# Patient Record
Sex: FEMALE | Race: WHITE | Employment: OTHER | URBAN - NONMETROPOLITAN AREA
[De-identification: names, ages, dates, MRNs, and addresses within clinical notes are randomized per-mention and may not be internally consistent; named-entity substitution may affect disease eponyms.]

---

## 2017-01-03 ENCOUNTER — TELEPHONE (OUTPATIENT)
Dept: ONCOLOGY | Facility: OTHER | Age: 82
End: 2017-01-03

## 2017-01-03 ENCOUNTER — ONCOLOGY VISIT (OUTPATIENT)
Dept: ONCOLOGY | Facility: OTHER | Age: 82
End: 2017-01-03
Attending: NURSE PRACTITIONER
Payer: MEDICARE

## 2017-01-03 VITALS
WEIGHT: 132 LBS | OXYGEN SATURATION: 94 % | SYSTOLIC BLOOD PRESSURE: 130 MMHG | BODY MASS INDEX: 22.53 KG/M2 | DIASTOLIC BLOOD PRESSURE: 80 MMHG | TEMPERATURE: 98.2 F | RESPIRATION RATE: 18 BRPM | HEART RATE: 88 BPM | HEIGHT: 64 IN

## 2017-01-03 DIAGNOSIS — R97.8 ELEVATED TUMOR MARKERS: ICD-10-CM

## 2017-01-03 DIAGNOSIS — C50.911 MALIGNANT NEOPLASM OF RIGHT FEMALE BREAST, UNSPECIFIED SITE OF BREAST: Primary | ICD-10-CM

## 2017-01-03 DIAGNOSIS — M81.0 OSTEOPOROSIS: ICD-10-CM

## 2017-01-03 PROCEDURE — 99212 OFFICE O/P EST SF 10 MIN: CPT

## 2017-01-03 PROCEDURE — 99213 OFFICE O/P EST LOW 20 MIN: CPT | Performed by: NURSE PRACTITIONER

## 2017-01-03 ASSESSMENT — PAIN SCALES - GENERAL: PAINLEVEL: NO PAIN (0)

## 2017-01-03 NOTE — PROGRESS NOTES
Oncology Follow-up Visit:  January 3, 2017    Reason for Visit:  Patient presents with:  RECHECK: 6 month follow up breast and scan and labs     Nursing Note and documentation reviewed: yes    HPI:  This is a 81-year-old female patient who presents to the oncology clinic today in followup of breast cancer.  She was diagnosed with carcinoma of the right breast in April 2010; she underwent mastectomy; tumor was ER/MT positive, HER-2/nery negative. She remains on Arimidex 1mg daily.  She states she is feeling well and has no new complaints.  She has ongoing pain with the left shoulder that has not changed and is long standing.  She continues on calcium with vitamin D twice a day.  She had a DEXA scan completed prior to this visit.    Oncologic History: Michael presented in April 2010 with an abnormal mammogram. She underwent a right simple mastectomy and sentinel node biopsy on 4/15/2010 by Dr. Rogelio Pina. 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 [place] node biopsy negative. She was then seen initially by Dr. Malin, oncology, on 5/4/2010 and patient was placed on tamoxifen 20 mg daily. Patient remained on tamoxifen until May of 2012 and had been complaining of cramps in her calves as well as hot flashes and she was switched to Arimidex 1 mg daily. She continues on the Arimidex to date and has been followed with surveillance.     Current Chemo Regime/TX: Arimidex 1mg daily started May 2012    Current Cycle: n/a  # of completed cycles: n/a    Previous treatment: Started tamoxifen May 2010 and switched to Arimidex 1mg daily May 2012     Past Medical History   Diagnosis Date     Arthritis      Hypertension      Shortness of breath 6/18/2007     Unspecified essential hypertension 8/30/2006     Osteoarthrosis, unspecified whether generalized or localized, lower leg 8/14/2006     Other and unspecified hyperlipidemia 10/11/2007     Backache,  unspecified 1/1/2011     Posttraumatic stress disorder 1/1/2011     Seasonal allergies 1/1/2011     Claustrophobia 1/1/2011     Hip joint replacement by other means 1/1/2011     Breast cancer, stage 1 3/19/2010     Chronic pain syndrome 1/23/2015       Past Surgical History   Procedure Laterality Date     Gyn surgery       tubal pregnancy     Breast surgery       right  side mastectomy     Eye surgery       Colonoscopy       Cataract extraction and lens implantation       Tubal/ectopic pregnancy       Orthopedic surgery  2009     l. hip replacement LT     Orthopedic surgery  july 19th 2013     Right total knee     C total knee arthroplasty Left 01/26/15       Family History   Problem Relation Age of Onset     DIABETES Father 75     cause of death     Other - See Comments Father      PVD     HEART DISEASE Sister      Other - See Comments Mother 83     old age; cause of death     Other - See Comments Sister      pow concentration; cause of death     Other - See Comments Sister      pow concentration; cause of death     Chronic Obstructive Pulmonary Disease Daughter        Social History     Social History     Marital Status:      Spouse Name: N/A     Number of Children: N/A     Years of Education: N/A     Occupational History     Not on file.     Social History Main Topics     Smoking status: Former Smoker -- 0.30 packs/day for 30 years     Types: Cigarettes     Quit date: 08/11/1987     Smokeless tobacco: Never Used      Comment: year quit 1987     Alcohol Use: No     Drug Use: No     Sexual Activity: Not on file     Other Topics Concern     Parent/Sibling W/ Cabg, Mi Or Angioplasty Before 65f 55m? No     Blood Transfusions Yes     Social History Narrative       Current Outpatient Prescriptions   Medication     losartan (COZAAR) 50 MG tablet     simvastatin (ZOCOR) 40 MG tablet     traMADol (ULTRAM) 50 MG tablet     anastrozole (ARIMIDEX) 1 MG tablet     warfarin (COUMADIN) 5 MG tablet     diltiazem (CARDIZEM)  "60 MG tablet     Acetaminophen (TYLENOL PO)     Calcium Citrate-Vitamin D (CALCITRATE/VITAMIN D PO)     Multiple Vitamin (MULTIVITAMINS PO)     Misc Natural Products (OSTEO BI-FLEX ADV DOUBLE ST PO)     No current facility-administered medications for this visit.        Allergies   Allergen Reactions     Atenolol Shortness Of Breath and Other (See Comments)     Dizziness  Severe vertigo and dizziness/SOB     Lisinopril Cough     Pollen Extract      Other reaction(s): Runny Nose       Review Of Systems:  Constitutional: denies fever, weight changes, chills, and night sweats.  Eyes: denies blurred or double vision  Ears/Nose/Throat: denies ear pain, nose problems, difficulty swallowing  Respiratory: denies shortness of breath, cough   Skin: denies rash, lesions  Breast/Chest wall: denies pain, lumps or discharge  Cardiovascular: denies chest pain, palpitations, edema  Gastrointestinal: denies abdominal pain, bloating, nausea, vomiting, early satiety  Genitourinary: denies difficulty with urination, blood in urine  Musculoskeletal:denies new muscle pain, bone pain  Neurologic: denies lightheadedness, headaches, numbness or tingling  Psychiatric: denies anxiety, depression  Hematologic/Lymphatic/Immunologic: denies easy bruising, easy bleeding, lumps or bumps noted  Endocrine: Denies increased thirst    Physical Exam:  /80 mmHg  Pulse 88  Temp(Src) 98.2  F (36.8  C) (Tympanic)  Resp 18  Ht 1.626 m (5' 4\")  Wt 59.875 kg (132 lb)  BMI 22.65 kg/m2  SpO2 94%    GENERAL APPEARANCE:  alert and in no acute distress.  HEENT: Normocephalic, Sclerae anicteric. Oropharynx without ulcers, lesions, or thrush.  NECK: Supple. No asymmetry or masses, no thyromegaly.  LYMPHATICS: No palpable cervical, supraclavicular, axillary, or inguinal nodes   RESP: Lungs clear to auscultation bilaterally, respirations regular and easy  CARDIOVASCULAR: Regular rate and rhythm. Normal S1, S2  ABDOMEN: Soft, nontender. Bowel sounds " auscultated all 4 quadrants. No palpable organomegaly or masses.  BREAST/Chest wall: no concerning lumps, masses or tenderness bilaterally  MUSCULOSKELETAL: Extremities without gross deformities noted. No edema of bilateral lower extremities.  SKIN: No suspicious lesions or rashes.  NEURO: Alert and oriented x 3.  Gait steady.  PSYCHIATRIC: Mentation and affect appear normal.  Mood appropriate.    Laboratory:  Results for orders placed or performed in visit on 12/19/16   CBC with platelets differential   Result Value Ref Range    WBC 10.6 4.0 - 11.0 10e9/L    RBC Count 4.27 3.8 - 5.2 10e12/L    Hemoglobin 13.7 11.7 - 15.7 g/dL    Hematocrit 40.6 35.0 - 47.0 %    MCV 95 78 - 100 fl    MCH 32.1 26.5 - 33.0 pg    MCHC 33.7 31.5 - 36.5 g/dL    RDW 12.7 10.0 - 15.0 %    Platelet Count 355 150 - 450 10e9/L    Diff Method Automated Method     % Neutrophils 68.0 %    % Lymphocytes 21.4 %    % Monocytes 8.7 %    % Eosinophils 1.0 %    % Basophils 0.6 %    % Immature Granulocytes 0.3 %    Nucleated RBCs 0 0 /100    Absolute Neutrophil 7.2 1.6 - 8.3 10e9/L    Absolute Lymphocytes 2.3 0.8 - 5.3 10e9/L    Absolute Monocytes 0.9 0.0 - 1.3 10e9/L    Absolute Eosinophils 0.1 0.0 - 0.7 10e9/L    Absolute Basophils 0.1 0.0 - 0.2 10e9/L    Abs Immature Granulocytes 0.0 0 - 0.4 10e9/L    Absolute Nucleated RBC 0.0    Comprehensive metabolic panel   Result Value Ref Range    Sodium 138 133 - 144 mmol/L    Potassium 4.7 3.4 - 5.3 mmol/L    Chloride 100 94 - 109 mmol/L    Carbon Dioxide 29 20 - 32 mmol/L    Anion Gap 9 3 - 14 mmol/L    Glucose 97 70 - 99 mg/dL    Urea Nitrogen 21 7 - 30 mg/dL    Creatinine 1.01 0.52 - 1.04 mg/dL    GFR Estimate 53 (L) >60 mL/min/1.7m2    GFR Estimate If Black 64 >60 mL/min/1.7m2    Calcium 9.6 8.5 - 10.1 mg/dL    Bilirubin Total 0.7 0.2 - 1.3 mg/dL    Albumin 4.1 3.4 - 5.0 g/dL    Protein Total 8.1 6.8 - 8.8 g/dL    Alkaline Phosphatase 84 40 - 150 U/L    ALT 20 0 - 50 U/L    AST 24 0 - 45 U/L   Ca27.29   breast tumor marker   Result Value Ref Range    CA 27-29 41 (H) 0 - 39 U/mL       Imaging Studies:     BONE DENSITOMETRY SCAN     HISTORY:  Postmenopausal female with history of breast cancer,  screening for osteoporosis.     COMPARISON:  Today's study is compared to a prior examination which is  dated December 12, 2014.     FINDINGS:  Bone mineral density in the right hip is 0.626 G/cm2 with a  T-score of -2.6.  This represents a 4.3% decrease from the prior  study.     Bone mineral density in the lumbar spine is 0.969 G/cm2 with a T-score  of -0.7.  This is essentially unchanged from the prior study.     IMPRESSION:  OSTEOPOROSIS, WORSENED.  Exam Date: Dec 19, 2016 01:23:00 PM  Author: MARGARET JOYA  This report is final and signed     LEFT SCREENING MAMMOGRAM WITH COMPUTER ANALYSIS AND LEFT BREAST  TOMOSYNTHESIS     FINDINGS: The patient is status post right mastectomy.  There are  scattered areas of fibroglandular density.  There are no dominant  juanito,  suspicious calcifications, or areas of architectural  distortion identified.  When comparison is made to prior left  mammograms of April 24, 2015 and April 22, 2014, no significant  interval changes have occurred.     IMPRESSION:  NEGATIVE.  ROUTINE SCREENING IS RECOMMENDED.    12/12/2014 DEXA scan: Osteopenia  11/14/2014 Bone scan: uptake which appears to be benign disease; please see report  10/29/2014 CT scan of the abdomen and pelvis: Incidental findings with no definite finding to account for weight loss  10/29/2014 CT scan of the chest: no intrapulmonary consolidation or new mass to suggest pulmonary malignancy; multiple osteoporotic compression fractures again seen  8/22/2014 right foot x-ray: Negative  8/11/2014 chest x-ray: Negative  4/22/2014 left mammogram: Negative with annual screening recommended      ASSESSMENT/PLAN:    #1 Breast cancer: Diagnosed 4/2010 with stage I carcinoma of the right breast; status post right mastectomy with sentinel node  biopsy negative; tumor was 1.2 cm, ER/UT positive, HER-2/nery negative. She was placed on Arimidex 1 mg daily and will continue this. Ca 27.29 is 41.  Will repeat in 1 month.  Will also obtain a BCI per recommendation of Dr. Levine.  Will follow up with patient for these results.  She will be due for left mammogram in April 2017.    #2  Elevated tumor marker:  Will recheck in 1 month and call her with this result.    #3  Osteoporosis:  Worsened bone density.  Discussed institution of a bisphosphonate and discussed benefits and risks.  She declines related to possible jaw issues as she does not have dental insurance.  Discussed with dr. Levine and we will obtain a BCI to evaluate if we could possibly stop the Arimidex as she has had also 7 yeas of hormonal therapy.  She will continue the Calcium and vitamin D.    I encouraged patient to call with any questions or concerns.      Noemy Mahajan  FNP-BC

## 2017-01-03 NOTE — NURSING NOTE
"Chief Complaint   Patient presents with     RECHECK     6 month follow up breast and scan and labs       Initial /80 mmHg  Pulse 88  Temp(Src) 98.2  F (36.8  C) (Tympanic)  Resp 18  Ht 1.626 m (5' 4\")  Wt 59.875 kg (132 lb)  BMI 22.65 kg/m2  SpO2 94% Estimated body mass index is 22.65 kg/(m^2) as calculated from the following:    Height as of this encounter: 1.626 m (5' 4\").    Weight as of this encounter: 59.875 kg (132 lb).  BP completed using cuff size: regular    Iris Bennett      Patient was assessed using the NCCN psychosocial distress thermometer. Patient rated the score as a 0. Patient rated current stressors as n/a. Stressors will be brought to the attention of provider or Oncology RN Care Coordinator for a score of 6 or greater or per nurses discretion.   Iris Bennett            "

## 2017-01-04 NOTE — TELEPHONE ENCOUNTER
Please call patient and let her know that I discussed her situation with Dr. Levine and we would like to do an additional test that will help determine if she could go off of the medication.  Please order the BCI on tumor sample from 2010 and have her follow up with me for the result in 5 weeks.  She could have her tumor marker re-done 3 days prior to this visit.  That order is in.  Going back on the Tamoxifen is not an option at this point.

## 2017-01-05 ENCOUNTER — MEDICAL CORRESPONDENCE (OUTPATIENT)
Dept: HEALTH INFORMATION MANAGEMENT | Facility: HOSPITAL | Age: 82
End: 2017-01-05

## 2017-01-05 NOTE — TELEPHONE ENCOUNTER
Paper work completed, waiting for Dr. Levine signature. He will be back in office 1/9/16    Melinda Soto RN

## 2017-01-11 ENCOUNTER — DOCUMENTATION ONLY (OUTPATIENT)
Dept: ONCOLOGY | Facility: OTHER | Age: 82
End: 2017-01-11

## 2017-01-11 ENCOUNTER — ANTICOAGULATION THERAPY VISIT (OUTPATIENT)
Dept: ANTICOAGULATION | Facility: OTHER | Age: 82
End: 2017-01-11
Attending: PHYSICIAN ASSISTANT
Payer: MEDICARE

## 2017-01-11 DIAGNOSIS — I48.20 CHRONIC ATRIAL FIBRILLATION (H): ICD-10-CM

## 2017-01-11 DIAGNOSIS — Z79.01 LONG-TERM (CURRENT) USE OF ANTICOAGULANTS: Primary | ICD-10-CM

## 2017-01-11 LAB — INR POINT OF CARE: 2.3 (ref 0.86–1.14)

## 2017-01-11 PROCEDURE — 85610 PROTHROMBIN TIME: CPT | Mod: QW

## 2017-01-11 NOTE — MR AVS SNAPSHOT
Michael Christiansen   1/11/2017 10:15 AM   Anticoagulation Therapy Visit    Description:  81 year old female   Provider:   ANTI COAGULATION   Department:   Anti Coagulation           INR as of 1/11/2017     Selected INR 2.3 (1/11/2017)      Anticoagulation Summary as of 1/11/2017     INR goal 2.0-3.0   Selected INR 2.3 (1/11/2017)   Full instructions 5 mg on Mon, Wed, Fri; 2.5 mg all other days   Next INR check 2/22/2017    Indications   Long-term (current) use of anticoagulants [Z79.01] [Z79.01]  Chronic atrial fibrillation (H) [I48.2]         Your next Anticoagulation Clinic appointment(s)     Jan 11, 2017 10:15 AM   [facility]  [facility]   [facility]    [address] [facility] [address]   [phone]            Feb 22, 2017 10:30 AM   Anticoagulation Visit with  ANTI COAGULATION   [facility]    [address] [facility] [address]   [phone]              January 2017 Details    Sun Mon Tue Wed Thu Fri Sat     1               2               3               4               5               6               7                 8               9               10               11      5 mg   See details      12      2.5 mg         13      5 mg         14      2.5 mg           15      2.5 mg         16      5 mg         17      2.5 mg         18      5 mg         19      2.5 mg         20      5 mg         21      2.5 mg           22      2.5 mg         23      5 mg         24      2.5 mg         25      5 mg         26      2.5 mg         27      5 mg         28      2.5 mg           29      2.5 mg         30      5 mg         31      2.5 mg              Date Details   01/11 This INR check               How to take your warfarin dose     To take:  2.5 mg Take 0.5 of a 5 mg tablet.    To take:  5 mg Take 1 of the 5 mg tablets.           February 2017 Details    Sun Mon Tue Wed Thu Fri Sat        1      5 mg          2      2.5 mg         3      5 mg         4      2.5 mg           5      2.5 mg         6      5 mg         7      2.5 mg         8      5 mg         9      2.5 mg         10      5 mg         11      2.5 mg           12      2.5 mg         13      5 mg         14      2.5 mg         15      5 mg         16      2.5 mg         17      5 mg         18      2.5 mg           19      2.5 mg         20      5 mg         21      2.5 mg         22            23               24               25                 26               27               28                    Date Details   No additional details    Date of next INR:  2/22/2017         How to take your warfarin dose     To take:  2.5 mg Take 0.5 of a 5 mg tablet.    To take:  5 mg Take 1 of the 5 mg tablets.

## 2017-01-11 NOTE — PROGRESS NOTES
ANTICOAGULATION FOLLOW-UP CLINIC VISIT    Patient Name:  Michael Christiansen  Date:  1/11/2017  Contact Type:  Face to Face    SUBJECTIVE:     Patient Findings     Positives No Problem Findings           OBJECTIVE    INR PROTIME   Date Value Ref Range Status   01/11/2017 2.3* 0.86 - 1.14 Final       ASSESSMENT / PLAN  INR assessment THER    Recheck INR In: 6 WEEKS    INR Location Clinic      Anticoagulation Summary as of 1/11/2017     INR goal 2.0-3.0   Selected INR 2.3 (1/11/2017)   Maintenance plan 5 mg (5 mg x 1) on Mon, Wed, Fri; 2.5 mg (5 mg x 0.5) all other days   Full instructions 5 mg on Mon, Wed, Fri; 2.5 mg all other days   Weekly total 25 mg   No change documented Kasia Hercules RN   Plan last modified Gabriella Koo RN (7/27/2016)   Next INR check 2/22/2017   Priority INR   Target end date Indefinite    Indications   Long-term (current) use of anticoagulants [Z79.01] [Z79.01]  Chronic atrial fibrillation (H) [I48.2]         Anticoagulation Episode Summary     INR check location     Preferred lab     Send INR reminders to  [facility] POOL    Comments       Anticoagulation Care Providers     Provider Role Specialty Phone number    Chandrika Kumar, PA [facility] [phone]            See the Encounter Report to view Anticoagulation Flowsheet and Dosing Calendar (Go to Encounters tab in chart review, and find the Anticoagulation Therapy Visit)        Kasia Hercules, RN

## 2017-01-11 NOTE — PROGRESS NOTES
BCI fax received, Requesting more information - pathology reports  All path related to breast cancer sent.     Melinda Soto RN

## 2017-01-20 NOTE — PROGRESS NOTES
Additional information requested by BCI - information sent and confirmation received.     Melinda Soto RN

## 2017-01-25 ENCOUNTER — TELEPHONE (OUTPATIENT)
Dept: ONCOLOGY | Facility: OTHER | Age: 82
End: 2017-01-25

## 2017-01-26 ENCOUNTER — ONCOLOGY VISIT (OUTPATIENT)
Dept: ONCOLOGY | Facility: OTHER | Age: 82
End: 2017-01-26
Attending: NURSE PRACTITIONER
Payer: MEDICARE

## 2017-01-26 VITALS
WEIGHT: 131 LBS | SYSTOLIC BLOOD PRESSURE: 135 MMHG | BODY MASS INDEX: 22.36 KG/M2 | OXYGEN SATURATION: 98 % | HEIGHT: 64 IN | HEART RATE: 86 BPM | DIASTOLIC BLOOD PRESSURE: 74 MMHG | TEMPERATURE: 98.1 F | RESPIRATION RATE: 16 BRPM

## 2017-01-26 DIAGNOSIS — Z12.31 ENCOUNTER FOR SCREENING MAMMOGRAM FOR BREAST CANCER: ICD-10-CM

## 2017-01-26 DIAGNOSIS — R97.8 ELEVATED TUMOR MARKERS: ICD-10-CM

## 2017-01-26 DIAGNOSIS — M81.0 OSTEOPOROSIS: ICD-10-CM

## 2017-01-26 DIAGNOSIS — Z71.89 ACP (ADVANCE CARE PLANNING): ICD-10-CM

## 2017-01-26 DIAGNOSIS — C50.911 MALIGNANT NEOPLASM OF RIGHT FEMALE BREAST, UNSPECIFIED SITE OF BREAST: Primary | ICD-10-CM

## 2017-01-26 PROCEDURE — 99214 OFFICE O/P EST MOD 30 MIN: CPT | Performed by: NURSE PRACTITIONER

## 2017-01-26 PROCEDURE — 99212 OFFICE O/P EST SF 10 MIN: CPT

## 2017-01-26 ASSESSMENT — PAIN SCALES - GENERAL: PAINLEVEL: MILD PAIN (2)

## 2017-01-26 NOTE — TELEPHONE ENCOUNTER
Please call patient and ask if she would be willing to come in to discuss the results of the test we did.

## 2017-01-26 NOTE — PATIENT INSTRUCTIONS
We would like to see you back in 6 months. Please come one week prior for lab work. When you are in need of a refill, please call your pharmacy and they will send us a request. If you have any questions please call [phone]

## 2017-01-26 NOTE — MR AVS SNAPSHOT
"              After Visit Summary   1/26/2017    Michael Christiansen    MRN: [id number]           Patient Information     Date Of Birth          1935        Visit Information        Provider Department      1/26/2017 12:00 PM Noemy Mahajan NP [facility]        Today's Diagnoses     ACP (advance care planning)    -  1       Care Instructions    We would like to see you back in 6 months. Please come one week prior for lab work. When you are in need of a refill, please call your pharmacy and they will send us a request. If you have any questions please call [phone]        Follow-ups after your visit        Your next 10 appointments already scheduled     Feb 22, 2017 10:30 AM   Anticoagulation Visit with  ANTI COAGULATION   [facility] ([facility])    [address] [place] [id number] [phone]              Who to contact     If you have questions or need follow up information about today's clinic visit or your schedule please contact [facility] directly at [phone].  Normal or non-critical lab and imaging results will be communicated to you by MyChart, letter or phone within 4 business days after the clinic has received the results. If you do not hear from us within 7 days, please contact the clinic through MyChart or phone. If you have a critical or abnormal lab result, we will notify you by phone as soon as possible.  Submit refill requests through fypio or call your pharmacy and they will forward the refill request to us. Please allow 3 business days for your refill to be completed.          Additional Information About Your Visit        Jumiahart Information     fypio lets you send messages to your doctor, view your test results, renew your prescriptions, schedule appointments and more. To sign up, go to [email or web address] . Click on \"Log in\" on the left side of the screen, which will take you to the Welcome page. Then click on " "\"Sign up Now\" on the right side of the page.     You will be asked to enter the access code listed below, as well as some personal information. Please follow the directions to create your username and password.     Your access code is: [email or web address]  Expires: 2017 12:56 PM     Your access code will  in 90 days. If you need help or a new code, please call your [facility] or [phone].        Care EveryWhere ID     This is your Care EveryWhere ID. This could be used by other organizations to access your [facility] medical records  [id number]        Your Vitals Were     Pulse Temperature Respirations Height BMI (Body Mass Index) Pulse Oximetry    86 98.1  F (36.7  C) (Tympanic) 16 1.626 m (5' 4\") 22.47 kg/m2 98%       Blood Pressure from Last 3 Encounters:   17 135/74   17 130/80   16 121/67    Weight from Last 3 Encounters:   17 59.421 kg (131 lb)   17 59.875 kg (132 lb)   16 59.421 kg (131 lb)              Today, you had the following     No orders found for display       Primary Care Provider Office Phone # Fax #    MELISA Murray [phone]       [facility] [address] [place] [address]        Thank you!     Thank you for choosing [facility]  for your care. Our goal is always to provide you with excellent care. Hearing back from our patients is one way we can continue to improve our services. Please take a few minutes to complete the written survey that you may receive in the mail after your visit with us. Thank you!             Your Updated Medication List - Protect others around you: Learn how to safely use, store and throw away your medicines at www.disposemymeds.org.          This list is accurate as of: 17 12:56 PM.  Always use your most recent med list.                   Brand Name Dispense Instructions for use    anastrozole 1 MG tablet    ARIMIDEX    90 tablet    Take 1 tablet (1 mg) by mouth " daily       CALCITRATE/VITAMIN D PO      Take  by mouth 2 times daily.       diltiazem 60 MG tablet    CARDIZEM    93 tablet    TAKE ONE TABLET BY MOUTH THREE TIMES DAILY       losartan 50 MG tablet    COZAAR    30 tablet    TAKE ONE TABLET BY MOUTH ONCE DAILY       MULTIVITAMINS PO      Take  by mouth daily.       OSTEO BI-FLEX ADV DOUBLE ST PO      Take  by mouth daily.       simvastatin 40 MG tablet    ZOCOR    45 tablet    TAKE ONE-HALF TABLET BY MOUTH ONCE DAILY IN THE EVENING       traMADol 50 MG tablet    ULTRAM    180 tablet    TAKE ONE TO TWO TABLETS BY MOUTH EVERY 6 HOURS AS NEEDED FOR  PAIN       TYLENOL PO      Take 500 mg by mouth       warfarin 5 MG tablet    COUMADIN    90 tablet    5mg Mon-Wed-Fri and 2.5mg the rest of the week

## 2017-01-26 NOTE — NURSING NOTE
"Chief Complaint   Patient presents with     RECHECK     follow up results/breast       Initial /74 mmHg  Pulse 86  Temp(Src) 98.1  F (36.7  C) (Tympanic)  Resp 16  Ht 1.626 m (5' 4\")  Wt 59.421 kg (131 lb)  BMI 22.47 kg/m2  SpO2 98% Estimated body mass index is 22.47 kg/(m^2) as calculated from the following:    Height as of this encounter: 1.626 m (5' 4\").    Weight as of this encounter: 59.421 kg (131 lb).  BP completed using cuff size: regular  Iris Bennett      Patient was assessed using the NCCN psychosocial distress thermometer. Patient rated the score as a 0. Patient rated current stressors as n/a. Stressors will be brought to the attention of provider or Oncology RN Care Coordinator for a score of 6 or greater or per nurses discretion.     Iris Bennett        "

## 2017-01-26 NOTE — PROGRESS NOTES
Oncology Follow-up Visit:  January 26, 2017    Reason for Visit:  Patient presents with:  RECHECK: follow up results/breast     Nursing Note and documentation reviewed: yes    HPI:  This is a 81-year-old female patient who presents to the oncology clinic today in followup of breast cancer.  She was diagnosed with carcinoma of the right breast in April 2010; she underwent mastectomy; tumor was ER/NC positive, HER-2/nery negative. She remains on Arimidex 1mg daily.  Patient was seen in follow up on 1/3/2017 with results of DEXA scan which showed worsening bone density and now osteoporosis.  She declines and bisphosphonate therapy related to concerns of side effects.  I was decided to do a Breast Cancer Index to evaluate the need for extended hormonal therapy and she is here to discuss these results.  She has no new complaints today.    Oncologic History:  Michael presented in April 2010 with an abnormal mammogram. She underwent a right simple mastectomy and sentinel node biopsy on 4/15/2010 by Dr. Rogelio Pina. 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 [place] node biopsy negative. She was then seen initially by Dr. Malin, oncology, on 5/4/2010 and patient was placed on tamoxifen 20 mg daily. Patient remained on tamoxifen until May of 2012 and had been complaining of cramps in her calves as well as hot flashes and she was switched to Arimidex 1 mg daily. She continues on the Arimidex to date and has been followed with surveillance.     Current Chemo Regime/TX: Arimidex 1mg daily started May 2012    Current Cycle: n/a  # of completed cycles: n/a    Previous treatment: Started tamoxifen May 2010 and switched to Arimidex 1mg daily May 2012     Past Medical History   Diagnosis Date     Arthritis      Hypertension      Shortness of breath 6/18/2007     Unspecified essential hypertension 8/30/2006     Osteoarthrosis, unspecified whether  generalized or localized, lower leg 8/14/2006     Other and unspecified hyperlipidemia 10/11/2007     Backache, unspecified 1/1/2011     Posttraumatic stress disorder 1/1/2011     Seasonal allergies 1/1/2011     Claustrophobia 1/1/2011     Hip joint replacement by other means 1/1/2011     Breast cancer, stage 1 3/19/2010     Chronic pain syndrome 1/23/2015       Past Surgical History   Procedure Laterality Date     Gyn surgery       tubal pregnancy     Breast surgery       right  side mastectomy     Eye surgery       Colonoscopy       Cataract extraction and lens implantation       Tubal/ectopic pregnancy       Orthopedic surgery  2009     l. hip replacement LT     Orthopedic surgery  july 19th 2013     Right total knee     C total knee arthroplasty Left 01/26/15       Family History   Problem Relation Age of Onset     DIABETES Father 75     cause of death     Other - See Comments Father      PVD     HEART DISEASE Sister      Other - See Comments Mother 83     old age; cause of death     Other - See Comments Sister      pow concentration; cause of death     Other - See Comments Sister      pow concentration; cause of death     Chronic Obstructive Pulmonary Disease Daughter        Social History     Social History     Marital Status:      Spouse Name: N/A     Number of Children: N/A     Years of Education: N/A     Occupational History     Not on file.     Social History Main Topics     Smoking status: Former Smoker -- 0.30 packs/day for 30 years     Types: Cigarettes     Quit date: 08/11/1987     Smokeless tobacco: Never Used      Comment: year quit 1987     Alcohol Use: No     Drug Use: No     Sexual Activity: Not on file     Other Topics Concern     Parent/Sibling W/ Cabg, Mi Or Angioplasty Before 65f 55m? No     Blood Transfusions Yes     Social History Narrative       Current Outpatient Prescriptions   Medication     traMADol (ULTRAM) 50 MG tablet     losartan (COZAAR) 50 MG tablet     simvastatin (ZOCOR)  "40 MG tablet     anastrozole (ARIMIDEX) 1 MG tablet     warfarin (COUMADIN) 5 MG tablet     diltiazem (CARDIZEM) 60 MG tablet     Acetaminophen (TYLENOL PO)     Calcium Citrate-Vitamin D (CALCITRATE/VITAMIN D PO)     Multiple Vitamin (MULTIVITAMINS PO)     Misc Natural Products (OSTEO BI-FLEX ADV DOUBLE ST PO)     No current facility-administered medications for this visit.        Allergies   Allergen Reactions     Atenolol Shortness Of Breath and Other (See Comments)     Dizziness  Severe vertigo and dizziness/SOB     Lisinopril Cough     Pollen Extract      Other reaction(s): Runny Nose       Physical Exam:  /74 mmHg  Pulse 86  Temp(Src) 98.1  F (36.7  C) (Tympanic)  Resp 16  Ht 1.626 m (5' 4\")  Wt 59.421 kg (131 lb)  BMI 22.47 kg/m2  SpO2 98%    GENERAL APPEARANCE:  alert and in no acute distress.  HEENT: Normocephalic, Sclerae anicteric.   RESP: respirations regular and easy  MUSCULOSKELETAL: Extremities without gross deformities noted.   NEURO: Alert and oriented x 3.  Gait steady.  PSYCHIATRIC: Mentation and affect appear normal.  Mood appropriate.    Laboratory:  None for today    Imaging Studies:      BONE DENSITOMETRY SCAN     HISTORY:  Postmenopausal female with history of breast cancer,  screening for osteoporosis.     COMPARISON:  Today's study is compared to a prior examination which is  dated December 12, 2014.     FINDINGS:  Bone mineral density in the right hip is 0.626 G/cm2 with a  T-score of -2.6.  This represents a 4.3% decrease from the prior  study.     Bone mineral density in the lumbar spine is 0.969 G/cm2 with a T-score  of -0.7.  This is essentially unchanged from the prior study.     IMPRESSION:  OSTEOPOROSIS, WORSENED.  Exam Date: Dec 19, 2016 01:23:00 PM  Author: MARGARET JOYA  This report is final and signed      ASSESSMENT/PLAN:    #1 Breast cancer: Diagnosed 4/2010 with stage I carcinoma of the right breast; status post right mastectomy with sentinel node biopsy negative; " tumor was 1.2 cm, ER/AL positive, HER-2/nery negative.  She has been on antiestrogen therapy since May 2010.  BCI shows low risk and benefit form extended hormonal therapy so we will discontinue the arimidex.  She will have another CA 27.29 in 2 weeks related to slight elevation and then plan follow up in 6 months with a CBC, CMP, LDH and CA 27.29.  Will likely follow annually from there.  Will place order for mammogram in April 2017.    #2  Elevated CA 27.29:  Very slight elevation at 41.  Will redraw this in about 2 weeks and call her with the results.    #3  Osteoporosis:  Worsening in her bone density and per BCI, patient is at low risk so the Arimidex will be discontinued.  Will plan another DEXA scan in 1 year.  She will continue with Calcium and Vitamin D twice daily.  Discussed extensively the use of bisphosphonates and patient declines.    I encouraged patient to call with any questions or concerns.    Noemy Mahajan  FNP-BC

## 2017-02-22 ENCOUNTER — ANTICOAGULATION THERAPY VISIT (OUTPATIENT)
Dept: ANTICOAGULATION | Facility: OTHER | Age: 82
End: 2017-02-22
Attending: PHYSICIAN ASSISTANT
Payer: MEDICARE

## 2017-02-22 DIAGNOSIS — Z79.01 LONG-TERM (CURRENT) USE OF ANTICOAGULANTS: ICD-10-CM

## 2017-02-22 DIAGNOSIS — I48.20 CHRONIC ATRIAL FIBRILLATION (H): ICD-10-CM

## 2017-02-22 LAB — INR POINT OF CARE: 2.3 (ref 0.86–1.14)

## 2017-02-22 PROCEDURE — 85610 PROTHROMBIN TIME: CPT | Mod: QW

## 2017-02-22 NOTE — PROGRESS NOTES
ANTICOAGULATION FOLLOW-UP CLINIC VISIT    Patient Name:  Michael Christiansen  Date:  2/22/2017  Contact Type:  Face to Face    SUBJECTIVE:     Patient Findings     Comments Sciatic nerve pain left leb           OBJECTIVE    INR Protime   Date Value Ref Range Status   02/22/2017 2.3 (A) 0.86 - 1.14 Final       ASSESSMENT / PLAN  INR assessment THER    Recheck INR In: 6 WEEKS    INR Location Clinic      Anticoagulation Summary as of 2/22/2017     INR goal 2.0-3.0   Today's INR 2.3   Maintenance plan 5 mg (5 mg x 1) on Mon, Wed, Fri; 2.5 mg (5 mg x 0.5) all other days   Full instructions 5 mg on Mon, Wed, Fri; 2.5 mg all other days   Weekly total 25 mg   No change documented Kasia Hercules RN   Plan last modified Gabriella Koo RN (7/27/2016)   Next INR check 4/5/2017   Priority INR   Target end date Indefinite    Indications   Long-term (current) use of anticoagulants [Z79.01] [Z79.01]  Chronic atrial fibrillation (H) [I48.2]         Anticoagulation Episode Summary     INR check location     Preferred lab     Send INR reminders to  [facility] POOL    Comments       Anticoagulation Care Providers     Provider Role Specialty Phone number    Chandrika Kumar, PA [facility] [phone]            See the Encounter Report to view Anticoagulation Flowsheet and Dosing Calendar (Go to Encounters tab in chart review, and find the Anticoagulation Therapy Visit)        Kasia Hercules, RN

## 2017-02-22 NOTE — MR AVS SNAPSHOT
Michael DEJESUS Елена   2/22/2017 10:30 AM   Anticoagulation Therapy Visit    Description:  81 year old female   Provider:   ANTI COAGULATION   Department:  [facility] Anti Coagulation           INR as of 2/22/2017     Today's INR 2.3      Anticoagulation Summary as of 2/22/2017     INR goal 2.0-3.0   Today's INR 2.3   Full instructions 5 mg on Mon, Wed, Fri; 2.5 mg all other days   Next INR check 4/5/2017    Indications   Long-term (current) use of anticoagulants [Z79.01] [Z79.01]  Chronic atrial fibrillation (H) [I48.2]         Your next Anticoagulation Clinic appointment(s)     Feb 22, 2017 10:30 AM CST   Anticoagulation Visit with  ANTI COAGULATION   [facility] ([facility]    [address]  [place] [address]   [phone]            Apr 05, 2017 10:00 AM CDT   Anticoagulation Visit with  ANTI COAGULATION   [facility] ([facility])    [address]  [place] [address]   [phone]              February 2017 Details    Sun Mon Tue Wed Thu Fri Sat        1               2               3               4                 5               6               7               8               9               10               11                 12               13               14               15               16               17               18                 19               20               21               22      5 mg   See details      23      2.5 mg         24      5 mg         25      2.5 mg           26      2.5 mg         27      5 mg         28      2.5 mg              Date Details   02/22 This INR check               How to take your warfarin dose     To take:  2.5 mg Take 0.5 of a 5 mg tablet.    To take:  5 mg Take 1 of the 5 mg tablets.           March 2017 Details    Sun Mon Tue Wed Thu Fri Sat        1      5 mg         2      2.5 mg         3      5 mg         4      2.5 mg           5      2.5 mg         6      5 mg         7      2.5 mg         8      5  mg         9      2.5 mg         10      5 mg         11      2.5 mg           12      2.5 mg         13      5 mg         14      2.5 mg         15      5 mg         16      2.5 mg         17      5 mg         18      2.5 mg           19      2.5 mg         20      5 mg         21      2.5 mg         22      5 mg         23      2.5 mg         24      5 mg         25      2.5 mg           26      2.5 mg         27      5 mg         28      2.5 mg         29      5 mg         30      2.5 mg         31      5 mg           Date Details   No additional details            How to take your warfarin dose     To take:  2.5 mg Take 0.5 of a 5 mg tablet.    To take:  5 mg Take 1 of the 5 mg tablets.           April 2017 Details    Sun Mon Tue Wed Thu Fri Sat           1      2.5 mg           2      2.5 mg         3      5 mg         4      2.5 mg         5            6               7               8                 9               10               11               12               13               14               15                 16               17               18               19               20               21               22                 23               24               25               26               27               28               29                 30                      Date Details   No additional details    Date of next INR:  4/5/2017         How to take your warfarin dose     To take:  2.5 mg Take 0.5 of a 5 mg tablet.    To take:  5 mg Take 1 of the 5 mg tablets.

## 2017-02-27 DIAGNOSIS — Z79.01 LONG TERM CURRENT USE OF ANTICOAGULANT THERAPY: ICD-10-CM

## 2017-02-27 DIAGNOSIS — I48.91 ATRIAL FIBRILLATION (H): ICD-10-CM

## 2017-02-27 DIAGNOSIS — I10 BENIGN ESSENTIAL HYPERTENSION: ICD-10-CM

## 2017-02-27 RX ORDER — WARFARIN SODIUM 5 MG/1
TABLET ORAL
Qty: 90 TABLET | Refills: 0 | Status: SHIPPED | OUTPATIENT
Start: 2017-02-27 | End: 2017-05-24

## 2017-02-27 RX ORDER — DILTIAZEM HCL 60 MG
TABLET ORAL
Qty: 93 TABLET | Refills: 0 | Status: SHIPPED | OUTPATIENT
Start: 2017-02-27 | End: 2017-03-27

## 2017-02-28 DIAGNOSIS — C50.911 MALIGNANT NEOPLASM OF RIGHT FEMALE BREAST, UNSPECIFIED SITE OF BREAST: ICD-10-CM

## 2017-02-28 DIAGNOSIS — R97.8 ELEVATED TUMOR MARKERS: ICD-10-CM

## 2017-02-28 LAB — CANCER AG27-29 SERPL-ACNC: 35 U/ML (ref 0–39)

## 2017-02-28 PROCEDURE — 86300 IMMUNOASSAY TUMOR CA 15-3: CPT | Performed by: NURSE PRACTITIONER

## 2017-02-28 PROCEDURE — 36415 COLL VENOUS BLD VENIPUNCTURE: CPT | Performed by: NURSE PRACTITIONER

## 2017-03-22 ENCOUNTER — OFFICE VISIT (OUTPATIENT)
Dept: FAMILY MEDICINE | Facility: OTHER | Age: 82
End: 2017-03-22
Attending: PHYSICIAN ASSISTANT
Payer: MEDICARE

## 2017-03-22 VITALS
DIASTOLIC BLOOD PRESSURE: 72 MMHG | HEIGHT: 64 IN | BODY MASS INDEX: 22.36 KG/M2 | WEIGHT: 131 LBS | HEART RATE: 84 BPM | TEMPERATURE: 98.5 F | SYSTOLIC BLOOD PRESSURE: 122 MMHG | OXYGEN SATURATION: 95 %

## 2017-03-22 DIAGNOSIS — I10 ESSENTIAL HYPERTENSION: ICD-10-CM

## 2017-03-22 DIAGNOSIS — H61.21 IMPACTED CERUMEN OF RIGHT EAR: ICD-10-CM

## 2017-03-22 DIAGNOSIS — E78.5 HYPERLIPIDEMIA LDL GOAL <130: ICD-10-CM

## 2017-03-22 DIAGNOSIS — M47.10 OSTEOARTHRITIS OF SPINE WITH MYELOPATHY, UNSPECIFIED SPINAL REGION: Primary | ICD-10-CM

## 2017-03-22 DIAGNOSIS — M81.0 SENILE OSTEOPOROSIS: ICD-10-CM

## 2017-03-22 PROCEDURE — 99212 OFFICE O/P EST SF 10 MIN: CPT | Mod: 25 | Performed by: COUNSELOR

## 2017-03-22 PROCEDURE — 69209 REMOVE IMPACTED EAR WAX UNI: CPT | Performed by: PHYSICIAN ASSISTANT

## 2017-03-22 PROCEDURE — 72100 X-RAY EXAM L-S SPINE 2/3 VWS: CPT | Mod: TC

## 2017-03-22 PROCEDURE — 99214 OFFICE O/P EST MOD 30 MIN: CPT | Performed by: PHYSICIAN ASSISTANT

## 2017-03-22 RX ORDER — ALENDRONATE SODIUM 70 MG/1
70 TABLET ORAL
Qty: 12 TABLET | Refills: 1 | Status: SHIPPED | OUTPATIENT
Start: 2017-03-22 | End: 2017-06-14

## 2017-03-22 ASSESSMENT — PAIN SCALES - GENERAL: PAINLEVEL: MILD PAIN (3)

## 2017-03-22 NOTE — PROGRESS NOTES
SUBJECTIVE:                                                    Michael Christiansen is a 81 year old female who presents to clinic today for the following health issues:    Musculoskeletal problem/pain      Duration: On going issue for a while    Description  Location: Left Leg/Low Left back    Intensity:  moderate    Accompanying signs and symptoms: none    History  Previous similar problem: no   Previous evaluation:  none    Precipitating or alleviating factors:  Trauma or overuse: no   Aggravating factors include: walking, climbing stairs and exercise    Therapies tried and outcome: rest/inactivity       Hyperlipidemia Follow-Up      Rate your low fat/cholesterol diet?: good    Taking statin?  Yes, no muscle aches from statin    Other lipid medications/supplements?:  none     Hypertension Follow-up      Outpatient blood pressures are not being checked.    Low Salt Diet: no added salt       Problem list and histories reviewed & adjusted, as indicated.  Additional history: as documented    Patient Active Problem List   Diagnosis     Hyperlipidemia LDL goal <130     HTN (hypertension)     Osteoarthritis     Chronic atrial fibrillation (H)     Advanced care planning/counseling discussion     Cancer of breast (H)     Chronic pain syndrome     History of total knee replacement     Thoracic or lumbosacral neuritis or radiculitis     Osteoarthritis of knee     Aromatase inhibitor use     Osteopenia     Long-term (current) use of anticoagulants [Z79.01]     ACP (advance care planning)     Malignant neoplasm of right female breast, unspecified site of breast (H)     Osteoporosis     Past Surgical History:   Procedure Laterality Date     BREAST SURGERY      right  side mastectomy     C TOTAL KNEE ARTHROPLASTY Left 01/26/15     cataract extraction and lens implantation       COLONOSCOPY       EYE SURGERY       GYN SURGERY      tubal pregnancy     ORTHOPEDIC SURGERY  2009    l. hip replacement LT     ORTHOPEDIC SURGERY  july  19th 2013    Right total knee     TUBAL/ECTOPIC PREGNANCY         Social History   Substance Use Topics     Smoking status: Former Smoker     Packs/day: 0.30     Years: 30.00     Types: Cigarettes     Quit date: 8/11/1987     Smokeless tobacco: Never Used      Comment: year quit 1987     Alcohol use No     Family History   Problem Relation Age of Onset     DIABETES Father 75     cause of death     Other - See Comments Father      PVD     HEART DISEASE Sister      Other - See Comments Mother 83     old age; cause of death     Other - See Comments Sister      pow concentration; cause of death     Other - See Comments Sister      pow concentration; cause of death     Chronic Obstructive Pulmonary Disease Daughter          Current Outpatient Prescriptions   Medication Sig Dispense Refill     diltiazem (CARDIZEM) 60 MG tablet TAKE ONE TABLET BY MOUTH THREE TIMES DAILY 93 tablet 0     warfarin (COUMADIN) 5 MG tablet TAKE ONE TABLET (5 MG) BY MOUTH MONDAY, WEDNESDAY, FRIDAY, AND ONE-HALF TABLET (2.5 MG) THE REST OF THE WEEK. 90 tablet 0     simvastatin (ZOCOR) 40 MG tablet TAKE ONE-HALF TABLET BY MOUTH ONCE DAILY IN THE EVENING 45 tablet 0     traMADol (ULTRAM) 50 MG tablet TAKE ONE TO TWO TABLETS BY MOUTH EVERY 6 HOURS AS NEEDED FOR  PAIN 180 tablet 0     losartan (COZAAR) 50 MG tablet TAKE ONE TABLET BY MOUTH ONCE DAILY 30 tablet 3     Acetaminophen (TYLENOL PO) Take 500 mg by mouth       Calcium Citrate-Vitamin D (CALCITRATE/VITAMIN D PO) Take  by mouth 2 times daily.       Multiple Vitamin (MULTIVITAMINS PO) Take  by mouth daily.       Misc Natural Products (OSTEO BI-FLEX ADV DOUBLE ST PO) Take  by mouth daily.       Allergies   Allergen Reactions     Atenolol Shortness Of Breath and Other (See Comments)     Dizziness  Severe vertigo and dizziness/SOB     Lisinopril Cough     Pollen Extract      Other reaction(s): Runny Nose     BP Readings from Last 3 Encounters:   03/22/17 122/72   01/26/17 135/74   01/03/17 130/80  "   Wt Readings from Last 3 Encounters:   03/22/17 131 lb (59.4 kg)   01/26/17 131 lb (59.4 kg)   01/03/17 132 lb (59.9 kg)                    Reviewed and updated as needed this visit by clinical staff  Tobacco  Allergies  Meds       Reviewed and updated as needed this visit by Provider         ROS:  Constitutional, neuro, ENT, endocrine, pulmonary, cardiac, gastrointestinal, genitourinary, musculoskeletal, integument and psychiatric systems are negative, except as otherwise noted.    OBJECTIVE:                                                    /72  Pulse 84  Temp 98.5  F (36.9  C) (Tympanic)  Ht 5' 4\" (1.626 m)  Wt 131 lb (59.4 kg)  SpO2 95%  BMI 22.49 kg/m2  Body mass index is 22.49 kg/(m^2).  GENERAL APPEARANCE: healthy, alert and no distress  EYES: Eyes grossly normal to inspection, PERRL and conjunctivae and sclerae normal  HENT: ear canals and TM's right impacted and manual removal not able to do.  Wash ordered and nurse did do this.  nose and mouth without ulcers or lesions  NECK: no adenopathy, no asymmetry, masses, or scars and thyroid normal to palpation  RESP: lungs clear to auscultation - no rales, rhonchi or wheezes  CV: regular rates and rhythm, normal S1 S2, no S3 or S4 and no murmur, click or rub  LYMPHATICS: normal ant/post cervical and supraclavicular nodes  MS: pain in left leg and ankle from her back is improved.   SKIN: no suspicious lesions or rashes  NEURO: Normal strength and tone, mentation intact and speech normal  PSYCH: mentation appears normal and affect normal/bright    Diagnostic test results:  Diagnostic Test Results:  No results found for this or any previous visit (from the past 24 hour(s)).     ASSESSMENT/PLAN:                                                    1. Osteoarthritis of spine with myelopathy, unspecified spinal region  Pain is a little better today.  Pain seems to go to the hip and to the ankle.  Near the S1 Distrubution on her lower back.   - XR Lumbar " Spine 2/3 Views    2. Essential hypertension  Good control good labs.     3. Hyperlipidemia LDL goal <130  Due for this.  Diet is good really has lost weight but less mobile.     - Lipid Profile; Future    4. Senile osteoporosis  DEXA shows present.  Willing to take this to avoid fracture.   - alendronate (FOSAMAX) 70 MG tablet; Take 1 tablet (70 mg) by mouth every 7 days Take 60 minutes before am meal with 8 oz. water. Remain upright for 30 minutes.  Dispense: 12 tablet; Refill: 1        See Patient Instructions    MELISA Gillespie  [facility]

## 2017-03-22 NOTE — MR AVS SNAPSHOT
After Visit Summary   3/22/2017    Michael Christiansen    MRN: [id number]           Patient Information     Date Of Birth          1935        Visit Information        Provider Department      3/22/2017 10:45 AM Chandrika Kumar PA [facility]        Today's Diagnoses     Osteoarthritis of spine with myelopathy, unspecified spinal region    -  1    Essential hypertension        Hyperlipidemia LDL goal <130        Senile osteoporosis          Care Instructions    Thank you for choosing [facility].   I appreciate the opportunity to serve you and look forward to supporting your healthcare needs in the future. Please contact me with any questions or concerns that you may have.    Sincerely,    Chandrika Kumar RN, PA-C           Follow-ups after your visit        Your next 10 appointments already scheduled     Apr 05, 2017 10:00 AM CDT   Anticoagulation Visit with  ANTI COAGULATION   [facility] ([facility] [place] [facility])    [address]  [place] [address]   [phone]            Apr 28, 2017  9:30 AM CDT   MAMMOGRAM with  MAMMOGRAM [facility] ([facility] [place] [facility])    [address]  [facility] [address]   [phone]           Do not wear any body powder, lotions, deodorant or perfume the day of the exam. Bring a list of all medications, especially hormones.  If your last mammogram was not done at [place], please bring your mammogram films. We will need the name of your MD/PA to send a copy of your report.            Jul 20, 2017  8:30 AM CDT   LAB with HC LAB   [facility] ([facility] [place] [facility])    [address]  [place] [address]   [phone]            Jul 27, 2017  9:30 AM CDT   Return Visit with Noemy Mahajan NP   [facility] ([facility] [place] [facility])    [address]  [place] [address]   [phone]              Future tests that were ordered for you today     Open Future Orders        Priority  "Expected Expires Ordered    Lipid Profile Routine  3/22/2018 3/22/2017            Who to contact     If you have questions or need follow up information about today's clinic visit or your schedule please contact [facility] [place] directly at [phone].  Normal or non-critical lab and imaging results will be communicated to you by MyChart, letter or phone within 4 business days after the clinic has received the results. If you do not hear from us within 7 days, please contact the clinic through [employer] or phone. If you have a critical or abnormal lab result, we will notify you by phone as soon as possible.  Submit refill requests through [employer] or call your pharmacy and they will forward the refill request to us. Please allow 3 business days for your refill to be completed.          Additional Information About Your Visit        [employer] Information     [employer] lets you send messages to your doctor, view your test results, renew your prescriptions, schedule appointments and more. To sign up, go to [email or web address] . Click on \"Log in\" on the left side of the screen, which will take you to the Welcome page. Then click on \"Sign up Now\" on the right side of the page.     You will be asked to enter the access code listed below, as well as some personal information. Please follow the directions to create your username and password.     Your access code is: [email or web address]  Expires: 2017  1:56 PM     Your access code will  in 90 days. If you need help or a new code, please call your [place] clinic or [phone].        Care EveryWhere ID     This is your Care EveryWhere ID. This could be used by other organizations to access your [place] medical records  [id number]        Your Vitals Were     Pulse Temperature Height Pulse Oximetry BMI (Body Mass Index)       84 98.5  F (36.9  C) (Tympanic) 5' 4\" (1.626 m) 95% 22.49 kg/m2        Blood Pressure from Last 3 Encounters:   17 122/72 "   01/26/17 135/74   01/03/17 130/80    Weight from Last 3 Encounters:   03/22/17 131 lb (59.4 kg)   01/26/17 131 lb (59.4 kg)   01/03/17 132 lb (59.9 kg)              We Performed the Following     XR Lumbar Spine 2/3 Views          Today's Medication Changes          These changes are accurate as of: 3/22/17 11:36 AM.  If you have any questions, ask your nurse or doctor.               Start taking these medicines.        Dose/Directions    alendronate 70 MG tablet   Commonly known as:  FOSAMAX   Used for:  Senile osteoporosis   Started by:  Chandrika Kumar PA        Dose:  70 mg   Take 1 tablet (70 mg) by mouth every 7 days Take 60 minutes before am meal with 8 oz. water. Remain upright for 30 minutes.   Quantity:  12 tablet   Refills:  1            Where to get your medicines      These medications were sent to [id number] - [place], [place] - [address]  [address], [place] [address]     Phone:  [phone]     alendronate 70 MG tablet                Primary Care Provider Office Phone # Fax #    MELISA Murray [phone]       [facility] [address]  [place] [address]        Thank you!     Thank you for choosing [facility]  for your care. Our goal is always to provide you with excellent care. Hearing back from our patients is one way we can continue to improve our services. Please take a few minutes to complete the written survey that you may receive in the mail after your visit with us. Thank you!             Your Updated Medication List - Protect others around you: Learn how to safely use, store and throw away your medicines at www.disposemymeds.org.          This list is accurate as of: 3/22/17 11:36 AM.  Always use your most recent med list.                   Brand Name Dispense Instructions for use    alendronate 70 MG tablet    FOSAMAX    12 tablet    Take 1 tablet (70 mg) by mouth every 7 days Take 60 minutes before am meal with 8  oz. water. Remain upright for 30 minutes.       CALCITRATE/VITAMIN D PO      Take  by mouth 2 times daily.       diltiazem 60 MG tablet    CARDIZEM    93 tablet    TAKE ONE TABLET BY MOUTH THREE TIMES DAILY       losartan 50 MG tablet    COZAAR    30 tablet    TAKE ONE TABLET BY MOUTH ONCE DAILY       MULTIVITAMINS PO      Take  by mouth daily.       OSTEO BI-FLEX ADV DOUBLE ST PO      Take  by mouth daily.       simvastatin 40 MG tablet    ZOCOR    45 tablet    TAKE ONE-HALF TABLET BY MOUTH ONCE DAILY IN THE EVENING       traMADol 50 MG tablet    ULTRAM    180 tablet    TAKE ONE TO TWO TABLETS BY MOUTH EVERY 6 HOURS AS NEEDED FOR  PAIN       TYLENOL PO      Take 500 mg by mouth       warfarin 5 MG tablet    COUMADIN    90 tablet    TAKE ONE TABLET (5 MG) BY MOUTH MONDAY, WEDNESDAY, FRIDAY, AND ONE-HALF TABLET (2.5 MG) THE REST OF THE WEEK.

## 2017-03-22 NOTE — PATIENT INSTRUCTIONS
Thank you for choosing [facility].   I appreciate the opportunity to serve you and look forward to supporting your healthcare needs in the future. Please contact me with any questions or concerns that you may have.    Sincerely,    Chandrika Kumar RN, PA-C

## 2017-03-22 NOTE — NURSING NOTE
"Chief Complaint   Patient presents with     Musculoskeletal Problem     Left Leg Pain       Initial /72  Pulse 84  Temp 98.5  F (36.9  C) (Tympanic)  Ht 5' 4\" (1.626 m)  Wt 131 lb (59.4 kg)  SpO2 95%  BMI 22.49 kg/m2 Estimated body mass index is 22.49 kg/(m^2) as calculated from the following:    Height as of this encounter: 5' 4\" (1.626 m).    Weight as of this encounter: 131 lb (59.4 kg).  Medication Reconciliation: complete     Alisa Patricio      "

## 2017-03-23 NOTE — NURSING NOTE
Right ear wash done per Chandrika Kumar PAC. Ear was irrigated with warm water, hydrogen peroxide and rubbing alcohol.  Ear was irrigated until cerumen was removed. Ear was clear, with no redness noted. Patient tolerated procedure well.  Tiffanie Edmonds LPN

## 2017-03-24 ENCOUNTER — TELEPHONE (OUTPATIENT)
Dept: FAMILY MEDICINE | Facility: OTHER | Age: 82
End: 2017-03-24

## 2017-03-24 NOTE — TELEPHONE ENCOUNTER
8:23 AM    Reason for Call: Phone Call    Description: Pt called and stated she picked up her Fosamax yesterday. She thought she was told by provider to take 1 tab a week but the directions on the bottle say take 1 tab a day for 1 week. Please call her back at [phone]    Was an appointment offered for this call? No    Preferred method for responding to this message: Telephone Call    If we cannot reach you directly, may we leave a detailed response at the number you provided? Yes    Can this message wait until your PCP/provider returns, if available today? Not applicable    Gissell Ambrose

## 2017-03-27 ENCOUNTER — DOCUMENTATION ONLY (OUTPATIENT)
Dept: OTHER | Facility: CLINIC | Age: 82
End: 2017-03-27

## 2017-03-27 DIAGNOSIS — Z71.89 ACP (ADVANCE CARE PLANNING): ICD-10-CM

## 2017-03-27 DIAGNOSIS — Z71.89 ADVANCED CARE PLANNING/COUNSELING DISCUSSION: ICD-10-CM

## 2017-03-27 DIAGNOSIS — I10 HTN (HYPERTENSION): ICD-10-CM

## 2017-03-27 DIAGNOSIS — I10 BENIGN ESSENTIAL HYPERTENSION: ICD-10-CM

## 2017-03-27 RX ORDER — LOSARTAN POTASSIUM 50 MG/1
TABLET ORAL
Qty: 30 TABLET | Refills: 11 | Status: SHIPPED | OUTPATIENT
Start: 2017-03-27 | End: 2018-03-01

## 2017-03-27 RX ORDER — DILTIAZEM HCL 60 MG
TABLET ORAL
Qty: 93 TABLET | Refills: 5 | Status: SHIPPED | OUTPATIENT
Start: 2017-03-27 | End: 2017-09-27

## 2017-04-05 ENCOUNTER — ANTICOAGULATION THERAPY VISIT (OUTPATIENT)
Dept: ANTICOAGULATION | Facility: OTHER | Age: 82
End: 2017-04-05
Attending: PHYSICIAN ASSISTANT
Payer: MEDICARE

## 2017-04-05 DIAGNOSIS — I48.20 CHRONIC ATRIAL FIBRILLATION (H): ICD-10-CM

## 2017-04-05 DIAGNOSIS — Z79.01 LONG-TERM (CURRENT) USE OF ANTICOAGULANTS: ICD-10-CM

## 2017-04-05 LAB — INR POINT OF CARE: 2.5 (ref 0.86–1.14)

## 2017-04-05 PROCEDURE — 85610 PROTHROMBIN TIME: CPT | Mod: QW

## 2017-04-05 NOTE — MR AVS SNAPSHOT
Pinorao Christiansen   4/5/2017 10:00 AM   Anticoagulation Therapy Visit    Description:  81 year old female   Provider:   ANTI COAGULATION   Department:  Hc Anti Coagulation           INR as of 4/5/2017     Today's INR 2.5      Anticoagulation Summary as of 4/5/2017     INR goal 2.0-3.0   Today's INR 2.5   Full instructions 5 mg on Mon, Wed, Fri; 2.5 mg all other days   Next INR check 5/17/2017    Indications   Long-term (current) use of anticoagulants [Z79.01] [Z79.01]  Chronic atrial fibrillation (H) [I48.2]         Your next Anticoagulation Clinic appointment(s)     May 17, 2017 10:00 AM CDT   Anticoagulation Visit with  ANTI COAGULATION   [facility] ([facility])    [address] [place] [address]   [phone]              April 2017 Details    Sun Mon Tue Wed Thu Fri Sat           1                 2               3               4               5      5 mg   See details      6      2.5 mg         7      5 mg         8      2.5 mg           9      2.5 mg         10      5 mg         11      2.5 mg         12      5 mg         13      2.5 mg         14      5 mg         15      2.5 mg           16      2.5 mg         17      5 mg         18      2.5 mg         19      5 mg         20      2.5 mg         21      5 mg         22      2.5 mg           23      2.5 mg         24      5 mg         25      2.5 mg         26      5 mg         27      2.5 mg         28      5 mg         29      2.5 mg           30      2.5 mg                Date Details   04/05 This INR check               How to take your warfarin dose     To take:  2.5 mg Take 0.5 of a 5 mg tablet.    To take:  5 mg Take 1 of the 5 mg tablets.           May 2017 Details    Sun Mon Tue Wed Thu Fri Sat      1      5 mg         2      2.5 mg         3      5 mg         4      2.5 mg         5      5 mg         6      2.5 mg           7      2.5 mg         8      5 mg         9      2.5 mg         10      5 mg         11       2.5 mg         12      5 mg         13      2.5 mg           14      2.5 mg         15      5 mg         16      2.5 mg         17            18               19               20                 21               22               23               24               25               26               27                 28               29               30               31                   Date Details   No additional details    Date of next INR:  5/17/2017         How to take your warfarin dose     To take:  2.5 mg Take 0.5 of a 5 mg tablet.    To take:  5 mg Take 1 of the 5 mg tablets.

## 2017-04-05 NOTE — PROGRESS NOTES
ANTICOAGULATION FOLLOW-UP CLINIC VISIT    Patient Name:  Michael Christiansen  Date:  4/5/2017  Contact Type:  Face to Face    SUBJECTIVE:     Patient Findings     Comments Fosamax new. Started it 2 weeks ago           OBJECTIVE    INR Protime   Date Value Ref Range Status   04/05/2017 2.5 (A) 0.86 - 1.14 Final       ASSESSMENT / PLAN  INR assessment THER    Recheck INR In: 6 WEEKS    INR Location Clinic      Anticoagulation Summary as of 4/5/2017     INR goal 2.0-3.0   Today's INR 2.5   Maintenance plan 5 mg (5 mg x 1) on Mon, Wed, Fri; 2.5 mg (5 mg x 0.5) all other days   Full instructions 5 mg on Mon, Wed, Fri; 2.5 mg all other days   Weekly total 25 mg   No change documented Kasia Hercules RN   Plan last modified Gabriella Koo RN (7/27/2016)   Next INR check 5/17/2017   Priority INR   Target end date Indefinite    Indications   Long-term (current) use of anticoagulants [Z79.01] [Z79.01]  Chronic atrial fibrillation (H) [I48.2]         Anticoagulation Episode Summary     INR check location     Preferred lab     Send INR reminders to  ANTICOAG POOL    Comments       Anticoagulation Care Providers     Provider Role Specialty Phone number    Chandrika Kumar, PA [facility] [phone]            See the Encounter Report to view Anticoagulation Flowsheet and Dosing Calendar (Go to Encounters tab in chart review, and find the Anticoagulation Therapy Visit)        Kasia Hercules, RN

## 2017-04-18 DIAGNOSIS — G89.4 CHRONIC PAIN SYNDROME: ICD-10-CM

## 2017-04-19 NOTE — TELEPHONE ENCOUNTER
Tramadol      Last Written Prescription Date: 1/23/17  Last Fill Quantity: 180,  # refills: 0   Last Office Visit with G, P or [facility] prescribing provider: 3/22/17

## 2017-04-19 NOTE — TELEPHONE ENCOUNTER
Tramadol      Last Written Prescription Date:  1/23/17  Last Fill Quantity: 180,   # refills: 0  Last Office Visit with G, P or [facility] prescribing provider: 3/22/17  Future Office visit:       Routing refill request to provider for review/approval because:

## 2017-04-20 RX ORDER — TRAMADOL HYDROCHLORIDE 50 MG/1
TABLET ORAL
Qty: 180 TABLET | Refills: 0 | Status: SHIPPED | OUTPATIENT
Start: 2017-04-20 | End: 2017-06-21

## 2017-04-28 DIAGNOSIS — Z12.31 VISIT FOR SCREENING MAMMOGRAM: Primary | ICD-10-CM

## 2017-04-28 PROCEDURE — G0202 SCR MAMMO BI INCL CAD: HCPCS | Mod: TC,LT

## 2017-04-28 PROCEDURE — 77063 BREAST TOMOSYNTHESIS BI: CPT | Mod: TC,LT

## 2017-05-07 DIAGNOSIS — E78.5 HYPERLIPIDEMIA LDL GOAL <100: ICD-10-CM

## 2017-05-09 RX ORDER — SIMVASTATIN 40 MG
TABLET ORAL
Qty: 45 TABLET | Refills: 1 | Status: SHIPPED | OUTPATIENT
Start: 2017-05-09 | End: 2017-10-30

## 2017-05-09 NOTE — TELEPHONE ENCOUNTER
Simvastatin      Last Written Prescription Date: 2/1/2017  Last Fill Quantity: 45, # refills: 0  Last Office Visit with G, P or [facility] prescribing provider: 3/22/2017  Next 5 appointments (look out 90 days)     Jul 27, 2017  9:30 AM CDT   Return Visit with Noemy Mahajan NP   [facility] [place] ([facility] [place] [facility])    [address] [place] [address]  [place] [address]   [phone]                   No results found for: CHOL  No results found for: HDL  No results found for: LDL  No results found for: TRIG  No results found for: CHOLHDLRATIO

## 2017-05-17 ENCOUNTER — ANTICOAGULATION THERAPY VISIT (OUTPATIENT)
Dept: ANTICOAGULATION | Facility: OTHER | Age: 82
End: 2017-05-17
Attending: PHYSICIAN ASSISTANT
Payer: MEDICARE

## 2017-05-17 DIAGNOSIS — Z79.01 LONG-TERM (CURRENT) USE OF ANTICOAGULANTS: ICD-10-CM

## 2017-05-17 DIAGNOSIS — I48.20 CHRONIC ATRIAL FIBRILLATION (H): ICD-10-CM

## 2017-05-17 LAB — INR POINT OF CARE: 3.3 (ref 0.86–1.14)

## 2017-05-17 PROCEDURE — 85610 PROTHROMBIN TIME: CPT | Mod: QW

## 2017-05-17 NOTE — MR AVS SNAPSHOT
Michael Christiansen   5/17/2017 10:00 AM   Anticoagulation Therapy Visit    Description:  81 year old female   Provider:   ANTI COAGULATION   Department:  Hc Anti Coagulation           INR as of 5/17/2017     Today's INR 3.3!      Anticoagulation Summary as of 5/17/2017     INR goal 2.0-3.0   Today's INR 3.3!   Full instructions 5/17: 2.5 mg; Otherwise 5 mg on Mon, Wed, Fri; 2.5 mg all other days   Next INR check 6/14/2017    Indications   Long-term (current) use of anticoagulants [Z79.01] [Z79.01]  Chronic atrial fibrillation (H) [I48.2]         Your next Anticoagulation Clinic appointment(s)     Jun 14, 2017  9:45 AM CDT   Anticoagulation Visit with  ANTI COAGULATION   [facility] ([facility])    [address] [place] [address]   [phone]              May 2017 Details    Sun Mon Tue Wed Thu Fri Sat      1               2               3               4               5               6                 7               8               9               10               11               12               13                 14               15               16               17      2.5 mg   See details      18      2.5 mg         19      5 mg         20      2.5 mg           21      2.5 mg         22      5 mg         23      2.5 mg         24      5 mg         25      2.5 mg         26      5 mg         27      2.5 mg           28      2.5 mg         29      5 mg         30      2.5 mg         31      5 mg             Date Details   05/17 This INR check               How to take your warfarin dose     To take:  2.5 mg Take 0.5 of a 5 mg tablet.    To take:  5 mg Take 1 of the 5 mg tablets.           June 2017 Details    Sun Mon Tue Wed Thu Fri Sat         1      2.5 mg         2      5 mg         3      2.5 mg           4      2.5 mg         5      5 mg         6      2.5 mg         7      5 mg         8      2.5 mg         9      5 mg         10      2.5 mg           11      2.5 mg          12      5 mg         13      2.5 mg         14            15               16               17                 18               19               20               21               22               23               24                 25               26               27               28               29               30                 Date Details   No additional details    Date of next INR:  6/14/2017         How to take your warfarin dose     To take:  2.5 mg Take 0.5 of a 5 mg tablet.    To take:  5 mg Take 1 of the 5 mg tablets.

## 2017-05-17 NOTE — PROGRESS NOTES
ANTICOAGULATION FOLLOW-UP CLINIC VISIT    Patient Name:  Michael Christiansen  Date:  5/17/2017  Contact Type:  Face to Face    SUBJECTIVE:     Patient Findings     Positives No Problem Findings    Comments Decreased vit K intake           OBJECTIVE    INR Protime   Date Value Ref Range Status   05/17/2017 3.3 (A) 0.86 - 1.14 Final       ASSESSMENT / PLAN  INR assessment SUPRA    Recheck INR In: 3 WEEKS    INR Location Clinic      Anticoagulation Summary as of 5/17/2017     INR goal 2.0-3.0   Today's INR 3.3!   Maintenance plan 5 mg (5 mg x 1) on Mon, Wed, Fri; 2.5 mg (5 mg x 0.5) all other days   Full instructions 5/17: 2.5 mg; Otherwise 5 mg on Mon, Wed, Fri; 2.5 mg all other days   Weekly total 25 mg   Plan last modified Gabriella Koo RN (7/27/2016)   Next INR check 6/14/2017   Priority INR   Target end date Indefinite    Indications   Long-term (current) use of anticoagulants [Z79.01] [Z79.01]  Chronic atrial fibrillation (H) [I48.2]         Anticoagulation Episode Summary     INR check location     Preferred lab     Send INR reminders to  ANTICOAG POOL    Comments       Anticoagulation Care Providers     Provider Role Specialty Phone number    Chandrika Kumar, PA [facility] [phone]            See the Encounter Report to view Anticoagulation Flowsheet and Dosing Calendar (Go to Encounters tab in chart review, and find the Anticoagulation Therapy Visit)        Kasia Hercules, RN

## 2017-05-24 DIAGNOSIS — I48.91 ATRIAL FIBRILLATION (H): ICD-10-CM

## 2017-05-24 DIAGNOSIS — Z79.01 LONG TERM CURRENT USE OF ANTICOAGULANT THERAPY: ICD-10-CM

## 2017-05-24 RX ORDER — WARFARIN SODIUM 5 MG/1
TABLET ORAL
Qty: 90 TABLET | Refills: 0 | Status: SHIPPED | OUTPATIENT
Start: 2017-05-24 | End: 2017-12-03

## 2017-06-14 ENCOUNTER — ANTICOAGULATION THERAPY VISIT (OUTPATIENT)
Dept: ANTICOAGULATION | Facility: OTHER | Age: 82
End: 2017-06-14
Attending: PHYSICIAN ASSISTANT
Payer: MEDICARE

## 2017-06-14 DIAGNOSIS — I48.20 CHRONIC ATRIAL FIBRILLATION (H): ICD-10-CM

## 2017-06-14 DIAGNOSIS — Z79.01 LONG-TERM (CURRENT) USE OF ANTICOAGULANTS: ICD-10-CM

## 2017-06-14 LAB — INR POINT OF CARE: 2.9 (ref 0.86–1.14)

## 2017-06-14 PROCEDURE — 85610 PROTHROMBIN TIME: CPT | Mod: QW,ZL

## 2017-06-14 NOTE — MR AVS SNAPSHOT
Michael DEJESUS Елена   6/14/2017 9:45 AM   Anticoagulation Therapy Visit    Description:  81 year old female   Provider:   ANTI COAGULATION   Department:   Anti Coagulation           INR as of 6/14/2017     Today's INR 2.9      Anticoagulation Summary as of 6/14/2017     INR goal 2.0-3.0   Today's INR 2.9   Full instructions 5 mg on Mon, Wed, Fri; 2.5 mg all other days   Next INR check 7/26/2017    Indications   Long-term (current) use of anticoagulants [Z79.01] [Z79.01]  Chronic atrial fibrillation (H) [I48.2]         Your next Anticoagulation Clinic appointment(s)     Jun 14, 2017  9:45 AM CDT   Anticoagulation Visit with  ANTI COAGULATION   [facility] [place] ([facility] )    [address]  [place] [address]   [phone]            Jul 26, 2017  9:45 AM CDT   Anticoagulation Visit with  ANTI COAGULATION   [facility] )    [address] [place] [address]   [phone]              June 2017 Details    Sun Mon Tue Wed Thu Fri Sat         1               2               3                 4               5               6               7               8               9               10                 11               12               13               14      5 mg   See details      15      2.5 mg         16      5 mg         17      2.5 mg           18      2.5 mg         19      5 mg         20      2.5 mg         21      5 mg         22      2.5 mg         23      5 mg         24      2.5 mg           25      2.5 mg         26      5 mg         27      2.5 mg         28      5 mg         29      2.5 mg         30      5 mg           Date Details   06/14 This INR check               How to take your warfarin dose     To take:  2.5 mg Take 0.5 of a 5 mg tablet.    To take:  5 mg Take 1 of the 5 mg tablets.           July 2017 Details    Sun Mon Tue Wed Thu Fri Sat           1      2.5 mg           2      2.5 mg         3       5 mg         4      2.5 mg         5      5 mg         6      2.5 mg         7      5 mg         8      2.5 mg           9      2.5 mg         10      5 mg         11      2.5 mg         12      5 mg         13      2.5 mg         14      5 mg         15      2.5 mg           16      2.5 mg         17      5 mg         18      2.5 mg         19      5 mg         20      2.5 mg         21      5 mg         22      2.5 mg           23      2.5 mg         24      5 mg         25      2.5 mg         26            27               28               29                 30               31                     Date Details   No additional details    Date of next INR:  7/26/2017         How to take your warfarin dose     To take:  2.5 mg Take 0.5 of a 5 mg tablet.    To take:  5 mg Take 1 of the 5 mg tablets.

## 2017-06-14 NOTE — PROGRESS NOTES
ANTICOAGULATION FOLLOW-UP CLINIC VISIT    Patient Name:  Michael Christiansen  Date:  6/14/2017  Contact Type:  Face to Face    SUBJECTIVE:     Patient Findings     Positives No Problem Findings           OBJECTIVE    INR Protime   Date Value Ref Range Status   06/14/2017 2.9 (A) 0.86 - 1.14 Final       ASSESSMENT / PLAN  INR assessment THER    Recheck INR In: 6 WEEKS    INR Location Clinic      Anticoagulation Summary as of 6/14/2017     INR goal 2.0-3.0   Today's INR 2.9   Maintenance plan 5 mg (5 mg x 1) on Mon, Wed, Fri; 2.5 mg (5 mg x 0.5) all other days   Full instructions 5 mg on Mon, Wed, Fri; 2.5 mg all other days   Weekly total 25 mg   No change documented Kasia Hercules RN   Plan last modified Gabriella Koo RN (7/27/2016)   Next INR check 7/26/2017   Priority INR   Target end date Indefinite    Indications   Long-term (current) use of anticoagulants [Z79.01] [Z79.01]  Chronic atrial fibrillation (H) [I48.2]         Anticoagulation Episode Summary     INR check location     Preferred lab     Send INR reminders to  MC POOL    Comments       Anticoagulation Care Providers     Provider Role Specialty Phone number    Chandrika Kumar, PA [facility] [phone]            See the Encounter Report to view Anticoagulation Flowsheet and Dosing Calendar (Go to Encounters tab in chart review, and find the Anticoagulation Therapy Visit)        Kasia Hercules, RN

## 2017-06-21 ENCOUNTER — OFFICE VISIT (OUTPATIENT)
Dept: FAMILY MEDICINE | Facility: OTHER | Age: 82
End: 2017-06-21
Attending: PHYSICIAN ASSISTANT
Payer: MEDICARE

## 2017-06-21 VITALS
SYSTOLIC BLOOD PRESSURE: 122 MMHG | OXYGEN SATURATION: 97 % | DIASTOLIC BLOOD PRESSURE: 68 MMHG | TEMPERATURE: 97.3 F | HEART RATE: 82 BPM | BODY MASS INDEX: 23.17 KG/M2 | WEIGHT: 135 LBS

## 2017-06-21 DIAGNOSIS — G89.4 CHRONIC PAIN SYNDROME: ICD-10-CM

## 2017-06-21 DIAGNOSIS — E78.5 HYPERLIPIDEMIA LDL GOAL <130: ICD-10-CM

## 2017-06-21 DIAGNOSIS — M81.0 OSTEOPOROSIS: Primary | ICD-10-CM

## 2017-06-21 DIAGNOSIS — I10 ESSENTIAL HYPERTENSION: ICD-10-CM

## 2017-06-21 LAB
CHOLEST SERPL-MCNC: 165 MG/DL
HDLC SERPL-MCNC: 76 MG/DL
LDLC SERPL CALC-MCNC: 77 MG/DL
NONHDLC SERPL-MCNC: 89 MG/DL
TRIGL SERPL-MCNC: 61 MG/DL

## 2017-06-21 PROCEDURE — 80061 LIPID PANEL: CPT | Mod: ZL | Performed by: PHYSICIAN ASSISTANT

## 2017-06-21 PROCEDURE — 99212 OFFICE O/P EST SF 10 MIN: CPT

## 2017-06-21 PROCEDURE — 36415 COLL VENOUS BLD VENIPUNCTURE: CPT | Mod: ZL | Performed by: PHYSICIAN ASSISTANT

## 2017-06-21 PROCEDURE — 99214 OFFICE O/P EST MOD 30 MIN: CPT | Performed by: PHYSICIAN ASSISTANT

## 2017-06-21 RX ORDER — IBANDRONATE SODIUM 150 MG/1
150 TABLET, FILM COATED ORAL
Qty: 1 TABLET | Refills: 3 | Status: SHIPPED | OUTPATIENT
Start: 2017-06-21 | End: 2017-07-06 | Stop reason: SINTOL

## 2017-06-21 RX ORDER — TRAMADOL HYDROCHLORIDE 50 MG/1
TABLET ORAL
Qty: 180 TABLET | Refills: 0 | Status: SHIPPED | OUTPATIENT
Start: 2017-06-21 | End: 2017-09-10

## 2017-06-21 ASSESSMENT — ANXIETY QUESTIONNAIRES
1. FEELING NERVOUS, ANXIOUS, OR ON EDGE: SEVERAL DAYS
IF YOU CHECKED OFF ANY PROBLEMS ON THIS QUESTIONNAIRE, HOW DIFFICULT HAVE THESE PROBLEMS MADE IT FOR YOU TO DO YOUR WORK, TAKE CARE OF THINGS AT HOME, OR GET ALONG WITH OTHER PEOPLE: NOT DIFFICULT AT ALL
2. NOT BEING ABLE TO STOP OR CONTROL WORRYING: NOT AT ALL
6. BECOMING EASILY ANNOYED OR IRRITABLE: NOT AT ALL
GAD7 TOTAL SCORE: 1
5. BEING SO RESTLESS THAT IT IS HARD TO SIT STILL: NOT AT ALL
3. WORRYING TOO MUCH ABOUT DIFFERENT THINGS: NOT AT ALL
7. FEELING AFRAID AS IF SOMETHING AWFUL MIGHT HAPPEN: NOT AT ALL

## 2017-06-21 ASSESSMENT — PAIN SCALES - GENERAL: PAINLEVEL: MILD PAIN (3)

## 2017-06-21 ASSESSMENT — PATIENT HEALTH QUESTIONNAIRE - PHQ9: 5. POOR APPETITE OR OVEREATING: NOT AT ALL

## 2017-06-21 NOTE — NURSING NOTE
"Chief Complaint   Patient presents with     Hypertension     Lipids     Musculoskeletal Problem     left lower leg        Initial /68 (BP Location: Left arm, Patient Position: Chair, Cuff Size: Adult Regular)  Pulse 82  Temp 97.3  F (36.3  C) (Tympanic)  Wt 135 lb (61.2 kg)  SpO2 97%  Breastfeeding? No  BMI 23.17 kg/m2 Estimated body mass index is 23.17 kg/(m^2) as calculated from the following:    Height as of 3/22/17: 5' 4\" (1.626 m).    Weight as of this encounter: 135 lb (61.2 kg).  Medication Reconciliation: complete   Ayana John CMA(AAMA)     "

## 2017-06-21 NOTE — PROGRESS NOTES
SUBJECTIVE:                                                    Michael Christiansen is a 81 year old female who presents to clinic today for the following health issues:      Hyperlipidemia Follow-Up      Rate your low fat/cholesterol diet?: good, but doesn't really watch her diet    Taking statin?  Yes, no muscle aches from statin    Other lipid medications/supplements?:  none     Hypertension Follow-up      Outpatient blood pressures are not being checked.    Low Salt Diet: no added salt       Amount of exercise or physical activity: None    Problems taking medications regularly: No    Medication side effects: none    Diet: regular (no restrictions) and low salt    Musculoskeletal problem/pain      Duration: 3 month follow up     Description  Location: Left lower leg which she thinks may be attributed to her lower back.    Intensity:  moderate, severe    Accompanying signs and symptoms: Patient states leg pain has not improved since last last visit but has actually gotten worse. Having sharp constant pains. She finds herself having trouble getting out of bed each morning.     History  Previous similar problem: YES  Previous evaluation:  Yes, seen with Chandrika. Had xray completed in 03/2017 which indicated some degenerative changes, L5-S1 Spondylolisthesis, and a new T10 compression fracture.    Precipitating or alleviating factors:  Trauma or overuse: no   Aggravating factors include: Standing, Walking, Climbing stairs. Reports that it is worse in the AM.     Therapies tried and outcome: rest/inactivity- Tramadol in PM, Tylenol in AM which helps           Problem list and histories reviewed & adjusted, as indicated.  Additional history: as documented    Patient Active Problem List   Diagnosis     Hyperlipidemia LDL goal <130     HTN (hypertension)     Osteoarthritis     Chronic atrial fibrillation (H)     Cancer of breast (H)     Chronic pain syndrome     History of total knee replacement     Thoracic or lumbosacral  neuritis or radiculitis     Osteoarthritis of knee     Aromatase inhibitor use     Osteopenia     Long-term (current) use of anticoagulants [Z79.01]     ACP (advance care planning)     Malignant neoplasm of right female breast, unspecified site of breast (H)     Osteoporosis     Past Surgical History:   Procedure Laterality Date     BREAST SURGERY      right  side mastectomy     C TOTAL KNEE ARTHROPLASTY Left 01/26/15     cataract extraction and lens implantation       COLONOSCOPY       EYE SURGERY       GYN SURGERY      tubal pregnancy     ORTHOPEDIC SURGERY  2009    l. hip replacement LT     ORTHOPEDIC SURGERY  july 19th 2013    Right total knee     TUBAL/ECTOPIC PREGNANCY         Social History   Substance Use Topics     Smoking status: Former Smoker     Packs/day: 0.30     Years: 30.00     Types: Cigarettes     Quit date: 8/11/1987     Smokeless tobacco: Never Used      Comment: year quit 1987     Alcohol use No     Family History   Problem Relation Age of Onset     DIABETES Father 75     cause of death     Other - See Comments Father      PVD     HEART DISEASE Sister      Other - See Comments Mother 83     old age; cause of death     Other - See Comments Sister      pow concentration; cause of death     Other - See Comments Sister      pow concentration; cause of death     Chronic Obstructive Pulmonary Disease Daughter          Current Outpatient Prescriptions   Medication Sig Dispense Refill     warfarin (COUMADIN) 5 MG tablet TAKE ONE TABLET (5 MG) BY MOUTH MONDAY, WEDNESDAY, FRIDAY, AND ONE-HALF TABLET (2.5 MG) THE REST OF THE WEEK. 90 tablet 0     simvastatin (ZOCOR) 40 MG tablet TAKE ONE-HALF TABLET BY MOUTH ONCE DAILY IN THE EVENING 45 tablet 1     traMADol (ULTRAM) 50 MG tablet TAKE ONE TO TWO TABLETS BY MOUTH EVERY 6 HOURS AS NEEDED FOR PAIN 180 tablet 0     diltiazem (CARDIZEM) 60 MG tablet TAKE ONE TABLET BY MOUTH THREE TIMES DAILY 93 tablet 5     losartan (COZAAR) 50 MG tablet TAKE ONE TABLET BY  MOUTH ONCE DAILY 30 tablet 11     Acetaminophen (TYLENOL PO) Take 500 mg by mouth       Calcium Citrate-Vitamin D (CALCITRATE/VITAMIN D PO) Take  by mouth 2 times daily.       Multiple Vitamin (MULTIVITAMINS PO) Take  by mouth daily.       Misc Natural Products (OSTEO BI-FLEX ADV DOUBLE ST PO) Take  by mouth daily.       Allergies   Allergen Reactions     Atenolol Shortness Of Breath and Other (See Comments)     Dizziness  Severe vertigo and dizziness/SOB     Lisinopril Cough     Pollen Extract      Other reaction(s): Runny Nose     BP Readings from Last 3 Encounters:   06/21/17 122/68   03/22/17 122/72   01/26/17 135/74    Wt Readings from Last 3 Encounters:   06/21/17 135 lb (61.2 kg)   03/22/17 131 lb (59.4 kg)   01/26/17 131 lb (59.4 kg)           Tobacco  Allergies  Meds  Med Hx  Surg Hx  Fam Hx  Soc Hx      Reviewed and updated as needed this visit by Provider    ROS:  Constitutional, HEENT, cardiovascular, pulmonary, GI, , musculoskeletal, neuro, skin, endocrine and psych systems are negative, except as otherwise noted.    OBJECTIVE:                                                    /68 (BP Location: Left arm, Patient Position: Chair, Cuff Size: Adult Regular)  Pulse 82  Temp 97.3  F (36.3  C) (Tympanic)  Wt 135 lb (61.2 kg)  SpO2 97%  Breastfeeding? No  BMI 23.17 kg/m2  Body mass index is 23.17 kg/(m^2).  GENERAL: healthy, alert and no distress  EYES: Eyes grossly normal to inspection, PERRL and conjunctivae and sclerae normal  HENT: ear canals and TM's normal, nose and mouth without ulcers or lesions  NECK: no adenopathy, no asymmetry, masses, or scars and thyroid normal to palpation  RESP: lungs clear to auscultation - no rales, rhonchi or wheezes  CV: regular rate and rhythm with harsh murmur auscultated, no peripheral edema   MS: no gross musculoskeletal defects noted, no edema  SKIN: no suspicious lesions or rashes  NEURO: Normal strength and tone, mentation intact and speech  normal  PSYCH: mentation appears normal, affect normal/bright    Diagnostic Test Results:  No results found for this or any previous visit (from the past 24 hour(s)).     ASSESSMENT/PLAN:                                                        1. Osteoporosis  Patient had a DEXA scan completed in 07/2016 which showed worsening of her osteoporosis. She was started on fosamax but got terrible constipation from that. We are going to try her on Boniva once a month and see if this is more tolerable.   - IBANdronate (Boniva) 150 MG tablet; Take one tablet (150 mg) by mouth every 30 days. Dispense: 1 tablet, Refill 3 tablets.     2. Hyperlipidemia LDL goal <130  Due for her lipid screen. Hasn't particularity been watching her diet but reports that she doesn't eat high cholesterol foods. Order placed, fasting today. Tolerating the simvastatin well.     3. Essential hypertension  Well controlled on the losartan. No complaints. Good BP today. Continue with POC.     4. Chronic pain syndrome  Continues having back pain that sometimes radiates into her legs. She notes that the tramadol helps her pains at night and Tylenol throughout the day. She is on the lowest dose of tramadol and there are no signs of her overusing her medications. This gives her good relief. Refill provided. Continue with plan of care.   - traMADol (ULTRAM) 50 MG tablet; TAKE ONE TO TWO TABLETS BY MOUTH EVERY 6 HOURS AS NEEDED FOR PAIN  Dispense: 180 tablet; Refill: 0    Follow-up in 6 months or sooner for acute issues.    Martha Greenberg, [employer] student, saw this patient under the supervision of Chandrika Kumar PA-C.     Chandrika Kumar PA-C  [facility]

## 2017-06-21 NOTE — MR AVS SNAPSHOT
After Visit Summary   6/21/2017    Michael Christiansen    MRN: [id number]           Patient Information     Date Of Birth          1935        Visit Information        Provider Department      6/21/2017 9:30 AM Chandrika Kumar PA [facility]        Today's Diagnoses     Osteoporosis    -  1    Hyperlipidemia LDL goal <130        Essential hypertension        Chronic pain syndrome          Care Instructions      Thank you for choosing [facility].   I have office hours 8:00 am to 4:30 pm on Monday's, Wednesday's, Thursday's and Friday's. My nurse and I are out of the office every Tuesday.    Following your visit, when your labs and diagnostic testing have returned, I will review then and you will be contacted by my nurse.  If you are on My Chart, you can also view results there.    For refills, notify your pharmacy regarding what you need and the pharmacy will generate a refill request. Do not call my nurse as she is unable to process refill request. Please plan ahead and allow 3-5 days for refill requests.    You will generally receive a reminder call the day prior to your appointment.  If you cannot attend your appointment, please cancel your appointment with as much notice as possible.  If there is a pattern of failure to present for your appointments, I cannot provide consistent, meaningful, ongoing care for you. It is very important to me that you come in for your care, so we can best assist you with your health care needs.    IMPORTANT:  Please note that it is my standard of practice to NOT participate in prescribing ongoing requested Narcotic Analgesic therapy, and/or participate in the prescribing of other controlled substances.  My nurse and I am happy to assist you with the process of referral for alternative pain management as needed, and other treatment modalities including but not limited to:  Physical Therapy, Physical Medicine and Rehab, Counseling,  Chiropractic Care, Orthopedic Care, and non-narcotic medication management.     In the event that you need to be seen for emergent concerns and I am out of office,  please see one of my colleagues for acute concerns.  You may also present to  or ER.  I appreciate the opportunity to serve you and look forward to supporting your healthcare needs in the future. Please contact me with any questions or concerns that you may have.    Sincerely,      Chandrika Kumar RN, PA-C               Follow-ups after your visit        Your next 10 appointments already scheduled     Jul 20, 2017  8:30 AM CDT   LAB with HC LAB   [facility] ([facility] [place] )    [address]  [place] [address]   [phone]            Jul 26, 2017  9:45 AM CDT   Anticoagulation Visit with HC ANTI COAGULATION   [facility] ([facility] [place] )    [address]  [place] [address]   [phone]            Jul 27, 2017  9:30 AM CDT   Return Visit with Noemy Mahajan NP   [facility] ([facility] [place] )    [address]  [place] [address]   [phone]              Who to contact     If you have questions or need follow up information about today's clinic visit or your schedule please contact [facility] directly at [phone].  Normal or non-critical lab and imaging results will be communicated to you by MyChart, letter or phone within 4 business days after the clinic has received the results. If you do not hear from us within 7 days, please contact the clinic through MyChart or phone. If you have a critical or abnormal lab result, we will notify you by phone as soon as possible.  Submit refill requests through [employer] or call your pharmacy and they will forward the refill request to us. Please allow 3 business days for your refill to be completed.          Additional Information About Your Visit        Care EveryWhere ID     This is  your Care EveryWhere ID. This could be used by other organizations to access your [place] medical records  [id number]        Your Vitals Were     Pulse Temperature Pulse Oximetry Breastfeeding? BMI (Body Mass Index)       82 97.3  F (36.3  C) (Tympanic) 97% No 23.17 kg/m2        Blood Pressure from Last 3 Encounters:   06/21/17 122/68   03/22/17 122/72   01/26/17 135/74    Weight from Last 3 Encounters:   06/21/17 135 lb (61.2 kg)   03/22/17 131 lb (59.4 kg)   01/26/17 131 lb (59.4 kg)              Today, you had the following     No orders found for display         Today's Medication Changes          These changes are accurate as of: 6/21/17 10:17 AM.  If you have any questions, ask your nurse or doctor.               Start taking these medicines.        Dose/Directions    IBANdronate 150 MG tablet   Commonly known as:  BONIVA   Used for:  Osteoporosis   Started by:  Chandrika Kumar PA        Dose:  150 mg   Take 1 tablet (150 mg) by mouth every 30 days   Quantity:  1 tablet   Refills:  3         These medicines have changed or have updated prescriptions.        Dose/Directions    traMADol 50 MG tablet   Commonly known as:  ULTRAM   This may have changed:  See the new instructions.   Used for:  Chronic pain syndrome   Changed by:  Chandrika Kumar PA        TAKE ONE TO TWO TABLETS BY MOUTH EVERY 6 HOURS AS NEEDED FOR PAIN   Quantity:  180 tablet   Refills:  0            Where to get your medicines      These medications were sent to [facility] [address]  [place], [place] - [address] [facility] [address]  [address], [place] [address]     Phone:  [phone]     IBANdronate 150 MG tablet         Some of these will need a paper prescription and others can be bought over the counter.  Ask your nurse if you have questions.     Bring a paper prescription for each of these medications     traMADol 50 MG tablet                Primary Care Provider Office Phone # Fax #    MELISA Murray [phone]        [facility] [address]  [place] [address]        Equal Access to Services     AMAYA CONSTANTINO : Hadii aad ku hadtaneshao Soalbertali, waaxda luqadaha, qaybta kaalmada adeegyada, topher yokastain hayaaeric quirosrosie nation modesto finley. So [facility] [phone].    ATENCIÓN: Si habla español, tiene a shay disposición servicios gratuitos de asistencia lingüística. Llame al [phone].    We comply with applicable federal civil rights laws and [place] laws. We do not discriminate on the basis of race, color, national origin, age, disability sex, sexual orientation or gender identity.            Thank you!     Thank you for choosing [facility]  for your care. Our goal is always to provide you with excellent care. Hearing back from our patients is one way we can continue to improve our services. Please take a few minutes to complete the written survey that you may receive in the mail after your visit with us. Thank you!             Your Updated Medication List - Protect others around you: Learn how to safely use, store and throw away your medicines at www.disposemymeds.org.          This list is accurate as of: 6/21/17 10:17 AM.  Always use your most recent med list.                   Brand Name Dispense Instructions for use Diagnosis    CALCITRATE/VITAMIN D PO      Take  by mouth 2 times daily.        diltiazem 60 MG tablet    CARDIZEM    93 tablet    TAKE ONE TABLET BY MOUTH THREE TIMES DAILY    Benign essential hypertension       IBANdronate 150 MG tablet    BONIVA    1 tablet    Take 1 tablet (150 mg) by mouth every 30 days    Osteoporosis       losartan 50 MG tablet    COZAAR    30 tablet    TAKE ONE TABLET BY MOUTH ONCE DAILY    HTN (hypertension)       MULTIVITAMINS PO      Take  by mouth daily.        OSTEO BI-FLEX ADV DOUBLE ST PO      Take  by mouth daily.        simvastatin 40 MG tablet    ZOCOR    45 tablet    TAKE ONE-HALF TABLET BY MOUTH ONCE DAILY IN THE EVENING    Hyperlipidemia LDL goal <100        traMADol 50 MG tablet    ULTRAM    180 tablet    TAKE ONE TO TWO TABLETS BY MOUTH EVERY 6 HOURS AS NEEDED FOR PAIN    Chronic pain syndrome       TYLENOL PO      Take 500 mg by mouth        warfarin 5 MG tablet    COUMADIN    90 tablet    TAKE ONE TABLET (5 MG) BY MOUTH MONDAY, WEDNESDAY, FRIDAY, AND ONE-HALF TABLET (2.5 MG) THE REST OF THE WEEK.    Atrial fibrillation (H), Long term current use of anticoagulant therapy

## 2017-06-21 NOTE — PATIENT INSTRUCTIONS
Thank you for choosing [facility].   I have office hours 8:00 am to 4:30 pm on Monday's, Wednesday's, Thursday's and Friday's. My nurse and I are out of the office every Tuesday.    Following your visit, when your labs and diagnostic testing have returned, I will review then and you will be contacted by my nurse.  If you are on My Chart, you can also view results there.    For refills, notify your pharmacy regarding what you need and the pharmacy will generate a refill request. Do not call my nurse as she is unable to process refill request. Please plan ahead and allow 3-5 days for refill requests.    You will generally receive a reminder call the day prior to your appointment.  If you cannot attend your appointment, please cancel your appointment with as much notice as possible.  If there is a pattern of failure to present for your appointments, I cannot provide consistent, meaningful, ongoing care for you. It is very important to me that you come in for your care, so we can best assist you with your health care needs.    IMPORTANT:  Please note that it is my standard of practice to NOT participate in prescribing ongoing requested Narcotic Analgesic therapy, and/or participate in the prescribing of other controlled substances.  My nurse and I am happy to assist you with the process of referral for alternative pain management as needed, and other treatment modalities including but not limited to:  Physical Therapy, Physical Medicine and Rehab, Counseling, Chiropractic Care, Orthopedic Care, and non-narcotic medication management.     In the event that you need to be seen for emergent concerns and I am out of office,  please see one of my colleagues for acute concerns.  You may also present to  or ER.  I appreciate the opportunity to serve you and look forward to supporting your healthcare needs in the future. Please contact me with any questions or concerns that you may  have.    Sincerely,      Chandrika Kumar RN, PA-C

## 2017-06-21 NOTE — LETTER
[facility] [place]  [address]  [facility] [address]  [phone]                                                                                                           Michael Christiansen  [address] [place] [id number]    June 22, 2017          Dear Michael Christiansen,      Thank you for allowing me to participate in your care. Your recent test results were reviewed and listed below.      Your results are provided below for your review  Results for orders placed or performed in visit on 06/21/17   Lipid Profile   Result Value Ref Range    Cholesterol 165 <200 mg/dL    Triglycerides 61 <150 mg/dL    HDL Cholesterol 76 >49 mg/dL    LDL Cholesterol Calculated 77 <100 mg/dL    Non HDL Cholesterol 89 <130 mg/dL                   Labs are great!    Thank you for choosing [facility]. As a result, please continue with the treatment plan discussed in the office. Return as discussed or sooner if symptoms worsens or fail to improve. If you have any further questions or concerns, please do not hesitate to contact us.                  Sincerely,    The office of Chandrika Kumar PAC

## 2017-06-22 ASSESSMENT — ANXIETY QUESTIONNAIRES: GAD7 TOTAL SCORE: 1

## 2017-06-22 ASSESSMENT — PATIENT HEALTH QUESTIONNAIRE - PHQ9: SUM OF ALL RESPONSES TO PHQ QUESTIONS 1-9: 1

## 2017-06-26 ENCOUNTER — TELEPHONE (OUTPATIENT)
Dept: FAMILY MEDICINE | Facility: OTHER | Age: 82
End: 2017-06-26

## 2017-06-26 NOTE — TELEPHONE ENCOUNTER
Patient took on Saturday, states can barely walk, horrible leg cramps and body shakes. Does not want to take the Boniva again. Willing to go back on Fosamax, this caused constipation but willing to deal with that as will just take Miralax with it. Aware provider is out and will wait for call back at a later date.  Tiffanie Edmonds LPN

## 2017-06-26 NOTE — TELEPHONE ENCOUNTER
10:22 AM    Reason for Call: Phone Call    Description: PT called and wants to talk to the nurse about a medication she was put on last week, (Boneva) for her osteoporosis, she said she is to take this one time a month, and she took it and she feels terrible, achy bones, exc. Please call her back at [phone] Thank you    Was an appointment offered for this call? Yes    Preferred method for responding to this message: Telephone Call [phone]    If we cannot reach you directly, may we leave a detailed response at the number you provided? Yes    Can this message wait until your PCP/provider returns, if available today? PCP is in    [place]

## 2017-06-27 NOTE — TELEPHONE ENCOUNTER
She will have to wait a month.  This was a monthly dose.  We should check calcium and her kidneys before restarting Fosamax.

## 2017-06-27 NOTE — TELEPHONE ENCOUNTER
Patient notified, was transferred to scheduling to make a follow up appointment to discuss   SONJA FUNES

## 2017-07-06 ENCOUNTER — OFFICE VISIT (OUTPATIENT)
Dept: FAMILY MEDICINE | Facility: OTHER | Age: 82
End: 2017-07-06
Attending: PHYSICIAN ASSISTANT
Payer: MEDICARE

## 2017-07-06 VITALS
SYSTOLIC BLOOD PRESSURE: 130 MMHG | BODY MASS INDEX: 23.92 KG/M2 | WEIGHT: 130 LBS | DIASTOLIC BLOOD PRESSURE: 76 MMHG | HEART RATE: 77 BPM | TEMPERATURE: 98.4 F | HEIGHT: 62 IN | OXYGEN SATURATION: 97 %

## 2017-07-06 DIAGNOSIS — M81.0 AGE RELATED OSTEOPOROSIS, UNSPECIFIED PATHOLOGICAL FRACTURE PRESENCE: ICD-10-CM

## 2017-07-06 DIAGNOSIS — M25.50 MULTIPLE JOINT PAIN: Primary | ICD-10-CM

## 2017-07-06 LAB
ANION GAP SERPL CALCULATED.3IONS-SCNC: 6 MMOL/L (ref 3–14)
BUN SERPL-MCNC: 24 MG/DL (ref 7–30)
CALCIUM SERPL-MCNC: 9.2 MG/DL (ref 8.5–10.1)
CHLORIDE SERPL-SCNC: 102 MMOL/L (ref 94–109)
CO2 SERPL-SCNC: 28 MMOL/L (ref 20–32)
CREAT SERPL-MCNC: 0.88 MG/DL (ref 0.52–1.04)
GFR SERPL CREATININE-BSD FRML MDRD: 62 ML/MIN/1.7M2
GLUCOSE SERPL-MCNC: 98 MG/DL (ref 70–99)
POTASSIUM SERPL-SCNC: 4.5 MMOL/L (ref 3.4–5.3)
SODIUM SERPL-SCNC: 136 MMOL/L (ref 133–144)

## 2017-07-06 PROCEDURE — 73502 X-RAY EXAM HIP UNI 2-3 VIEWS: CPT | Mod: TC,LT

## 2017-07-06 PROCEDURE — 99213 OFFICE O/P EST LOW 20 MIN: CPT | Performed by: PHYSICIAN ASSISTANT

## 2017-07-06 PROCEDURE — 99212 OFFICE O/P EST SF 10 MIN: CPT

## 2017-07-06 PROCEDURE — 36415 COLL VENOUS BLD VENIPUNCTURE: CPT | Mod: ZL | Performed by: PHYSICIAN ASSISTANT

## 2017-07-06 PROCEDURE — 80048 BASIC METABOLIC PNL TOTAL CA: CPT | Mod: ZL | Performed by: PHYSICIAN ASSISTANT

## 2017-07-06 RX ORDER — ALENDRONATE SODIUM 70 MG/1
70 TABLET ORAL
Qty: 4 TABLET | Refills: 1 | Status: SHIPPED | OUTPATIENT
Start: 2017-07-06 | End: 2017-08-09

## 2017-07-06 ASSESSMENT — PAIN SCALES - GENERAL: PAINLEVEL: MODERATE PAIN (4)

## 2017-07-06 NOTE — PROGRESS NOTES
SUBJECTIVE:                                                    Michael Christiansen is a 81 year old female who presents to clinic today for the following health issues:      Medication Followup of Ibandronate (Boniva)    Taking Medication as prescribed: yes    Side Effects:  Yes, body aches    Medication Helping Symptoms:  NO, made the pain worse           Problem list and histories reviewed & adjusted, as indicated.  Additional history: as documented    Patient Active Problem List   Diagnosis     Hyperlipidemia LDL goal <130     HTN (hypertension)     Osteoarthritis     Chronic atrial fibrillation (H)     Cancer of breast (H)     Chronic pain syndrome     History of total knee replacement     Radiculitis     Osteoarthritis of knee     Aromatase inhibitor use     Osteopenia     Long-term (current) use of anticoagulants [Z79.01]     ACP (advance care planning)     Malignant neoplasm of right female breast, unspecified site of breast     Osteoporosis     Past Surgical History:   Procedure Laterality Date     BREAST SURGERY      right  side mastectomy     C TOTAL KNEE ARTHROPLASTY Left 01/26/15     cataract extraction and lens implantation       COLONOSCOPY       EYE SURGERY       GYN SURGERY      tubal pregnancy     ORTHOPEDIC SURGERY  2009    l. hip replacement LT     ORTHOPEDIC SURGERY  july 19th 2013    Right total knee     TUBAL/ECTOPIC PREGNANCY         Social History   Substance Use Topics     Smoking status: Former Smoker     Packs/day: 0.30     Years: 30.00     Types: Cigarettes     Quit date: 8/11/1987     Smokeless tobacco: Never Used      Comment: year quit 1987     Alcohol use No     Family History   Problem Relation Age of Onset     DIABETES Father 75     cause of death     Other - See Comments Father      PVD     HEART DISEASE Sister      Other - See Comments Mother 83     old age; cause of death     Other - See Comments Sister      pow concentration; cause of death     Other - See Comments Sister       pow concentration; cause of death     Chronic Obstructive Pulmonary Disease Daughter          Current Outpatient Prescriptions   Medication Sig Dispense Refill     traMADol (ULTRAM) 50 MG tablet TAKE ONE TO TWO TABLETS BY MOUTH EVERY 6 HOURS AS NEEDED FOR PAIN 180 tablet 0     warfarin (COUMADIN) 5 MG tablet TAKE ONE TABLET (5 MG) BY MOUTH MONDAY, WEDNESDAY, FRIDAY, AND ONE-HALF TABLET (2.5 MG) THE REST OF THE WEEK. 90 tablet 0     simvastatin (ZOCOR) 40 MG tablet TAKE ONE-HALF TABLET BY MOUTH ONCE DAILY IN THE EVENING 45 tablet 1     diltiazem (CARDIZEM) 60 MG tablet TAKE ONE TABLET BY MOUTH THREE TIMES DAILY 93 tablet 5     losartan (COZAAR) 50 MG tablet TAKE ONE TABLET BY MOUTH ONCE DAILY 30 tablet 11     Acetaminophen (TYLENOL PO) Take 500 mg by mouth       Calcium Citrate-Vitamin D (CALCITRATE/VITAMIN D PO) Take  by mouth 2 times daily.       Multiple Vitamin (MULTIVITAMINS PO) Take  by mouth daily.       Misc Natural Products (OSTEO BI-FLEX ADV DOUBLE ST PO) Take  by mouth daily.       IBANdronate (BONIVA) 150 MG tablet Take 1 tablet (150 mg) by mouth every 30 days (Patient not taking: Reported on 7/6/2017) 1 tablet 3     Allergies   Allergen Reactions     Atenolol Shortness Of Breath and Other (See Comments)     Dizziness  Severe vertigo and dizziness/SOB     Lisinopril Cough     Pollen Extract      Other reaction(s): Runny Nose     BP Readings from Last 3 Encounters:   07/06/17 130/76   06/21/17 122/68   03/22/17 122/72    Wt Readings from Last 3 Encounters:   07/06/17 130 lb (59 kg)   06/21/17 135 lb (61.2 kg)   03/22/17 131 lb (59.4 kg)                    Reviewed and updated as needed this visit by clinical staff  Tobacco  Allergies  Meds  Med Hx  Surg Hx  Fam Hx  Soc Hx      Reviewed and updated as needed this visit by Provider         ROS:  Constitutional, HEENT, cardiovascular, pulmonary, gi and gu systems are negative, except as otherwise noted.    OBJECTIVE:                                       "              /76 (BP Location: Left arm, Patient Position: Chair, Cuff Size: Adult Regular)  Pulse 77  Temp 98.4  F (36.9  C) (Tympanic)  Ht 5' 1.5\" (1.562 m)  Wt 130 lb (59 kg)  SpO2 97%  BMI 24.17 kg/m2  Body mass index is 24.17 kg/(m^2).  GENERAL APPEARANCE: healthy, alert and no distress  CV: regular rates and rhythm, normal S1 S2, no S3 or S4 and no murmur, click or rub  LYMPHATICS: normal ant/post cervical and supraclavicular nodes  MS: pain in lateral hip and moving to knee. Lower back is sore.  Hurts to move legs and arms.    PSYCH: mentation appears normal and affect normal/bright    Diagnostic test results:  Diagnostic Test Results:  Results for orders placed or performed in visit on 07/06/17   XR HIP LT G/E 2 VW (Clinic Performed)    Narrative    LEFT HIP TWO VIEWS    REPORT:  There is a total hip prosthesis in place. Prosthetic elements  appear well seated.  There is some myositis ossificans seen adjacent  to the hip.    IMPRESSION: NO ACUTE HIP ABNORMALITY.  Exam Date: Jul 06, 2017 11:30:40 AM  Author: NARDA WHITE  This report is final and null     Basic metabolic panel   Result Value Ref Range    Sodium 136 133 - 144 mmol/L    Potassium 4.5 3.4 - 5.3 mmol/L    Chloride 102 94 - 109 mmol/L    Carbon Dioxide 28 20 - 32 mmol/L    Anion Gap 6 3 - 14 mmol/L    Glucose 98 70 - 99 mg/dL    Urea Nitrogen 24 7 - 30 mg/dL    Creatinine 0.88 0.52 - 1.04 mg/dL    GFR Estimate 62 >60 mL/min/1.7m2    GFR Estimate If Black 75 >60 mL/min/1.7m2    Calcium 9.2 8.5 - 10.1 mg/dL        ASSESSMENT/PLAN:                                                    1. Multiple joint pain  Took her Boniva and she did not feel well and still doesn't . Has hip pain. And has muscle aches.  We will obtain labs. She can hold for now.  Good calcium and vit d.  Discussion on which she would like to take and will go back on Fosamax and deal with constipation. Labs appear ok and her xray is ok.   - Basic metabolic panel    2. " Age related osteoporosis, unspecified pathological fracture presence  Change back to fosamax.  It made her constipated , even thought I thought it just might be her Vit D and Calcium, but still taking and constipation not an issue.   Will see us back in a month.   - Basic metabolic panel      See Patient Instructions    Chandrika Kumar PA-C  [facility]

## 2017-07-06 NOTE — NURSING NOTE
"Chief Complaint   Patient presents with     Medication Follow-up     Ibandronate 150mg.        Initial /76 (BP Location: Left arm, Patient Position: Chair, Cuff Size: Adult Regular)  Pulse 77  Temp 98.4  F (36.9  C) (Tympanic)  Ht 5' 1.5\" (1.562 m)  Wt 130 lb (59 kg)  SpO2 97%  BMI 24.17 kg/m2 Estimated body mass index is 24.17 kg/(m^2) as calculated from the following:    Height as of this encounter: 5' 1.5\" (1.562 m).    Weight as of this encounter: 130 lb (59 kg).  Medication Reconciliation: complete   Pretty Purcell LPN    "

## 2017-07-06 NOTE — MR AVS SNAPSHOT
After Visit Summary   7/6/2017    Michael Christiansen    MRN: [id number]           Patient Information     Date Of Birth          1935        Visit Information        Provider Department      7/6/2017 10:15 AM Chandrika Kumar PA [facility]        Today's Diagnoses     Multiple joint pain    -  1    Age related osteoporosis, unspecified pathological fracture presence          Care Instructions      Thank you for choosing [facility].   I have office hours 8:00 am to 4:30 pm on Monday's, Wednesday's, Thursday's and Friday's. My nurse and I are out of the office every Tuesday.    Following your visit, when your labs and diagnostic testing have returned, I will review then and you will be contacted by my nurse.  If you are on My Chart, you can also view results there.    For refills, notify your pharmacy regarding what you need and the pharmacy will generate a refill request. Do not call my nurse as she is unable to process refill request. Please plan ahead and allow 3-5 days for refill requests.    You will generally receive a reminder call the day prior to your appointment.  If you cannot attend your appointment, please cancel your appointment with as much notice as possible.  If there is a pattern of failure to present for your appointments, I cannot provide consistent, meaningful, ongoing care for you. It is very important to me that you come in for your care, so we can best assist you with your health care needs.    IMPORTANT:  Please note that it is my standard of practice to NOT participate in prescribing ongoing requested Narcotic Analgesic therapy, and/or participate in the prescribing of other controlled substances.  My nurse and I am happy to assist you with the process of referral for alternative pain management as needed, and other treatment modalities including but not limited to:  Physical Therapy, Physical Medicine and Rehab, Counseling, Chiropractic Care,  Orthopedic Care, and non-narcotic medication management.     In the event that you need to be seen for emergent concerns and I am out of office,  please see one of my colleagues for acute concerns.  You may also present to UC or ER.  I appreciate the opportunity to serve you and look forward to supporting your healthcare needs in the future. Please contact me with any questions or concerns that you may have.    Sincerely,      Chandrika Kumar RN, PA-C               Follow-ups after your visit        Your next 10 appointments already scheduled     Jul 20, 2017  8:30 AM CDT   LAB with HC LAB   [facility] ([facility] [place] )    [address]  [place] [address]   [phone]            Jul 26, 2017  9:45 AM CDT   Anticoagulation Visit with HC ANTI COAGULATION   [facility] ([facility] [place] )    [address]  [place] [address]   [phone]            Jul 27, 2017  9:30 AM CDT   Return Visit with Noemy Mahajan NP   [facility] [place] ([facility] [place] )    [address]  [place] [address]   [phone]            Dec 21, 2017 10:15 AM CST   (Arrive by 10:00 AM)   Office Visit with MELISA Murray   [facility] ([facility] [place] )    [address]  [place] [address]   [phone]              Who to contact     If you have questions or need follow up information about today's clinic visit or your schedule please contact [facility] directly at [phone].  Normal or non-critical lab and imaging results will be communicated to you by MyChart, letter or phone within 4 business days after the clinic has received the results. If you do not hear from us within 7 days, please contact the clinic through MyChart or phone. If you have a critical or abnormal lab result, we will notify you by phone as soon as possible.  Submit refill requests through appiris or call your pharmacy  "and they will forward the refill request to us. Please allow 3 business days for your refill to be completed.          Additional Information About Your Visit        Care EveryWhere ID     This is your Care EveryWhere ID. This could be used by other organizations to access your [place] medical records  [id number]        Your Vitals Were     Pulse Temperature Height Pulse Oximetry BMI (Body Mass Index)       77 98.4  F (36.9  C) (Tympanic) 5' 1.5\" (1.562 m) 97% 24.17 kg/m2        Blood Pressure from Last 3 Encounters:   07/06/17 130/76   06/21/17 122/68   03/22/17 122/72    Weight from Last 3 Encounters:   07/06/17 130 lb (59 kg)   06/21/17 135 lb (61.2 kg)   03/22/17 131 lb (59.4 kg)              We Performed the Following     Basic metabolic panel          Today's Medication Changes          These changes are accurate as of: 7/6/17 11:09 AM.  If you have any questions, ask your nurse or doctor.               Start taking these medicines.        Dose/Directions    alendronate 70 MG tablet   Commonly known as:  FOSAMAX   Used for:  Age related osteoporosis, unspecified pathological fracture presence   Started by:  Chandrika Kumar PA        Dose:  70 mg   Take 1 tablet (70 mg) by mouth every 7 days Take 60 minutes before am meal with 8 oz. water. Remain upright for 30 minutes.   Quantity:  4 tablet   Refills:  1         Stop taking these medicines if you haven't already. Please contact your care team if you have questions.     IBANdronate 150 MG tablet   Commonly known as:  BONIVA   Stopped by:  Chandrika Kumar PA                Where to get your medicines      These medications were sent to [id number] [address] [id number]   [address] , [place] [address]     Phone:  [phone]     alendronate 70 MG tablet                Primary Care Provider Office Phone # Fax #    MELISA Murray [phone]       [facility] [address]  [place] [address]   "      Equal Access to Services     [facility]: Hadii anjelica florence mynor [place], waaxda luqadaha, qaybta kaalmapedro luis quiroskristinepedro luis, topher finley. So [facility] [phone].    ATENCIÓN: Si habla migdalia, tiene a shay disposición servicios gratuitos de asistencia lingüística. Mosheame al [phone].    We comply with applicable federal civil rights laws and [place] laws. We do not discriminate on the basis of race, color, national origin, age, disability sex, sexual orientation or gender identity.            Thank you!     Thank you for choosing [facility]  [facility]. Our goal is always to provide you with excellent care. Hearing back from our patients is one way we can continue to improve our services. Please take a few minutes to complete the written survey that you may receive in the mail after your visit with us. Thank you!             Your Updated Medication List - Protect others around you: Learn how to safely use, store and throw away your medicines at www.disposemymeds.org.          This list is accurate as of: 7/6/17 11:09 AM.  Always use your most recent med list.                   Brand Name Dispense Instructions for use Diagnosis    alendronate 70 MG tablet    FOSAMAX    4 tablet    Take 1 tablet (70 mg) by mouth every 7 days Take 60 minutes before am meal with 8 oz. water. Remain upright for 30 minutes.    Age related osteoporosis, unspecified pathological fracture presence       CALCITRATE/VITAMIN D PO      Take  by mouth 2 times daily.        diltiazem 60 MG tablet    CARDIZEM    93 tablet    TAKE ONE TABLET BY MOUTH THREE TIMES DAILY    Benign essential hypertension       losartan 50 MG tablet    COZAAR    30 tablet    TAKE ONE TABLET BY MOUTH ONCE DAILY    HTN (hypertension)       MULTIVITAMINS PO      Take  by mouth daily.        OSTEO BI-FLEX ADV DOUBLE ST PO      Take  by mouth daily.        simvastatin 40 MG tablet    ZOCOR    45 tablet    TAKE ONE-HALF TABLET BY  MOUTH ONCE DAILY IN THE EVENING    Hyperlipidemia LDL goal <100       traMADol 50 MG tablet    ULTRAM    180 tablet    TAKE ONE TO TWO TABLETS BY MOUTH EVERY 6 HOURS AS NEEDED FOR PAIN    Chronic pain syndrome       TYLENOL PO      Take 500 mg by mouth        warfarin 5 MG tablet    COUMADIN    90 tablet    TAKE ONE TABLET (5 MG) BY MOUTH MONDAY, WEDNESDAY, FRIDAY, AND ONE-HALF TABLET (2.5 MG) THE REST OF THE WEEK.    Atrial fibrillation (H), Long term current use of anticoagulant therapy

## 2017-07-06 NOTE — PATIENT INSTRUCTIONS
Thank you for choosing [facility].   I have office hours 8:00 am to 4:30 pm on Monday's, Wednesday's, Thursday's and Friday's. My nurse and I are out of the office every Tuesday.    Following your visit, when your labs and diagnostic testing have returned, I will review then and you will be contacted by my nurse.  If you are on My Chart, you can also view results there.    For refills, notify your pharmacy regarding what you need and the pharmacy will generate a refill request. Do not call my nurse as she is unable to process refill request. Please plan ahead and allow 3-5 days for refill requests.    You will generally receive a reminder call the day prior to your appointment.  If you cannot attend your appointment, please cancel your appointment with as much notice as possible.  If there is a pattern of failure to present for your appointments, I cannot provide consistent, meaningful, ongoing care for you. It is very important to me that you come in for your care, so we can best assist you with your health care needs.    IMPORTANT:  Please note that it is my standard of practice to NOT participate in prescribing ongoing requested Narcotic Analgesic therapy, and/or participate in the prescribing of other controlled substances.  My nurse and I am happy to assist you with the process of referral for alternative pain management as needed, and other treatment modalities including but not limited to:  Physical Therapy, Physical Medicine and Rehab, Counseling, Chiropractic Care, Orthopedic Care, and non-narcotic medication management.     In the event that you need to be seen for emergent concerns and I am out of office,  please see one of my colleagues for acute concerns.  You may also present to  or ER.  I appreciate the opportunity to serve you and look forward to supporting your healthcare needs in the future. Please contact me with any questions or concerns that you may  have.    Sincerely,      Chandrika Kumar RN, PA-C

## 2017-07-18 DIAGNOSIS — C50.911 MALIGNANT NEOPLASM OF RIGHT FEMALE BREAST (H): Primary | ICD-10-CM

## 2017-07-20 DIAGNOSIS — C50.911 MALIGNANT NEOPLASM OF RIGHT FEMALE BREAST (H): ICD-10-CM

## 2017-07-20 LAB
ALBUMIN SERPL-MCNC: 3.9 G/DL (ref 3.4–5)
ALP SERPL-CCNC: 73 U/L (ref 40–150)
ALT SERPL W P-5'-P-CCNC: 19 U/L (ref 0–50)
ANION GAP SERPL CALCULATED.3IONS-SCNC: 6 MMOL/L (ref 3–14)
AST SERPL W P-5'-P-CCNC: 23 U/L (ref 0–45)
BASOPHILS # BLD AUTO: 0.1 10E9/L (ref 0–0.2)
BASOPHILS NFR BLD AUTO: 0.8 %
BILIRUB SERPL-MCNC: 0.6 MG/DL (ref 0.2–1.3)
BUN SERPL-MCNC: 21 MG/DL (ref 7–30)
CALCIUM SERPL-MCNC: 9.1 MG/DL (ref 8.5–10.1)
CANCER AG27-29 SERPL-ACNC: 40 U/ML (ref 0–39)
CHLORIDE SERPL-SCNC: 100 MMOL/L (ref 94–109)
CO2 SERPL-SCNC: 29 MMOL/L (ref 20–32)
CREAT SERPL-MCNC: 0.91 MG/DL (ref 0.52–1.04)
DIFFERENTIAL METHOD BLD: NORMAL
EOSINOPHIL # BLD AUTO: 0.1 10E9/L (ref 0–0.7)
EOSINOPHIL NFR BLD AUTO: 1.1 %
ERYTHROCYTE [DISTWIDTH] IN BLOOD BY AUTOMATED COUNT: 13 % (ref 10–15)
GFR SERPL CREATININE-BSD FRML MDRD: 59 ML/MIN/1.7M2
GLUCOSE SERPL-MCNC: 91 MG/DL (ref 70–99)
HCT VFR BLD AUTO: 38.3 % (ref 35–47)
HGB BLD-MCNC: 13.4 G/DL (ref 11.7–15.7)
IMM GRANULOCYTES # BLD: 0 10E9/L (ref 0–0.4)
IMM GRANULOCYTES NFR BLD: 0.3 %
LDH SERPL L TO P-CCNC: 190 U/L (ref 81–234)
LYMPHOCYTES # BLD AUTO: 2.1 10E9/L (ref 0.8–5.3)
LYMPHOCYTES NFR BLD AUTO: 21.6 %
MCH RBC QN AUTO: 32.6 PG (ref 26.5–33)
MCHC RBC AUTO-ENTMCNC: 35 G/DL (ref 31.5–36.5)
MCV RBC AUTO: 93 FL (ref 78–100)
MONOCYTES # BLD AUTO: 1.1 10E9/L (ref 0–1.3)
MONOCYTES NFR BLD AUTO: 10.7 %
NEUTROPHILS # BLD AUTO: 6.4 10E9/L (ref 1.6–8.3)
NEUTROPHILS NFR BLD AUTO: 65.5 %
NRBC # BLD AUTO: 0 10*3/UL
NRBC BLD AUTO-RTO: 0 /100
PLATELET # BLD AUTO: 329 10E9/L (ref 150–450)
POTASSIUM SERPL-SCNC: 4.3 MMOL/L (ref 3.4–5.3)
PROT SERPL-MCNC: 7.8 G/DL (ref 6.8–8.8)
RBC # BLD AUTO: 4.11 10E12/L (ref 3.8–5.2)
SODIUM SERPL-SCNC: 135 MMOL/L (ref 133–144)
WBC # BLD AUTO: 9.8 10E9/L (ref 4–11)

## 2017-07-20 PROCEDURE — 86300 IMMUNOASSAY TUMOR CA 15-3: CPT | Mod: ZL | Performed by: NURSE PRACTITIONER

## 2017-07-20 PROCEDURE — 36415 COLL VENOUS BLD VENIPUNCTURE: CPT | Mod: ZL | Performed by: NURSE PRACTITIONER

## 2017-07-20 PROCEDURE — 83615 LACTATE (LD) (LDH) ENZYME: CPT | Mod: ZL | Performed by: NURSE PRACTITIONER

## 2017-07-20 PROCEDURE — 80053 COMPREHEN METABOLIC PANEL: CPT | Mod: ZL | Performed by: NURSE PRACTITIONER

## 2017-07-20 PROCEDURE — 85025 COMPLETE CBC W/AUTO DIFF WBC: CPT | Mod: ZL | Performed by: NURSE PRACTITIONER

## 2017-07-26 ENCOUNTER — ANTICOAGULATION THERAPY VISIT (OUTPATIENT)
Dept: ANTICOAGULATION | Facility: OTHER | Age: 82
End: 2017-07-26
Attending: FAMILY MEDICINE
Payer: MEDICARE

## 2017-07-26 DIAGNOSIS — I48.20 CHRONIC ATRIAL FIBRILLATION (H): ICD-10-CM

## 2017-07-26 DIAGNOSIS — Z79.01 LONG-TERM (CURRENT) USE OF ANTICOAGULANTS: ICD-10-CM

## 2017-07-26 LAB — INR POINT OF CARE: 2.5 (ref 0.86–1.14)

## 2017-07-26 PROCEDURE — 85610 PROTHROMBIN TIME: CPT | Mod: QW,ZL

## 2017-07-26 NOTE — MR AVS SNAPSHOT
Pinorao Christiansen   7/26/2017 9:45 AM   Anticoagulation Therapy Visit    Description:  81 year old female   Provider:   ANTI COAGULATION   Department:  H[facility] Anti Coagulation           INR as of 7/26/2017     Today's INR 2.5      Anticoagulation Summary as of 7/26/2017     INR goal 2.0-3.0   Today's INR 2.5   Full instructions 5 mg on Mon, Wed, Fri; 2.5 mg all other days   Next INR check 9/6/2017    Indications   Long-term (current) use of anticoagulants [Z79.01] [Z79.01]  Chronic atrial fibrillation (H) [I48.2]         Your next Anticoagulation Clinic appointment(s)     Sep 06, 2017  9:45 AM CDT   Anticoagulation Visit with  ANTI COAGULATION   [facility] ([facility]    [address] [place] [address]   [phone]              July 2017 Details    Sun Mon Tue Wed Thu Fri Sat           1                 2               3               4               5               6               7               8                 9               10               11               12               13               14               15                 16               17               18               19               20               21               22                 23               24               25               26      5 mg   See details      27      2.5 mg         28      5 mg         29      2.5 mg           30      2.5 mg         31      5 mg               Date Details   07/26 This INR check               How to take your warfarin dose     To take:  2.5 mg Take 0.5 of a 5 mg tablet.    To take:  5 mg Take 1 of the 5 mg tablets.           August 2017 Details    Sun Mon Tue Wed Thu Fri Sat       1      2.5 mg         2      5 mg         3      2.5 mg         4      5 mg         5      2.5 mg           6      2.5 mg         7      5 mg         8      2.5 mg         9      5 mg         10      2.5 mg         11      5 mg         12      2.5 mg           13      2.5 mg         14       5 mg         15      2.5 mg         16      5 mg         17      2.5 mg         18      5 mg         19      2.5 mg           20      2.5 mg         21      5 mg         22      2.5 mg         23      5 mg         24      2.5 mg         25      5 mg         26      2.5 mg           27      2.5 mg         28      5 mg         29      2.5 mg         30      5 mg         31      2.5 mg            Date Details   No additional details            How to take your warfarin dose     To take:  2.5 mg Take 0.5 of a 5 mg tablet.    To take:  5 mg Take 1 of the 5 mg tablets.           September 2017 Details    Sun Mon Tue Wed Thu Fri Sat          1      5 mg         2      2.5 mg           3      2.5 mg         4      5 mg         5      2.5 mg         6            7               8               9                 10               11               12               13               14               15               16                 17               18               19               20               21               22               23                 24               25               26               27               28               29               30                Date Details   No additional details    Date of next INR:  9/6/2017         How to take your warfarin dose     To take:  2.5 mg Take 0.5 of a 5 mg tablet.    To take:  5 mg Take 1 of the 5 mg tablets.

## 2017-07-26 NOTE — PROGRESS NOTES
ANTICOAGULATION FOLLOW-UP CLINIC VISIT    Patient Name:  Michael Christiansen  Date:  7/26/2017  Contact Type:  Face to Face    SUBJECTIVE:     Patient Findings     Positives No Problem Findings           OBJECTIVE    INR Protime   Date Value Ref Range Status   07/26/2017 2.5 (A) 0.86 - 1.14 Final       ASSESSMENT / PLAN  INR assessment THER    Recheck INR In: 6 WEEKS    INR Location Clinic      Anticoagulation Summary as of 7/26/2017     INR goal 2.0-3.0   Today's INR 2.5   Maintenance plan 5 mg (5 mg x 1) on Mon, Wed, Fri; 2.5 mg (5 mg x 0.5) all other days   Full instructions 5 mg on Mon, Wed, Fri; 2.5 mg all other days   Weekly total 25 mg   No change documented Kasia Hercules RN   Plan last modified Gabriella Koo RN (7/27/2016)   Next INR check 9/6/2017   Priority INR   Target end date Indefinite    Indications   Long-term (current) use of anticoagulants [Z79.01] [Z79.01]  Chronic atrial fibrillation (H) [I48.2]         Anticoagulation Episode Summary     INR check location     Preferred lab     Send INR reminders to  [facility] POOL    Comments       Anticoagulation Care Providers     Provider Role Specialty Phone number    Chandrika Kumar, PA [facility] [phone]            See the Encounter Report to view Anticoagulation Flowsheet and Dosing Calendar (Go to Encounters tab in chart review, and find the Anticoagulation Therapy Visit)        Kasia Hercules, RN

## 2017-08-02 ENCOUNTER — ONCOLOGY VISIT (OUTPATIENT)
Dept: ONCOLOGY | Facility: OTHER | Age: 82
End: 2017-08-02
Attending: NURSE PRACTITIONER
Payer: MEDICARE

## 2017-08-02 VITALS
HEIGHT: 63 IN | BODY MASS INDEX: 23.37 KG/M2 | HEART RATE: 65 BPM | WEIGHT: 131.9 LBS | DIASTOLIC BLOOD PRESSURE: 84 MMHG | TEMPERATURE: 97.9 F | SYSTOLIC BLOOD PRESSURE: 120 MMHG | OXYGEN SATURATION: 98 %

## 2017-08-02 DIAGNOSIS — C50.911 MALIGNANT NEOPLASM OF RIGHT BREAST IN FEMALE, ESTROGEN RECEPTOR POSITIVE, UNSPECIFIED SITE OF BREAST (H): Primary | ICD-10-CM

## 2017-08-02 DIAGNOSIS — Z17.0 MALIGNANT NEOPLASM OF RIGHT BREAST IN FEMALE, ESTROGEN RECEPTOR POSITIVE, UNSPECIFIED SITE OF BREAST (H): Primary | ICD-10-CM

## 2017-08-02 DIAGNOSIS — R97.8 ELEVATED TUMOR MARKERS: ICD-10-CM

## 2017-08-02 DIAGNOSIS — Z78.0 POSTMENOPAUSAL STATUS: ICD-10-CM

## 2017-08-02 DIAGNOSIS — M81.0 OSTEOPOROSIS, UNSPECIFIED OSTEOPOROSIS TYPE, UNSPECIFIED PATHOLOGICAL FRACTURE PRESENCE: ICD-10-CM

## 2017-08-02 PROCEDURE — 99214 OFFICE O/P EST MOD 30 MIN: CPT | Performed by: NURSE PRACTITIONER

## 2017-08-02 PROCEDURE — 99212 OFFICE O/P EST SF 10 MIN: CPT

## 2017-08-02 ASSESSMENT — PAIN SCALES - GENERAL: PAINLEVEL: MODERATE PAIN (4)

## 2017-08-02 NOTE — MR AVS SNAPSHOT
After Visit Summary   8/2/2017    Michael Christiansen    MRN: [id number]           Patient Information     Date Of Birth          1935        Visit Information        Provider Department      8/2/2017 10:30 AM Noemy Mahajan NP [facility] (formerly [facility])        Today's Diagnoses     Malignant neoplasm of right breast in female, estrogen receptor positive, unspecified site of breast (H)    -  1    Postmenopausal status        Osteoporosis, unspecified osteoporosis type, unspecified pathological fracture presence           Follow-ups after your visit        Your next 10 appointments already scheduled     Aug 09, 2017 10:45 AM CDT   (Arrive by 10:30 AM)   Office Visit with MELISA Murray   [facility] [place] ([facility] [place] )    [address]  [place] [address]   [phone]            Sep 06, 2017  9:45 AM CDT   Anticoagulation Visit with  ANTI COAGULATION   [facility] ([facility] [place] )    [address]  [place] [address]   [phone]            Dec 21, 2017 10:15 AM CST   (Arrive by 10:00 AM)   Office Visit with MELISA Murray   [facility] [place] ([facility] [place] )    [address]  [place] [address]   [phone]            Dec 27, 2017 12:15 PM CST   LAB with HC LAB   [facility] [place] ([facility] [place] )    [address]  [place] [address]   [phone]            Dec 27, 2017  1:00 PM CST   Radiology with HI DEXA   [facility] ([facility] )    [address] [place] [address]  [place] [address] [phone]            Jan 03, 2018  1:00 PM CST   (Arrive by 12:45 PM)   Return Visit with Noemy Mahajan NP   [facility]    [address]  [place] [address]   [phone]              Future tests that were ordered for you today     Open Future Orders        Priority Expected Expires Ordered     "Comprehensive metabolic panel Routine 1/2/2018 2/2/2018 8/2/2017    Lactate Dehydrogenase Routine 1/2/2018 2/2/2018 8/2/2017    CBC with platelets differential Routine 1/2/2018 2/2/2018 8/2/2017    Ca27.29  breast tumor marker Routine 1/2/2018 2/2/2018 8/2/2017            Who to contact     If you have questions or need follow up information about today's clinic visit or your schedule please contact [facility] directly at [phone].  Normal or non-critical lab and imaging results will be communicated to you by MyChart, letter or phone within 4 business days after the clinic has received the results. If you do not hear from us within 7 days, please contact the clinic through MyChart or phone. If you have a critical or abnormal lab result, we will notify you by phone as soon as possible.  Submit refill requests through [employer] or call your pharmacy and they will forward the refill request to us. Please allow 3 business days for your refill to be completed.          Additional Information About Your Visit        Care EveryWhere ID     This is your Care EveryWhere ID. This could be used by other organizations to access your [facility] medical records  [id number]        Your Vitals Were     Pulse Temperature Height Pulse Oximetry BMI (Body Mass Index)       65 97.9  F (36.6  C) (Tympanic) 1.6 m (5' 3\") 98% 23.37 kg/m2        Blood Pressure from Last 3 Encounters:   08/02/17 120/84   07/06/17 130/76   06/21/17 122/68    Weight from Last 3 Encounters:   08/02/17 59.8 kg (131 lb 14.4 oz)   07/06/17 59 kg (130 lb)   06/21/17 61.2 kg (135 lb)               Primary Care Provider Office Phone # Fax #    MELISA Murray [phone]       [facility] [address] [address]        Equal Access to Services     AMAYA CONSTANTINO : Jaxson Hampton, levi luqadaha, qaybta kaaltopher isaac. So [facility] " [phone].    ATENCIÓN: Si kal negron, tiene a shay disposición servicios gratuitos de asistencia lingüística. Gera cho [phone].    We comply with applicable federal civil rights laws and [place] laws. We do not discriminate on the basis of race, color, national origin, age, disability sex, sexual orientation or gender identity.            Thank you!     Thank you for choosing [facility]  for your care. Our goal is always to provide you with excellent care. Hearing back from our patients is one way we can continue to improve our services. Please take a few minutes to complete the written survey that you may receive in the mail after your visit with us. Thank you!             Your Updated Medication List - Protect others around you: Learn how to safely use, store and throw away your medicines at www.disposemymeds.org.          This list is accurate as of: 8/2/17 11:59 PM.  Always use your most recent med list.                   Brand Name Dispense Instructions for use Diagnosis    alendronate 70 MG tablet    FOSAMAX    4 tablet    Take 1 tablet (70 mg) by mouth every 7 days Take 60 minutes before am meal with 8 oz. water. Remain upright for 30 minutes.    Age related osteoporosis, unspecified pathological fracture presence       CALCITRATE/VITAMIN D PO      Take  by mouth 2 times daily.        diltiazem 60 MG tablet    CARDIZEM    93 tablet    TAKE ONE TABLET BY MOUTH THREE TIMES DAILY    Benign essential hypertension       losartan 50 MG tablet    COZAAR    30 tablet    TAKE ONE TABLET BY MOUTH ONCE DAILY    HTN (hypertension)       MULTIVITAMINS PO      Take  by mouth daily.        OSTEO BI-FLEX ADV DOUBLE ST PO      Take  by mouth daily.        simvastatin 40 MG tablet    ZOCOR    45 tablet    TAKE ONE-HALF TABLET BY MOUTH ONCE DAILY IN THE EVENING    Hyperlipidemia LDL goal <100       traMADol 50 MG tablet    ULTRAM    180 tablet    TAKE ONE TO TWO TABLETS BY MOUTH EVERY 6 HOURS AS NEEDED  FOR PAIN    Chronic pain syndrome       TYLENOL PO      Take 500 mg by mouth        warfarin 5 MG tablet    COUMADIN    90 tablet    TAKE ONE TABLET (5 MG) BY MOUTH MONDAY, WEDNESDAY, FRIDAY, AND ONE-HALF TABLET (2.5 MG) THE REST OF THE WEEK.    Atrial fibrillation (H), Long term current use of anticoagulant therapy

## 2017-08-02 NOTE — NURSING NOTE
"Chief Complaint   Patient presents with     RECHECK     *_* Health Care Directive *_*     On file       Initial /84 (BP Location: Left arm, Cuff Size: Adult Regular)  Pulse 65  Temp 97.9  F (36.6  C) (Tympanic)  Ht 1.6 m (5' 3\")  Wt 59.8 kg (131 lb 14.4 oz)  SpO2 98%  BMI 23.37 kg/m2 Estimated body mass index is 23.37 kg/(m^2) as calculated from the following:    Height as of this encounter: 1.6 m (5' 3\").    Weight as of this encounter: 59.8 kg (131 lb 14.4 oz).  Medication Reconciliation: complete   Yusra Bravo LPN      "

## 2017-08-02 NOTE — PROGRESS NOTES
Oncology Follow-up Visit:  August 2, 2017    Reason for Visit:  Patient presents with:  RECHECK  *_* Health Care Directive *_*: On file     Nursing Note and documentation reviewed: yes    HPI:  This is a 81-year-old female patient who presents to the oncology clinic today in followup of breast cancer.  She was diagnosed with carcinoma of the right breast in April 2010; she underwent mastectomy; tumor was ER/MD positive, HER-2/nery negative.  Arimidex was discontinued in January of 2017 related to worsening osteoporosis and BCI showing essentially no benefit from long-term hormonal therapy.      Patient presents to the clinic today stating she is feeling well.  She is having difficulty with her sciatic nerve and this has been ongoing for years.  She denies any problems with new muscle or bone pain and is currently being followed by her PCP for her osteoporosis and she did initiate Fosamax recently.  She is on calcium and vitamin d twice daily.  She does state she has some issues with easy bruising and this has not new and also some increased anxiety related to her 's health.  She denies any headaches, shortness of breath, coughing or chest pains and denies any fevers.    Oncologic History: Michael presented in April 2010 with an abnormal mammogram. She underwent a right simple mastectomy and sentinel node biopsy on 4/15/2010 by Dr. Rogelio Pina. Pathology revealed a 1.2 cm infiltrating ductal carcinoma of the right breast, grade 1/3; ER/MD positive, HER-2/nery negative. Rea node biopsy negative. She was then seen initially by Dr. Malin, oncology, on 5/4/2010 and patient was placed on tamoxifen 20 mg daily. Patient remained on tamoxifen until May of 2012 and had been complaining of cramps in her calves as well as hot flashes and she was switched to Arimidex 1 mg daily.     Current Chemo Regime/TX: n/a  Current Cycle: n/a  # of completed cycles: n/a     Previous treatment: Started tamoxifen May 2010 and switched  to Arimidex 1mg daily May 2012 and discontinued 1/2017     Past Medical History:   Diagnosis Date     Arthritis      Backache, unspecified 1/1/2011     Breast cancer, stage 1 3/19/2010     Chronic pain syndrome 1/23/2015     Claustrophobia 1/1/2011     Hip joint replacement by other means 1/1/2011     Hypertension      Osteoarthrosis, unspecified whether generalized or localized, lower leg 8/14/2006     Other and unspecified hyperlipidemia 10/11/2007     Posttraumatic stress disorder 1/1/2011     Seasonal allergies 1/1/2011     Shortness of breath 6/18/2007     Unspecified essential hypertension 8/30/2006       Past Surgical History:   Procedure Laterality Date     BREAST SURGERY      right  side mastectomy     C TOTAL KNEE ARTHROPLASTY Left 01/26/15     cataract extraction and lens implantation       COLONOSCOPY       EYE SURGERY       GYN SURGERY      tubal pregnancy     ORTHOPEDIC SURGERY  2009    l. hip replacement LT     ORTHOPEDIC SURGERY  july 19th 2013    Right total knee     TUBAL/ECTOPIC PREGNANCY         Family History   Problem Relation Age of Onset     DIABETES Father 75     cause of death     Other - See Comments Father      PVD     HEART DISEASE Sister      Other - See Comments Mother 83     old age; cause of death     Other - See Comments Sister      pow concentration; cause of death     Other - See Comments Sister      pow concentration; cause of death     Chronic Obstructive Pulmonary Disease Daughter        Social History     Social History     Marital status:      Spouse name: N/A     Number of children: N/A     Years of education: N/A     Occupational History     Not on file.     Social History Main Topics     Smoking status: Former Smoker     Packs/day: 0.30     Years: 30.00     Types: Cigarettes     Quit date: 8/11/1987     Smokeless tobacco: Never Used      Comment: year quit 1987     Alcohol use No     Drug use: No     Sexual activity: Not on file     Other Topics Concern      "Parent/Sibling W/ Cabg, Mi Or Angioplasty Before 65f 55m? No     Blood Transfusions Yes     Social History Narrative       Current Outpatient Prescriptions   Medication     alendronate (FOSAMAX) 70 MG tablet     traMADol (ULTRAM) 50 MG tablet     warfarin (COUMADIN) 5 MG tablet     simvastatin (ZOCOR) 40 MG tablet     diltiazem (CARDIZEM) 60 MG tablet     losartan (COZAAR) 50 MG tablet     Acetaminophen (TYLENOL PO)     Calcium Citrate-Vitamin D (CALCITRATE/VITAMIN D PO)     Multiple Vitamin (MULTIVITAMINS PO)     Misc Natural Products (OSTEO BI-FLEX ADV DOUBLE ST PO)     No current facility-administered medications for this visit.         Allergies   Allergen Reactions     Atenolol Shortness Of Breath and Other (See Comments)     Dizziness  Severe vertigo and dizziness/SOB     Lisinopril Cough     Pollen Extract      Other reaction(s): Runny Nose       Review Of Systems:  Constitutional: denies fever, weight changes, chills, and night sweats.  Eyes: denies blurred or double vision  Ears/Nose/Throat: denies ear pain, nose problems, difficulty swallowing  Respiratory: denies shortness of breath, cough  Skin: denies rash, lesions  Breast/Chest wall: denies new pain, lumps   Cardiovascular: denies chest pain, palpitations, edema  Gastrointestinal: denies abdominal pain, bloating, nausea, vomiting, early satiety  Genitourinary: denies difficulty with urination, blood in urine  Musculoskeletal:denies new muscle pain, bone pain  Neurologic: denies lightheadedness, headaches, numbness or tingling  Psychiatric: see HPI  Hematologic/Lymphatic/Immunologic: denies easy bleeding, lumps or bumps noted, has easy bruising-on coumadin,   Endocrine: Denies increased thirst    Physical Exam:  /84 (BP Location: Left arm, Cuff Size: Adult Regular)  Pulse 65  Temp 97.9  F (36.6  C) (Tympanic)  Ht 1.6 m (5' 3\")  Wt 59.8 kg (131 lb 14.4 oz)  SpO2 98%  BMI 23.37 kg/m2    GENERAL APPEARANCE: Healthy, alert and in no acute " distress.  HEENT: Normocephalic, Sclerae anicteric. Oropharynx without ulcers, lesions, or thrush.  NECK: No asymmetry or masses, no thyromegaly.  LYMPHATICS: No palpable cervical, supraclavicular, axillary, or inguinal nodes   RESP: Lungs clear to auscultation bilaterally, respirations regular and easy  CARDIOVASCULAR: Regular rate and rhythm. Normal S1, S2; no murmur, gallop, or rub.  ABDOMEN: Soft, nontender. Bowel sounds auscultated all 4 quadrants. No palpable organomegaly or masses.  BREAST/Chest wall: no lumps, masses or tenderness bilaterally  MUSCULOSKELETAL: Extremities without gross deformities noted. No edema of bilateral lower extremities.  SKIN: No suspicious lesions or rashes.  NEURO: Alert and oriented x 3.  Gait steady.  PSYCHIATRIC: Mentation and affect appear normal.  Mood appropriate.    Laboratory:  Component Results   Component Value Flag Ref Range Units Status Collected Lab   WBC 9.8  4.0 - 11.0 10e9/L Final 07/20/2017 10:04 AM HI   RBC Count 4.11  3.8 - 5.2 10e12/L Final 07/20/2017 10:04 AM HI   Hemoglobin 13.4  11.7 - 15.7 g/dL Final 07/20/2017 10:04 AM HI   Hematocrit 38.3  35.0 - 47.0 % Final 07/20/2017 10:04 AM HI   MCV 93  78 - 100 fl Final 07/20/2017 10:04 AM HI   MCH 32.6  26.5 - 33.0 pg Final 07/20/2017 10:04 AM HI   MCHC 35.0  31.5 - 36.5 g/dL Final 07/20/2017 10:04 AM HI   RDW 13.0  10.0 - 15.0 % Final 07/20/2017 10:04 AM HI   Platelet Count 329  150 - 450 10e9/L Final 07/20/2017 10:04 AM HI   Diff Method     Final 07/20/2017 10:04 AM HI   Automated Method   % Neutrophils 65.5   % Final 07/20/2017 10:04 AM HI   % Lymphocytes 21.6   % Final 07/20/2017 10:04 AM HI   % Monocytes 10.7   % Final 07/20/2017 10:04 AM HI   % Eosinophils 1.1   % Final 07/20/2017 10:04 AM HI   % Basophils 0.8   % Final 07/20/2017 10:04 AM HI   % Immature Granulocytes 0.3   % Final 07/20/2017 10:04 AM HI   Nucleated RBCs 0  0 /100 Final 07/20/2017 10:04 AM HI   Absolute Neutrophil 6.4  1.6 - 8.3 10e9/L Final  07/20/2017 10:04 AM HI   Absolute Lymphocytes 2.1  0.8 - 5.3 10e9/L Final 07/20/2017 10:04 AM HI   Absolute Monocytes 1.1  0.0 - 1.3 10e9/L Final 07/20/2017 10:04 AM HI   Absolute Eosinophils 0.1  0.0 - 0.7 10e9/L Final 07/20/2017 10:04 AM HI   Absolute Basophils 0.1  0.0 - 0.2 10e9/L Final 07/20/2017 10:04 AM HI   Abs Immature Granulocytes 0.0  0 - 0.4 10e9/L Final 07/20/2017 10:04 AM HI   Absolute Nucleated RBC 0.0    Final 07/20/2017 10:04 AM HI     Component Value Flag Ref Range Units Status Collected Lab   Sodium 135  133 - 144 mmol/L Final 07/20/2017 10:04 AM HI   Potassium 4.3  3.4 - 5.3 mmol/L Final 07/20/2017 10:04 AM HI   Chloride 100  94 - 109 mmol/L Final 07/20/2017 10:04 AM HI   Carbon Dioxide 29  20 - 32 mmol/L Final 07/20/2017 10:04 AM HI   Anion Gap 6  3 - 14 mmol/L Final 07/20/2017 10:04 AM HI   Glucose 91  70 - 99 mg/dL Final 07/20/2017 10:04 AM HI   Urea Nitrogen 21  7 - 30 mg/dL Final 07/20/2017 10:04 AM HI   Creatinine 0.91  0.52 - 1.04 mg/dL Final 07/20/2017 10:04 AM HI   GFR Estimate 59 (L) >60 mL/min/1.7m2 Final 07/20/2017 10:04 AM HI   Comment:   Non  GFR Calc   GFR Estimate If Black 71  >60 mL/min/1.7m2 Final 07/20/2017 10:04 AM HI   Comment:   African American GFR Calc   Calcium 9.1  8.5 - 10.1 mg/dL Final 07/20/2017 10:04 AM HI   Bilirubin Total 0.6  0.2 - 1.3 mg/dL Final 07/20/2017 10:04 AM HI   Albumin 3.9  3.4 - 5.0 g/dL Final 07/20/2017 10:04 AM HI   Protein Total 7.8  6.8 - 8.8 g/dL Final 07/20/2017 10:04 AM HI   Alkaline Phosphatase 73  40 - 150 U/L Final 07/20/2017 10:04 AM HI   ALT 19  0 - 50 U/L Final 07/20/2017 10:04 AM HI   AST 23  0 - 45 U/L Final 07/20/2017 10:04 AM HI     Component Value Flag Ref Range Units Status Collected Lab   Lactate Dehydrogenase 190  81 - 234 U/L Final 07/20/2017 10:04 AM HI     Component Value Flag Ref Range Units Status Collected Lab   CA 27-29 40 (H) 0 - 39 U/mL Final 07/20/2017 10:04 AM 51       Imaging Studies:  None for  today      ASSESSMENT/PLAN:    #1 Breast cancer: Diagnosed 4/2010 with stage I carcinoma of the right breast; status post right mastectomy with sentinel node biopsy negative; tumor was 1.2 cm, ER/LA positive, HER-2/nery negative.  She completed 7 years of antiestrogen therapy.  BCI showed low risk and low benefit from extended hormonal therapy so the arimidex was discontinued in January 2017.   CA 27.29 at 40.  We will follow up in 5 months with a CBC, CMP, LDH and CA 27.29.  Will plan for mammogram in April 2018.     #2  Elevated CA 27.29:  Very slight elevation at 40 and essentially stable.  Will recheck in 5 months.     #3  Osteoporosis:  She will continue with Calcium and Vitamin D twice daily and is currently on Fosamax.  Will plan for DEXA scan prior to next follow up.     I encouraged patient to call with any questions or concerns.       Noemy Mahajan  FNP-BC

## 2017-08-06 NOTE — PATIENT INSTRUCTIONS
We would like to see you back in 5 months with lab and DEXA scan 1 week prior. Our diagnostic imaging department will call you to schedule your DEXA scan(s).   If you have any questions please call [phone]  Other instructions:  None

## 2017-08-09 ENCOUNTER — OFFICE VISIT (OUTPATIENT)
Dept: FAMILY MEDICINE | Facility: OTHER | Age: 82
End: 2017-08-09
Attending: PHYSICIAN ASSISTANT
Payer: MEDICARE

## 2017-08-09 VITALS
OXYGEN SATURATION: 96 % | HEART RATE: 62 BPM | TEMPERATURE: 98 F | WEIGHT: 130 LBS | BODY MASS INDEX: 23.03 KG/M2 | DIASTOLIC BLOOD PRESSURE: 62 MMHG | SYSTOLIC BLOOD PRESSURE: 116 MMHG

## 2017-08-09 DIAGNOSIS — M81.0 OSTEOPOROSIS, UNSPECIFIED OSTEOPOROSIS TYPE, UNSPECIFIED PATHOLOGICAL FRACTURE PRESENCE: ICD-10-CM

## 2017-08-09 DIAGNOSIS — M54.42 CHRONIC LEFT-SIDED LOW BACK PAIN WITH LEFT-SIDED SCIATICA: Primary | ICD-10-CM

## 2017-08-09 DIAGNOSIS — G89.29 CHRONIC LEFT-SIDED LOW BACK PAIN WITH LEFT-SIDED SCIATICA: Primary | ICD-10-CM

## 2017-08-09 DIAGNOSIS — M81.0 AGE RELATED OSTEOPOROSIS, UNSPECIFIED PATHOLOGICAL FRACTURE PRESENCE: ICD-10-CM

## 2017-08-09 PROCEDURE — 99212 OFFICE O/P EST SF 10 MIN: CPT

## 2017-08-09 PROCEDURE — 99214 OFFICE O/P EST MOD 30 MIN: CPT | Performed by: PHYSICIAN ASSISTANT

## 2017-08-09 RX ORDER — ALENDRONATE SODIUM 70 MG/1
70 TABLET ORAL
Qty: 4 TABLET | Refills: 11 | Status: SHIPPED | OUTPATIENT
Start: 2017-08-09 | End: 2018-06-01

## 2017-08-09 RX ORDER — METHYLPREDNISOLONE 4 MG
TABLET, DOSE PACK ORAL
Qty: 21 TABLET | Refills: 0 | Status: SHIPPED | OUTPATIENT
Start: 2017-08-09 | End: 2017-08-30

## 2017-08-09 ASSESSMENT — ANXIETY QUESTIONNAIRES
7. FEELING AFRAID AS IF SOMETHING AWFUL MIGHT HAPPEN: NOT AT ALL
GAD7 TOTAL SCORE: 7
1. FEELING NERVOUS, ANXIOUS, OR ON EDGE: SEVERAL DAYS
5. BEING SO RESTLESS THAT IT IS HARD TO SIT STILL: MORE THAN HALF THE DAYS
4. TROUBLE RELAXING: MORE THAN HALF THE DAYS
3. WORRYING TOO MUCH ABOUT DIFFERENT THINGS: SEVERAL DAYS
2. NOT BEING ABLE TO STOP OR CONTROL WORRYING: SEVERAL DAYS
6. BECOMING EASILY ANNOYED OR IRRITABLE: NOT AT ALL

## 2017-08-09 ASSESSMENT — PATIENT HEALTH QUESTIONNAIRE - PHQ9: SUM OF ALL RESPONSES TO PHQ QUESTIONS 1-9: 8

## 2017-08-09 ASSESSMENT — PAIN SCALES - GENERAL: PAINLEVEL: MODERATE PAIN (4)

## 2017-08-09 NOTE — PATIENT INSTRUCTIONS
Thank you for choosing [facility].   I have office hours 8:00 am to 4:30 pm on Monday's, Wednesday's, Thursday's and Friday's. My nurse and I are out of the office every Tuesday.    Following your visit, when your labs and diagnostic testing have returned, I will review then and you will be contacted by my nurse.  If you are on My Chart, you can also view results there.    For refills, notify your pharmacy regarding what you need and the pharmacy will generate a refill request. Do not call my nurse as she is unable to process refill request. Please plan ahead and allow 3-5 days for refill requests.    You will generally receive a reminder call the day prior to your appointment.  If you cannot attend your appointment, please cancel your appointment with as much notice as possible.  If there is a pattern of failure to present for your appointments, I cannot provide consistent, meaningful, ongoing care for you. It is very important to me that you come in for your care, so we can best assist you with your health care needs.    IMPORTANT:  Please note that it is my standard of practice to NOT participate in prescribing ongoing requested Narcotic Analgesic therapy, and/or participate in the prescribing of other controlled substances.  My nurse and I am happy to assist you with the process of referral for alternative pain management as needed, and other treatment modalities including but not limited to:  Physical Therapy, Physical Medicine and Rehab, Counseling, Chiropractic Care, Orthopedic Care, and non-narcotic medication management.     In the event that you need to be seen for emergent concerns and I am out of office,  please see one of my colleagues for acute concerns.  You may also present to  or ER.  I appreciate the opportunity to serve you and look forward to supporting your healthcare needs in the future. Please contact me with any questions or concerns that you may  have.    Sincerely,      Chandrika Kumar RN, PA-C

## 2017-08-09 NOTE — PROGRESS NOTES
SUBJECTIVE:                                                    Michael Christiansen is a 82 year old female who presents to clinic today for the following health issues:        Osteoporosis medication follow up       Duration: 1 month     Description (location/character/radiation): patient states was taking Boniva medication but was not tolerating the medication and was switched to Fosamax medication. Patient here to follow up on medication today     Intensity:  Patient states , 4/10 pain rating today     Accompanying signs and symptoms: no known side effects to Fosamax medication today     History (similar episodes/previous evaluation): taking Fosamax was taking Boniva     Precipitating or alleviating factors: None    Therapies tried and outcome: Fosamax          Musculoskeletal problem/pain      Duration: severe sciatica    Description  Location: left side    Intensity:  severe    Accompanying signs and symptoms: none    History  Previous similar problem: YES  Previous evaluation:  x-ray, MRI and CT    Precipitating or alleviating factors:  Trauma or overuse: YES  Aggravating factors include: none    Therapies tried and outcome: nothing        Problem list and histories reviewed & adjusted, as indicated.  Additional history: as documented    Patient Active Problem List   Diagnosis     Hyperlipidemia LDL goal <130     HTN (hypertension)     Osteoarthritis     Chronic atrial fibrillation (H)     Chronic pain syndrome     History of total knee replacement     Radiculitis     Osteoarthritis of knee     Long-term (current) use of anticoagulants [Z79.01]     ACP (advance care planning)     Osteoporosis     Malignant neoplasm of right breast in female, estrogen receptor positive, unspecified site of breast (H)     Past Surgical History:   Procedure Laterality Date     BREAST SURGERY      right  side mastectomy     C TOTAL KNEE ARTHROPLASTY Left 01/26/15     cataract extraction and lens implantation       COLONOSCOPY        EYE SURGERY       GYN SURGERY      tubal pregnancy     ORTHOPEDIC SURGERY  2009    l. hip replacement LT     ORTHOPEDIC SURGERY  july 19th 2013    Right total knee     TUBAL/ECTOPIC PREGNANCY         Social History   Substance Use Topics     Smoking status: Former Smoker     Packs/day: 0.30     Years: 30.00     Types: Cigarettes     Quit date: 8/11/1987     Smokeless tobacco: Never Used      Comment: year quit 1987     Alcohol use No     Family History   Problem Relation Age of Onset     DIABETES Father 75     cause of death     Other - See Comments Father      PVD     HEART DISEASE Sister      Other - See Comments Mother 83     old age; cause of death     Other - See Comments Sister      pow concentration; cause of death     Other - See Comments Sister      pow concentration; cause of death     Chronic Obstructive Pulmonary Disease Daughter          Current Outpatient Prescriptions   Medication Sig Dispense Refill     methylPREDNISolone (MEDROL DOSEPAK) 4 MG tablet Follow package instructions 21 tablet 0     alendronate (FOSAMAX) 70 MG tablet Take 1 tablet (70 mg) by mouth every 7 days Take 60 minutes before am meal with 8 oz. water. Remain upright for 30 minutes. 4 tablet 1     traMADol (ULTRAM) 50 MG tablet TAKE ONE TO TWO TABLETS BY MOUTH EVERY 6 HOURS AS NEEDED FOR PAIN 180 tablet 0     warfarin (COUMADIN) 5 MG tablet TAKE ONE TABLET (5 MG) BY MOUTH MONDAY, WEDNESDAY, FRIDAY, AND ONE-HALF TABLET (2.5 MG) THE REST OF THE WEEK. 90 tablet 0     simvastatin (ZOCOR) 40 MG tablet TAKE ONE-HALF TABLET BY MOUTH ONCE DAILY IN THE EVENING 45 tablet 1     diltiazem (CARDIZEM) 60 MG tablet TAKE ONE TABLET BY MOUTH THREE TIMES DAILY 93 tablet 5     losartan (COZAAR) 50 MG tablet TAKE ONE TABLET BY MOUTH ONCE DAILY 30 tablet 11     Acetaminophen (TYLENOL PO) Take 500 mg by mouth       Calcium Citrate-Vitamin D (CALCITRATE/VITAMIN D PO) Take  by mouth 2 times daily.       Multiple Vitamin (MULTIVITAMINS PO) Take  by mouth  daily.       [facility] [place] [place] Natural Products (OSTEO BI-FLEX ADV DOUBLE ST PO) Take  by mouth daily.       Allergies   Allergen Reactions     Atenolol Shortness Of Breath and Other (See Comments)     Dizziness  Severe vertigo and dizziness/SOB     Lisinopril Cough     Pollen Extract      Other reaction(s): Runny Nose     BP Readings from Last 3 Encounters:   08/09/17 116/62   08/02/17 120/84   07/06/17 130/76    Wt Readings from Last 3 Encounters:   08/09/17 130 lb (59 kg)   08/02/17 131 lb 14.4 oz (59.8 kg)   07/06/17 130 lb (59 kg)                        Reviewed and updated as needed this visit by clinical staffTobacco  Allergies  Meds       Reviewed and updated as needed this visit by Provider         ROS:  Constitutional, neuro, ENT, endocrine, pulmonary, cardiac, gastrointestinal, genitourinary, musculoskeletal, integument and psychiatric systems are negative, except as otherwise noted.      OBJECTIVE:                                                    /62 (BP Location: Left arm, Patient Position: Chair, Cuff Size: Adult Regular)  Pulse 62  Temp 98  F (36.7  C) (Tympanic)  Wt 130 lb (59 kg)  SpO2 96%  Breastfeeding? No  BMI 23.03 kg/m2  Body mass index is 23.03 kg/(m^2).  GENERAL APPEARANCE: healthy, alert and no distress  MS: extremities normal- no gross deformities noted  SKIN: no suspicious lesions or rashes  NEURO: Normal strength and tone, mentation intact and speech normal  PSYCH: mentation appears normal and affect normal/bright    Diagnostic test results:  Diagnostic Test Results:  No results found for this or any previous visit (from the past 24 hour(s)).       ASSESSMENT/PLAN:                                                    1. Chronic left-sided low back pain with left-sided sciatica  She has severe sciatica.  Not related to her osteoporosis.  Wants to try PT again along with the dose pack as that has worked well for her in the past.   See us back as recommended.   - methylPREDNISolone (MEDROL  DOSEPAK) 4 MG tablet; Follow package instructions  Dispense: 21 tablet; Refill: 0  - PHYSICAL THERAPY REFERRAL      2. Osteoporosis, unspecified osteoporosis type, unspecified pathological fracture presence  Just start fosamax last week. Was off due to reaction to Boniva.      See Patient Instructions    Chandrika Kumar PA-C  [facility]

## 2017-08-09 NOTE — NURSING NOTE
"Chief Complaint   Patient presents with     Musculoskeletal Problem     follow up medication        Initial /62 (BP Location: Left arm, Patient Position: Chair, Cuff Size: Adult Regular)  Pulse 62  Temp 98  F (36.7  C) (Tympanic)  Wt 130 lb (59 kg)  SpO2 96%  Breastfeeding? No  BMI 23.03 kg/m2 Estimated body mass index is 23.03 kg/(m^2) as calculated from the following:    Height as of 8/2/17: 5' 3\" (1.6 m).    Weight as of this encounter: 130 lb (59 kg).  Medication Reconciliation: complete   Ayana John CMA(AAMA)   "

## 2017-08-10 ASSESSMENT — ANXIETY QUESTIONNAIRES: GAD7 TOTAL SCORE: 7

## 2017-08-30 ENCOUNTER — OFFICE VISIT (OUTPATIENT)
Dept: FAMILY MEDICINE | Facility: OTHER | Age: 82
End: 2017-08-30
Attending: PHYSICIAN ASSISTANT
Payer: MEDICARE

## 2017-08-30 VITALS
WEIGHT: 125 LBS | TEMPERATURE: 97.6 F | DIASTOLIC BLOOD PRESSURE: 70 MMHG | BODY MASS INDEX: 22.14 KG/M2 | SYSTOLIC BLOOD PRESSURE: 124 MMHG | HEART RATE: 86 BPM | OXYGEN SATURATION: 98 %

## 2017-08-30 DIAGNOSIS — M54.42 CHRONIC BILATERAL LOW BACK PAIN WITH BILATERAL SCIATICA: Primary | ICD-10-CM

## 2017-08-30 DIAGNOSIS — M54.41 CHRONIC BILATERAL LOW BACK PAIN WITH BILATERAL SCIATICA: Primary | ICD-10-CM

## 2017-08-30 DIAGNOSIS — G89.29 CHRONIC BILATERAL LOW BACK PAIN WITH BILATERAL SCIATICA: Primary | ICD-10-CM

## 2017-08-30 PROCEDURE — 99212 OFFICE O/P EST SF 10 MIN: CPT

## 2017-08-30 PROCEDURE — 99213 OFFICE O/P EST LOW 20 MIN: CPT | Performed by: PHYSICIAN ASSISTANT

## 2017-08-30 RX ORDER — LORAZEPAM 1 MG/1
TABLET ORAL
Qty: 2 TABLET | Refills: 0 | Status: SHIPPED | OUTPATIENT
Start: 2017-08-30 | End: 2017-09-06

## 2017-08-30 RX ORDER — CYCLOBENZAPRINE HCL 5 MG
5 TABLET ORAL 3 TIMES DAILY PRN
Qty: 42 TABLET | Refills: 0 | Status: SHIPPED | OUTPATIENT
Start: 2017-08-30 | End: 2017-10-26 | Stop reason: SINTOL

## 2017-08-30 ASSESSMENT — PATIENT HEALTH QUESTIONNAIRE - PHQ9: SUM OF ALL RESPONSES TO PHQ QUESTIONS 1-9: 6

## 2017-08-30 ASSESSMENT — ANXIETY QUESTIONNAIRES
2. NOT BEING ABLE TO STOP OR CONTROL WORRYING: MORE THAN HALF THE DAYS
7. FEELING AFRAID AS IF SOMETHING AWFUL MIGHT HAPPEN: SEVERAL DAYS
5. BEING SO RESTLESS THAT IT IS HARD TO SIT STILL: MORE THAN HALF THE DAYS
GAD7 TOTAL SCORE: 11
1. FEELING NERVOUS, ANXIOUS, OR ON EDGE: MORE THAN HALF THE DAYS
3. WORRYING TOO MUCH ABOUT DIFFERENT THINGS: MORE THAN HALF THE DAYS
IF YOU CHECKED OFF ANY PROBLEMS ON THIS QUESTIONNAIRE, HOW DIFFICULT HAVE THESE PROBLEMS MADE IT FOR YOU TO DO YOUR WORK, TAKE CARE OF THINGS AT HOME, OR GET ALONG WITH OTHER PEOPLE: NOT DIFFICULT AT ALL
4. TROUBLE RELAXING: MORE THAN HALF THE DAYS
6. BECOMING EASILY ANNOYED OR IRRITABLE: NOT AT ALL

## 2017-08-30 ASSESSMENT — PAIN SCALES - GENERAL: PAINLEVEL: MODERATE PAIN (4)

## 2017-08-30 NOTE — NURSING NOTE
"Chief Complaint   Patient presents with     Back Pain       Initial /70 (BP Location: Left arm, Patient Position: Chair, Cuff Size: Adult Regular)  Pulse 86  Temp 97.6  F (36.4  C) (Tympanic)  Wt 125 lb (56.7 kg)  SpO2 98%  Breastfeeding? No  BMI 22.14 kg/m2 Estimated body mass index is 22.14 kg/(m^2) as calculated from the following:    Height as of 8/2/17: 5' 3\" (1.6 m).    Weight as of this encounter: 125 lb (56.7 kg).  Medication Reconciliation: complete   Ayana John CMA(AAMA)   "

## 2017-08-30 NOTE — PATIENT INSTRUCTIONS
Thank you for choosing [facility].   I have office hours 8:00 am to 4:30 pm on Monday's, Wednesday's, Thursday's and Friday's. My nurse and I are out of the office every Tuesday.    Following your visit, when your labs and diagnostic testing have returned, I will review then and you will be contacted by my nurse.  If you are on My Chart, you can also view results there.    For refills, notify your pharmacy regarding what you need and the pharmacy will generate a refill request. Do not call my nurse as she is unable to process refill request. Please plan ahead and allow 3-5 days for refill requests.    You will generally receive a reminder call the day prior to your appointment.  If you cannot attend your appointment, please cancel your appointment with as much notice as possible.  If there is a pattern of failure to present for your appointments, I cannot provide consistent, meaningful, ongoing care for you. It is very important to me that you come in for your care, so we can best assist you with your health care needs.    IMPORTANT:  Please note that it is my standard of practice to NOT participate in prescribing ongoing requested Narcotic Analgesic therapy, and/or participate in the prescribing of other controlled substances.  My nurse and I am happy to assist you with the process of referral for alternative pain management as needed, and other treatment modalities including but not limited to:  Physical Therapy, Physical Medicine and Rehab, Counseling, Chiropractic Care, Orthopedic Care, and non-narcotic medication management.     In the event that you need to be seen for emergent concerns and I am out of office,  please see one of my colleagues for acute concerns.  You may also present to  or ER.  I appreciate the opportunity to serve you and look forward to supporting your healthcare needs in the future. Please contact me with any questions or concerns that you may  have.    Sincerely,      Chandrika Kumar RN, PA-C

## 2017-08-30 NOTE — PROGRESS NOTES
SUBJECTIVE:   Michael Christiansen is a 82 year old female who presents to clinic today for the following health issues:            Chronic Pain Follow-Up       Type / Location of Pain: back and down legs   Analgesia/pain control:       Recent changes:  same      Overall control: Tolerable with discomfort  Activity level/function:      Daily activities:  Able to do light housework, cooking    Work:  not applicable  Adverse effects:  No  Adherance    Taking medication as directed?  Yes    Participating in other treatments: no - physical therapy   Risk Factors:    Sleep:  Fair    Mood/anxiety:  controlled    Recent family or social stressors:  none noted    Other aggravating factors: prolonged sitting, prolonged standing and poor posture  PHQ-9 SCORE 4/19/2016 6/21/2017 8/9/2017   Total Score 3 1 8     LISA-7 SCORE 6/21/2017 8/9/2017 8/30/2017   Total Score 1 7 11     Encounter-Level CSA:     There are no encounter-level csa.              Problem list and histories reviewed & adjusted, as indicated.  Additional history: as documented    Patient Active Problem List   Diagnosis     Hyperlipidemia LDL goal <130     HTN (hypertension)     Osteoarthritis     Chronic atrial fibrillation (H)     Chronic pain syndrome     History of total knee replacement     Radiculitis     Osteoarthritis of knee     Long-term (current) use of anticoagulants [Z79.01]     ACP (advance care planning)     Osteoporosis     Malignant neoplasm of right breast in female, estrogen receptor positive, unspecified site of breast (H)     Past Surgical History:   Procedure Laterality Date     BREAST SURGERY      right  side mastectomy     C TOTAL KNEE ARTHROPLASTY Left 01/26/15     cataract extraction and lens implantation       COLONOSCOPY       EYE SURGERY       GYN SURGERY      tubal pregnancy     ORTHOPEDIC SURGERY  2009    l. hip replacement LT     ORTHOPEDIC SURGERY  july 19th 2013    Right total knee     TUBAL/ECTOPIC PREGNANCY         Social History    Substance Use Topics     Smoking status: Former Smoker     Packs/day: 0.30     Years: 30.00     Types: Cigarettes     Quit date: 8/11/1987     Smokeless tobacco: Never Used      Comment: year quit 1987     Alcohol use No     Family History   Problem Relation Age of Onset     DIABETES Father 75     cause of death     Other - See Comments Father      PVD     HEART DISEASE Sister      Other - See Comments Mother 83     old age; cause of death     Other - See Comments Sister      pow concentration; cause of death     Other - See Comments Sister      pow concentration; cause of death     Chronic Obstructive Pulmonary Disease Daughter          Current Outpatient Prescriptions   Medication Sig Dispense Refill     LORazepam (ATIVAN) 1 MG tablet Take one pill 30 minutes before procedure.  May repeat x1. 2 tablet 0     alendronate (FOSAMAX) 70 MG tablet Take 1 tablet (70 mg) by mouth every 7 days Take 60 minutes before am meal with 8 oz. water. Remain upright for 30 minutes. 4 tablet 11     Acetaminophen (TYLENOL PO) Take 500 mg by mouth       Calcium Citrate-Vitamin D (CALCITRATE/VITAMIN D PO) Take  by mouth 2 times daily.       order for DME 4 wheeled walker 1 each 0     traMADol (ULTRAM) 50 MG tablet TAKE ONE TO TWO TABLETS BY MOUTH EVERY 6 HOURS AS NEEDED FOR PAIN 180 tablet 0     warfarin (COUMADIN) 5 MG tablet TAKE ONE TABLET (5 MG) BY MOUTH MONDAY, WEDNESDAY, FRIDAY, AND ONE-HALF TABLET (2.5 MG) THE REST OF THE WEEK. 90 tablet 0     simvastatin (ZOCOR) 40 MG tablet TAKE ONE-HALF TABLET BY MOUTH ONCE DAILY IN THE EVENING 45 tablet 1     diltiazem (CARDIZEM) 60 MG tablet TAKE ONE TABLET BY MOUTH THREE TIMES DAILY 93 tablet 5     losartan (COZAAR) 50 MG tablet TAKE ONE TABLET BY MOUTH ONCE DAILY 30 tablet 11     Multiple Vitamin (MULTIVITAMINS PO) Take  by mouth daily.       Misc Natural Products (OSTEO BI-FLEX ADV DOUBLE ST PO) Take  by mouth daily.       Allergies   Allergen Reactions     Atenolol Shortness Of Breath  and Other (See Comments)     Dizziness  Severe vertigo and dizziness/SOB     Lisinopril Cough     Pollen Extract      Other reaction(s): Runny Nose     BP Readings from Last 3 Encounters:   08/30/17 124/70   08/09/17 116/62   08/02/17 120/84    Wt Readings from Last 3 Encounters:   08/30/17 125 lb (56.7 kg)   08/09/17 130 lb (59 kg)   08/02/17 131 lb 14.4 oz (59.8 kg)                        Reviewed and updated as needed this visit by clinical staffTobacco  Allergies  Meds       Reviewed and updated as needed this visit by Provider         ROS:  Constitutional, neuro, ENT, endocrine, pulmonary, cardiac, gastrointestinal, genitourinary, musculoskeletal, integument and psychiatric systems are negative, except as otherwise noted.      OBJECTIVE:                                                    /70 (BP Location: Left arm, Patient Position: Chair, Cuff Size: Adult Regular)  Pulse 86  Temp 97.6  F (36.4  C) (Tympanic)  Wt 125 lb (56.7 kg)  SpO2 98%  Breastfeeding? No  BMI 22.14 kg/m2  Body mass index is 22.14 kg/(m^2).  GENERAL APPEARANCE: healthy, alert and no distress  MS: severe DJD in lower spine.   Pain in buttox bilaterally.  walking with walker.  Pain is worsening.   SKIN: no suspicious lesions or rashes  NEURO: Normal strength and tone, mentation intact and speech normal  PSYCH: mentation appears normal and affect normal/bright    Diagnostic test results:  Diagnostic Test Results:  No results found for this or any previous visit (from the past 24 hour(s)).       ASSESSMENT/PLAN:                                                    1. Chronic bilateral low back pain with bilateral sciatica  She is going to be given MRI and then referral for injection.  Pain is not allowing sleeping in PT now and not getting much benefit. We will call and see if both can be done on the same day.   - MR Lumbar Spine w/o Contrast; Future  - MR Lumbar Spine w/o Contrast  - LORazepam (ATIVAN) 1 MG tablet; Take one pill 30  minutes before procedure.  May repeat x1.  Dispense: 2 tablet; Refill: 0      See Patient Instructions    Chandrika Kumar PA-C  [facility]

## 2017-08-30 NOTE — MR AVS SNAPSHOT
After Visit Summary   8/30/2017    Michael Christiansen    MRN: [id number]           Patient Information     Date Of Birth          1935        Visit Information        Provider Department      8/30/2017 10:15 AM Chandrika Kumar PA [facility]        Today's Diagnoses     Chronic bilateral low back pain with bilateral sciatica    -  1      Care Instructions      Thank you for choosing [facility].   I have office hours 8:00 am to 4:30 pm on Monday's, Wednesday's, Thursday's and Friday's. My nurse and I are out of the office every Tuesday.    Following your visit, when your labs and diagnostic testing have returned, I will review then and you will be contacted by my nurse.  If you are on My Chart, you can also view results there.    For refills, notify your pharmacy regarding what you need and the pharmacy will generate a refill request. Do not call my nurse as she is unable to process refill request. Please plan ahead and allow 3-5 days for refill requests.    You will generally receive a reminder call the day prior to your appointment.  If you cannot attend your appointment, please cancel your appointment with as much notice as possible.  If there is a pattern of failure to present for your appointments, I cannot provide consistent, meaningful, ongoing care for you. It is very important to me that you come in for your care, so we can best assist you with your health care needs.    IMPORTANT:  Please note that it is my standard of practice to NOT participate in prescribing ongoing requested Narcotic Analgesic therapy, and/or participate in the prescribing of other controlled substances.  My nurse and I am happy to assist you with the process of referral for alternative pain management as needed, and other treatment modalities including but not limited to:  Physical Therapy, Physical Medicine and Rehab, Counseling, Chiropractic Care, Orthopedic Care, and non-narcotic  medication management.     In the event that you need to be seen for emergent concerns and I am out of office,  please see one of my colleagues for acute concerns.  You may also present to UC or ER.  I appreciate the opportunity to serve you and look forward to supporting your healthcare needs in the future. Please contact me with any questions or concerns that you may have.    Sincerely,      Chandrika Kumar RN, PA-C               Follow-ups after your visit        Your next 10 appointments already scheduled     Sep 06, 2017  9:45 AM CDT   Anticoagulation Visit with HC ANTI COAGULATION   [facility] [place] ([facility] [place] )    [address]  [place] [address]   [phone]            Dec 21, 2017 10:15 AM CST   (Arrive by 10:00 AM)   Office Visit with MELISA Murray   [facility] ([facility] [place] )    [address]  [place] [address]   [phone]            Dec 27, 2017 12:15 PM CST   LAB with HC LAB   [facility] ([facility] [place] )    [address]  [place] [address]   [phone]            Dec 27, 2017  1:00 PM CST   [facility]   [facility] ([facility] )    [address] [place] [address] [phone]            Jan 03, 2018  1:00 PM CST   (Arrive by 12:45 PM)   Return Visit with Noemy Mahajan NP   [facility] ([facility]    [address]  [place] [address]   [phone]              Who to contact     If you have questions or need follow up information about today's clinic visit or your schedule please contact [facility] directly at [phone].  Normal or non-critical lab and imaging results will be communicated to you by MyChart, letter or phone within 4 business days after the clinic has received the results. If you do not hear from us within 7 days, please contact the clinic through MyChart or phone.  If you have a critical or abnormal lab result, we will notify you by phone as soon as possible.  Submit refill requests through [employer] or call your pharmacy and they will forward the refill request to us. Please allow 3 business days for your refill to be completed.          Additional Information About Your Visit        Care EveryWhere ID     This is your Care EveryWhere ID. This could be used by other organizations to access your [place] medical records  [id number]        Your Vitals Were     Pulse Temperature Pulse Oximetry Breastfeeding? BMI (Body Mass Index)       86 97.6  F (36.4  C) (Tympanic) 98% No 22.14 kg/m2        Blood Pressure from Last 3 Encounters:   08/30/17 124/70   08/09/17 116/62   08/02/17 120/84    Weight from Last 3 Encounters:   08/30/17 125 lb (56.7 kg)   08/09/17 130 lb (59 kg)   08/02/17 131 lb 14.4 oz (59.8 kg)                 Today's Medication Changes          These changes are accurate as of: 8/30/17 10:49 AM.  If you have any questions, ask your nurse or doctor.               Start taking these medicines.        Dose/Directions    LORazepam 1 MG tablet   Commonly known as:  ATIVAN   Used for:  Chronic bilateral low back pain with bilateral sciatica   Started by:  Chandrika Kumar PA        Take one pill 30 minutes before procedure.  May repeat x1.   Quantity:  2 tablet   Refills:  0            Where to get your medicines      Some of these will need a paper prescription and others can be bought over the counter.  Ask your nurse if you have questions.     Bring a paper prescription for each of these medications     LORazepam 1 MG tablet                Primary Care Provider Office Phone # Fax #    MELISA Murray [phone]       [facility] [address] [address]        Equal Access to Services     AMAYA CONSTANTINO AH: Jaxson Hampton, waaxda luqadaha, qaybta kaalmapedro luis solano, topher finley.  So [facility] [phone].    ATENCIÓN: Si kal negron, tiene a shay disposición servicios gratuitos de asistencia lingüística. Gera cho [phone].    We comply with applicable federal civil rights laws and [place] laws. We do not discriminate on the basis of race, color, national origin, age, disability sex, sexual orientation or gender identity.            Thank you!     Thank you for choosing [facility]  for your care. Our goal is always to provide you with excellent care. Hearing back from our patients is one way we can continue to improve our services. Please take a few minutes to complete the written survey that you may receive in the mail after your visit with us. Thank you!             Your Updated Medication List - Protect others around you: Learn how to safely use, store and throw away your medicines at www.disposemymeds.org.          This list is accurate as of: 8/30/17 10:49 AM.  Always use your most recent med list.                   Brand Name Dispense Instructions for use Diagnosis    alendronate 70 MG tablet    FOSAMAX    4 tablet    Take 1 tablet (70 mg) by mouth every 7 days Take 60 minutes before am meal with 8 oz. water. Remain upright for 30 minutes.    Age related osteoporosis, unspecified pathological fracture presence       CALCITRATE/VITAMIN D PO      Take  by mouth 2 times daily.        diltiazem 60 MG tablet    CARDIZEM    93 tablet    TAKE ONE TABLET BY MOUTH THREE TIMES DAILY    Benign essential hypertension       LORazepam 1 MG tablet    ATIVAN    2 tablet    Take one pill 30 minutes before procedure.  May repeat x1.    Chronic bilateral low back pain with bilateral sciatica       losartan 50 MG tablet    COZAAR    30 tablet    TAKE ONE TABLET BY MOUTH ONCE DAILY    HTN (hypertension)       MULTIVITAMINS PO      Take  by mouth daily.        order for DME     1 each    4 wheeled walker    Chronic left-sided low back pain with left-sided sciatica       OSTEO BI-FLEX ADV  DOUBLE ST PO      Take  by mouth daily.        simvastatin 40 MG tablet    ZOCOR    45 tablet    TAKE ONE-HALF TABLET BY MOUTH ONCE DAILY IN THE EVENING    Hyperlipidemia LDL goal <100       traMADol 50 MG tablet    ULTRAM    180 tablet    TAKE ONE TO TWO TABLETS BY MOUTH EVERY 6 HOURS AS NEEDED FOR PAIN    Chronic pain syndrome       TYLENOL PO      Take 500 mg by mouth        warfarin 5 MG tablet    COUMADIN    90 tablet    TAKE ONE TABLET (5 MG) BY MOUTH MONDAY, WEDNESDAY, FRIDAY, AND ONE-HALF TABLET (2.5 MG) THE REST OF THE WEEK.    Atrial fibrillation (H), Long term current use of anticoagulant therapy

## 2017-08-31 ASSESSMENT — ANXIETY QUESTIONNAIRES: GAD7 TOTAL SCORE: 11

## 2017-09-01 DIAGNOSIS — M54.41 CHRONIC BILATERAL LOW BACK PAIN WITH BILATERAL SCIATICA: Primary | ICD-10-CM

## 2017-09-01 DIAGNOSIS — M54.42 CHRONIC BILATERAL LOW BACK PAIN WITH BILATERAL SCIATICA: Primary | ICD-10-CM

## 2017-09-01 DIAGNOSIS — G89.29 CHRONIC BILATERAL LOW BACK PAIN WITH BILATERAL SCIATICA: Primary | ICD-10-CM

## 2017-09-06 ENCOUNTER — HOSPITAL ENCOUNTER (OUTPATIENT)
Dept: MRI IMAGING | Facility: HOSPITAL | Age: 82
Discharge: HOME | End: 2017-09-06
Attending: PHYSICIAN ASSISTANT | Admitting: PHYSICIAN ASSISTANT
Payer: MEDICARE

## 2017-09-06 ENCOUNTER — ANTICOAGULATION THERAPY VISIT (OUTPATIENT)
Dept: ANTICOAGULATION | Facility: OTHER | Age: 82
End: 2017-09-06
Attending: PHYSICIAN ASSISTANT
Payer: MEDICARE

## 2017-09-06 ENCOUNTER — HOSPITAL ENCOUNTER (OUTPATIENT)
Dept: INTERVENTIONAL RADIOLOGY/VASCULAR | Facility: HOSPITAL | Age: 82
End: 2017-09-06
Attending: PHYSICIAN ASSISTANT
Payer: MEDICARE

## 2017-09-06 DIAGNOSIS — Z79.01 LONG-TERM (CURRENT) USE OF ANTICOAGULANTS: ICD-10-CM

## 2017-09-06 DIAGNOSIS — Z79.01 LONG TERM (CURRENT) USE OF ANTICOAGULANTS: Primary | ICD-10-CM

## 2017-09-06 DIAGNOSIS — I48.20 CHRONIC ATRIAL FIBRILLATION (H): ICD-10-CM

## 2017-09-06 LAB — INR POINT OF CARE: 1.1 (ref 0.86–1.14)

## 2017-09-06 PROCEDURE — 64483 NJX AA&/STRD TFRM EPI L/S 1: CPT | Mod: TC,LT

## 2017-09-06 PROCEDURE — 25000128 H RX IP 250 OP 636: Performed by: RADIOLOGY

## 2017-09-06 PROCEDURE — 72148 MRI LUMBAR SPINE W/O DYE: CPT | Mod: TC

## 2017-09-06 PROCEDURE — 85610 PROTHROMBIN TIME: CPT | Mod: QW,ZL

## 2017-09-06 PROCEDURE — 64483 NJX AA&/STRD TFRM EPI L/S 1: CPT | Mod: TC | Performed by: RADIOLOGY

## 2017-09-06 RX ORDER — METHYLPREDNISOLONE ACETATE 80 MG/ML
INJECTION, SUSPENSION INTRA-ARTICULAR; INTRALESIONAL; INTRAMUSCULAR; SOFT TISSUE
Status: DISCONTINUED
Start: 2017-09-06 | End: 2017-09-06 | Stop reason: WASHOUT

## 2017-09-06 RX ORDER — IOPAMIDOL 612 MG/ML
15 INJECTION, SOLUTION INTRATHECAL ONCE
Status: COMPLETED | OUTPATIENT
Start: 2017-09-06 | End: 2017-09-06

## 2017-09-06 RX ORDER — LIDOCAINE HYDROCHLORIDE 10 MG/ML
INJECTION, SOLUTION EPIDURAL; INFILTRATION; INTRACAUDAL; PERINEURAL
Status: DISCONTINUED
Start: 2017-09-06 | End: 2017-09-06 | Stop reason: WASHOUT

## 2017-09-06 RX ORDER — DEXAMETHASONE SODIUM PHOSPHATE 10 MG/ML
INJECTION, SOLUTION INTRAMUSCULAR; INTRAVENOUS
Status: DISPENSED
Start: 2017-09-06 | End: 2017-09-06

## 2017-09-06 RX ORDER — LIDOCAINE HYDROCHLORIDE 10 MG/ML
5 INJECTION, SOLUTION INFILTRATION; PERINEURAL ONCE
Status: DISCONTINUED | OUTPATIENT
Start: 2017-09-06 | End: 2017-09-07 | Stop reason: HOSPADM

## 2017-09-06 RX ORDER — METHYLPREDNISOLONE ACETATE 80 MG/ML
80 INJECTION, SUSPENSION INTRA-ARTICULAR; INTRALESIONAL; INTRAMUSCULAR; SOFT TISSUE ONCE
Status: DISCONTINUED | OUTPATIENT
Start: 2017-09-06 | End: 2017-09-06 | Stop reason: CLARIF

## 2017-09-06 RX ORDER — DEXAMETHASONE SODIUM PHOSPHATE 10 MG/ML
10 INJECTION, SOLUTION INTRAMUSCULAR; INTRAVENOUS ONCE
Status: COMPLETED | OUTPATIENT
Start: 2017-09-06 | End: 2017-09-06

## 2017-09-06 RX ADMIN — DEXAMETHASONE SODIUM PHOSPHATE 10 MG: 10 INJECTION INTRAMUSCULAR; INTRAVENOUS at 09:39

## 2017-09-06 RX ADMIN — IOPAMIDOL 2 ML: 612 INJECTION, SOLUTION INTRATHECAL at 09:39

## 2017-09-06 NOTE — PROGRESS NOTES
Procedure: Spine: lumbar elias    Radiologist:Dr. Masterson    There were no complicationsand patient has no symptoms..      Sedation:None. Local Anesthestic used  No sedation    Complications: None    Condition: Stable    Post-procedure pain score as of today is: 3    See dictated procedure note for full details and results.    Chandrika Soto

## 2017-09-06 NOTE — MR AVS SNAPSHOT
Michael DEJESUS Елена   9/6/2017 9:45 AM   Anticoagulation Therapy Visit    Description:  82 year old female   Provider:   ANTI COAGULATION   Department:  [facility] Anti Coagulation           INR as of 9/6/2017     Today's INR 1.1!      Anticoagulation Summary as of 9/6/2017     INR goal 2.0-3.0   Today's INR 1.1!   Full instructions 9/7: 7.5 mg; 9/8: 7.5 mg; 9/9: 5 mg; Otherwise 5 mg on Mon, Wed, Fri; 2.5 mg all other days   Next INR check 9/11/2017    Indications   Long-term (current) use of anticoagulants [Z79.01] [Z79.01]  Chronic atrial fibrillation (H) [I48.2]         Your next Anticoagulation Clinic appointment(s)     Sep 06, 2017  9:45 AM CDT   Anticoagulation Visit with  ANTI COAGULATION   [facility] ([facility] )    [address]  [place] [address]   [phone]            Sep 11, 2017 10:15 AM CDT   Anticoagulation Visit with  ANTI COAGULATION   [facility] ([facility] )    [address]  [place] [address]   [phone]              September 2017 Details    Sun Mon Tue Wed Thu Fri Sat          1               2                 3               4               5               6      5 mg   See details      7      7.5 mg         8      7.5 mg         9      5 mg           10      2.5 mg         11            12               13               14               15               16                 17               18               19               20               21               22               23                 24               25               26               27               28               29               30                Date Details   09/06 This INR check       Date of next INR:  9/11/2017         How to take your warfarin dose     To take:  2.5 mg Take 0.5 of a 5 mg tablet.    To take:  5 mg Take 1 of the 5 mg tablets.    To take:  7.5 mg Take 1.5 of the 5 mg tablets.

## 2017-09-06 NOTE — IP AVS SNAPSHOT
[facility]    [address] [place] [id number]    Phone:  [phone]    Fax:  [phone]                                       After Visit Summary   9/6/2017    Michael Christiansen    MRN: [id number]           After Visit Summary Signature Page     I have received my discharge instructions, and my questions have been answered. I have discussed any challenges I see with this plan with the nurse or doctor.    ..........................................................................................................................................  Patient/Patient Representative Signature      ..........................................................................................................................................  Patient Representative Print Name and Relationship to Patient    ..................................................               ................................................  Date                                            Time    ..........................................................................................................................................  Reviewed by Signature/Title    ...................................................              ..............................................  Date                                                            Time

## 2017-09-06 NOTE — DISCHARGE INSTRUCTIONS
Home number on file [phone] (home)  Is it ok to leave a message at this number(s)? Yes    Dr. Masterson completed your procedure on 9/6/2017.    Current Pain Level (0-10 Scale): 4/10  Post Pain Level (0-10):  3/10    Radiology Discharge instructions for Steroid Injection    Activity Level:     Do not do any heavy activity or exercise for 24 hours.   Do not drive for 4 hours after your injection.  Diet:   Return to your normal diet.  Medications:   If you have stopped taking your Aspirin, Coumadin/Warfarin, Ibuprofen, or any   other blood thinner for this procedure you may resume in the morning unless   your primary care provider has given you other instructions.    Diabetics may see an increase in blood sugar after steroid injections. If you are concerned about your blood sugar, please contact your family doctor.    Site Care:  Remove the bandage and bathe or shower the morning after the procedure.      Please allow two weeks to experience improvement in your pain.  If you have any further issues, please contact your provider.    Call your Primary Care Provider if you have the following (if your primary care provider is not available please seek emergency care):   Nausea with vomiting   Severe headache   Drowsiness or confusion   Redness or drainage at the injection or puncture site   Temperature over 101 degrees F   Other concerns   Worsening back pain   Stiff neck

## 2017-09-06 NOTE — IP AVS SNAPSHOT
MRN:[id number]                      After Visit Summary   9/6/2017    Michael Christiansen    MRN: [id number]           Visit Information        Provider Department      9/6/2017  8:30 AM Radiologist, Need Interventional; [facility] INTERVENTIONAL RO[facility]           Review of your medicines      UNREVIEWED medicines. Ask your doctor about these medicines        Dose / Directions    alendronate 70 MG tablet   Commonly known as:  FOSAMAX   Used for:  Age related osteoporosis, unspecified pathological fracture presence        Dose:  70 mg   Take 1 tablet (70 mg) by mouth every 7 days Take 60 minutes before am meal with 8 oz. water. Remain upright for 30 minutes.   Quantity:  4 tablet   Refills:  11       CALCITRATE/VITAMIN D PO        Take  by mouth 2 times daily.   Refills:  0       cyclobenzaprine 5 MG tablet   Commonly known as:  FLEXERIL   Used for:  Chronic bilateral low back pain with bilateral sciatica        Dose:  5 mg   Take 1 tablet (5 mg) by mouth 3 times daily as needed for muscle spasms   Quantity:  42 tablet   Refills:  0       diltiazem 60 MG tablet   Commonly known as:  CARDIZEM   Used for:  Benign essential hypertension        TAKE ONE TABLET BY MOUTH THREE TIMES DAILY   Quantity:  93 tablet   Refills:  5       losartan 50 MG tablet   Commonly known as:  COZAAR   Used for:  HTN (hypertension)        TAKE ONE TABLET BY MOUTH ONCE DAILY   Quantity:  30 tablet   Refills:  11       MULTIVITAMINS PO        Take  by mouth daily.   Refills:  0       OSTEO BI-FLEX ADV DOUBLE ST PO        Take  by mouth daily.   Refills:  0       simvastatin 40 MG tablet   Commonly known as:  ZOCOR   Used for:  Hyperlipidemia LDL goal <100        TAKE ONE-HALF TABLET BY MOUTH ONCE DAILY IN THE EVENING   Quantity:  45 tablet   Refills:  1       traMADol 50 MG tablet   Commonly known as:  ULTRAM   Used for:  Chronic pain syndrome        TAKE ONE TO TWO TABLETS BY MOUTH EVERY 6 HOURS AS NEEDED FOR  PAIN   Quantity:  180 tablet   Refills:  0       TYLENOL PO        Dose:  500 mg   Take 500 mg by mouth   Refills:  0       warfarin 5 MG tablet   Commonly known as:  COUMADIN   Used for:  Atrial fibrillation (H), Long term current use of anticoagulant therapy        TAKE ONE TABLET (5 MG) BY MOUTH MONDAY, WEDNESDAY, FRIDAY, AND ONE-HALF TABLET (2.5 MG) THE REST OF THE WEEK.   Quantity:  90 tablet   Refills:  0         CONTINUE these medicines which have NOT CHANGED        Dose / Directions    order for DME   Used for:  Chronic left-sided low back pain with left-sided sciatica        4 wheeled walker   Quantity:  1 each   Refills:  0                Protect others around you: Learn how to safely use, store and throw away your medicines at www.disposemymeds.org.         Follow-ups after your visit        Your next 10 appointments already scheduled     Sep 06, 2017  9:45 AM CDT   Anticoagulation Visit with  ANTI COAGULATION   [facility] ([facility] )    [address]  [facility] [address]   [phone]            Sep 11, 2017 10:15 AM CDT   Anticoagulation Visit with  ANTI COAGULATION   [facility] ([facility] )    [address]  [facility] [address]   [phone]            Dec 21, 2017 10:15 AM CST   (Arrive by 10:00 AM)   Office Visit with MELISA Murray   [facility] ([facility] )    [address]  [facility] [address]   [phone]            Dec 27, 2017 12:15 PM CST   LAB with  LAB   [facility] ([facility] )    [address]  [facility] [address]   [phone]            Dec 27, 2017  1:00 PM CST   Radiology with HI DEXA   [facility] ([facility] )    [address] [id number] [phone]            Jan 03, 2018  1:00 PM CST   (Arrive by 12:45 PM)   Return Visit with Noemy Mahajan NP   [facility]  ([facility] )    [address] [place] [address]   [phone]               Care Instructions        Further instructions from your care team       Home number on file [phone] (home)  Is it ok to leave a message at this number(s)? Yes    Dr. Masterson completed your procedure on 9/6/2017.    Current Pain Level (0-10 Scale): 4/10  Post Pain Level (0-10):  3/10    Radiology Discharge instructions for Steroid Injection    Activity Level:     Do not do any heavy activity or exercise for 24 hours.   Do not drive for 4 hours after your injection.  Diet:   Return to your normal diet.  Medications:   If you have stopped taking your Aspirin, Coumadin/Warfarin, Ibuprofen, or any   other blood thinner for this procedure you may resume in the morning unless   your primary care provider has given you other instructions.    Diabetics may see an increase in blood sugar after steroid injections. If you are concerned about your blood sugar, please contact your family doctor.    Site Care:  Remove the bandage and bathe or shower the morning after the procedure.      Please allow two weeks to experience improvement in your pain.  If you have any further issues, please contact your provider.    Call your Primary Care Provider if you have the following (if your primary care provider is not available please seek emergency care):   Nausea with vomiting   Severe headache   Drowsiness or confusion   Redness or drainage at the injection or puncture site   Temperature over 101 degrees F   Other concerns   Worsening back pain   Stiff neck       Additional Information About Your Visit        Care EveryWhere ID     This is your Care EveryWhere ID. This could be used by other organizations to access your [facility] medical records  [id number]         Primary Care Provider Office Phone # Fax #    MELISA Murray [phone]      Equal Access to Services     AMAYA CONSTANTINO AH: Jaxson lowe  Sorudi, wazanderda luqadaha, qaramirezta kaludmila solano, topher duenaseric malia. So [facility] [phone].    ATENCIÓN: Si kal negron, tiene a shay disposición servicios gratuitos de asistencia lingüística. Gera al [phone].    We comply with applicable federal civil rights laws and [place] laws. We do not discriminate on the basis of race, color, national origin, age, disability sex, sexual orientation or gender identity.            Thank you!     Thank you for choosing [facility] for your care. Our goal is always to provide you with excellent care. Hearing back from our patients is one way we can continue to improve our services. Please take a few minutes to complete the written survey that you may receive in the mail after you visit with us. Thank you!             Medication List: This is a list of all your medications and when to take them. Check marks below indicate your daily home schedule. Keep this list as a reference.      Medications           Morning Afternoon Evening Bedtime As Needed    alendronate 70 MG tablet   Commonly known as:  FOSAMAX   Take 1 tablet (70 mg) by mouth every 7 days Take 60 minutes before am meal with 8 oz. water. Remain upright for 30 minutes.                                CALCITRATE/VITAMIN D PO   Take  by mouth 2 times daily.                                cyclobenzaprine 5 MG tablet   Commonly known as:  FLEXERIL   Take 1 tablet (5 mg) by mouth 3 times daily as needed for muscle spasms                                diltiazem 60 MG tablet   Commonly known as:  CARDIZEM   TAKE ONE TABLET BY MOUTH THREE TIMES DAILY                                losartan 50 MG tablet   Commonly known as:  COZAAR   TAKE ONE TABLET BY MOUTH ONCE DAILY                                MULTIVITAMINS PO   Take  by mouth daily.                                order for DME   4 wheeled walker                                OSTEO BI-FLEX ADV DOUBLE ST PO   Take  by mouth daily.                                 simvastatin 40 MG tablet   Commonly known as:  ZOCOR   TAKE ONE-HALF TABLET BY MOUTH ONCE DAILY IN THE EVENING                                traMADol 50 MG tablet   Commonly known as:  ULTRAM   TAKE ONE TO TWO TABLETS BY MOUTH EVERY 6 HOURS AS NEEDED FOR PAIN                                TYLENOL PO   Take 500 mg by mouth                                warfarin 5 MG tablet   Commonly known as:  COUMADIN   TAKE ONE TABLET (5 MG) BY MOUTH MONDAY, WEDNESDAY, FRIDAY, AND ONE-HALF TABLET (2.5 MG) THE REST OF THE WEEK.

## 2017-09-06 NOTE — PROGRESS NOTES
Fluoro time for this case was 18 seconds, less than 5 min  Was patient held? NO  If yes, by whom?

## 2017-09-06 NOTE — PROGRESS NOTES
ANTICOAGULATION FOLLOW-UP CLINIC VISIT    Patient Name:  Michael Christiansen  Date:  9/6/2017  Contact Type:  Face to Face    SUBJECTIVE:     Patient Findings     Comments Back injection today           OBJECTIVE    INR Protime   Date Value Ref Range Status   09/06/2017 1.1 0.86 - 1.14 Final       ASSESSMENT / PLAN  INR assessment THER    Recheck INR In: 5 DAYS    INR Location Clinic      Anticoagulation Summary as of 9/6/2017     INR goal 2.0-3.0   Today's INR 1.1!   Maintenance plan 5 mg (5 mg x 1) on Mon, Wed, Fri; 2.5 mg (5 mg x 0.5) all other days   Full instructions 9/7: 7.5 mg; 9/8: 7.5 mg; 9/9: 5 mg; Otherwise 5 mg on Mon, Wed, Fri; 2.5 mg all other days   Weekly total 25 mg   Plan last modified Gabriella Koo RN (7/27/2016)   Next INR check 9/11/2017   Priority INR   Target end date Indefinite    Indications   Long-term (current) use of anticoagulants [Z79.01] [Z79.01]  Chronic atrial fibrillation (H) [I48.2]         Anticoagulation Episode Summary     INR check location     Preferred lab     Send INR reminders to  [facility] POOL    Comments       Anticoagulation Care Providers     Provider Role Specialty Phone number    Chandrika Kumar, PA [facility] [phone]            See the Encounter Report to view Anticoagulation Flowsheet and Dosing Calendar (Go to Encounters tab in chart review, and find the Anticoagulation Therapy Visit)        Kasia Hercules, RN

## 2017-09-10 DIAGNOSIS — G89.4 CHRONIC PAIN SYNDROME: ICD-10-CM

## 2017-09-11 ENCOUNTER — ANTICOAGULATION THERAPY VISIT (OUTPATIENT)
Dept: ANTICOAGULATION | Facility: OTHER | Age: 82
End: 2017-09-11
Attending: PHYSICIAN ASSISTANT
Payer: MEDICARE

## 2017-09-11 DIAGNOSIS — Z79.01 LONG-TERM (CURRENT) USE OF ANTICOAGULANTS: ICD-10-CM

## 2017-09-11 DIAGNOSIS — I48.20 CHRONIC ATRIAL FIBRILLATION (H): ICD-10-CM

## 2017-09-11 LAB — INR POINT OF CARE: 3.2 (ref 0.86–1.14)

## 2017-09-11 PROCEDURE — 85610 PROTHROMBIN TIME: CPT | Mod: QW,ZL

## 2017-09-11 NOTE — TELEPHONE ENCOUNTER
Controlled Substance Refill Request for Tramadol  Problem List Complete:  No     PROVIDER TO CONSIDER COMPLETION OF PROBLEM LIST AND OVERVIEW/CONTROLLED SUBSTANCE AGREEMENT    Last Written Prescription Date:  6.21.17  Last Fill Quantity: 180,   # refills: 0    [facility] Office Visit with [facility] care provider: 8.30.17    Future Office visit:   Next 5 appointments (look out 90 days)     Oct 26, 2017 10:30 AM CDT   (Arrive by 10:15 AM)   SHORT with MELISA Murray   [facility] ([facility] [place] )    [address]  [place] [address]   [phone]                  Controlled substance agreement on file: No.     Processing:  Fax Rx to [facility] pharmacy     checked in past 6 months?  No, route to RN

## 2017-09-11 NOTE — MR AVS SNAPSHOT
Michael DEJESUS Елена   9/11/2017 10:15 AM   Anticoagulation Therapy Visit    Description:  82 year old female   Provider:   ANTI COAGULATION   Department:   Anti Coagulation           INR as of 9/11/2017     Today's INR 3.2!      Anticoagulation Summary as of 9/11/2017     INR goal 2.0-3.0   Today's INR 3.2!   Full instructions 9/11: 2.5 mg; Otherwise 5 mg on Mon, Wed, Fri; 2.5 mg all other days   Next INR check 10/9/2017    Indications   Long-term (current) use of anticoagulants [Z79.01] [Z79.01]  Chronic atrial fibrillation (H) [I48.2]         Your next Anticoagulation Clinic appointment(s)     Sep 11, 2017 10:15 AM CDT   Anticoagulation Visit with  ANTI COAGULATION   [facility] [place] ([facility] )    [address]  [place] [address]   [phone]            Oct 09, 2017  9:45 AM CDT   Anticoagulation Visit with  ANTI COAGULATION   [facility]    [address] [facility] [facility] [place] [address]   [phone]              September 2017 Details    Sun Mon Tue Wed Thu Fri Sat          1               2                 3               4               5               6               7               8               9                 10               11      2.5 mg   See details      12      2.5 mg         13      5 mg         14      2.5 mg         15      5 mg         16      2.5 mg           17      2.5 mg         18      5 mg         19      2.5 mg         20      5 mg         21      2.5 mg         22      5 mg         23      2.5 mg           24      2.5 mg         25      5 mg         26      2.5 mg         27      5 mg         28      2.5 mg         29      5 mg         30      2.5 mg          Date Details   09/11 This INR check               How to take your warfarin dose     To take:  2.5 mg Take 0.5 of a 5 mg tablet.    To take:  5 mg Take 1 of the 5 mg tablets.           October 2017 Details    Sun Mon Tue Wed Thu Fri Sat     1       2.5 mg         2      5 mg         3      2.5 mg         4      5 mg         5      2.5 mg         6      5 mg         7      2.5 mg           8      2.5 mg         9            10               11               12               13               14                 15               16               17               18               19               20               21                 22               23               24               25               26               27               28                 29               30               31                    Date Details   No additional details    Date of next INR:  10/9/2017         How to take your warfarin dose     To take:  2.5 mg Take 0.5 of a 5 mg tablet.    To take:  5 mg Take 1 of the 5 mg tablets.

## 2017-09-11 NOTE — PROGRESS NOTES
ANTICOAGULATION FOLLOW-UP CLINIC VISIT    Patient Name:  Michael Christiansen  Date:  9/11/2017  Contact Type:  Face to Face    SUBJECTIVE:     Patient Findings     Comments Having some left hip pain           OBJECTIVE    INR Protime   Date Value Ref Range Status   09/11/2017 3.2 (A) 0.86 - 1.14 Final       ASSESSMENT / PLAN  INR assessment SUPRA    Recheck INR In: 4 WEEKS    INR Location Clinic      Anticoagulation Summary as of 9/11/2017     INR goal 2.0-3.0   Today's INR 3.2!   Maintenance plan 5 mg (5 mg x 1) on Mon, Wed, Fri; 2.5 mg (5 mg x 0.5) all other days   Full instructions 9/11: 2.5 mg; Otherwise 5 mg on Mon, Wed, Fri; 2.5 mg all other days   Weekly total 25 mg   Plan last modified Gabriella Koo RN (7/27/2016)   Next INR check 10/9/2017   Priority INR   Target end date Indefinite    Indications   Long-term (current) use of anticoagulants [Z79.01] [Z79.01]  Chronic atrial fibrillation (H) [I48.2]         Anticoagulation Episode Summary     INR check location     Preferred lab     Send INR reminders to  ANTICOAG POOL    Comments       Anticoagulation Care Providers     Provider Role Specialty Phone number    Chandrika Kumar, PA [facility] [phone]            See the Encounter Report to view Anticoagulation Flowsheet and Dosing Calendar (Go to Encounters tab in chart review, and find the Anticoagulation Therapy Visit)        Kasia Hercules, RN

## 2017-09-12 RX ORDER — TRAMADOL HYDROCHLORIDE 50 MG/1
TABLET ORAL
Qty: 180 TABLET | Refills: 0 | Status: SHIPPED | OUTPATIENT
Start: 2017-09-12 | End: 2017-10-26

## 2017-09-20 ENCOUNTER — TELEPHONE (OUTPATIENT)
Dept: INTERVENTIONAL RADIOLOGY/VASCULAR | Facility: HOSPITAL | Age: 82
End: 2017-09-20

## 2017-09-20 NOTE — TELEPHONE ENCOUNTER
[unfilled]    PAIN MANAGEMENT POST CALL      Procedure: NKECHI TF Lumbar  Radiologist(s): Dr. Jaguar Masterson  Date of Procedure: 9/6/17    I responded to the patient's questions/concerns.    Pre-procedure pain score was: 4 (See pre-procedure score)  Post-procedure pain score as of today is: 2(Ask in call)   Percentage of pain reduction: 85% (Calculate from patients' post procedure response--do not expect the patient to calculate)     Where is the pain? Left hip  Is the pain radiating? Yes: left leg  Is this new pain? Yes: aches    Patient would like to pursue another injection.    Patient will contact provider, Chandrika Kumar if there are any issues and for the results.    REX SHANNON RN

## 2017-09-27 DIAGNOSIS — I10 BENIGN ESSENTIAL HYPERTENSION: ICD-10-CM

## 2017-09-28 RX ORDER — DILTIAZEM HCL 60 MG
TABLET ORAL
Qty: 93 TABLET | Refills: 1 | Status: SHIPPED | OUTPATIENT
Start: 2017-09-28 | End: 2017-12-03

## 2017-10-09 ENCOUNTER — ANTICOAGULATION THERAPY VISIT (OUTPATIENT)
Dept: ANTICOAGULATION | Facility: OTHER | Age: 82
End: 2017-10-09
Attending: PHYSICIAN ASSISTANT
Payer: MEDICARE

## 2017-10-09 DIAGNOSIS — Z79.01 LONG-TERM (CURRENT) USE OF ANTICOAGULANTS: ICD-10-CM

## 2017-10-09 DIAGNOSIS — I48.20 CHRONIC ATRIAL FIBRILLATION (H): ICD-10-CM

## 2017-10-09 LAB — INR POINT OF CARE: 3.7 (ref 0.86–1.14)

## 2017-10-09 PROCEDURE — 85610 PROTHROMBIN TIME: CPT | Mod: QW,ZL

## 2017-10-09 NOTE — PROGRESS NOTES
ANTICOAGULATION FOLLOW-UP CLINIC VISIT    Patient Name:  Michael Christiansen  Date:  10/9/2017  Contact Type:  Face to Face    SUBJECTIVE:     Patient Findings     Positives Change in diet/appetite    Comments Decreased vit K intake           OBJECTIVE    INR Protime   Date Value Ref Range Status   10/09/2017 3.7 (A) 0.86 - 1.14 Final       ASSESSMENT / PLAN  INR assessment SUPRA    Recheck INR In: 3 WEEKS    INR Location Clinic      Anticoagulation Summary as of 10/9/2017     INR goal 2.0-3.0   Today's INR 3.7!   Maintenance plan 5 mg (5 mg x 1) on Mon, Wed, Fri; 2.5 mg (5 mg x 0.5) all other days   Full instructions 10/9: Hold; Otherwise 5 mg on Mon, Wed, Fri; 2.5 mg all other days   Weekly total 25 mg   Plan last modified Gabriella Koo RN (7/27/2016)   Next INR check 10/26/2017   Priority INR   Target end date Indefinite    Indications   Long-term (current) use of anticoagulants [Z79.01] [Z79.01]  Chronic atrial fibrillation (H) [I48.2]         Anticoagulation Episode Summary     INR check location     Preferred lab     Send INR reminders to  ANTICOAG POOL    Comments       Anticoagulation Care Providers     Provider Role Specialty Phone number    Chandrika Kumar, PA [facility] [phone]            See the Encounter Report to view Anticoagulation Flowsheet and Dosing Calendar (Go to Encounters tab in chart review, and find the Anticoagulation Therapy Visit)        Kasia Hercules, RN

## 2017-10-09 NOTE — MR AVS SNAPSHOT
Michael DEJESUS Елена   10/9/2017 9:45 AM   Anticoagulation Therapy Visit    Description:  82 year old female   Provider:   ANTI COAGULATION   Department:  [facility] Anti Coagulation           INR as of 10/9/2017     Today's INR 3.7!      Anticoagulation Summary as of 10/9/2017     INR goal 2.0-3.0   Today's INR 3.7!   Full instructions 10/9: Hold; Otherwise 5 mg on Mon, Wed, Fri; 2.5 mg all other days   Next INR check 10/26/2017    Indications   Long-term (current) use of anticoagulants [Z79.01] [Z79.01]  Chronic atrial fibrillation (H) [I48.2]         Your next Anticoagulation Clinic appointment(s)     Oct 09, 2017  9:45 AM CDT   Anticoagulation Visit with  ANTI COAGULATION   [facility] ([facility] )    [address]  [place] [address]   [phone]            Oct 26, 2017 10:00 AM CDT   Anticoagulation Visit with  ANTI COAGULATION   [facility] )    [address] [facility] [place] [address]   [phone]              October 2017 Details    Sun Mon Tue Wed Thu Fri Sat     1               2               3               4               5               6               7                 8               9      Hold   See details      10      2.5 mg         11      5 mg         12      2.5 mg         13      5 mg         14      2.5 mg           15      2.5 mg         16      5 mg         17      2.5 mg         18      5 mg         19      2.5 mg         20      5 mg         21      2.5 mg           22      2.5 mg         23      5 mg         24      2.5 mg         25      5 mg         26            27               28                 29               30               31                    Date Details   10/09 This INR check       Date of next INR:  10/26/2017         How to take your warfarin dose     To take:  2.5 mg Take 0.5 of a 5 mg tablet.    To take:  5 mg Take 1 of the 5 mg tablets.    Hold Do not take your warfarin dose. See the  Details table to the right for additional instructions.

## 2017-10-26 ENCOUNTER — ANTICOAGULATION THERAPY VISIT (OUTPATIENT)
Dept: ANTICOAGULATION | Facility: OTHER | Age: 82
End: 2017-10-26
Attending: PHYSICIAN ASSISTANT
Payer: MEDICARE

## 2017-10-26 ENCOUNTER — OFFICE VISIT (OUTPATIENT)
Dept: FAMILY MEDICINE | Facility: OTHER | Age: 82
End: 2017-10-26
Attending: PHYSICIAN ASSISTANT
Payer: MEDICARE

## 2017-10-26 VITALS
TEMPERATURE: 96.5 F | OXYGEN SATURATION: 97 % | WEIGHT: 129 LBS | BODY MASS INDEX: 22.85 KG/M2 | SYSTOLIC BLOOD PRESSURE: 124 MMHG | DIASTOLIC BLOOD PRESSURE: 72 MMHG | HEART RATE: 88 BPM

## 2017-10-26 DIAGNOSIS — R30.0 DYSURIA: Primary | ICD-10-CM

## 2017-10-26 DIAGNOSIS — I48.20 CHRONIC ATRIAL FIBRILLATION (H): ICD-10-CM

## 2017-10-26 DIAGNOSIS — Z79.01 LONG-TERM (CURRENT) USE OF ANTICOAGULANTS: ICD-10-CM

## 2017-10-26 DIAGNOSIS — R82.90 ABNORMAL URINE FINDINGS: ICD-10-CM

## 2017-10-26 DIAGNOSIS — G89.4 CHRONIC PAIN SYNDROME: ICD-10-CM

## 2017-10-26 LAB
ALBUMIN UR-MCNC: NEGATIVE MG/DL
APPEARANCE UR: ABNORMAL
BACTERIA #/AREA URNS HPF: ABNORMAL /HPF
BILIRUB UR QL STRIP: NEGATIVE
COLOR UR AUTO: ABNORMAL
GLUCOSE UR STRIP-MCNC: NEGATIVE MG/DL
HGB UR QL STRIP: ABNORMAL
INR POINT OF CARE: 2.8 (ref 0.86–1.14)
INR POINT OF CARE: 2.8 (ref 0.86–1.14)
KETONES UR STRIP-MCNC: NEGATIVE MG/DL
LEUKOCYTE ESTERASE UR QL STRIP: ABNORMAL
NITRATE UR QL: POSITIVE
PH UR STRIP: 7 PH (ref 4.7–8)
RBC #/AREA URNS AUTO: 4 /HPF (ref 0–2)
SOURCE: ABNORMAL
SP GR UR STRIP: 1.01 (ref 1–1.03)
UROBILINOGEN UR STRIP-MCNC: NORMAL MG/DL (ref 0–2)
WBC #/AREA URNS AUTO: >182 /HPF (ref 0–2)
WBC CLUMPS #/AREA URNS HPF: PRESENT /HPF

## 2017-10-26 PROCEDURE — 87186 SC STD MICRODIL/AGAR DIL: CPT | Mod: ZL | Performed by: PHYSICIAN ASSISTANT

## 2017-10-26 PROCEDURE — 87086 URINE CULTURE/COLONY COUNT: CPT | Mod: ZL | Performed by: PHYSICIAN ASSISTANT

## 2017-10-26 PROCEDURE — 99214 OFFICE O/P EST MOD 30 MIN: CPT | Performed by: PHYSICIAN ASSISTANT

## 2017-10-26 PROCEDURE — 99212 OFFICE O/P EST SF 10 MIN: CPT

## 2017-10-26 PROCEDURE — 87088 URINE BACTERIA CULTURE: CPT | Mod: ZL | Performed by: PHYSICIAN ASSISTANT

## 2017-10-26 PROCEDURE — 81001 URINALYSIS AUTO W/SCOPE: CPT | Mod: ZL | Performed by: PHYSICIAN ASSISTANT

## 2017-10-26 PROCEDURE — 85610 PROTHROMBIN TIME: CPT | Mod: QW,ZL

## 2017-10-26 RX ORDER — AMOXICILLIN 500 MG/1
500 CAPSULE ORAL 3 TIMES DAILY
Qty: 30 CAPSULE | Refills: 0 | Status: SHIPPED | OUTPATIENT
Start: 2017-10-26 | End: 2017-11-09

## 2017-10-26 RX ORDER — TRAMADOL HYDROCHLORIDE 50 MG/1
TABLET ORAL
Qty: 180 TABLET | Refills: 0 | Status: SHIPPED | OUTPATIENT
Start: 2017-10-26 | End: 2017-12-21

## 2017-10-26 ASSESSMENT — ANXIETY QUESTIONNAIRES
3. WORRYING TOO MUCH ABOUT DIFFERENT THINGS: SEVERAL DAYS
6. BECOMING EASILY ANNOYED OR IRRITABLE: SEVERAL DAYS
2. NOT BEING ABLE TO STOP OR CONTROL WORRYING: SEVERAL DAYS
5. BEING SO RESTLESS THAT IT IS HARD TO SIT STILL: NOT AT ALL
GAD7 TOTAL SCORE: 3
1. FEELING NERVOUS, ANXIOUS, OR ON EDGE: NOT AT ALL
7. FEELING AFRAID AS IF SOMETHING AWFUL MIGHT HAPPEN: NOT AT ALL
4. TROUBLE RELAXING: NOT AT ALL

## 2017-10-26 ASSESSMENT — PAIN SCALES - GENERAL: PAINLEVEL: MILD PAIN (2)

## 2017-10-26 ASSESSMENT — PATIENT HEALTH QUESTIONNAIRE - PHQ9: SUM OF ALL RESPONSES TO PHQ QUESTIONS 1-9: 6

## 2017-10-26 NOTE — PROGRESS NOTES
ANTICOAGULATION FOLLOW-UP CLINIC VISIT    Patient Name:  Michael Christiansen  Date:  10/26/2017  Contact Type:  Face to Face    SUBJECTIVE:     Patient Findings     Positives Other complaints    Comments Having back injection on 11/1/17.  Will have to hold warfarin x 5 days. States she cannot do the lovenox shots and refuses to have lovenox while off warfarin.  She is seeing DAMON Kumar today so I talked to Chandrika's nurse, Tiffanie and told her that patient is refusing lovenox shots while off warfarin. Tiffanie will tell Chandrika Kumar that patient will not do the lovenox shots.            OBJECTIVE    INR Protime   Date Value Ref Range Status   10/26/2017 2.8 (A) 0.86 - 1.14 Final   10/26/2017 2.8 (A) 0.86 - 1.14 Final       ASSESSMENT / PLAN  INR assessment THER    Recheck INR In: 1 WEEK    INR Location Clinic      Anticoagulation Summary as of 10/26/2017     INR goal 2.0-3.0   Today's INR 2.8   Maintenance plan 5 mg (5 mg x 1) on Mon, Wed, Fri; 2.5 mg (5 mg x 0.5) all other days   Full instructions 10/27: Hold; 10/28: Hold; 10/29: Hold; 10/30: Hold; 10/31: Hold; Otherwise 5 mg on Mon, Wed, Fri; 2.5 mg all other days   Weekly total 25 mg   Plan last modified Gabriella Koo RN (7/27/2016)   Next INR check 11/1/2017   Priority INR   Target end date Indefinite    Indications   Long-term (current) use of anticoagulants [Z79.01] [Z79.01]  Chronic atrial fibrillation (H) [I48.2]         Anticoagulation Episode Summary     INR check location     Preferred lab     Send INR reminders to  ANTICOAG POOL    Comments       Anticoagulation Care Providers     Provider Role Specialty Phone number    Chandrika Kumar, PA [facility] [phone]            See the Encounter Report to view Anticoagulation Flowsheet and Dosing Calendar (Go to Encounters tab in chart review, and find the Anticoagulation Therapy Visit)        Kasia Hercules, RN

## 2017-10-26 NOTE — MR AVS SNAPSHOT
After Visit Summary   10/26/2017    Michael Christiansen    MRN: [id number]           Patient Information     Date Of Birth          1935        Visit Information        Provider Department      10/26/2017 10:30 AM Chandrika Kumar PA [facility]        Today's Diagnoses     Dysuria    -  1    Chronic pain syndrome          Care Instructions      Thank you for choosing [facility].   I have office hours 8:00 am to 4:30 pm on Monday's, Wednesday's, Thursday's and Friday's. My nurse and I are out of the office every Tuesday.    Following your visit, when your labs and diagnostic testing have returned, I will review then and you will be contacted by my nurse.  If you are on My Chart, you can also view results there.    For refills, notify your pharmacy regarding what you need and the pharmacy will generate a refill request. Do not call my nurse as she is unable to process refill request. Please plan ahead and allow 3-5 days for refill requests.    You will generally receive a reminder call the day prior to your appointment.  If you cannot attend your appointment, please cancel your appointment with as much notice as possible.  If there is a pattern of failure to present for your appointments, I cannot provide consistent, meaningful, ongoing care for you. It is very important to me that you come in for your care, so we can best assist you with your health care needs.    IMPORTANT:  Please note that it is my standard of practice to NOT participate in prescribing ongoing requested Narcotic Analgesic therapy, and/or participate in the prescribing of other controlled substances.  My nurse and I am happy to assist you with the process of referral for alternative pain management as needed, and other treatment modalities including but not limited to:  Physical Therapy, Physical Medicine and Rehab, Counseling, Chiropractic Care, Orthopedic Care, and non-narcotic medication management.      In the event that you need to be seen for emergent concerns and I am out of office,  please see one of my colleagues for acute concerns.  You may also present to UC or ER.  I appreciate the opportunity to serve you and look forward to supporting your healthcare needs in the future. Please contact me with any questions or concerns that you may have.    Sincerely,      Chandrika Kumar RN, PA-C               Follow-ups after your visit        Your next 10 appointments already scheduled     Nov 01, 2017  9:00 AM CDT   Anticoagulation Visit with  ANTI COAGULATION   [facility] ([facility] )    [address]  [facility] [address]   [phone]            Nov 01, 2017  9:30 AM CDT   XR LUMBAR TRANSFORAMINAL INJ SINGLE with HIIR1, HIIRRAD   [facility] INTERVENTIONAL RAD ([facility] )    [address] [address] [phone]           For nerve root injection, please send or bring copies of any MRIs or other scans you have had.  Bring a list of your current medicines to your exam. (Include vitamins, minerals and over-the-counter medicines.) Leave your valuables at home.  Plan to have someone drive you home afterward.  Stop taking the following medicines (but talk to your doctor first):   If you take blood thinners, you may need to stop taking them a few days before treatment. Talk to your doctor before stopping these medicines.Stop taking Coumadin (warfarin) 3 days before treatment. Restart the day after treatment.   If you take Plavix, Ticlid, Pletal or Persantine, please ask your doctor if you should stop these medicines. You may need extra tests on the morning of your scan.   If you take Xarelto (Rivaroxaban), you may need to stop taking it 24 hours before treatment. Talk to your doctor before stopping this medicine. Restart the day after treatment.  You may take your other medicines as normal.  Stop all food and drink (including water) 3 hours before your  test or treatment.  Please tell the doctor:   If you are allergic to X-ray dye (contrast fluid).   If you may be pregnant.  Injections take about 30 to 45 minutes. Most people spend up to 2 hours in the clinic or hospital.  Please call the Imaging Department at your exam site with any questions.            Dec 21, 2017 10:15 AM CST   (Arrive by 10:00 AM)   Office Visit with MELISA Murray   [facility] ([facility] )    [address] [facility] [address]   [phone]            Dec 27, 2017 12:15 PM CST   LAB with HC LAB   [facility] ([facility] )    [address] [facility] [address]   [phone]            Dec 28, 2017  1:00 PM CST   DX HIP/PELVIS/SPINE with HIDX1   HI DEXA ([facility] )    [address]  [facility] [id number] [phone]           Please do not take any of the following 24 hours prior to the day of your exam: vitamins, calcium tablets, antacids.  If possible, please wear clothes without metal (snaps, zippers). A sweatsuit works well.            Jan 03, 2018  1:00 PM CST   (Arrive by 12:45 PM)   Return Visit with Noemy Mahajan NP   [facility] ([facility] )    [address] [facility] [address]   [phone]              Who to contact     If you have questions or need follow up information about today's clinic visit or your schedule please contact [facility] directly at [phone].  Normal or non-critical lab and imaging results will be communicated to you by MyChart, letter or phone within 4 business days after the clinic has received the results. If you do not hear from us within 7 days, please contact the clinic through [employer] or phone. If you have a critical or abnormal lab result, we will notify you by phone as soon as possible.  Submit refill requests through [employer] or call your pharmacy and they will forward the refill request to  us. Please allow 3 business days for your refill to be completed.          Additional Information About Your Visit        Care EveryWhere ID     This is your Care EveryWhere ID. This could be used by other organizations to access your [facility] medical records  [id number]        Your Vitals Were     Pulse Temperature Pulse Oximetry Breastfeeding? BMI (Body Mass Index)       88 96.5  F (35.8  C) (Tympanic) 97% No 22.85 kg/m2        Blood Pressure from Last 3 Encounters:   10/26/17 124/72   08/30/17 124/70   08/09/17 116/62    Weight from Last 3 Encounters:   10/26/17 129 lb (58.5 kg)   08/30/17 125 lb (56.7 kg)   08/09/17 130 lb (59 kg)              We Performed the Following     UA reflex to Microscopic and Culture          Today's Medication Changes          These changes are accurate as of: 10/26/17 11:25 AM.  If you have any questions, ask your nurse or doctor.               These medicines have changed or have updated prescriptions.        Dose/Directions    traMADol 50 MG tablet   Commonly known as:  ULTRAM   This may have changed:  See the new instructions.   Used for:  Chronic pain syndrome   Changed by:  Chandrika Kumar PA        TAKE ONE TO TWO TABLETS BY MOUTH EVERY 6 HOURS AS NEEDED FOR PAIN   Quantity:  180 tablet   Refills:  0         Stop taking these medicines if you haven't already. Please contact your care team if you have questions.     cyclobenzaprine 5 MG tablet   Commonly known as:  FLEXERIL   Stopped by:  Chandrika Kumar PA                Where to get your medicines      Some of these will need a paper prescription and others can be bought over the counter.  Ask your nurse if you have questions.     Bring a paper prescription for each of these medications     traMADol 50 MG tablet                Primary Care Provider Office Phone # Fax #    MELISA Murray [phone]       [facility] [address] [place] [address]        Equal Access to  Services     : Hadii aad ku hadtaneshakya Ritarudi, waaxda luqadaha, qaybta kaalmada xiomara, topher salvador . So [facility] [phone].    ATENCIÓN: Si tylerla migdalia, tiene a shay disposición servicios gratuitos de asistencia lingüística. Llame al [phone].    We comply with applicable federal civil rights laws and [place] laws. We do not discriminate on the basis of race, color, national origin, age, disability, sex, sexual orientation, or gender identity.            Thank you!     Thank you for choosing [facility]  for your care. Our goal is always to provide you with excellent care. Hearing back from our patients is one way we can continue to improve our services. Please take a few minutes to complete the written survey that you may receive in the mail after your visit with us. Thank you!             Your Updated Medication List - Protect others around you: Learn how to safely use, store and throw away your medicines at www.disposemymeds.org.          This list is accurate as of: 10/26/17 11:25 AM.  Always use your most recent med list.                   Brand Name Dispense Instructions for use Diagnosis    alendronate 70 MG tablet    FOSAMAX    4 tablet    Take 1 tablet (70 mg) by mouth every 7 days Take 60 minutes before am meal with 8 oz. water. Remain upright for 30 minutes.    Age related osteoporosis, unspecified pathological fracture presence       CALCITRATE/VITAMIN D PO      Take  by mouth 2 times daily.        diltiazem 60 MG tablet    CARDIZEM    93 tablet    TAKE ONE TABLET BY MOUTH THREE TIMES DAILY    Benign essential hypertension       losartan 50 MG tablet    COZAAR    30 tablet    TAKE ONE TABLET BY MOUTH ONCE DAILY    HTN (hypertension)       MULTIVITAMINS PO      Take  by mouth daily.        order for DME     1 each    4 wheeled walker    Chronic left-sided low back pain with left-sided sciatica       OSTEO BI-FLEX ADV DOUBLE ST PO      Take  by  mouth daily.        simvastatin 40 MG tablet    ZOCOR    45 tablet    TAKE ONE-HALF TABLET BY MOUTH ONCE DAILY IN THE EVENING    Hyperlipidemia LDL goal <100       traMADol 50 MG tablet    ULTRAM    180 tablet    TAKE ONE TO TWO TABLETS BY MOUTH EVERY 6 HOURS AS NEEDED FOR PAIN    Chronic pain syndrome       TYLENOL PO      Take 500 mg by mouth        warfarin 5 MG tablet    COUMADIN    90 tablet    TAKE ONE TABLET (5 MG) BY MOUTH MONDAY, WEDNESDAY, FRIDAY, AND ONE-HALF TABLET (2.5 MG) THE REST OF THE WEEK.    Atrial fibrillation (H), Long term current use of anticoagulant therapy

## 2017-10-26 NOTE — PROGRESS NOTES
SUBJECTIVE:   Michael Christiansen is a 82 year old female who presents to clinic today for the following health issues:        Chronic Pain Follow-Up       Type / Location of Pain: back  Analgesia/pain control:       Recent changes:  same      Overall control: Tolerable with discomfort  Activity level/function:      Daily activities:  Able to do light housework, cooking    Work:  not applicable  Adverse effects:  No  Adherance    Taking medication as directed?  Yes    Participating in other treatments: yes  Risk Factors:    Sleep:  good    Mood/anxiety:  controlled    Recent family or social stressors:  none noted    Other aggravating factors: prolonged sitting, prolonged standing and poor posture  PHQ-9 SCORE 8/9/2017 8/30/2017 10/26/2017   Total Score 8 6 6     LISA-7 SCORE 8/9/2017 8/30/2017 10/26/2017   Total Score 7 11 3     Encounter-Level CSA:     There are no encounter-level csa.            Amount of exercise or physical activity: None    Problems taking medications regularly: No    Medication side effects: none    Diet: regular (no restrictions)        URINARY TRACT SYMPTOMS      Duration: 1 week.     Description  dysuria, frequency, urgency and retention    Intensity:  moderate    Accompanying signs and symptoms:  Fever/chills: YES  Flank pain no   Nausea and vomiting: no   Vaginal symptoms: none  Abdominal/Pelvic Pain: no     History  History of frequent UTI's: no   History of kidney stones: no   Sexually Active: no   Possibility of pregnancy: No    Precipitating or alleviating factors: None    Therapies tried and outcome: cranberry juice  and increase fluid intake   Outcome: not helpful.         Problem list and histories reviewed & adjusted, as indicated.  Additional history: as documented    Patient Active Problem List   Diagnosis     Hyperlipidemia LDL goal <130     HTN (hypertension)     Osteoarthritis     Chronic atrial fibrillation (H)     Chronic pain syndrome     History of total knee replacement      Radiculitis     Osteoarthritis of knee     Long-term (current) use of anticoagulants [Z79.01]     ACP (advance care planning)     Osteoporosis     Malignant neoplasm of right breast in female, estrogen receptor positive, unspecified site of breast (H)     Past Surgical History:   Procedure Laterality Date     BREAST SURGERY      right  side mastectomy     C TOTAL KNEE ARTHROPLASTY Left 01/26/15     cataract extraction and lens implantation       COLONOSCOPY       EYE SURGERY       GYN SURGERY      tubal pregnancy     ORTHOPEDIC SURGERY  2009    l. hip replacement LT     ORTHOPEDIC SURGERY  july 19th 2013    Right total knee     TUBAL/ECTOPIC PREGNANCY         Social History   Substance Use Topics     Smoking status: Former Smoker     Packs/day: 0.30     Years: 30.00     Types: Cigarettes     Quit date: 8/11/1987     Smokeless tobacco: Never Used      Comment: year quit 1987     Alcohol use No     Family History   Problem Relation Age of Onset     DIABETES Father 75     cause of death     Other - See Comments Father      PVD     HEART DISEASE Sister      Other - See Comments Mother 83     old age; cause of death     Other - See Comments Sister      pow concentration; cause of death     Other - See Comments Sister      pow concentration; cause of death     Chronic Obstructive Pulmonary Disease Daughter          Current Outpatient Prescriptions   Medication Sig Dispense Refill     traMADol (ULTRAM) 50 MG tablet TAKE ONE TO TWO TABLETS BY MOUTH EVERY 6 HOURS AS NEEDED FOR PAIN 180 tablet 0     diltiazem (CARDIZEM) 60 MG tablet TAKE ONE TABLET BY MOUTH THREE TIMES DAILY 93 tablet 1     alendronate (FOSAMAX) 70 MG tablet Take 1 tablet (70 mg) by mouth every 7 days Take 60 minutes before am meal with 8 oz. water. Remain upright for 30 minutes. 4 tablet 11     order for DME 4 wheeled walker 1 each 0     warfarin (COUMADIN) 5 MG tablet TAKE ONE TABLET (5 MG) BY MOUTH MONDAY, WEDNESDAY, FRIDAY, AND ONE-HALF TABLET (2.5  MG) THE REST OF THE WEEK. 90 tablet 0     simvastatin (ZOCOR) 40 MG tablet TAKE ONE-HALF TABLET BY MOUTH ONCE DAILY IN THE EVENING 45 tablet 1     losartan (COZAAR) 50 MG tablet TAKE ONE TABLET BY MOUTH ONCE DAILY 30 tablet 11     Acetaminophen (TYLENOL PO) Take 500 mg by mouth       Calcium Citrate-Vitamin D (CALCITRATE/VITAMIN D PO) Take  by mouth 2 times daily.       Multiple Vitamin (MULTIVITAMINS PO) Take  by mouth daily.       Misc Natural Products (OSTEO BI-FLEX ADV DOUBLE ST PO) Take  by mouth daily.       Allergies   Allergen Reactions     Atenolol Shortness Of Breath and Other (See Comments)     Dizziness  Severe vertigo and dizziness/SOB     Lisinopril Cough     Pollen Extract      Other reaction(s): Runny Nose     Recent Labs   Lab Test  07/20/17   1004  07/06/17   1120  06/21/17   1025  12/19/16   1155  07/15/16   0908   10/27/14   1450   LDL   --    --   77   --    --    --    --    HDL   --    --   76   --    --    --    --    TRIG   --    --   61   --    --    --    --    ALT  19   --    --   20  20   < >  22   CR  0.91  0.88   --   1.01  0.82   < >  0.92   GFRESTIMATED  59*  62   --   53*  67   < >  59*   GFRESTBLACK  71  75   --   64  81   < >  71   POTASSIUM  4.3  4.5   --   4.7  4.3   < >  4.1   TSH   --    --    --    --    --    --   0.56    < > = values in this interval not displayed.      BP Readings from Last 3 Encounters:   10/26/17 124/72   08/30/17 124/70   08/09/17 116/62    Wt Readings from Last 3 Encounters:   10/26/17 129 lb (58.5 kg)   08/30/17 125 lb (56.7 kg)   08/09/17 130 lb (59 kg)                          Reviewed and updated as needed this visit by clinical staffTobacco  Allergies  Meds       Reviewed and updated as needed this visit by Provider         ROS:  Constitutional, neuro, ENT, endocrine, pulmonary, cardiac, gastrointestinal, genitourinary, musculoskeletal, integument and psychiatric systems are negative, except as otherwise noted.      OBJECTIVE:                                                     /72 (BP Location: Left arm, Patient Position: Chair, Cuff Size: Adult Regular)  Pulse 88  Temp 96.5  F (35.8  C) (Tympanic)  Wt 129 lb (58.5 kg)  SpO2 97%  Breastfeeding? No  BMI 22.85 kg/m2  Body mass index is 22.85 kg/(m^2).  GENERAL APPEARANCE: healthy, alert and no distress  EYES: Eyes grossly normal to inspection, PERRL and conjunctivae and sclerae normal  HENT: ear canals and TM's normal and nose and mouth without ulcers or lesions  NECK: no adenopathy, no asymmetry, masses, or scars and thyroid normal to palpation  RESP: lungs clear to auscultation - no rales, rhonchi or wheezes  CV: regular rates and rhythm, normal S1 S2, no S3 or S4 and no murmur, click or rub  LYMPHATICS: normal ant/post cervical and supraclavicular nodes  ABDOMEN: soft, nontender, without hepatosplenomegaly or masses and bowel sounds normal  MS: extremities normal- no gross deformities noted  SKIN: pain in low back is much better, sitting crossed legged.  Left leg is still quite painful.   Gait is slightly compensated.   NEURO: both legs are weak.   PSYCH: mentation appears normal and affect normal/bright    Diagnostic test results:  Diagnostic Test Results:  Results for orders placed or performed in visit on 10/26/17 (from the past 24 hour(s))   INR point of care   Result Value Ref Range    INR Protime 2.8 (A) 0.86 - 1.14   INR point of care   Result Value Ref Range    INR Protime 2.8 (A) 0.86 - 1.14          ASSESSMENT/PLAN:                                                    1. Dysuria  Start on Amoxil due to UTI and being on Coumadin.  Awaiting culture.   - UA reflex to Microscopic and Culture    2. Chronic pain syndrome  In PT and having her 3rd spinal injection pain in leg remains.  Her movement with a cane is pretty good and not exhibiting  pain behavior. Continue PT and her injections.  Further recommendations in another month.   - traMADol (ULTRAM) 50 MG tablet; TAKE ONE TO TWO TABLETS  BY MOUTH EVERY 6 HOURS AS NEEDED FOR PAIN  Dispense: 180 tablet; Refill: 0      Follow up with Provider - 4 weeks.      MELISA Gillespie  [facility]

## 2017-10-26 NOTE — MR AVS SNAPSHOT
Michael Christiansen   10/26/2017 10:00 AM   Anticoagulation Therapy Visit    Description:  82 year old female   Provider:  HC ANTI COAGULATION   Department:  [facility] Anti Coagulation           INR as of 10/26/2017     Today's INR 2.8      Anticoagulation Summary as of 10/26/2017     INR goal 2.0-3.0   Today's INR 2.8   Full instructions 10/27: Hold; 10/28: Hold; 10/29: Hold; 10/30: Hold; 10/31: Hold; Otherwise 5 mg on Mon, Wed, Fri; 2.5 mg all other days   Next INR check 11/1/2017    Indications   Long-term (current) use of anticoagulants [Z79.01] [Z79.01]  Chronic atrial fibrillation (H) [I48.2]         Your next Anticoagulation Clinic appointment(s)     Nov 01, 2017  9:00 AM CDT   Anticoagulation Visit with HC ANTI COAGULATION   [facility] ([facility]    [address] [address]   [phone]              October 2017 Details    Sun Mon Tue Wed Thu Fri Sat     1               2               3               4               5               6               7                 8               9               10               11               12               13               14                 15               16               17               18               19               20               21                 22               23               24               25               26      2.5 mg   See details      27      Hold         28      Hold           29      Hold         30      Hold         31      Hold              Date Details   10/26 This INR check               How to take your warfarin dose     To take:  2.5 mg Take 0.5 of a 5 mg tablet.    Hold Do not take your warfarin dose. See the Details table to the right for additional instructions.                November 2017 Details    Sun Mon Tue Wed Thu Fri Sat        1            2               3               4                 5               6               7               8               9               10                11                 12               13               14               15               16               17               18                 19               20               21               22               23               24               25                 26               27               28               29               30                  Date Details   No additional details    Date of next INR:  11/1/2017         How to take your warfarin dose     To take:  5 mg Take 1 of the 5 mg tablets.

## 2017-10-26 NOTE — NURSING NOTE
"Chief Complaint   Patient presents with     Pain       Initial /72 (BP Location: Left arm, Patient Position: Chair, Cuff Size: Adult Regular)  Pulse 88  Temp 96.5  F (35.8  C) (Tympanic)  Wt 129 lb (58.5 kg)  SpO2 97%  Breastfeeding? No  BMI 22.85 kg/m2 Estimated body mass index is 22.85 kg/(m^2) as calculated from the following:    Height as of 8/2/17: 5' 3\" (1.6 m).    Weight as of this encounter: 129 lb (58.5 kg).  Medication Reconciliation: complete   Ayana John CMA(AAMA)   "

## 2017-10-26 NOTE — PATIENT INSTRUCTIONS
Thank you for choosing [facility].   I have office hours 8:00 am to 4:30 pm on Monday's, Wednesday's, Thursday's and Friday's. My nurse and I are out of the office every Tuesday.    Following your visit, when your labs and diagnostic testing have returned, I will review then and you will be contacted by my nurse.  If you are on My Chart, you can also view results there.    For refills, notify your pharmacy regarding what you need and the pharmacy will generate a refill request. Do not call my nurse as she is unable to process refill request. Please plan ahead and allow 3-5 days for refill requests.    You will generally receive a reminder call the day prior to your appointment.  If you cannot attend your appointment, please cancel your appointment with as much notice as possible.  If there is a pattern of failure to present for your appointments, I cannot provide consistent, meaningful, ongoing care for you. It is very important to me that you come in for your care, so we can best assist you with your health care needs.    IMPORTANT:  Please note that it is my standard of practice to NOT participate in prescribing ongoing requested Narcotic Analgesic therapy, and/or participate in the prescribing of other controlled substances.  My nurse and I am happy to assist you with the process of referral for alternative pain management as needed, and other treatment modalities including but not limited to:  Physical Therapy, Physical Medicine and Rehab, Counseling, Chiropractic Care, Orthopedic Care, and non-narcotic medication management.     In the event that you need to be seen for emergent concerns and I am out of office,  please see one of my colleagues for acute concerns.  You may also present to  or ER.  I appreciate the opportunity to serve you and look forward to supporting your healthcare needs in the future. Please contact me with any questions or concerns that you may  have.    Sincerely,      Chandrika Kumar RN, PA-C

## 2017-10-27 ASSESSMENT — ANXIETY QUESTIONNAIRES: GAD7 TOTAL SCORE: 3

## 2017-10-28 LAB
BACTERIA SPEC CULT: ABNORMAL
SPECIMEN SOURCE: ABNORMAL

## 2017-10-30 ENCOUNTER — ANTICOAGULATION THERAPY VISIT (OUTPATIENT)
Dept: ANTICOAGULATION | Facility: OTHER | Age: 82
End: 2017-10-30

## 2017-10-30 DIAGNOSIS — E78.5 HYPERLIPIDEMIA LDL GOAL <100: ICD-10-CM

## 2017-10-30 DIAGNOSIS — Z79.01 LONG-TERM (CURRENT) USE OF ANTICOAGULANTS: ICD-10-CM

## 2017-10-30 DIAGNOSIS — I48.20 CHRONIC ATRIAL FIBRILLATION (H): ICD-10-CM

## 2017-10-30 NOTE — MR AVS SNAPSHOT
Michael ANJELICA Christiansen   10/30/2017   Anticoagulation Therapy Visit    Description:  82 year old female   Provider:  Chandrika Kumar PA   Department:  Hc Anti Coagulation           INR as of 10/30/2017     Today's INR No new INR was available at the time of this encounter.      Anticoagulation Summary as of 10/30/2017     INR goal 2.0-3.0   Today's INR No new INR was available at the time of this encounter.   Full instructions 10/30: 7.5 mg; 10/31: 5 mg; Otherwise 5 mg on Mon, Wed, Fri; 2.5 mg all other days   Next INR check 11/3/2017    Indications   Long-term (current) use of anticoagulants [Z79.01] [Z79.01]  Chronic atrial fibrillation (H) [I48.2]         Your next Anticoagulation Clinic appointment(s)     Nov 03, 2017  9:15 AM CDT   Anticoagulation Visit with HC ANTI COAGULATION   [facility]    [address] [place] [address]   [phone]              October 2017 Details    Sun Mon Tue Wed Thu Fri Sat     1               2               3               4               5               6               7                 8               9               10               11               12               13               14                 15               16               17               18               19               20               21                 22               23               24               25               26               27               28                 29               30      7.5 mg   See details      31      5 mg              Date Details   10/30 This INR check               How to take your warfarin dose     To take:  5 mg Take 1 of the 5 mg tablets.    To take:  7.5 mg Take 1.5 of the 5 mg tablets.           November 2017 Details    Sun Mon Tue Wed Thu Fri Sat        1      5 mg         2      2.5 mg         3            4                 5               6               7               8               9               10                11                 12               13               14               15               16               17               18                 19               20               21               22               23               24               25                 26               27               28               29               30                  Date Details   No additional details    Date of next INR:  11/3/2017         How to take your warfarin dose     To take:  2.5 mg Take 0.5 of a 5 mg tablet.    To take:  5 mg Take 1 of the 5 mg tablets.

## 2017-10-30 NOTE — TELEPHONE ENCOUNTER
Simvastatin       Last Written Prescription Date: 5/9/17  Last Fill Quantity: 45, # refills: 1    Last Office Visit with [facility] or [facility] prescribing provider:  10/26/17   Future Office Visit:    Next 5 appointments (look out 90 days)     Nov 30, 2017 11:15 AM CST   (Arrive by 11:00 AM)   Office Visit with MELISA Murray   [facility] [place] ([facility] [place] [facility]    [address]  [place] [address]   [phone]            Dec 21, 2017 10:15 AM CST   (Arrive by 10:00 AM)   Office Visit with MELISA Murray   [facility] [place] ([facility] [place] )    [address]  [place] [address]   [phone]            Jan 03, 2018  1:00 PM CST   (Arrive by 12:45 PM)   Return Visit with Noemy Mahajan NP   [facility] [place] ([facility] [place] )    [address]  [place] [address]   [phone]                  Cholesterol   Date Value Ref Range Status   06/21/2017 165 <200 mg/dL Final     HDL Cholesterol   Date Value Ref Range Status   06/21/2017 76 >49 mg/dL Final     LDL Cholesterol Calculated   Date Value Ref Range Status   06/21/2017 77 <100 mg/dL Final     Comment:     Desirable:       <100 mg/dl     Triglycerides   Date Value Ref Range Status   06/21/2017 61 <150 mg/dL Final     Comment:     Fasting specimen     ALT   Date Value Ref Range Status   07/20/2017 19 0 - 50 U/L Final

## 2017-10-30 NOTE — PROGRESS NOTES
ANTICOAGULATION FOLLOW-UP CLINIC VISIT    Patient Name:  Michael Christiansen  Date:  10/30/2017  Contact Type:  Telephone    SUBJECTIVE:     Patient Findings     Positives Antibiotic use or infection, Intentional hold of therapy    Comments Call returned to patient.  She had stated she is not having her back injection on 11/1  and has already stopped her warfarin. She is also on amoxicillin for UTI. She states back injection rescheduled for 11/9/17.            OBJECTIVE    INR Protime   Date Value Ref Range Status   10/26/2017 2.8 (A) 0.86 - 1.14 Final   10/26/2017 2.8 (A) 0.86 - 1.14 Final       ASSESSMENT / PLAN  No question data found.  Anticoagulation Summary as of 10/30/2017     INR goal 2.0-3.0   Today's INR No new INR was available at the time of this encounter.   Maintenance plan 5 mg (5 mg x 1) on Mon, Wed, Fri; 2.5 mg (5 mg x 0.5) all other days   Full instructions 10/30: 7.5 mg; 10/31: 5 mg; Otherwise 5 mg on Mon, Wed, Fri; 2.5 mg all other days   Weekly total 25 mg   Plan last modified Gabriella Koo RN (7/27/2016)   Next INR check 11/3/2017   Priority INR   Target end date Indefinite    Indications   Long-term (current) use of anticoagulants [Z79.01] [Z79.01]  Chronic atrial fibrillation (H) [I48.2]         Anticoagulation Episode Summary     INR check location     Preferred lab     Send INR reminders to  ANTICOAG POOL    Comments       Anticoagulation Care Providers     Provider Role Specialty Phone number    Chandrika Kumar, PA [facility] [phone]            See the Encounter Report to view Anticoagulation Flowsheet and Dosing Calendar (Go to Encounters tab in chart review, and find the Anticoagulation Therapy Visit)        Kasia Hercules, RN

## 2017-11-01 RX ORDER — SIMVASTATIN 40 MG
TABLET ORAL
Qty: 45 TABLET | Refills: 1 | Status: SHIPPED | OUTPATIENT
Start: 2017-11-01 | End: 2018-02-26

## 2017-11-03 ENCOUNTER — ANTICOAGULATION THERAPY VISIT (OUTPATIENT)
Dept: ANTICOAGULATION | Facility: OTHER | Age: 82
End: 2017-11-03
Attending: PHYSICIAN ASSISTANT
Payer: MEDICARE

## 2017-11-03 DIAGNOSIS — I48.20 CHRONIC ATRIAL FIBRILLATION (H): ICD-10-CM

## 2017-11-03 DIAGNOSIS — Z79.01 LONG-TERM (CURRENT) USE OF ANTICOAGULANTS: ICD-10-CM

## 2017-11-03 LAB — INR POINT OF CARE: 1.8 (ref 0.86–1.14)

## 2017-11-03 PROCEDURE — 85610 PROTHROMBIN TIME: CPT | Mod: QW,ZL

## 2017-11-03 NOTE — MR AVS SNAPSHOT
Michael Christiansen   11/3/2017 9:15 AM   Anticoagulation Therapy Visit    Description:  82 year old female   Provider:   ANTI COAGULATION   Department:  Hc Anti Coagulation           INR as of 11/3/2017     Today's INR 1.8!      Anticoagulation Summary as of 11/3/2017     INR goal 2.0-3.0   Today's INR 1.8!   Full instructions 11/4: Hold; 11/5: Hold; 11/6: Hold; 11/7: Hold; 11/8: Hold; Otherwise 5 mg on Mon, Wed, Fri; 2.5 mg all other days   Next INR check 11/9/2017    Indications   Long-term (current) use of anticoagulants [Z79.01] [Z79.01]  Chronic atrial fibrillation (H) [I48.2]         Your next Anticoagulation Clinic appointment(s)     Nov 03, 2017  9:15 AM CDT   Anticoagulation Visit with  ANTI COAGULATION   [facility]    [address]  [place] [address]   [phone]            Nov 09, 2017 11:15 AM CST   Anticoagulation Visit with  ANTI COAGULATION   [facility]    [address]  [place] [address]   [phone]              November 2017 Details    Sun Mon Tue Wed Thu Fri Sat        1               2               3      5 mg   See details      4      Hold           5      Hold         6      Hold         7      Hold         8      Hold         9            10               11                 12               13               14               15               16               17               18                 19               20               21               22               23               24               25                 26               27               28               29               30                  Date Details   11/03 This INR check       Date of next INR:  11/9/2017         How to take your warfarin dose     To take:  2.5 mg Take 0.5 of a 5 mg tablet.    To take:  5 mg Take 1 of the 5 mg tablets.    Hold Do not take your warfarin dose. See the Details table to the right for  additional instructions.

## 2017-11-03 NOTE — PROGRESS NOTES
ANTICOAGULATION FOLLOW-UP CLINIC VISIT    Patient Name:  Michael Christiansen  Date:  11/3/2017  Contact Type:  Face to Face    SUBJECTIVE:     Patient Findings     Positives Other complaints    Comments Patient on amoxicillin for UTI and will complete in 2 days.  She is scheduled for a back injection on 11/9/17.  We will hold warfarin x 5 days. Patient states she will not do lovenox shots as she cannot do the shot herself and her  cannot help her either.  We discussed warfarin hold and patient verbalized understanding           OBJECTIVE    INR Protime   Date Value Ref Range Status   11/03/2017 1.8 (A) 0.86 - 1.14 Final       ASSESSMENT / PLAN  INR assessment SUB    Recheck INR In: 6 DAYS    INR Location Clinic      Anticoagulation Summary as of 11/3/2017     INR goal 2.0-3.0   Today's INR 1.8!   Maintenance plan 5 mg (5 mg x 1) on Mon, Wed, Fri; 2.5 mg (5 mg x 0.5) all other days   Full instructions 11/4: Hold; 11/5: Hold; 11/6: Hold; 11/7: Hold; 11/8: Hold; Otherwise 5 mg on Mon, Wed, Fri; 2.5 mg all other days   Weekly total 25 mg   Plan last modified Gabriella Koo RN (7/27/2016)   Next INR check 11/9/2017   Priority INR   Target end date Indefinite    Indications   Long-term (current) use of anticoagulants [Z79.01] [Z79.01]  Chronic atrial fibrillation (H) [I48.2]         Anticoagulation Episode Summary     INR check location     Preferred lab     Send INR reminders to  ANTICOAG POOL    Comments       Anticoagulation Care Providers     Provider Role Specialty Phone number    Chandrika Kumar PA [facility] [phone]            See the Encounter Report to view Anticoagulation Flowsheet and Dosing Calendar (Go to Encounters tab in chart review, and find the Anticoagulation Therapy Visit)        Kasia Hercules, RN

## 2017-11-03 NOTE — Clinical Note
Michael Cobosy is having a back injection on 11/9/17. We will hold warfarin for 5 days. She told me she cannot do any lovenox ever as she cannot do the shot herself and her  cannot help her.  Kasia Hercules RN

## 2017-11-09 ENCOUNTER — ANTICOAGULATION THERAPY VISIT (OUTPATIENT)
Dept: ANTICOAGULATION | Facility: OTHER | Age: 82
End: 2017-11-09
Attending: PHYSICIAN ASSISTANT
Payer: MEDICARE

## 2017-11-09 ENCOUNTER — HOSPITAL ENCOUNTER (OUTPATIENT)
Dept: INTERVENTIONAL RADIOLOGY/VASCULAR | Facility: HOSPITAL | Age: 82
Discharge: HOME | End: 2017-11-09
Attending: PHYSICIAN ASSISTANT | Admitting: PHYSICIAN ASSISTANT
Payer: MEDICARE

## 2017-11-09 DIAGNOSIS — M54.42 CHRONIC LEFT-SIDED LOW BACK PAIN WITH LEFT-SIDED SCIATICA: ICD-10-CM

## 2017-11-09 DIAGNOSIS — Z79.01 LONG-TERM (CURRENT) USE OF ANTICOAGULANTS: ICD-10-CM

## 2017-11-09 DIAGNOSIS — G89.29 CHRONIC LEFT-SIDED LOW BACK PAIN WITH LEFT-SIDED SCIATICA: ICD-10-CM

## 2017-11-09 DIAGNOSIS — I48.20 CHRONIC ATRIAL FIBRILLATION (H): ICD-10-CM

## 2017-11-09 LAB — INR POINT OF CARE: 1 (ref 0.86–1.14)

## 2017-11-09 PROCEDURE — 25000128 H RX IP 250 OP 636: Performed by: RADIOLOGY

## 2017-11-09 PROCEDURE — 64483 NJX AA&/STRD TFRM EPI L/S 1: CPT | Mod: TC

## 2017-11-09 PROCEDURE — 85610 PROTHROMBIN TIME: CPT | Mod: QW,ZL

## 2017-11-09 RX ORDER — IOPAMIDOL 612 MG/ML
15 INJECTION, SOLUTION INTRATHECAL ONCE
Status: COMPLETED | OUTPATIENT
Start: 2017-11-09 | End: 2017-11-09

## 2017-11-09 RX ORDER — DEXAMETHASONE SODIUM PHOSPHATE 10 MG/ML
10 INJECTION, SOLUTION INTRAMUSCULAR; INTRAVENOUS ONCE
Status: COMPLETED | OUTPATIENT
Start: 2017-11-09 | End: 2017-11-09

## 2017-11-09 RX ORDER — DEXAMETHASONE SODIUM PHOSPHATE 10 MG/ML
INJECTION, SOLUTION INTRAMUSCULAR; INTRAVENOUS
Status: DISPENSED
Start: 2017-11-09 | End: 2017-11-10

## 2017-11-09 RX ADMIN — IOPAMIDOL 2 ML: 612 INJECTION, SOLUTION INTRATHECAL at 12:24

## 2017-11-09 RX ADMIN — DEXAMETHASONE SODIUM PHOSPHATE 10 MG: 10 INJECTION INTRAMUSCULAR; INTRAVENOUS at 12:25

## 2017-11-09 NOTE — PROGRESS NOTES
ANTICOAGULATION FOLLOW-UP CLINIC VISIT    Patient Name:  Michael Christiansen  Date:  11/9/2017  Contact Type:  Face to Face    SUBJECTIVE:     Patient Findings     Positives Intentional hold of therapy    Comments Patient having back injection today, Warfarin has been held x 5 days, no bridging.  Call placed to radiology and left message for the nurse, re INR result.            OBJECTIVE    INR Protime   Date Value Ref Range Status   11/09/2017 1.0 0.86 - 1.14 Final       ASSESSMENT / PLAN  INR assessment THER    Recheck INR In: 1 WEEK    INR Location Clinic      Anticoagulation Summary as of 11/9/2017     INR goal 2.0-3.0   Today's INR 1.0!   Maintenance plan 5 mg (5 mg x 1) on Mon, Wed, Fri; 2.5 mg (5 mg x 0.5) all other days   Full instructions 11/10: 7.5 mg; 11/11: 7.5 mg; Otherwise 5 mg on Mon, Wed, Fri; 2.5 mg all other days   Weekly total 25 mg   Plan last modified Gabriella Koo RN (7/27/2016)   Next INR check 11/16/2017   Priority INR   Target end date Indefinite    Indications   Long-term (current) use of anticoagulants [Z79.01] [Z79.01]  Chronic atrial fibrillation (H) [I48.2]         Anticoagulation Episode Summary     INR check location     Preferred lab     Send INR reminders to  ANTICOAG POOL    Comments       Anticoagulation Care Providers     Provider Role Specialty Phone number    Chandrika Kumar PA [facility] [phone]            See the Encounter Report to view Anticoagulation Flowsheet and Dosing Calendar (Go to Encounters tab in chart review, and find the Anticoagulation Therapy Visit)        Kasia Hercules RN

## 2017-11-09 NOTE — DISCHARGE INSTRUCTIONS
Home number on file [phone] (home)  Is it ok to leave a message at this number(s)? Yes    Dr. Michel completed your procedure on 11/9/2017.    Current Pain Level (0-10 Scale): 1/10  Post Pain Level (0-10):  1/10    Radiology Discharge instructions for Steroid Injection    Activity Level:     Do not do any heavy activity or exercise for 24 hours.   Do not drive for 4 hours after your injection.  Diet:   Return to your normal diet.  Medications:   If you have stopped taking your Aspirin, Coumadin/Warfarin, Ibuprofen, or any   other blood thinner for this procedure you may resume in the morning unless   your primary care provider has given you other instructions.    Diabetics may see an increase in blood sugar after steroid injections. If you are concerned about your blood sugar, please contact your family doctor.    Site Care:  Remove the bandage and bathe or shower the morning after the procedure.      Please allow two weeks to experience improvement in your pain.  If you have any further issues, please contact your provider.    Call your Primary Care Provider if you have the following (if your primary care provider is not available please seek emergency care):   Nausea with vomiting   Severe headache   Drowsiness or confusion   Redness or drainage at the injection or puncture site   Temperature over 101 degrees F   Other concerns   Worsening back pain   Stiff neck

## 2017-11-09 NOTE — IP AVS SNAPSHOT
[facility]    [address] [address]    Phone:  [phone]    Fax:  [phone]                                       After Visit Summary   11/9/2017    Michael Christiansen    MRN: [id number]           After Visit Summary Signature Page     I have received my discharge instructions, and my questions have been answered. I have discussed any challenges I see with this plan with the nurse or doctor.    ..........................................................................................................................................  Patient/Patient Representative Signature      ..........................................................................................................................................  Patient Representative Print Name and Relationship to Patient    ..................................................               ................................................  Date                                            Time    ..........................................................................................................................................  Reviewed by Signature/Title    ...................................................              ..............................................  Date                                                            Time

## 2017-11-09 NOTE — IP AVS SNAPSHOT
MRN:[id number]                      After Visit Summary   11/9/2017    Michael Christiansen    MRN: [id number]           Visit Information        Provider Department      11/9/2017 11:45 AM HIIRRAD; HIIR1 HI INTERVENTIONAL RAD           Review of your medicines      UNREVIEWED medicines. Ask your doctor about these medicines        Dose / Directions    alendronate 70 MG tablet   Commonly known as:  FOSAMAX   Used for:  Age related osteoporosis, unspecified pathological fracture presence        Dose:  70 mg   Take 1 tablet (70 mg) by mouth every 7 days Take 60 minutes before am meal with 8 oz. water. Remain upright for 30 minutes.   Quantity:  4 tablet   Refills:  11       CALCITRATE/VITAMIN D PO        Take  by mouth 2 times daily.   Refills:  0       diltiazem 60 MG tablet   Commonly known as:  CARDIZEM   Used for:  Benign essential hypertension        TAKE ONE TABLET BY MOUTH THREE TIMES DAILY   Quantity:  93 tablet   Refills:  1       losartan 50 MG tablet   Commonly known as:  COZAAR   Used for:  HTN (hypertension)        TAKE ONE TABLET BY MOUTH ONCE DAILY   Quantity:  30 tablet   Refills:  11       MULTIVITAMINS PO        Take  by mouth daily.   Refills:  0       OSTEO BI-FLEX ADV DOUBLE ST PO        Take  by mouth daily.   Refills:  0       simvastatin 40 MG tablet   Commonly known as:  ZOCOR   Used for:  Hyperlipidemia LDL goal <100        TAKE ONE-HALF TABLET BY MOUTH ONCE DAILY IN THE EVENING   Quantity:  45 tablet   Refills:  1       traMADol 50 MG tablet   Commonly known as:  ULTRAM   Used for:  Chronic pain syndrome        TAKE ONE TO TWO TABLETS BY MOUTH EVERY 6 HOURS AS NEEDED FOR PAIN   Quantity:  180 tablet   Refills:  0       TYLENOL PO        Dose:  500 mg   Take 500 mg by mouth   Refills:  0       warfarin 5 MG tablet   Commonly known as:  COUMADIN   Used for:  Atrial fibrillation (H), Long term current use of anticoagulant therapy        TAKE ONE TABLET (5 MG) BY MOUTH MONDAY,  WEDNESDAY, FRIDAY, AND ONE-HALF TABLET (2.5 MG) THE REST OF THE WEEK.   Quantity:  90 tablet   Refills:  0         CONTINUE these medicines which have NOT CHANGED        Dose / Directions    order for DME   Used for:  Chronic left-sided low back pain with left-sided sciatica        4 wheeled walker   Quantity:  1 each   Refills:  0                Protect others around you: Learn how to safely use, store and throw away your medicines at www.disposemymeds.org.         Follow-ups after your visit        Your next 10 appointments already scheduled     Nov 16, 2017  2:00 PM CST   Anticoagulation Visit with HC ANTI COAGULATION   [facility] ([facility] )    [address]  [place] [address]   [phone]            Nov 30, 2017 11:15 AM CST   (Arrive by 11:00 AM)   Office Visit with MELISA Murray   [facility] ([facility] )    [address]  [place] [address]   [phone]            Dec 21, 2017 10:15 AM CST   (Arrive by 10:00 AM)   Office Visit with MELISA Murray   [facility] ([facility] )    [address]  [place] [address]   [phone]            Dec 27, 2017 12:15 PM CST   LAB with HC LAB   [facility] ([facility] )    [address]  [place] [address]   [phone]            Dec 28, 2017  1:00 PM CST   DX HIP/PELVIS/SPINE with HIDX1   [facility] DEXA ([facility] )    [address]  [facility] [id number] [phone]           Please do not take any of the following 24 hours prior to the day of your exam: vitamins, calcium tablets, antacids.  If possible, please wear clothes without metal (snaps, zippers). A sweatsuit works well.            Jan 03, 2018  1:00 PM CST   (Arrive by 12:45 PM)   Return Visit with Noemy Mahajan NP   [facility] ([facility] )    [address]  [place] [address]    [phone]               Care Instructions        Further instructions from your care team       Home number on file [phone] (home)  Is it ok to leave a message at this number(s)? Yes    Dr. Michel completed your procedure on 11/9/2017.    Current Pain Level (0-10 Scale): 1/10  Post Pain Level (0-10):  1/10    Radiology Discharge instructions for Steroid Injection    Activity Level:     Do not do any heavy activity or exercise for 24 hours.   Do not drive for 4 hours after your injection.  Diet:   Return to your normal diet.  Medications:   If you have stopped taking your Aspirin, Coumadin/Warfarin, Ibuprofen, or any   other blood thinner for this procedure you may resume in the morning unless   your primary care provider has given you other instructions.    Diabetics may see an increase in blood sugar after steroid injections. If you are concerned about your blood sugar, please contact your family doctor.    Site Care:  Remove the bandage and bathe or shower the morning after the procedure.      Please allow two weeks to experience improvement in your pain.  If you have any further issues, please contact your provider.    Call your Primary Care Provider if you have the following (if your primary care provider is not available please seek emergency care):   Nausea with vomiting   Severe headache   Drowsiness or confusion   Redness or drainage at the injection or puncture site   Temperature over 101 degrees F   Other concerns   Worsening back pain   Stiff neck         Additional Information About Your Visit        Care EveryWhere ID     This is your Care EveryWhere ID. This could be used by other organizations to access your [facility] medical records  [id number]         Primary Care Provider Office Phone # Fax #    MELISA Murray [phone]      Equal Access to Services     [facility]: Jaxson Hampton, levi navarro, topher valenzuela  chularosie blaineamanda la'aan ah. So [facility] [phone].    ATENCIÓN: Si habla migdalia, tiene a shay disposición servicios gratuitos de asistencia lingüística. Gera al [phone].    We comply with applicable federal civil rights laws and [place] laws. We do not discriminate on the basis of race, color, national origin, age, disability, sex, sexual orientation, or gender identity.            Thank you!     Thank you for choosing [facility] for your care. Our goal is always to provide you with excellent care. Hearing back from our patients is one way we can continue to improve our services. Please take a few minutes to complete the written survey that you may receive in the mail after you visit with us. Thank you!             Medication List: This is a list of all your medications and when to take them. Check marks below indicate your daily home schedule. Keep this list as a reference.      Medications           Morning Afternoon Evening Bedtime As Needed    alendronate 70 MG tablet   Commonly known as:  FOSAMAX   Take 1 tablet (70 mg) by mouth every 7 days Take 60 minutes before am meal with 8 oz. water. Remain upright for 30 minutes.                                CALCITRATE/VITAMIN D PO   Take  by mouth 2 times daily.                                diltiazem 60 MG tablet   Commonly known as:  CARDIZEM   TAKE ONE TABLET BY MOUTH THREE TIMES DAILY                                losartan 50 MG tablet   Commonly known as:  COZAAR   TAKE ONE TABLET BY MOUTH ONCE DAILY                                MULTIVITAMINS PO   Take  by mouth daily.                                order for DME   4 wheeled walker                                OSTEO BI-FLEX ADV DOUBLE ST PO   Take  by mouth daily.                                simvastatin 40 MG tablet   Commonly known as:  ZOCOR   TAKE ONE-HALF TABLET BY MOUTH ONCE DAILY IN THE EVENING                                traMADol 50 MG tablet   Commonly known as:  ULTRAM   TAKE ONE TO TWO  TABLETS BY MOUTH EVERY 6 HOURS AS NEEDED FOR PAIN                                TYLENOL PO   Take 500 mg by mouth                                warfarin 5 MG tablet   Commonly known as:  COUMADIN   TAKE ONE TABLET (5 MG) BY MOUTH MONDAY, WEDNESDAY, FRIDAY, AND ONE-HALF TABLET (2.5 MG) THE REST OF THE WEEK.

## 2017-11-09 NOTE — MR AVS SNAPSHOT
Michael DEJESUS Елена   11/9/2017 11:15 AM   Anticoagulation Therapy Visit    Description:  82 year old female   Provider:   ANTI COAGULATION   Department:  Hc Anti Coagulation           INR as of 11/9/2017     Today's INR 1.0!      Anticoagulation Summary as of 11/9/2017     INR goal 2.0-3.0   Today's INR 1.0!   Full instructions 11/10: 7.5 mg; 11/11: 7.5 mg; Otherwise 5 mg on Mon, Wed, Fri; 2.5 mg all other days   Next INR check 11/16/2017    Indications   Long-term (current) use of anticoagulants [Z79.01] [Z79.01]  Chronic atrial fibrillation (H) [I48.2]         Your next Anticoagulation Clinic appointment(s)     Nov 09, 2017 11:15 AM CST   Anticoagulation Visit with  ANTI COAGULATION   [facility] ([facility] )    [address]  [place] [address]   [phone]            Nov 16, 2017  2:00 PM CST   Anticoagulation Visit with  ANTI COAGULATION   [facility] ([facility] )    [address]  [place] [address]   [phone]              November 2017 Details    Sun Mon Tue Wed Thu Fri Sat        1               2               3               4                 5               6               7               8               9      2.5 mg   See details      10      7.5 mg         11      7.5 mg           12      2.5 mg         13      5 mg         14      2.5 mg         15      5 mg         16            17               18                 19               20               21               22               23               24               25                 26               27               28               29               30                  Date Details   11/09 This INR check       Date of next INR:  11/16/2017         How to take your warfarin dose     To take:  2.5 mg Take 0.5 of a 5 mg tablet.    To take:  5 mg Take 1 of the 5 mg tablets.    To take:  7.5 mg Take 1.5 of the 5 mg tablets.

## 2017-11-16 ENCOUNTER — ANTICOAGULATION THERAPY VISIT (OUTPATIENT)
Dept: ANTICOAGULATION | Facility: OTHER | Age: 82
End: 2017-11-16
Attending: PHYSICIAN ASSISTANT
Payer: MEDICARE

## 2017-11-16 DIAGNOSIS — I48.20 CHRONIC ATRIAL FIBRILLATION (H): ICD-10-CM

## 2017-11-16 DIAGNOSIS — Z79.01 LONG-TERM (CURRENT) USE OF ANTICOAGULANTS: ICD-10-CM

## 2017-11-16 LAB — INR POINT OF CARE: 1.7 (ref 0.86–1.14)

## 2017-11-16 PROCEDURE — 85610 PROTHROMBIN TIME: CPT | Mod: QW,ZL

## 2017-11-16 NOTE — MR AVS SNAPSHOT
Michael DEJESUS Елена   11/16/2017 2:00 PM   Anticoagulation Therapy Visit    Description:  82 year old female   Provider:   ANTI COAGULATION   Department:  Hc Anti Coagulation           INR as of 11/16/2017     Today's INR 1.7!      Anticoagulation Summary as of 11/16/2017     INR goal 2.0-3.0   Today's INR 1.7!   Full instructions 11/16: 5 mg; Otherwise 5 mg on Mon, Wed, Fri; 2.5 mg all other days   Next INR check 12/7/2017    Indications   Long-term (current) use of anticoagulants [Z79.01] [Z79.01]  Chronic atrial fibrillation (H) [I48.2]         Your next Anticoagulation Clinic appointment(s)     Nov 16, 2017  2:00 PM CST   Anticoagulation Visit with  ANTI COAGULATION   [facility] [place] ([facility] )    [address]  [place] [address]   [phone]            Dec 07, 2017  1:00 PM CST   Anticoagulation Visit with  ANTI COAGULATION   [facility]    [address] [facility] [place] [address]   [phone]              November 2017 Details    Sun Mon Tue Wed Thu Fri Sat        1               2               3               4                 5               6               7               8               9               10               11                 12               13               14               15               16      5 mg   See details      17      5 mg         18      2.5 mg           19      2.5 mg         20      5 mg         21      2.5 mg         22      5 mg         23      2.5 mg         24      5 mg         25      2.5 mg           26      2.5 mg         27      5 mg         28      2.5 mg         29      5 mg         30      2.5 mg            Date Details   11/16 This INR check               How to take your warfarin dose     To take:  2.5 mg Take 0.5 of a 5 mg tablet.    To take:  5 mg Take 1 of the 5 mg tablets.           December 2017 Details    Sun Mon Tue Wed Thu Fri Sat          1      5 mg         2       2.5 mg           3      2.5 mg         4      5 mg         5      2.5 mg         6      5 mg         7            8               9                 10               11               12               13               14               15               16                 17               18               19               20               21               22               23                 24               25               26               27               28               29               30                 31                      Date Details   No additional details    Date of next INR:  12/7/2017         How to take your warfarin dose     To take:  2.5 mg Take 0.5 of a 5 mg tablet.    To take:  5 mg Take 1 of the 5 mg tablets.

## 2017-11-16 NOTE — PROGRESS NOTES
ANTICOAGULATION FOLLOW-UP CLINIC VISIT    Patient Name:  Michael Christiansen  Date:  11/16/2017  Contact Type:  Face to Face    SUBJECTIVE:     Patient Findings     Comments Back injection last week.           OBJECTIVE    INR Protime   Date Value Ref Range Status   11/16/2017 1.7 (A) 0.86 - 1.14 Final       ASSESSMENT / PLAN  INR assessment THER    Recheck INR In: 3 WEEKS    INR Location Clinic      Anticoagulation Summary as of 11/16/2017     INR goal 2.0-3.0   Today's INR 1.7!   Maintenance plan 5 mg (5 mg x 1) on Mon, Wed, Fri; 2.5 mg (5 mg x 0.5) all other days   Full instructions 11/16: 5 mg; Otherwise 5 mg on Mon, Wed, Fri; 2.5 mg all other days   Weekly total 25 mg   Plan last modified Gabriella Koo RN (7/27/2016)   Next INR check 12/7/2017   Priority INR   Target end date Indefinite    Indications   Long-term (current) use of anticoagulants [Z79.01] [Z79.01]  Chronic atrial fibrillation (H) [I48.2]         Anticoagulation Episode Summary     INR check location     Preferred lab     Send INR reminders to  [facility] POOL    Comments       Anticoagulation Care Providers     Provider Role Specialty Phone number    Chandrika Kumar, PA [facility] [phone]            See the Encounter Report to view Anticoagulation Flowsheet and Dosing Calendar (Go to Encounters tab in chart review, and find the Anticoagulation Therapy Visit)        Kasia Hercules RN

## 2017-11-22 ENCOUNTER — TELEPHONE (OUTPATIENT)
Dept: INTERVENTIONAL RADIOLOGY/VASCULAR | Facility: HOSPITAL | Age: 82
End: 2017-11-22

## 2017-11-22 NOTE — TELEPHONE ENCOUNTER
"POST CALL      Procedure: NKECHI TF lumbar left L5-S1  Radiologist(s): Dr. Tristen Michel  Date of Procedure: 11/09/2017    The patient had no questions or concerns.    Pre-procedure pain score was: 1 (See pre-procedure score)  Post-procedure pain score as of today is: 1(Ask in call)   Percentage of pain reduction: 0% (Calculate from patients' post procedure response--do not expect the patient to calculate)     Where is the pain? Patient is doing \"good\".  Is the pain radiating? No  Is this new pain? No  Patient would not like to pursue another injection at this time. She will call her PCP to get another order when needed.   Patient will contact provider, Chandrika Kumar if there are any issues and for the results.  RAVINDRA Wiggins        "

## 2017-11-30 ENCOUNTER — OFFICE VISIT (OUTPATIENT)
Dept: FAMILY MEDICINE | Facility: OTHER | Age: 82
End: 2017-11-30
Attending: PHYSICIAN ASSISTANT
Payer: MEDICARE

## 2017-11-30 VITALS
OXYGEN SATURATION: 95 % | TEMPERATURE: 97.9 F | HEART RATE: 74 BPM | WEIGHT: 135 LBS | BODY MASS INDEX: 23.91 KG/M2 | DIASTOLIC BLOOD PRESSURE: 76 MMHG | SYSTOLIC BLOOD PRESSURE: 122 MMHG

## 2017-11-30 DIAGNOSIS — M15.0 PRIMARY OSTEOARTHRITIS INVOLVING MULTIPLE JOINTS: Primary | ICD-10-CM

## 2017-11-30 DIAGNOSIS — I48.20 CHRONIC ATRIAL FIBRILLATION (H): ICD-10-CM

## 2017-11-30 DIAGNOSIS — I35.0 NONRHEUMATIC AORTIC VALVE STENOSIS: ICD-10-CM

## 2017-11-30 PROCEDURE — 99214 OFFICE O/P EST MOD 30 MIN: CPT | Performed by: PHYSICIAN ASSISTANT

## 2017-11-30 PROCEDURE — 99212 OFFICE O/P EST SF 10 MIN: CPT

## 2017-11-30 ASSESSMENT — ANXIETY QUESTIONNAIRES
GAD7 TOTAL SCORE: 7
7. FEELING AFRAID AS IF SOMETHING AWFUL MIGHT HAPPEN: NOT AT ALL
3. WORRYING TOO MUCH ABOUT DIFFERENT THINGS: MORE THAN HALF THE DAYS
1. FEELING NERVOUS, ANXIOUS, OR ON EDGE: SEVERAL DAYS
2. NOT BEING ABLE TO STOP OR CONTROL WORRYING: SEVERAL DAYS
4. TROUBLE RELAXING: SEVERAL DAYS
5. BEING SO RESTLESS THAT IT IS HARD TO SIT STILL: SEVERAL DAYS
6. BECOMING EASILY ANNOYED OR IRRITABLE: SEVERAL DAYS

## 2017-11-30 ASSESSMENT — PAIN SCALES - GENERAL: PAINLEVEL: MILD PAIN (2)

## 2017-11-30 ASSESSMENT — PATIENT HEALTH QUESTIONNAIRE - PHQ9: SUM OF ALL RESPONSES TO PHQ QUESTIONS 1-9: 3

## 2017-11-30 NOTE — PROGRESS NOTES
SUBJECTIVE:   Michael Christiansen is a 82 year old female who presents to clinic today for the following health issues:        Musculoskeletal problem/pain      Duration: 1 month follow     Description  Location: back     Intensity:  2/10    Accompanying signs and symptoms: patient states no pain since injection     History  Previous similar problem: no   Previous evaluation:  x-ray    Precipitating or alleviating factors:  Trauma or overuse: no   Aggravating factors include: standing, walking and climbing stairs    Therapies tried and outcome: rest/inactivity and tramadol- injection             Problem list and histories reviewed & adjusted, as indicated.  Additional history: as documented    Patient Active Problem List   Diagnosis     Hyperlipidemia LDL goal <130     HTN (hypertension)     Osteoarthritis     Chronic atrial fibrillation (H)     Chronic pain syndrome     History of total knee replacement     Radiculitis     Osteoarthritis of knee     Long-term (current) use of anticoagulants [Z79.01]     ACP (advance care planning)     Osteoporosis     Malignant neoplasm of right breast in female, estrogen receptor positive, unspecified site of breast (H)     Past Surgical History:   Procedure Laterality Date     BREAST SURGERY      right  side mastectomy     C TOTAL KNEE ARTHROPLASTY Left 01/26/15     cataract extraction and lens implantation       COLONOSCOPY       EYE SURGERY       GYN SURGERY      tubal pregnancy     ORTHOPEDIC SURGERY  2009    l. hip replacement LT     ORTHOPEDIC SURGERY  july 19th 2013    Right total knee     TUBAL/ECTOPIC PREGNANCY         Social History   Substance Use Topics     Smoking status: Former Smoker     Packs/day: 0.30     Years: 30.00     Types: Cigarettes     Quit date: 8/11/1987     Smokeless tobacco: Never Used      Comment: year quit 1987     Alcohol use No     Family History   Problem Relation Age of Onset     DIABETES Father 75     cause of death     Other - See Comments  Father      PVD     HEART DISEASE Sister      Other - See Comments Mother 83     old age; cause of death     Other - See Comments Sister      pow concentration; cause of death     Other - See Comments Sister      pow concentration; cause of death     Chronic Obstructive Pulmonary Disease Daughter          Current Outpatient Prescriptions   Medication Sig Dispense Refill     simvastatin (ZOCOR) 40 MG tablet TAKE ONE-HALF TABLET BY MOUTH ONCE DAILY IN THE EVENING 45 tablet 1     traMADol (ULTRAM) 50 MG tablet TAKE ONE TO TWO TABLETS BY MOUTH EVERY 6 HOURS AS NEEDED FOR PAIN 180 tablet 0     diltiazem (CARDIZEM) 60 MG tablet TAKE ONE TABLET BY MOUTH THREE TIMES DAILY 93 tablet 1     alendronate (FOSAMAX) 70 MG tablet Take 1 tablet (70 mg) by mouth every 7 days Take 60 minutes before am meal with 8 oz. water. Remain upright for 30 minutes. 4 tablet 11     order for DME 4 wheeled walker 1 each 0     warfarin (COUMADIN) 5 MG tablet TAKE ONE TABLET (5 MG) BY MOUTH MONDAY, WEDNESDAY, FRIDAY, AND ONE-HALF TABLET (2.5 MG) THE REST OF THE WEEK. 90 tablet 0     losartan (COZAAR) 50 MG tablet TAKE ONE TABLET BY MOUTH ONCE DAILY 30 tablet 11     Acetaminophen (TYLENOL PO) Take 500 mg by mouth       Calcium Citrate-Vitamin D (CALCITRATE/VITAMIN D PO) Take 1 tablet by mouth 2 times daily        Multiple Vitamin (MULTIVITAMINS PO) Take 1 tablet by mouth daily        Misc Natural Products (OSTEO BI-FLEX ADV DOUBLE ST PO) Take  by mouth daily.       Allergies   Allergen Reactions     Atenolol Shortness Of Breath and Other (See Comments)     Dizziness  Severe vertigo and dizziness/SOB     Lisinopril Cough     Pollen Extract      Other reaction(s): Runny Nose     Recent Labs   Lab Test  07/20/17   1004  07/06/17   1120  06/21/17   1025  12/19/16   1155  07/15/16   0908   10/27/14   1450   LDL   --    --   77   --    --    --    --    HDL   --    --   76   --    --    --    --    TRIG   --    --   61   --    --    --    --    ALT  19    --    --   20  20   < >  22   CR  0.91  0.88   --   1.01  0.82   < >  0.92   GFRESTIMATED  59*  62   --   53*  67   < >  59*   GFRESTBLACK  71  75   --   64  81   < >  71   POTASSIUM  4.3  4.5   --   4.7  4.3   < >  4.1   TSH   --    --    --    --    --    --   0.56    < > = values in this interval not displayed.      BP Readings from Last 3 Encounters:   11/30/17 122/76   10/26/17 124/72   08/30/17 124/70    Wt Readings from Last 3 Encounters:   11/30/17 135 lb (61.2 kg)   10/26/17 129 lb (58.5 kg)   08/30/17 125 lb (56.7 kg)                          Reviewed and updated as needed this visit by clinical staffTobacco  Allergies  Meds       Reviewed and updated as needed this visit by Provider         ROS:  Constitutional, neuro, ENT, endocrine, pulmonary, cardiac, gastrointestinal, genitourinary, musculoskeletal, integument and psychiatric systems are negative, except as otherwise noted.      OBJECTIVE:                                                    /76 (BP Location: Left arm, Patient Position: Chair, Cuff Size: Adult Regular)  Pulse 74  Temp 97.9  F (36.6  C) (Tympanic)  Wt 135 lb (61.2 kg)  SpO2 95%  Breastfeeding? No  BMI 23.91 kg/m2  Body mass index is 23.91 kg/(m^2).  GENERAL APPEARANCE: healthy, alert and no distress  EYES: Eyes grossly normal to inspection, PERRL and conjunctivae and sclerae normal  HENT: ear canals and TM's normal and nose and mouth without ulcers or lesions  NECK: no adenopathy, no asymmetry, masses, or scars and thyroid normal to palpation  RESP: lungs clear to auscultation - no rales, rhonchi or wheezes  CV: irregular rate and is failry rapid today. No JVD trace ankle swelling on left.  LYMPHATICS: normal ant/post cervical and supraclavicular nodes  MS: extremities normal- no gross deformities noted  SKIN: no suspicious lesions or rashes  NEURO: Normal strength and tone, mentation intact and speech normal  PSYCH: mentation appears normal and affect  normal/bright    Diagnostic test results:  Diagnostic Test Results:  Results for orders placed or performed in visit on 11/16/17   INR point of care   Result Value Ref Range    INR Protime 1.7 (A) 0.86 - 1.14     Order   XR Lumbar Transforaminal Inj Single [[id number]] (Order [id number])   Exam Information   Exam Date Exam Time Accession # Performing Department Results    11/9/17 12:23 PM [id number] HI INTERVENTIONAL RAD    [facility] Interactive Report and InfoRx   View the interactive report   PACS Images   Show images for XR Lumbar Transforaminal Inj Single   Study Result   Exam:XR LUMBAR TRANSFORAMINAL INJ SINGLE.     History:82 years Female ; Chronic left-sided low back pain with  left-sided sciatica; Chronic left-sided low back pain with left-sided  sciatica     Comparison:  9/6/2017     Technique: After informed consent was obtained, a timeout was  performed, satisfactorily identifying the patient and the site of the  procedure.     The patient was then placed in the prone position and prepped and  draped in usual sterile fashion. Local anesthetic consisting of 2 ml  of 1% lidocaine was then instilled into the skin and deep tissues. The  tip of a 22-gauge spinal needle was then directed toward the cephalad  aspect of the left L5-S1 intervertebral foramen. Fluoroscopic guidance  was used to position the needle.  2 ml of Isovue-M 300 was then  instilled, demonstrating satisfactory position of the needle tip.     1.5 mL of 1%, preservative-free lidocaine and 10 mg of dexamethasone  (10 mg/mL) were then administered. Needle was then removed.  The patient tolerated the procedure well.     Findings: Contrast is seen accumulating along the root sleeve.  Other:Degenerative changes are seen within the spine.             Impression:  1.  Technically successful fluoroscopically guided left L5-S1  transforaminal epidural steroid injection.     2.  Total fluoroscopy time: 13 seconds. .22 minutes      3.  Immediately  after the procedure, the patient noted 0% relief,No  acute complication. Follow-up is pending.     BALTAZAR LEAL MD          ASSESSMENT/PLAN:                                                      1. Primary osteoarthritis involving multiple joints  She is just finishing therapy and had her spinal injection.  Doing very well and moving well using her cane and moves more freely.   Pain level is very tolerable with our tylenol and use of Ultram at bedtime.   Also using hot packs and massage.         2. Chronic atrial fibrillation (H)  She is going to be seeing Dr. Friend used to see .  Has chronic A fib and has had Aortic Stenosis. She is on chronic anticoagulation and has been stable. No syncope or weakness.   - CARDIOLOGY EVAL ADULT REFERRAL    3. Nonrheumatic aortic valve stenosis  No syncope or other sx of heart failure.  Has had normal EF in the past.  See Cardiology.   - Echocardiogram Complete; Future  - CARDIOLOGY EVAL ADULT REFERRAL    See Patient Instructions    MELISA Gillespie  [facility]

## 2017-11-30 NOTE — NURSING NOTE
"Chief Complaint   Patient presents with     Musculoskeletal Problem     back/ 1 month follow up        Initial /76 (BP Location: Left arm, Patient Position: Chair, Cuff Size: Adult Regular)  Pulse 74  Temp 97.9  F (36.6  C) (Tympanic)  Wt 135 lb (61.2 kg)  SpO2 95%  Breastfeeding? No  BMI 23.91 kg/m2 Estimated body mass index is 23.91 kg/(m^2) as calculated from the following:    Height as of 8/2/17: 5' 3\" (1.6 m).    Weight as of this encounter: 135 lb (61.2 kg).  Medication Reconciliation: complete   Ayana John CMA(AAMA)   "

## 2017-11-30 NOTE — MR AVS SNAPSHOT
After Visit Summary   11/30/2017    Michael Christiansen    MRN: [id number]           Patient Information     Date Of Birth          1935        Visit Information        Provider Department      11/30/2017 11:15 AM Chandrika Kumar PA [facility]        Today's Diagnoses     Primary osteoarthritis involving multiple joints    -  1    Chronic atrial fibrillation (H)        Nonrheumatic aortic valve stenosis          Care Instructions      Thank you for choosing [facility].   I have office hours 8:00 am to 4:30 pm on Monday's, Wednesday's, Thursday's and Friday's. My nurse and I are out of the office every Tuesday.    Following your visit, when your labs and diagnostic testing have returned, I will review then and you will be contacted by my nurse.  If you are on My Chart, you can also view results there.    For refills, notify your pharmacy regarding what you need and the pharmacy will generate a refill request. Do not call my nurse as she is unable to process refill request. Please plan ahead and allow 3-5 days for refill requests.    You will generally receive a reminder call the day prior to your appointment.  If you cannot attend your appointment, please cancel your appointment with as much notice as possible.  If there is a pattern of failure to present for your appointments, I cannot provide consistent, meaningful, ongoing care for you. It is very important to me that you come in for your care, so we can best assist you with your health care needs.    IMPORTANT:  Please note that it is my standard of practice to NOT participate in prescribing ongoing requested Narcotic Analgesic therapy, and/or participate in the prescribing of other controlled substances.  My nurse and I am happy to assist you with the process of referral for alternative pain management as needed, and other treatment modalities including but not limited to:  Physical Therapy, Physical Medicine and  Rehab, Counseling, Chiropractic Care, Orthopedic Care, and non-narcotic medication management.     In the event that you need to be seen for emergent concerns and I am out of office,  please see one of my colleagues for acute concerns.  You may also present to UC or ER.  I appreciate the opportunity to serve you and look forward to supporting your healthcare needs in the future. Please contact me with any questions or concerns that you may have.    Sincerely,      Chandrika Kumar RN, PA-C               Follow-ups after your visit        Additional Services     CARDIOLOGY EVAL ADULT REFERRAL       Your provider has referred you to:  Dr Kirby     Please be aware that coverage of these services is subject to the terms and limitations of your health insurance plan.  Call member services at your health plan with any benefit or coverage questions.      Type of Referral:  Cardiology Follow Up    Timeframe requested:  Within 1 month    Please bring the following to your appointment:  >>   Any x-rays, CTs or MRIs which have been performed.  Contact the facility where they were done to arrange for  prior to your scheduled appointment.    >>   List of current medications  >>   This referral request   >>   Any documents/labs given to you for this referral                  Your next 10 appointments already scheduled     Dec 07, 2017  1:00 PM CST   Anticoagulation Visit with HC ANTI COAGULATION   [facility] [place] ([facility] [place] [facility]    [address]  [place] [address]   [phone]            Dec 21, 2017 10:15 AM CST   (Arrive by 10:00 AM)   Office Visit with MELISA Murray   [facility] [place] ([facility] [place] )    [address]  [place] [address]   [phone]            Dec 27, 2017 12:15 PM CST   LAB with HC LAB   [facility] ([facility] [place] )    [address]  [place] [address]   [phone]            Dec 28, 2017  1:00 PM  CST   DX HIP/PELVIS/SPINE with HIDX1   [facility] ([facility] )    [address]  [facility] [address] [phone]           Please do not take any of the following 24 hours prior to the day of your exam: vitamins, calcium tablets, antacids.  If possible, please wear clothes without metal (snaps, zippers). A sweatsuit works well.            Jan 03, 2018  1:00 PM CST   (Arrive by 12:45 PM)   Return Visit with Noemy Mahajan NP   [facility] ([facility] )    [address]  [facility] [address]   [phone]              Future tests that were ordered for you today     Open Future Orders        Priority Expected Expires Ordered    Echocardiogram Complete Routine  11/30/2018 11/30/2017            Who to contact     If you have questions or need follow up information about today's clinic visit or your schedule please contact [facility] directly at [phone].  Normal or non-critical lab and imaging results will be communicated to you by MyChart, letter or phone within 4 business days after the clinic has received the results. If you do not hear from us within 7 days, please contact the clinic through [employer] or phone. If you have a critical or abnormal lab result, we will notify you by phone as soon as possible.  Submit refill requests through "[employer]" or call your pharmacy and they will forward the refill request to us. Please allow 3 business days for your refill to be completed.          Additional Information About Your Visit        Care EveryWhere ID     This is your Care EveryWhere ID. This could be used by other organizations to access your [facility] medical records  [id number]        Your Vitals Were     Pulse Temperature Pulse Oximetry Breastfeeding? BMI (Body Mass Index)       74 97.9  F (36.6  C) (Tympanic) 95% No 23.91 kg/m2        Blood Pressure from Last 3 Encounters:   11/30/17 122/76   10/26/17 124/72   08/30/17 124/70    Weight from  Last 3 Encounters:   11/30/17 135 lb (61.2 kg)   10/26/17 129 lb (58.5 kg)   08/30/17 125 lb (56.7 kg)              We Performed the Following     CARDIOLOGY EVAL ADULT REFERRAL        Primary Care Provider Office Phone # Fax #    MELISA Murray [phone]       [facility] [address]  [place] [address]        Equal Access to Services     [facility] : Hadii aad ku hadasho Soomaali, waaxda luqadaha, qaybta kaalmada adeegyada, waxay idiin hayaan adeeg kharash la'aan . So [facility] [phone].    ATENCIÓN: Si tylerla migdalia, tiene a shay disposición servicios gratuitos de asistencia lingüística. Llame al [phone].    We comply with applicable federal civil rights laws and [place] laws. We do not discriminate on the basis of race, color, national origin, age, disability, sex, sexual orientation, or gender identity.            Thank you!     Thank you for choosing [facility] [place]  for your care. Our goal is always to provide you with excellent care. Hearing back from our patients is one way we can continue to improve our services. Please take a few minutes to complete the written survey that you may receive in the mail after your visit with us. Thank you!             Your Updated Medication List - Protect others around you: Learn how to safely use, store and throw away your medicines at www.disposemymeds.org.          This list is accurate as of: 11/30/17 12:05 PM.  Always use your most recent med list.                   Brand Name Dispense Instructions for use Diagnosis    alendronate 70 MG tablet    FOSAMAX    4 tablet    Take 1 tablet (70 mg) by mouth every 7 days Take 60 minutes before am meal with 8 oz. water. Remain upright for 30 minutes.    Age related osteoporosis, unspecified pathological fracture presence       CALCITRATE/VITAMIN D PO      Take 1 tablet by mouth 2 times daily        diltiazem 60 MG tablet    CARDIZEM    93 tablet    TAKE ONE TABLET BY  MOUTH THREE TIMES DAILY    Benign essential hypertension       losartan 50 MG tablet    COZAAR    30 tablet    TAKE ONE TABLET BY MOUTH ONCE DAILY    HTN (hypertension)       MULTIVITAMINS PO      Take 1 tablet by mouth daily        order for DME     1 each    4 wheeled walker    Chronic left-sided low back pain with left-sided sciatica       OSTEO BI-FLEX ADV DOUBLE ST PO      Take  by mouth daily.        simvastatin 40 MG tablet    ZOCOR    45 tablet    TAKE ONE-HALF TABLET BY MOUTH ONCE DAILY IN THE EVENING    Hyperlipidemia LDL goal <100       traMADol 50 MG tablet    ULTRAM    180 tablet    TAKE ONE TO TWO TABLETS BY MOUTH EVERY 6 HOURS AS NEEDED FOR PAIN    Chronic pain syndrome       TYLENOL PO      Take 500 mg by mouth        warfarin 5 MG tablet    COUMADIN    90 tablet    TAKE ONE TABLET (5 MG) BY MOUTH MONDAY, WEDNESDAY, FRIDAY, AND ONE-HALF TABLET (2.5 MG) THE REST OF THE WEEK.    Atrial fibrillation (H), Long term current use of anticoagulant therapy

## 2017-11-30 NOTE — PATIENT INSTRUCTIONS
Thank you for choosing [facility].   I have office hours 8:00 am to 4:30 pm on Monday's, Wednesday's, Thursday's and Friday's. My nurse and I are out of the office every Tuesday.    Following your visit, when your labs and diagnostic testing have returned, I will review then and you will be contacted by my nurse.  If you are on My Chart, you can also view results there.    For refills, notify your pharmacy regarding what you need and the pharmacy will generate a refill request. Do not call my nurse as she is unable to process refill request. Please plan ahead and allow 3-5 days for refill requests.    You will generally receive a reminder call the day prior to your appointment.  If you cannot attend your appointment, please cancel your appointment with as much notice as possible.  If there is a pattern of failure to present for your appointments, I cannot provide consistent, meaningful, ongoing care for you. It is very important to me that you come in for your care, so we can best assist you with your health care needs.    IMPORTANT:  Please note that it is my standard of practice to NOT participate in prescribing ongoing requested Narcotic Analgesic therapy, and/or participate in the prescribing of other controlled substances.  My nurse and I am happy to assist you with the process of referral for alternative pain management as needed, and other treatment modalities including but not limited to:  Physical Therapy, Physical Medicine and Rehab, Counseling, Chiropractic Care, Orthopedic Care, and non-narcotic medication management.     In the event that you need to be seen for emergent concerns and I am out of office,  please see one of my colleagues for acute concerns.  You may also present to  or ER.  I appreciate the opportunity to serve you and look forward to supporting your healthcare needs in the future. Please contact me with any questions or concerns that you may  have.    Sincerely,      Chandrika Kumar RN, PA-C

## 2017-12-01 ASSESSMENT — ANXIETY QUESTIONNAIRES: GAD7 TOTAL SCORE: 7

## 2017-12-03 DIAGNOSIS — Z79.01 LONG TERM CURRENT USE OF ANTICOAGULANT THERAPY: ICD-10-CM

## 2017-12-03 DIAGNOSIS — I48.91 ATRIAL FIBRILLATION (H): ICD-10-CM

## 2017-12-03 DIAGNOSIS — I10 BENIGN ESSENTIAL HYPERTENSION: ICD-10-CM

## 2017-12-04 ENCOUNTER — HOSPITAL ENCOUNTER (OUTPATIENT)
Dept: CARDIOLOGY | Facility: HOSPITAL | Age: 82
Discharge: HOME | End: 2017-12-04
Attending: PHYSICIAN ASSISTANT | Admitting: PHYSICIAN ASSISTANT
Payer: MEDICARE

## 2017-12-04 DIAGNOSIS — I35.0 NONRHEUMATIC AORTIC VALVE STENOSIS: ICD-10-CM

## 2017-12-04 PROCEDURE — 93306 TTE W/DOPPLER COMPLETE: CPT | Mod: TC

## 2017-12-04 PROCEDURE — 93306 TTE W/DOPPLER COMPLETE: CPT | Mod: 26 | Performed by: INTERNAL MEDICINE

## 2017-12-04 NOTE — TELEPHONE ENCOUNTER
diltiazem (CARDIZEM) 60 MG tablet    Last Written Prescription Date: 9-  Last Fill Quantity: 93,  # refills: 1   Last Office Visit with [facility], [facility] or [facility] prescribing provider: 8-                                         Next 5 appointments (look out 90 days)     Dec 21, 2017 10:15 AM CST   (Arrive by 10:00 AM)   Office Visit with MELISA Murray   [facility] [place] ([facility] [place] )    [address]  [place] [address]   [phone]            Jan 03, 2018  1:00 PM CST   (Arrive by 12:45 PM)   Return Visit with Noemy Mahajan NP   [facility] [place] ([facility] [place] )    [address]  [place] [address]   [phone]                 Warfarin 5mg tablets      Last Written Prescription Date: 5-  Last Fill Quantity: 90,  # refills: 0   Last Office Visit with [facility], [facility] or [facility] prescribing provider: 8-                                         Next 5 appointments (look out 90 days)     Dec 21, 2017 10:15 AM CST   (Arrive by 10:00 AM)   Office Visit with MELISA Murray   [facility] [place] ([facility] [place] )    [address]  [place] [address]   [phone]            Jan 03, 2018  1:00 PM CST   (Arrive by 12:45 PM)   Return Visit with Noemy Mahajan NP   [facility] [place] ([facility] [place] )    [address]  [place] [address]   [phone]

## 2017-12-06 RX ORDER — WARFARIN SODIUM 5 MG/1
TABLET ORAL
Qty: 90 TABLET | Refills: 0 | Status: SHIPPED | OUTPATIENT
Start: 2017-12-06 | End: 2018-03-07

## 2017-12-06 RX ORDER — DILTIAZEM HCL 60 MG
TABLET ORAL
Qty: 270 TABLET | Refills: 1 | Status: SHIPPED | OUTPATIENT
Start: 2017-12-06 | End: 2017-12-20 | Stop reason: ALTCHOICE

## 2017-12-07 ENCOUNTER — TELEPHONE (OUTPATIENT)
Dept: FAMILY MEDICINE | Facility: OTHER | Age: 82
End: 2017-12-07

## 2017-12-07 NOTE — TELEPHONE ENCOUNTER
9:53 AM    Reason for Call: Phone Call    Description: Patient is returning call to nurse, she received call from nurse, but spouse answered call and was advised to have patient call back.    Was an appointment offered for this call? No  If yes : Appointment type              Date    Preferred method for responding to this message: Telephone Call  What is your phone number ?    If we cannot reach you directly, may we leave a detailed response at the number you provided? Yes    Can this message wait until your PCP/provider returns, if available today? Not applicable,     Mayuri Morales

## 2017-12-15 ENCOUNTER — PRE VISIT (OUTPATIENT)
Dept: CARDIOLOGY | Facility: OTHER | Age: 82
End: 2017-12-15

## 2017-12-15 NOTE — TELEPHONE ENCOUNTER
Office Visit     11/30/2017  [facility] Chandrika Shoemaker PA   Family Practice    Primary osteoarthritis involving multiple joints +2 more   Dx    Musculoskeletal Problem    Reason for visit    Progress Notes   Chandrika Kumar PA (Physician Assistant)     Family Practice   Expand All Collapse All       SUBJECTIVE:   Michael Christiansen is a 82 year old female who presents to clinic today for the following health issues:           Musculoskeletal problem/pain       Duration: 1 month follow     Description  Location: back     Intensity:  2/10    Accompanying signs and symptoms: patient states no pain since injection     History  Previous similar problem: no   Previous evaluation:  x-ray    Precipitating or alleviating factors:  Trauma or overuse: no   Aggravating factors include: standing, walking and climbing stairs    Therapies tried and outcome: rest/inactivity and tramadol- injection                  Problem list and histories reviewed & adjusted, as indicated.  Additional history: as documented         Patient Active Problem List   Diagnosis     Hyperlipidemia LDL goal <130     HTN (hypertension)     Osteoarthritis     Chronic atrial fibrillation (H)     Chronic pain syndrome     History of total knee replacement     Radiculitis     Osteoarthritis of knee     Long-term (current) use of anticoagulants [Z79.01]     ACP (advance care planning)     Osteoporosis     Malignant neoplasm of right breast in female, estrogen receptor positive, unspecified site of breast (H)      Past Surgical History            Past Surgical History:   Procedure Laterality Date     BREAST SURGERY         right  side mastectomy     C TOTAL KNEE ARTHROPLASTY Left 01/26/15     cataract extraction and lens implantation         COLONOSCOPY         EYE SURGERY         GYN SURGERY         tubal pregnancy     ORTHOPEDIC SURGERY   2009     l. hip replacement LT     ORTHOPEDIC SURGERY   july 19th 2013     Right total knee      TUBAL/ECTOPIC PREGNANCY                      Social History   Substance Use Topics     Smoking status: Former Smoker       Packs/day: 0.30       Years: 30.00       Types: Cigarettes       Quit date: 8/11/1987     Smokeless tobacco: Never Used         Comment: year quit 1987     Alcohol use No      Family History             Family History   Problem Relation Age of Onset     DIABETES Father 75       cause of death     Other - See Comments Father         PVD     HEART DISEASE Sister       Other - See Comments Mother 83       old age; cause of death     Other - See Comments Sister         pow concentration; cause of death     Other - See Comments Sister         pow concentration; cause of death     Chronic Obstructive Pulmonary Disease Daughter                Current Outpatient Prescriptions           Current Outpatient Prescriptions   Medication Sig Dispense Refill     simvastatin (ZOCOR) 40 MG tablet TAKE ONE-HALF TABLET BY MOUTH ONCE DAILY IN THE EVENING 45 tablet 1     traMADol (ULTRAM) 50 MG tablet TAKE ONE TO TWO TABLETS BY MOUTH EVERY 6 HOURS AS NEEDED FOR PAIN 180 tablet 0     diltiazem (CARDIZEM) 60 MG tablet TAKE ONE TABLET BY MOUTH THREE TIMES DAILY 93 tablet 1     alendronate (FOSAMAX) 70 MG tablet Take 1 tablet (70 mg) by mouth every 7 days Take 60 minutes before am meal with 8 oz. water. Remain upright for 30 minutes. 4 tablet 11     order for DME 4 wheeled walker 1 each 0     warfarin (COUMADIN) 5 MG tablet TAKE ONE TABLET (5 MG) BY MOUTH MONDAY, WEDNESDAY, FRIDAY, AND ONE-HALF TABLET (2.5 MG) THE REST OF THE WEEK. 90 tablet 0     losartan (COZAAR) 50 MG tablet TAKE ONE TABLET BY MOUTH ONCE DAILY 30 tablet 11     Acetaminophen (TYLENOL PO) Take 500 mg by mouth         Calcium Citrate-Vitamin D (CALCITRATE/VITAMIN D PO) Take 1 tablet by mouth 2 times daily          Multiple Vitamin (MULTIVITAMINS PO) Take 1 tablet by mouth daily          Misc Natural Products (OSTEO BI-FLEX ADV DOUBLE ST PO) Take  by  mouth daily.                   Allergies   Allergen Reactions     Atenolol Shortness Of Breath and Other (See Comments)       Dizziness  Severe vertigo and dizziness/SOB     Lisinopril Cough     Pollen Extract         Other reaction(s): Runny Nose                Recent Labs   Lab Test  07/20/17   1004  07/06/17   1120  06/21/17   1025  12/19/16   1155  07/15/16   0908    10/27/14   1450   LDL   --    --   77   --    --    --    --    HDL   --    --   76   --    --    --    --    TRIG   --    --   61   --    --    --    --    ALT  19   --    --   20  20   < >  22   CR  0.91  0.88   --   1.01  0.82   < >  0.92   GFRESTIMATED  59*  62   --   53*  67   < >  59*   GFRESTBLACK  71  75   --   64  81   < >  71   POTASSIUM  4.3  4.5   --   4.7  4.3   < >  4.1   TSH   --    --    --    --    --    --   0.56    < > = values in this interval not displayed.          BP Readings from Last 3 Encounters:   11/30/17 122/76   10/26/17 124/72   08/30/17 124/70         Wt Readings from Last 3 Encounters:   11/30/17 135 lb (61.2 kg)   10/26/17 129 lb (58.5 kg)   08/30/17 125 lb (56.7 kg)                                  Reviewed and updated as needed this visit by clinical staffTobacco  Allergies  Meds       Reviewed and updated as needed this visit by Provider           ROS:  Constitutional, neuro, ENT, endocrine, pulmonary, cardiac, gastrointestinal, genitourinary, musculoskeletal, integument and psychiatric systems are negative, except as otherwise noted.        OBJECTIVE:      /76 (BP Location: Left arm, Patient Position: Chair, Cuff Size: Adult Regular)  Pulse 74  Temp 97.9  F (36.6  C) (Tympanic)  Wt 135 lb (61.2 kg)  SpO2 95%  Breastfeeding? No  BMI 23.91 kg/m2  Body mass index is 23.91 kg/(m^2).  GENERAL APPEARANCE: healthy, alert and no distress  EYES: Eyes grossly normal to inspection, PERRL and conjunctivae and sclerae normal  HENT: ear canals and TM's normal and nose and mouth without ulcers or lesions  NECK:  no adenopathy, no asymmetry, masses, or scars and thyroid normal to palpation  RESP: lungs clear to auscultation - no rales, rhonchi or wheezes  CV: irregular rate and is failry rapid today. No JVD trace ankle swelling on left.  LYMPHATICS: normal ant/post cervical and supraclavicular nodes  MS: extremities normal- no gross deformities noted  SKIN: no suspicious lesions or rashes  NEURO: Normal strength and tone, mentation intact and speech normal  PSYCH: mentation appears normal and affect normal/bright     Diagnostic test results:  Diagnostic Test Results:        Results for orders placed or performed in visit on 11/16/17   INR point of care   Result Value Ref Range     INR Protime 1.7 (A) 0.86 - 1.14      Order   XR Lumbar Transforaminal Inj Single [[id number]] (Order [id number])   Exam Information   Exam Date Exam Time Accession # Performing Department Results    11/9/17 12:23 PM [id number] [facility] INTERVENTIONAL RAD     [employer] Interactive Report and InfoRx   View the interactive report   PACS Images   Show images for XR Lumbar Transforaminal Inj Single   Study Result   Exam:XR LUMBAR TRANSFORAMINAL INJ SINGLE.      History:82 years Female ; Chronic left-sided low back pain with  left-sided sciatica; Chronic left-sided low back pain with left-sided  sciatica      Comparison:  9/6/2017      Technique: After informed consent was obtained, a timeout was  performed, satisfactorily identifying the patient and the site of the  procedure.      The patient was then placed in the prone position and prepped and  draped in usual sterile fashion. Local anesthetic consisting of 2 ml  of 1% lidocaine was then instilled into the skin and deep tissues. The  tip of a 22-gauge spinal needle was then directed toward the cephalad  aspect of the left L5-S1 intervertebral foramen. Fluoroscopic guidance  was used to position the needle.  2 ml of Isovue-M 300 was then  instilled, demonstrating satisfactory position of the needle  tip.      1.5 mL of 1%, preservative-free lidocaine and 10 mg of dexamethasone  (10 mg/mL) were then administered. Needle was then removed.  The patient tolerated the procedure well.      Findings: Contrast is seen accumulating along the root sleeve.  Other:Degenerative changes are seen within the spine.              Impression:  1.  Technically successful fluoroscopically guided left L5-S1  transforaminal epidural steroid injection.      2.  Total fluoroscopy time: 13 seconds. .22 minutes      3.  Immediately after the procedure, the patient noted 0% relief,No  acute complication. Follow-up is pending.      BALTAZAR LEAL MD          ASSESSMENT/PLAN:         1. Primary osteoarthritis involving multiple joints  She is just finishing therapy and had her spinal injection.  Doing very well and moving well using her cane and moves more freely.   Pain level is very tolerable with our tylenol and use of Ultram at bedtime.   Also using hot packs and massage.           2. Chronic atrial fibrillation (H)  She is going to be seeing Dr. Friend used to see .  Has chronic A fib and has had Aortic Stenosis. She is on chronic anticoagulation and has been stable. No syncope or weakness.   - CARDIOLOGY EVAL ADULT REFERRAL     3. Nonrheumatic aortic valve stenosis  No syncope or other sx of heart failure.  Has had normal EF in the past.  See Cardiology.   - Echocardiogram Complete; Future  - CARDIOLOGY EVAL ADULT REFERRAL     See Patient Instructions           Exam:  Echocardiogram Complete       ACN:[id number]      Date/Time Completed:   12/04/2017 1047                                                                    Authorizing Provider:  Chandrika Kumar  Ordering Provider:  Chandrika Kumar  Attending Provider:    QUETA Provider:    ______________________________________________________________________________________        [id number]  [facility]  [id number]  [id number]^ROMEL^CHANDRIKA^CHICHO              [facility] and  [facility]  Echocardiography Laboratory  [address] [place] [id number]      Name: KIMMIE AMAYA  MRN: [id number]  : 1935  Study Date: 2017 09:51 AM  Age: 82 yrs  Gender: Female  Patient Location: [facility]  Reason For Study: , Nonrheumatic aortic valve stenosis  History: Aortic Valve Stenosis  Ordering Physician: KARLOS URENA  Referring Physician: KARLOS URENA  Performed By: Darlene Banerjee      BSA: 1.6 m2  Height: 65 in  Weight: 125 lb  _____________________________________________________________________________  __          Procedure  Echocardiogram with two-dimensional, color and spectral Doppler performed.  _____________________________________________________________________________  __          Interpretation Summary  No pericardial effusion is present.  Left ventricular function is normal.The EF is 60-65%.  Mild right ventricular dilation is present.  Moderate tricuspid insufficiency is present.  Right ventricular systolic pressure is 50mmHg above the right atrial pressure.  Moderate (pulmonary artery systolic pressure 50-75mmHg) pulmonary hypertension  is present.  Mild left atrial enlargement is present.  Mild right atrial enlargement is present.  Mild mitral insufficiency is present.  Moderate to severe aortic valve sclerosis is present.  The peak aortic velocity is 3.17 m/sec.  Moderate aortic stenosis is present.  The mean gradient across the aortic valve is20 mmHg.  The aortic valve area is .86 cm^2, by the continuity equation.  Mild pulmonic insufficiency is present.  _____________________________________________________________________________  __          Left Ventricle  Left ventricular function is normal.The EF is 60-65%.      Right Ventricle  Mild right ventricular dilation is present.      Atria  Mild left atrial enlargement is present. Mild right atrial enlargement is  present.      Mitral Valve  Mild mitral insufficiency is present.      Aortic Valve  Moderate to  severe aortic valve sclerosis is present. Trace to mild aortic  insufficiency is present. The peak aortic velocity is 3.17 m/sec. Moderate  aortic stenosis is present. The mean gradient across the aortic valve is20  mmHg. The aortic valve area is .86 cm^2, by the continuity equation.          Tricuspid Valve  Moderate tricuspid insufficiency is present. Right ventricular systolic  pressure is 50mmHg above the right atrial pressure. Moderate (pulmonary artery  systolic pressure 50-75mmHg) pulmonary hypertension is present.      Pulmonic Valve  Mild pulmonic insufficiency is present.      Pericardium  No pericardial effusion is present.  _____________________________________________________________________________  __      MMode/2D Measurements & Calculations  RVDd: 3.2 cm  IVSd: 1.0 cm  IVSs: 1.3 cm  LVIDd: 4.2 cm  LVIDs: 2.8 cm  LVPWd: 1.0 cm  LVPWs: 1.3 cm  FS: 32.4 %  EDV(Teich): 77.1 ml  ESV(Teich): 30.0 ml  LV mass(C)d: 143.4 grams  LV mass(C)dI: 88.5 grams/m2  LV mass(C)sI: 68.0 grams/m2  Ao root diam: 3.6 cm  LA dimension: 4.0 cm  LA/Ao: 1.1  LVOT diam: 1.7 cm  LVOT area: 2.4 cm2  RWT: 0.50          Time Measurements  Pulm. HR: 185.3 BPM  MM HR: 77.6 BPM      Doppler Measurements & Calculations  Ao V2 max: 317.0 cm/sec  Ao max P.2 mmHg  Ao V2 mean: 208.4 cm/sec  Ao mean P.1 mmHg  Ao V2 VTI: 59.9 cm  GLEN(I,D): 0.81 cm2  GLEN(V,D): 0.86 cm2  AI P1/2t: 476.8 msec  LV V1 max P.1 mmHg  LV V1 max: 113.0 cm/sec  LV V1 VTI: 20.3 cm  CO(LVOT): 10.3 l/min  CI(LVOT): 6.4 l/min/m2  SV(LVOT): 48.8 ml  SI(LVOT): 30.1 ml/m2  TV max P.8 mmHg  PA V2 max: 120.8 cm/sec  PA max P.8 mmHg  PA mean P.8 mmHg  PA V2 VTI: 25.0 cm  TR max kraig: 348.8 cm/sec  TR max P.7 mmHg  GLEN Index (cm2/m2): 0.50  Peak E' Kraig: 15.0 cm/sec          _____________________________________________________________________________  __         Report approved by: Shira Aaron 2017 08:24 AM    Prior Echo from  2014:  Exam:  Echocardiogram       ACN:[id number]      Date/Time Completed:   08/11/2014 1321                                                                    Authorizing Provider:  Elvira Ventura  Ordering Provider:  Elvira Ventura  Attending Provider:    QUETA Provider:    ______________________________________________________________________________________              ECHOCARDIOGRAM     M-mode, two-dimensional, color-flow, and Doppler studies were obtained  on this patient.  Standard views were utilized.     ORDERING PHYSICIAN:  Elvira Lazcano MD     INDICATIONS:  Rule out pericarditis.     Cardiac Dimensions                                    Normal  Dimensions  Aortic Root:                          34.4 mm      Aortic Root  Diameter: 20-37 mm  Left Atrium:                          43 mm      Left Atrium: 19-40  mm  Right Ventricle:                     25.7 mm      Right Ventricle:  7-23 mm  Left Ventricle end-diastole:   51 mm      Left Ventricle end-diastole:  35-56 mm  Left Ventricle end-systole:    32.6 mm      Left Ventricle  end-systole: 35-56 mm  IVS end-diastole:                 8 mm      IVS end-diastole: 7-11 mm  LVPW:                                9 mm      LVPW: 7-11 mm     Review of the study shows there is no pericardial effusion of any  significance.  The left ventricle is of normal size.  Systolic  function is normal.  Ejection fraction around 60%.  There is perhaps  some mild hypokinesis of the distal septal area, towards the apex but  minimal.  The left atrium is enlarged by diameter, volume is measured  at 101 mL.  The right ventricle appears normal on 2-D study.  The  mitral valve has a mild amount of mitral regurgitation.  The aortic  valve has aortic sclerosis.  There is slight increase in gradient with  peak velocity measured at 3.03 meters/second.  The peak and mean  gradients were 37 and 17 respectively.  There is a mild-to-moderate  amount of aortic  insufficiency noted.  There is a moderate amount of  tricuspid regurgitation.  The peak systolic pressure of the right  ventricle is estimated at 42 plus right atrium which is mildly  elevated.  The patient does appear to be in atrial fibrillation.     Continued on page 2           ASSESSMENT:  Echocardiographic study revealing  1.  No evidence of any significant pericardial effusion.  2.  Normal left ventricular size and systolic function.  Ejection  fraction around 60%.  Perhaps some slight hypokinesis of the distal  septum apical area.  3.  Left atrial enlargement.  4.  Normal right ventricular size and function.  5.  Mild mitral regurgitation.  6.  Aortic sclerosis with a minimal increased gradient.  Peak and mean  were 37 and 16 with mild-to-moderate aortic insufficiency.  There  still is fairly good movement of the aortic leaflets.  7.  Moderate tricuspid regurgitation.  Peak systolic pressure of the  right ventricle is estimate at 42 plus right atrium.  8.  Slight pulmonic valve insufficiency.  9.  The patient is in atrial fibrillation.  Exam Date: Aug 11, 2014 01:21:54 PM  Author: DOMENIC CRAWFORD  This report is final and signed     ______________________________________________________________________________________  End of diagnostic Imaging report for accession:  [id number]       Component      Latest Ref Rng & Units 6/21/2017 7/6/2017 7/20/2017   WBC      4.0 - 11.0 10e9/L   9.8   RBC Count      3.8 - 5.2 10e12/L   4.11   Hemoglobin      11.7 - 15.7 g/dL   13.4   Hematocrit      35.0 - 47.0 %   38.3   MCV      78 - 100 fl   93   MCH      26.5 - 33.0 pg   32.6   MCHC      31.5 - 36.5 g/dL   35.0   RDW      10.0 - 15.0 %   13.0   Platelet Count      150 - 450 10e9/L   329   Diff Method         Automated Method   % Neutrophils      %   65.5   % Lymphocytes      %   21.6   % Monocytes      %   10.7   % Eosinophils      %   1.1   % Basophils      %   0.8   % Immature Granulocytes      %   0.3   Nucleated RBCs       0 /100   0   Absolute Neutrophil      1.6 - 8.3 10e9/L   6.4   Absolute Lymphocytes      0.8 - 5.3 10e9/L   2.1   Absolute Monocytes      0.0 - 1.3 10e9/L   1.1   Absolute Eosinophils      0.0 - 0.7 10e9/L   0.1   Absolute Basophils      0.0 - 0.2 10e9/L   0.1   Abs Immature Granulocytes      0 - 0.4 10e9/L   0.0   Absolute Nucleated RBC         0.0   Sodium      133 - 144 mmol/L  136 135   Potassium      3.4 - 5.3 mmol/L  4.5 4.3   Chloride      94 - 109 mmol/L  102 100   Carbon Dioxide      20 - 32 mmol/L  28 29   Anion Gap      3 - 14 mmol/L  6 6   Glucose      70 - 99 mg/dL  98 91   Urea Nitrogen      7 - 30 mg/dL  24 21   Creatinine      0.52 - 1.04 mg/dL  0.88 0.91   GFR Estimate      >60 mL/min/1.7m2  62 59 (L)   GFR Estimate If Black      >60 mL/min/1.7m2  75 71   Calcium      8.5 - 10.1 mg/dL  9.2 9.1   Bilirubin Total      0.2 - 1.3 mg/dL   0.6   Albumin      3.4 - 5.0 g/dL   3.9   Protein Total      6.8 - 8.8 g/dL   7.8   Alkaline Phosphatase      40 - 150 U/L   73   ALT      0 - 50 U/L   19   AST      0 - 45 U/L   23   Cholesterol      <200 mg/dL 165     Triglycerides      <150 mg/dL 61     HDL Cholesterol      >49 mg/dL 76     LDL Cholesterol Calculated      <100 mg/dL 77     Non HDL Cholesterol      <130 mg/dL 89     INR      0.86 - 1.14        Component      Latest Ref Rng & Units 11/16/2017   WBC      4.0 - 11.0 10e9/L    RBC Count      3.8 - 5.2 10e12/L    Hemoglobin      11.7 - 15.7 g/dL    Hematocrit      35.0 - 47.0 %    MCV      78 - 100 fl    MCH      26.5 - 33.0 pg    MCHC      31.5 - 36.5 g/dL    RDW      10.0 - 15.0 %    Platelet Count      150 - 450 10e9/L    Diff Method          % Neutrophils      %    % Lymphocytes      %    % Monocytes      %    % Eosinophils      %    % Basophils      %    % Immature Granulocytes      %    Nucleated RBCs      0 /100    Absolute Neutrophil      1.6 - 8.3 10e9/L    Absolute Lymphocytes      0.8 - 5.3 10e9/L    Absolute Monocytes      0.0 - 1.3 10e9/L     Absolute Eosinophils      0.0 - 0.7 10e9/L    Absolute Basophils      0.0 - 0.2 10e9/L    Abs Immature Granulocytes      0 - 0.4 10e9/L    Absolute Nucleated RBC          Sodium      133 - 144 mmol/L    Potassium      3.4 - 5.3 mmol/L    Chloride      94 - 109 mmol/L    Carbon Dioxide      20 - 32 mmol/L    Anion Gap      3 - 14 mmol/L    Glucose      70 - 99 mg/dL    Urea Nitrogen      7 - 30 mg/dL    Creatinine      0.52 - 1.04 mg/dL    GFR Estimate      >60 mL/min/1.7m2    GFR Estimate If Black      >60 mL/min/1.7m2    Calcium      8.5 - 10.1 mg/dL    Bilirubin Total      0.2 - 1.3 mg/dL    Albumin      3.4 - 5.0 g/dL    Protein Total      6.8 - 8.8 g/dL    Alkaline Phosphatase      40 - 150 U/L    ALT      0 - 50 U/L    AST      0 - 45 U/L    Cholesterol      <200 mg/dL    Triglycerides      <150 mg/dL    HDL Cholesterol      >49 mg/dL    LDL Cholesterol Calculated      <100 mg/dL    Non HDL Cholesterol      <130 mg/dL    INR      0.86 - 1.14 1.7 (A)     Current Outpatient Prescriptions   Medication     diltiazem (CARDIZEM) 60 MG tablet     warfarin (COUMADIN) 5 MG tablet     simvastatin (ZOCOR) 40 MG tablet     traMADol (ULTRAM) 50 MG tablet     alendronate (FOSAMAX) 70 MG tablet     order for DME     losartan (COZAAR) 50 MG tablet     Acetaminophen (TYLENOL PO)     Calcium Citrate-Vitamin D (CALCITRATE/VITAMIN D PO)     Multiple Vitamin (MULTIVITAMINS PO)     Misc Natural Products (OSTEO BI-FLEX ADV DOUBLE ST PO)     No current facility-administered medications for this visit.

## 2017-12-19 ENCOUNTER — MEDICAL CORRESPONDENCE (OUTPATIENT)
Dept: HEALTH INFORMATION MANAGEMENT | Facility: HOSPITAL | Age: 82
End: 2017-12-19

## 2017-12-20 ENCOUNTER — OFFICE VISIT (OUTPATIENT)
Dept: CARDIOLOGY | Facility: OTHER | Age: 82
End: 2017-12-20
Attending: PHYSICIAN ASSISTANT
Payer: MEDICARE

## 2017-12-20 VITALS
HEIGHT: 65 IN | HEART RATE: 83 BPM | RESPIRATION RATE: 20 BRPM | OXYGEN SATURATION: 98 % | SYSTOLIC BLOOD PRESSURE: 128 MMHG | DIASTOLIC BLOOD PRESSURE: 82 MMHG | WEIGHT: 124 LBS | BODY MASS INDEX: 20.66 KG/M2 | TEMPERATURE: 99 F

## 2017-12-20 DIAGNOSIS — I48.20 CHRONIC ATRIAL FIBRILLATION (H): Primary | ICD-10-CM

## 2017-12-20 DIAGNOSIS — E78.5 HYPERLIPIDEMIA LDL GOAL <130: ICD-10-CM

## 2017-12-20 DIAGNOSIS — I35.0 AORTIC STENOSIS, MODERATE: ICD-10-CM

## 2017-12-20 DIAGNOSIS — I27.20 PULMONARY HYPERTENSION (H): ICD-10-CM

## 2017-12-20 DIAGNOSIS — I07.1 MODERATE TRICUSPID INSUFFICIENCY: ICD-10-CM

## 2017-12-20 DIAGNOSIS — Z79.01 LONG-TERM (CURRENT) USE OF ANTICOAGULANTS: ICD-10-CM

## 2017-12-20 DIAGNOSIS — I10 ESSENTIAL HYPERTENSION: ICD-10-CM

## 2017-12-20 PROCEDURE — 93010 ELECTROCARDIOGRAM REPORT: CPT | Performed by: INTERNAL MEDICINE

## 2017-12-20 PROCEDURE — 99204 OFFICE O/P NEW MOD 45 MIN: CPT | Performed by: INTERNAL MEDICINE

## 2017-12-20 PROCEDURE — 99213 OFFICE O/P EST LOW 20 MIN: CPT | Mod: 25

## 2017-12-20 PROCEDURE — 99203 OFFICE O/P NEW LOW 30 MIN: CPT

## 2017-12-20 PROCEDURE — 93005 ELECTROCARDIOGRAM TRACING: CPT

## 2017-12-20 RX ORDER — DILTIAZEM HYDROCHLORIDE 180 MG/1
180 CAPSULE, COATED, EXTENDED RELEASE ORAL DAILY
Qty: 30 CAPSULE | Refills: 11 | Status: SHIPPED | OUTPATIENT
Start: 2017-12-20 | End: 2018-01-03 | Stop reason: DRUGHIGH

## 2017-12-20 ASSESSMENT — PATIENT HEALTH QUESTIONNAIRE - PHQ9
5. POOR APPETITE OR OVEREATING: NOT AT ALL
SUM OF ALL RESPONSES TO PHQ QUESTIONS 1-9: 4

## 2017-12-20 ASSESSMENT — ANXIETY QUESTIONNAIRES
GAD7 TOTAL SCORE: 3
3. WORRYING TOO MUCH ABOUT DIFFERENT THINGS: SEVERAL DAYS
6. BECOMING EASILY ANNOYED OR IRRITABLE: NOT AT ALL
2. NOT BEING ABLE TO STOP OR CONTROL WORRYING: SEVERAL DAYS
5. BEING SO RESTLESS THAT IT IS HARD TO SIT STILL: NOT AT ALL
7. FEELING AFRAID AS IF SOMETHING AWFUL MIGHT HAPPEN: NOT AT ALL
1. FEELING NERVOUS, ANXIOUS, OR ON EDGE: SEVERAL DAYS

## 2017-12-20 ASSESSMENT — PAIN SCALES - GENERAL: PAINLEVEL: MILD PAIN (3)

## 2017-12-20 NOTE — MR AVS SNAPSHOT
After Visit Summary   12/20/2017    Michael Christiansen    MRN: [id number]           Patient Information     Date Of Birth          1935        Visit Information        Provider Department      12/20/2017 2:00 PM Cam Kirby, DO [facility]        Today's Diagnoses     Chronic atrial fibrillation (H)    -  1      Care Instructions    You were seen by Dr. Kirby, 12/20/2017.     1.  Please continue with Coumadin for anticoagulation.  Coumadin reduces your risk for stroke.      2. You will discontinue the Diltiazem 60 mg three times daily.     3. You will begin Diltiazem 120 mg once daily, this medication takes the place of the diltiazem 60 mg three times daily. This will be called into [facility].     4. You will have a repeat echocardiogram in 6 months to evaluate your heart valves and heart function. The hospital scheduling department will call to set this up closer to the date this test should be done.     5. Please continue all other medications as they have been prescribed.     6. Please report symptoms of chest pain, tightness, pressure, dizziness, passing out, or other symptoms which are concerning.  Severe symptoms you must go to the emergency department.     You will follow up with Dr. Kirby in 6 months.       Please call the cardiology office with problems, questions, or concerns at [phone].    If you experience chest pain, chest pressure, chest tightness, shortness of breath, fainting, lightheadedness, nausea, vomiting, or other concerning symptoms, please report to the Emergency Department or call 911. These symptoms may be emergent, and best treated in the Emergency Department.       Faby MCCORD RN-BSN  Cardiology   [facility]  [phone]              Follow-ups after your visit        Your next 10 appointments already scheduled     Dec 21, 2017 10:15 AM CST   (Arrive by 10:00 AM)   Office Visit with MELISA Murray   [facility]  [place] ([facility] )    [address]  [facility] [address]   [phone]            Dec 27, 2017 12:15 PM CST   LAB with HC LAB   [facility] ([facility] )    [address] [place] [address]   [phone]            Dec 28, 2017  1:00 PM CST   DX HIP/PELVIS/SPINE with HIDX1   HI DEXA ([facility] )    [address]  [facility] [id number] [phone]           Please do not take any of the following 24 hours prior to the day of your exam: vitamins, calcium tablets, antacids.  If possible, please wear clothes without metal (snaps, zippers). A sweatsuit works well.            Jan 03, 2018  1:00 PM CST   (Arrive by 12:45 PM)   Return Visit with Noemy Mahajan NP   [facility] )    [address]  [facility] [address]   [phone]              Who to contact     If you have questions or need follow up information about today's clinic visit or your schedule please contact [facility] directly at [phone].  Normal or non-critical lab and imaging results will be communicated to you by MyChart, letter or phone within 4 business days after the clinic has received the results. If you do not hear from us within 7 days, please contact the clinic through MyChart or phone. If you have a critical or abnormal lab result, we will notify you by phone as soon as possible.  Submit refill requests through [email or web address] or call your pharmacy and they will forward the refill request to us. Please allow 3 business days for your refill to be completed.          Additional Information About Your Visit        Care EveryWhere ID     This is your Care EveryWhere ID. This could be used by other organizations to access your [place] medical records  [id number]        Your Vitals Were     Pulse Temperature Respirations Height Pulse Oximetry BMI (Body Mass Index)    83 99  F (37.2  C) (Tympanic) 20 1.651  "m (5' 5\") 98% 20.63 kg/m2       Blood Pressure from Last 3 Encounters:   12/20/17 128/82   11/30/17 122/76   10/26/17 124/72    Weight from Last 3 Encounters:   12/20/17 56.2 kg (124 lb)   11/30/17 61.2 kg (135 lb)   10/26/17 58.5 kg (129 lb)              We Performed the Following     EKG 12-lead complete w/read - (Clinic Performed)          Today's Medication Changes          These changes are accurate as of: 12/20/17  2:39 PM.  If you have any questions, ask your nurse or doctor.               Stop taking these medicines if you haven't already. Please contact your care team if you have questions.     diltiazem 60 MG tablet   Commonly known as:  CARDIZEM   Stopped by:  Cam Kirby, DO                    Primary Care Provider Office Phone # Fax #    Chandrika MELISA Kenney [phone]       [facility] [address] [place] [address]        Equal Access to Services     [facility]: Hadii aad ku hadasho Soomaali, waaxda luqadaha, qaybta kaalmada adeegyada, waxay yokastain haymalickn leandra salvador . So [facility] [phone].    ATENCIÓN: Si habla español, tiene a shay disposición servicios gratuitos de asistencia lingüística. Gera al [phone].    We comply with applicable federal civil rights laws and [place] laws. We do not discriminate on the basis of race, color, national origin, age, disability, sex, sexual orientation, or gender identity.            Thank you!     Thank you for choosing [facility]  for your care. Our goal is always to provide you with excellent care. Hearing back from our patients is one way we can continue to improve our services. Please take a few minutes to complete the written survey that you may receive in the mail after your visit with us. Thank you!             Your Updated Medication List - Protect others around you: Learn how to safely use, store and throw away your medicines at www.disposemymeds.org.          This list is " accurate as of: 12/20/17  2:39 PM.  Always use your most recent med list.                   Brand Name Dispense Instructions for use Diagnosis    alendronate 70 MG tablet    FOSAMAX    4 tablet    Take 1 tablet (70 mg) by mouth every 7 days Take 60 minutes before am meal with 8 oz. water. Remain upright for 30 minutes.    Age related osteoporosis, unspecified pathological fracture presence       CALCITRATE/VITAMIN D PO      Take 1 tablet by mouth 2 times daily        losartan 50 MG tablet    COZAAR    30 tablet    TAKE ONE TABLET BY MOUTH ONCE DAILY    HTN (hypertension)       MULTIVITAMINS PO      Take 1 tablet by mouth daily        order for DME     1 each    4 wheeled walker    Chronic left-sided low back pain with left-sided sciatica       OSTEO BI-FLEX ADV DOUBLE ST PO      Take  by mouth daily.        simvastatin 40 MG tablet    ZOCOR    45 tablet    TAKE ONE-HALF TABLET BY MOUTH ONCE DAILY IN THE EVENING    Hyperlipidemia LDL goal <100       traMADol 50 MG tablet    ULTRAM    180 tablet    TAKE ONE TO TWO TABLETS BY MOUTH EVERY 6 HOURS AS NEEDED FOR PAIN    Chronic pain syndrome       TYLENOL PO      Take 500 mg by mouth        warfarin 5 MG tablet    COUMADIN    90 tablet    TAKE ONE TABLET BY MOUTH MONDAY,WEDNESDAY, AND FRIDAY. TAKE ONE-HALF TABLET THE REST OF THE WEEK.    Atrial fibrillation (H), Long term current use of anticoagulant therapy

## 2017-12-20 NOTE — NURSING NOTE
"Chief Complaint   Patient presents with     Consult     Referral MELISA Dye.  Patient states she went to Dr. Borrego at [facility]'s years ago.       Initial /82 (BP Location: Left arm, Patient Position: Chair, Cuff Size: Adult Regular)  Pulse 83  Temp 99  F (37.2  C) (Tympanic)  Resp 20  Ht 1.651 m (5' 5\")  Wt 56.2 kg (124 lb)  SpO2 98%  BMI 20.63 kg/m2 Estimated body mass index is 20.63 kg/(m^2) as calculated from the following:    Height as of this encounter: 1.651 m (5' 5\").    Weight as of this encounter: 56.2 kg (124 lb).  Medication Reconciliation: complete   Margareth Glass      "

## 2017-12-20 NOTE — PROGRESS NOTES
[facility]   CARDIOLOGY CONSULT     Michael Christiansen     Chandrika Kumar     Chief Complaint   Patient presents with     Consult     Referral MELISA Dye.  Patient states she went to Dr. Borrego at [facility] years ago.        HPI:   Mrs. Christiansen is an 82-year-old female who is being seen by cardiology largely to establish care. She has a history of persistent atrial fibrillation, pulmonary hypertension, moderate tricuspid regurgitation, moderate aortic stenosis, hypertension, and hyperlipidemia.    She has previously been seen by Dr. Moreira in cardiology through [facility] on 7/10/12.  She was diagnosed with a fibrillation at that time. She's been on diltiazem 60 mg 3 times a day and Coumadin.  She denies symptoms form atrial fibrillation such as a fluttering in her chest, palpitations, irregular heart rate, fatigue, or shortness of breath. She is largely unaware she has atrial fibrillation. She's been on Coumadin since diagnosis. She has not been cardioverted or been on antiarrhythmics in the past. Her echo from 12/4/17 shows mild left atrial enlargement. She does have moderate aortic stenosis. Her heart rate has been running in the high side of normal.  The heart rate today is 83 with a blood pressure of 128/82.    She also is known to have moderate aortic stenosis. She denies dizziness, lightheadedness, edema, chest pain, near-syncope, or syncope.    She was noted to have an RVSP of 50 on her echo from 12/4/17. However, as noted, she does not have any symptoms of pulmonary hypertension, presently.    She has hypertension and hyperlipidemia.  She's currently on losartan 50 mg daily, diltiazem 60 mg, 3 times a day, and Zocor.  She has no complaints today.    IMAGING RESULTS:   ECHO completed on 12/4/17.  Interpretation Summary  No pericardial effusion is present.  Left ventricular function is normal.The EF is 60-65%.  Mild right ventricular dilation is present.  Moderate tricuspid insufficiency is  present.  Right ventricular systolic pressure is 50 mmHg above the right atrial pressure.  Moderate (pulmonary artery systolic pressure 50-75 mmHg) pulmonary hypertension  is present.  Mild left atrial enlargement is present.  Mild right atrial enlargement is present.  Mild mitral insufficiency is present.  Moderate to severe aortic valve sclerosis is present.  The peak aortic velocity is 3.17 m/sec.  Moderate aortic stenosis is present.  The mean gradient across the aortic valve is20 mmHg.  The aortic valve area is .86 cm^2, by the continuity equation.  Mild pulmonic insufficiency is present.      PAST MEDICAL HISTORY:   Past Medical History:   Diagnosis Date     Arthritis      Backache, unspecified 1/1/2011     Breast cancer, stage 1 3/19/2010     Chronic pain syndrome 1/23/2015     Claustrophobia 1/1/2011     Hip joint replacement by other means 1/1/2011     Hypertension      Osteoarthrosis, unspecified whether generalized or localized, lower leg 8/14/2006     Other and unspecified hyperlipidemia 10/11/2007     Posttraumatic stress disorder 1/1/2011     Seasonal allergies 1/1/2011     Shortness of breath 6/18/2007     Unspecified essential hypertension 8/30/2006          FAMILY HISTORY:   Family History   Problem Relation Age of Onset     DIABETES Father 75     cause of death     Other - See Comments Father      PVD     HEART DISEASE Sister      Other - See Comments Mother 83     old age; cause of death     Other - See Comments Sister      14 year [employer]; cause of death     Chronic Obstructive Pulmonary Disease Daughter      OSTEOPOROSIS Daughter      CANCER Sister           PAST SURGICAL HISTORY:   Past Surgical History:   Procedure Laterality Date     BREAST SURGERY      right  side mastectomy     C TOTAL KNEE ARTHROPLASTY Left 01/26/15     cataract extraction and lens implantation       COLONOSCOPY       EYE SURGERY       GYN SURGERY      tubal pregnancy     ORTHOPEDIC SURGERY  2009    l.  hip replacement LT     ORTHOPEDIC SURGERY  july 19th 2013    Right total knee     TUBAL/ECTOPIC PREGNANCY            SOCIAL HISTORY:   Social History     Social History     Marital status:      Spouse name: N/A     Number of children: N/A     Years of education: N/A     Social History Main Topics     Smoking status: Former Smoker     Packs/day: 0.30     Years: 30.00     Types: Cigarettes     Quit date: 8/11/1987     Smokeless tobacco: Never Used      Comment: year quit 1987     Alcohol use No     Drug use: No     Sexual activity: Not Asked     Other Topics Concern     Parent/Sibling W/ Cabg, Mi Or Angioplasty Before 65f 55m? No     Blood Transfusions Yes     Social History Narrative          CURRENT MEDICATIONS:   Current Outpatient Prescriptions   Medication     warfarin (COUMADIN) 5 MG tablet     simvastatin (ZOCOR) 40 MG tablet     traMADol (ULTRAM) 50 MG tablet     alendronate (FOSAMAX) 70 MG tablet     order for DME     losartan (COZAAR) 50 MG tablet     Acetaminophen (TYLENOL PO)     Calcium Citrate-Vitamin D (CALCITRATE/VITAMIN D PO)     Multiple Vitamin (MULTIVITAMINS PO)     Misc Natural Products (OSTEO BI-FLEX ADV DOUBLE ST PO)     No current facility-administered medications for this visit.           ALLERGIES:   Allergies   Allergen Reactions     Atenolol Shortness Of Breath and Other (See Comments)     Dizziness  Severe vertigo and dizziness/SOB     Lisinopril Cough     Pollen Extract      Other reaction(s): Runny Nose          ROS:   CONSTITUTIONAL: No weight loss, fever, chills, weakness or fatigue.   HEENT: Eyes: No visual changes. Ears, Nose, Throat: No hearing loss, congestion or difficulty swallowing.   CARDIOVASCULAR: No chest pain, chest pressure or chest discomfort. No palpitations or lower extremity edema.   RESPIRATORY: No shortness of breath, dyspnea upon exertion, cough or sputum production.   GASTROINTESTINAL: No abdominal pain. No anorexia, nausea, vomiting or diarrhea.    NEUROLOGICAL: No headache, lightheadedness, dizziness, syncope, ataxia or weakness.   HEMATOLOGIC: No anemia, bleeding or bruising.   PSYCHIATRIC: No history of depression or anxiety.   ENDOCRINOLOGIC: No reports of sweating, cold or heat intolerance. No polyuria or polydipsia.   SKIN: No abnormal rashes or itching.       PHYSICAL EXAM:   GENERAL: The patient is a well-developed, well-nourished, in no apparent distress. Alert and oriented x3.   HEENT: Head is normocephalic and atraumatic. Eyes are symmetrical with normal visual tracking.   HEART: Irregular rate and irregular rhythm, S1S2 present with a systolic murmur, but no rub or gallop.   LUNGS: Respirations regular and unlabored. Clear to auscultation.   GI: Abdomen is soft and nondistended.   EXTREMITIES: No peripheral edema present.   MUSCULOSKELETAL: No joint swelling.   NEUROLOGIC: Alert and oriented X3.   SKIN: No jaundice.       LAB RESULTS:   Anticoagulation Therapy Visit on 11/16/2017   Component Date Value Ref Range Status     INR Protime 11/16/2017 1.7* 0.86 - 1.14 Final   Anticoagulation Therapy Visit on 11/09/2017   Component Date Value Ref Range Status     INR Protime 11/09/2017 1.0  0.86 - 1.14 Final   Anticoagulation Therapy Visit on 11/03/2017   Component Date Value Ref Range Status     INR Protime 11/03/2017 1.8* 0.86 - 1.14 Final   Office Visit on 10/26/2017   Component Date Value Ref Range Status     Color Urine 10/26/2017 Light Yellow   Final     Appearance Urine 10/26/2017 Slightly Cloudy   Final     Glucose Urine 10/26/2017 Negative  NEG^Negative mg/dL Final     Bilirubin Urine 10/26/2017 Negative  NEG^Negative Final     Ketones Urine 10/26/2017 Negative  NEG^Negative mg/dL Final     Specific Gravity Urine 10/26/2017 1.006  1.003 - 1.035 Final     Blood Urine 10/26/2017 Small* NEG^Negative Final     pH Urine 10/26/2017 7.0  4.7 - 8.0 pH Final     Protein Albumin Urine 10/26/2017 Negative  NEG^Negative mg/dL Final     Urobilinogen mg/dL  10/26/2017 Normal  0.0 - 2.0 mg/dL Final     Nitrite Urine 10/26/2017 Positive* NEG^Negative Final     Leukocyte Esterase Urine 10/26/2017 Large* NEG^Negative Final     Source 10/26/2017 Midstream Urine   Final     RBC Urine 10/26/2017 4* 0 - 2 /HPF Final     WBC Urine 10/26/2017 >182* 0 - 2 /HPF Final     WBC Clumps 10/26/2017 Present* NEG^Negative /HPF Final     Bacteria Urine 10/26/2017 Few* NEG^Negative /HPF Final     Specimen Description 10/26/2017 Midstream Urine   Final     Culture Micro 10/26/2017 *  Final                    Value:>100,000 colonies/mL  Escherichia coli     Anticoagulation Therapy Visit on 10/26/2017   Component Date Value Ref Range Status     INR Protime 10/26/2017 2.8* 0.86 - 1.14 Final     INR Protime 10/26/2017 2.8* 0.86 - 1.14 Final   Anticoagulation Therapy Visit on 10/09/2017   Component Date Value Ref Range Status     INR Protime 10/09/2017 3.7* 0.86 - 1.14 Final   Anticoagulation Therapy Visit on 09/11/2017   Component Date Value Ref Range Status     INR Protime 09/11/2017 3.2* 0.86 - 1.14 Final   Anticoagulation Therapy Visit on 09/06/2017   Component Date Value Ref Range Status     INR Protime 09/06/2017 1.1  0.86 - 1.14 Final   Anticoagulation Therapy Visit on 07/26/2017   Component Date Value Ref Range Status     INR Protime 07/26/2017 2.5* 0.86 - 1.14 Final   Orders Only on 07/20/2017   Component Date Value Ref Range Status     CA 27-29 07/20/2017 40* 0 - 39 U/mL Final     WBC 07/20/2017 9.8  4.0 - 11.0 10e9/L Final     RBC Count 07/20/2017 4.11  3.8 - 5.2 10e12/L Final     Hemoglobin 07/20/2017 13.4  11.7 - 15.7 g/dL Final     Hematocrit 07/20/2017 38.3  35.0 - 47.0 % Final     MCV 07/20/2017 93  78 - 100 fl Final     MCH 07/20/2017 32.6  26.5 - 33.0 pg Final     MCHC 07/20/2017 35.0  31.5 - 36.5 g/dL Final     RDW 07/20/2017 13.0  10.0 - 15.0 % Final     Platelet Count 07/20/2017 329  150 - 450 10e9/L Final     Diff Method 07/20/2017 Automated Method   Final     % Neutrophils  07/20/2017 65.5  % Final     % Lymphocytes 07/20/2017 21.6  % Final     % Monocytes 07/20/2017 10.7  % Final     % Eosinophils 07/20/2017 1.1  % Final     % Basophils 07/20/2017 0.8  % Final     % Immature Granulocytes 07/20/2017 0.3  % Final     Nucleated RBCs 07/20/2017 0  0 /100 Final     Absolute Neutrophil 07/20/2017 6.4  1.6 - 8.3 10e9/L Final     Absolute Lymphocytes 07/20/2017 2.1  0.8 - 5.3 10e9/L Final     Absolute Monocytes 07/20/2017 1.1  0.0 - 1.3 10e9/L Final     Absolute Eosinophils 07/20/2017 0.1  0.0 - 0.7 10e9/L Final     Absolute Basophils 07/20/2017 0.1  0.0 - 0.2 10e9/L Final     Abs Immature Granulocytes 07/20/2017 0.0  0 - 0.4 10e9/L Final     Absolute Nucleated RBC 07/20/2017 0.0   Final     Sodium 07/20/2017 135  133 - 144 mmol/L Final     Potassium 07/20/2017 4.3  3.4 - 5.3 mmol/L Final     Chloride 07/20/2017 100  94 - 109 mmol/L Final     Carbon Dioxide 07/20/2017 29  20 - 32 mmol/L Final     Anion Gap 07/20/2017 6  3 - 14 mmol/L Final     Glucose 07/20/2017 91  70 - 99 mg/dL Final     Urea Nitrogen 07/20/2017 21  7 - 30 mg/dL Final     Creatinine 07/20/2017 0.91  0.52 - 1.04 mg/dL Final     GFR Estimate 07/20/2017 59* >60 mL/min/1.7m2 Final     GFR Estimate If Black 07/20/2017 71  >60 mL/min/1.7m2 Final     Calcium 07/20/2017 9.1  8.5 - 10.1 mg/dL Final     Bilirubin Total 07/20/2017 0.6  0.2 - 1.3 mg/dL Final     Albumin 07/20/2017 3.9  3.4 - 5.0 g/dL Final     Protein Total 07/20/2017 7.8  6.8 - 8.8 g/dL Final     Alkaline Phosphatase 07/20/2017 73  40 - 150 U/L Final     ALT 07/20/2017 19  0 - 50 U/L Final     AST 07/20/2017 23  0 - 45 U/L Final     Lactate Dehydrogenase 07/20/2017 190  81 - 234 U/L Final          ASSESSMENT:   Mrs. Christiansen is an 82-year-old female who is being seen by cardiology largely to establish care.     Impression:  1.  Persistent atrial fibrillation.  2.  Pulmonary hypertension.  3.  Moderate tricuspid regurgitation.  4.  Moderate aortic stenosis.  5.   Hypertension.  6.  Hyperlipidemia.    PLAN:   1.  Cont coumadin for stroke prevention.   2.  Will change diltiazem from 60 mg TID to diltiazem 180 mg daily.  3.  She will have a repeat echo in 6 months to F/U on her aortic stenosis/pulmonary hypertension.   4.  She will be seen in 6 months F/U.        Thank you for allowing me to participate in the care of your patient. Please do not hesitate to contact me if you have any questions.     Cam Kirby

## 2017-12-20 NOTE — PATIENT INSTRUCTIONS
You were seen by Dr. Kirby, 12/20/2017.     1.  Please continue with Coumadin for anticoagulation.  Coumadin reduces your risk for stroke.      2. You will discontinue the Diltiazem 60 mg three times daily.     3. You will begin Diltiazem 180 mg once daily, this medication takes the place of the diltiazem 60 mg three times daily. This will be called into [facility].     4. You will have a repeat echocardiogram in 6 months to evaluate your heart valves and heart function. The hospital scheduling department will call to set this up closer to the date this test should be done.     5. Please continue all other medications as they have been prescribed.     6. Please report symptoms of chest pain, tightness, pressure, dizziness, passing out, or other symptoms which are concerning.  Severe symptoms you must go to the emergency department.     You will follow up with Dr. Kirby in 6 months.       Please call the cardiology office with problems, questions, or concerns at [phone].    If you experience chest pain, chest pressure, chest tightness, shortness of breath, fainting, lightheadedness, nausea, vomiting, or other concerning symptoms, please report to the Emergency Department or call 911. These symptoms may be emergent, and best treated in the Emergency Department.       Faby MCCORD RN-BSN  Cardiology   [facility]  [phone]

## 2017-12-21 ENCOUNTER — OFFICE VISIT (OUTPATIENT)
Dept: FAMILY MEDICINE | Facility: OTHER | Age: 82
End: 2017-12-21
Attending: PHYSICIAN ASSISTANT
Payer: MEDICARE

## 2017-12-21 ENCOUNTER — TELEPHONE (OUTPATIENT)
Dept: CARDIOLOGY | Facility: OTHER | Age: 82
End: 2017-12-21

## 2017-12-21 ENCOUNTER — OFFICE VISIT (OUTPATIENT)
Dept: BEHAVIORAL HEALTH | Facility: OTHER | Age: 82
End: 2017-12-21
Attending: SOCIAL WORKER
Payer: MEDICARE

## 2017-12-21 VITALS
SYSTOLIC BLOOD PRESSURE: 122 MMHG | TEMPERATURE: 98.4 F | HEART RATE: 78 BPM | OXYGEN SATURATION: 95 % | DIASTOLIC BLOOD PRESSURE: 68 MMHG | BODY MASS INDEX: 20.14 KG/M2 | WEIGHT: 121 LBS

## 2017-12-21 DIAGNOSIS — R69 DIAGNOSIS DEFERRED: Primary | ICD-10-CM

## 2017-12-21 DIAGNOSIS — M81.0 OSTEOPOROSIS, UNSPECIFIED OSTEOPOROSIS TYPE, UNSPECIFIED PATHOLOGICAL FRACTURE PRESENCE: Primary | ICD-10-CM

## 2017-12-21 DIAGNOSIS — I38 VALVULAR HEART DISEASE: ICD-10-CM

## 2017-12-21 DIAGNOSIS — G89.4 CHRONIC PAIN SYNDROME: ICD-10-CM

## 2017-12-21 PROCEDURE — 99212 OFFICE O/P EST SF 10 MIN: CPT

## 2017-12-21 PROCEDURE — 99214 OFFICE O/P EST MOD 30 MIN: CPT | Performed by: PHYSICIAN ASSISTANT

## 2017-12-21 RX ORDER — TRAMADOL HYDROCHLORIDE 50 MG/1
TABLET ORAL
Qty: 180 TABLET | Refills: 0 | Status: SHIPPED | OUTPATIENT
Start: 2017-12-21 | End: 2018-02-19

## 2017-12-21 ASSESSMENT — ANXIETY QUESTIONNAIRES: GAD7 TOTAL SCORE: 3

## 2017-12-21 ASSESSMENT — PAIN SCALES - GENERAL: PAINLEVEL: MILD PAIN (3)

## 2017-12-21 NOTE — NURSING NOTE
"Chief Complaint   Patient presents with     Osteoporosis     6 month follow up        Initial /68 (BP Location: Left arm, Patient Position: Chair, Cuff Size: Adult Regular)  Pulse 78  Temp 98.4  F (36.9  C) (Tympanic)  Wt 121 lb (54.9 kg)  SpO2 95%  Breastfeeding? No  BMI 20.14 kg/m2 Estimated body mass index is 20.14 kg/(m^2) as calculated from the following:    Height as of 12/20/17: 5' 5\" (1.651 m).    Weight as of this encounter: 121 lb (54.9 kg).  Medication Reconciliation: complete   Ayana John CMA(AAMA)   "

## 2017-12-21 NOTE — MR AVS SNAPSHOT
After Visit Summary   12/21/2017    Michael Christiansen    MRN: [id number]           Patient Information     Date Of Birth          1935        Visit Information        Provider Department      12/21/2017 10:15 AM Chandrika Kumar PA [facility]        Today's Diagnoses     Osteoporosis, unspecified osteoporosis type, unspecified pathological fracture presence    -  1    Valvular heart disease        Chronic pain syndrome          Care Instructions      Thank you for choosing [facility].   I have office hours 8:00 am to 4:30 pm on Monday's, Wednesday's, Thursday's and Friday's. My nurse and I are out of the office every Tuesday.    Following your visit, when your labs and diagnostic testing have returned, I will review then and you will be contacted by my nurse.  If you are on My Chart, you can also view results there.    For refills, notify your pharmacy regarding what you need and the pharmacy will generate a refill request. Do not call my nurse as she is unable to process refill request. Please plan ahead and allow 3-5 days for refill requests.    You will generally receive a reminder call the day prior to your appointment.  If you cannot attend your appointment, please cancel your appointment with as much notice as possible.  If there is a pattern of failure to present for your appointments, I cannot provide consistent, meaningful, ongoing care for you. It is very important to me that you come in for your care, so we can best assist you with your health care needs.    IMPORTANT:  Please note that it is my standard of practice to NOT participate in prescribing ongoing requested Narcotic Analgesic therapy, and/or participate in the prescribing of other controlled substances.  My nurse and I am happy to assist you with the process of referral for alternative pain management as needed, and other treatment modalities including but not limited to:  Physical Therapy, Physical  Medicine and Rehab, Counseling, Chiropractic Care, Orthopedic Care, and non-narcotic medication management.     In the event that you need to be seen for emergent concerns and I am out of office,  please see one of my colleagues for acute concerns.  You may also present to UC or ER.  I appreciate the opportunity to serve you and look forward to supporting your healthcare needs in the future. Please contact me with any questions or concerns that you may have.    Sincerely,      Chandrika Kumar RN, PA-C               Follow-ups after your visit        Your next 10 appointments already scheduled     Dec 27, 2017 12:15 PM CST   LAB with HC LAB   [facility] [place] ([facility] )    [address]  [place] [address]   [phone]            Dec 28, 2017  1:00 PM CST   DX HIP/PELVIS/SPINE with HIDX1   HI DEXA ([facility] )    [address]  [facility] [id number] [phone]           Please do not take any of the following 24 hours prior to the day of your exam: vitamins, calcium tablets, antacids.  If possible, please wear clothes without metal (snaps, zippers). A sweatsuit works well.            Jan 03, 2018  1:00 PM CST   (Arrive by 12:45 PM)   Return Visit with Noemy Mahajan NP   [facility] ([facility] )    [address]  [place] [address]   [phone]            Jun 20, 2018  9:30 AM CDT   (Arrive by 9:15 AM)   Return Visit with Cam Kirby,    [facility] ([facility] )    [address]  [facility] [address]   [phone]              Future tests that were ordered for you today     Open Future Orders        Priority Expected Expires Ordered    Echocardiogram Complete Routine 6/1/2018 12/20/2018 12/20/2017            Who to contact     If you have questions or need follow up information about today's clinic visit or your schedule please contact [facility] directly at  [phone].  Normal or non-critical lab and imaging results will be communicated to you by MyChart, letter or phone within 4 business days after the clinic has received the results. If you do not hear from us within 7 days, please contact the clinic through MyChart or phone. If you have a critical or abnormal lab result, we will notify you by phone as soon as possible.  Submit refill requests through Clipcopiat or call your pharmacy and they will forward the refill request to us. Please allow 3 business days for your refill to be completed.          Additional Information About Your Visit        Care EveryWhere ID     This is your Care EveryWhere ID. This could be used by other organizations to access your [place] medical records  [id number]        Your Vitals Were     Pulse Temperature Pulse Oximetry Breastfeeding? BMI (Body Mass Index)       78 98.4  F (36.9  C) (Tympanic) 95% No 20.14 kg/m2        Blood Pressure from Last 3 Encounters:   12/21/17 122/68   12/20/17 128/82   11/30/17 122/76    Weight from Last 3 Encounters:   12/21/17 121 lb (54.9 kg)   12/20/17 124 lb (56.2 kg)   11/30/17 135 lb (61.2 kg)              We Performed the Following     Comprehensive metabolic panel (BMP + Alb, Alk Phos, ALT, AST, Total. Bili, TP)          Where to get your medicines      Some of these will need a paper prescription and others can be bought over the counter.  Ask your nurse if you have questions.     Bring a paper prescription for each of these medications     traMADol 50 MG tablet          Primary Care Provider Office Phone # Fax #    MELISA Murray [phone]       [facility] [address] [facility] [address]  [facility] [address]        Equal Access to Services     STACEY CONSTANTINO AH: Hadii anjelica Hampton, waaxda luqadaha, qaybta kaalmada leandrayada, topher finley. So [facility] [phone].    ATENCIÓN: Si habla español, tiene a shay disposición servicios gratuitos de  asistencia lingüística. Gera al [phone].    We comply with applicable federal civil rights laws and [place] laws. We do not discriminate on the basis of race, color, national origin, age, disability, sex, sexual orientation, or gender identity.            Thank you!     Thank you for choosing [facility]  for your care. Our goal is always to provide you with excellent care. Hearing back from our patients is one way we can continue to improve our services. Please take a few minutes to complete the written survey that you may receive in the mail after your visit with us. Thank you!             Your Updated Medication List - Protect others around you: Learn how to safely use, store and throw away your medicines at www.disposemymeds.org.          This list is accurate as of: 12/21/17 10:34 AM.  Always use your most recent med list.                   Brand Name Dispense Instructions for use Diagnosis    alendronate 70 MG tablet    FOSAMAX    4 tablet    Take 1 tablet (70 mg) by mouth every 7 days Take 60 minutes before am meal with 8 oz. water. Remain upright for 30 minutes.    Age related osteoporosis, unspecified pathological fracture presence       CALCITRATE/VITAMIN D PO      Take 1 tablet by mouth 2 times daily        diltiazem 180 MG 24 hr capsule    CARDIZEM CD    30 capsule    Take 1 capsule (180 mg) by mouth daily    Chronic atrial fibrillation (H), Essential hypertension       losartan 50 MG tablet    COZAAR    30 tablet    TAKE ONE TABLET BY MOUTH ONCE DAILY    HTN (hypertension)       MULTIVITAMINS PO      Take 1 tablet by mouth daily        order for DME     1 each    4 wheeled walker    Chronic left-sided low back pain with left-sided sciatica       OSTEO BI-FLEX ADV DOUBLE ST PO      Take  by mouth daily.        simvastatin 40 MG tablet    ZOCOR    45 tablet    TAKE ONE-HALF TABLET BY MOUTH ONCE DAILY IN THE EVENING    Hyperlipidemia LDL goal <100       traMADol 50 MG tablet    ULTRAM     180 tablet    TAKE ONE TO TWO TABLETS BY MOUTH EVERY 6 HOURS AS NEEDED FOR PAIN    Chronic pain syndrome       TYLENOL PO      Take 500 mg by mouth every 4 hours as needed        warfarin 5 MG tablet    COUMADIN    90 tablet    TAKE ONE TABLET BY MOUTH MONDAY,WEDNESDAY, AND FRIDAY. TAKE ONE-HALF TABLET THE REST OF THE WEEK.    Atrial fibrillation (H), Long term current use of anticoagulant therapy

## 2017-12-21 NOTE — PROGRESS NOTES
SUBJECTIVE:   Michael Christiansen is a 82 year old female who presents to clinic today for the following health issues:        Osteoporosis       Duration: 6 month follow up     Description (location/character/radiation): patient here for follow up on left leg. Patient states 3/10 pain in the leg with swelling at times     Intensity:  3/10    Accompanying signs and symptoms: leg pain and swelling     History (similar episodes/previous evaluation): osteoporosis     Precipitating or alleviating factors: None    Therapies tried and outcome: fosamax and tramadol for pain              Problem list and histories reviewed & adjusted, as indicated.  Additional history: as documented    Patient Active Problem List   Diagnosis     Hyperlipidemia LDL goal <130     HTN (hypertension)     Osteoarthritis     Chronic atrial fibrillation (H)     Chronic pain syndrome     History of total knee replacement     Radiculitis     Osteoarthritis of knee     Long-term (current) use of anticoagulants [Z79.01]     ACP (advance care planning)     Osteoporosis     Malignant neoplasm of right breast in female, estrogen receptor positive, unspecified site of breast (H)     Pulmonary hypertension     Aortic stenosis, moderate     Moderate tricuspid insufficiency     Past Surgical History:   Procedure Laterality Date     BREAST SURGERY      right  side mastectomy     C TOTAL KNEE ARTHROPLASTY Left 01/26/15     cataract extraction and lens implantation       COLONOSCOPY       EYE SURGERY       GYN SURGERY      tubal pregnancy     ORTHOPEDIC SURGERY  2009    l. hip replacement LT     ORTHOPEDIC SURGERY  july 19th 2013    Right total knee     TUBAL/ECTOPIC PREGNANCY         Social History   Substance Use Topics     Smoking status: Former Smoker     Packs/day: 0.30     Years: 30.00     Types: Cigarettes     Quit date: 8/11/1987     Smokeless tobacco: Never Used      Comment: year quit 1987     Alcohol use No     Family History   Problem Relation Age of  Onset     DIABETES Father 75     cause of death     Other - See Comments Father      PVD     HEART DISEASE Sister      Other - See Comments Mother 83     old age; cause of death     Other - See Comments Sister      14 year twin [employer]; cause of death     Chronic Obstructive Pulmonary Disease Daughter      OSTEOPOROSIS Daughter      CANCER Sister          Current Outpatient Prescriptions   Medication Sig Dispense Refill     diltiazem 180 MG 24 hr capsule Take 1 capsule (180 mg) by mouth daily 30 capsule 11     warfarin (COUMADIN) 5 MG tablet TAKE ONE TABLET BY MOUTH MONDAY,WEDNESDAY, AND FRIDAY. TAKE ONE-HALF TABLET THE REST OF THE WEEK. 90 tablet 0     simvastatin (ZOCOR) 40 MG tablet TAKE ONE-HALF TABLET BY MOUTH ONCE DAILY IN THE EVENING 45 tablet 1     traMADol (ULTRAM) 50 MG tablet TAKE ONE TO TWO TABLETS BY MOUTH EVERY 6 HOURS AS NEEDED FOR PAIN 180 tablet 0     alendronate (FOSAMAX) 70 MG tablet Take 1 tablet (70 mg) by mouth every 7 days Take 60 minutes before am meal with 8 oz. water. Remain upright for 30 minutes. 4 tablet 11     order for [facility] 4 wheeled walker 1 each 0     losartan (COZAAR) 50 MG tablet TAKE ONE TABLET BY MOUTH ONCE DAILY 30 tablet 11     Acetaminophen (TYLENOL PO) Take 500 mg by mouth every 4 hours as needed        Calcium Citrate-Vitamin D (CALCITRATE/VITAMIN D PO) Take 1 tablet by mouth 2 times daily        Multiple Vitamin (MULTIVITAMINS PO) Take 1 tablet by mouth daily        Misc Natural Products (OSTEO BI-FLEX ADV DOUBLE ST PO) Take  by mouth daily.       Allergies   Allergen Reactions     Atenolol Shortness Of Breath and Other (See Comments)     Dizziness  Severe vertigo and dizziness/SOB     Lisinopril Cough     Pollen Extract      Other reaction(s): Runny Nose     BP Readings from Last 3 Encounters:   12/21/17 122/68   12/20/17 128/82   11/30/17 122/76    Wt Readings from Last 3 Encounters:   12/21/17 121 lb (54.9 kg)   12/20/17 124 lb (56.2 kg)   11/30/17 135 lb  (61.2 kg)                        Reviewed and updated as needed this visit by clinical staffTobacco  Allergies  Meds       Reviewed and updated as needed this visit by Provider         ROS:  Constitutional, neuro, ENT, endocrine, pulmonary, cardiac, gastrointestinal, genitourinary, musculoskeletal, integument and psychiatric systems are negative, except as otherwise noted.      OBJECTIVE:                                                    /68 (BP Location: Left arm, Patient Position: Chair, Cuff Size: Adult Regular)  Pulse 78  Temp 98.4  F (36.9  C) (Tympanic)  Wt 121 lb (54.9 kg)  SpO2 95%  Breastfeeding? No  BMI 20.14 kg/m2  Body mass index is 20.14 kg/(m^2).  GENERAL APPEARANCE: healthy, alert and no distress  EYES: Eyes grossly normal to inspection, PERRL and conjunctivae and sclerae normal  HENT: ear canals and TM's normal and nose and mouth without ulcers or lesions  NECK: no adenopathy, no asymmetry, masses, or scars and thyroid normal to palpation  RESP: lungs clear to auscultation - no rales, rhonchi or wheezes  CV: regular rates and rhythm, normal S1 S2, no S3 or S4 and no murmur, click or rub  LYMPHATICS: normal ant/post cervical and supraclavicular nodes  ABDOMEN: soft, nontender, without hepatosplenomegaly or masses and bowel sounds normal  MS: extremities normal- no gross deformities noted  SKIN: no suspicious lesions or rashes  NEURO: Normal strength and tone, mentation intact and speech normal  PSYCH: mentation appears normal and affect normal/bright    Diagnostic test results:  Diagnostic Test Results:  none        ASSESSMENT/PLAN:                                                    1. Osteoporosis, unspecified osteoporosis type, unspecified pathological fracture presence  She is doing much better on Fosamax weekly.  Some constipation initially and we are going to check her labs today.  She is good with calcium intake. See us back in 6 months.   - Comprehensive metabolic panel (BMP +  Alb, Alk Phos, ALT, AST, Total. Bili, TP)      2. Valvular heart disease  Mild mix up on her heart medication from yesterday.  Was told to take 120 on Diltiazem and note from Cardiology says 180. We will let them know and check.       3. Chronic pain syndrome  Uses very sparingly   - traMADol (ULTRAM) 50 MG tablet; TAKE ONE TO TWO TABLETS BY MOUTH EVERY 6 HOURS AS NEEDED FOR PAIN  Dispense: 180 tablet; Refill: 0      See Patient Instructions    MELISA Gillespie  [facility]

## 2017-12-21 NOTE — PATIENT INSTRUCTIONS
Thank you for choosing [facility].   I have office hours 8:00 am to 4:30 pm on Monday's, Wednesday's, Thursday's and Friday's. My nurse and I are out of the office every Tuesday.    Following your visit, when your labs and diagnostic testing have returned, I will review then and you will be contacted by my nurse.  If you are on My Chart, you can also view results there.    For refills, notify your pharmacy regarding what you need and the pharmacy will generate a refill request. Do not call my nurse as she is unable to process refill request. Please plan ahead and allow 3-5 days for refill requests.    You will generally receive a reminder call the day prior to your appointment.  If you cannot attend your appointment, please cancel your appointment with as much notice as possible.  If there is a pattern of failure to present for your appointments, I cannot provide consistent, meaningful, ongoing care for you. It is very important to me that you come in for your care, so we can best assist you with your health care needs.    IMPORTANT:  Please note that it is my standard of practice to NOT participate in prescribing ongoing requested Narcotic Analgesic therapy, and/or participate in the prescribing of other controlled substances.  My nurse and I am happy to assist you with the process of referral for alternative pain management as needed, and other treatment modalities including but not limited to:  Physical Therapy, Physical Medicine and Rehab, Counseling, Chiropractic Care, Orthopedic Care, and non-narcotic medication management.     In the event that you need to be seen for emergent concerns and I am out of office,  please see one of my colleagues for acute concerns.  You may also present to  or ER.  I appreciate the opportunity to serve you and look forward to supporting your healthcare needs in the future. Please contact me with any questions or concerns that you may  have.    Sincerely,      Chandrika Kumar RN, PA-C

## 2017-12-21 NOTE — MR AVS SNAPSHOT
After Visit Summary   12/21/2017    Michael Christiansen    MRN: [id number]           Patient Information     Date Of Birth          1935        Visit Information        Provider Department      12/21/2017 10:00 AM Anna Fuller LSW [facility]        Today's Diagnoses     Diagnosis deferred    -  1       Follow-ups after your visit        Your next 10 appointments already scheduled     Dec 27, 2017 12:15 PM CST   LAB with HC LAB   [facility] ([facility] )    [address]  [place] [address]   [phone]            Dec 28, 2017  1:00 PM CST   DX HIP/PELVIS/SPINE with HIDX1   HI DEXA ([facility] )    [address]  [place] [id number] [phone]           Please do not take any of the following 24 hours prior to the day of your exam: vitamins, calcium tablets, antacids.  If possible, please wear clothes without metal (snaps, zippers). A sweatsuit works well.            Jan 03, 2018  1:00 PM CST   (Arrive by 12:45 PM)   Return Visit with Noemy Mahajan NP   [facility] ([facility] [place] )    [address]  [place] [address]   [phone]            Jun 20, 2018  9:30 AM CDT   (Arrive by 9:15 AM)   Return Visit with Cam Kirby,    [facility] ([facility] )    [address]  [place] [address]   [phone]              Future tests that were ordered for you today     Open Future Orders        Priority Expected Expires Ordered    Comprehensive metabolic panel Routine  12/21/2018 12/21/2017    Echocardiogram Complete Routine 6/1/2018 12/20/2018 12/20/2017            Who to contact     If you have questions or need follow up information about today's clinic visit or your schedule please contact [facility] directly at [phone].  Normal or non-critical lab and imaging results will be communicated to you by MyChart, letter or phone  within 4 business days after the clinic has received the results. If you do not hear from us within 7 days, please contact the clinic through Arc[employer]rhart or phone. If you have a critical or abnormal lab result, we will notify you by phone as soon as possible.  Submit refill requests through [employer]aloo or call your pharmacy and they will forward the refill request to us. Please allow 3 business days for your refill to be completed.          Additional Information About Your Visit        Care EveryWhere ID     This is your Care EveryWhere ID. This could be used by other organizations to access your [place] medical records  [id number]         Blood Pressure from Last 3 Encounters:   12/21/17 122/68   12/20/17 128/82   11/30/17 122/76    Weight from Last 3 Encounters:   12/21/17 121 lb (54.9 kg)   12/20/17 124 lb (56.2 kg)   11/30/17 135 lb (61.2 kg)              Today, you had the following     No orders found for display         Where to get your medicines      Some of these will need a paper prescription and others can be bought over the counter.  Ask your nurse if you have questions.     Bring a paper prescription for each of these medications     traMADol 50 MG tablet          Primary Care Provider Office Phone # Fax #    MELISA Murray [phone]       [facility] [address] [place] [address]        Equal Access to Services     AMAYA CONSTANTINO : Hadii aad ku hadasho Soomaali, waaxda luqadaha, qaybta kaalmada adeegyada, topher finley. So [facility] [phone].    ATENCIÓN: Si habla español, tiene a shay disposición servicios gratuitos de asistencia lingüística. Gera al [phone].    We comply with applicable federal civil rights laws and [place] laws. We do not discriminate on the basis of race, color, national origin, age, disability, sex, sexual orientation, or gender identity.            Thank you!     Thank you for choosing [facility]  [place]  for your care. Our goal is always to provide you with excellent care. Hearing back from our patients is one way we can continue to improve our services. Please take a few minutes to complete the written survey that you may receive in the mail after your visit with us. Thank you!             Your Updated Medication List - Protect others around you: Learn how to safely use, store and throw away your medicines at www.disposemymeds.org.          This list is accurate as of: 12/21/17  3:03 PM.  Always use your most recent med list.                   Brand Name Dispense Instructions for use Diagnosis    alendronate 70 MG tablet    FOSAMAX    4 tablet    Take 1 tablet (70 mg) by mouth every 7 days Take 60 minutes before am meal with 8 oz. water. Remain upright for 30 minutes.    Age related osteoporosis, unspecified pathological fracture presence       CALCITRATE/VITAMIN D PO      Take 1 tablet by mouth 2 times daily        diltiazem 180 MG 24 hr capsule    CARDIZEM CD    30 capsule    Take 1 capsule (180 mg) by mouth daily    Chronic atrial fibrillation (H), Essential hypertension       losartan 50 MG tablet    COZAAR    30 tablet    TAKE ONE TABLET BY MOUTH ONCE DAILY    HTN (hypertension)       MULTIVITAMINS PO      Take 1 tablet by mouth daily        order for DME     1 each    4 wheeled walker    Chronic left-sided low back pain with left-sided sciatica       OSTEO BI-FLEX ADV DOUBLE ST PO      Take  by mouth daily.        simvastatin 40 MG tablet    ZOCOR    45 tablet    TAKE ONE-HALF TABLET BY MOUTH ONCE DAILY IN THE EVENING    Hyperlipidemia LDL goal <100       traMADol 50 MG tablet    ULTRAM    180 tablet    TAKE ONE TO TWO TABLETS BY MOUTH EVERY 6 HOURS AS NEEDED FOR PAIN    Chronic pain syndrome       TYLENOL PO      Take 500 mg by mouth every 4 hours as needed        warfarin 5 MG tablet    COUMADIN    90 tablet    TAKE ONE TABLET BY MOUTH MONDAY,WEDNESDAY, AND FRIDAY. TAKE ONE-HALF TABLET THE REST OF  THE WEEK.    Atrial fibrillation (H), Long term current use of anticoagulant therapy

## 2017-12-21 NOTE — PROGRESS NOTES
PCP asked this worker to meet with patient as she not been doing as well as she could. Patient's  lost his 's license back in May and she reports that he has lost his sense of independence.   Patient needs help with some chores and the outside chores, shoveling and mowing.  Patient does not qualifity for [facility], did pass on patient to care coordination, as she is more appropriate.

## 2017-12-21 NOTE — TELEPHONE ENCOUNTER
Outreach to patient to discuss diltiazem dosage. Left message for patient to return call to the cardiology department.   Faby High RN-BSN

## 2017-12-27 DIAGNOSIS — Z78.0 POSTMENOPAUSAL STATUS: ICD-10-CM

## 2017-12-27 DIAGNOSIS — Z17.0 MALIGNANT NEOPLASM OF RIGHT BREAST IN FEMALE, ESTROGEN RECEPTOR POSITIVE, UNSPECIFIED SITE OF BREAST (H): ICD-10-CM

## 2017-12-27 DIAGNOSIS — M81.0 OSTEOPOROSIS, UNSPECIFIED OSTEOPOROSIS TYPE, UNSPECIFIED PATHOLOGICAL FRACTURE PRESENCE: ICD-10-CM

## 2017-12-27 DIAGNOSIS — C50.911 MALIGNANT NEOPLASM OF RIGHT BREAST IN FEMALE, ESTROGEN RECEPTOR POSITIVE, UNSPECIFIED SITE OF BREAST (H): ICD-10-CM

## 2017-12-27 LAB
ALBUMIN SERPL-MCNC: 4 G/DL (ref 3.4–5)
ALP SERPL-CCNC: 75 U/L (ref 40–150)
ALT SERPL W P-5'-P-CCNC: 26 U/L (ref 0–50)
ANION GAP SERPL CALCULATED.3IONS-SCNC: 8 MMOL/L (ref 3–14)
AST SERPL W P-5'-P-CCNC: 32 U/L (ref 0–45)
BASOPHILS # BLD AUTO: 0.1 10E9/L (ref 0–0.2)
BASOPHILS NFR BLD AUTO: 0.6 %
BILIRUB SERPL-MCNC: 0.5 MG/DL (ref 0.2–1.3)
BUN SERPL-MCNC: 21 MG/DL (ref 7–30)
CALCIUM SERPL-MCNC: 9.1 MG/DL (ref 8.5–10.1)
CHLORIDE SERPL-SCNC: 100 MMOL/L (ref 94–109)
CO2 SERPL-SCNC: 28 MMOL/L (ref 20–32)
CREAT SERPL-MCNC: 0.76 MG/DL (ref 0.52–1.04)
DIFFERENTIAL METHOD BLD: ABNORMAL
EOSINOPHIL # BLD AUTO: 0.1 10E9/L (ref 0–0.7)
EOSINOPHIL NFR BLD AUTO: 0.8 %
ERYTHROCYTE [DISTWIDTH] IN BLOOD BY AUTOMATED COUNT: 12.6 % (ref 10–15)
GFR SERPL CREATININE-BSD FRML MDRD: 73 ML/MIN/1.7M2
GLUCOSE SERPL-MCNC: 115 MG/DL (ref 70–99)
HCT VFR BLD AUTO: 43.6 % (ref 35–47)
HGB BLD-MCNC: 14.5 G/DL (ref 11.7–15.7)
IMM GRANULOCYTES # BLD: 0 10E9/L (ref 0–0.4)
IMM GRANULOCYTES NFR BLD: 0.3 %
LDH SERPL L TO P-CCNC: 216 U/L (ref 81–234)
LYMPHOCYTES # BLD AUTO: 2.4 10E9/L (ref 0.8–5.3)
LYMPHOCYTES NFR BLD AUTO: 20.2 %
MCH RBC QN AUTO: 31.7 PG (ref 26.5–33)
MCHC RBC AUTO-ENTMCNC: 33.3 G/DL (ref 31.5–36.5)
MCV RBC AUTO: 95 FL (ref 78–100)
MONOCYTES # BLD AUTO: 0.8 10E9/L (ref 0–1.3)
MONOCYTES NFR BLD AUTO: 7.1 %
NEUTROPHILS # BLD AUTO: 8.3 10E9/L (ref 1.6–8.3)
NEUTROPHILS NFR BLD AUTO: 71 %
NRBC # BLD AUTO: 0 10*3/UL
NRBC BLD AUTO-RTO: 0 /100
PLATELET # BLD AUTO: 314 10E9/L (ref 150–450)
POTASSIUM SERPL-SCNC: 4 MMOL/L (ref 3.4–5.3)
PROT SERPL-MCNC: 8 G/DL (ref 6.8–8.8)
RBC # BLD AUTO: 4.58 10E12/L (ref 3.8–5.2)
SODIUM SERPL-SCNC: 136 MMOL/L (ref 133–144)
WBC # BLD AUTO: 11.7 10E9/L (ref 4–11)

## 2017-12-27 PROCEDURE — 86300 IMMUNOASSAY TUMOR CA 15-3: CPT | Mod: ZL | Performed by: NURSE PRACTITIONER

## 2017-12-27 PROCEDURE — 80053 COMPREHEN METABOLIC PANEL: CPT | Mod: ZL | Performed by: NURSE PRACTITIONER

## 2017-12-27 PROCEDURE — 36415 COLL VENOUS BLD VENIPUNCTURE: CPT | Mod: ZL | Performed by: NURSE PRACTITIONER

## 2017-12-27 PROCEDURE — 83615 LACTATE (LD) (LDH) ENZYME: CPT | Mod: ZL | Performed by: NURSE PRACTITIONER

## 2017-12-27 PROCEDURE — 85025 COMPLETE CBC W/AUTO DIFF WBC: CPT | Mod: ZL | Performed by: NURSE PRACTITIONER

## 2017-12-28 ENCOUNTER — TELEPHONE (OUTPATIENT)
Dept: CARDIOLOGY | Facility: OTHER | Age: 82
End: 2017-12-28

## 2017-12-28 LAB — CANCER AG27-29 SERPL-ACNC: 36 U/ML (ref 0–39)

## 2017-12-28 NOTE — TELEPHONE ENCOUNTER
Returning call to patient. Patient questions if the Diltiazem 60 mg 3 times daily and the new prescription Dr. Kirby ordered of Diltiazem  daily is the same medication. Patient states that she has a new 90 day supply of the Diltiazem 60 mg and does not want to waste this medication. Patient instructed to continue the Diltiazem 60 mg TID and when this prescription is complete she may change over to the Diltiazem  mg daily. Patient verbalizes understanding and agrees with this plan.   Faby High RN-BSN

## 2017-12-29 ENCOUNTER — ANTICOAGULATION THERAPY VISIT (OUTPATIENT)
Dept: ANTICOAGULATION | Facility: OTHER | Age: 82
End: 2017-12-29
Payer: MEDICARE

## 2017-12-29 DIAGNOSIS — I48.20 CHRONIC ATRIAL FIBRILLATION (H): ICD-10-CM

## 2017-12-29 DIAGNOSIS — Z79.01 LONG-TERM (CURRENT) USE OF ANTICOAGULANTS: ICD-10-CM

## 2017-12-29 NOTE — PROGRESS NOTES
ANTICOAGULATION FOLLOW-UP CLINIC VISIT    Patient Name:  Michael Christiansen  Date:  12/29/2017  Contact Type:  Telephone    SUBJECTIVE:     Patient Findings     Comments Call placed to patient as she missed her INR appointment. She rescheduled appointment and will be seen 1/4/18           OBJECTIVE    INR Protime   Date Value Ref Range Status   11/16/2017 1.7 (A) 0.86 - 1.14 Final       ASSESSMENT / PLAN  No question data found.  Anticoagulation Summary as of 12/29/2017     INR goal 2.0-3.0   Today's INR No new INR was available at the time of this encounter.   Maintenance plan 5 mg (5 mg x 1) on Mon, Wed, Fri; 2.5 mg (5 mg x 0.5) all other days   Full instructions 5 mg on Mon, Wed, Fri; 2.5 mg all other days   Weekly total 25 mg   No change documented Kasia Hercules RN   Plan last modified Gabriella Koo RN (7/27/2016)   Next INR check 1/4/2018   Priority INR   Target end date Indefinite    Indications   Long-term (current) use of anticoagulants [Z79.01] [Z79.01]  Chronic atrial fibrillation (H) [I48.2]         Anticoagulation Episode Summary     INR check location     Preferred lab     Send INR reminders to HC ANTICOAG POOL    Comments       Anticoagulation Care Providers     Provider Role Specialty Phone number    Chandrika Kumar, PA [facility] [phone]            See the Encounter Report to view Anticoagulation Flowsheet and Dosing Calendar (Go to Encounters tab in chart review, and find the Anticoagulation Therapy Visit)        Kasia Hercules RN

## 2017-12-29 NOTE — MR AVS SNAPSHOT
Michael ANJELICA Christiansen   12/29/2017   Anticoagulation Therapy Visit    Description:  82 year old female   Provider:  Chandrika Kumar PA   Department:  [facility] Anti Coagulation           INR as of 12/29/2017     Today's INR No new INR was available at the time of this encounter.      Anticoagulation Summary as of 12/29/2017     INR goal 2.0-3.0   Today's INR No new INR was available at the time of this encounter.   Full instructions 5 mg on Mon, Wed, Fri; 2.5 mg all other days   Next INR check 1/4/2018    Indications   Long-term (current) use of anticoagulants [Z79.01] [Z79.01]  Chronic atrial fibrillation (H) [I48.2]         Your next Anticoagulation Clinic appointment(s)     Jan 04, 2018  1:15 PM CST   Anticoagulation Visit with HC ANTI COAGULATION   [facility] ([facility] [place] [facility] )    [address] [facility] [place] [address]   [phone]              December 2017 Details    Sun Mon Tue Wed Thu Fri Sat          1               2                 3               4               5               6               7               8               9                 10               11               12               13               14               15               16                 17               18               19               20               21               22               23                 24               25               26               27               28               29      5 mg   See details      30      2.5 mg           31      2.5 mg                Date Details   12/29 This INR check               How to take your warfarin dose     To take:  2.5 mg Take 0.5 of a 5 mg tablet.    To take:  5 mg Take 1 of the 5 mg tablets.           January 2018 Details    Sun Mon Tue Wed Thu Fri Sat      1      5 mg         2      2.5 mg         3      5 mg         4            5               6                 7               8               9               10               11               12                13                 14               15               16               17               18               19               20                 21               22               23               24               25               26               27                 28               29               30               31                   Date Details   No additional details    Date of next INR:  1/4/2018         How to take your warfarin dose     To take:  2.5 mg Take 0.5 of a 5 mg tablet.    To take:  5 mg Take 1 of the 5 mg tablets.

## 2018-01-03 ENCOUNTER — ANTICOAGULATION THERAPY VISIT (OUTPATIENT)
Dept: ANTICOAGULATION | Facility: OTHER | Age: 83
End: 2018-01-03
Attending: PHYSICIAN ASSISTANT
Payer: MEDICARE

## 2018-01-03 ENCOUNTER — ONCOLOGY VISIT (OUTPATIENT)
Dept: ONCOLOGY | Facility: OTHER | Age: 83
End: 2018-01-03
Attending: NURSE PRACTITIONER
Payer: MEDICARE

## 2018-01-03 VITALS
RESPIRATION RATE: 18 BRPM | HEIGHT: 65 IN | HEART RATE: 78 BPM | WEIGHT: 123 LBS | DIASTOLIC BLOOD PRESSURE: 87 MMHG | TEMPERATURE: 97.9 F | BODY MASS INDEX: 20.49 KG/M2 | SYSTOLIC BLOOD PRESSURE: 133 MMHG | OXYGEN SATURATION: 97 %

## 2018-01-03 DIAGNOSIS — I48.20 CHRONIC ATRIAL FIBRILLATION (H): ICD-10-CM

## 2018-01-03 DIAGNOSIS — M81.0 OSTEOPOROSIS, UNSPECIFIED OSTEOPOROSIS TYPE, UNSPECIFIED PATHOLOGICAL FRACTURE PRESENCE: ICD-10-CM

## 2018-01-03 DIAGNOSIS — Z78.0 POSTMENOPAUSAL STATUS: ICD-10-CM

## 2018-01-03 DIAGNOSIS — Z79.01 LONG-TERM (CURRENT) USE OF ANTICOAGULANTS: ICD-10-CM

## 2018-01-03 DIAGNOSIS — Z17.0 MALIGNANT NEOPLASM OF RIGHT BREAST IN FEMALE, ESTROGEN RECEPTOR POSITIVE, UNSPECIFIED SITE OF BREAST (H): Primary | ICD-10-CM

## 2018-01-03 DIAGNOSIS — Z12.31 ENCOUNTER FOR SCREENING MAMMOGRAM FOR BREAST CANCER: ICD-10-CM

## 2018-01-03 DIAGNOSIS — C50.911 MALIGNANT NEOPLASM OF RIGHT BREAST IN FEMALE, ESTROGEN RECEPTOR POSITIVE, UNSPECIFIED SITE OF BREAST (H): Primary | ICD-10-CM

## 2018-01-03 LAB — INR POINT OF CARE: 2.6 (ref 0.86–1.14)

## 2018-01-03 PROCEDURE — 85610 PROTHROMBIN TIME: CPT | Mod: QW,ZL

## 2018-01-03 PROCEDURE — 99213 OFFICE O/P EST LOW 20 MIN: CPT | Performed by: NURSE PRACTITIONER

## 2018-01-03 PROCEDURE — G0463 HOSPITAL OUTPT CLINIC VISIT: HCPCS

## 2018-01-03 RX ORDER — DILTIAZEM HCL 60 MG
120 TABLET ORAL DAILY
COMMUNITY
End: 2018-06-13 | Stop reason: DRUGHIGH

## 2018-01-03 ASSESSMENT — PAIN SCALES - GENERAL: PAINLEVEL: MILD PAIN (2)

## 2018-01-03 ASSESSMENT — PATIENT HEALTH QUESTIONNAIRE - PHQ9: SUM OF ALL RESPONSES TO PHQ QUESTIONS 1-9: 3

## 2018-01-03 NOTE — PROGRESS NOTES
ANTICOAGULATION FOLLOW-UP CLINIC VISIT    Patient Name:  Michael Christiansen  Date:  1/3/2018  Contact Type:  Face to Face    SUBJECTIVE:     Patient Findings     Positives No Problem Findings           OBJECTIVE    INR Protime   Date Value Ref Range Status   01/03/2018 2.6 (A) 0.86 - 1.14 Final       ASSESSMENT / PLAN  INR assessment THER    Recheck INR In: 6 WEEKS    INR Location Clinic      Anticoagulation Summary as of 1/3/2018     INR goal 2.0-3.0   Today's INR 2.6   Maintenance plan 5 mg (5 mg x 1) on Mon, Wed, Fri; 2.5 mg (5 mg x 0.5) all other days   Full instructions 5 mg on Mon, Wed, Fri; 2.5 mg all other days   Weekly total 25 mg   No change documented Kasia Hercules RN   Plan last modified Gabriella Koo RN (7/27/2016)   Next INR check 2/14/2018   Priority INR   Target end date Indefinite    Indications   Long-term (current) use of anticoagulants [Z79.01] [Z79.01]  Chronic atrial fibrillation (H) [I48.2]         Anticoagulation Episode Summary     INR check location     Preferred lab     Send INR reminders to  [facility] POOL    Comments       Anticoagulation Care Providers     Provider Role Specialty Phone number    Chandrika Kumar, PA [facility] [phone]            See the Encounter Report to view Anticoagulation Flowsheet and Dosing Calendar (Go to Encounters tab in chart review, and find the Anticoagulation Therapy Visit)        Kasia Hercules, RN

## 2018-01-03 NOTE — PATIENT INSTRUCTIONS
We would like to see you back in May 2018. Please come 1 week prior for lab work.  If you have any questions please call [phone].  Other instructions:  none

## 2018-01-03 NOTE — MR AVS SNAPSHOT
Michael ANJELICA Christiansen   1/3/2018 2:15 PM   Anticoagulation Therapy Visit    Description:  82 year old female   Provider:   ANTI COAGULATION   Department:   Anti Coagulation           INR as of 1/3/2018     Today's INR 2.6      Anticoagulation Summary as of 1/3/2018     INR goal 2.0-3.0   Today's INR 2.6   Full instructions 5 mg on Mon, Wed, Fri; 2.5 mg all other days   Next INR check 2/14/2018    Indications   Long-term (current) use of anticoagulants [Z79.01] [Z79.01]  Chronic atrial fibrillation (H) [I48.2]         January 2018 Details    Sun Mon Tue Wed Thu Fri Sat      1               2               3      5 mg   See details      4      2.5 mg         5      5 mg         6      2.5 mg           7      2.5 mg         8      5 mg         9      2.5 mg         10      5 mg         11      2.5 mg         12      5 mg         13      2.5 mg           14      2.5 mg         15      5 mg         16      2.5 mg         17      5 mg         18      2.5 mg         19      5 mg         20      2.5 mg           21      2.5 mg         22      5 mg         23      2.5 mg         24      5 mg         25      2.5 mg         26      5 mg         27      2.5 mg           28      2.5 mg         29      5 mg         30      2.5 mg         31      5 mg             Date Details   01/03 This INR check               How to take your warfarin dose     To take:  2.5 mg Take 0.5 of a 5 mg tablet.    To take:  5 mg Take 1 of the 5 mg tablets.           February 2018 Details    Sun Mon Tue Wed Thu Fri Sat         1      2.5 mg         2      5 mg         3      2.5 mg           4      2.5 mg         5      5 mg         6      2.5 mg         7      5 mg         8      2.5 mg         9      5 mg         10      2.5 mg           11      2.5 mg         12      5 mg         13      2.5 mg         14            15               16               17                 18               19               20               21               22                23               24                 25               26               27               28                   Date Details   No additional details    Date of next INR:  2/14/2018         How to take your warfarin dose     To take:  2.5 mg Take 0.5 of a 5 mg tablet.    To take:  5 mg Take 1 of the 5 mg tablets.

## 2018-01-03 NOTE — MR AVS SNAPSHOT
After Visit Summary   1/3/2018    Michael Christiansen    MRN: [id number]           Patient Information     Date Of Birth          1935        Visit Information        Provider Department      1/3/2018 1:00 PM Noemy Mahajan NP [facility]        Today's Diagnoses     Malignant neoplasm of right breast in female, estrogen receptor positive, unspecified site of breast (H)    -  1    Encounter for screening mammogram for breast cancer        Postmenopausal status        Osteoporosis, unspecified osteoporosis type, unspecified pathological fracture presence          Care Instructions    We would like to see you back in May 2018. Please come 1 week prior for lab work.  If you have any questions please call [phone].  Other instructions:  none          Follow-ups after your visit        Your next 10 appointments already scheduled     Jan 03, 2018  2:15 PM CST   Anticoagulation Visit with [facility] COAGUL[facility]   [facility]    [address] [facility] [address]   [phone]            Apr 25, 2018 12:30 PM CDT   LAB with HC LAB   [facility] ([facility] )    [address] [facility] [address]   [phone]            Apr 25, 2018  1:00 PM CDT   DX HIP/PELVIS/SPINE with HIDX1   [facility] ([facility] )    [address]  [facility] [id number] [phone]           Please do not take any of the following 24 hours prior to the day of your exam: vitamins, calcium tablets, antacids.  If possible, please wear clothes without metal (snaps, zippers). A sweatsuit works well.            Apr 25, 2018  1:30 PM CDT   (Arrive by 1:15 PM)   MA SCREENING LEFT W/ YUKO with HCMA1   [facility] Mammography ([facility] )    [address] [facility] [address]   [phone]           Three-dimensional (3D) mammograms are available at [facility] in [facility]  "[place], [place], [place], [facility], [place], [place], and [place]. M-Health locations include [place] and [facility] in [place]. Benefits of 3D mammograms include: - Improved rate of cancer detection - Decreases your chance of having to go back for more tests, which means fewer: - \"False-positive\" results (This means that there is an abnormal area but it isn't cancer.) - Invasive testing procedures, such as a biopsy or surgery - Can provide clearer images of the breast if you have dense breast tissue. 3D mammography is an optional exam that anyone can have with a 2D mammogram. It doesn't replace or take the place of a 2D mammogram. 2D mammograms remain an effective screening test for all women.  Not all insurance companies cover the cost of a 3D mammogram. Check with your insurance.            May 04, 2018  1:00 PM CDT   (Arrive by 12:45 PM)   Return Visit with Noemy Mahajan NP   [facility] ([facility] )    [address]  [place] [address]   [phone]            Jun 20, 2018  9:30 AM CDT   (Arrive by 9:15 AM)   Return Visit with Cam Kirby DO   [facility] ([facility] )    [address]  [place] [address]   [phone]              Future tests that were ordered for you today     Open Future Orders        Priority Expected Expires Ordered    CBC with platelets differential Routine 4/30/2018 1/3/2019 1/3/2018    Comprehensive metabolic panel Routine 4/30/2018 1/3/2019 1/3/2018    Ca27.29  breast tumor marker Routine 4/30/2018 1/3/2019 1/3/2018    MA Screen Left w/Christiano Routine 4/30/2018 1/3/2019 1/3/2018    DX Hip/Pelvis/Spine Routine 4/30/2018 1/3/2019 1/3/2018            Who to contact     If you have questions or need follow up information about today's clinic visit or your schedule please contact [facility] directly at [phone].  Normal or non-critical lab and imaging " "results will be communicated to you by MyChart, letter or phone within 4 business days after the clinic has received the results. If you do not hear from us within 7 days, please contact the clinic through MyChart or phone. If you have a critical or abnormal lab result, we will notify you by phone as soon as possible.  Submit refill requests through [employer] or call your pharmacy and they will forward the refill request to us. Please allow 3 business days for your refill to be completed.          Additional Information About Your Visit        Care EveryWhere ID     This is your Care EveryWhere ID. This could be used by other organizations to access your [facility] medical records  [id number]        Your Vitals Were     Pulse Temperature Respirations Height Pulse Oximetry BMI (Body Mass Index)    78 97.9  F (36.6  C) (Oral) 18 1.651 m (5' 5\") 97% 20.47 kg/m2       Blood Pressure from Last 3 Encounters:   01/03/18 133/87   12/21/17 122/68   12/20/17 128/82    Weight from Last 3 Encounters:   01/03/18 55.8 kg (123 lb)   12/21/17 54.9 kg (121 lb)   12/20/17 56.2 kg (124 lb)               Primary Care Provider Office Phone # Fax #    MELISA Murray [phone]       [facility] [address] [address]        Equal Access to Services     [facility] AH: Hadii aad ku hadasho Soomaali, waaxda luqadaha, qaybta kaalmada adeegyada, topher duffy hayharoon salvador . So [facility] [phone].    ATENCIÓN: Si habla español, tiene a shay disposición servicios gratuitos de asistencia lingüística. Llame al [phone].    We comply with applicable federal civil rights laws and [place] laws. We do not discriminate on the basis of race, color, national origin, age, disability, sex, sexual orientation, or gender identity.            Thank you!     Thank you for choosing [facility]  for your care. Our goal is always to provide you with excellent care. Hearing back from " our patients is one way we can continue to improve our services. Please take a few minutes to complete the written survey that you may receive in the mail after your visit with us. Thank you!             Your Updated Medication List - Protect others around you: Learn how to safely use, store and throw away your medicines at www.disposemymeds.org.          This list is accurate as of: 1/3/18  1:35 PM.  Always use your most recent med list.                   Brand Name Dispense Instructions for use Diagnosis    alendronate 70 MG tablet    FOSAMAX    4 tablet    Take 1 tablet (70 mg) by mouth every 7 days Take 60 minutes before am meal with 8 oz. water. Remain upright for 30 minutes.    Age related osteoporosis, unspecified pathological fracture presence       CALCITRATE/VITAMIN D PO      Take 1 tablet by mouth 2 times daily        diltiazem 180 MG 24 hr capsule    CARDIZEM CD    30 capsule    Take 1 capsule (180 mg) by mouth daily    Chronic atrial fibrillation (H), Essential hypertension       losartan 50 MG tablet    COZAAR    30 tablet    TAKE ONE TABLET BY MOUTH ONCE DAILY    HTN (hypertension)       MULTIVITAMINS PO      Take 1 tablet by mouth daily        order for DME     1 each    4 wheeled walker    Chronic left-sided low back pain with left-sided sciatica       OSTEO BI-FLEX ADV DOUBLE ST PO      Take  by mouth daily.        simvastatin 40 MG tablet    ZOCOR    45 tablet    TAKE ONE-HALF TABLET BY MOUTH ONCE DAILY IN THE EVENING    Hyperlipidemia LDL goal <100       traMADol 50 MG tablet    ULTRAM    180 tablet    TAKE ONE TO TWO TABLETS BY MOUTH EVERY 6 HOURS AS NEEDED FOR PAIN    Chronic pain syndrome       TYLENOL PO      Take 500 mg by mouth every 4 hours as needed        warfarin 5 MG tablet    COUMADIN    90 tablet    TAKE ONE TABLET BY MOUTH MONDAY,WEDNESDAY, AND FRIDAY. TAKE ONE-HALF TABLET THE REST OF THE WEEK.    Atrial fibrillation (H), Long term current use of anticoagulant therapy

## 2018-01-03 NOTE — PROGRESS NOTES
Oncology Follow-up Visit:  January 2, 2018    Reason for Visit:  Patient presents with:  RECHECK: 5 month follow up     Nursing Note and documentation reviewed: yes    HPI:  This is a 82-year-old female patient who presents to the oncology clinic today in followup of breast cancer.  She was diagnosed with carcinoma of the right breast in April 2010; she underwent mastectomy; tumor was ER/OR positive, HER-2/nery negative.  Arimidex was discontinued in January of 2017 related to worsening osteoporosis and BCI showing essentially no benefit from long-term hormonal therapy.     She presents to the clinic today telling me she is feeling well she does have some generalized arthritic pain but nothing has changed.  She denies any headaches.  She is on calcium with vitamin D twice a day and does admit today she forgot to have her DEXA scan completed.  Should like to have this completed when she is due for her mammogram in April.  Otherwise, she has no new complaints today and is feeling well.  She does continue to have some increased stressors related to caring for her .    Oncologic History: Michael presented in April 2010 with an abnormal mammogram. She underwent a right simple mastectomy and sentinel node biopsy on 4/15/2010 by Dr. Rogelio Pina. 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 [place] node biopsy negative. She was then seen initially by Dr. Malin, oncology, on 5/4/2010 and patient was placed on tamoxifen 20 mg daily. Patient remained on tamoxifen until May of 2012 and had been complaining of cramps in her calves as well as hot flashes and she was switched to Arimidex 1 mg daily.      Current Chemo Regime/TX: n/a  Current Cycle: n/a  # of completed cycles: n/a      Previous treatment: Started tamoxifen May 2010 and switched to Arimidex 1mg daily May 2012 and discontinued 1/2017     Past Medical History:   Diagnosis Date      Arthritis      Backache, unspecified 1/1/2011     Breast cancer, stage 1 3/19/2010     Chronic pain syndrome 1/23/2015     Claustrophobia 1/1/2011     Hip joint replacement by other means 1/1/2011     Osteoarthrosis, unspecified whether generalized or localized, lower leg 8/14/2006     Posttraumatic stress disorder 1/1/2011       Past Surgical History:   Procedure Laterality Date     BREAST SURGERY      right  side mastectomy     C TOTAL KNEE ARTHROPLASTY Left 01/26/15     cataract extraction and lens implantation       COLONOSCOPY       EYE SURGERY       GYN SURGERY      tubal pregnancy     ORTHOPEDIC SURGERY  2009    l. hip replacement LT     ORTHOPEDIC SURGERY  july 19th 2013    Right total knee     TUBAL/ECTOPIC PREGNANCY         Family History   Problem Relation Age of Onset     DIABETES Father 75     cause of death     Other - See Comments Father      PVD     HEART DISEASE Sister      Other - See Comments Mother 83     old age; cause of death     Other - See Comments Sister      14 year [employer]; cause of death     Chronic Obstructive Pulmonary Disease Daughter      OSTEOPOROSIS Daughter      CANCER Sister        Social History     Social History     Marital status:      Spouse name: N/A     Number of children: N/A     Years of education: N/A     Occupational History     Not on file.     Social History Main Topics     Smoking status: Former Smoker     Packs/day: 0.30     Years: 30.00     Types: Cigarettes     Quit date: 8/11/1987     Smokeless tobacco: Never Used      Comment: year quit 1987     Alcohol use No     Drug use: No     Sexual activity: Not on file     Other Topics Concern     Parent/Sibling W/ Cabg, Mi Or Angioplasty Before 65f 55m? No     Blood Transfusions Yes     Social History Narrative       Current Outpatient Prescriptions   Medication     traMADol (ULTRAM) 50 MG tablet     diltiazem 180 MG 24 hr capsule     warfarin (COUMADIN) 5 MG tablet     simvastatin (ZOCOR) 40  "MG tablet     alendronate (FOSAMAX) 70 MG tablet     order for DME     losartan (COZAAR) 50 MG tablet     Acetaminophen (TYLENOL PO)     Calcium Citrate-Vitamin D (CALCITRATE/VITAMIN D PO)     Multiple Vitamin (MULTIVITAMINS PO)     Misc Natural Products (OSTEO BI-FLEX ADV DOUBLE ST PO)     No current facility-administered medications for this visit.         Allergies   Allergen Reactions     Atenolol Shortness Of Breath and Other (See Comments)     Dizziness  Severe vertigo and dizziness/SOB     Lisinopril Cough     Pollen Extract      Other reaction(s): Runny Nose       Review Of Systems:  Constitutional: denies fever, weight changes, chills, and night sweats.  Eyes: denies blurred or double vision  Ears/Nose/Throat: denies ear pain, nose problems, difficulty swallowing  Respiratory: denies shortness of breath, cough   Skin: denies rash, lesions  Breast/Chest wall: denies new pain, lumps or discharge  Cardiovascular: denies chest pain, palpitations, edema  Gastrointestinal: denies abdominal pain, bloating, nausea, vomiting, early satiety  Genitourinary: denies difficulty with urination, blood in urine  Musculoskeletal:denies new muscle pain, bone pain  Neurologic: denies lightheadedness, headaches, numbness or tingling  Psychiatric: denies anxiety, depression  Hematologic/Lymphatic/Immunologic: denies easy bruising, easy bleeding, lumps or bumps noted  Endocrine: Denies increased thirst      Physical Exam:  /87 (BP Location: Left arm, Patient Position: Sitting, Cuff Size: Adult Regular)  Pulse 78  Temp 97.9  F (36.6  C) (Oral)  Resp 18  Ht 1.651 m (5' 5\")  Wt 55.8 kg (123 lb)  SpO2 97%  BMI 20.47 kg/m2    GENERAL APPEARANCE: Thin, elderly woman, alert and in no acute distress.  HEENT: Normocephalic, Sclerae anicteric. Oropharynx without ulcers, lesions, or thrush.  NECK:  No asymmetry or masses, no thyromegaly.  LYMPHATICS: No palpable cervical, supraclavicular, axillary, or inguinal nodes   RESP: " Lungs clear to auscultation bilaterally, respirations regular and easy  CARDIOVASCULAR: Regular rate and rhythm. Normal S1, S2  ABDOMEN: Soft, nontender. Bowel sounds auscultated all 4 quadrants. No palpable organomegaly or masses.  BREAST/Chest wall: no lumps, masses or tenderness bilaterally  MUSCULOSKELETAL: Extremities without gross deformities noted. No edema of bilateral lower extremities.  NEURO: Alert and oriented x 3.  Gait steady.  PSYCHIATRIC: Mentation and affect appear normal.  Mood appropriate.    Laboratory:  Results for orders placed or performed in visit on 12/27/17   Comprehensive metabolic panel   Result Value Ref Range    Sodium 136 133 - 144 mmol/L    Potassium 4.0 3.4 - 5.3 mmol/L    Chloride 100 94 - 109 mmol/L    Carbon Dioxide 28 20 - 32 mmol/L    Anion Gap 8 3 - 14 mmol/L    Glucose 115 (H) 70 - 99 mg/dL    Urea Nitrogen 21 7 - 30 mg/dL    Creatinine 0.76 0.52 - 1.04 mg/dL    GFR Estimate 73 >60 mL/min/1.7m2    GFR Estimate If Black 88 >60 mL/min/1.7m2    Calcium 9.1 8.5 - 10.1 mg/dL    Bilirubin Total 0.5 0.2 - 1.3 mg/dL    Albumin 4.0 3.4 - 5.0 g/dL    Protein Total 8.0 6.8 - 8.8 g/dL    Alkaline Phosphatase 75 40 - 150 U/L    ALT 26 0 - 50 U/L    AST 32 0 - 45 U/L   Lactate Dehydrogenase   Result Value Ref Range    Lactate Dehydrogenase 216 81 - 234 U/L   CBC with platelets differential   Result Value Ref Range    WBC 11.7 (H) 4.0 - 11.0 10e9/L    RBC Count 4.58 3.8 - 5.2 10e12/L    Hemoglobin 14.5 11.7 - 15.7 g/dL    Hematocrit 43.6 35.0 - 47.0 %    MCV 95 78 - 100 fl    MCH 31.7 26.5 - 33.0 pg    MCHC 33.3 31.5 - 36.5 g/dL    RDW 12.6 10.0 - 15.0 %    Platelet Count 314 150 - 450 10e9/L    Diff Method Automated Method     % Neutrophils 71.0 %    % Lymphocytes 20.2 %    % Monocytes 7.1 %    % Eosinophils 0.8 %    % Basophils 0.6 %    % Immature Granulocytes 0.3 %    Nucleated RBCs 0 0 /100    Absolute Neutrophil 8.3 1.6 - 8.3 10e9/L    Absolute Lymphocytes 2.4 0.8 - 5.3 10e9/L     Absolute Monocytes 0.8 0.0 - 1.3 10e9/L    Absolute Eosinophils 0.1 0.0 - 0.7 10e9/L    Absolute Basophils 0.1 0.0 - 0.2 10e9/L    Abs Immature Granulocytes 0.0 0 - 0.4 10e9/L    Absolute Nucleated RBC 0.0    Ca27.29  breast tumor marker   Result Value Ref Range    CA 27-29 36 0 - 39 U/mL       Imaging Studies:  None for today      ASSESSMENT/PLAN:    #1 Breast cancer: Diagnosed 4/2010 with stage I carcinoma of the right breast; status post right mastectomy with sentinel node biopsy negative; tumor was 1.2 cm, ER/[place] positive, HER-2/nery negative.  She completed 7 years of antiestrogen therapy.  BCI showed low risk and low benefit from extended hormonal therapy so the arimidex was discontinued in January 2017.  Will follow up in May 2018 with mammogram prior.  Will likely go to annual follow up then.      #2  Osteoporosis:  She will continue with Calcium and Vitamin D twice daily and is currently on Fosamax.  Will plan for DEXA scan in April 2018.     I encouraged patient to call with any questions or concerns.         Noemy Mahajan  FNP-BC

## 2018-01-03 NOTE — NURSING NOTE
"Chief Complaint   Patient presents with     RECHECK     5 month follow up       Initial /87 (BP Location: Left arm, Patient Position: Sitting, Cuff Size: Adult Regular)  Pulse 78  Temp 97.9  F (36.6  C) (Oral)  Resp 18  Ht 1.651 m (5' 5\")  Wt 55.8 kg (123 lb)  SpO2 97%  BMI 20.47 kg/m2 Estimated body mass index is 20.47 kg/(m^2) as calculated from the following:    Height as of this encounter: 1.651 m (5' 5\").    Weight as of this encounter: 55.8 kg (123 lb).  Medication Reconciliation: complete   Phuong Graham MA  "

## 2018-02-08 NOTE — PROGRESS NOTES
Injection Documentation of Provider History    PER-PROVIDER HISTORY, Dr. URENA:  Is this for treatment of acute herpes zoster, post herpetic neuralgia, post-decompressive radiculitis, or post-surgical scarring?   No  Does the patient have radiculopathy or sciatica?  Yes  How long has the patient had any physical therapy, home therapy or chiropractor care?  PT STARTED PT AS OF 9-1.  How long has the patient taken NSaids or muscle relaxants?  <4 WEEKS weeks.  Is there any history of systemic/local infection or unstable medical conditions?  No

## 2018-02-14 ENCOUNTER — ANTICOAGULATION THERAPY VISIT (OUTPATIENT)
Dept: ANTICOAGULATION | Facility: OTHER | Age: 83
End: 2018-02-14
Attending: PHYSICIAN ASSISTANT
Payer: MEDICARE

## 2018-02-14 DIAGNOSIS — Z79.01 LONG-TERM (CURRENT) USE OF ANTICOAGULANTS: ICD-10-CM

## 2018-02-14 DIAGNOSIS — I48.20 CHRONIC ATRIAL FIBRILLATION (H): ICD-10-CM

## 2018-02-14 LAB — INR POINT OF CARE: 2.3 (ref 0.86–1.14)

## 2018-02-14 PROCEDURE — 36416 COLLJ CAPILLARY BLOOD SPEC: CPT | Mod: ZL

## 2018-02-14 NOTE — MR AVS SNAPSHOT
Michael DEJESUS Елена   2/14/2018 10:30 AM   Anticoagulation Therapy Visit    Description:  82 year old female   Provider:   ANTI COAGULATION   Department:   Anti Coagulation           INR as of 2/14/2018     Today's INR 2.3      Anticoagulation Summary as of 2/14/2018     INR goal 2.0-3.0   Today's INR 2.3   Full instructions 5 mg on Mon, Wed, Fri; 2.5 mg all other days   Next INR check 3/28/2018    Indications   Long-term (current) use of anticoagulants [Z79.01] [Z79.01]  Chronic atrial fibrillation (H) [I48.2]         Your next Anticoagulation Clinic appointment(s)     Feb 14, 2018 10:30 AM CST   Anticoagulation Visit with  ANTI COAGULATION   [facility] [place] ([facility] )    [address]  [place] [address]   [phone]            Mar 28, 2018 10:00 AM CDT   Anticoagulation Visit with  ANTI COAGULATION   [facility] ([facility] )    [address] [address]   [phone]              February 2018 Details    Sun Mon Tue Wed Thu Fri Sat         1               2               3                 4               5               6               7               8               9               10                 11               12               13               14      5 mg   See details      15      2.5 mg         16      5 mg         17      2.5 mg           18      2.5 mg         19      5 mg         20      2.5 mg         21      5 mg         22      2.5 mg         23      5 mg         24      2.5 mg           25      2.5 mg         26      5 mg         27      2.5 mg         28      5 mg             Date Details   02/14 This INR check               How to take your warfarin dose     To take:  2.5 mg Take 0.5 of a 5 mg tablet.    To take:  5 mg Take 1 of the 5 mg tablets.           March 2018 Details    Sun Mon Tue Wed Thu Fri Sat         1      2.5 mg         2      5 mg         3      2.5 mg           4      2.5 mg         5       5 mg         6      2.5 mg         7      5 mg         8      2.5 mg         9      5 mg         10      2.5 mg           11      2.5 mg         12      5 mg         13      2.5 mg         14      5 mg         15      2.5 mg         16      5 mg         17      2.5 mg           18      2.5 mg         19      5 mg         20      2.5 mg         21      5 mg         22      2.5 mg         23      5 mg         24      2.5 mg           25      2.5 mg         26      5 mg         27      2.5 mg         28            29               30               31                Date Details   No additional details    Date of next INR:  3/28/2018         How to take your warfarin dose     To take:  2.5 mg Take 0.5 of a 5 mg tablet.    To take:  5 mg Take 1 of the 5 mg tablets.

## 2018-02-14 NOTE — PROGRESS NOTES
ANTICOAGULATION FOLLOW-UP CLINIC VISIT    Patient Name:  Michael Christiansen  Date:  2/14/2018  Contact Type:  Face to Face    SUBJECTIVE:     Patient Findings     Positives No Problem Findings           OBJECTIVE    INR Protime   Date Value Ref Range Status   02/14/2018 2.3 (A) 0.86 - 1.14 Final       ASSESSMENT / PLAN  INR assessment THER    Recheck INR In: 6 WEEKS    INR Location Clinic      Anticoagulation Summary as of 2/14/2018     INR goal 2.0-3.0   Today's INR 2.3   Maintenance plan 5 mg (5 mg x 1) on Mon, Wed, Fri; 2.5 mg (5 mg x 0.5) all other days   Full instructions 5 mg on Mon, Wed, Fri; 2.5 mg all other days   Weekly total 25 mg   No change documented Kasia Hercules RN   Plan last modified Gabriella Koo RN (7/27/2016)   Next INR check 3/28/2018   Priority INR   Target end date Indefinite    Indications   Long-term (current) use of anticoagulants [Z79.01] [Z79.01]  Chronic atrial fibrillation (H) [I48.2]         Anticoagulation Episode Summary     INR check location     Preferred lab     Send INR reminders to  [facility] POOL    Comments       Anticoagulation Care Providers     Provider Role Specialty Phone number    Chandrika Kumar, PA [facility] [phone]            See the Encounter Report to view Anticoagulation Flowsheet and Dosing Calendar (Go to Encounters tab in chart review, and find the Anticoagulation Therapy Visit)        Kasia Hercules, RN

## 2018-02-19 DIAGNOSIS — G89.4 CHRONIC PAIN SYNDROME: ICD-10-CM

## 2018-02-20 NOTE — TELEPHONE ENCOUNTER
Tramadol       Last Written Prescription Date:  12/21/2017  Last Fill Quantity: 180,   # refills: 0  Last Office Visit: 12/21/2017  Future Office visit:    Next 5 appointments (look out 90 days)     May 04, 2018  1:00 PM CDT   (Arrive by 12:45 PM)   Return Visit with Noemy Mahajan NP   [facility] [place] ([facility] [place] )    [address]  [place] [address]   [phone]

## 2018-02-20 NOTE — TELEPHONE ENCOUNTER
Controlled Substance Refill Request for Ultram  Problem List Complete:  No     PROVIDER TO CONSIDER COMPLETION OF PROBLEM LIST AND OVERVIEW/CONTROLLED SUBSTANCE AGREEMENT    Future Office visit:   Next 5 appointments (look out 90 days)     May 04, 2018  1:00 PM CDT   (Arrive by 12:45 PM)   Return Visit with Noemy Mahajan NP   [facility] [place] ([facility] [place] )    [address]  [place] [address]   [phone]                  Controlled substance agreement on file: No.     Processing:  Fax Rx to [facility] [place] pharmacy

## 2018-02-21 RX ORDER — TRAMADOL HYDROCHLORIDE 50 MG/1
TABLET ORAL
Qty: 180 TABLET | Refills: 0 | Status: SHIPPED | OUTPATIENT
Start: 2018-02-21 | End: 2018-03-21

## 2018-02-26 DIAGNOSIS — E78.5 HYPERLIPIDEMIA LDL GOAL <100: ICD-10-CM

## 2018-02-27 RX ORDER — SIMVASTATIN 40 MG
TABLET ORAL
Qty: 45 TABLET | Refills: 1 | Status: SHIPPED | OUTPATIENT
Start: 2018-02-27 | End: 2018-11-04

## 2018-03-01 DIAGNOSIS — I10 HTN (HYPERTENSION): ICD-10-CM

## 2018-03-01 NOTE — TELEPHONE ENCOUNTER
losartan (COZAAR) 50 MG tablet     Last Written Prescription Date:  3/27/17  Last Fill Quantity: 30,   # refills: 11  Last Office Visit: 12/21/17  Future Office visit:    Next 5 appointments (look out 90 days)     Apr 30, 2018 10:00 AM CDT   (Arrive by 9:45 AM)   Return Visit with Noemy Mahajan NP   [facility] [place] ([facility] [place] )    [address] [place] [address]   [phone]                 Note: patient is requesting 90 day supply

## 2018-03-02 RX ORDER — LOSARTAN POTASSIUM 50 MG/1
50 TABLET ORAL DAILY
Qty: 30 TABLET | Refills: 8 | Status: SHIPPED | OUTPATIENT
Start: 2018-03-02 | End: 2018-12-23

## 2018-03-07 DIAGNOSIS — I48.91 ATRIAL FIBRILLATION, UNSPECIFIED TYPE (H): ICD-10-CM

## 2018-03-07 DIAGNOSIS — Z79.01 LONG TERM CURRENT USE OF ANTICOAGULANT THERAPY: ICD-10-CM

## 2018-03-07 RX ORDER — WARFARIN SODIUM 5 MG/1
TABLET ORAL
Qty: 80 TABLET | Refills: 3 | Status: SHIPPED | OUTPATIENT
Start: 2018-03-07 | End: 2018-08-06

## 2018-03-21 ENCOUNTER — TELEPHONE (OUTPATIENT)
Dept: FAMILY MEDICINE | Facility: OTHER | Age: 83
End: 2018-03-21

## 2018-03-21 DIAGNOSIS — G89.4 CHRONIC PAIN SYNDROME: ICD-10-CM

## 2018-03-21 RX ORDER — TRAMADOL HYDROCHLORIDE 50 MG/1
TABLET ORAL
Qty: 180 TABLET | Refills: 0 | Status: SHIPPED | OUTPATIENT
Start: 2018-03-21 | End: 2018-05-13

## 2018-03-21 NOTE — TELEPHONE ENCOUNTER
Severe back and leg pain again. Having to take 4 ultram per day.  Has had recent injection and got better for a while.

## 2018-03-28 ENCOUNTER — ANTICOAGULATION THERAPY VISIT (OUTPATIENT)
Dept: ANTICOAGULATION | Facility: OTHER | Age: 83
End: 2018-03-28
Attending: PHYSICIAN ASSISTANT
Payer: MEDICARE

## 2018-03-28 DIAGNOSIS — Z79.01 LONG-TERM (CURRENT) USE OF ANTICOAGULANTS: ICD-10-CM

## 2018-03-28 DIAGNOSIS — I48.20 CHRONIC ATRIAL FIBRILLATION (H): ICD-10-CM

## 2018-03-28 LAB — INR POINT OF CARE: 1.7 (ref 0.86–1.14)

## 2018-03-28 PROCEDURE — 36416 COLLJ CAPILLARY BLOOD SPEC: CPT | Mod: ZL

## 2018-03-28 NOTE — MR AVS SNAPSHOT
Michael DEJESUS Елена   3/28/2018 10:00 AM   Anticoagulation Therapy Visit    Description:  82 year old female   Provider:  [facility] ANTI COAGULATION   Department:   Anti Coagulation           INR as of 3/28/2018     Today's INR 1.7!      Anticoagulation Summary as of 3/28/2018     INR goal 2.0-3.0   Today's INR 1.7!   Full instructions 3/28: 7.5 mg; Otherwise 5 mg on Mon, Wed, Fri; 2.5 mg all other days   Next INR check 4/25/2018    Indications   Long-term (current) use of anticoagulants [Z79.01] [Z79.01]  Chronic atrial fibrillation (H) [I48.2]         March 2018 Details    Sun Mon Tue Wed Thu Fri Sat         1               2               3                 4               5               6               7               8               9               10                 11               12               13               14               15               16               17                 18               19               20               21               22               23               24                 25               26               27               28      7.5 mg   See details      29      2.5 mg         30      5 mg         31      2.5 mg          Date Details   03/28 This INR check               How to take your warfarin dose     To take:  2.5 mg Take 0.5 of a 5 mg tablet.    To take:  5 mg Take 1 of the 5 mg tablets.    To take:  7.5 mg Take 1.5 of the 5 mg tablets.           April 2018 Details    Sun Mon Tue Wed Thu Fri Sat     1      2.5 mg         2      5 mg         3      2.5 mg         4      5 mg         5      2.5 mg         6      5 mg         7      2.5 mg           8      2.5 mg         9      5 mg         10      2.5 mg         11      5 mg         12      2.5 mg         13      5 mg         14      2.5 mg           15      2.5 mg         16      5 mg         17      2.5 mg         18      5 mg         19      2.5 mg         20      5 mg         21      2.5 mg           22      2.5 mg          23      5 mg         24      2.5 mg         25            26               27               28                 29               30                     Date Details   No additional details    Date of next INR:  4/25/2018         How to take your warfarin dose     To take:  2.5 mg Take 0.5 of a 5 mg tablet.    To take:  5 mg Take 1 of the 5 mg tablets.

## 2018-03-28 NOTE — PROGRESS NOTES
ANTICOAGULATION FOLLOW-UP CLINIC VISIT    Patient Name:  Michael Christiansen  Date:  3/28/2018  Contact Type:  Face to Face    SUBJECTIVE:     Patient Findings     Positives Missed doses    Comments Missed one dose of warfarin last week.           OBJECTIVE    INR Protime   Date Value Ref Range Status   03/28/2018 1.7 (A) 0.86 - 1.14 Final       ASSESSMENT / PLAN  INR assessment SUB    Recheck INR In: 4 WEEKS    INR Location Clinic      Anticoagulation Summary as of 3/28/2018     INR goal 2.0-3.0   Today's INR 1.7!   Maintenance plan 5 mg (5 mg x 1) on Mon, Wed, Fri; 2.5 mg (5 mg x 0.5) all other days   Full instructions 3/28: 7.5 mg; Otherwise 5 mg on Mon, Wed, Fri; 2.5 mg all other days   Weekly total 25 mg   Plan last modified Gabriella Koo RN (7/27/2016)   Next INR check 4/25/2018   Priority INR   Target end date Indefinite    Indications   Long-term (current) use of anticoagulants [Z79.01] [Z79.01]  Chronic atrial fibrillation (H) [I48.2]         Anticoagulation Episode Summary     INR check location     Preferred lab     Send INR reminders to  ANTICOAG POOL    Comments       Anticoagulation Care Providers     Provider Role Specialty Phone number    Chandrika Kumar PA [facility] [phone]            See the Encounter Report to view Anticoagulation Flowsheet and Dosing Calendar (Go to Encounters tab in chart review, and find the Anticoagulation Therapy Visit)        Kasia Hercules, RN

## 2018-04-25 ENCOUNTER — ANTICOAGULATION THERAPY VISIT (OUTPATIENT)
Dept: ANTICOAGULATION | Facility: OTHER | Age: 83
End: 2018-04-25
Payer: MEDICARE

## 2018-04-25 ENCOUNTER — RADIANT APPOINTMENT (OUTPATIENT)
Dept: MAMMOGRAPHY | Facility: OTHER | Age: 83
End: 2018-04-25
Attending: NURSE PRACTITIONER
Payer: MEDICARE

## 2018-04-25 DIAGNOSIS — Z12.31 ENCOUNTER FOR SCREENING MAMMOGRAM FOR BREAST CANCER: ICD-10-CM

## 2018-04-25 DIAGNOSIS — M81.0 OSTEOPOROSIS, UNSPECIFIED OSTEOPOROSIS TYPE, UNSPECIFIED PATHOLOGICAL FRACTURE PRESENCE: ICD-10-CM

## 2018-04-25 DIAGNOSIS — Z79.01 LONG-TERM (CURRENT) USE OF ANTICOAGULANTS: ICD-10-CM

## 2018-04-25 DIAGNOSIS — C50.911 MALIGNANT NEOPLASM OF RIGHT BREAST IN FEMALE, ESTROGEN RECEPTOR POSITIVE, UNSPECIFIED SITE OF BREAST (H): ICD-10-CM

## 2018-04-25 DIAGNOSIS — I48.20 CHRONIC ATRIAL FIBRILLATION (H): ICD-10-CM

## 2018-04-25 DIAGNOSIS — Z78.0 POSTMENOPAUSAL STATUS: ICD-10-CM

## 2018-04-25 DIAGNOSIS — Z17.0 MALIGNANT NEOPLASM OF RIGHT BREAST IN FEMALE, ESTROGEN RECEPTOR POSITIVE, UNSPECIFIED SITE OF BREAST (H): ICD-10-CM

## 2018-04-25 LAB
ALBUMIN SERPL-MCNC: 3.9 G/DL (ref 3.4–5)
ALP SERPL-CCNC: 80 U/L (ref 40–150)
ALT SERPL W P-5'-P-CCNC: 19 U/L (ref 0–50)
ANION GAP SERPL CALCULATED.3IONS-SCNC: 8 MMOL/L (ref 3–14)
AST SERPL W P-5'-P-CCNC: 23 U/L (ref 0–45)
BASOPHILS # BLD AUTO: 0.1 10E9/L (ref 0–0.2)
BASOPHILS NFR BLD AUTO: 0.5 %
BILIRUB SERPL-MCNC: 0.6 MG/DL (ref 0.2–1.3)
BUN SERPL-MCNC: 22 MG/DL (ref 7–30)
CALCIUM SERPL-MCNC: 9.3 MG/DL (ref 8.5–10.1)
CHLORIDE SERPL-SCNC: 99 MMOL/L (ref 94–109)
CO2 SERPL-SCNC: 28 MMOL/L (ref 20–32)
CREAT SERPL-MCNC: 0.86 MG/DL (ref 0.52–1.04)
DIFFERENTIAL METHOD BLD: NORMAL
EOSINOPHIL # BLD AUTO: 0.1 10E9/L (ref 0–0.7)
EOSINOPHIL NFR BLD AUTO: 0.5 %
ERYTHROCYTE [DISTWIDTH] IN BLOOD BY AUTOMATED COUNT: 12.3 % (ref 10–15)
GFR SERPL CREATININE-BSD FRML MDRD: 63 ML/MIN/1.7M2
GLUCOSE SERPL-MCNC: 137 MG/DL (ref 70–99)
HCT VFR BLD AUTO: 41.6 % (ref 35–47)
HGB BLD-MCNC: 14.2 G/DL (ref 11.7–15.7)
IMM GRANULOCYTES # BLD: 0 10E9/L (ref 0–0.4)
IMM GRANULOCYTES NFR BLD: 0.4 %
INR PPP: 1.62 (ref 0.8–1.2)
LYMPHOCYTES # BLD AUTO: 2.2 10E9/L (ref 0.8–5.3)
LYMPHOCYTES NFR BLD AUTO: 21.2 %
MCH RBC QN AUTO: 31.7 PG (ref 26.5–33)
MCHC RBC AUTO-ENTMCNC: 34.1 G/DL (ref 31.5–36.5)
MCV RBC AUTO: 93 FL (ref 78–100)
MONOCYTES # BLD AUTO: 0.9 10E9/L (ref 0–1.3)
MONOCYTES NFR BLD AUTO: 8.2 %
NEUTROPHILS # BLD AUTO: 7.3 10E9/L (ref 1.6–8.3)
NEUTROPHILS NFR BLD AUTO: 69.2 %
NRBC # BLD AUTO: 0 10*3/UL
NRBC BLD AUTO-RTO: 0 /100
PLATELET # BLD AUTO: 333 10E9/L (ref 150–450)
POTASSIUM SERPL-SCNC: 4.5 MMOL/L (ref 3.4–5.3)
PROT SERPL-MCNC: 7.9 G/DL (ref 6.8–8.8)
RBC # BLD AUTO: 4.48 10E12/L (ref 3.8–5.2)
SODIUM SERPL-SCNC: 135 MMOL/L (ref 133–144)
WBC # BLD AUTO: 10.5 10E9/L (ref 4–11)

## 2018-04-25 PROCEDURE — 36415 COLL VENOUS BLD VENIPUNCTURE: CPT | Mod: ZL | Performed by: NURSE PRACTITIONER

## 2018-04-25 PROCEDURE — 77063 BREAST TOMOSYNTHESIS BI: CPT | Mod: TC,52

## 2018-04-25 PROCEDURE — 86300 IMMUNOASSAY TUMOR CA 15-3: CPT | Mod: ZL | Performed by: NURSE PRACTITIONER

## 2018-04-25 PROCEDURE — 80053 COMPREHEN METABOLIC PANEL: CPT | Mod: ZL | Performed by: NURSE PRACTITIONER

## 2018-04-25 PROCEDURE — 85610 PROTHROMBIN TIME: CPT | Mod: ZL | Performed by: NURSE PRACTITIONER

## 2018-04-25 PROCEDURE — 85025 COMPLETE CBC W/AUTO DIFF WBC: CPT | Mod: ZL | Performed by: NURSE PRACTITIONER

## 2018-04-25 NOTE — PROGRESS NOTES
ANTICOAGULATION FOLLOW-UP CLINIC VISIT    Patient Name:  Michael Christianesn  Date:  4/25/2018  Contact Type:  Telephone/ message left on home phone voicemail    SUBJECTIVE:     Patient Findings     Comments INR done with other labwork. Call placed to patient and message left on her voicemail re: INR result, warfarin dosing and INR recheck date. She is to call warfarin clinic if any bleeding/bruising, changes in diet/meds/activity or questions           OBJECTIVE    INR   Date Value Ref Range Status   04/25/2018 1.62 (H) 0.80 - 1.20 Final       ASSESSMENT / PLAN  INR assessment SUB    Recheck INR In: 2 WEEKS    INR Location Clinic      Anticoagulation Summary as of 4/25/2018     INR goal 2.0-3.0   Today's INR 1.62!   Maintenance plan 5 mg (5 mg x 1) on Mon, Wed, Fri; 2.5 mg (5 mg x 0.5) all other days   Full instructions 4/25: 10 mg; Otherwise 5 mg on Mon, Wed, Fri; 2.5 mg all other days   Weekly total 25 mg   Plan last modified Gabriella Koo RN (7/27/2016)   Next INR check 5/9/2018   Priority INR   Target end date Indefinite    Indications   Long-term (current) use of anticoagulants [Z79.01] [Z79.01]  Chronic atrial fibrillation (H) [I48.2]         Anticoagulation Episode Summary     INR check location     Preferred lab     Send INR reminders to  ANTICOAG POOL    Comments       Anticoagulation Care Providers     Provider Role Specialty Phone number    Chandrika Kumar PA [facility] [phone]            See the Encounter Report to view Anticoagulation Flowsheet and Dosing Calendar (Go to Encounters tab in chart review, and find the Anticoagulation Therapy Visit)        Kasia Hercules, RN

## 2018-04-25 NOTE — MR AVS SNAPSHOT
Michael Christiansen   4/25/2018   Anticoagulation Therapy Visit    Description:  82 year old female   Provider:  Chandrika Kumar PA   Department:  [facility] Anti Coagulation           INR as of 4/25/2018     Today's INR 1.62!      Anticoagulation Summary as of 4/25/2018     INR goal 2.0-3.0   Today's INR 1.62!   Full instructions 4/25: 10 mg; Otherwise 5 mg on Mon, Wed, Fri; 2.5 mg all other days   Next INR check 5/9/2018    Indications   Long-term (current) use of anticoagulants [Z79.01] [Z79.01]  Chronic atrial fibrillation (H) [I48.2]         Your next Anticoagulation Clinic appointment(s)     May 09, 2018  1:00 PM CDT   Anticoagulation Visit with HC ANTI COAGULATION   [facility] ([facility] [place] )    [address] [place] [address]   [phone]              April 2018 Details    Sun Mon Tue Wed Thu Fri Sat     1               2               3               4               5               6               7                 8               9               10               11               12               13               14                 15               16               17               18               19               20               21                 22               23               24               25      10 mg   See details      26      2.5 mg         27      5 mg         28      2.5 mg           29      2.5 mg         30      5 mg               Date Details   04/25 This INR check               How to take your warfarin dose     To take:  2.5 mg Take 0.5 of a 5 mg tablet.    To take:  5 mg Take 1 of the 5 mg tablets.    To take:  10 mg Take 2 of the 5 mg tablets.           May 2018 Details    Sun Mon Tue Wed Thu Fri Sat       1      2.5 mg         2      5 mg         3      2.5 mg         4      5 mg         5      2.5 mg           6      2.5 mg         7      5 mg         8      2.5 mg         9            10               11               12                 13                14               15               16               17               18               19                 20               21               22               23               24               25               26                 27               28               29               30               31                  Date Details   No additional details    Date of next INR:  5/9/2018         How to take your warfarin dose     To take:  2.5 mg Take 0.5 of a 5 mg tablet.    To take:  5 mg Take 1 of the 5 mg tablets.

## 2018-04-26 LAB — CANCER AG27-29 SERPL-ACNC: 39 U/ML (ref 0–39)

## 2018-05-01 ENCOUNTER — HOSPITAL ENCOUNTER (OUTPATIENT)
Dept: BONE DENSITY | Facility: HOSPITAL | Age: 83
Discharge: HOME | End: 2018-05-01
Attending: NURSE PRACTITIONER | Admitting: NURSE PRACTITIONER
Payer: MEDICARE

## 2018-05-01 PROCEDURE — 77080 DXA BONE DENSITY AXIAL: CPT | Mod: TC

## 2018-05-09 ENCOUNTER — ANTICOAGULATION THERAPY VISIT (OUTPATIENT)
Dept: ANTICOAGULATION | Facility: OTHER | Age: 83
End: 2018-05-09
Attending: PHYSICIAN ASSISTANT
Payer: MEDICARE

## 2018-05-09 DIAGNOSIS — I48.20 CHRONIC ATRIAL FIBRILLATION (H): ICD-10-CM

## 2018-05-09 DIAGNOSIS — Z79.01 LONG-TERM (CURRENT) USE OF ANTICOAGULANTS: ICD-10-CM

## 2018-05-09 LAB — INR POINT OF CARE: 1.4 (ref 0.86–1.14)

## 2018-05-09 PROCEDURE — 36416 COLLJ CAPILLARY BLOOD SPEC: CPT | Mod: ZL

## 2018-05-09 NOTE — MR AVS SNAPSHOT
Michael DEJESUS Елена   5/9/2018 1:00 PM   Anticoagulation Therapy Visit    Description:  82 year old female   Provider:   ANTI COAGULATION   Department:  [facility] Anti Coagulation           INR as of 5/9/2018     Today's INR 1.4!      Anticoagulation Summary as of 5/9/2018     INR goal 2.0-3.0   Today's INR 1.4!   Full instructions 5/9: 10 mg; Otherwise 2.5 mg on Mon, Wed, Fri; 5 mg all other days   Next INR check 5/23/2018    Indications   Long-term (current) use of anticoagulants [Z79.01] [Z79.01]  Chronic atrial fibrillation (H) [I48.2]         Description     Forgot to take bump on wed took on thurs and forgot some other days.      Your next Anticoagulation Clinic appointment(s)     May 23, 2018 10:45 AM CDT   Anticoagulation Visit with  ANTI COAGULATION   [facility] ([facility] [place] )    [address]  [place] [address]   [phone]              May 2018 Details    Sun Mon Tue Wed Thu Fri Sat       1               2               3               4               5                 6               7               8               9      10 mg   See details      10      5 mg         11      2.5 mg         12      5 mg           13      5 mg         14      2.5 mg         15      5 mg         16      2.5 mg         17      5 mg         18      2.5 mg         19      5 mg           20      5 mg         21      2.5 mg         22      5 mg         23            24               25               26                 27               28               29               30               31                  Date Details   05/09 This INR check       Date of next INR:  5/23/2018         How to take your warfarin dose     To take:  2.5 mg Take 0.5 of a 5 mg tablet.    To take:  5 mg Take 1 of the 5 mg tablets.    To take:  10 mg Take 2 of the 5 mg tablets.

## 2018-05-09 NOTE — PROGRESS NOTES
ANTICOAGULATION FOLLOW-UP CLINIC VISIT    Patient Name:  Michael Christiansen  Date:  5/9/2018  Contact Type:  Face to Face    SUBJECTIVE:     Patient Findings     Positives Activity level change, Missed doses    Comments Patient forgot to take 10 mg dose last time and missed a few other doses.  Patient has had activity change due to increased caregiver needs of her .  Patient has been eating a lot of green's.  No change in medications.  No abnormal bleeding and bruising.  New warfarin dosing and next INR date explained to patient and she verbalizes understanding.             OBJECTIVE    INR Protime   Date Value Ref Range Status   05/09/2018 1.4 (A) 0.86 - 1.14 Final       ASSESSMENT / PLAN  INR assessment SUB    Recheck INR In: 2 WEEKS    INR Location Clinic      Anticoagulation Summary as of 5/9/2018     INR goal 2.0-3.0   Today's INR 1.4!   Maintenance plan 2.5 mg (5 mg x 0.5) on Mon, Wed, Fri; 5 mg (5 mg x 1) all other days   Full instructions 5/9: 10 mg; Otherwise 2.5 mg on Mon, Wed, Fri; 5 mg all other days   Weekly total 27.5 mg   Plan last modified Jacqueline Licea, RN (5/9/2018)   Next INR check 5/23/2018   Priority INR   Target end date Indefinite    Indications   Long-term (current) use of anticoagulants [Z79.01] [Z79.01]  Chronic atrial fibrillation (H) [I48.2]         Anticoagulation Episode Summary     INR check location     Preferred lab     Send INR reminders to  ANTICOAG POOL    Comments       Anticoagulation Care Providers     Provider Role Specialty Phone number    Chandrika Kumar PA [facility] [phone]            See the Encounter Report to view Anticoagulation Flowsheet and Dosing Calendar (Go to Encounters tab in chart review, and find the Anticoagulation Therapy Visit)        Jacqueline Licea, DAVI

## 2018-05-11 ENCOUNTER — ONCOLOGY VISIT (OUTPATIENT)
Dept: ONCOLOGY | Facility: OTHER | Age: 83
End: 2018-05-11
Attending: NURSE PRACTITIONER
Payer: MEDICARE

## 2018-05-11 VITALS
SYSTOLIC BLOOD PRESSURE: 147 MMHG | HEIGHT: 65 IN | BODY MASS INDEX: 20.26 KG/M2 | DIASTOLIC BLOOD PRESSURE: 82 MMHG | TEMPERATURE: 97.6 F | HEART RATE: 100 BPM | RESPIRATION RATE: 18 BRPM | WEIGHT: 121.6 LBS | OXYGEN SATURATION: 97 %

## 2018-05-11 DIAGNOSIS — M81.0 OSTEOPOROSIS, UNSPECIFIED OSTEOPOROSIS TYPE, UNSPECIFIED PATHOLOGICAL FRACTURE PRESENCE: ICD-10-CM

## 2018-05-11 DIAGNOSIS — Z12.31 ENCOUNTER FOR SCREENING MAMMOGRAM FOR BREAST CANCER: ICD-10-CM

## 2018-05-11 DIAGNOSIS — C50.911 MALIGNANT NEOPLASM OF RIGHT BREAST IN FEMALE, ESTROGEN RECEPTOR POSITIVE, UNSPECIFIED SITE OF BREAST (H): Primary | ICD-10-CM

## 2018-05-11 DIAGNOSIS — H61.21 EXCESSIVE EAR WAX, RIGHT: ICD-10-CM

## 2018-05-11 DIAGNOSIS — Z17.0 MALIGNANT NEOPLASM OF RIGHT BREAST IN FEMALE, ESTROGEN RECEPTOR POSITIVE, UNSPECIFIED SITE OF BREAST (H): Primary | ICD-10-CM

## 2018-05-11 PROCEDURE — 99214 OFFICE O/P EST MOD 30 MIN: CPT | Performed by: NURSE PRACTITIONER

## 2018-05-11 PROCEDURE — G0463 HOSPITAL OUTPT CLINIC VISIT: HCPCS

## 2018-05-11 ASSESSMENT — PAIN SCALES - GENERAL: PAINLEVEL: NO PAIN (0)

## 2018-05-11 NOTE — PATIENT INSTRUCTIONS
We would like to see you back in 1 year. Please come 1 week prior for lab work, mammogram and DEXA scan. Our diagnostic imaging department will call you to schedule your mammogram and DEXA scan.   If you have any questions please call [phone]    Denosumab Solution for injection  What is this medicine?  DENOSUMAB (den oh rafia mab) slows bone breakdown. Prolia is used to treat osteoporosis in women after menopause and in men. Xgeva is used to prevent bone fractures and other bone problems caused by cancer bone metastases. Xgeva is also used to treat giant cell tumor of the bone.  This medicine may be used for other purposes; ask your health care provider or pharmacist if you have questions.  What should I tell my health care provider before I take this medicine?  They need to know if you have any of these conditions:    dental disease    eczema    infection or history of infections    kidney disease or on dialysis    low blood calcium or vitamin D    malabsorption syndrome    scheduled to have surgery or tooth extraction    taking medicine that contains denosumab    thyroid or parathyroid disease    an unusual reaction to denosumab, other medicines, foods, dyes, or preservatives    pregnant or trying to get pregnant    breast-feeding  How should I use this medicine?  This medicine is for injection under the skin. It is given by a health care professional in a hospital or clinic setting.  If you are getting Prolia, a special MedGuide will be given to you by the pharmacist with each prescription and refill. Be sure to read this information carefully each time.  For Prolia, talk to your pediatrician regarding the use of this medicine in children. Special care may be needed. For Xgeva, talk to your pediatrician regarding the use of this medicine in children. While this drug may be prescribed for children as young as 13 years for selected conditions, precautions do apply.  Overdosage: If you think you've taken too much  of this medicine contact a poison control center or emergency room at once.  NOTE: This medicine is only for you. Do not share this medicine with others.  What if I miss a dose?  It is important not to miss your dose. Call your doctor or health care professional if you are unable to keep an appointment.  What may interact with this medicine?  Do not take this medicine with any of the following medications:    other medicines containing denosumab  This medicine may also interact with the following medications:    medicines that suppress the immune system    medicines that treat cancer    steroid medicines like prednisone or cortisone  This list may not describe all possible interactions. Give your health care provider a list of all the medicines, herbs, non-prescription drugs, or dietary supplements you use. Also tell them if you smoke, drink alcohol, or use illegal drugs. Some items may interact with your medicine.  What should I watch for while using this medicine?  Visit your doctor or health care professional for regular checks on your progress. Your doctor or health care professional may order blood tests and other tests to see how you are doing.  Call your doctor or health care professional if you get a cold or other infection while receiving this medicine. Do not treat yourself. This medicine may decrease your body's ability to fight infection.  You should make sure you get enough calcium and vitamin D while you are taking this medicine, unless your doctor tells you not to. Discuss the foods you eat and the vitamins you take with your health care professional.  See your dentist regularly. Brush and floss your teeth as directed. Before you have any dental work done, tell your dentist you are receiving this medicine.  Do not become pregnant while taking this medicine or for 5 months after stopping it. Women should inform their doctor if they wish to become pregnant or think they might be pregnant. There is a  potential for serious side effects to an unborn child. Talk to your health care professional or pharmacist for more information.  What side effects may I notice from receiving this medicine?  Side effects that you should report to your doctor or health care professional as soon as possible:    allergic reactions like skin rash, itching or hives, swelling of the face, lips, or tongue    breathing problems    chest pain    fast, irregular heartbeat    feeling faint or lightheaded, falls    fever, chills, or any other sign of infection    muscle spasms, tightening, or twitches    numbness or tingling    skin blisters or bumps, or is dry, peels, or red    slow healing or unexplained pain in the mouth or jaw    unusual bleeding or bruising  Side effects that usually do not require medical attention (Report these to your doctor or health care professional if they continue or are bothersome.):    muscle pain    stomach upset, gas  This list may not describe all possible side effects. Call your doctor for medical advice about side effects. You may report side effects to FDA at 2-110-FDA-8124.  Where should I keep my medicine?  This medicine is only given in a clinic, doctor's office, or other health care setting and will not be stored at home.  NOTE: This sheet is a summary. It may not cover all possible information. If you have questions about this medicine, talk to your doctor, pharmacist, or health care provider.  NOTE:This sheet is a summary. It may not cover all possible information. If you have questions about this medicine, talk to your doctor, pharmacist, or health care provider. Copyright  2016 [employer]       We will check with your insurance to see if it is going to cover the Prolia injection for your bones

## 2018-05-11 NOTE — NURSING NOTE
Cerumenosis is noted.  Wax is removed by syringing and manual debridement. Instructions for home care to prevent wax buildup are given. Right ear wash done with a moderated amount of was ear drum visible and intact. Patient tolerated procedure with no difficulty.  Lindsay Zaldivar LPN

## 2018-05-11 NOTE — PROGRESS NOTES
Oncology Follow-up Visit:  May 11, 2018    Reason for Visit:  Patient presents with:  RECHECK: Follow up Malignant neoplasm of right breast in female, estrogen receptor positive, unspecified site of breast      Nursing Note and documentation reviewed: yes    HPI:  This is a 82-year-old female patient who presents to the oncology clinic today in followup of breast cancer.  She was diagnosed with carcinoma of the right breast in April 2010; she underwent mastectomy; tumor was ER/UT positive, HER-2/nery negative.  Arimidex was discontinued in January of 2017 related to worsening osteoporosis and BCI showing essentially no benefit from long-term hormonal therapy.     Patient states she is doing well and offers no complaints.  She continues on calcium with vitamin D twice a day along with Fosamax weekly.  Does have some increased stressors related to caring for her .  She was seen by her eye doctor recently and started on a vitamin for her eyes stating she has the start of macular degeneration.  She has a dry mouth and is using Biotene.  She'll see Dr. Kirby in June and followup.    Oncologic History:  Michael presented in April 2010 with an abnormal mammogram. She underwent a right simple mastectomy and sentinel node biopsy on 4/15/2010 by Dr. Rogelio Pina. 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 [place] node biopsy negative. She was then seen initially by Dr. Malin, oncology, on 5/4/2010 and patient was placed on tamoxifen 20 mg daily. Patient remained on tamoxifen until May of 2012 and had been complaining of cramps in her calves as well as hot flashes and she was switched to Arimidex 1 mg daily.      Current Chemo Regime/TX: n/a  Current Cycle: n/a  # of completed cycles: n/a      Previous treatment: Started tamoxifen May 2010 and switched to Arimidex 1mg daily May 2012 and discontinued 1/2017     Past Medical History:    Diagnosis Date     Arthritis      Backache, unspecified 1/1/2011     Breast cancer, stage 1 3/19/2010     Chronic pain syndrome 1/23/2015     Claustrophobia 1/1/2011     Hip joint replacement by other means 1/1/2011     Osteoarthrosis, unspecified whether generalized or localized, lower leg 8/14/2006     Posttraumatic stress disorder 1/1/2011       Past Surgical History:   Procedure Laterality Date     BREAST SURGERY      right  side mastectomy     C TOTAL KNEE ARTHROPLASTY Left 01/26/15     cataract extraction and lens implantation       COLONOSCOPY       EYE SURGERY       GYN SURGERY      tubal pregnancy     ORTHOPEDIC SURGERY  2009    l. hip replacement LT     ORTHOPEDIC SURGERY  july 19th 2013    Right total knee     TUBAL/ECTOPIC PREGNANCY         Family History   Problem Relation Age of Onset     DIABETES Father 75     cause of death     Other - See Comments Father      PVD     HEART DISEASE Sister      Coronary Artery Disease Sister      Other - See Comments Mother 83     old age; cause of death     Other - See Comments Sister      14 year twin [employer]; cause of death     Chronic Obstructive Pulmonary Disease Daughter      OSTEOPOROSIS Daughter      Thyroid Disease Daughter      CANCER Sister      Other Cancer Sister      Other Cancer Sister      uterine     Hypertension No family hx of      Hyperlipidemia No family hx of      CEREBROVASCULAR DISEASE No family hx of      Breast Cancer No family hx of      Prostate Cancer No family hx of      Colon Cancer No family hx of      Asthma No family hx of      Anesthesia Reaction No family hx of      Genetic Disorder No family hx of        Social History     Social History     Marital status:      Spouse name: N/A     Number of children: N/A     Years of education: N/A     Occupational History     Not on file.     Social History Main Topics     Smoking status: Former Smoker     Packs/day: 0.30     Years: 30.00     Types: Cigarettes     Quit  "date: 8/11/1987     Smokeless tobacco: Never Used      Comment: year quit 1987     Alcohol use No     Drug use: No     Sexual activity: Not on file     Other Topics Concern     Parent/Sibling W/ Cabg, Mi Or Angioplasty Before 65f 55m? No     Blood Transfusions Yes     Social History Narrative       Current Outpatient Prescriptions   Medication     alendronate (FOSAMAX) 70 MG tablet     Calcium Citrate-Vitamin D (CALCITRATE/VITAMIN D PO)     diltiazem (CARDIZEM) 60 MG tablet     losartan (COZAAR) 50 MG tablet     Misc Natural Products (OSTEO BI-FLEX ADV DOUBLE ST PO)     Multiple Vitamin (MULTIVITAMINS PO)     order for DME     simvastatin (ZOCOR) 40 MG tablet     traMADol (ULTRAM) 50 MG tablet     warfarin (COUMADIN) 5 MG tablet     Acetaminophen (TYLENOL PO)     No current facility-administered medications for this visit.         Allergies   Allergen Reactions     Atenolol Shortness Of Breath and Other (See Comments)     Dizziness  Severe vertigo and dizziness/SOB     Lisinopril Cough     Pollen Extract      Other reaction(s): Runny Nose       Review Of Systems:  Constitutional: denies fever, weight fluates, chills, and night sweats.  Eyes: see HPI  Ears/Nose/Throat: denies ear pain, nose problems, difficulty swallowing  Respiratory: denies shortness of breath, cough   Skin: denies rash, lesions  Breast/Chest wall: denies pain, lumps or discharge  Cardiovascular: denies chest pain, palpitations, edema  Gastrointestinal: denies abdominal pain  Genitourinary: denies difficulty with urination, blood in urine  Musculoskeletal:denies new muscle pain, bone pain  Neurologic: denies lightheadedness, headaches, numbness or tingling  Psychiatric: denies anxiety, depression  Hematologic/Lymphatic/Immunologic: denies easy bruising, easy bleeding, lumps or bumps noted  Endocrine: Denies increased thirst      Physical Exam:  /82  Pulse 100  Temp 97.6  F (36.4  C) (Tympanic)  Resp 18  Ht 1.651 m (5' 5\")  Wt 55.2 kg " (121 lb 9.6 oz)  SpO2 97%  BMI 20.24 kg/m2    GENERAL APPEARANCE: Thin elderly woman, alert and in no acute distress.  HEENT: Normocephalic, Sclerae anicteric. Oropharynx without ulcers, lesions, or thrush.  Right TM not visualized related to cerumen.  NECK:  No asymmetry or masses, no thyromegaly.  LYMPHATICS: No palpable cervical, supraclavicular, axillary, or inguinal nodes   RESP: Lungs clear to auscultation bilaterally, respirations regular and easy  CARDIOVASCULAR: Irregular rate and rhythm. Normal S1, S2; murmur  ABDOMEN: Soft, nontender. Bowel sounds auscultated all 4 quadrants. No palpable organomegaly or masses.  BREAST/Chest wall: no lumps, masses or tenderness bilaterally  MUSCULOSKELETAL: Extremities without gross deformities noted. No edema of bilateral lower extremities.  NEURO: Alert and oriented x 3.  Gait steady with cane  PSYCHIATRIC: Mentation and affect appear normal.  Mood appropriate.    Laboratory:  Component Value Flag Ref Range Units Status Collected Lab   WBC 10.5  4.0 - 11.0 10e9/L Final 04/25/2018 12:05 PM HI   RBC Count 4.48  3.8 - 5.2 10e12/L Final 04/25/2018 12:05 PM HI   Hemoglobin 14.2  11.7 - 15.7 g/dL Final 04/25/2018 12:05 PM HI   Hematocrit 41.6  35.0 - 47.0 % Final 04/25/2018 12:05 PM HI   MCV 93  78 - 100 fl Final 04/25/2018 12:05 PM HI   MCH 31.7  26.5 - 33.0 pg Final 04/25/2018 12:05 PM HI   MCHC 34.1  31.5 - 36.5 g/dL Final 04/25/2018 12:05 PM HI   RDW 12.3  10.0 - 15.0 % Final 04/25/2018 12:05 PM HI   Platelet Count 333  150 - 450 10e9/L Final 04/25/2018 12:05 PM HI   Diff Method     Final 04/25/2018 12:05 PM HI   Automated Method   % Neutrophils 69.2   % Final 04/25/2018 12:05 PM HI   % Lymphocytes 21.2   % Final 04/25/2018 12:05 PM HI   % Monocytes 8.2   % Final 04/25/2018 12:05 PM HI   % Eosinophils 0.5   % Final 04/25/2018 12:05 PM HI   % Basophils 0.5   % Final 04/25/2018 12:05 PM HI   % Immature Granulocytes 0.4   % Final 04/25/2018 12:05 PM HI   Nucleated RBCs 0   0 /100 Final 04/25/2018 12:05 PM HI   Absolute Neutrophil 7.3  1.6 - 8.3 10e9/L Final 04/25/2018 12:05 PM HI   Absolute Lymphocytes 2.2  0.8 - 5.3 10e9/L Final 04/25/2018 12:05 PM HI   Absolute Monocytes 0.9  0.0 - 1.3 10e9/L Final 04/25/2018 12:05 PM HI   Absolute Eosinophils 0.1  0.0 - 0.7 10e9/L Final 04/25/2018 12:05 PM HI   Absolute Basophils 0.1  0.0 - 0.2 10e9/L Final 04/25/2018 12:05 PM HI   Abs Immature Granulocytes 0.0  0 - 0.4 10e9/L Final 04/25/2018 12:05 PM HI   Absolute Nucleated RBC 0.0    Final 04/25/2018 12:05 PM HI     Component Value Flag Ref Range Units Status Collected Lab   Sodium 135  133 - 144 mmol/L Final 04/25/2018 12:05 PM HI   Potassium 4.5  3.4 - 5.3 mmol/L Final 04/25/2018 12:05 PM HI   Chloride 99  94 - 109 mmol/L Final 04/25/2018 12:05 PM HI   Carbon Dioxide 28  20 - 32 mmol/L Final 04/25/2018 12:05 PM HI   Anion Gap 8  3 - 14 mmol/L Final 04/25/2018 12:05 PM HI   Glucose 137 (H) 70 - 99 mg/dL Final 04/25/2018 12:05 PM HI   Urea Nitrogen 22  7 - 30 mg/dL Final 04/25/2018 12:05 PM HI   Creatinine 0.86  0.52 - 1.04 mg/dL Final 04/25/2018 12:05 PM HI   GFR Estimate 63  >60 mL/min/1.7m2 Final 04/25/2018 12:05 PM HI   Comment:   Non  GFR Calc   GFR Estimate If Black 76  >60 mL/min/1.7m2 Final 04/25/2018 12:05 PM HI   Comment:   African American GFR Calc   Calcium 9.3  8.5 - 10.1 mg/dL Final 04/25/2018 12:05 PM HI   Bilirubin Total 0.6  0.2 - 1.3 mg/dL Final 04/25/2018 12:05 PM HI   Albumin 3.9  3.4 - 5.0 g/dL Final 04/25/2018 12:05 PM HI   Protein Total 7.9  6.8 - 8.8 g/dL Final 04/25/2018 12:05 PM HI   Alkaline Phosphatase 80  40 - 150 U/L Final 04/25/2018 12:05 PM HI   ALT 19  0 - 50 U/L Final 04/25/2018 12:05 PM HI   AST 23  0 - 45 U/L Final 04/25/2018 12:05 PM HI     Component Value Flag Ref Range Units Status Collected Lab   CA 27-29 39  0 - 39 U/mL Final 04/25/2018 12:05 PM 51       Imaging Studies:      PROCEDURE: DX HIP/PELVIS/SPINE 5/1/2018 10:08 AM     HISTORY:  osteoporosis; Malignant neoplasm of right breast in female,  estrogen receptor positive, unspecified site of breast (H); Malignant  neoplasm of right breast in female, estrogen receptor positive,  unspecified site of breast (H); Encounter for screening mammogram for  breast cancer; Postmenopausal status; Osteoporosis, unspecified  osteoporosis type, unspecified pathological fracture presence     COMPARISONS: None.     TECHNIQUE: DEXA bone mineral density study of the right hip and lumbar  spine     FINDINGS: Bone mineral density of the right hip measured 0.584 g/sq cm  with a T score -2.9. Is been a 6.7% decline from 2016 which is  statistically significant. Bone mineral density of the femoral neck  measured 0.638 g/sq cm with a T score -1.9. Bone mineral density  lumbar spine L1 L4 measured 1.002 g/sq cm with a T score of -0.4. This  been a 3.4% improvement from 2016.          IMPRESSION: Patient is osteoporotic. The fracture risk is high. There  has been a statistically significant decline in bone mineral density  in the right hip as compared to 2016     NARDA WHITE MD    EXAM: MA SCREENING LEFT W/ YUKO, 4/25/2018 1:16 PM     COMPARISONS: April 2017     HISTORY: screeing/breast cancer in right-mastectomy; Malignant  neoplasm of right breast in female, estrogen receptor positive,  unspecified site of breast (H); Malignant neoplasm of right breast in  female, estrogen receptor positive, unspecified site of breast (H);  Encounter for screening mammogram for breast cancer; Postmenopausal  status; Osteoporosis, unspecified osteoporosis type, unspecified  pathologica     FINDINGS: Is heavy vascular calcifications. No dominant masses or  malignant calcifications are seen. Comparison with April 2017 reveals  no significant interval change     BREAST DENSITY: Heterogeneously dense.         IMPRESSION: BI-RADS CATEGORY: 1 -  Negative.     RECOMMENDED FOLLOW-UP: Annual Mammography.         ASSESSMENT/PLAN:    #1 Breast  cancer: Diagnosed 4/2010 with stage I carcinoma of the right breast; status post right mastectomy with sentinel node biopsy negative; tumor was 1.2 cm, ER/CA positive, HER-2/nery negative.  She completed 7 years of antiestrogen therapy.  BCI showed low risk and low benefit from extended hormonal therapy so the arimidex was discontinued in January 2017.  Will follow up in 1 year with a CBC, CMP, CA 27.29 along with mammogram and DEXA scan prior.        #2  Osteoporosis:  She will continue with Calcium and Vitamin D twice daily and is currently on Fosamax that was initiated 8/2017.  I'd like to switch to Prolia if insurance will cover this as worsening noted on DEXA while on Fosamax.   Will plan for DEXA scan in April 2019.     #3  Excessive ear wax, right:  Ear wash today.     I encouraged patient to call with any questions or concerns.       Noemy ROMAN, FNP-BC, AOCNP

## 2018-05-11 NOTE — NURSING NOTE
"Chief Complaint   Patient presents with     RECHECK     Follow up Malignant neoplasm of right breast in female, estrogen receptor positive, unspecified site of breast        Initial /82  Pulse 100  Temp 97.6  F (36.4  C) (Tympanic)  Resp 18  Ht 1.651 m (5' 5\")  Wt 55.2 kg (121 lb 9.6 oz)  SpO2 97%  BMI 20.24 kg/m2 Estimated body mass index is 20.24 kg/(m^2) as calculated from the following:    Height as of this encounter: 1.651 m (5' 5\").    Weight as of this encounter: 55.2 kg (121 lb 9.6 oz).  Medication Reconciliation: complete   Immunizations reviewed and up to date, information given today for health care directives, pain = 0, PHQ9 = 4.    Lindsay Zaldivar LPN    "

## 2018-05-11 NOTE — MR AVS SNAPSHOT
After Visit Summary   5/11/2018    Michael Christiansen    MRN: [id number]           Patient Information     Date Of Birth          1935        Visit Information        Provider Department      5/11/2018 10:00 AM Noemy Mahajan NP 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 Please come 1 week prior for lab work, mammogram and DEXA scan. Our diagnostic imaging department will call you to schedule your mammogram and DEXA scan.   If you have any questions please call [phone]    Denosumab Solution for injection  What is this medicine?  DENOSUMAB (den oh rafia mab) slows bone breakdown. Prolia is used to treat osteoporosis in women after menopause and in men. Xgeva is used to prevent bone fractures and other bone problems caused by cancer bone metastases. Xgeva is also used to treat giant cell tumor of the bone.  This medicine may be used for other purposes; ask your health care provider or pharmacist if you have questions.  What should I tell my health care provider before I take this medicine?  They need to know if you have any of these conditions:    dental disease    eczema    infection or history of infections    kidney disease or on dialysis    low blood calcium or vitamin D    malabsorption syndrome    scheduled to have surgery or tooth extraction    taking medicine that contains denosumab    thyroid or parathyroid disease    an unusual reaction to denosumab, other medicines, foods, dyes, or preservatives    pregnant or trying to get pregnant    breast-feeding  How should I use this medicine?  This medicine is for  injection under the skin. It is given by a health care professional in a hospital or clinic setting.  If you are getting Prolia, a special MedGuide will be given to you by the pharmacist with each prescription and refill. Be sure to read this information carefully each time.  For Prolia, talk to your pediatrician regarding the use of this medicine in children. Special care may be needed. For Xgeva, talk to your pediatrician regarding the use of this medicine in children. While this drug may be prescribed for children as young as 13 years for selected conditions, precautions do apply.  Overdosage: If you think you've taken too much of this medicine contact a poison control center or emergency room at once.  NOTE: This medicine is only for you. Do not share this medicine with others.  What if I miss a dose?  It is important not to miss your dose. Call your doctor or health care professional if you are unable to keep an appointment.  What may interact with this medicine?  Do not take this medicine with any of the following medications:    other medicines containing denosumab  This medicine may also interact with the following medications:    medicines that suppress the immune system    medicines that treat cancer    steroid medicines like prednisone or cortisone  This list may not describe all possible interactions. Give your health care provider a list of all the medicines, herbs, non-prescription drugs, or dietary supplements you use. Also tell them if you smoke, drink alcohol, or use illegal drugs. Some items may interact with your medicine.  What should I watch for while using this medicine?  Visit your doctor or health care professional for regular checks on your progress. Your doctor or health care professional may order blood tests and other tests to see how you are doing.  Call your doctor or health care professional if you get a cold or other infection while receiving this medicine. Do not treat yourself. This  medicine may decrease your body's ability to fight infection.  You should make sure you get enough calcium and vitamin D while you are taking this medicine, unless your doctor tells you not to. Discuss the foods you eat and the vitamins you take with your health care professional.  See your dentist regularly. Brush and floss your teeth as directed. Before you have any dental work done, tell your dentist you are receiving this medicine.  Do not become pregnant while taking this medicine or for 5 months after stopping it. Women should inform their doctor if they wish to become pregnant or think they might be pregnant. There is a potential for serious side effects to an unborn child. Talk to your health care professional or pharmacist for more information.  What side effects may I notice from receiving this medicine?  Side effects that you should report to your doctor or health care professional as soon as possible:    allergic reactions like skin rash, itching or hives, swelling of the face, lips, or tongue    breathing problems    chest pain    fast, irregular heartbeat    feeling faint or lightheaded, falls    fever, chills, or any other sign of infection    muscle spasms, tightening, or twitches    numbness or tingling    skin blisters or bumps, or is dry, peels, or red    slow healing or unexplained pain in the mouth or jaw    unusual bleeding or bruising  Side effects that usually do not require medical attention (Report these to your doctor or health care professional if they continue or are bothersome.):    muscle pain    stomach upset, gas  This list may not describe all possible side effects. Call your doctor for medical advice about side effects. You may report side effects to FDA at 5-171-FDA-1865.  Where should I keep my medicine?  This medicine is only given in a clinic, doctor's office, or other health care setting and will not be stored at home.  NOTE: This sheet is a summary. It may not cover all  "possible information. If you have questions about this medicine, talk to your doctor, pharmacist, or health care provider.  NOTE:This sheet is a summary. It may not cover all possible information. If you have questions about this medicine, talk to your doctor, pharmacist, or health care provider. Copyright  2016 Gold Standard       We will check with your insurance to see if Prolia is going to cover the Prolia injection for your bones          Follow-ups after your visit        Your next 10 appointments already scheduled     May 23, 2018 10:45 AM CDT   Anticoagulation Visit with HC ANTI COAGULATION   [facility] [place] ([facility] [place] )    [address]  [place] [address]   [phone]            Jun 20, 2018  9:30 AM CDT   (Arrive by 9:15 AM)   Return Visit with Cam Kirby,    [facility] [place] ([facility] [place] )    [address]  [place] [address]   [phone]            May 07, 2019  9:30 AM CDT   (Arrive by 9:15 AM)   MA SCREENING LEFT W/ YUKO with HCMA1   [facility] ([facility]    [address]  [place] [address]   [phone]           Three-dimensional (3D) mammograms are available at [place] locations in [facility], [facility], [facility], [place], [place], and [place]. [facility] locations include [place] and [facility] in [place]. Benefits of 3D mammograms include: - Improved rate of cancer detection - Decreases your chance of having to go back for more tests, which means fewer: - \"False-positive\" results (This means that there is an abnormal area but it isn't cancer.) - Invasive testing procedures, such as a biopsy or surgery - Can provide clearer images of the breast if you have dense breast tissue. 3D mammography is an optional exam that anyone can have with a 2D mammogram. It doesn't replace or take the place of a 2D mammogram. 2D mammograms " "remain an effective screening test for all women.  Not all insurance companies cover the cost of a 3D mammogram. Check with your insurance.            May 07, 2019 10:00 AM CDT   LAB with HC LAB   [facility] ([facility] )    [place] [address]   [phone]              Future tests that were ordered for you today     Open Future Orders        Priority Expected Expires Ordered    CBC with platelets differential Routine 5/1/2019 5/11/2019 5/11/2018    Comprehensive metabolic panel Routine 5/1/2019 5/11/2019 5/11/2018    Ca27.29  breast tumor marker Routine 5/1/2019 5/11/2019 5/11/2018    DX Hip/Pelvis/Spine Routine 5/1/2019 5/11/2019 5/11/2018    MA Screen Left w/Christiano Routine 4/23/2019 5/11/2019 5/11/2018            Who to contact     If you have questions or need follow up information about today's clinic visit or your schedule please contact [facility] directly at [phone].  Normal or non-critical lab and imaging results will be communicated to you by [employer], letter or phone within 4 business days after the clinic has received the results. If you do not hear from us within 7 days, please contact the clinic through [employer] or phone. If you have a critical or abnormal lab result, we will notify you by phone as soon as possible.  Submit refill requests through [employer] or call your pharmacy and they will forward the refill request to us. Please allow 3 business days for your refill to be completed.          Additional Information About Your Visit        [employer] Information     [employer] lets you send messages to your doctor, view your test results, renew your prescriptions, schedule appointments and more. To sign up, go to [email or web address] . Click on \"Log in\" on the left side of the screen, which will take you to the Welcome page. Then click on \"Sign up Now\" on the right side of the page.     You will be asked to enter the access code listed " "below, as well as some personal information. Please follow the directions to create your username and password.     Your access code is: [email or web address]  Expires: 2018 10:46 AM     Your access code will  in 90 days. If you need help or a new code, please call your [place] clinic or [phone].        Care EveryWhere ID     This is your Care EveryWhere ID. This could be used by other organizations to access your [place] medical records  [id number]        Your Vitals Were     Pulse Temperature Respirations Height Pulse Oximetry BMI (Body Mass Index)    100 97.6  F (36.4  C) (Tympanic) 18 1.651 m (5' 5\") 97% 20.24 kg/m2       Blood Pressure from Last 3 Encounters:   18 147/82   18 133/87   17 122/68    Weight from Last 3 Encounters:   18 55.2 kg (121 lb 9.6 oz)   18 55.8 kg (123 lb)   17 54.9 kg (121 lb)               Primary Care Provider Office Phone # Fax #    MELISA Murray [phone]       [address] [place] [address]        Equal Access to Services     STACEY CONSTANTINO AH: Hadii anjelica ku hadasho Soomaali, waaxda luqadaha, qaybta kaalmada adeegyada, topher salvador . So [facility] [phone].    ATENCIÓN: Si habla español, tiene a shay disposición servicios gratuitos de asistencia lingüística. Llame al [phone].    We comply with applicable federal civil rights laws and [place] laws. We do not discriminate on the basis of race, color, national origin, age, disability, sex, sexual orientation, or gender identity.            Thank you!     Thank you for choosing [facility]  for your care. Our goal is always to provide you with excellent care. Hearing back from our patients is one way we can continue to improve our services. Please take a few minutes to complete the written survey that you may receive in the mail after your visit with us. Thank you!             Your Updated Medication List - Protect " others around you: Learn how to safely use, store and throw away your medicines at www.disposemymeds.org.          This list is accurate as of 5/11/18  3:10 PM.  Always use your most recent med list.                   Brand Name Dispense Instructions for use Diagnosis    alendronate 70 MG tablet    FOSAMAX    4 tablet    Take 1 tablet (70 mg) by mouth every 7 days Take 60 minutes before am meal with 8 oz. water. Remain upright for 30 minutes.    Age related osteoporosis, unspecified pathological fracture presence       CALCITRATE/VITAMIN D PO      Take 1 tablet by mouth 2 times daily        diltiazem 60 MG tablet    CARDIZEM     Take 120 mg by mouth daily        losartan 50 MG tablet    COZAAR    30 tablet    Take 1 tablet (50 mg) by mouth daily    HTN (hypertension)       MULTIVITAMINS PO      Take 1 tablet by mouth daily        order for DME     1 each    4 wheeled walker    Chronic left-sided low back pain with left-sided sciatica       OSTEO BI-FLEX ADV DOUBLE ST PO      Take  by mouth daily.        simvastatin 40 MG tablet    ZOCOR    45 tablet    TAKE ONE-HALF TABLET BY MOUTH IN THE EVENING    Hyperlipidemia LDL goal <100       traMADol 50 MG tablet    ULTRAM    180 tablet    TAKE ONE TO TWO TABLETS BY MOUTH EVERY 6 HOURS AS NEEDED FOR PAIN    Chronic pain syndrome       TYLENOL PO      Take 500 mg by mouth every 4 hours as needed        warfarin 5 MG tablet    COUMADIN    80 tablet    5 mg Mon,Wed,Fri and 2.5mg all other days or as directed by warfarin clinic    Atrial fibrillation, unspecified type (H), Long term current use of anticoagulant therapy

## 2018-05-12 ASSESSMENT — PATIENT HEALTH QUESTIONNAIRE - PHQ9: SUM OF ALL RESPONSES TO PHQ QUESTIONS 1-9: 4

## 2018-05-13 DIAGNOSIS — G89.4 CHRONIC PAIN SYNDROME: ICD-10-CM

## 2018-05-14 NOTE — TELEPHONE ENCOUNTER
Controlled Substance Refill Request for Tramadol  Problem List Complete:  No     PROVIDER TO CONSIDER COMPLETION OF PROBLEM LIST AND OVERVIEW/CONTROLLED SUBSTANCE AGREEMENT    Last Written Prescription Date:  3/21/18  Last Fill Quantity: 180,   # refills: 0    Last Office Visit with [facility] primary care provider: 12/21/17    Future Office visit:   Next 5 appointments (look out 90 days)     Jun 20, 2018  9:30 AM CDT   (Arrive by 9:15 AM)   Return Visit with Cam Kirby,    [facility] [place] ([facility] [place] )    [address] [place] [address]  [place] [address]   [phone]                  Controlled substance agreement on file: No.     Processing:  Fax Rx to [facility] [place] pharmacy

## 2018-05-15 RX ORDER — TRAMADOL HYDROCHLORIDE 50 MG/1
TABLET ORAL
Qty: 180 TABLET | Refills: 0 | Status: SHIPPED | OUTPATIENT
Start: 2018-05-15 | End: 2018-06-27

## 2018-05-23 ENCOUNTER — ANTICOAGULATION THERAPY VISIT (OUTPATIENT)
Dept: ANTICOAGULATION | Facility: OTHER | Age: 83
End: 2018-05-23
Attending: PHYSICIAN ASSISTANT
Payer: MEDICARE

## 2018-05-23 DIAGNOSIS — I48.20 CHRONIC ATRIAL FIBRILLATION (H): ICD-10-CM

## 2018-05-23 DIAGNOSIS — Z79.01 LONG-TERM (CURRENT) USE OF ANTICOAGULANTS: ICD-10-CM

## 2018-05-23 LAB — INR POINT OF CARE: 1.9 (ref 0.86–1.14)

## 2018-05-23 PROCEDURE — 85610 PROTHROMBIN TIME: CPT | Mod: QW,ZL

## 2018-05-23 NOTE — MR AVS SNAPSHOT
Michael DEJESUS Елена   5/23/2018 10:45 AM   Anticoagulation Therapy Visit    Description:  82 year old female   Provider:   ANTI COAGULATION   Department:   Anti Coagulation           INR as of 5/23/2018     Today's INR 1.9!      Anticoagulation Summary as of 5/23/2018     INR goal 2.0-3.0   Today's INR 1.9!   Full warfarin instructions 5/23: 5 mg; Otherwise 2.5 mg on Mon, Wed, Fri; 5 mg all other days   Next INR check 6/20/2018    Indications   Long-term (current) use of anticoagulants [Z79.01] [Z79.01]  Chronic atrial fibrillation (H) [I48.2]         Your next Anticoagulation Clinic appointment(s)     May 23, 2018 10:45 AM CDT   Anticoagulation Visit with  ANTI COAGULATION   [facility]    [address] [facility] [place] [address]   [phone]            Jun 20, 2018  8:45 AM CDT   Anticoagulation Visit with  ANTI COAGULATION   [facility] ([facility] )    [address] [facility] [address]   [phone]              May 2018 Details    Sun Mon Tue Wed Thu Fri Sat       1               2               3               4               5                 6               7               8               9               10               11               12                 13               14               15               16               17               18               19                 20               21               22               23      5 mg   See details      24      5 mg         25      2.5 mg         26      5 mg           27      5 mg         28      2.5 mg         29      5 mg         30      2.5 mg         31      5 mg            Date Details   05/23 This INR check               How to take your warfarin dose     To take:  2.5 mg Take 0.5 of a 5 mg tablet.    To take:  5 mg Take 1 of the 5 mg tablets.           June 2018 Details    Sun Mon Tue Wed Thu Fri Sat          1      2.5 mg         2      5 mg           3       5 mg         4      2.5 mg         5      5 mg         6      2.5 mg         7      5 mg         8      2.5 mg         9      5 mg           10      5 mg         11      2.5 mg         12      5 mg         13      2.5 mg         14      5 mg         15      2.5 mg         16      5 mg           17      5 mg         18      2.5 mg         19      5 mg         20            21               22               23                 24               25               26               27               28               29               30                Date Details   No additional details    Date of next INR:  6/20/2018         How to take your warfarin dose     To take:  2.5 mg Take 0.5 of a 5 mg tablet.    To take:  5 mg Take 1 of the 5 mg tablets.

## 2018-05-23 NOTE — PROGRESS NOTES
ANTICOAGULATION FOLLOW-UP CLINIC VISIT    Patient Name:  Michael Christiansen  Date:  5/23/2018  Contact Type:  Face to Face    SUBJECTIVE:        OBJECTIVE    INR Protime   Date Value Ref Range Status   05/23/2018 1.9 (A) 0.86 - 1.14 Final       ASSESSMENT / PLAN  INR assessment THER    Recheck INR In: 4 WEEKS    INR Location Clinic      Anticoagulation Summary as of 5/23/2018     INR goal 2.0-3.0   Today's INR 1.9!   Warfarin maintenance plan 2.5 mg (5 mg x 0.5) on Mon, Wed, Fri; 5 mg (5 mg x 1) all other days   Full warfarin instructions 5/23: 5 mg; Otherwise 2.5 mg on Mon, Wed, Fri; 5 mg all other days   Weekly warfarin total 27.5 mg   Plan last modified Jacqueline Licea, RN (5/9/2018)   Next INR check 6/20/2018   Priority INR   Target end date Indefinite    Indications   Long-term (current) use of anticoagulants [Z79.01] [Z79.01]  Chronic atrial fibrillation (H) [I48.2]         Anticoagulation Episode Summary     INR check location     Preferred lab     Send INR reminders to  [facility] POOL    Comments       Anticoagulation Care Providers     Provider Role Specialty Phone number    Chandrika Kumar, PA [facility] [phone]            See the Encounter Report to view Anticoagulation Flowsheet and Dosing Calendar (Go to Encounters tab in chart review, and find the Anticoagulation Therapy Visit)        Kasia Hercules RN

## 2018-06-01 DIAGNOSIS — M81.0 AGE RELATED OSTEOPOROSIS, UNSPECIFIED PATHOLOGICAL FRACTURE PRESENCE: ICD-10-CM

## 2018-06-01 NOTE — LETTER
June 4, 2018      Michael Christiansen  [address] [place] [id number]        Dear Michael,     APPOINTMENT REMINDER:   Our records indicates that it is time for you to be seen for a 6 month follow-up.    Your current medication request will be approved for one refill but you will need to be seen before any additional refills can be approved. Taking care of your health is important to us, and ongoing visits with your provider are vital to your care.    We look forward to seeing you in the near future.  You may call our office at [phone] to schedule a visit.     Please disregard this notice if you have already made an appointment.        Sincerely,  MELISA Gillespie

## 2018-06-01 NOTE — TELEPHONE ENCOUNTER
Fosamax  Last Written Prescription Date: 8/9/17  Last Fill Quantity: 4 # of Refills: 11  Last Office Visit: 12/21/17

## 2018-06-04 RX ORDER — ALENDRONATE SODIUM 70 MG/1
TABLET ORAL
Qty: 4 TABLET | Refills: 0 | Status: SHIPPED | OUTPATIENT
Start: 2018-06-04 | End: 2018-06-29

## 2018-06-04 NOTE — TELEPHONE ENCOUNTER
Fosamax  Last Written Prescription Date: 8/9/17  Last Fill Quantity: 4 # of Refills: 11  Last Office Visit: 12/21/17     Osteoporosis, unspecified osteoporosis type, unspecified pathological fracture presence  She is doing much better on Fosamax weekly.  Some constipation initially and we are going to check her labs today.  She is good with calcium intake. See us back in 6 months.   - Comprehensive metabolic panel (BMP + Alb, Alk Phos, ALT, AST, Total. Bili, TP)

## 2018-06-05 ENCOUNTER — TELEPHONE (OUTPATIENT)
Dept: FAMILY MEDICINE | Facility: OTHER | Age: 83
End: 2018-06-05

## 2018-06-05 NOTE — TELEPHONE ENCOUNTER
9:21 AM    Reason for Call: OVERBOOK    Patient is having the following symptoms: Pt fall/LT side pain for 1 weeks.    The patient is requesting an appointment for ASAP with Stacy.    Was an appointment offered for this call? Yes  If yes : Appointment type short              Date  06/15/18    Preferred method for responding to this message: Telephone Call  What is your phone number ? [phone]    If we cannot reach you directly, may we leave a detailed response at the number you provided? Yes    Can this message wait until your PCP/provider returns, if unavailable today? Yes, aware provider out today    Gissell Ambrose

## 2018-06-05 NOTE — TELEPHONE ENCOUNTER
Please offer patient 11:20am appointment on 6/6/18 with DAMON Kumar for long appointment.  Ayana John, CMA

## 2018-06-06 ENCOUNTER — RADIANT APPOINTMENT (OUTPATIENT)
Dept: GENERAL RADIOLOGY | Facility: OTHER | Age: 83
End: 2018-06-06
Attending: PHYSICIAN ASSISTANT
Payer: MEDICARE

## 2018-06-06 ENCOUNTER — OFFICE VISIT (OUTPATIENT)
Dept: FAMILY MEDICINE | Facility: OTHER | Age: 83
End: 2018-06-06
Attending: PHYSICIAN ASSISTANT
Payer: MEDICARE

## 2018-06-06 VITALS
BODY MASS INDEX: 19.49 KG/M2 | OXYGEN SATURATION: 95 % | HEIGHT: 65 IN | WEIGHT: 117 LBS | HEART RATE: 79 BPM | SYSTOLIC BLOOD PRESSURE: 124 MMHG | DIASTOLIC BLOOD PRESSURE: 70 MMHG | TEMPERATURE: 97.2 F

## 2018-06-06 DIAGNOSIS — R07.81 RIB PAIN ON LEFT SIDE: ICD-10-CM

## 2018-06-06 DIAGNOSIS — L89.321 DECUBITUS ULCER OF LEFT BUTTOCK, STAGE 1: ICD-10-CM

## 2018-06-06 DIAGNOSIS — R07.81 RIB PAIN ON LEFT SIDE: Primary | ICD-10-CM

## 2018-06-06 PROCEDURE — 99214 OFFICE O/P EST MOD 30 MIN: CPT | Performed by: PHYSICIAN ASSISTANT

## 2018-06-06 PROCEDURE — G0463 HOSPITAL OUTPT CLINIC VISIT: HCPCS

## 2018-06-06 PROCEDURE — 71101 X-RAY EXAM UNILAT RIBS/CHEST: CPT | Mod: TC,LT

## 2018-06-06 ASSESSMENT — ANXIETY QUESTIONNAIRES
6. BECOMING EASILY ANNOYED OR IRRITABLE: SEVERAL DAYS
5. BEING SO RESTLESS THAT IT IS HARD TO SIT STILL: MORE THAN HALF THE DAYS
1. FEELING NERVOUS, ANXIOUS, OR ON EDGE: SEVERAL DAYS
3. WORRYING TOO MUCH ABOUT DIFFERENT THINGS: MORE THAN HALF THE DAYS
2. NOT BEING ABLE TO STOP OR CONTROL WORRYING: MORE THAN HALF THE DAYS
7. FEELING AFRAID AS IF SOMETHING AWFUL MIGHT HAPPEN: SEVERAL DAYS
4. TROUBLE RELAXING: SEVERAL DAYS
GAD7 TOTAL SCORE: 10

## 2018-06-06 ASSESSMENT — PAIN SCALES - GENERAL: PAINLEVEL: MILD PAIN (3)

## 2018-06-06 NOTE — PATIENT INSTRUCTIONS
Thank you for choosing [facility].   I have office hours 8:00 am to 4:30 pm on Monday's, Wednesday's, Thursday's and Friday's. My nurse and I are out of the office every Tuesday.    Following your visit, when your labs and diagnostic testing have returned, I will review then and you will be contacted by my nurse.  If you are on My Chart, you can also view results there.    For refills, notify your pharmacy regarding what you need and the pharmacy will generate a refill request. Do not call my nurse as she is unable to process refill request. Please plan ahead and allow 3-5 days for refill requests.    You will generally receive a reminder call the day prior to your appointment.  If you cannot attend your appointment, please cancel your appointment with as much notice as possible.  If there is a pattern of failure to present for your appointments, I cannot provide consistent, meaningful, ongoing care for you. It is very important to me that you come in for your care, so we can best assist you with your health care needs.    IMPORTANT:  Please note that it is my standard of practice to NOT participate in prescribing ongoing requested Narcotic Analgesic therapy, and/or participate in the prescribing of other controlled substances.  My nurse and I am happy to assist you with the process of referral for alternative pain management as needed, and other treatment modalities including but not limited to:  Physical Therapy, Physical Medicine and Rehab, Counseling, Chiropractic Care, Orthopedic Care, and non-narcotic medication management.     In the event that you need to be seen for emergent concerns and I am out of office,  please see one of my colleagues for acute concerns.  You may also present to  or ER.  I appreciate the opportunity to serve you and look forward to supporting your healthcare needs in the future. Please contact me with any questions or concerns that you may  have.    Sincerely,      Chandrika Kumar RN, PA-C

## 2018-06-06 NOTE — PROGRESS NOTES
SUBJECTIVE:   Michael Christiansen is a 82 year old female who presents to clinic today for the following health issues:      Musculoskeletal problem/pain      Duration: Last week    Description  Location: Left Ribs    Intensity:  moderate    Accompanying signs and symptoms: none    History  Previous similar problem: no   Previous evaluation:  none    Precipitating or alleviating factors:  Trauma or overuse: YES- Trauma fell last week  Aggravating factors include: sitting, standing, walking, climbing stairs, lifting and exercise    Therapies tried and outcome: heat and asper cream with lido and no change.    Open sore on bottom      Duration: a few months worsening.     Description (location/character/radiation): has a couple sores on her bottom becoming more painful. Not helped by her cream OTC    Intensity:  mild    Accompanying signs and symptoms: pain no sign of infection.     History (similar episodes/previous evaluation): no previous sores.  un ware of how she got them.     Precipitating or alleviating factors: None    Therapies tried and outcome: otc bacitracin.             Problem list and histories reviewed & adjusted, as indicated.  Additional history: as documented    Patient Active Problem List   Diagnosis     Hyperlipidemia LDL goal <130     HTN (hypertension)     Osteoarthritis     Chronic atrial fibrillation (H)     Chronic pain syndrome     History of total knee replacement     Radiculitis     Osteoarthritis of knee     Long-term (current) use of anticoagulants [Z79.01]     ACP (advance care planning)     Osteoporosis     Malignant neoplasm of right breast in female, estrogen receptor positive, unspecified site of breast (H)     Pulmonary hypertension     Aortic stenosis, moderate     Moderate tricuspid insufficiency     Past Surgical History:   Procedure Laterality Date     BREAST SURGERY      right  side mastectomy     C TOTAL KNEE ARTHROPLASTY Left 01/26/15     cataract extraction and lens  implantation       COLONOSCOPY       EYE SURGERY       GYN SURGERY      tubal pregnancy     ORTHOPEDIC SURGERY  2009    l. hip replacement LT     ORTHOPEDIC SURGERY  july 19th 2013    Right total knee     TUBAL/ECTOPIC PREGNANCY         Social History   Substance Use Topics     Smoking status: Former Smoker     Packs/day: 0.30     Years: 30.00     Types: Cigarettes     Quit date: 8/11/1987     Smokeless tobacco: Never Used      Comment: year quit 1987     Alcohol use No     Family History   Problem Relation Age of Onset     DIABETES Father 75     cause of death     Other - See Comments Father      PVD     HEART DISEASE Sister      Coronary Artery Disease Sister      Other - See Comments Mother 83     old age; cause of death     Other - See Comments Sister      14 year twin [employer]; cause of death     Chronic Obstructive Pulmonary Disease Daughter      OSTEOPOROSIS Daughter      Thyroid Disease Daughter      CANCER Sister      Other Cancer Sister      Other Cancer Sister      uterine     Hypertension No family hx of      Hyperlipidemia No family hx of      CEREBROVASCULAR DISEASE No family hx of      Breast Cancer No family hx of      Prostate Cancer No family hx of      Colon Cancer No family hx of      Asthma No family hx of      Anesthesia Reaction No family hx of      Genetic Disorder No family hx of          Current Outpatient Prescriptions   Medication Sig Dispense Refill     Acetaminophen (TYLENOL PO) Take 500 mg by mouth every 4 hours as needed        alendronate (FOSAMAX) 70 MG tablet TAKE ONE TABLET BY MOUTH EVERY 7 DAYS 60 MINUTES BEFORE MORNING MEAL WITH 8 OZ OF WATER. REMAIN UPRIGHT FOR 30 MINUTES. 4 tablet 0     Calcium Citrate-Vitamin D (CALCITRATE/VITAMIN D PO) Take 1 tablet by mouth 2 times daily        diltiazem (CARDIZEM) 60 MG tablet Take 120 mg by mouth daily       losartan (COZAAR) 50 MG tablet Take 1 tablet (50 mg) by mouth daily 30 tablet 8     [facility] Natural Products (OSTEO  BI-FLEX ADV DOUBLE ST PO) Take  by mouth daily.       Multiple Vitamin (MULTIVITAMINS PO) Take 1 tablet by mouth daily        order for DME 4 wheeled walker 1 each 0     simvastatin (ZOCOR) 40 MG tablet TAKE ONE-HALF TABLET BY MOUTH IN THE EVENING 45 tablet 1     traMADol (ULTRAM) 50 MG tablet TAKE 1 TO 2 TABLETS BY MOUTH EVERY 6 HOURS AS NEEDED FOR PAIN 180 tablet 0     warfarin (COUMADIN) 5 MG tablet 5 mg Mon,Wed,Fri and 2.5mg all other days or as directed by warfarin clinic 80 tablet 3     Allergies   Allergen Reactions     Atenolol Shortness Of Breath and Other (See Comments)     Dizziness  Severe vertigo and dizziness/SOB     Lisinopril Cough     Pollen Extract      Other reaction(s): Runny Nose     Recent Labs   Lab Test  04/25/18   1205  12/27/17   1215  07/20/17   1004   06/21/17   1025   10/27/14   1450   LDL   --    --    --    --   77   --    --    HDL   --    --    --    --   76   --    --    TRIG   --    --    --    --   61   --    --    ALT  19  26  19   --    --    < >  22   CR  0.86  0.76  0.91   < >   --    < >  0.92   GFRESTIMATED  63  73  59*   < >   --    < >  59*   GFRESTBLACK  76  88  71   < >   --    < >  71   POTASSIUM  4.5  4.0  4.3   < >   --    < >  4.1   TSH   --    --    --    --    --    --   0.56    < > = values in this interval not displayed.      BP Readings from Last 3 Encounters:   06/06/18 124/70   05/11/18 147/82   01/03/18 133/87    Wt Readings from Last 3 Encounters:   06/06/18 117 lb (53.1 kg)   05/11/18 121 lb 9.6 oz (55.2 kg)   01/03/18 123 lb (55.8 kg)                    Reviewed and updated as needed this visit by clinical staff  Tobacco  Allergies  Meds  Med Hx  Surg Hx  Fam Hx  Soc Hx      Reviewed and updated as needed this visit by Provider         ROS:  Constitutional, neuro, ENT, endocrine, pulmonary, cardiac, gastrointestinal, genitourinary, musculoskeletal, integument and psychiatric systems are negative, except as otherwise noted.    OBJECTIVE:                "                                     /70  Pulse 79  Temp 97.2  F (36.2  C)  Ht 5' 5\" (1.651 m)  Wt 117 lb (53.1 kg)  SpO2 95%  BMI 19.47 kg/m2  Body mass index is 19.47 kg/(m^2).  GENERAL APPEARANCE: healthy, alert, mild distress and uncomfortable in seated position.   EYES: Eyes grossly normal to inspection, PERRL and conjunctivae and sclerae normal  HENT: ear canals and TM's normal and nose and mouth without ulcers or lesions  NECK: no adenopathy, no asymmetry, masses, or scars and thyroid normal to palpation  RESP: lungs clear to auscultation - no rales, rhonchi or wheezes and pain with inspiration. Rated 4/10.   CV: no murmur, click or rub and irregularly irregular rhythm  LYMPHATICS: no cervical adenopathy  MS: extremities normal- no gross deformities noted  SKIN: no suspicious lesions or rashes  PSYCH: mentation appears normal and affect normal/bright    Diagnostic test results:  Diagnostic Test Results:  No results found for this or any previous visit (from the past 24 hour(s)).  Results for orders placed or performed in visit on 05/23/18   INR point of care   Result Value Ref Range    INR Protime 1.9 (A) 0.86 - 1.14        ASSESSMENT/PLAN:                                                    1. Rib pain on left side  Not much rib to see. She shows compression in spine and is painful.  Has a pain control program. We will refill her Ultram to fill. Use tylenol. See us back   - XR RIBS & CHEST LT G/E 3VW (Clinic Performed); Future    2. Decubitus ulcer of left buttock, stage 1  See wound care.  Has stage one decubiti.  Painful will have her see them for recommendations at home and how to avoid further issues.       See Patient Instructions    MELISA Gillespie  [facility] [place]  "

## 2018-06-06 NOTE — MR AVS SNAPSHOT
After Visit Summary   6/6/2018    Michael Christiansen    MRN: [id number]           Patient Information     Date Of Birth          1935        Visit Information        Provider Department      6/6/2018 11:20 AM Chandrika Kumar PA [facility]        Today's Diagnoses     Rib pain on left side    -  1    Decubitus ulcer of left buttock, stage 1          Care Instructions      Thank you for choosing [facility].   I have office hours 8:00 am to 4:30 pm on Monday's, Wednesday's, Thursday's and Friday's. My nurse and I are out of the office every Tuesday.    Following your visit, when your labs and diagnostic testing have returned, I will review then and you will be contacted by my nurse.  If you are on My Chart, you can also view results there.    For refills, notify your pharmacy regarding what you need and the pharmacy will generate a refill request. Do not call my nurse as she is unable to process refill request. Please plan ahead and allow 3-5 days for refill requests.    You will generally receive a reminder call the day prior to your appointment.  If you cannot attend your appointment, please cancel your appointment with as much notice as possible.  If there is a pattern of failure to present for your appointments, I cannot provide consistent, meaningful, ongoing care for you. It is very important to me that you come in for your care, so we can best assist you with your health care needs.    IMPORTANT:  Please note that it is my standard of practice to NOT participate in prescribing ongoing requested Narcotic Analgesic therapy, and/or participate in the prescribing of other controlled substances.  My nurse and I am happy to assist you with the process of referral for alternative pain management as needed, and other treatment modalities including but not limited to:  Physical Therapy, Physical Medicine and Rehab, Counseling, Chiropractic Care, Orthopedic Care, and  non-narcotic medication management.     In the event that you need to be seen for emergent concerns and I am out of office,  please see one of my colleagues for acute concerns.  You may also present to UC or ER.  I appreciate the opportunity to serve you and look forward to supporting your healthcare needs in the future. Please contact me with any questions or concerns that you may have.    Sincerely,      Chandrika Kumar RN, PA-C               Follow-ups after your visit        Additional Services     WOUND CARE REFERRAL ([place])       WOUND/OSTOMY CENTER ([facility]) TO EVALUATE, INITIATE TREATMENT BASED ON WOUND OSTOMY PROTOCOL, AND RECOMMEND AND REVISE AS NEEDED AN INDIVIDUALIZED TREATMENT CARE PLAN:    Conservative Sharp Debridement of non-viable and loose necrotic tissue  Topical 5% Lidocaine (Xylocaine) ointment - for pain associated with wound care/debridement; apply thin layer   Dakin's Solution (Sodium Hypochlorite Topical 0.125%) to cleanse wounds with odor & signs of infection  Antifungal Powder and Cream (miconazole (Micatin) 2%) - applied topically to areas of fungal dermatitis  Cadexomer Iodine (Iodosorb) 0.9% Topical Gel apply topically to wound beds with slough/soft necrotic tissue with suspected bioburden  Collagenase Enzymatic Debrider (Santyl ointment) - 250units/gram apply topically to wound beds with necrotic debris/slough;   Acetic Acid 0.25% to cleanse wounds with odor and signs of infection  Lidocaine Hydrochloride (Xylocaine) topical solution 4% to assist with Wound Vac dressing remove  Silver Sulfadiazine (Silvadene) topical cream; apply topically to burns/wound beds  Hydrocortisone (Cortaid) 1% cream; apply topically to areas with dermatitis  Silver nitrate (Arazol) topical stick to control minor bleeding, and applied to epibole and hypergranular tissue  Negative Pressure Wound Therapy (Wound Vac) (additional physician order needed)  Triple Antibiotic (Neosporin) ointment for wounds with signs  of infection or at risk for infection  Topical agents, dressings, or products per Wound Ostomy Protocol Clinical Guidelines  Compression therapy as indicated for lower extremity ulcers caused by venous insufficiency or complicated by edema.  SANFORD above 0.8 - medium compression; SANFORD 0.6 - 0.8 - light compression  Therapeutic support surfaces for bed and/or chair and off-loading devices to minimize pressure    DIAGNOSTICS THAT MAY BE ORDERED BY CWON OR APRN; ORDERS TO BE PLACED UNDER THE REFERRING PROVIDER OR APRN FOR REVIEW AND ANY NEEDED ACTION:    Wound culture when signs of infection or colonization are present  Ankle Brachial Index (SANFORD) in patients with Lower Extremity ulcerations; Toe Brachial Index (TBI) for diabetics as indicated  Hgb A1c for diabetics or those suspected of undiagnosed diabetes  Albumin or Prealbumin if nutritional concerns   Urinalysis/Urine Culture (UA/UC) if urinary tract infection (UTI) suspected in Urostomy patient  Clostridium difficile toxin B PCR panel stool sample if suspected in Fecal Ostomy patient    ANCILLARY CONSULTS AS NEEDED:  Pedorithist/Prosthetist for specialty shoes/orthotic  Diabetes Education for improved blood glucose during wound healing  Medical Nutrition Therapy for diabetes management and wound and/or ostomy healing nutrition   Physical Therapy/Occupational Therapy - assess tissue loads & need for positioning devices, mobility safety/ ADL's, lymphedema treatment    OSTOMY ONLY:  Preoperative stoma site marking if requested by surgeon  Equipment selection/Ostomy supplies based on assessment  Treatment of stomal and peristomal complications per Wound Ostomy Protocol Clinical Guidelines  Colostomy Irrigation routine for constipation: Irrigate with 500-1000mL warm tap water per patient rountine. Do not exceed 1000mL without physician consultation.  Ileostomy Lavage with lubricated soft catheter by instilling 30-50mL normal saline at a time until resolved. May take 1-2  "hours with repeat attempts; if not resolved at that point notify surgeon.    NOTE EXCLUSIONS FROM ABOVE ITEMS HERE:     MELISA Gillespie                  Your next 10 appointments already scheduled     Jun 08, 2018  8:00 AM CDT   Ech Complete with HIECH1   [facility] Echo ([facility] )    [address]  [facility] [id number] [phone]           1. Please bring or wear a comfortable two-piece outfit. 2. You may eat, drink and take your normal medicines. 3. For any questions that cannot be answered, please contact the ordering physician            Jun 20, 2018  8:45 AM CDT   Anticoagulation Visit with HC ANTI COAGULATION   [facility] )    [address]  [place] [address]   [phone]            Jun 20, 2018  9:30 AM CDT   (Arrive by 9:15 AM)   Return Visit with Cam Kirby,    [facility] ([facility] )    [address]  [place] [address]   [phone]            May 07, 2019  9:30 AM CDT   LAB with HC LAB   [facility] ([facility] )    [address]  [place] [address]   [phone]            May 07, 2019 10:00 AM CDT   MA SCREENING LEFT W/ YUKO with HCMA1   [facility] ([facility] )    [address]  [facility] [address]   [phone]           Three-dimensional (3D) mammograms are available at [place] locations in [facility], [facility], [place], [place], and [place]. -Health locations include [facility] in [place]. Benefits of 3D mammograms include: - Improved rate of cancer detection - Decreases your chance of having to go back for more tests, which means fewer: - \"False-positive\" results (This means that there is an abnormal area but it isn't cancer.) - Invasive testing procedures, such as a biopsy or surgery - Can provide clearer images of the " "breast if you have dense breast tissue. 3D mammography is an optional exam that anyone can have with a 2D mammogram. It doesn't replace or take the place of a 2D mammogram. 2D mammograms remain an effective screening test for all women.  Not all insurance companies cover the cost of a 3D mammogram. Check with your insurance.            May 07, 2019 10:30 AM CDT   DX HIP/PELVIS/SPINE with HIDX1   [facility] DEXA ([facility] )    [address]  [place] [address] [phone]           Please do not take any of the following 24 hours prior to the day of your exam: vitamins, calcium tablets, antacids.  If possible, please wear clothes without metal (snaps, zippers). A sweatsuit works well.            May 14, 2019 10:00 AM CDT   (Arrive by 9:45 AM)   Return Visit with oNemy Mahajan NP   [facility] )    [address] [facility] [address]   [phone]              Who to contact     If you have questions or need follow up information about today's clinic visit or your schedule please contact [facility] directly at [phone].  Normal or non-critical lab and imaging results will be communicated to you by MyChart, letter or phone within 4 business days after the clinic has received the results. If you do not hear from us within 7 days, please contact the clinic through MyChart or phone. If you have a critical or abnormal lab result, we will notify you by phone as soon as possible.  Submit refill requests through [employer] or call your pharmacy and they will forward the refill request to us. Please allow 3 business days for your refill to be completed.          Additional Information About Your Visit        MyChart Information     [employer] lets you send messages to your doctor, view your test results, renew your prescriptions, schedule appointments and more. To sign up, go to [email or web address] . Click on \"Log in\" on the left side of the " "screen, which will take you to the Welcome page. Then click on \"Sign up Now\" on the right side of the page.     You will be asked to enter the access code listed below, as well as some personal information. Please follow the directions to create your username and password.     Your access code is: [email or web address]  Expires: 2018 10:46 AM     Your access code will  in 90 days. If you need help or a new code, please call your [place] clinic or [phone].        Care EveryWhere ID     This is your Care EveryWhere ID. This could be used by other organizations to access your [place] medical records  [id number]        Your Vitals Were     Pulse Temperature Height Pulse Oximetry BMI (Body Mass Index)       79 97.2  F (36.2  C) 5' 5\" (1.651 m) 95% 19.47 kg/m2        Blood Pressure from Last 3 Encounters:   18 124/70   18 147/82   18 133/87    Weight from Last 3 Encounters:   18 117 lb (53.1 kg)   18 121 lb 9.6 oz (55.2 kg)   18 123 lb (55.8 kg)              We Performed the Following     WOUND CARE REFERRAL ([place])        Primary Care Provider Office Phone # Fax #    MELISA Murray [phone]       [address] [place] [address]  [place] [address]        Equal Access to Services     [facility] AH: Hadii anjelica fariaso Sorudi, waaxda luqadaha, qaybta kaalmada adeegyada, topher salvador . So [facility] [phone].    ATENCIÓN: Si habla español, tiene a shay disposición servicios gratuitos de asistencia lingüística. Gera al [phone].    We comply with applicable federal civil rights laws and [place] laws. We do not discriminate on the basis of race, color, national origin, age, disability, sex, sexual orientation, or gender identity.            Thank you!     Thank you for choosing [facility] [place]  for your care. Our goal is always to provide you with excellent care. Hearing back from our patients is one way we can continue " to improve our services. Please take a few minutes to complete the written survey that you may receive in the mail after your visit with us. Thank you!             Your Updated Medication List - Protect others around you: Learn how to safely use, store and throw away your medicines at www.disposemymeds.org.          This list is accurate as of 6/6/18 12:13 PM.  Always use your most recent med list.                   Brand Name Dispense Instructions for use Diagnosis    alendronate 70 MG tablet    FOSAMAX    4 tablet    TAKE ONE TABLET BY MOUTH EVERY 7 DAYS 60 MINUTES BEFORE MORNING MEAL WITH 8 OZ OF WATER. REMAIN UPRIGHT FOR 30 MINUTES.    Age related osteoporosis, unspecified pathological fracture presence       CALCITRATE/VITAMIN D PO      Take 1 tablet by mouth 2 times daily        diltiazem 60 MG tablet    CARDIZEM     Take 120 mg by mouth daily        losartan 50 MG tablet    COZAAR    30 tablet    Take 1 tablet (50 mg) by mouth daily    HTN (hypertension)       MULTIVITAMINS PO      Take 1 tablet by mouth daily        order for DME     1 each    4 wheeled walker    Chronic left-sided low back pain with left-sided sciatica       OSTEO BI-FLEX ADV DOUBLE ST PO      Take  by mouth daily.        simvastatin 40 MG tablet    ZOCOR    45 tablet    TAKE ONE-HALF TABLET BY MOUTH IN THE EVENING    Hyperlipidemia LDL goal <100       traMADol 50 MG tablet    ULTRAM    180 tablet    TAKE 1 TO 2 TABLETS BY MOUTH EVERY 6 HOURS AS NEEDED FOR PAIN    Chronic pain syndrome       TYLENOL PO      Take 500 mg by mouth every 4 hours as needed        warfarin 5 MG tablet    COUMADIN    80 tablet    5 mg Mon,Wed,Fri and 2.5mg all other days or as directed by warfarin clinic    Atrial fibrillation, unspecified type (H), Long term current use of anticoagulant therapy

## 2018-06-07 ASSESSMENT — ANXIETY QUESTIONNAIRES: GAD7 TOTAL SCORE: 10

## 2018-06-07 ASSESSMENT — PATIENT HEALTH QUESTIONNAIRE - PHQ9: SUM OF ALL RESPONSES TO PHQ QUESTIONS 1-9: 5

## 2018-06-08 ENCOUNTER — HOSPITAL ENCOUNTER (OUTPATIENT)
Dept: CARDIOLOGY | Facility: HOSPITAL | Age: 83
Discharge: HOME | End: 2018-06-08
Attending: INTERNAL MEDICINE | Admitting: INTERNAL MEDICINE
Payer: MEDICARE

## 2018-06-08 DIAGNOSIS — I35.0 AORTIC STENOSIS, MODERATE: ICD-10-CM

## 2018-06-08 DIAGNOSIS — I27.20 PULMONARY HYPERTENSION (H): ICD-10-CM

## 2018-06-08 PROCEDURE — 93306 TTE W/DOPPLER COMPLETE: CPT | Mod: 26 | Performed by: INTERNAL MEDICINE

## 2018-06-08 PROCEDURE — 93306 TTE W/DOPPLER COMPLETE: CPT | Mod: TC

## 2018-06-11 DIAGNOSIS — I48.20 CHRONIC ATRIAL FIBRILLATION (H): ICD-10-CM

## 2018-06-11 DIAGNOSIS — I07.1 MODERATE TRICUSPID INSUFFICIENCY: ICD-10-CM

## 2018-06-11 DIAGNOSIS — I35.0 AORTIC STENOSIS, MODERATE: ICD-10-CM

## 2018-06-11 DIAGNOSIS — I27.20 PULMONARY HYPERTENSION (H): Primary | ICD-10-CM

## 2018-06-13 ENCOUNTER — OFFICE VISIT (OUTPATIENT)
Dept: CARDIOLOGY | Facility: OTHER | Age: 83
End: 2018-06-13
Attending: NURSE PRACTITIONER
Payer: MEDICARE

## 2018-06-13 VITALS
TEMPERATURE: 98.6 F | OXYGEN SATURATION: 95 % | HEIGHT: 64 IN | RESPIRATION RATE: 16 BRPM | HEART RATE: 85 BPM | BODY MASS INDEX: 19.63 KG/M2 | SYSTOLIC BLOOD PRESSURE: 126 MMHG | DIASTOLIC BLOOD PRESSURE: 74 MMHG | WEIGHT: 115 LBS

## 2018-06-13 DIAGNOSIS — I07.1 MODERATE TRICUSPID INSUFFICIENCY: ICD-10-CM

## 2018-06-13 DIAGNOSIS — I48.21 PERMANENT ATRIAL FIBRILLATION (H): Primary | ICD-10-CM

## 2018-06-13 DIAGNOSIS — I35.0 AORTIC STENOSIS, MODERATE: ICD-10-CM

## 2018-06-13 DIAGNOSIS — E78.5 HYPERLIPIDEMIA LDL GOAL <130: ICD-10-CM

## 2018-06-13 DIAGNOSIS — I10 ESSENTIAL HYPERTENSION: ICD-10-CM

## 2018-06-13 DIAGNOSIS — I27.20 PULMONARY HYPERTENSION (H): ICD-10-CM

## 2018-06-13 DIAGNOSIS — Z79.01 ANTICOAGULATED ON COUMADIN: ICD-10-CM

## 2018-06-13 DIAGNOSIS — Z79.01 LONG-TERM (CURRENT) USE OF ANTICOAGULANTS: ICD-10-CM

## 2018-06-13 PROCEDURE — G0463 HOSPITAL OUTPT CLINIC VISIT: HCPCS

## 2018-06-13 PROCEDURE — 99214 OFFICE O/P EST MOD 30 MIN: CPT | Performed by: INTERNAL MEDICINE

## 2018-06-13 RX ORDER — DILTIAZEM HYDROCHLORIDE 180 MG/1
180 CAPSULE, EXTENDED RELEASE ORAL DAILY
Qty: 30 CAPSULE | Refills: 11 | Status: SHIPPED | OUTPATIENT
Start: 2018-06-13 | End: 2019-06-21

## 2018-06-13 ASSESSMENT — PAIN SCALES - GENERAL: PAINLEVEL: MODERATE PAIN (4)

## 2018-06-13 NOTE — MR AVS SNAPSHOT
After Visit Summary   6/13/2018    Michael Christiansen    MRN: [id number]           Patient Information     Date Of Birth          1935        Visit Information        Provider Department      6/13/2018 2:30 PM Cam Kirby, DO [facility]        Care Instructions    You were seen by Dr. Kirby, 6/13/2018.     1.  Please continue all medications as they have been prescribed.      2.  You will have a repeat echocardiogram in one year prior to your appointment. The hospital scheduling department will call to schedule this appointment.     3. A refill of Diltiazem has been sent to your pharmacy.       You will follow up with Dr. Kirby in 1 year sooner if your symptoms change or worsen.      Please call the cardiology office with problems, questions, or concerns at [phone].    If you experience chest pain, chest pressure, chest tightness, shortness of breath, fainting, lightheadedness, nausea, vomiting, or other concerning symptoms, please report to the Emergency Department or call 911. These symptoms may be emergent, and best treated in the Emergency Department.       Faby MCCORD RN-BSN  Cardiology   [facility]  [phone]              Follow-ups after your visit        Your next 10 appointments already scheduled     Bhupinder 15, 2018  9:00 AM CDT   (Arrive by 8:45 AM)   New Visit with Genesis Tavares NP   [facility] ([facility] )    [address] [place] [address]   [phone]            Jun 20, 2018  8:45 AM CDT   Anticoagulation Visit with HC ANTI COAGULATION   [facility] ([facility] )    [address]  [place] [address]   [phone]            May 07, 2019  9:30 AM CDT   LAB with HC LAB   [facility] ([facility] )    [address]  [place] [address]   [phone]            May 07, 2019 10:00 AM CDT   MA SCREENING LEFT W/ YUOK with HCMA1  "  [facility] ([facility] )    [address]  [facility] [address]   [phone]           Three-dimensional (3D) mammograms are available at [place] locations in [facility], [place], [place], [facility], [place], [place], and [place]. -Health locations include [place] and [facility] in [place]. Benefits of 3D mammograms include: - Improved rate of cancer detection - Decreases your chance of having to go back for more tests, which means fewer: - \"False-positive\" results (This means that there is an abnormal area but it isn't cancer.) - Invasive testing procedures, such as a biopsy or surgery - Can provide clearer images of the breast if you have dense breast tissue. 3D mammography is an optional exam that anyone can have with a 2D mammogram. It doesn't replace or take the place of a 2D mammogram. 2D mammograms remain an effective screening test for all women.  Not all insurance companies cover the cost of a 3D mammogram. Check with your insurance.            May 07, 2019 10:30 AM CDT   DX HIP/PELVIS/SPINE with HIDX1   [facility] ([facility] )    [address] [facility] [id number] [phone]           Please do not take any of the following 24 hours prior to the day of your exam: vitamins, calcium tablets, antacids.  If possible, please wear clothes without metal (snaps, zippers). A sweatsuit works well.            May 14, 2019 10:00 AM CDT   (Arrive by 9:45 AM)   Return Visit with Noemy Mahajan NP   [facility] ([facility] )    [address] [facility] [address]   [phone]            Jun 12, 2019  9:30 AM CDT   (Arrive by 9:15 AM)   Return Visit with Cam Kirby,    [facility] ([facility] )    [address] [facility] [address]   [phone]              Who to contact     If you have questions or need follow up information " "about today's clinic visit or your schedule please contact [facility] directly at [phone].  Normal or non-critical lab and imaging results will be communicated to you by MyChart, letter or phone within 4 business days after the clinic has received the results. If you do not hear from us within 7 days, please contact the clinic through MyChart or phone. If you have a critical or abnormal lab result, we will notify you by phone as soon as possible.  Submit refill requests through [employer] or call your pharmacy and they will forward the refill request to us. Please allow 3 business days for your refill to be completed.          Additional Information About Your Visit        Care EveryWhere ID     This is your Care EveryWhere ID. This could be used by other organizations to access your [place] medical records  [id number]        Your Vitals Were     Pulse Temperature Respirations Height Pulse Oximetry BMI (Body Mass Index)    85 98.6  F (37  C) (Tympanic) 16 1.626 m (5' 4\") 95% 19.74 kg/m2       Blood Pressure from Last 3 Encounters:   06/13/18 126/74   06/06/18 124/70   05/11/18 147/82    Weight from Last 3 Encounters:   06/13/18 52.2 kg (115 lb)   06/06/18 53.1 kg (117 lb)   05/11/18 55.2 kg (121 lb 9.6 oz)              Today, you had the following     No orders found for display         Today's Medication Changes          These changes are accurate as of 6/13/18  3:22 PM.  If you have any questions, ask your nurse or doctor.               Stop taking these medicines if you haven't already. Please contact your care team if you have questions.     diltiazem 60 MG tablet   Commonly known as:  CARDIZEM   Stopped by:  Cam Kirby, DO                    Primary Care Provider Office Phone # Fax #    MELISA Murray [phone]       [address] [place] [address]        Equal Access to Services     STACEY CONSTANTINO AH: levi Gardiner, " jenn nowak chulatopher bradley ah. So [facility] [phone].    ATENCIÓN: Si kal negron, tiene a shay disposición servicios gratuitos de asistencia lingüística. Gera al [phone].    We comply with applicable federal civil rights laws and [place] laws. We do not discriminate on the basis of race, color, national origin, age, disability, sex, sexual orientation, or gender identity.            Thank you!     Thank you for choosing [facility]  for your care. Our goal is always to provide you with excellent care. Hearing back from our patients is one way we can continue to improve our services. Please take a few minutes to complete the written survey that you may receive in the mail after your visit with us. Thank you!             Your Updated Medication List - Protect others around you: Learn how to safely use, store and throw away your medicines at www.disposemymeds.org.          This list is accurate as of 6/13/18  3:22 PM.  Always use your most recent med list.                   Brand Name Dispense Instructions for use Diagnosis    alendronate 70 MG tablet    FOSAMAX    4 tablet    TAKE ONE TABLET BY MOUTH EVERY 7 DAYS 60 MINUTES BEFORE MORNING MEAL WITH 8 OZ OF WATER. REMAIN UPRIGHT FOR 30 MINUTES.    Age related osteoporosis, unspecified pathological fracture presence       CALCITRATE/VITAMIN D PO      Take 1 tablet by mouth 2 times daily        losartan 50 MG tablet    COZAAR    30 tablet    Take 1 tablet (50 mg) by mouth daily    HTN (hypertension)       MULTIVITAMINS PO      Take 1 tablet by mouth daily        order for DME     1 each    4 wheeled walker    Chronic left-sided low back pain with left-sided sciatica       OSTEO BI-FLEX ADV DOUBLE ST PO      Take  by mouth daily.        simvastatin 40 MG tablet    ZOCOR    45 tablet    TAKE ONE-HALF TABLET BY MOUTH IN THE EVENING    Hyperlipidemia LDL goal <100       traMADol 50 MG tablet    ULTRAM    180 tablet     TAKE 1 TO 2 TABLETS BY MOUTH EVERY 6 HOURS AS NEEDED FOR PAIN    Chronic pain syndrome       TYLENOL PO      Take 500 mg by mouth every 4 hours as needed        warfarin 5 MG tablet    COUMADIN    80 tablet    5 mg Mon,Wed,Fri and 2.5mg all other days or as directed by warfarin clinic    Atrial fibrillation, unspecified type (H), Long term current use of anticoagulant therapy

## 2018-06-13 NOTE — PROGRESS NOTES
Cardiology Progress Note     Assessment & Plan   Michael Christiansen is a 82 year old female who is being seen by cardiology with concerns for atrial fibrillation, moderate AS, moderate TR, and moderate pulmonary hypertension.  She is here for her echo results which were completed on 6/8/18.     Impression:  1.  Persistent atrial fibrillation.  2.  Pulmonary hypertension-moderate.  3.  Moderate tricuspid regurgitation.  4.  Moderate aortic stenosis.  5.  Hypertension.  6.  Hyperlipidemia..     PLAN:   1.  Repeat echo in 1 year to reassess moderate aortic stenosis, moderate tricuspid regurgitation, and moderate pulmonary hypertension.  2.  She will have her diltiazem refilled for a year today.  3.  She will continue Coumadin for atrial fibrillation.  4.  She will be seen 1 year follow-up after the echocardiogram is completed.    Cam Kirby    Interval History   Michael is being seen in follow-up to visit from 12/20/17.  She has a history of permanent atrial fibrillation currently on Coumadin and diltiazem.  Previously, she was on diltiazem 60 mg, 3 times a day.  This was changed to a daily dose of diltiazem.  She denies any palpitations, fluttering, or racing sensation in her chest.  She has not been dizzy, lightheaded, had near syncope, or syncope.  She has not any chest pain, chest tightness, or chest discomfort.  He has minimal to no lower extremity edema.  She is largely without complaints.    We did review her echo today.  She had an echo on 6/8/18 which showed an EF 55-60%, atrial fibrillation, mild left and right atrial enlargement, mild mitral insufficiency, mild aortic insufficiency, moderate aortic stenosis, moderate tricuspid regurgitation, and RVSP of 49.    Recently, she had been following up with  Dr. Moreira in cardiology through [facility] on 7/10/12.  She was diagnosed with atrial fibrillation at that time. She's been on diltiazem 60 mg 3 times a day and Coumadin. She's been on Coumadin since  diagnosis. She has not been cardioverted or been on antiarrhythmics in the past.      She is minimally short of breath with climbing stairs with considerable exertion. Otherwise, she has no additional complaints.    Physical Exam   Temp: 98.6  F (37  C) Temp src: Tympanic BP: 126/74 Pulse: 85   Resp: 16 SpO2: 95 %      Vitals:    06/13/18 1436   Weight: 52.2 kg (115 lb)     Vital Signs with Ranges  Temp:  [98.6  F (37  C)] 98.6  F (37  C)  Pulse:  [85] 85  Resp:  [16] 16  BP: (126)/(74) 126/74  SpO2:  [95 %] 95 %  ROS is negative except that which was noted in the HPI.          Constitutional: awake, alert, cooperative, no apparent distress, and appears stated age  Eyes: Lids and lashes normal, pupils equal, sclera clear, conjunctiva normal  ENT: Normocephalic, without obvious abnormality, atraumatic.  Respiratory: No increased work of breathing, good air exchange, clear to auscultation bilaterally, no crackles or wheezing  Cardiovascular: Normal apical impulse, regular rate and rhythm, normal S1 and S2, no S3 or S4, and no murmur noted  GI: No scars, normal bowel sounds, soft.  Musculoskeletal: Minimal lower extremity edema.  Neurologic: Awake, alert, oriented to name, place and time.  Neuropsychiatric: General: normal, calm and normal eye contact    Medications         Data   No results found for this or any previous visit (from the past 24 hour(s)).

## 2018-06-13 NOTE — PATIENT INSTRUCTIONS
You were seen by Dr. Kirby, 6/13/2018.     1.  Please continue all medications as they have been prescribed.      2.  You will have a repeat echocardiogram in one year prior to your appointment. The hospital scheduling department will call to schedule this appointment.     3. A refill of Diltiazem has been sent to your pharmacy.       You will follow up with Dr. Kirby in 1 year sooner if your symptoms change or worsen.      Please call the cardiology office with problems, questions, or concerns at [phone].    If you experience chest pain, chest pressure, chest tightness, shortness of breath, fainting, lightheadedness, nausea, vomiting, or other concerning symptoms, please report to the Emergency Department or call 911. These symptoms may be emergent, and best treated in the Emergency Department.       Faby MCCORD RN-BSN  Cardiology   [facility]  [phone]

## 2018-06-13 NOTE — NURSING NOTE
"Chief Complaint   Patient presents with     RECHECK     6 month follow-up       Initial /74 (BP Location: Left arm, Patient Position: Chair, Cuff Size: Adult Regular)  Pulse 85  Temp 98.6  F (37  C) (Tympanic)  Resp 16  Ht 1.626 m (5' 4\")  Wt 52.2 kg (115 lb)  SpO2 95%  BMI 19.74 kg/m2 Estimated body mass index is 19.74 kg/(m^2) as calculated from the following:    Height as of this encounter: 1.626 m (5' 4\").    Weight as of this encounter: 52.2 kg (115 lb).  Medication Reconciliation: complete    Margareth Glass LPN    "

## 2018-06-15 ENCOUNTER — OFFICE VISIT (OUTPATIENT)
Dept: WOUND CARE | Facility: OTHER | Age: 83
End: 2018-06-15
Attending: PHYSICIAN ASSISTANT
Payer: MEDICARE

## 2018-06-15 VITALS — HEART RATE: 110 BPM | SYSTOLIC BLOOD PRESSURE: 131 MMHG | TEMPERATURE: 98 F | DIASTOLIC BLOOD PRESSURE: 80 MMHG

## 2018-06-15 DIAGNOSIS — L89.312 DECUBITUS ULCER OF RIGHT BUTTOCK, STAGE 2 (H): ICD-10-CM

## 2018-06-15 PROCEDURE — G0463 HOSPITAL OUTPT CLINIC VISIT: HCPCS

## 2018-06-15 PROCEDURE — 99213 OFFICE O/P EST LOW 20 MIN: CPT | Performed by: NURSE PRACTITIONER

## 2018-06-15 ASSESSMENT — PAIN SCALES - GENERAL: PAINLEVEL: MILD PAIN (2)

## 2018-06-15 NOTE — MR AVS SNAPSHOT
After Visit Summary   6/15/2018    Michael Christiansen    MRN: [id number]           Patient Information     Date Of Birth          1935        Visit Information        Provider Department      6/15/2018 9:00 AM Genesis Tavares NP [facility]        Today's Diagnoses     Decubitus ulcer of right buttock, stage 2          Care Instructions    Wash hands prior to dressing change.   Clean instruments as appropriate    Wash wound with mild soap and water, dry  Apply non-zinc barrier cream at least twice daily  Offload as much as possible      Please call if any questions/concerns and/or problems ([phone])    Follow up as needed    Three building blocks of wound healing  1. Protein (if not contraindicated)  2. Vitamin C 500 mg oral twice a day  3.  Zinc 220 mg oral daily           Follow-ups after your visit        Follow-up notes from your care team     Return in about 1 week (around 6/22/2018).      Your next 10 appointments already scheduled     Jul 06, 2018  9:00 AM CDT   (Arrive by 8:45 AM)   Return Visit with Genesis Tavares NP   [facility] ([facility] [place] [facility] [place] )    [address]  [place] [address]   [phone]            Jul 11, 2018  8:45 AM CDT   Anticoagulation Visit with HC ANTI COAGULATION   [facility] ([facility] [place] )    [address]  [place] [address]   [phone]            Dec 31, 2018 10:00 AM CST   LAB with HC LAB   [facility] ([facility] [place] )    [address]  [place] [address]   [phone]            Dec 31, 2018 11:00 AM CST   Level O with HC INF RM [id number]   [facility] [place] ([facility] [place] )    [address]  [place] [address]   [phone]            May 07, 2019  9:30 AM CDT   LAB with HC LAB   [facility] ([facility] [place] )    [address]  [place] [address]   [phone]            May 07,  "2019 10:00 AM CDT   MA SCREENING LEFT W/ YUKO with HCMA1   [facility] ([facility] )    [address] [facility] [address]   [phone]           Three-dimensional (3D) mammograms are available at [place] locations in [facility], [place], [place], [facility], [place], [place], and [place]. -Health locations include [place] and [facility] [place] in [place]. Benefits of 3D mammograms include: - Improved rate of cancer detection - Decreases your chance of having to go back for more tests, which means fewer: - \"False-positive\" results (This means that there is an abnormal area but it isn't cancer.) - Invasive testing procedures, such as a biopsy or surgery - Can provide clearer images of the breast if you have dense breast tissue. 3D mammography is an optional exam that anyone can have with a 2D mammogram. It doesn't replace or take the place of a 2D mammogram. 2D mammograms remain an effective screening test for all women.  Not all insurance companies cover the cost of a 3D mammogram. Check with your insurance.            May 07, 2019 10:30 AM CDT   DX HIP/PELVIS/SPINE with HIDX1   [facility] ([facility] )    [address]  [facility] [id number] [phone]           Please do not take any of the following 24 hours prior to the day of your exam: vitamins, calcium tablets, antacids.  If possible, please wear clothes without metal (snaps, zippers). A sweatsuit works well.            May 14, 2019 10:00 AM CDT   (Arrive by 9:45 AM)   Return Visit with Noemy Mahajan NP   [facility] ([facility] )    [address] [facility] [address]   [phone]            Jun 12, 2019  9:30 AM CDT   (Arrive by 9:15 AM)   Return Visit with Cam Kirby,    [facility] ([facility] )    [address] [facility] [address]   [phone]              Who to " contact     If you have questions or need follow up information about today's clinic visit or your schedule please contact [facility] directly at [phone].  Normal or non-critical lab and imaging results will be communicated to you by MyChart, letter or phone within 4 business days after the clinic has received the results. If you do not hear from us within 7 days, please contact the clinic through MyChart or phone. If you have a critical or abnormal lab result, we will notify you by phone as soon as possible.  Submit refill requests through [employer] or call your pharmacy and they will forward the refill request to us. Please allow 3 business days for your refill to be completed.          Additional Information About Your Visit        Care EveryWhere ID     This is your Care EveryWhere ID. This could be used by other organizations to access your [place] medical records  [id number]        Your Vitals Were     Pulse Temperature                110 98  F (36.7  C)           Blood Pressure from Last 3 Encounters:   06/29/18 142/82   06/22/18 (!) 136/94   06/15/18 131/80    Weight from Last 3 Encounters:   06/29/18 115 lb (52.2 kg)   06/13/18 115 lb (52.2 kg)   06/06/18 117 lb (53.1 kg)              Today, you had the following     No orders found for display       Primary Care Provider Office Phone # Fax #    MELISA Murray [phone]       [address] [place] [address]        Equal Access to Services     STACEY CONSTANTINO AH: Hadii anjelica fariaso Sorudi, waaxda luqadaha, qaybta kaalmada leandrayada, topher finley. So [facility] [phone].    ATENCIÓN: Si habla español, tiene a shay disposición servicios gratuitos de asistencia lingüística. Gera al [phone].    We comply with applicable federal civil rights laws and [place] laws. We do not discriminate on the basis of race, color, national origin, age, disability, sex, sexual orientation, or  gender identity.            Thank you!     Thank you for choosing [facility]  for your care. Our goal is always to provide you with excellent care. Hearing back from our patients is one way we can continue to improve our services. Please take a few minutes to complete the written survey that you may receive in the mail after your visit with us. Thank you!             Your Updated Medication List - Protect others around you: Learn how to safely use, store and throw away your medicines at www.disposemymeds.org.          This list is accurate as of 6/15/18 11:59 PM.  Always use your most recent med list.                   Brand Name Dispense Instructions for use Diagnosis    CALCITRATE/VITAMIN D PO      Take 1 tablet by mouth 2 times daily        diltiazem 180 MG 24 hr capsule    DILACOR XR    30 capsule    Take 1 capsule (180 mg) by mouth daily    Permanent atrial fibrillation (H), Aortic stenosis, moderate, Moderate tricuspid insufficiency, Long-term (current) use of anticoagulants       losartan 50 MG tablet    COZAAR    30 tablet    Take 1 tablet (50 mg) by mouth daily    HTN (hypertension)       MULTIVITAMINS PO      Take 1 tablet by mouth daily        order for DME     1 each    4 wheeled walker    Chronic left-sided low back pain with left-sided sciatica       OSTEO BI-FLEX ADV DOUBLE ST PO      Take  by mouth daily.        simvastatin 40 MG tablet    ZOCOR    45 tablet    TAKE ONE-HALF TABLET BY MOUTH IN THE EVENING    Hyperlipidemia LDL goal <100       TYLENOL PO      Take 500 mg by mouth every 4 hours as needed        warfarin 5 MG tablet    COUMADIN    80 tablet    5 mg Mon,Wed,Fri and 2.5mg all other days or as directed by warfarin clinic    Atrial fibrillation, unspecified type (H), Long term current use of anticoagulant therapy

## 2018-06-20 ENCOUNTER — ANTICOAGULATION THERAPY VISIT (OUTPATIENT)
Dept: ANTICOAGULATION | Facility: OTHER | Age: 83
End: 2018-06-20
Attending: PHYSICIAN ASSISTANT
Payer: MEDICARE

## 2018-06-20 ENCOUNTER — TELEPHONE (OUTPATIENT)
Dept: ONCOLOGY | Facility: OTHER | Age: 83
End: 2018-06-20

## 2018-06-20 DIAGNOSIS — Z79.01 LONG-TERM (CURRENT) USE OF ANTICOAGULANTS: ICD-10-CM

## 2018-06-20 DIAGNOSIS — I48.21 PERMANENT ATRIAL FIBRILLATION (H): ICD-10-CM

## 2018-06-20 LAB — INR POINT OF CARE: 1.6 (ref 0.86–1.14)

## 2018-06-20 PROCEDURE — 36416 COLLJ CAPILLARY BLOOD SPEC: CPT | Mod: ZL

## 2018-06-20 NOTE — TELEPHONE ENCOUNTER
Please check on prior authorization for the patient's prolia.  She needs to get on the Infusion schedule for this if it has been approved.  Last visit with me was 5/11/2018 and we discussed then.

## 2018-06-20 NOTE — MR AVS SNAPSHOT
Michael DEJESUS Елена   6/20/2018 8:45 AM   Anticoagulation Therapy Visit    Description:  82 year old female   Provider:   ANTI COAGULATION   Department:  [facility] Anti Coagulation           INR as of 6/20/2018     Today's INR 1.6!      Anticoagulation Summary as of 6/20/2018     INR goal 2.0-3.0   Today's INR 1.6!   Full warfarin instructions 6/20: 10 mg; Otherwise 2.5 mg on Mon, Fri; 5 mg all other days   Next INR check 7/11/2018    Indications   Long-term (current) use of anticoagulants [Z79.01] [Z79.01]  Permanent atrial fibrillation (H) [I48.2]         Your next Anticoagulation Clinic appointment(s)     Jun 20, 2018  8:45 AM CDT   Anticoagulation Visit with  ANTI COAGULATION   [facility]    [address]  [place] [address]   [phone]            Jul 11, 2018  8:45 AM CDT   Anticoagulation Visit with  ANTI COAGULATION   [facility]    [address] [facility] [address]   [phone]              June 2018 Details    Sun Mon Tue Wed Thu Fri Sat          1               2                 3               4               5               6               7               8               9                 10               11               12               13               14               15               16                 17               18               19               20      10 mg   See details      21      5 mg         22      2.5 mg         23      5 mg           24      5 mg         25      2.5 mg         26      5 mg         27      5 mg         28      5 mg         29      2.5 mg         30      5 mg          Date Details   06/20 This INR check               How to take your warfarin dose     To take:  2.5 mg Take 0.5 of a 5 mg tablet.    To take:  5 mg Take 1 of the 5 mg tablets.    To take:  10 mg Take 2 of the 5 mg tablets.           July 2018 Details    Sun Mon Tue Wed Thu Fri Sat     1      5 mg          2      2.5 mg         3      5 mg         4      5 mg         5      5 mg         6      2.5 mg         7      5 mg           8      5 mg         9      2.5 mg         10      5 mg         11            12               13               14                 15               16               17               18               19               20               21                 22               23               24               25               26               27               28                 29               30               31                    Date Details   No additional details    Date of next INR:  7/11/2018         How to take your warfarin dose     To take:  2.5 mg Take 0.5 of a 5 mg tablet.    To take:  5 mg Take 1 of the 5 mg tablets.

## 2018-06-20 NOTE — PROGRESS NOTES
"  ANTICOAGULATION FOLLOW-UP CLINIC VISIT    Patient Name:  Michael Christiansen  Date:  6/20/2018  Contact Type:  Face to Face    SUBJECTIVE:     Patient Findings     Positives Change in diet/appetite, Activity level change    Comments Patient states increased activity. She also states she is not eating regular meals and tends to \"pick\" all day.            OBJECTIVE    INR Protime   Date Value Ref Range Status   06/20/2018 1.6 (A) 0.86 - 1.14 Final       ASSESSMENT / PLAN  INR assessment SUB increased activity   Recheck INR In: 3 WEEKS    INR Location Clinic      Anticoagulation Summary as of 6/20/2018     INR goal 2.0-3.0   Today's INR 1.6!   Warfarin maintenance plan 2.5 mg (5 mg x 0.5) on Mon, Fri; 5 mg (5 mg x 1) all other days   Full warfarin instructions 6/20: 10 mg; Otherwise 2.5 mg on Mon, Fri; 5 mg all other days   Weekly warfarin total 30 mg   Plan last modified Kasia Hercules RN (6/20/2018)   Next INR check 7/11/2018   Priority INR   Target end date Indefinite    Indications   Long-term (current) use of anticoagulants [Z79.01] [Z79.01]  Permanent atrial fibrillation (H) [I48.2]         Anticoagulation Episode Summary     INR check location     Preferred lab     Send INR reminders to  ANTICOAG POOL    Comments       Anticoagulation Care Providers     Provider Role Specialty Phone number    Chandrika Kumar PA [facility] [phone]            See the Encounter Report to view Anticoagulation Flowsheet and Dosing Calendar (Go to Encounters tab in chart review, and find the Anticoagulation Therapy Visit)        Kasia Hercules RN               "

## 2018-06-22 ENCOUNTER — OFFICE VISIT (OUTPATIENT)
Dept: WOUND CARE | Facility: OTHER | Age: 83
End: 2018-06-22
Attending: CLINICAL NURSE SPECIALIST
Payer: MEDICARE

## 2018-06-22 VITALS — HEART RATE: 100 BPM | DIASTOLIC BLOOD PRESSURE: 94 MMHG | TEMPERATURE: 99.2 F | SYSTOLIC BLOOD PRESSURE: 136 MMHG

## 2018-06-22 DIAGNOSIS — L89.311 DECUBITUS ULCER OF RIGHT BUTTOCK, STAGE 1: Primary | ICD-10-CM

## 2018-06-22 PROCEDURE — 99213 OFFICE O/P EST LOW 20 MIN: CPT | Performed by: CLINICAL NURSE SPECIALIST

## 2018-06-22 PROCEDURE — G0463 HOSPITAL OUTPT CLINIC VISIT: HCPCS

## 2018-06-22 ASSESSMENT — PAIN SCALES - GENERAL: PAINLEVEL: MODERATE PAIN (4)

## 2018-06-22 NOTE — PROGRESS NOTES
"SUBJECTIVE:  Michael Christiansen, 82 year old, female presents with the following Chief Complaint(s) with HPI to follow:   Chief Complaint   Patient presents with     WOUND CARE          HPI:    Michael presented to [facility] in early June 2018 for a sore on her buttocks.  She had been applying bacitracin without relief.  She was referred to wound care for further assessment and treatment.      She reports that she has been applying betzaida clear ointment to the pressure injury areas on her buttocks 3-4 times per day and the pain is much improved.  She reports sleeping in her recliner and has been using a pressure relief cushion when sitting in her recliner and reports shifting her weight every 15\" when sitting.      Patient Active Problem List   Diagnosis     Hyperlipidemia LDL goal <130     Essential hypertension     Osteoarthritis     Permanent atrial fibrillation (H)     Chronic pain syndrome     History of total knee replacement     Radiculitis     Osteoarthritis of knee     Long-term (current) use of anticoagulants [Z79.01]     ACP (advance care planning)     Osteoporosis     Malignant neoplasm of right breast in female, estrogen receptor positive, unspecified site of breast (H)     Pulmonary hypertension-moderate     Aortic stenosis, moderate     Moderate tricuspid insufficiency     Anticoagulated on Coumadin       Past Medical History:   Diagnosis Date     Arthritis      Backache, unspecified 1/1/2011     Breast cancer, stage 1 3/19/2010     Chronic pain syndrome 1/23/2015     Claustrophobia 1/1/2011     Hip joint replacement by other means 1/1/2011     Osteoarthrosis, unspecified whether generalized or localized, lower leg 8/14/2006     Posttraumatic stress disorder 1/1/2011       Past Surgical History:   Procedure Laterality Date     BREAST SURGERY      right  side mastectomy     C TOTAL KNEE ARTHROPLASTY Left 01/26/15     cataract extraction and lens implantation       COLONOSCOPY       EYE SURGERY   "     GYN SURGERY      tubal pregnancy     ORTHOPEDIC SURGERY  2009    l. hip replacement LT     ORTHOPEDIC SURGERY  july 19th 2013    Right total knee     TUBAL/ECTOPIC PREGNANCY         Family History   Problem Relation Age of Onset     Diabetes Father 75     cause of death     Other - See Comments Father      PVD     HEART DISEASE Sister      Coronary Artery Disease Sister      Other - See Comments Mother 83     old age; cause of death     Other - See Comments Sister      14 year twin [employer]; cause of death     Chronic Obstructive Pulmonary Disease Daughter      Osteoperosis Daughter      Thyroid Disease Daughter      Cancer Sister      Other Cancer Sister      Other Cancer Sister      uterine     Hypertension No family hx of      Hyperlipidemia No family hx of      Cerebrovascular Disease No family hx of      Breast Cancer No family hx of      Prostate Cancer No family hx of      Colon Cancer No family hx of      Asthma No family hx of      Anesthesia Reaction No family hx of      Genetic Disorder No family hx of        Social History   Substance Use Topics     Smoking status: Former Smoker     Packs/day: 0.30     Years: 30.00     Types: Cigarettes     Quit date: 8/11/1987     Smokeless tobacco: Never Used      Comment: year quit 1987     Alcohol use No       Current Outpatient Prescriptions   Medication Sig Dispense Refill     Acetaminophen (TYLENOL PO) Take 500 mg by mouth every 4 hours as needed        alendronate (FOSAMAX) 70 MG tablet TAKE ONE TABLET BY MOUTH EVERY 7 DAYS 60 MINUTES BEFORE MORNING MEAL WITH 8 OZ OF WATER. REMAIN UPRIGHT FOR 30 MINUTES. 4 tablet 0     Calcium Citrate-Vitamin D (CALCITRATE/VITAMIN D PO) Take 1 tablet by mouth 2 times daily        diltiazem (DILACOR XR) 180 MG 24 hr capsule Take 1 capsule (180 mg) by mouth daily 30 capsule 11     losartan (COZAAR) 50 MG tablet Take 1 tablet (50 mg) by mouth daily 30 tablet 8     Misc Natural Products (OSTEO BI-FLEX ADV DOUBLE ST  PO) Take  by mouth daily.       Multiple Vitamin (MULTIVITAMINS PO) Take 1 tablet by mouth daily        order for DME 4 wheeled walker 1 each 0     simvastatin (ZOCOR) 40 MG tablet TAKE ONE-HALF TABLET BY MOUTH IN THE EVENING 45 tablet 1     traMADol (ULTRAM) 50 MG tablet TAKE 1 TO 2 TABLETS BY MOUTH EVERY 6 HOURS AS NEEDED FOR PAIN 180 tablet 0     warfarin (COUMADIN) 5 MG tablet 5 mg Mon,Wed,Fri and 2.5mg all other days or as directed by warfarin clinic 80 tablet 3       Allergies   Allergen Reactions     Atenolol Shortness Of Breath and Other (See Comments)     Dizziness  Severe vertigo and dizziness/SOB     Lisinopril Cough     Pollen Extract      Other reaction(s): Runny Nose       REVIEW OF SYSTEMS    Skin: negative  Eyes: negative; cataract removal  Ears/Nose/Throat: negative  Respiratory: No shortness of breath, dyspnea on exertion, cough, or hemoptysis  Cardiovascular: positive for irregular heart beat, HTN  Gastrointestinal: negative  Genitourinary: negative  Musculoskeletal: positive for arthritis and osteoporosis  Neurologic: positive for numbness or tingling of feet  Psychiatric: negative  Hematologic/Lymphatic/Immunologic: negative  Endocrine: negative    OBJECTIVE:    B/P: 136/94, T: 99.2, P: 100, R: Data Unavailable, W: 0 lbs 0 oz, BMI: There is no height or weight on file to calculate BMI.  Constitutional: healthy, alert and no distress  Head: Normocephalic. No masses, lesions, tenderness or abnormalities  Cardiovascular: Irregularly irregular rhythm. No murmurs, clicks gallops or rub  Respiratory:  Good diaphragmatic excursion. Lungs clear  Gastrointestinal: Abdomen soft, non-tender. BS normal. No masses, organomegaly  : Deferred  Musculoskeletal: extremities normal- no gross deformities noted, gait normal and normal muscle tone  Skin: no suspicious lesions or rashes       Wound description:     Type of Wound:  Pressure injury, stage I   Location:  Gluteal cleft (1) and right gluteal region (2)  "   Drainage amount:  none   Drainage color:  N/A   Odor:  none   Wound bed:  Non-blanchable redness, no warmth   Surrounding skin:  WDL        Measurements:  Not measured today; three areas: 1 in gluteal cleft, 1 in right gluteal region   Pain:  Denies at present       Dressing change:      Wound cleansed with mild soap, rinse, dried.  Applied betzaida clear moisture barrier.    Neurologic: Gait normal. Sensation grossly WNL.  Psychiatric: mentation appears normal and affect normal/bright    THERAPY GOAL:    Pressure relief     ASSESSMENT / PLAN:    Comments:  She reports the pain is much improved.  She is using a pressure relief cushion and shifting her weight every 15\" when in the recliner.    Plan:  Continue to use the betzaida clear moisture barrier 2-3X daily, use pressure relief cushion, shift weight when sitting.    Follow-up in two weeks or as needed for acute concerns.    Latonia Mccoy CNS  Surgery and Wound Care  [place]    "

## 2018-06-22 NOTE — MR AVS SNAPSHOT
After Visit Summary   6/22/2018    Mcihael Christiansen    MRN: [id number]           Patient Information     Date Of Birth          1935        Visit Information        Provider Department      6/22/2018 10:00 AM Latonia Mccoy APRN CNS [facility] [place]        Today's Diagnoses     Decubitus ulcer of right buttock, stage 1    -  1       Follow-ups after your visit        Your next 10 appointments already scheduled     Jun 28, 2018  1:00 PM CDT   LAB with HC LAB   [facility] [place] ([facility] [place] )    [address]  [place] [address]   [phone]            Jun 29, 2018  2:00 PM CDT   Level O with HC INF RM [id number]   [facility] [place] ([facility] [place] )    [address]  [place] [address]   [phone]            Jul 06, 2018  9:00 AM CDT   (Arrive by 8:45 AM)   Return Visit with Genesis Tavares NP   [facility] [place] ([facility] [place] )    [address]  [place] [address]   [phone]            Jul 11, 2018  8:45 AM CDT   Anticoagulation Visit with HC ANTI COAGULATION   [facility] [place] ([facility] [place] )    [address]  [place] [address]   [phone]            May 07, 2019  9:30 AM CDT   LAB with HC LAB   [facility] [place] ([facility] [place] )    [address]  [place] [address]   [phone]            May 07, 2019 10:00 AM CDT   MA SCREENING LEFT W/ YUKO with HCMA1   [facility] [place] [facility] ([facility] [place] )    [address]  [place] [address]   [phone]           Three-dimensional (3D) mammograms are available at [facility] locations in [facility], [facility], [facility], [place], [place], and [place]. -Health locations include [place] and [facility] in [place]. Benefits of 3D mammograms include: - Improved rate of cancer detection - Decreases your chance of  "having to go back for more tests, which means fewer: - \"False-positive\" results (This means that there is an abnormal area but it isn't cancer.) - Invasive testing procedures, such as a biopsy or surgery - Can provide clearer images of the breast if you have dense breast tissue. 3D mammography is an optional exam that anyone can have with a 2D mammogram. It doesn't replace or take the place of a 2D mammogram. 2D mammograms remain an effective screening test for all women.  Not all insurance companies cover the cost of a 3D mammogram. Check with your insurance.            May 07, 2019 10:30 AM CDT   DX HIP/PELVIS/SPINE with HIDX1   [facility] ([facility] )    [address]  [facility] [id number] [phone]           Please do not take any of the following 24 hours prior to the day of your exam: vitamins, calcium tablets, antacids.  If possible, please wear clothes without metal (snaps, zippers). A sweatsuit works well.            May 14, 2019 10:00 AM CDT   (Arrive by 9:45 AM)   Return Visit with Noemy Mahajan NP   [facility] ([facility] )    [address] [address]   [phone]            Jun 12, 2019  9:30 AM CDT   (Arrive by 9:15 AM)   Return Visit with Cam Kirby DO   [facility] ([facility] )    [address] [address]   [phone]              Who to contact     If you have questions or need follow up information about today's clinic visit or your schedule please contact [facility] directly at [phone].  Normal or non-critical lab and imaging results will be communicated to you by MyChart, letter or phone within 4 business days after the clinic has received the results. If you do not hear from us within 7 days, please contact the clinic through MyChart or phone. If you have a critical or abnormal lab result, we will notify you by phone as soon as possible.  Submit " refill requests through [facility] or call your pharmacy and they will forward the refill request to us. Please allow 3 business days for your refill to be completed.          Additional Information About Your Visit        Care EveryWhere ID     This is your Care EveryWhere ID. This could be used by other organizations to access your [facility] medical records  [id number]        Your Vitals Were     Pulse Temperature                100 99.2  F (37.3  C)           Blood Pressure from Last 3 Encounters:   06/22/18 (!) 136/94   06/15/18 131/80   06/13/18 126/74    Weight from Last 3 Encounters:   06/13/18 115 lb (52.2 kg)   06/06/18 117 lb (53.1 kg)   05/11/18 121 lb 9.6 oz (55.2 kg)              Today, you had the following     No orders found for display       Primary Care Provider Office Phone # Fax #    [facility] [phone]       [address] [place] [address]        Equal Access to Services     [facility] Center[facility] : Hadii aad ku hadasho Soomaali, waaxda luqadaha, qaybta kaalmada adeegyada, waxay idiin hayaan leandra salvador . So [facility] [phone].    ATENCIÓN: Si habla español, tiene a shay disposición servicios gratuitos de asistencia lingüística. [facility] [phone].    We comply with applicable federal civil rights laws and [place] laws. We do not discriminate on the basis of race, color, national origin, age, disability, sex, sexual orientation, or gender identity.            Thank you!     Thank you for choosing [facility] [place]  for your care. Our goal is always to provide you with excellent care. Hearing back from our patients is one way we can continue to improve our services. Please take a few minutes to complete the written survey that you may receive in the mail after your visit with us. Thank you!             Your Updated Medication List - Protect others around you: Learn how to safely use, store and throw away your medicines at www.disposemymeds.org.           This list is accurate as of 6/22/18 11:32 AM.  Always use your most recent med list.                   Brand Name Dispense Instructions for use Diagnosis    alendronate 70 MG tablet    FOSAMAX    4 tablet    TAKE ONE TABLET BY MOUTH EVERY 7 DAYS 60 MINUTES BEFORE MORNING MEAL WITH 8 OZ OF WATER. REMAIN UPRIGHT FOR 30 MINUTES.    Age related osteoporosis, unspecified pathological fracture presence       CALCITRATE/VITAMIN D PO      Take 1 tablet by mouth 2 times daily        diltiazem 180 MG 24 hr capsule    DILACOR XR    30 capsule    Take 1 capsule (180 mg) by mouth daily    Permanent atrial fibrillation (H), Aortic stenosis, moderate, Moderate tricuspid insufficiency, Long-term (current) use of anticoagulants       losartan 50 MG tablet    COZAAR    30 tablet    Take 1 tablet (50 mg) by mouth daily    HTN (hypertension)       MULTIVITAMINS PO      Take 1 tablet by mouth daily        order for DME     1 each    4 wheeled walker    Chronic left-sided low back pain with left-sided sciatica       OSTEO BI-FLEX ADV DOUBLE ST PO      Take  by mouth daily.        simvastatin 40 MG tablet    ZOCOR    45 tablet    TAKE ONE-HALF TABLET BY MOUTH IN THE EVENING    Hyperlipidemia LDL goal <100       traMADol 50 MG tablet    ULTRAM    180 tablet    TAKE 1 TO 2 TABLETS BY MOUTH EVERY 6 HOURS AS NEEDED FOR PAIN    Chronic pain syndrome       TYLENOL PO      Take 500 mg by mouth every 4 hours as needed        warfarin 5 MG tablet    COUMADIN    80 tablet    5 mg Mon,Wed,Fri and 2.5mg all other days or as directed by warfarin clinic    Atrial fibrillation, unspecified type (H), Long term current use of anticoagulant therapy

## 2018-06-27 DIAGNOSIS — G89.4 CHRONIC PAIN SYNDROME: ICD-10-CM

## 2018-06-28 RX ORDER — TRAMADOL HYDROCHLORIDE 50 MG/1
TABLET ORAL
Qty: 180 TABLET | Refills: 0 | Status: SHIPPED | OUTPATIENT
Start: 2018-06-28 | End: 2018-08-10

## 2018-06-28 NOTE — TELEPHONE ENCOUNTER
Controlled Substance Refill Request for Tramadol  Problem List Complete:  No     PROVIDER TO CONSIDER COMPLETION OF PROBLEM LIST AND OVERVIEW/CONTROLLED SUBSTANCE AGREEMENT    Last Written Prescription Date:  5/15/18  Last Fill Quantity: 180,   # refills: 0    Last Office Visit with [facility] primary care provider: 6/6/18    Future Office visit:   Next 5 appointments (look out 90 days)     Jul 06, 2018  9:00 AM CDT   (Arrive by 8:45 AM)   Return Visit with Genesis Tavares NP   [facility] ([facility] )    [address] [place] [address] [place] [address]   [phone]                  Controlled substance agreement on file: No.     Processing:  Fax Rx to [facility] [place] pharmacy  PCP Stacy  Thank you.

## 2018-06-29 ENCOUNTER — INFUSION THERAPY VISIT (OUTPATIENT)
Dept: INFUSION THERAPY | Facility: OTHER | Age: 83
End: 2018-06-29
Attending: PHYSICIAN ASSISTANT
Payer: MEDICARE

## 2018-06-29 VITALS
DIASTOLIC BLOOD PRESSURE: 82 MMHG | TEMPERATURE: 97.7 F | OXYGEN SATURATION: 95 % | SYSTOLIC BLOOD PRESSURE: 142 MMHG | WEIGHT: 115 LBS | BODY MASS INDEX: 19.63 KG/M2 | RESPIRATION RATE: 18 BRPM | HEIGHT: 64 IN | HEART RATE: 83 BPM

## 2018-06-29 DIAGNOSIS — M81.0 OSTEOPOROSIS, UNSPECIFIED OSTEOPOROSIS TYPE, UNSPECIFIED PATHOLOGICAL FRACTURE PRESENCE: Primary | ICD-10-CM

## 2018-06-29 LAB
ALBUMIN SERPL-MCNC: 3.7 G/DL (ref 3.4–5)
CALCIUM SERPL-MCNC: 9.1 MG/DL (ref 8.5–10.1)
CREAT SERPL-MCNC: 0.75 MG/DL (ref 0.52–1.04)
GFR SERPL CREATININE-BSD FRML MDRD: 74 ML/MIN/1.7M2
PHOSPHATE SERPL-MCNC: 2.6 MG/DL (ref 2.5–4.5)

## 2018-06-29 PROCEDURE — 82310 ASSAY OF CALCIUM: CPT | Mod: ZL | Performed by: NURSE PRACTITIONER

## 2018-06-29 PROCEDURE — 82565 ASSAY OF CREATININE: CPT | Mod: ZL | Performed by: NURSE PRACTITIONER

## 2018-06-29 PROCEDURE — 36415 COLL VENOUS BLD VENIPUNCTURE: CPT | Mod: ZL | Performed by: NURSE PRACTITIONER

## 2018-06-29 PROCEDURE — 82040 ASSAY OF SERUM ALBUMIN: CPT | Mod: ZL | Performed by: NURSE PRACTITIONER

## 2018-06-29 PROCEDURE — 96372 THER/PROPH/DIAG INJ SC/IM: CPT

## 2018-06-29 PROCEDURE — 84100 ASSAY OF PHOSPHORUS: CPT | Mod: ZL | Performed by: NURSE PRACTITIONER

## 2018-06-29 PROCEDURE — 25000128 H RX IP 250 OP 636: Performed by: NURSE PRACTITIONER

## 2018-06-29 RX ADMIN — DENOSUMAB 60 MG: 60 INJECTION SUBCUTANEOUS at 10:32

## 2018-06-29 NOTE — PATIENT INSTRUCTIONS
Denosumab Solution for injection  What is this medicine?  DENOSUMAB (den oh rafia mab) slows bone breakdown. Prolia is used to treat osteoporosis in women after menopause and in men. Xgeva is used to prevent bone fractures and other bone problems caused by cancer bone metastases. Xgeva is also used to treat giant cell tumor of the bone.  This medicine may be used for other purposes; ask your health care provider or pharmacist if you have questions.  What should I tell my health care provider before I take this medicine?  They need to know if you have any of these conditions:    dental disease    eczema    infection or history of infections    kidney disease or on dialysis    low blood calcium or vitamin D    malabsorption syndrome    scheduled to have surgery or tooth extraction    taking medicine that contains denosumab    thyroid or parathyroid disease    an unusual reaction to denosumab, other medicines, foods, dyes, or preservatives    pregnant or trying to get pregnant    breast-feeding  How should I use this medicine?  This medicine is for injection under the skin. It is given by a health care professional in a hospital or clinic setting.  If you are getting Prolia, a special MedGuide will be given to you by the pharmacist with each prescription and refill. Be sure to read this information carefully each time.  For Prolia, talk to your pediatrician regarding the use of this medicine in children. Special care may be needed. For Xgeva, talk to your pediatrician regarding the use of this medicine in children. While this drug may be prescribed for children as young as 13 years for selected conditions, precautions do apply.  Overdosage: If you think you've taken too much of this medicine contact a poison control center or emergency room at once.  NOTE: This medicine is only for you. Do not share this medicine with others.  What if I miss a dose?  It is important not to miss your dose. Call your doctor or health care  professional if you are unable to keep an appointment.  What may interact with this medicine?  Do not take this medicine with any of the following medications:    other medicines containing denosumab  This medicine may also interact with the following medications:    medicines that suppress the immune system    medicines that treat cancer    steroid medicines like prednisone or cortisone  This list may not describe all possible interactions. Give your health care provider a list of all the medicines, herbs, non-prescription drugs, or dietary supplements you use. Also tell them if you smoke, drink alcohol, or use illegal drugs. Some items may interact with your medicine.  What should I watch for while using this medicine?  Visit your doctor or health care professional for regular checks on your progress. Your doctor or health care professional may order blood tests and other tests to see how you are doing.  Call your doctor or health care professional if you get a cold or other infection while receiving this medicine. Do not treat yourself. This medicine may decrease your body's ability to fight infection.  You should make sure you get enough calcium and vitamin D while you are taking this medicine, unless your doctor tells you not to. Discuss the foods you eat and the vitamins you take with your health care professional.  See your dentist regularly. Brush and floss your teeth as directed. Before you have any dental work done, tell your dentist you are receiving this medicine.  Do not become pregnant while taking this medicine or for 5 months after stopping it. Women should inform their doctor if they wish to become pregnant or think they might be pregnant. There is a potential for serious side effects to an unborn child. Talk to your health care professional or pharmacist for more information.  What side effects may I notice from receiving this medicine?  Side effects that you should report to your doctor or health  care professional as soon as possible:    allergic reactions like skin rash, itching or hives, swelling of the face, lips, or tongue    breathing problems    chest pain    fast, irregular heartbeat    feeling faint or lightheaded, falls    fever, chills, or any other sign of infection    muscle spasms, tightening, or twitches    numbness or tingling    skin blisters or bumps, or is dry, peels, or red    slow healing or unexplained pain in the mouth or jaw    unusual bleeding or bruising  Side effects that usually do not require medical attention (Report these to your doctor or health care professional if they continue or are bothersome.):    muscle pain    stomach upset, gas  This list may not describe all possible side effects. Call your doctor for medical advice about side effects. You may report side effects to FDA at 4-082-FDA-7788.  NOTE:This sheet is a summary. It may not cover all possible information. If you have questions about this medicine, talk to your doctor, pharmacist, or health care provider. Copyright  2016 Gold Standard

## 2018-06-29 NOTE — PROGRESS NOTES
Patient is a 82 year old female here today accompanied by  for injection of Prolia per order of Sandi Mahajan. Labs within parameters to administer injection; today's lab values are Creatinine: 0.75, Calcium: 9.1, Phosphorus: 2.6, Albumin: 3.7.    Patient given and reviewed Prolia Medication Guide and Patient Brochure.  Patient denies questions nor concerns regarding medication, administration site, side effects, nor aftercare.  Patient identified with two identifiers, order verified, and verbal consent for today's injection obtained from patient.     Patient education provided on s/s of injection site infection, and/or medication specific side effects, and when to call a provider.  Patient instructed to report any adverse effects.   1032: Prolia 60mg administered per protocol in left upper arm at 45 degrees.  Site covered with sterile bandage.  Patient tolerated injection well, no verbal nor non-verbal signs of discomfort noted.  No adverse effects noted at this time.   Patient instructed to call with further questions or concerns.  Patient states understanding and is in agreement with this plan.  Copy of appointments, and after visit summary (AVS) given to patient. Patient discharged ambulatory.  Imani Tyson RN

## 2018-06-29 NOTE — MR AVS SNAPSHOT
After Visit Summary   6/29/2018    Michael Christiansen    MRN: [id number]           Patient Information     Date Of Birth          1935        Visit Information        Provider Department      6/29/2018 11:00 AM [facility] [address] [facility]        Today's Diagnoses     Osteoporosis, unspecified osteoporosis type, unspecified pathological fracture presence    -  1      Care Instructions    Denosumab Solution for injection  What is this medicine?  DENOSUMAB (den oh rafia mab) slows bone breakdown. Prolia is used to treat osteoporosis in women after menopause and in men. Xgeva is used to prevent bone fractures and other bone problems caused by cancer bone metastases. Xgeva is also used to treat giant cell tumor of the bone.  This medicine may be used for other purposes; ask your health care provider or pharmacist if you have questions.  What should I tell my health care provider before I take this medicine?  They need to know if you have any of these conditions:    dental disease    eczema    infection or history of infections    kidney disease or on dialysis    low blood calcium or vitamin D    malabsorption syndrome    scheduled to have surgery or tooth extraction    taking medicine that contains denosumab    thyroid or parathyroid disease    an unusual reaction to denosumab, other medicines, foods, dyes, or preservatives    pregnant or trying to get pregnant    breast-feeding  How should I use this medicine?  This medicine is for injection under the skin. It is given by a health care professional in a hospital or clinic setting.  If you are getting Prolia, a special MedGuide will be given to you by the pharmacist with each prescription and refill. Be sure to read this information carefully each time.  For Prolia, talk to your pediatrician regarding the use of this medicine in children. Special care may be needed. For Xgeva, talk to your pediatrician regarding the use of this medicine in  children. While this drug may be prescribed for children as young as 13 years for selected conditions, precautions do apply.  Overdosage: If you think you've taken too much of this medicine contact a poison control center or emergency room at once.  NOTE: This medicine is only for you. Do not share this medicine with others.  What if I miss a dose?  It is important not to miss your dose. Call your doctor or health care professional if you are unable to keep an appointment.  What may interact with this medicine?  Do not take this medicine with any of the following medications:    other medicines containing denosumab  This medicine may also interact with the following medications:    medicines that suppress the immune system    medicines that treat cancer    steroid medicines like prednisone or cortisone  This list may not describe all possible interactions. Give your health care provider a list of all the medicines, herbs, non-prescription drugs, or dietary supplements you use. Also tell them if you smoke, drink alcohol, or use illegal drugs. Some items may interact with your medicine.  What should I watch for while using this medicine?  Visit your doctor or health care professional for regular checks on your progress. Your doctor or health care professional may order blood tests and other tests to see how you are doing.  Call your doctor or health care professional if you get a cold or other infection while receiving this medicine. Do not treat yourself. This medicine may decrease your body's ability to fight infection.  You should make sure you get enough calcium and vitamin D while you are taking this medicine, unless your doctor tells you not to. Discuss the foods you eat and the vitamins you take with your health care professional.  See your dentist regularly. Brush and floss your teeth as directed. Before you have any dental work done, tell your dentist you are receiving this medicine.  Do not become pregnant  while taking this medicine or for 5 months after stopping it. Women should inform their doctor if they wish to become pregnant or think they might be pregnant. There is a potential for serious side effects to an unborn child. Talk to your health care professional or pharmacist for more information.  What side effects may I notice from receiving this medicine?  Side effects that you should report to your doctor or health care professional as soon as possible:    allergic reactions like skin rash, itching or hives, swelling of the face, lips, or tongue    breathing problems    chest pain    fast, irregular heartbeat    feeling faint or lightheaded, falls    fever, chills, or any other sign of infection    muscle spasms, tightening, or twitches    numbness or tingling    skin blisters or bumps, or is dry, peels, or red    slow healing or unexplained pain in the mouth or jaw    unusual bleeding or bruising  Side effects that usually do not require medical attention (Report these to your doctor or health care professional if they continue or are bothersome.):    muscle pain    stomach upset, gas  This list may not describe all possible side effects. Call your doctor for medical advice about side effects. You may report side effects to FDA at 5-151-FDA-7746.  NOTE:This sheet is a summary. It may not cover all possible information. If you have questions about this medicine, talk to your doctor, pharmacist, or health care provider. Copyright  2016 Gold Standard                Follow-ups after your visit        Your next 10 appointments already scheduled     Jul 06, 2018  9:00 AM CDT   (Arrive by 8:45 AM)   Return Visit with Genesis Tavares NP   [facility] [place] ([facility] )    [address] [place] [address]   [phone]            Jul 11, 2018  8:45 AM CDT   Anticoagulation Visit with  ANTI COAGULATION   [facility] [place] ([facility] )    [address]  "[place]  [facility] [address]   [phone]            May 07, 2019  9:30 AM CDT   LAB with HC LAB   [facility] ([facility] )    [address] [address]   [phone]            May 07, 2019 10:00 AM CDT   MA SCREENING LEFT W/ YUKO with HCMA1   [facility] ([facility] )    [address] [address]   [phone]           Three-dimensional (3D) mammograms are available at [facility] locations in [place], [place], [place], [place], [place], [place], [place], and [place]. [facility] locations include [place] and [facility] in [place]. Benefits of 3D mammograms include: - Improved rate of cancer detection - Decreases your chance of having to go back for more tests, which means fewer: - \"False-positive\" results (This means that there is an abnormal area but it isn't cancer.) - Invasive testing procedures, such as a biopsy or surgery - Can provide clearer images of the breast if you have dense breast tissue. 3D mammography is an optional exam that anyone can have with a 2D mammogram. It doesn't replace or take the place of a 2D mammogram. 2D mammograms remain an effective screening test for all women.  Not all insurance companies cover the cost of a 3D mammogram. Check with your insurance.            May 07, 2019 10:30 AM CDT   DX HIP/PELVIS/SPINE with HIDX1   [facility] ([facility] )    [address]  [facility] [id number] [phone]           Please do not take any of the following 24 hours prior to the day of your exam: vitamins, calcium tablets, antacids.  If possible, please wear clothes without metal (snaps, zippers). A sweatsuit works well.            May 14, 2019 10:00 AM CDT   (Arrive by 9:45 AM)   Return Visit with Noemy Mahajan NP   [facility] ([facility] )    [address] [facility] [address]   [phone]            Bhupinder " "12, 2019  9:30 AM CDT   (Arrive by 9:15 AM)   Return Visit with Cam Kirby,    [facility] ([facility] )    [address] [place] [address]  [place] [id number] [phone]              Who to contact     If you have questions or need follow up information about today's clinic visit or your schedule please contact [facility] directly at [phone].  Normal or non-critical lab and imaging results will be communicated to you by MyChart, letter or phone within 4 business days after the clinic has received the results. If you do not hear from us within 7 days, please contact the clinic through MyChart or phone. If you have a critical or abnormal lab result, we will notify you by phone as soon as possible.  Submit refill requests through [employer] or call your pharmacy and they will forward the refill request to us. Please allow 3 business days for your refill to be completed.          Additional Information About Your Visit        Care EveryWhere ID     This is your Care EveryWhere ID. This could be used by other organizations to access your [facility] medical records  [id number]        Your Vitals Were     Pulse Temperature Respirations Height Pulse Oximetry BMI (Body Mass Index)    83 97.7  F (36.5  C) (Tympanic) 18 1.626 m (5' 4\") 95% 19.74 kg/m2       Blood Pressure from Last 3 Encounters:   06/29/18 142/82   06/22/18 (!) 136/94   06/15/18 131/80    Weight from Last 3 Encounters:   06/29/18 52.2 kg (115 lb)   06/13/18 52.2 kg (115 lb)   06/06/18 53.1 kg (117 lb)              We Performed the Following     Albumin level     Calcium     Creatinine     Phosphorus        Primary Care Provider Office Phone # Fax #    MELISA Murray [phone]       [address] [place] [address]  [place] [address]        Equal Access to Services     AMAYA CONSTANTINO AH: Hadii aad ku hadasho Soomaali, waaxda luqadaha, qaybta kaalmapedro luis solano, topher salvador " ah. So [facility] [phone].    ATENCIÓN: Si kal negron, tiene a shay disposición servicios gratuitos de asistencia lingüística. Gera cho [phone].    We comply with applicable federal civil rights laws and [place] laws. We do not discriminate on the basis of race, color, national origin, age, disability, sex, sexual orientation, or gender identity.            Thank you!     Thank you for choosing [facility]  for your care. Our goal is always to provide you with excellent care. Hearing back from our patients is one way we can continue to improve our services. Please take a few minutes to complete the written survey that you may receive in the mail after your visit with us. Thank you!             Your Updated Medication List - Protect others around you: Learn how to safely use, store and throw away your medicines at www.disposemymeds.org.          This list is accurate as of 6/29/18 11:00 AM.  Always use your most recent med list.                   Brand Name Dispense Instructions for use Diagnosis    CALCITRATE/VITAMIN D PO      Take 1 tablet by mouth 2 times daily        diltiazem 180 MG 24 hr capsule    DILACOR XR    30 capsule    Take 1 capsule (180 mg) by mouth daily    Permanent atrial fibrillation (H), Aortic stenosis, moderate, Moderate tricuspid insufficiency, Long-term (current) use of anticoagulants       losartan 50 MG tablet    COZAAR    30 tablet    Take 1 tablet (50 mg) by mouth daily    HTN (hypertension)       MULTIVITAMINS PO      Take 1 tablet by mouth daily        order for DME     1 each    4 wheeled walker    Chronic left-sided low back pain with left-sided sciatica       OSTEO BI-FLEX ADV DOUBLE ST PO      Take  by mouth daily.        simvastatin 40 MG tablet    ZOCOR    45 tablet    TAKE ONE-HALF TABLET BY MOUTH IN THE EVENING    Hyperlipidemia LDL goal <100       traMADol 50 MG tablet    ULTRAM    180 tablet    TAKE 1 TO 2 TABLETS BY MOUTH EVERY 6 HOURS AS NEEDED FOR PAIN     Chronic pain syndrome       TYLENOL PO      Take 500 mg by mouth every 4 hours as needed        warfarin 5 MG tablet    COUMADIN    80 tablet    5 mg Mon,Wed,Fri and 2.5mg all other days or as directed by warfarin clinic    Atrial fibrillation, unspecified type (H), Long term current use of anticoagulant therapy

## 2018-07-03 NOTE — PATIENT INSTRUCTIONS
Wash hands prior to dressing change.   Clean instruments as appropriate    Wash wound with mild soap and water, dry  Apply non-zinc barrier cream at least twice daily  Offload as much as possible      Please call if any questions/concerns and/or problems ([phone])    Follow up as needed    Three building blocks of wound healing  1. Protein (if not contraindicated)  2. Vitamin C 500 mg oral twice a day  3.  Zinc 220 mg oral daily

## 2018-07-03 NOTE — PROGRESS NOTES
DOS: 6/15/18    SUBJECTIVE:  Michael Christiansen, 82 year old, female presents with the following Chief Complaint(s) with HPI to follow:  Chief Complaint   Patient presents with     WOUND CARE     buttocks wounds        Wound Care    HPI:  Michael is here today for the reassessment and treatment of buttock wound.  Back story:   Michael's not sure when it started, but feels she had it for at least 2-3 months.  She sleeps in a recliner in her basement to take care of her  who has Parkinson's.   Saw her PCP, Chandrika Kumar PA-C for it.    Wound Care referral: by her PCP on 6/6/18  1st wound care appt: myself on 6/15/18   Past and current treatments:   Soap and water  Triple antibiotic daily.    Believes it was helping a little  Denies any fevers, chills, and/or malodorous drainage.   PMH: former smoker, denies history of diabetes  No results found for: A1C      Patient Active Problem List   Diagnosis     Hyperlipidemia LDL goal <130     Essential hypertension     Osteoarthritis     Permanent atrial fibrillation (H)     Chronic pain syndrome     History of total knee replacement     Radiculitis     Osteoarthritis of knee     Long-term (current) use of anticoagulants [Z79.01]     ACP (advance care planning)     Osteoporosis     Malignant neoplasm of right breast in female, estrogen receptor positive, unspecified site of breast (H)     Pulmonary hypertension-moderate     Aortic stenosis, moderate     Moderate tricuspid insufficiency     Anticoagulated on Coumadin       Past Medical History:   Diagnosis Date     Arthritis      Backache, unspecified 1/1/2011     Breast cancer, stage 1 3/19/2010     Chronic pain syndrome 1/23/2015     Claustrophobia 1/1/2011     Hip joint replacement by other means 1/1/2011     Osteoarthrosis, unspecified whether generalized or localized, lower leg 8/14/2006     Posttraumatic stress disorder 1/1/2011       Past Surgical History:   Procedure Laterality Date     BREAST SURGERY      right   side mastectomy     C TOTAL KNEE ARTHROPLASTY Left 01/26/15     cataract extraction and lens implantation       COLONOSCOPY       EYE SURGERY       GYN SURGERY      tubal pregnancy     ORTHOPEDIC SURGERY  2009    l. hip replacement LT     ORTHOPEDIC SURGERY  july 19th 2013    Right total knee     TUBAL/ECTOPIC PREGNANCY         Family History   Problem Relation Age of Onset     Diabetes Father 75     cause of death     Other - See Comments Father      PVD     HEART DISEASE Sister      Coronary Artery Disease Sister      Other - See Comments Mother 83     old age; cause of death     Other - See Comments Sister      14 year twin [employer]; cause of death     Chronic Obstructive Pulmonary Disease Daughter      Osteoperosis Daughter      Thyroid Disease Daughter      Cancer Sister      Other Cancer Sister      Other Cancer Sister      uterine     Hypertension No family hx of      Hyperlipidemia No family hx of      Cerebrovascular Disease No family hx of      Breast Cancer No family hx of      Prostate Cancer No family hx of      Colon Cancer No family hx of      Asthma No family hx of      Anesthesia Reaction No family hx of      Genetic Disorder No family hx of        Social History   Substance Use Topics     Smoking status: Former Smoker     Packs/day: 0.30     Years: 30.00     Types: Cigarettes     Quit date: 8/11/1987     Smokeless tobacco: Never Used      Comment: year quit 1987     Alcohol use No       Current Outpatient Prescriptions   Medication Sig Dispense Refill     Acetaminophen (TYLENOL PO) Take 500 mg by mouth every 4 hours as needed        Calcium Citrate-Vitamin D (CALCITRATE/VITAMIN D PO) Take 1 tablet by mouth 2 times daily        diltiazem (DILACOR XR) 180 MG 24 hr capsule Take 1 capsule (180 mg) by mouth daily 30 capsule 11     losartan (COZAAR) 50 MG tablet Take 1 tablet (50 mg) by mouth daily 30 tablet 8     Misc Natural Products (OSTEO BI-FLEX ADV DOUBLE ST PO) Take  by mouth daily.   "     Multiple Vitamin (MULTIVITAMINS PO) Take 1 tablet by mouth daily        order for DME 4 wheeled walker 1 each 0     simvastatin (ZOCOR) 40 MG tablet TAKE ONE-HALF TABLET BY MOUTH IN THE EVENING 45 tablet 1     warfarin (COUMADIN) 5 MG tablet 5 mg Mon,Wed,Fri and 2.5mg all other days or as directed by warfarin clinic 80 tablet 3     traMADol (ULTRAM) 50 MG tablet TAKE 1 TO 2 TABLETS BY MOUTH EVERY 6 HOURS AS NEEDED FOR PAIN 180 tablet 0       Allergies   Allergen Reactions     Atenolol Shortness Of Breath and Other (See Comments)     Dizziness  Severe vertigo and dizziness/SOB     Lisinopril Cough     Pollen Extract      Other reaction(s): Runny Nose       REVIEW OF SYSTEMS  Skin: as noted above  Eyes: negative  Ears/Nose/Throat: negative; history of allergies  Respiratory: Dyspnea on exertion- occasionally when \"I over do it\", No cough and No hemoptysis  Cardiovascular: positive for coumadin  Gastrointestinal: negative  Genitourinary: negative  Musculoskeletal: positive for arthritis  Neurologic: positive for numbness or tingling of feet  Psychiatric: positive for excessive stress  Hematologic/Lymphatic/Immunologic: negative  Endocrine: negative    OBJECTIVE:  /80  Pulse 110  Temp 98  F (36.7  C)  Constitutional: healthy, alert and no distress  Cardiovascular: RRR. No clicks gallops or rub; Positive for Murmur  Respiratory:  Good diaphragmatic excursion. Lungs clear  Skin:   Wound description: Type of Wound- pressure ulcer; Location- right buttock, Drainage amount-none, Drainage color-n/a, Odor- none; Wound bed-see below, Surrounding skin-blanchable erythema.  Measurements (proximal to distal):   1.  0.5 x 0.4 cm (100% pink)  2.  0.5 x 0.3 cm (100% pink)  3.  1 x 1 cm (70% slough, 30% pink)   Dressing change: Wound cleansed with soap and water, dried, dressed with non-zinc barrier ointment.       Psychiatric: mentation appears normal and affect normal/bright      LABS  Results for orders placed or " performed during the hospital encounter of 18   Echocardiogram Complete    Narrative    [id number]  [id number]  [id number]  [id number]^PATITO^PHANI^SIGRID           [facility]  Echocardiography Laboratory  [address] [place] [id number]     Name: KIMMIE [place]  MRN: [id number]  : 1935  Study Date: 2018 07:45 AM  Age: 82 yrs  Gender: Female  Patient [facility]: [facility]  Reason For Study: , Aortic stenosis, moderate, Pulmonary hypertension  History: AS  Ordering Physician: PHANI CAPUTO  Referring Physician: PHANI CAPUTO  Performed By: Migdalia Cueva     BSA: 1.6 m2  Height: 64 in  Weight: 117 lb  _____________________________________________________________________________  __        Procedure  The patient's rhythm is atrial fibrillation.  _____________________________________________________________________________  __        Interpretation Summary  No pericardial effusion is present.  Left ventricular size is normal.  The Ejection Fraction is estimated at 55-60%.  Diastolic function not assessed due to atrial fibrillation.  The right ventricle is normal size.  Global right ventricular function is normal.  Mild left atrial enlargement is present.  Mild right atrial enlargement is present.  Mild mitral annular calcification is present.  Mild mitral insufficiency is present.  Mild to moderate aortic valve sclerosis is present.  Mild aortic insufficiency is present.  The peak aortic velocity is 2.78 m/sec.  The mean gradient across the aortic valve is17 mmHg.  Peak gradient AV 31 mm Hg.  Moderate tricuspid insufficiency is present.  Right ventricular systolic pressure is 49mmHg above the right atrial pressure.  The pulmonic valve is normal.  _____________________________________________________________________________  __        Left Ventricle  Left ventricular size is normal. The Ejection Fraction is estimated at 55-60%.  Diastolic function not assessed due to atrial  fibrillation.     Right Ventricle  The right ventricle is normal size. Global right ventricular function is  normal.     Atria  Mild left atrial enlargement is present. Mild right atrial enlargement is  present.     Mitral Valve  Mild mitral annular calcification is present. Mild mitral insufficiency is  present.     Aortic Valve  Mild to moderate aortic valve sclerosis is present. Mild aortic insufficiency  is present. The peak aortic velocity is 2.78 m/sec. Peak gradient AV 31 mm Hg.  The mean gradient across the aortic valve is17 mmHg.        Tricuspid Valve  Right ventricular systolic pressure is 49mmHg above the right atrial pressure.  Moderate tricuspid insufficiency is present.     Pulmonic Valve  The pulmonic valve is normal.     Vessels  The aorta root is normal.     Pericardium  No pericardial effusion is present.     _____________________________________________________________________________  __  MMode/2D Measurements & Calculations  RVDd: 2.1 cm  IVSd: 0.88 cm  IVSs: 1.2 cm  LVIDd: 4.8 cm  LVIDs: 3.3 cm  LVPWd: 0.79 cm  LVPWs: 1.2 cm  FS: 30.6 %  LV mass(C)d: 132.6 grams  LV mass(C)dI: 85.1 grams/m2  LV mass(C)sI: 81.6 grams/m2  Ao root diam: 3.5 cm  LA dimension: 3.8 cm  LA/Ao: 1.1  LVOT diam: 1.6 cm  LVOT area: 2.0 cm2     RWT: 0.33     Time Measurements  Pulm. HR: 185.3 BPM  MM HR: 133.5 BPM     Doppler Measurements & Calculations  Ao V2 max: 278.3 cm/sec  Ao max P.0 mmHg  Ao V2 mean: 191.0 cm/sec  Ao mean P.3 mmHg  Ao V2 VTI: 62.6 cm  GLEN(I,D): 0.60 cm2  GLEN(V,D): 0.62 cm2  AI P1/2t: 513.6 msec  LV V1 max PG: 3.0 mmHg  LV V1 max: 86.7 cm/sec  LV V1 VTI: 18.9 cm  CO(LVOT): 6.6 l/min  CI(LVOT): 4.2 l/min/m2  SV(LVOT): 37.5 ml  SI(LVOT): 24.1 ml/m2  TV max P.5 mmHg  PA V2 max: 105.5 cm/sec  PA max P.5 mmHg  PA mean P.6 mmHg  PA V2 VTI: 24.4 cm  TR max rkaig: 351.7 cm/sec  TR max P.5 mmHg  AV Kraig Ratio (DI): 0.31  GLEN Index (cm2/m2): 0.38         _____________________________________________________________________________  __           Report approved by: Shira Aaron 06/10/2018 08:27 PM          ASSESSMENT / PLAN:  (L89.312) Decubitus ulcer of right buttock, stage 2  Comment: noted and plan developed  Plan:   Encouraged use of non-zinc barrier ointment at least twice daily (Eduin--samples given).    Offload area as much as possible.   Follow up as needed.     Treatment goal: moisture balance and encouraged offloading      Patient Instructions   Wash hands prior to dressing change.   Clean instruments as appropriate    Wash wound with mild soap and water, dry  Apply non-zinc barrier cream at least twice daily  Offload as much as possible      Please call if any questions/concerns and/or problems ([phone])    Follow up as needed    Three building blocks of wound healing  1. Protein (if not contraindicated)  2. Vitamin C 500 mg oral twice a day  3.  Zinc 220 mg oral daily       Time: 35 minutes  Barrier: none  Willingness to learn: accepting    Genesis Tavares APRN FNP-BC  Disease Management

## 2018-07-06 ENCOUNTER — OFFICE VISIT (OUTPATIENT)
Dept: WOUND CARE | Facility: OTHER | Age: 83
End: 2018-07-06
Attending: NURSE PRACTITIONER
Payer: MEDICARE

## 2018-07-06 VITALS — SYSTOLIC BLOOD PRESSURE: 131 MMHG | TEMPERATURE: 98.6 F | DIASTOLIC BLOOD PRESSURE: 89 MMHG | HEART RATE: 85 BPM

## 2018-07-06 DIAGNOSIS — L89.312 DECUBITUS ULCER OF RIGHT BUTTOCK, STAGE 2 (H): Primary | ICD-10-CM

## 2018-07-06 PROCEDURE — G0463 HOSPITAL OUTPT CLINIC VISIT: HCPCS

## 2018-07-06 PROCEDURE — 99213 OFFICE O/P EST LOW 20 MIN: CPT | Performed by: NURSE PRACTITIONER

## 2018-07-06 ASSESSMENT — PAIN SCALES - GENERAL: PAINLEVEL: NO PAIN (0)

## 2018-07-06 NOTE — PATIENT INSTRUCTIONS
Wash hands prior to dressing change.   Clean instruments as appropriate    Wash wound with mild soap and water, dry  Apply non-zinc barrier cream at least twice daily  Offload as much as possible    Please call if any questions/concerns and/or problems ([phone])    Follow up as needed    Three building blocks of wound healing  1. Protein (if not contraindicated)  2. Vitamin C 500 mg oral twice a day  3.  Zinc 220 mg oral daily

## 2018-07-06 NOTE — MR AVS SNAPSHOT
After Visit Summary   7/6/2018    Michael Christiansen    MRN: [id number]           Patient Information     Date Of Birth          1935        Visit Information        Provider Department      7/6/2018 9:00 AM Genesis Tavares NP [facility]        Today's Diagnoses     Decubitus ulcer of right buttock, stage 2    -  1      Care Instructions    Wash hands prior to dressing change.   Clean instruments as appropriate    Wash wound with mild soap and water, dry  Apply non-zinc barrier cream at least twice daily  Offload as much as possible    Please call if any questions/concerns and/or problems ([phone])    Follow up as needed    Three building blocks of wound healing  1. Protein (if not contraindicated)  2. Vitamin C 500 mg oral twice a day  3.  Zinc 220 mg oral daily           Follow-ups after your visit        Follow-up notes from your care team     Return if symptoms worsen or fail to improve.      Your next 10 appointments already scheduled     Jul 11, 2018  8:45 AM CDT   Anticoagulation Visit with HC ANTI COAGULATION   [facility] [place] ([facility] [place] )    [address]  [place] [address]   [phone]            Dec 31, 2018 10:00 AM CST   LAB with HC LAB   [facility] ([facility] [place] )    [address]  [place] [address]   [phone]            Dec 31, 2018 11:00 AM CST   Level O with HC INF RM 3314   [facility] [place] ([facility] [place] )    [address]  [place] [address]   [phone]            May 07, 2019  9:30 AM CDT   LAB with HC LAB   [facility] [place] ([facility] [place] )    [address]  [place] [address]   [phone]            May 07, 2019 10:00 AM CDT   MA SCREENING LEFT W/ YUKO with HCMA1   [facility] ([facility] [place] )    [address]  [place] [address]   [phone]           Three-dimensional  "(3D) mammograms are available at [facility] locations in [place], [place], [place], [place], [facility], [place], [place], and [place]. -Health locations include [place] and [facility] in [place]. Benefits of 3D mammograms include: - Improved rate of cancer detection - Decreases your chance of having to go back for more tests, which means fewer: - \"False-positive\" results (This means that there is an abnormal area but it isn't cancer.) - Invasive testing procedures, such as a biopsy or surgery - Can provide clearer images of the breast if you have dense breast tissue. 3D mammography is an optional exam that anyone can have with a 2D mammogram. It doesn't replace or take the place of a 2D mammogram. 2D mammograms remain an effective screening test for all women.  Not all insurance companies cover the cost of a 3D mammogram. Check with your insurance.            May 07, 2019 10:30 AM CDT   DX HIP/PELVIS/SPINE with HIDX1   [facility] ([facility] )    [address] [facility] [id number] [phone]           Please do not take any of the following 24 hours prior to the day of your exam: vitamins, calcium tablets, antacids.  If possible, please wear clothes without metal (snaps, zippers). A sweatsuit works well.            May 14, 2019 10:00 AM CDT   (Arrive by 9:45 AM)   Return Visit with Noemy Mahajan NP   [facility] ([facility] )    [address] [address]   [phone]            Jun 12, 2019  9:30 AM CDT   (Arrive by 9:15 AM)   Return Visit with Cam Kirby DO   [facility] ([facility] )    [address] [address]   [phone]              Who to contact     If you have questions or need follow up information about today's clinic visit or your schedule please contact [facility] directly at [phone].  Normal or non-critical lab and imaging " results will be communicated to you by MyChart, letter or phone within 4 business days after the clinic has received the results. If you do not hear from us within 7 days, please contact the clinic through MyChart or phone. If you have a critical or abnormal lab result, we will notify you by phone as soon as possible.  Submit refill requests through [employer] or call your pharmacy and they will forward the refill request to us. Please allow 3 business days for your refill to be completed.          Additional Information About Your Visit        Care EveryWhere ID     This is your Care EveryWhere ID. This could be used by other organizations to access your [facility] medical records  [id number]        Your Vitals Were     Pulse Temperature                85 98.6  F (37  C)           Blood Pressure from Last 3 Encounters:   07/06/18 131/89   06/29/18 142/82   06/22/18 (!) 136/94    Weight from Last 3 Encounters:   06/29/18 115 lb (52.2 kg)   06/13/18 115 lb (52.2 kg)   06/06/18 117 lb (53.1 kg)              Today, you had the following     No orders found for display       Primary Care Provider Office Phone # Fax #    MELISA Murray [phone]       [address] [place] [id number]        Equal Access to Services     AMAYA CONSTANTINO : Hadii anjelica ku hadasho Soomaali, waaxda luqadaha, qaybta kaalmada adeegyada, waxjimmie finley. So [facility] [phone].    ATENCIÓN: Si habla español, tiene a shay disposición servicios gratuitos de asistencia lingüística. Gera al [phone].    We comply with applicable federal civil rights laws and [place] laws. We do not discriminate on the basis of race, color, national origin, age, disability, sex, sexual orientation, or gender identity.            Thank you!     Thank you for choosing [facility]  for your care. Our goal is always to provide you with excellent care. Hearing back from our patients is one way we can  continue to improve our services. Please take a few minutes to complete the written survey that you may receive in the mail after your visit with us. Thank you!             Your Updated Medication List - Protect others around you: Learn how to safely use, store and throw away your medicines at www.disposemymeds.org.          This list is accurate as of 7/6/18 11:59 PM.  Always use your most recent med list.                   Brand Name Dispense Instructions for use Diagnosis    CALCITRATE/VITAMIN D PO      Take 1 tablet by mouth 2 times daily        diltiazem 180 MG 24 hr capsule    DILACOR XR    30 capsule    Take 1 capsule (180 mg) by mouth daily    Permanent atrial fibrillation (H), Aortic stenosis, moderate, Moderate tricuspid insufficiency, Long-term (current) use of anticoagulants       losartan 50 MG tablet    COZAAR    30 tablet    Take 1 tablet (50 mg) by mouth daily    HTN (hypertension)       MULTIVITAMINS PO      Take 1 tablet by mouth daily        order for DME     1 each    4 wheeled walker    Chronic left-sided low back pain with left-sided sciatica       OSTEO BI-FLEX ADV DOUBLE ST PO      Take  by mouth daily.        simvastatin 40 MG tablet    ZOCOR    45 tablet    TAKE ONE-HALF TABLET BY MOUTH IN THE EVENING    Hyperlipidemia LDL goal <100       traMADol 50 MG tablet    ULTRAM    180 tablet    TAKE 1 TO 2 TABLETS BY MOUTH EVERY 6 HOURS AS NEEDED FOR PAIN    Chronic pain syndrome       TYLENOL PO      Take 500 mg by mouth every 4 hours as needed        warfarin 5 MG tablet    COUMADIN    80 tablet    5 mg Mon,Wed,Fri and 2.5mg all other days or as directed by warfarin clinic    Atrial fibrillation, unspecified type (H), Long term current use of anticoagulant therapy

## 2018-07-06 NOTE — PROGRESS NOTES
SUBJECTIVE:  Michael Christiansen, 82 year old, female presents with the following Chief Complaint(s) with HPI to follow:  Chief Complaint   Patient presents with     WOUND CARE     right buttocks wound        Wound Care    HPI:  Michael is here today for the reassessment and treatment of buttock wound.  Back story:   Michael's not sure when it started, but feels she had it for at least 2-3 months.  She sleeps in a recliner in her basement to take care of her  who has Parkinson's.   Saw her PCP, Chandrika Kumar PA-C for it.    Wound Care referral: by her PCP on 6/6/18  1st wound care appt: myself on 6/15/18   Past treatments:   Soap and water  Triple antibiotic daily.   Current treatment: using the Eduin ointment twice daily and using the blue cushion that we gave her during the last appointment.     Michale feels the wounds are 100% better.    Pain is better.    Denies any fevers, chills, and/or malodorous drainage.   PMH: former smoker, denies history of diabetes  No results found for: A1C      Patient Active Problem List   Diagnosis     Hyperlipidemia LDL goal <130     Essential hypertension     Osteoarthritis     Permanent atrial fibrillation (H)     Chronic pain syndrome     History of total knee replacement     Radiculitis     Osteoarthritis of knee     Long-term (current) use of anticoagulants [Z79.01]     ACP (advance care planning)     Osteoporosis     Malignant neoplasm of right breast in female, estrogen receptor positive, unspecified site of breast (H)     Pulmonary hypertension-moderate     Aortic stenosis, moderate     Moderate tricuspid insufficiency     Anticoagulated on Coumadin       Past Medical History:   Diagnosis Date     Arthritis      Backache, unspecified 1/1/2011     Breast cancer, stage 1 3/19/2010     Chronic pain syndrome 1/23/2015     Claustrophobia 1/1/2011     Hip joint replacement by other means 1/1/2011     Osteoarthrosis, unspecified whether generalized or localized, lower leg  8/14/2006     Posttraumatic stress disorder 1/1/2011       Past Surgical History:   Procedure Laterality Date     BREAST SURGERY      right  side mastectomy     C TOTAL KNEE ARTHROPLASTY Left 01/26/15     cataract extraction and lens implantation       COLONOSCOPY       EYE SURGERY       GYN SURGERY      tubal pregnancy     ORTHOPEDIC SURGERY  2009    l. hip replacement LT     ORTHOPEDIC SURGERY  july 19th 2013    Right total knee     TUBAL/ECTOPIC PREGNANCY         Family History   Problem Relation Age of Onset     Diabetes Father 75     cause of death     Other - See Comments Father      PVD     HEART DISEASE Sister      Coronary Artery Disease Sister      Other - See Comments Mother 83     old age; cause of death     Other - See Comments Sister      14 year twin [employer]; cause of death     Chronic Obstructive Pulmonary Disease Daughter      Osteoperosis Daughter      Thyroid Disease Daughter      Cancer Sister      Other Cancer Sister      Other Cancer Sister      uterine     Hypertension No family hx of      Hyperlipidemia No family hx of      Cerebrovascular Disease No family hx of      Breast Cancer No family hx of      Prostate Cancer No family hx of      Colon Cancer No family hx of      Asthma No family hx of      Anesthesia Reaction No family hx of      Genetic Disorder No family hx of        Social History   Substance Use Topics     Smoking status: Former Smoker     Packs/day: 0.30     Years: 30.00     Types: Cigarettes     Quit date: 8/11/1987     Smokeless tobacco: Never Used      Comment: year quit 1987     Alcohol use No       Current Outpatient Prescriptions   Medication Sig Dispense Refill     Acetaminophen (TYLENOL PO) Take 500 mg by mouth every 4 hours as needed        Calcium Citrate-Vitamin D (CALCITRATE/VITAMIN D PO) Take 1 tablet by mouth 2 times daily        diltiazem (DILACOR XR) 180 MG 24 hr capsule Take 1 capsule (180 mg) by mouth daily 30 capsule 11     losartan (COZAAR) 50  "MG tablet Take 1 tablet (50 mg) by mouth daily 30 tablet 8     Misc Natural Products (OSTEO BI-FLEX ADV DOUBLE ST PO) Take  by mouth daily.       Multiple Vitamin (MULTIVITAMINS PO) Take 1 tablet by mouth daily        simvastatin (ZOCOR) 40 MG tablet TAKE ONE-HALF TABLET BY MOUTH IN THE EVENING 45 tablet 1     traMADol (ULTRAM) 50 MG tablet TAKE 1 TO 2 TABLETS BY MOUTH EVERY 6 HOURS AS NEEDED FOR PAIN 180 tablet 0     warfarin (COUMADIN) 5 MG tablet 5 mg Mon,Wed,Fri and 2.5mg all other days or as directed by warfarin clinic 80 tablet 3     order for DME 4 wheeled walker 1 each 0       Allergies   Allergen Reactions     Atenolol Shortness Of Breath and Other (See Comments)     Dizziness  Severe vertigo and dizziness/SOB     Lisinopril Cough     Pollen Extract      Other reaction(s): Runny Nose       REVIEW OF SYSTEMS  Skin: as noted above  Eyes: dry eyes plus start macular degeneration--taking vitamins for the eyes; followed by eye doctor  Ears/Nose/Throat: negative; history of allergies  Respiratory: Dyspnea on exertion- occasionally when \"I over do it\", No cough and No hemoptysis  Cardiovascular: history of a-fib; positive for coumadin  Gastrointestinal: negative  Genitourinary: negative  Musculoskeletal: positive for arthritis  Neurologic: positive for numbness or tingling of feet  Psychiatric: positive for excessive stress  Hematologic/Lymphatic/Immunologic: negative  Endocrine: negative    OBJECTIVE:  /89  Pulse 85  Temp 98.6  F (37  C)  Constitutional: healthy, alert and no distress  Cardiovascular: RRR. No clicks gallops or rub; Positive for Murmur  Respiratory:  Good diaphragmatic excursion. Lungs clear  Skin:   Wound description: Type of Wound- pressure ulcer; Location- right buttock, Drainage amount-none, Drainage color-n/a, Odor- none; Wound bed-100% healed Surrounding skin-blanchable erythema.  Measurements n/a   Dressing change: Wound cleansed with soap and water, applied Eduin ointment. "     Psychiatric: mentation appears normal and affect normal/bright      LABS  Results for orders placed or performed in visit on 06/29/18   Calcium   Result Value Ref Range    Calcium 9.1 8.5 - 10.1 mg/dL   Creatinine   Result Value Ref Range    Creatinine 0.75 0.52 - 1.04 mg/dL    GFR Estimate 74 >60 mL/min/1.7m2    GFR Estimate If Black 90 >60 mL/min/1.7m2   Phosphorus   Result Value Ref Range    Phosphorus 2.6 2.5 - 4.5 mg/dL   Albumin level   Result Value Ref Range    Albumin 3.7 3.4 - 5.0 g/dL       ASSESSMENT / PLAN:  (L89.312) Decubitus ulcer of right buttock, stage 2  (primary encounter diagnosis)  Comment:   100% healed    Plan:   Encouraged use of Eduin ointment and offloading at much as possible.    Gave her samples of Eduin.    Michael's aware to call if she needs a tube of Eduin.      Treatment goal: moisture balance and encouraged offloading      Patient Instructions   Wash hands prior to dressing change.   Clean instruments as appropriate    Wash wound with mild soap and water, dry  Apply non-zinc barrier cream at least twice daily  Offload as much as possible    Please call if any questions/concerns and/or problems ([phone])    Follow up as needed    Three building blocks of wound healing  1. Protein (if not contraindicated)  2. Vitamin C 500 mg oral twice a day  3.  Zinc 220 mg oral daily       Time: 30 minutes  Barrier: none  Willingness to learn: accepting    Genesis ROMAN FNP-BC  Disease Management

## 2018-07-16 ENCOUNTER — ANTICOAGULATION THERAPY VISIT (OUTPATIENT)
Dept: ANTICOAGULATION | Facility: OTHER | Age: 83
End: 2018-07-16
Attending: PHYSICIAN ASSISTANT
Payer: MEDICARE

## 2018-07-16 DIAGNOSIS — I48.21 PERMANENT ATRIAL FIBRILLATION (H): ICD-10-CM

## 2018-07-16 DIAGNOSIS — Z79.01 LONG-TERM (CURRENT) USE OF ANTICOAGULANTS: ICD-10-CM

## 2018-07-16 LAB — INR POINT OF CARE: 1.4 (ref 0.86–1.14)

## 2018-07-16 PROCEDURE — 85610 PROTHROMBIN TIME: CPT | Mod: QW,ZL

## 2018-07-16 NOTE — MR AVS SNAPSHOT
Pinorao Christiansen   7/16/2018 8:15 AM   Anticoagulation Therapy Visit    Description:  82 year old female   Provider:   ANTI COAGULATION   Department:  Hc Anti Coagulation           INR as of 7/16/2018     Today's INR 1.4!      Anticoagulation Summary as of 7/16/2018     INR goal 2.0-3.0   Today's INR 1.4!   Full warfarin instructions 7/16: 10 mg; Otherwise 2.5 mg on Mon; 5 mg all other days   Next INR check 7/23/2018    Indications   Long-term (current) use of anticoagulants [Z79.01] [Z79.01]  Permanent atrial fibrillation (H) [I48.2]         Your next Anticoagulation Clinic appointment(s)     Jul 23, 2018  8:15 AM CDT   Anticoagulation Visit with  ANTI COAGULATION   [facility] [place] ([facility] [place] )    [address] [place] [address]   [phone]              July 2018 Details    Sun Mon Tue Wed Thu Fri Sat     1               2               3               4               5               6               7                 8               9               10               11               12               13               14                 15               16      10 mg   See details      17      5 mg         18      5 mg         19      5 mg         20      5 mg         21      5 mg           22      5 mg         23            24               25               26               27               28                 29               30               31                    Date Details   07/16 This INR check       Date of next INR:  7/23/2018         How to take your warfarin dose     To take:  2.5 mg Take 0.5 of a 5 mg tablet.    To take:  5 mg Take 1 of the 5 mg tablets.    To take:  10 mg Take 2 of the 5 mg tablets.

## 2018-07-16 NOTE — PROGRESS NOTES
ANTICOAGULATION FOLLOW-UP CLINIC VISIT    Patient Name:  Michael Christiansen  Date:  7/16/2018  Contact Type:  Face to Face    SUBJECTIVE:     Patient Findings     Positives Change in diet/appetite, Activity level change    Comments Has changes in diet/activity. She is not eating bit meals, eating more salads, activity increased.            OBJECTIVE    INR Protime   Date Value Ref Range Status   07/16/2018 1.4 (A) 0.86 - 1.14 Final       ASSESSMENT / PLAN  INR assessment SUB diet and activity changes   Recheck INR In: 1 WEEK    INR Location Clinic      Anticoagulation Summary as of 7/16/2018     INR goal 2.0-3.0   Today's INR 1.4!   Warfarin maintenance plan 2.5 mg (5 mg x 0.5) on Mon; 5 mg (5 mg x 1) all other days   Full warfarin instructions 7/16: 10 mg; Otherwise 2.5 mg on Mon; 5 mg all other days   Weekly warfarin total 32.5 mg   Plan last modified Kasia Hercules RN (7/16/2018)   Next INR check 7/23/2018   Priority INR   Target end date Indefinite    Indications   Long-term (current) use of anticoagulants [Z79.01] [Z79.01]  Permanent atrial fibrillation (H) [I48.2]         Anticoagulation Episode Summary     INR check location     Preferred lab     Send INR reminders to [facility] POOL    Comments       Anticoagulation Care Providers     Provider Role Specialty Phone number    Chandrika Kumar PA [facility] [phone]            See the Encounter Report to view Anticoagulation Flowsheet and Dosing Calendar (Go to Encounters tab in chart review, and find the Anticoagulation Therapy Visit)        Kasia Hercules, RN

## 2018-07-23 ENCOUNTER — ANTICOAGULATION THERAPY VISIT (OUTPATIENT)
Dept: ANTICOAGULATION | Facility: OTHER | Age: 83
End: 2018-07-23
Attending: PHYSICIAN ASSISTANT
Payer: MEDICARE

## 2018-07-23 DIAGNOSIS — Z79.01 LONG-TERM (CURRENT) USE OF ANTICOAGULANTS: ICD-10-CM

## 2018-07-23 DIAGNOSIS — I48.21 PERMANENT ATRIAL FIBRILLATION (H): ICD-10-CM

## 2018-07-23 LAB — INR POINT OF CARE: 2.1 (ref 0.86–1.14)

## 2018-07-23 PROCEDURE — 85610 PROTHROMBIN TIME: CPT | Mod: QW,ZL

## 2018-07-23 NOTE — PROGRESS NOTES
ANTICOAGULATION FOLLOW-UP CLINIC VISIT    Patient Name:  Michael Christiansen  Date:  7/23/2018  Contact Type:  Face to Face    SUBJECTIVE:     Patient Findings     Positives No Problem Findings           OBJECTIVE    INR Protime   Date Value Ref Range Status   07/23/2018 2.1 (A) 0.86 - 1.14 Final       ASSESSMENT / PLAN  INR assessment THER    Recheck INR In: 2 WEEKS    INR Location Clinic      Anticoagulation Summary as of 7/23/2018     INR goal 2.0-3.0   Today's INR 2.1   Warfarin maintenance plan 5 mg (5 mg x 1) every day   Full warfarin instructions 5 mg every day   Weekly warfarin total 35 mg   Plan last modified Kasia Hercules, RN (7/23/2018)   Next INR check 8/6/2018   Priority INR   Target end date Indefinite    Indications   Long-term (current) use of anticoagulants [Z79.01] [Z79.01]  Permanent atrial fibrillation (H) [I48.2]         Anticoagulation Episode Summary     INR check location     Preferred lab     Send INR reminders to [facility] POOL    Comments       Anticoagulation Care Providers     Provider Role Specialty Phone number    Chandrika Kumar, PA [facility] [phone]            See the Encounter Report to view Anticoagulation Flowsheet and Dosing Calendar (Go to Encounters tab in chart review, and find the Anticoagulation Therapy Visit)        Kasia Hercules, RN

## 2018-07-23 NOTE — MR AVS SNAPSHOT
Pinorao Christiansen   7/23/2018 8:15 AM   Anticoagulation Therapy Visit    Description:  82 year old female   Provider:   ANTI COAGULATION   Department:  Hc Anti Coagulation           INR as of 7/23/2018     Today's INR 2.1      Anticoagulation Summary as of 7/23/2018     INR goal 2.0-3.0   Today's INR 2.1   Full warfarin instructions 5 mg every day   Next INR check 8/6/2018    Indications   Long-term (current) use of anticoagulants [Z79.01] [Z79.01]  Permanent atrial fibrillation (H) [I48.2]         Your next Anticoagulation Clinic appointment(s)     Aug 06, 2018  8:15 AM CDT   Anticoagulation Visit with HC ANTI COAGULATION   [facility] [place] ([facility] [place] [facility]    [address] [place] [address]   [phone]              July 2018 Details    Sun Mon Tue Wed Thu Fri Sat     1               2               3               4               5               6               7                 8               9               10               11               12               13               14                 15               16               17               18               19               20               21                 22               23      5 mg   See details      24      5 mg         25      5 mg         26      5 mg         27      5 mg         28      5 mg           29      5 mg         30      5 mg         31      5 mg              Date Details   07/23 This INR check               How to take your warfarin dose     To take:  5 mg Take 1 of the 5 mg tablets.           August 2018 Details    Sun Mon Tue Wed Thu Fri Sat        1      5 mg         2      5 mg         3      5 mg         4      5 mg           5      5 mg         6            7               8               9               10               11                 12               13               14               15               16               17               18                 19               20               21                22               23               24               25                 26               27               28               29               30               31                 Date Details   No additional details    Date of next INR:  8/6/2018         How to take your warfarin dose     To take:  5 mg Take 1 of the 5 mg tablets.

## 2018-08-06 ENCOUNTER — ANTICOAGULATION THERAPY VISIT (OUTPATIENT)
Dept: ANTICOAGULATION | Facility: OTHER | Age: 83
End: 2018-08-06
Attending: PHYSICIAN ASSISTANT
Payer: MEDICARE

## 2018-08-06 DIAGNOSIS — Z79.01 LONG TERM CURRENT USE OF ANTICOAGULANT THERAPY: ICD-10-CM

## 2018-08-06 DIAGNOSIS — I48.91 ATRIAL FIBRILLATION, UNSPECIFIED TYPE (H): ICD-10-CM

## 2018-08-06 DIAGNOSIS — Z79.01 LONG-TERM (CURRENT) USE OF ANTICOAGULANTS: ICD-10-CM

## 2018-08-06 DIAGNOSIS — I48.21 PERMANENT ATRIAL FIBRILLATION (H): ICD-10-CM

## 2018-08-06 LAB — INR POINT OF CARE: 4.7 (ref 0.86–1.14)

## 2018-08-06 PROCEDURE — 85610 PROTHROMBIN TIME: CPT | Mod: QW,ZL

## 2018-08-06 RX ORDER — WARFARIN SODIUM 5 MG/1
TABLET ORAL
Qty: 100 TABLET | Refills: 3 | Status: SHIPPED | OUTPATIENT
Start: 2018-08-06 | End: 2018-11-19

## 2018-08-06 NOTE — PROGRESS NOTES
ANTICOAGULATION FOLLOW-UP CLINIC VISIT    Patient Name:  Michael Christiansen  Date:  8/6/2018  Contact Type:  Face to Face    SUBJECTIVE:     Patient Findings     Positives Activity level change    Comments States is more active           OBJECTIVE    INR Protime   Date Value Ref Range Status   08/06/2018 4.7 (A) 0.86 - 1.14 Final       ASSESSMENT / PLAN  INR assessment SUPRA change in activity   Recheck INR In: 1 WEEK    INR Location Clinic      Anticoagulation Summary as of 8/6/2018     INR goal 2.0-3.0   Today's INR 4.7!   Warfarin maintenance plan 2.5 mg (5 mg x 0.5) on Fri; 5 mg (5 mg x 1) all other days   Full warfarin instructions 8/6: Hold; 8/7: 2.5 mg; Otherwise 2.5 mg on Fri; 5 mg all other days   Weekly warfarin total 32.5 mg   Plan last modified Kasia Hercules, RN (8/6/2018)   Next INR check 8/13/2018   Priority INR   Target end date Indefinite    Indications   Long-term (current) use of anticoagulants [Z79.01] [Z79.01]  Permanent atrial fibrillation (H) [I48.2]         Anticoagulation Episode Summary     INR check location     Preferred lab     Send INR reminders to  MC POOL    Comments       Anticoagulation Care Providers     Provider Role Specialty Phone number    Chandrika Kumar, PA [facility] [phone]            See the Encounter Report to view Anticoagulation Flowsheet and Dosing Calendar (Go to Encounters tab in chart review, and find the Anticoagulation Therapy Visit)        Kasia Hercules, RN

## 2018-08-06 NOTE — MR AVS SNAPSHOT
Michael DEJESUS Елена   8/6/2018 8:15 AM   Anticoagulation Therapy Visit    Description:  82 year old female   Provider:   ANTI COAGULATION   Department:  Hc Anti Coagulation           INR as of 8/6/2018     Today's INR 4.7!      Anticoagulation Summary as of 8/6/2018     INR goal 2.0-3.0   Today's INR 4.7!   Full warfarin instructions 8/6: Hold; 8/7: 2.5 mg; Otherwise 2.5 mg on Fri; 5 mg all other days   Next INR check 8/13/2018    Indications   Long-term (current) use of anticoagulants [Z79.01] [Z79.01]  Permanent atrial fibrillation (H) [I48.2]         Your next Anticoagulation Clinic appointment(s)     Aug 13, 2018  8:15 AM CDT   Anticoagulation Visit with  ANTI COAGULATION   [facility] ([facility] )    [address] [place] [address]   [phone]              August 2018 Details    Sun Mon Tue Wed Thu Fri Sat        1               2               3               4                 5               6      Hold   See details      7      2.5 mg         8      5 mg         9      5 mg         10      2.5 mg         11      5 mg           12      5 mg         13            14               15               16               17               18                 19               20               21               22               23               24               25                 26               27               28               29               30               31                 Date Details   08/06 This INR check       Date of next INR:  8/13/2018         How to take your warfarin dose     To take:  2.5 mg Take 0.5 of a 5 mg tablet.    To take:  5 mg Take 1 of the 5 mg tablets.    Hold Do not take your warfarin dose. See the Details table to the right for additional instructions.

## 2018-08-10 DIAGNOSIS — G89.4 CHRONIC PAIN SYNDROME: ICD-10-CM

## 2018-08-13 ENCOUNTER — ANTICOAGULATION THERAPY VISIT (OUTPATIENT)
Dept: ANTICOAGULATION | Facility: OTHER | Age: 83
End: 2018-08-13
Attending: PHYSICIAN ASSISTANT
Payer: MEDICARE

## 2018-08-13 DIAGNOSIS — Z79.01 LONG-TERM (CURRENT) USE OF ANTICOAGULANTS: ICD-10-CM

## 2018-08-13 DIAGNOSIS — I48.21 PERMANENT ATRIAL FIBRILLATION (H): ICD-10-CM

## 2018-08-13 LAB — INR POINT OF CARE: 1.9 (ref 0.86–1.14)

## 2018-08-13 PROCEDURE — 36416 COLLJ CAPILLARY BLOOD SPEC: CPT | Mod: ZL

## 2018-08-13 RX ORDER — TRAMADOL HYDROCHLORIDE 50 MG/1
TABLET ORAL
Qty: 180 TABLET | Refills: 0 | Status: SHIPPED | OUTPATIENT
Start: 2018-08-13 | End: 2018-09-26

## 2018-08-13 NOTE — PROGRESS NOTES
ANTICOAGULATION FOLLOW-UP CLINIC VISIT    Patient Name:  Michael Christiansen  Date:  8/13/2018  Contact Type:  Face to Face    SUBJECTIVE:     Patient Findings     Comments States had Prolia shot in end of June.             OBJECTIVE    INR Protime   Date Value Ref Range Status   08/13/2018 1.9 (A) 0.86 - 1.14 Final       ASSESSMENT / PLAN  INR assessment THER    Recheck INR In: 2 WEEKS    INR Location Clinic      Anticoagulation Summary as of 8/13/2018     INR goal 2.0-3.0   Today's INR 1.9!   Warfarin maintenance plan 2.5 mg (5 mg x 0.5) on Fri; 5 mg (5 mg x 1) all other days   Full warfarin instructions 2.5 mg on Fri; 5 mg all other days   Weekly warfarin total 32.5 mg   No change documented Kasia Hercules RN   Plan last modified Kasia Hercules RN (8/6/2018)   Next INR check 8/27/2018   Priority INR   Target end date Indefinite    Indications   Long-term (current) use of anticoagulants [Z79.01] [Z79.01]  Permanent atrial fibrillation (H) [I48.2]         Anticoagulation Episode Summary     INR check location     Preferred lab     Send INR reminders to  ANTICOAG POOL    Comments       Anticoagulation Care Providers     Provider Role Specialty Phone number    Chandrika Kumar, PA [facility] [phone]            See the Encounter Report to view Anticoagulation Flowsheet and Dosing Calendar (Go to Encounters tab in chart review, and find the Anticoagulation Therapy Visit)        Kasia Hercules RN

## 2018-08-13 NOTE — MR AVS SNAPSHOT
Michael DEJESUS Елена   8/13/2018 9:30 AM   Anticoagulation Therapy Visit    Description:  83 year old female   Provider:   ANTI COAGULATION   Department:   Anti Coagulation           INR as of 8/13/2018     Today's INR 1.9!      Anticoagulation Summary as of 8/13/2018     INR goal 2.0-3.0   Today's INR 1.9!   Full warfarin instructions 2.5 mg on Fri; 5 mg all other days   Next INR check 8/27/2018    Indications   Long-term (current) use of anticoagulants [Z79.01] [Z79.01]  Permanent atrial fibrillation (H) [I48.2]         Your next Anticoagulation Clinic appointment(s)     Aug 13, 2018  9:30 AM CDT   Anticoagulation Visit with  ANTI COAGULATION   [facility] [place] ([facility] )    [address]  [place] [address]   [phone]            Aug 27, 2018  8:45 AM CDT   Anticoagulation Visit with  ANTI COAGULATION   [facility] ([facility]    [address]  [place] [address]   [phone]              August 2018 Details    Sun Mon Tue Wed Thu Fri Sat        1               2               3               4                 5               6               7               8               9               10               11                 12               13      5 mg   See details      14      5 mg         15      5 mg         16      5 mg         17      2.5 mg         18      5 mg           19      5 mg         20      5 mg         21      5 mg         22      5 mg         23      5 mg         24      2.5 mg         25      5 mg           26      5 mg         27            28               29               30               31                 Date Details   08/13 This INR check       Date of next INR:  8/27/2018         How to take your warfarin dose     To take:  2.5 mg Take 0.5 of a 5 mg tablet.    To take:  5 mg Take 1 of the 5 mg tablets.

## 2018-08-27 ENCOUNTER — ANTICOAGULATION THERAPY VISIT (OUTPATIENT)
Dept: ANTICOAGULATION | Facility: OTHER | Age: 83
End: 2018-08-27
Attending: PHYSICIAN ASSISTANT
Payer: MEDICARE

## 2018-08-27 DIAGNOSIS — I48.21 PERMANENT ATRIAL FIBRILLATION (H): ICD-10-CM

## 2018-08-27 DIAGNOSIS — Z79.01 LONG-TERM (CURRENT) USE OF ANTICOAGULANTS: ICD-10-CM

## 2018-08-27 LAB — INR POINT OF CARE: 2.9 (ref 0.86–1.14)

## 2018-08-27 PROCEDURE — 36416 COLLJ CAPILLARY BLOOD SPEC: CPT | Mod: ZL

## 2018-08-27 NOTE — MR AVS SNAPSHOT
Michael DEJESUS Елена   8/27/2018 8:45 AM   Anticoagulation Therapy Visit    Description:  83 year old female   Provider:   ANTI COAGULATION   Department:   Anti Coagulation           INR as of 8/27/2018     Today's INR 2.9      Anticoagulation Summary as of 8/27/2018     INR goal 2.0-3.0   Today's INR 2.9   Full warfarin instructions 2.5 mg on Fri; 5 mg all other days   Next INR check 10/1/2018    Indications   Long-term (current) use of anticoagulants [Z79.01] [Z79.01]  Permanent atrial fibrillation (H) [I48.2]         Your next Anticoagulation Clinic appointment(s)     Aug 27, 2018  8:45 AM CDT   Anticoagulation Visit with  ANTI COAGULATION   [facility] [place] ([facility] [place] )    [address]  [place] [address]   [phone]            Oct 01, 2018  9:00 AM CDT   Anticoagulation Visit with  ANTI COAGULATION   [facility] [place] ([facility] [place] )    [address]  [place] [address]   [phone]              August 2018 Details    Sun Mon Tue Wed Thu Fri Sat        1               2               3               4                 5               6               7               8               9               10               11                 12               13               14               15               16               17               18                 19               20               21               22               23               24               25                 26               27      5 mg   See details      28      5 mg         29      5 mg         30      5 mg         31      2.5 mg           Date Details   08/27 This INR check               How to take your warfarin dose     To take:  2.5 mg Take 0.5 of a 5 mg tablet.    To take:  5 mg Take 1 of the 5 mg tablets.           September 2018 Details    Sun Mon Tue Wed Thu Fri Sat           1      5 mg           2      5 mg         3      5 mg         4      5 mg         5      5  mg         6      5 mg         7      2.5 mg         8      5 mg           9      5 mg         10      5 mg         11      5 mg         12      5 mg         13      5 mg         14      2.5 mg         15      5 mg           16      5 mg         17      5 mg         18      5 mg         19      5 mg         20      5 mg         21      2.5 mg         22      5 mg           23      5 mg         24      5 mg         25      5 mg         26      5 mg         27      5 mg         28      2.5 mg         29      5 mg           30      5 mg                Date Details   No additional details            How to take your warfarin dose     To take:  2.5 mg Take 0.5 of a 5 mg tablet.    To take:  5 mg Take 1 of the 5 mg tablets.           October 2018 Details    Sun Mon Tue Wed Thu Fri Sat      1            2               3               4               5               6                 7               8               9               10               11               12               13                 14               15               16               17               18               19               20                 21               22               23               24               25               26               27                 28               29               30               31                   Date Details   No additional details    Date of next INR:  10/1/2018         How to take your warfarin dose     To take:  5 mg Take 1 of the 5 mg tablets.

## 2018-08-27 NOTE — PROGRESS NOTES
ANTICOAGULATION FOLLOW-UP CLINIC VISIT    Patient Name:  Michael Christiansen  Date:  8/27/2018  Contact Type:  Face to Face    SUBJECTIVE:     Patient Findings     Positives No Problem Findings           OBJECTIVE    INR Protime   Date Value Ref Range Status   08/27/2018 2.9 (A) 0.86 - 1.14 Final       ASSESSMENT / PLAN  INR assessment THER    Recheck INR In: 5 WEEKS    INR Location Clinic      Anticoagulation Summary as of 8/27/2018     INR goal 2.0-3.0   Today's INR 2.9   Warfarin maintenance plan 2.5 mg (5 mg x 0.5) on Fri; 5 mg (5 mg x 1) all other days   Full warfarin instructions 2.5 mg on Fri; 5 mg all other days   Weekly warfarin total 32.5 mg   No change documented Kasia Hercules RN   Plan last modified Kasia Hercules RN (8/6/2018)   Next INR check 10/1/2018   Priority INR   Target end date Indefinite    Indications   Long-term (current) use of anticoagulants [Z79.01] [Z79.01]  Permanent atrial fibrillation (H) [I48.2]         Anticoagulation Episode Summary     INR check location     Preferred lab     Send INR reminders to  ANTICOAG POOL    Comments       Anticoagulation Care Providers     Provider Role Specialty Phone number    Chandrika Kumar PA [facility] [phone]            See the Encounter Report to view Anticoagulation Flowsheet and Dosing Calendar (Go to Encounters tab in chart review, and find the Anticoagulation Therapy Visit)        Kasia Hercules RN

## 2018-09-26 ENCOUNTER — ALLIED HEALTH/NURSE VISIT (OUTPATIENT)
Dept: FAMILY MEDICINE | Facility: OTHER | Age: 83
End: 2018-09-26
Attending: PHYSICIAN ASSISTANT
Payer: MEDICARE

## 2018-09-26 ENCOUNTER — TELEPHONE (OUTPATIENT)
Dept: FAMILY MEDICINE | Facility: OTHER | Age: 83
End: 2018-09-26

## 2018-09-26 DIAGNOSIS — G89.4 CHRONIC PAIN SYNDROME: ICD-10-CM

## 2018-09-26 DIAGNOSIS — Z23 NEED FOR PROPHYLACTIC VACCINATION AND INOCULATION AGAINST INFLUENZA: Primary | ICD-10-CM

## 2018-09-26 PROCEDURE — 90662 IIV NO PRSV INCREASED AG IM: CPT

## 2018-09-26 PROCEDURE — G0008 ADMIN INFLUENZA VIRUS VAC: HCPCS

## 2018-09-26 RX ORDER — TRAMADOL HYDROCHLORIDE 50 MG/1
TABLET ORAL
Qty: 180 TABLET | Refills: 0 | Status: SHIPPED | OUTPATIENT
Start: 2018-09-26 | End: 2018-10-22

## 2018-09-26 NOTE — PROGRESS NOTES

## 2018-09-26 NOTE — MR AVS SNAPSHOT
After Visit Summary   9/26/2018    Michael Christiansen    MRN: [id number]           Patient Information     Date Of Birth          1935        Visit Information        Provider Department      9/26/2018 11:45 AM HC FP NURSE [facility] [place]        Today's Diagnoses     Need for prophylactic vaccination and inoculation against influenza    -  1       Follow-ups after your visit        Your next 10 appointments already scheduled     Oct 01, 2018  9:00 AM CDT   Anticoagulation Visit with HC ANTI COAGULATION   [facility] [place] ([facility] [place] )    [address]  [place] [address]   [phone]            Dec 31, 2018 10:00 AM CST   LAB with HC LAB   [facility] [place] ([facility] [place] )    [address]  [place] [address]   [phone]            Dec 31, 2018 11:00 AM CST   Level O with HC INF RM 3314   [facility] [place] ([facility] [place] )    [address]  [place] [address]   [phone]            May 07, 2019  9:30 AM CDT   LAB with HC LAB   [facility] [place] ([facility] [place] )    [address]  [place] [address]   [phone]            May 07, 2019 10:00 AM CDT   MA SCREENING LEFT W/ YUKO with HCMA1   [facility] [place] ([facility] [place] )    [address]  [place] [address]   [phone]           How do I prepare for my exam? (Food and drink instructions) No Food and Drink Restrictions.  How do I prepare for my exam? (Other instructions) Do not use any powder, lotion or deodorant under your arms or on your breast. If you do, we will ask you to remove it before your exam.  What should I wear: Wear comfortable, two-piece clothing.  How long does the exam take: Most scans will take 15 minutes.  What should I bring: Bring any previous mammograms from other facilities or have them mailed to the breast  "center.  Do I need a :  No  is needed.  What do I need to tell my doctor: If you have any allergies, tell your care team.  What should I do after the exam: No restrictions, You may resume normal activities.  What is this test: This test is an x-ray of the breast to look for breast disease. The breast is pressed between two plates to flatten and spread the tissue. An X-ray is taken of the breast from different angles.  Who should I call with questions: If you have any questions, please call the Imaging Department where you will have your exam. Directions, parking instructions, and other information is available on our website, [email or web address].  Other information about my exam Three-dimensional (3D) mammograms are available at [place] locations in [facility], [place], and [place]. [facility] locations include [place] and the [facility] [place] in [place].  Benefits of 3D mammograms include: * Improved rate of cancer detection * Decreases your chance of having to go back for more tests, which means fewer: * \"False-positive\" results (This means that there is an abnormal area but it isn't cancer.) * Invasive testing procedures, such as a biopsy or surgery * Can provide clearer images of the breast if you have dense breast tissue.  *3D mammography is an optional exam that anyone can have with a 2D mammogram. It doesn't replace or take the place of a 2D mammogram. 2D mammograms remain an effective screening test for all women.  Not all insurance companies cover the cost of a 3D mammogram. Check with your insurance.            May 07, 2019 10:30 AM CDT   DX HIP/PELVIS/SPINE with HIDX1   [facility] ([facility] )    [address] [facility] [id number] [phone]           How do I prepare for my exam? (Food and drink instructions) No Food and Drink Restrictions.  How do I prepare for my exam? (Other instructions) Please do not take any " of the following 24 hours prior to the day of your exam: vitamins, calcium tablets, antacids.  What should I wear: If possible, please wear clothes without metal (snaps, zippers). A sweat suit works well.  How long does the exam take: The exam takes about 20 minutes.  What should I bring: Bring a list of your current medicines to your exam (including vitamins, minerals and over-the-counter drugs).  Do I need a :  No  is needed.  What should I do after the exam: No restrictions, You may resume normal activities.  How do I prepare for my exam? (Food and drink instructions) A DEXA scan is a bone-density scan. It uses a low level of radiation to check the strength of your bones. As you lie on a padded table, a machine will take X-rays. We most often scan the hips and lower spine.  Who should I call with questions: If you have any questions, please call the Imaging Department where you will have your exam. Directions, parking instructions, and other information is available on our website, [email or web address].            May 14, 2019 10:10 AM CDT   (Arrive by 9:55 AM)   [facility]   [facility]    [address]  [facility] [address]   [phone]            Jun 12, 2019  9:30 AM CDT   (Arrive by 9:15 AM)   [facility] Visit with Cam Kirby DO   [facility]    [address]  [facility] [address]   [phone]              Who to contact     If you have questions or need follow up information about today's clinic visit or your schedule please contact [facility] directly at [phone].  Normal or non-critical lab and imaging results will be communicated to you by MyChart, letter or phone within 4 business days after the clinic has received the results. If you do not hear from us within 7 days, please contact the clinic through [employer]  "or phone. If you have a critical or abnormal lab result, we will notify you by phone as soon as possible.  Submit refill requests through [employer] or call your pharmacy and they will forward the refill request to us. Please allow 3 business days for your refill to be completed.          Additional Information About Your Visit        S[employer] Information     [employer] lets you send messages to your doctor, view your test results, renew your prescriptions, schedule appointments and more. To sign up, go to [email or web address] . Click on \"Log in\" on the left side of the screen, which will take you to the Welcome page. Then click on \"Sign up Now\" on the right side of the page.     You will be asked to enter the access code listed below, as well as some personal information. Please follow the directions to create your username and password.     Your access code is: [email or web address]  Expires: 2018 11:45 AM     Your access code will  in 90 days. If you need help or a new code, please call your [place] clinic or [phone].        Care EveryWhere ID     This is your Care EveryWhere ID. This could be used by other organizations to access your [place] medical records  [id number]         Blood Pressure from Last 3 Encounters:   18 131/89   18 142/82   18 (!) 136/94    Weight from Last 3 Encounters:   18 115 lb (52.2 kg)   18 115 lb (52.2 kg)   18 117 lb (53.1 kg)              We Performed the Following     ADMIN INFLUENZA (For MEDICARE Patients ONLY) []     HC FLU VACCINE, INCREASED ANTIGEN, PRESV FREE        Primary Care Provider Office Phone # Fax #    MELISA Murray [phone]       [address] [place] [address]        Equal Access to Services     AMAYA CONSTANTINO : Jaxson Hampton, waraymond navarro, qaybliseth kaaltopher isaac. [facility] [phone].    ATENCIÓN: Si kal negron, " tiene a shay disposición servicios gratuitos de asistencia lingüística. Gera cho [phone].    We comply with applicable federal civil rights laws and [place] laws. We do not discriminate on the basis of race, color, national origin, age, disability, sex, sexual orientation, or gender identity.            Thank you!     Thank you for choosing [facility]  for your care. Our goal is always to provide you with excellent care. Hearing back from our patients is one way we can continue to improve our services. Please take a few minutes to complete the written survey that you may receive in the mail after your visit with us. Thank you!             Your Updated Medication List - Protect others around you: Learn how to safely use, store and throw away your medicines at www.disposemymeds.org.          This list is accurate as of 9/26/18 11:45 AM.  Always use your most recent med list.                   Brand Name Dispense Instructions for use Diagnosis    CALCITRATE/VITAMIN D PO      Take 1 tablet by mouth 2 times daily        diltiazem 180 MG 24 hr capsule    DILACOR XR    30 capsule    Take 1 capsule (180 mg) by mouth daily    Permanent atrial fibrillation (H), Aortic stenosis, moderate, Moderate tricuspid insufficiency, Long-term (current) use of anticoagulants       losartan 50 MG tablet    COZAAR    30 tablet    Take 1 tablet (50 mg) by mouth daily    HTN (hypertension)       MULTIVITAMINS PO      Take 1 tablet by mouth daily        order for DME     1 each    4 wheeled walker    Chronic left-sided low back pain with left-sided sciatica       OSTEO BI-FLEX ADV DOUBLE ST PO      Take  by mouth daily.        simvastatin 40 MG tablet    ZOCOR    45 tablet    TAKE ONE-HALF TABLET BY MOUTH IN THE EVENING    Hyperlipidemia LDL goal <100       traMADol 50 MG tablet    ULTRAM    180 tablet    TAKE 1 TO 2 TABLETS BY MOUTH EVERY 6 HOURS AS NEEDED FOR  PAIN    Chronic pain syndrome       TYLENOL PO       Take 500 mg by mouth every 4 hours as needed        warfarin 5 MG tablet    COUMADIN    100 tablet    2.5mg Fri and 5mg  all other days or as directed by warfarin clinic    Atrial fibrillation, unspecified type (H), Long term current use of anticoagulant therapy

## 2018-10-01 ENCOUNTER — ANTICOAGULATION THERAPY VISIT (OUTPATIENT)
Dept: ANTICOAGULATION | Facility: OTHER | Age: 83
End: 2018-10-01
Attending: PHYSICIAN ASSISTANT
Payer: MEDICARE

## 2018-10-01 DIAGNOSIS — Z79.01 LONG-TERM (CURRENT) USE OF ANTICOAGULANTS: ICD-10-CM

## 2018-10-01 DIAGNOSIS — I48.21 PERMANENT ATRIAL FIBRILLATION (H): ICD-10-CM

## 2018-10-01 LAB — INR POINT OF CARE: 3.8 (ref 0.86–1.14)

## 2018-10-01 PROCEDURE — 36416 COLLJ CAPILLARY BLOOD SPEC: CPT | Mod: ZL

## 2018-10-01 NOTE — PROGRESS NOTES
ANTICOAGULATION FOLLOW-UP CLINIC VISIT    Patient Name:  Michael Christiansen  Date:  10/1/2018  Contact Type:  Face to Face    SUBJECTIVE:     Patient Findings     Comments Increased stress with , She states he is getting progressively weaker and more unsteady on his feet. (he has parkinson disease).            OBJECTIVE    INR Protime   Date Value Ref Range Status   10/01/2018 3.8 (A) 0.86 - 1.14 Final       ASSESSMENT / PLAN  INR assessment SUPRA    Recheck INR In: 2 WEEKS    INR Location Clinic      Anticoagulation Summary as of 10/1/2018     INR goal 2.0-3.0   Today's INR 3.8!   Warfarin maintenance plan 2.5 mg (5 mg x 0.5) on Mon, Fri; 5 mg (5 mg x 1) all other days   Full warfarin instructions 10/1: Hold; Otherwise 2.5 mg on Mon, Fri; 5 mg all other days   Weekly warfarin total 30 mg   Plan last modified Kasia Hercules RN (10/1/2018)   Next INR check 10/15/2018   Priority INR   Target end date Indefinite    Indications   Long-term (current) use of anticoagulants [Z79.01] [Z79.01]  Permanent atrial fibrillation (H) [I48.2]         Anticoagulation Episode Summary     INR check location     Preferred lab     Send INR reminders to HC ANTICOAG POOL    Comments       Anticoagulation Care Providers     Provider Role Specialty Phone number    Chandrika Kumar, PA [facility] [phone]            See the Encounter Report to view Anticoagulation Flowsheet and Dosing Calendar (Go to Encounters tab in chart review, and find the Anticoagulation Therapy Visit)        Kasia Hercules, RN

## 2018-10-01 NOTE — MR AVS SNAPSHOT
Michael DEJESUS Елена   10/1/2018 9:00 AM   Anticoagulation Therapy Visit    Description:  83 year old female   Provider:   ANTI COAGULATION   Department:  [facility] Anti Coagulation           INR as of 10/1/2018     Today's INR 3.8!      Anticoagulation Summary as of 10/1/2018     INR goal 2.0-3.0   Today's INR 3.8!   Full warfarin instructions 10/1: Hold; Otherwise 2.5 mg on Mon, Fri; 5 mg all other days   Next INR check 10/15/2018    Indications   Long-term (current) use of anticoagulants [Z79.01] [Z79.01]  Permanent atrial fibrillation (H) [I48.2]         Your next Anticoagulation Clinic appointment(s)     Oct 01, 2018  9:00 AM CDT   Anticoagulation Visit with HC ANTI COAGULATION   [facility]    [address]  [place] [address]   [phone]            Oct 15, 2018 10:00 AM CDT   Anticoagulation Visit with  ANTI COAGULATION   [facility]    [address] [facility] [place] [address]   [phone]              October 2018 Details    Sun Mon Tue Wed Thu Fri Sat      1      Hold   See details      2      5 mg         3      5 mg         4      5 mg         5      2.5 mg         6      5 mg           7      5 mg         8      2.5 mg         9      5 mg         10      5 mg         11      5 mg         12      2.5 mg         13      5 mg           14      5 mg         15            16               17               18               19               20                 21               22               23               24               25               26               27                 28               29               30               31                   Date Details   10/01 This INR check       Date of next INR:  10/15/2018         How to take your warfarin dose     To take:  2.5 mg Take 0.5 of a 5 mg tablet.    To take:  5 mg Take 1 of the 5 mg tablets.    Hold Do not take your warfarin dose. See the  Details table to the right for additional instructions.

## 2018-10-15 ENCOUNTER — ANTICOAGULATION THERAPY VISIT (OUTPATIENT)
Dept: ANTICOAGULATION | Facility: OTHER | Age: 83
End: 2018-10-15
Attending: PHYSICIAN ASSISTANT
Payer: MEDICARE

## 2018-10-15 DIAGNOSIS — I48.21 PERMANENT ATRIAL FIBRILLATION (H): ICD-10-CM

## 2018-10-15 LAB — INR POINT OF CARE: 2.3 (ref 0.86–1.14)

## 2018-10-15 PROCEDURE — 36416 COLLJ CAPILLARY BLOOD SPEC: CPT | Mod: ZL

## 2018-10-15 NOTE — PROGRESS NOTES
ANTICOAGULATION FOLLOW-UP CLINIC VISIT    Patient Name:  Mcihael Christiansen  Date:  10/15/2018  Contact Type:  Face to Face    SUBJECTIVE:     Patient Findings     Positives No Problem Findings           OBJECTIVE    INR Protime   Date Value Ref Range Status   10/15/2018 2.3 (A) 0.86 - 1.14 Final       ASSESSMENT / PLAN  INR assessment THER    Recheck INR In: 5 WEEKS    INR Location Clinic      Anticoagulation Summary as of 10/15/2018     INR goal 2.0-3.0   Today's INR 2.3   Warfarin maintenance plan 2.5 mg (5 mg x 0.5) on Mon, Fri; 5 mg (5 mg x 1) all other days   Full warfarin instructions 2.5 mg on Mon, Fri; 5 mg all other days   Weekly warfarin total 30 mg   No change documented Kasia Hercules RN   Plan last modified Kasia Hercules RN (10/1/2018)   Next INR check 11/19/2018   Priority INR   Target end date Indefinite    Indications   Long-term (current) use of anticoagulants [Z79.01] [Z79.01]  Permanent atrial fibrillation (H) [I48.2]         Anticoagulation Episode Summary     INR check location     Preferred lab     Send INR reminders to  [facility] POOL    Comments       Anticoagulation Care Providers     Provider Role Specialty Phone number    Chandrika Kumar PA [facility] [phone]            See the Encounter Report to view Anticoagulation Flowsheet and Dosing Calendar (Go to Encounters tab in chart review, and find the Anticoagulation Therapy Visit)        Kasia Hercules RN

## 2018-10-15 NOTE — MR AVS SNAPSHOT
Michael DEJESUS Елена   10/15/2018 10:00 AM   Anticoagulation Therapy Visit    Description:  83 year old female   Provider:   ANTI COAGULATION   Department:  [facility] Anti Coagulation           INR as of 10/15/2018     Today's INR 2.3      Anticoagulation Summary as of 10/15/2018     INR goal 2.0-3.0   Today's INR 2.3   Full warfarin instructions 2.5 mg on Mon, Fri; 5 mg all other days   Next INR check 11/19/2018    Indications   Long-term (current) use of anticoagulants [Z79.01] [Z79.01]  Permanent atrial fibrillation (H) [I48.2]         Your next Anticoagulation Clinic appointment(s)     Oct 15, 2018 10:00 AM CDT   Anticoagulation Visit with HC ANTI COAGULATION   [facility] [place] ([facility] [place] [facility]    [address]  [place] [address]   [phone]            Nov 19, 2018 10:00 AM CST   Anticoagulation Visit with  ANTI COAGULATION   [facility] [place] ([facility] [place] )    [address]  [place] [address]   [phone]              October 2018 Details    Sun Mon Tue Wed Thu Fri Sat      1               2               3               4               5               6                 7               8               9               10               11               12               13                 14               15      2.5 mg   See details      16      5 mg         17      5 mg         18      5 mg         19      2.5 mg         20      5 mg           21      5 mg         22      2.5 mg         23      5 mg         24      5 mg         25      5 mg         26      2.5 mg         27      5 mg           28      5 mg         29      2.5 mg         30      5 mg         31      5 mg             Date Details   10/15 This INR check               How to take your warfarin dose     To take:  2.5 mg Take 0.5 of a 5 mg tablet.    To take:  5 mg Take 1 of the 5 mg tablets.           November 2018 Details    Sun Mon Tue Wed Thu Fri Sat         1      5  mg         2      2.5 mg         3      5 mg           4      5 mg         5      2.5 mg         6      5 mg         7      5 mg         8      5 mg         9      2.5 mg         10      5 mg           11      5 mg         12      2.5 mg         13      5 mg         14      5 mg         15      5 mg         16      2.5 mg         17      5 mg           18      5 mg         19            20               21               22               23               24                 25               26               27               28               29               30                 Date Details   No additional details    Date of next INR:  11/19/2018         How to take your warfarin dose     To take:  2.5 mg Take 0.5 of a 5 mg tablet.    To take:  5 mg Take 1 of the 5 mg tablets.

## 2018-10-22 DIAGNOSIS — G89.4 CHRONIC PAIN SYNDROME: ICD-10-CM

## 2018-10-22 RX ORDER — TRAMADOL HYDROCHLORIDE 50 MG/1
TABLET ORAL
Qty: 100 TABLET | Refills: 0 | Status: SHIPPED | OUTPATIENT
Start: 2018-10-22 | End: 2018-11-16

## 2018-10-22 NOTE — TELEPHONE ENCOUNTER
Controlled Substance Refill Request for Tramadol  Problem List Complete:  No     PROVIDER TO CONSIDER COMPLETION OF PROBLEM LIST AND OVERVIEW/CONTROLLED SUBSTANCE AGREEMENT    Last Written Prescription Date:  9/26/18  Last Fill Quantity: 180,   # refills: 0    [facility] Office Visit with [facility] care provider: 6/6/18    Future Office visit:     Controlled substance agreement on file: No.     Processing:  Fax Rx to [facility] pharmacy

## 2018-11-04 DIAGNOSIS — E78.5 HYPERLIPIDEMIA LDL GOAL <100: ICD-10-CM

## 2018-11-05 RX ORDER — SIMVASTATIN 40 MG
TABLET ORAL
Qty: 45 TABLET | Refills: 0 | Status: SHIPPED | OUTPATIENT
Start: 2018-11-05 | End: 2019-02-13

## 2018-11-05 NOTE — TELEPHONE ENCOUNTER
simvastatin      Last Written Prescription Date:  2/27/18  Last Fill Quantity: 45,   # refills: 1  Last Office Visit: 6/6/18  Future Office visit:

## 2018-11-12 ENCOUNTER — HOSPITAL ENCOUNTER (EMERGENCY)
Facility: HOSPITAL | Age: 83
Discharge: HOME | End: 2018-11-12
Attending: PHYSICIAN ASSISTANT | Admitting: PHYSICIAN ASSISTANT
Payer: MEDICARE

## 2018-11-12 ENCOUNTER — TELEPHONE (OUTPATIENT)
Dept: FAMILY MEDICINE | Facility: OTHER | Age: 83
End: 2018-11-12

## 2018-11-12 VITALS
DIASTOLIC BLOOD PRESSURE: 72 MMHG | SYSTOLIC BLOOD PRESSURE: 126 MMHG | OXYGEN SATURATION: 99 % | RESPIRATION RATE: 16 BRPM | TEMPERATURE: 97.8 F

## 2018-11-12 DIAGNOSIS — J20.8 ACUTE BRONCHITIS DUE TO OTHER SPECIFIED ORGANISMS: ICD-10-CM

## 2018-11-12 PROCEDURE — G0463 HOSPITAL OUTPT CLINIC VISIT: HCPCS

## 2018-11-12 PROCEDURE — 99213 OFFICE O/P EST LOW 20 MIN: CPT | Performed by: PHYSICIAN ASSISTANT

## 2018-11-12 ASSESSMENT — ENCOUNTER SYMPTOMS
VOMITING: 0
HEADACHES: 0
SINUS PRESSURE: 0
NECK PAIN: 0
DIZZINESS: 0
NECK STIFFNESS: 0
FEVER: 0
SORE THROAT: 0
COUGH: 1
VOICE CHANGE: 0
TROUBLE SWALLOWING: 0
APPETITE CHANGE: 0
EYE REDNESS: 0
NAUSEA: 0
ABDOMINAL PAIN: 0
DIARRHEA: 0
EYE DISCHARGE: 0
FATIGUE: 1
PSYCHIATRIC NEGATIVE: 1
CARDIOVASCULAR NEGATIVE: 1
LIGHT-HEADEDNESS: 0

## 2018-11-12 NOTE — TELEPHONE ENCOUNTER
8:55 AM    Reason for Call: OVERBOOK     Patient is having the following symptoms: colds for 1 weeks.    The patient is requesting an appointment for 11/12/18 with Chandrika Kumar.    Was an appointment offered for this call? No  If yes : Appointment type              Date    Preferred method for responding to this message: Telephone Call  What is your phone number ?[phone]    If we cannot reach you directly, may we leave a detailed response at the number you provided? Yes    Can this message wait until your PCP/provider returns, if unavailable today? Not applicable, pcp is in    [facility]  \

## 2018-11-12 NOTE — ED AVS SNAPSHOT
[facility]    [address]    [place] [address]    Phone:  [phone]                                       Michael Christiansen   MRN: [id number]    Department:  [facility]   Date of Visit:  11/12/2018           Patient Information     Date Of Birth          1935        Your diagnoses for this visit were:     Acute bronchitis due to other specified organisms        You were seen by Brianna Auguste PA.      Follow-up Information     Follow up with Chandrika Kumar PA.    Specialty:  Family Practice    Why:  If symptoms worsen    Contact information:    [address]  [place] [address] [phone]          Follow up with [facility].    Specialty:  EMERGENCY MEDICINE    Why:  If further concerns develop    Contact information:    [address]  [place] [address] [phone]    Additional information:    From [facility]: Take [address]. Turn left at [address]. Turn left at the first stoplight on [address]. At the first stop sign, take a right onto [address]. Take a left into the parking lot and continue through until you reach the North enterance of the building.       From [place]: Take [address]. Take the [address] ramp towards [place]. Turn left onto [address]. Take a slight right onto [address]. Turn left at the first stoplight on [address]. At the first stop sign, take a right onto [address]. Take a left into the parking lot and continue through until you reach the North enterance of the building.       From [place]: Take [address]. Take a right at [address]. At the first stop sign, take a right onto [address]. Take a left into the parking lot and continue through until you reach the North enterance of the building.       Discharge References/Attachments     BRONCHITIS, ANTIBIOTIC TREATMENT (ADULT) (ENGLISH)      Your next 10  appointments already scheduled     Nov 19, 2018 10:00 AM CST   Anticoagulation Visit with HC ANTI COAGULATION   [facility] [place] ([facility] [place] )    [address]  [place] [address]   [phone]            Dec 31, 2018 10:00 AM CST   LAB with HC LAB   [facility] [place] ([facility] [place] )    [address]  [place] [address]   [phone]            Dec 31, 2018 11:00 AM CST   Level O with HC INF RM 3314   [facility] [place] ([facility] [place] )    [address]  [place] [address]   [phone]            May 07, 2019  9:30 AM CDT   LAB with HC LAB   [facility] [place] ([facility] [place] )    [address]  [place] [address]   [phone]            May 07, 2019 10:00 AM CDT   MA SCREENING LEFT W/ YUKO with HCMA1   [facility] [place] ([facility] [place] )    [address]  [place] [address]   [phone]           How do I prepare for my exam? (Food and drink instructions) No Food and Drink Restrictions.  How do I prepare for my exam? (Other instructions) Do not use any powder, lotion or deodorant under your arms or on your breast. If you do, we will ask you to remove it before your exam.  What should I wear: Wear comfortable, two-piece clothing.  How long does the exam take: Most scans will take 15 minutes.  What should I bring: Bring any previous mammograms from other facilities or have them mailed to the breast center.  Do I need a :  No  is needed.  What do I need to tell my doctor: If you have any allergies, tell your care team.  What should I do after the exam: No restrictions, You may resume normal activities.  What is this test: This test is an x-ray of the breast to look for breast disease. The breast is pressed between two plates to flatten and spread the tissue. An X-ray is taken of the breast from different angles.   "Who should I call with questions: If you have any questions, please call the Imaging Department where you will have your exam. Directions, parking instructions, and other information is available on our website, [email or web address].  Other information about my exam Three-dimensional (3D) mammograms are available at [facility] locations in [facility], [facility], [facility], [place], and [place].  Health locations include [facility] and the [facility] in [place].  Benefits of 3D mammograms include: * Improved rate of cancer detection * Decreases your chance of having to go back for more tests, which means fewer: * \"False-positive\" results (This means that there is an abnormal area but it isn't cancer.) * Invasive testing procedures, such as a biopsy or surgery * Can provide clearer images of the breast if you have dense breast tissue.  *3D mammography is an optional exam that anyone can have with a 2D mammogram. It doesn't replace or take the place of a 2D mammogram. 2D mammograms remain an effective screening test for all women.  Not all insurance companies cover the cost of a 3D mammogram. Check with your insurance.            May 07, 2019 10:30 AM CDT   DX HIP/PELVIS/SPINE with HIDX1   [facility] ([facility] )    [address] [facility] [id number] [phone]           How do I prepare for my exam? (Food and drink instructions) No Food and Drink Restrictions.  How do I prepare for my exam? (Other instructions) Please do not take any of the following 24 hours prior to the day of your exam: vitamins, calcium tablets, antacids.  What should I wear: If possible, please wear clothes without metal (snaps, zippers). A sweat suit works well.  How long does the exam take: The exam takes about 20 minutes.  What should I bring: Bring a list of your current medicines to your exam (including vitamins, minerals and over-the-counter drugs).  Do I need a :  No  " is needed.  What should I do after the exam: No restrictions, You may resume normal activities.  How do I prepare for my exam? (Food and drink instructions) A DEXA scan is a bone-density scan. It uses a low level of radiation to check the strength of your bones. As you lie on a padded table, a machine will take X-rays. We most often scan the hips and lower spine.  Who should I call with questions: If you have any questions, please call the Imaging Department where you will have your exam. Directions, parking instructions, and other information is available on our website, [email or web address].            May 14, 2019 10:10 AM CDT   (Arrive by 9:55 AM)   Return Visit with Noemy Mahajan NP   [facility] [place] ([facility] [place] )    [address]  [place] [address]   [phone]            Jun 12, 2019  9:30 AM CDT   (Arrive by 9:15 AM)   Return Visit with Cam Kirby DO   [facility] [place] ([facility] [place] )    [address]  [place] [address]   [phone]                 Review of your medicines      START taking        Dose / Directions Last dose taken    amoxicillin-clavulanate 875-125 MG per tablet   Commonly known as:  AUGMENTIN   Dose:  1 tablet   Quantity:  20 tablet        Take 1 tablet by mouth 2 times daily   Refills:  0          Our records show that you are taking the medicines listed below. If these are incorrect, please call your family doctor or clinic.        Dose / Directions Last dose taken    CALCITRATE/VITAMIN D PO   Dose:  1 tablet        Take 1 tablet by mouth 2 times daily   Refills:  0        diltiazem 180 MG 24 hr capsule   Commonly known as:  DILACOR XR   Dose:  180 mg   Quantity:  30 capsule        Take 1 capsule (180 mg) by mouth daily   Refills:  11        losartan 50 MG tablet   Commonly known as:  COZAAR   Dose:  50 mg   Quantity:  30 tablet        Take 1 tablet (50 mg) by mouth daily   Refills:  8         MULTIVITAMINS PO   Dose:  1 tablet        Take 1 tablet by mouth daily   Refills:  0        order for DME   Quantity:  1 each        4 wheeled walker   Refills:  0        OSTEO BI-FLEX ADV DOUBLE ST PO        Take  by mouth daily.   Refills:  0        simvastatin 40 MG tablet   Commonly known as:  ZOCOR   Quantity:  45 tablet        TAKE 1/2 (ONE-HALF) TABLET BY MOUTH IN THE EVENING   Refills:  0        traMADol 50 MG tablet   Commonly known as:  ULTRAM   Quantity:  100 tablet        TAKE 1 TO 2 TABLETS BY MOUTH EVERY 4 HOURS AS NEEDED -MAX  OF  8  TABLETS  PER  DAY   Refills:  0        TYLENOL PO   Dose:  500 mg        Take 500 mg by mouth every 4 hours as needed   Refills:  0        warfarin 5 MG tablet   Commonly known as:  COUMADIN   Quantity:  100 tablet        2.5mg Fri and 5mg  all other days or as directed by warfarin clinic   Refills:  3                Prescriptions were sent or printed at these locations (1 Prescription)                   [facility] - [place], [place] - [address]   [address], [place] [address]    Telephone:  [phone]   Fax:  [phone]   Hours:                  E-Prescribed (1 of 1)         amoxicillin-clavulanate (AUGMENTIN) 875-125 MG per tablet                Orders Needing Specimen Collection     None      Pending Results     No orders found from 11/10/2018 to 11/13/2018.            Pending Culture Results     No orders found from 11/10/2018 to 11/13/2018.            Thank you for choosing [facility]       Thank you for choosing [facility] for your care. Our goal is always to provide you with excellent care. Hearing back from our patients is one way we can continue to improve our services. Please take a few minutes to complete the written survey that you may receive in the mail after you visit with us. Thank you!        [employer] Information     [employer] lets you send messages to your doctor, view your test results, renew your prescriptions, schedule appointments and  "more. To sign up, go to [email or web address] . Click on \"Log in\" on the left side of the screen, which will take you to the Welcome page. Then click on \"Sign up Now\" on the right side of the page.     You will be asked to enter the access code listed below, as well as some personal information. Please follow the directions to create your username and password.     Your access code is: [email or web address]  Expires: 2018 10:45 AM     Your access code will  in 90 days. If you need help or a new code, please call your [facility] clinic or [phone].        Care EveryWhere ID     This is your Care EveryWhere ID. This could be used by other organizations to access your [facility] medical records  [id number]        Equal Access to Services     AMAYA CONSTANTINO : Jaxson Hampton, levi navarro, jenn solano, topher finley. So [facility] [phone].    ATENCIÓN: Si habla español, tiene a shay disposición servicios gratuitos de asistencia lingüística. Gera al [phone].    We comply with applicable federal civil rights laws and [place] laws. We do not discriminate on the basis of race, color, national origin, age, disability, sex, sexual orientation, or gender identity.            After Visit Summary       This is your record. Keep this with you and show to your community pharmacist(s) and doctor(s) at your next visit.                  "

## 2018-11-12 NOTE — ED PROVIDER NOTES
History     Chief Complaint   Patient presents with     Cold Symptoms     The history is provided by the patient. No  was used.     Michael Christiansen is a 83 year old female who has 2 weeks of cough/congestion and decreased energy. No sinus pain. No n/v/d/f/c. No rash. No change in b/b habits. No ear pain.    Problem List:    Patient Active Problem List    Diagnosis Date Noted     Anticoagulated on Coumadin 06/13/2018     Priority: Medium     Pulmonary hypertension-moderate 12/20/2017     Priority: Medium     Aortic stenosis, moderate 12/20/2017     Priority: Medium     Moderate tricuspid insufficiency 12/20/2017     Priority: Medium     Malignant neoplasm of right breast in female, estrogen receptor positive, unspecified site of breast (H) 08/02/2017     Priority: Medium     ACP (advance care planning) 01/26/2017     Priority: Medium     Advance Care Planning 3/27/2017: Receipt of ACP document:  Received: invalid Advance Directive document dated 2-20-16 due to the document has not been signed.  Document not previously scanned. Validation form completed and sent to be scanned indicating invalid document. Letter sent to client with information and facilitation resources.  Confirmed/documented designated decision maker(s).  Added by Kerry Pichardo RN, System Director ACP-Honoring Choices   Advance Care Planning 1/26/2017: ACP Review of Chart / Resources Provided:  Reviewed chart for advance care plan.  Michael Christiansen has been provided information and resources to begin or update their advance care plan.  Added by Iris Bennett         Osteoporosis 01/26/2017     Priority: Medium     Long-term (current) use of anticoagulants [Z79.01] 07/27/2016     Priority: Medium     History of total knee replacement 01/26/2015     Priority: Medium     Chronic pain syndrome 01/23/2015     Priority: Medium     Permanent atrial fibrillation (H) 05/01/2013     Priority: Medium     Overview:   Chronic         Hyperlipidemia LDL goal <130 03/25/2013     Priority: Medium     Essential hypertension 03/25/2013     Priority: Medium     Osteoarthritis 03/25/2013     Priority: Medium     Radiculitis 06/16/2011     Priority: Medium     Overview:   IMO Update 10/11       Osteoarthritis of knee 09/16/2010     Priority: Medium     Overview:   IMO Update 10/11          Past Medical History:    Past Medical History:   Diagnosis Date     Arthritis      Backache, unspecified 1/1/2011     Breast cancer, stage 1 3/19/2010     Chronic pain syndrome 1/23/2015     Claustrophobia 1/1/2011     Hip joint replacement by other means 1/1/2011     Osteoarthrosis, unspecified whether generalized or localized, lower leg 8/14/2006     Posttraumatic stress disorder 1/1/2011       Past Surgical History:    Past Surgical History:   Procedure Laterality Date     BREAST SURGERY      right  side mastectomy     C TOTAL KNEE ARTHROPLASTY Left 01/26/15     cataract extraction and lens implantation       COLONOSCOPY       EYE SURGERY       GYN SURGERY      tubal pregnancy     ORTHOPEDIC SURGERY  2009    l. hip replacement LT     ORTHOPEDIC SURGERY  july 19th 2013    Right total knee     TUBAL/ECTOPIC PREGNANCY         Family History:    Family History   Problem Relation Age of Onset     Diabetes Father 75     cause of death     Other - See Comments Father      PVD     HEART DISEASE Sister      Coronary Artery Disease Sister      Other - See Comments Mother 83     old age; cause of death     Other - See Comments Sister      14 year twin [employer]; cause of death     Chronic Obstructive Pulmonary Disease Daughter      Osteoporosis Daughter      Thyroid Disease Daughter      Cancer Sister      Other Cancer Sister      Other Cancer Sister      uterine     Hypertension No family hx of      Hyperlipidemia No family hx of      Cerebrovascular Disease No family hx of      Breast Cancer No family hx of      Prostate Cancer No family hx of      Colon  Cancer No family hx of      Asthma No family hx of      Anesthesia Reaction No family hx of      Genetic Disorder No family hx of        Social History:  Marital Status:   [2]  Social History   Substance Use Topics     Smoking status: Former Smoker     Packs/day: 0.30     Years: 30.00     Types: Cigarettes     Quit date: 8/11/1987     Smokeless tobacco: Never Used      Comment: year quit 1987     Alcohol use No        Medications:      amoxicillin-clavulanate (AUGMENTIN) 875-125 MG per tablet   Acetaminophen (TYLENOL PO)   Calcium Citrate-Vitamin D (CALCITRATE/VITAMIN D PO)   diltiazem (DILACOR XR) 180 MG 24 hr capsule   losartan (COZAAR) 50 MG tablet   Misc Natural Products (OSTEO BI-FLEX ADV DOUBLE ST PO)   Multiple Vitamin (MULTIVITAMINS PO)   order for DME   simvastatin (ZOCOR) 40 MG tablet   traMADol (ULTRAM) 50 MG tablet   warfarin (COUMADIN) 5 MG tablet         Review of Systems   Constitutional: Positive for fatigue. Negative for appetite change and fever.   HENT: Positive for congestion. Negative for ear pain, sinus pressure, sore throat, trouble swallowing and voice change.    Eyes: Negative for discharge and redness.   Respiratory: Positive for cough.    Cardiovascular: Negative.    Gastrointestinal: Negative for abdominal pain, diarrhea, nausea and vomiting.   Genitourinary: Negative.    Musculoskeletal: Negative for neck pain and neck stiffness.   Skin: Negative for rash.   Neurological: Negative for dizziness, light-headedness and headaches.   Psychiatric/Behavioral: Negative.        Physical Exam   BP: 126/72  Heart Rate: 66  Temp: 97.8  F (36.6  C)  Resp: 16  SpO2: 99 %      Physical Exam   Constitutional: She is oriented to person, place, and time. She appears well-developed and well-nourished. No distress.   HENT:   Head: Normocephalic and atraumatic.   Right Ear: External ear normal.   Left Ear: External ear normal.   Mouth/Throat: Oropharynx is clear and moist.   Bilateral TMs/canals  clear/wnl  No sinus TTP     Eyes: Conjunctivae and EOM are normal. Right eye exhibits no discharge. Left eye exhibits no discharge.   Neck: Normal range of motion. Neck supple.   Cardiovascular: Normal rate, regular rhythm and normal heart sounds.    Pulmonary/Chest: Effort normal. No respiratory distress.   Mild coarse BS in BLL   Abdominal: Soft. Bowel sounds are normal. She exhibits no distension. There is no tenderness.   Neurological: She is alert and oriented to person, place, and time.   Skin: Skin is warm and dry. No rash noted. She is not diaphoretic.   Psychiatric: She has a normal mood and affect.   Nursing note and vitals reviewed.      ED Course     ED Course     Procedures            No results found for this or any previous visit (from the past 24 hour(s)).    Medications - No data to display    Assessments & Plan (with Medical Decision Making)     I have reviewed the nursing notes.    I have reviewed the findings, diagnosis, plan and need for follow up with the patient.      Discharge Medication List as of 11/12/2018 11:54 AM      START taking these medications    Details   amoxicillin-clavulanate (AUGMENTIN) 875-125 MG per tablet Take 1 tablet by mouth 2 times daily, Disp-20 tablet, R-0, E-Prescribe             Final diagnoses:   Acute bronchitis due to other specified organisms           Patient verbally educated and given appropriate education sheets for each of the diagnoses and has no questions.  Take OTC motrin or tylenol as directed on the bottle as needed.  Take prescription medications as directed.  Increase fluids, wash hands often.  Sleep in a recliner or with multiple pillows until this has resolved.  Follow up with your provider if symptoms increase or if further concerns develop, return to the ER.  Brianna Auguste Certified   Physician Assistant  11/12/2018  9:19 PM  [facility]    11/12/2018   [facility]     Brianna Auguste PA  11/12/18 [id number]

## 2018-11-12 NOTE — ED AVS SNAPSHOT
[facility]    [address] [address]    Phone:  [phone]                                       Michael Christiansen   MRN: [id number]    Department:  [facility]   Date of Visit:  11/12/2018           After Visit Summary Signature Page     I have received my discharge instructions, and my questions have been answered. I have discussed any challenges I see with this plan with the nurse or doctor.    ..........................................................................................................................................  Patient/Patient Representative Signature      ..........................................................................................................................................  Patient Representative Print Name and Relationship to Patient    ..................................................               ................................................  Date                                   Time    ..........................................................................................................................................  Reviewed by Signature/Title    ...................................................              ..............................................  Date                                               Time          22EPIC Rev 08/18

## 2018-11-14 ENCOUNTER — APPOINTMENT (OUTPATIENT)
Dept: CT IMAGING | Facility: HOSPITAL | Age: 83
End: 2018-11-14
Attending: EMERGENCY MEDICINE
Payer: MEDICARE

## 2018-11-14 ENCOUNTER — APPOINTMENT (OUTPATIENT)
Dept: GENERAL RADIOLOGY | Facility: HOSPITAL | Age: 83
End: 2018-11-14
Attending: EMERGENCY MEDICINE
Payer: MEDICARE

## 2018-11-14 ENCOUNTER — HOSPITAL ENCOUNTER (EMERGENCY)
Facility: HOSPITAL | Age: 83
Discharge: STILL ADMITTED OR ADMINISTRATIVE | End: 2018-11-14
Attending: EMERGENCY MEDICINE | Admitting: EMERGENCY MEDICINE
Payer: MEDICARE

## 2018-11-14 ENCOUNTER — HOSPITAL ENCOUNTER (EMERGENCY)
Facility: HOSPITAL | Age: 83
Discharge: HOME | End: 2018-11-14
Attending: EMERGENCY MEDICINE | Admitting: EMERGENCY MEDICINE
Payer: MEDICARE

## 2018-11-14 VITALS
HEART RATE: 102 BPM | DIASTOLIC BLOOD PRESSURE: 104 MMHG | SYSTOLIC BLOOD PRESSURE: 163 MMHG | RESPIRATION RATE: 22 BRPM | TEMPERATURE: 98.3 F | OXYGEN SATURATION: 98 %

## 2018-11-14 DIAGNOSIS — R42 DIZZINESS: Primary | ICD-10-CM

## 2018-11-14 DIAGNOSIS — I48.21 PERMANENT ATRIAL FIBRILLATION (H): ICD-10-CM

## 2018-11-14 LAB
ALBUMIN SERPL-MCNC: 3.7 G/DL (ref 3.4–5)
ALP SERPL-CCNC: 90 U/L (ref 40–150)
ALT SERPL W P-5'-P-CCNC: 19 U/L (ref 0–50)
ANION GAP SERPL CALCULATED.3IONS-SCNC: 5 MMOL/L (ref 3–14)
AST SERPL W P-5'-P-CCNC: 16 U/L (ref 0–45)
BASOPHILS # BLD AUTO: 0.1 10E9/L (ref 0–0.2)
BASOPHILS NFR BLD AUTO: 0.5 %
BILIRUB SERPL-MCNC: 0.7 MG/DL (ref 0.2–1.3)
BUN SERPL-MCNC: 18 MG/DL (ref 7–30)
CALCIUM SERPL-MCNC: 9.4 MG/DL (ref 8.5–10.1)
CHLORIDE SERPL-SCNC: 100 MMOL/L (ref 94–109)
CO2 SERPL-SCNC: 30 MMOL/L (ref 20–32)
CREAT SERPL-MCNC: 0.77 MG/DL (ref 0.52–1.04)
CRP SERPL-MCNC: 20.2 MG/L (ref 0–8)
D DIMER PPP DDU-MCNC: 218 NG/ML D-DU (ref 0–300)
DIFFERENTIAL METHOD BLD: NORMAL
EOSINOPHIL # BLD AUTO: 0.1 10E9/L (ref 0–0.7)
EOSINOPHIL NFR BLD AUTO: 0.9 %
ERYTHROCYTE [DISTWIDTH] IN BLOOD BY AUTOMATED COUNT: 12.7 % (ref 10–15)
GFR SERPL CREATININE-BSD FRML MDRD: 71 ML/MIN/1.7M2
GLUCOSE SERPL-MCNC: 101 MG/DL (ref 70–99)
HCT VFR BLD AUTO: 42.7 % (ref 35–47)
HGB BLD-MCNC: 14.6 G/DL (ref 11.7–15.7)
IMM GRANULOCYTES # BLD: 0 10E9/L (ref 0–0.4)
IMM GRANULOCYTES NFR BLD: 0.4 %
LACTATE BLD-SCNC: 1 MMOL/L (ref 0.7–2)
LIPASE SERPL-CCNC: 77 U/L (ref 73–393)
LYMPHOCYTES # BLD AUTO: 1.6 10E9/L (ref 0.8–5.3)
LYMPHOCYTES NFR BLD AUTO: 16.2 %
MCH RBC QN AUTO: 30.9 PG (ref 26.5–33)
MCHC RBC AUTO-ENTMCNC: 34.2 G/DL (ref 31.5–36.5)
MCV RBC AUTO: 90 FL (ref 78–100)
MONOCYTES # BLD AUTO: 0.7 10E9/L (ref 0–1.3)
MONOCYTES NFR BLD AUTO: 7.2 %
NEUTROPHILS # BLD AUTO: 7.4 10E9/L (ref 1.6–8.3)
NEUTROPHILS NFR BLD AUTO: 74.8 %
NRBC # BLD AUTO: 0 10*3/UL
NRBC BLD AUTO-RTO: 0 /100
NT-PROBNP SERPL-MCNC: 1072 PG/ML (ref 0–1800)
PLATELET # BLD AUTO: 407 10E9/L (ref 150–450)
POTASSIUM SERPL-SCNC: 4.1 MMOL/L (ref 3.4–5.3)
PROT SERPL-MCNC: 8.5 G/DL (ref 6.8–8.8)
RBC # BLD AUTO: 4.73 10E12/L (ref 3.8–5.2)
SODIUM SERPL-SCNC: 135 MMOL/L (ref 133–144)
TROPONIN I SERPL-MCNC: <0.015 UG/L (ref 0–0.04)
WBC # BLD AUTO: 9.9 10E9/L (ref 4–11)

## 2018-11-14 PROCEDURE — 70450 CT HEAD/BRAIN W/O DYE: CPT | Mod: TC

## 2018-11-14 PROCEDURE — 99285 EMERGENCY DEPT VISIT HI MDM: CPT | Mod: 25

## 2018-11-14 PROCEDURE — 84484 ASSAY OF TROPONIN QUANT: CPT | Performed by: EMERGENCY MEDICINE

## 2018-11-14 PROCEDURE — 81003 URINALYSIS AUTO W/O SCOPE: CPT | Performed by: EMERGENCY MEDICINE

## 2018-11-14 PROCEDURE — 85379 FIBRIN DEGRADATION QUANT: CPT | Performed by: EMERGENCY MEDICINE

## 2018-11-14 PROCEDURE — 93005 ELECTROCARDIOGRAM TRACING: CPT

## 2018-11-14 PROCEDURE — 71046 X-RAY EXAM CHEST 2 VIEWS: CPT | Mod: TC

## 2018-11-14 PROCEDURE — 83690 ASSAY OF LIPASE: CPT | Performed by: EMERGENCY MEDICINE

## 2018-11-14 PROCEDURE — 83880 ASSAY OF NATRIURETIC PEPTIDE: CPT | Performed by: EMERGENCY MEDICINE

## 2018-11-14 PROCEDURE — 83605 ASSAY OF LACTIC ACID: CPT | Performed by: EMERGENCY MEDICINE

## 2018-11-14 PROCEDURE — 86140 C-REACTIVE PROTEIN: CPT | Performed by: EMERGENCY MEDICINE

## 2018-11-14 PROCEDURE — 93010 ELECTROCARDIOGRAM REPORT: CPT | Performed by: INTERNAL MEDICINE

## 2018-11-14 PROCEDURE — 99285 EMERGENCY DEPT VISIT HI MDM: CPT | Mod: Z6 | Performed by: EMERGENCY MEDICINE

## 2018-11-14 PROCEDURE — 40000268 ZZH STATISTIC NO CHARGES

## 2018-11-14 PROCEDURE — 80053 COMPREHEN METABOLIC PANEL: CPT | Performed by: EMERGENCY MEDICINE

## 2018-11-14 PROCEDURE — 36415 COLL VENOUS BLD VENIPUNCTURE: CPT | Performed by: EMERGENCY MEDICINE

## 2018-11-14 PROCEDURE — 85025 COMPLETE CBC W/AUTO DIFF WBC: CPT | Performed by: EMERGENCY MEDICINE

## 2018-11-14 ASSESSMENT — ENCOUNTER SYMPTOMS
FEVER: 0
ABDOMINAL PAIN: 0
SHORTNESS OF BREATH: 0

## 2018-11-14 NOTE — DISCHARGE INSTRUCTIONS
Discharge Instructions for Atrial Fibrillation  You have been diagnosed with an abnormal heart rhythm called atrial fibrillation. With this condition, your heart s 2 upper chambers quiver rather than squeeze the blood out in a normal pattern. This leads to an irregular and sometimes rapid heartbeat. Some people will develop associated symptoms such as a flip-flopping heartbeat, chest pain, lightheadedness, or shortness of breath. Other people may have no symptoms at all. Atrial fibrillation is serious because it affects the heart s ability to fill with blood as it should. Blood clots may form. This increases the risk for stroke. Untreated atrial fibrillation can also lead to heart failure. Atrial fibrillation can be controlled. With treatment, most people with atrial fibrillation lead normal lives.  Treatment options  Recommended treatment for atrial fibrillation depends on your age, symptoms, how long you have had atrial fibrillation, and other factors. You will have a complete evaluation to find out if you have any abnormalities that caused your heart to go into atrial fibrillation. This might be blocked heart arteries or a thyroid problem. Your doctor will assess your particular case and discuss choices with you.  Treatment choices may include:    Treating an underlying disorder that puts you at risk for atrial fibrillation. For example, correcting an abnormal thyroid or electrolyte problem, or treating a blocked heart artery.    Restoring a normal heart rhythm with an electrical shock (cardioversion) or with an antiarrhythmic medicine (chemical cardioversion).    Using medicine to control your heart rate in atrial fibrillation.    Preventing the risk for blood clot and stroke using blood-thinning medicines. Your doctor will tell you what he or she recommends. Choices may include aspirin, clopidogrel, warfarin, dabigatran, rivaroxaban, apixaban, and edoxaban.    Doing catheter ablation or a surgical maze  procedure. These use different methods to destroy certain areas of heart tissue. This interrupts the electrical signals causing atrial fibrillation. One of these procedures may be a choice when medicines do not work, or as an alternative to long-term medicine.    Other treatment choices may be recommended for you by your doctor.  Managing risk factors for stroke and preventing heart failure are important parts of any treatment plan for atrial fibrillation.  Home care    Take your medicines exactly as directed. Don t skip doses.    Work with your doctor to find the right medicines and doses for you.    Learn to take your own pulse. Keep a record of your results. Ask your doctor which pulse rates mean that you need medical attention. Slowing your pulse is often the goal of treatment. Ask your doctor if it s OK for you to use an automatic machine to check your pulse at home. Sometimes these machines don t count the pulse correctly when you have atrial fibrillation.    Limit your intake of coffee, tea, cola, and other beverages with caffeine. Talk with your doctor about whether you should eliminate caffeine.    Avoid over-the-counter medicines that have caffeine in them.    Let your doctor know what medicines you take, including prescription and over-the-counter medicines, as well as any supplements. They interfere with some medicines given for atrial fibrillation.    Ask your doctor about whether you can drink alcohol. Some people need to avoid alcohol to better treat atrial fibrillation. If you are taking blood-thinner medicines, alcohol may interfere with them by increasing their effect.    Never take stimulants such as amphetamines or cocaine. These drugs can speed up your heart rate and trigger atrial fibrillation.  Follow-up care  Follow up with your doctor, or as advised.     When should I call my healthcare provider  Call your healthcare provider right away if you have any of the  following:    Weakness    Dizziness    Fainting    Fatigue    Shortness of breath    Chest pain with increased activity    A change in the usual regularity of your heartbeat, or an unusually fast heartbeat   Date Last Reviewed: 4/23/2016 2000-2018 [employer]. [address] [facility], [place] [address]. All rights reserved. This information is not intended as a substitute for professional medical care. Always follow your healthcare professional's instructions.          Dizziness (Vertigo) and Balance Problems: Staying Safe     Replace burned-out light bulbs to keep your home safe and well lit.     Falls or accidents can lead to pain, broken bones, and fear of future falls. Protect yourself and others by preparing for episodes. Simple steps can help you stay safe at home and wherever you go.  Lighting  Keep all areas well lit. This helps your eyes send the right signals to the brain. It also makes you less likely to trip and fall. If bright lights make symptoms worse, dim the lights or lie in a dark room until the dizziness passes. Then turn the lights back to their normal level.  Tips:    Keep a flashlight by the bed.    Place nightlights in bathrooms and hallways.    Replace burned-out bulbs, or have someone replace them for you.  Preventing falls  To reduce your risk of falling:    Get out of bed or up from a chair slowly.    Wear low-heeled shoes that fit properly and have slip-resistant soles.    Remove throw rugs. Clear clutter from walkways.    Use handrails on stairs. Have handrails installed or adjusted if needed.    Install grab bars in the bathroom. Don't use towel racks for balance.    Use a shower stool. Also put adhesive strips in the shower or on the tub floor.  Going out  With a little time and preparation, you can get around safely.  Tips:    Bring a cane or walking aid if needed.    Give yourself plenty of time in case you start to get dizzy.    Ask your healthcare provider what type  of exercise is safe for your condition.    Be patient. If an activity such as walking through a crowded shop causes you stress, you may not be ready for it yet.  Driving  If you become dizzy or disoriented while driving, you could hurt yourself and others. That's why it's best to not drive until symptoms have gone away. In some cases, your license may be temporarily held until it's safe for you to drive again.  For safety:    Ask a friend to drive for you.    Take public transportation.    Walk to stores and other places when you can.  Asking for help  Don't be afraid to ask for help running errands, cooking meals, and doing exercise. Whether it's a friend, loved one, neighbor, or stranger on the street, a little help can make a world of difference.   Date Last Reviewed: 11/1/2016 2000-2018 [employer]. [address] [place] [address], [place] [address]. All rights reserved. This information is not intended as a substitute for professional medical care. Always follow your healthcare professional's instructions.

## 2018-11-14 NOTE — ED AVS SNAPSHOT
[facility]    [address] [address]    Phone:  [phone]                                       Michael Christiansen   MRN: [id number]    Department:  [facility]   Date of Visit:  11/14/2018           After Visit Summary Signature Page     I have received my discharge instructions, and my questions have been answered. I have discussed any challenges I see with this plan with the nurse or doctor.    ..........................................................................................................................................  Patient/Patient Representative Signature      ..........................................................................................................................................  Patient Representative Print Name and Relationship to Patient    ..................................................               ................................................  Date                                   Time    ..........................................................................................................................................  Reviewed by Signature/Title    ...................................................              ..............................................  Date                                               Time          22EPIC Rev 08/18

## 2018-11-14 NOTE — ED NOTES
Pt discharged to home ambulatory with wheeled walker with family members. Pt denies pain, verbalizes understanding of instruct via avs. Vs as charted.

## 2018-11-14 NOTE — ED PROVIDER NOTES
History     Chief Complaint   Patient presents with     Dizziness     Eye Problem     Cough     bronchitis several days ago and started an atbx.     HPI  Michael Christiansen is a 83 year old female who presents to the emergency department accompanied by the spouse and the son.  Patient was seen in the emergency department 2 days ago and started on treatment for acute bronchitis with Augmentin.  Since last night she has been complaining of dizziness.  The dizziness seems to be worse on rising up from sitting down.  It is not affected by moving head from side to side.  Denies any ear pain, fullness or discharge.  Denies any syncopal episodes but she is feeling weak.  Patient is a poor historian and most of the history was obtained from the spouse and the son.    Problem List:    Patient Active Problem List    Diagnosis Date Noted     Anticoagulated on Coumadin 06/13/2018     Priority: Medium     Pulmonary hypertension-moderate 12/20/2017     Priority: Medium     Aortic stenosis, moderate 12/20/2017     Priority: Medium     Moderate tricuspid insufficiency 12/20/2017     Priority: Medium     Malignant neoplasm of right breast in female, estrogen receptor positive, unspecified site of breast (H) 08/02/2017     Priority: Medium     ACP (advance care planning) 01/26/2017     Priority: Medium     Advance Care Planning 3/27/2017: Receipt of ACP document:  Received: invalid Advance Directive document dated 2-20-16 due to the document has not been signed.  Document not previously scanned. Validation form completed and sent to be scanned indicating invalid document. Letter sent to client with information and facilitation resources.  Confirmed/documented designated decision maker(s).  Added by Kerry Pichardo RN, System Director ACP-Honoring Choices   Advance Care Planning 1/26/2017: ACP Review of Chart / Resources Provided:  Reviewed chart for advance care plan.  Michael Christiansen has been provided information and resources to  begin or update their advance care plan.  Added by Iris Bennett         Osteoporosis 01/26/2017     Priority: Medium     Long-term (current) use of anticoagulants [Z79.01] 07/27/2016     Priority: Medium     History of total knee replacement 01/26/2015     Priority: Medium     Chronic pain syndrome 01/23/2015     Priority: Medium     Permanent atrial fibrillation (H) 05/01/2013     Priority: Medium     Overview:   Chronic        Hyperlipidemia LDL goal <130 03/25/2013     Priority: Medium     Essential hypertension 03/25/2013     Priority: Medium     Osteoarthritis 03/25/2013     Priority: Medium     Radiculitis 06/16/2011     Priority: Medium     Overview:   IMO Update 10/11       Osteoarthritis of knee 09/16/2010     Priority: Medium     Overview:   IMO Update 10/11          Past Medical History:    Past Medical History:   Diagnosis Date     Arthritis      Backache, unspecified 1/1/2011     Breast cancer, stage 1 3/19/2010     Chronic pain syndrome 1/23/2015     Claustrophobia 1/1/2011     Hip joint replacement by other means 1/1/2011     Osteoarthrosis, unspecified whether generalized or localized, lower leg 8/14/2006     Posttraumatic stress disorder 1/1/2011       Past Surgical History:    Past Surgical History:   Procedure Laterality Date     BREAST SURGERY      right  side mastectomy     C TOTAL KNEE ARTHROPLASTY Left 01/26/15     cataract extraction and lens implantation       COLONOSCOPY       EYE SURGERY       GYN SURGERY      tubal pregnancy     ORTHOPEDIC SURGERY  2009    l. hip replacement LT     ORTHOPEDIC SURGERY  july 19th 2013    Right total knee     TUBAL/ECTOPIC PREGNANCY         Family History:    Family History   Problem Relation Age of Onset     Diabetes Father 75     cause of death     Other - See Comments Father      PVD     HEART DISEASE Sister      Coronary Artery Disease Sister      Other - See Comments Mother 83     old age; cause of death     Other - See Comments Sister       14 year twin pow [facility]; cause of death     Chronic Obstructive Pulmonary Disease Daughter      Osteoporosis Daughter      Thyroid Disease Daughter      Cancer Sister      Other Cancer Sister      Other Cancer Sister      uterine     Hypertension No family hx of      Hyperlipidemia No family hx of      Cerebrovascular Disease No family hx of      Breast Cancer No family hx of      Prostate Cancer No family hx of      Colon Cancer No family hx of      Asthma No family hx of      Anesthesia Reaction No family hx of      Genetic Disorder No family hx of        Social History:  Marital Status:   [2]  Social History   Substance Use Topics     Smoking status: Former Smoker     Packs/day: 0.30     Years: 30.00     Types: Cigarettes     Quit date: 8/11/1987     Smokeless tobacco: Never Used      Comment: year quit 1987     Alcohol use No        Medications:      amoxicillin-clavulanate (AUGMENTIN) 875-125 MG per tablet   Calcium Citrate-Vitamin D (CALCITRATE/VITAMIN D PO)   diltiazem (DILACOR XR) 180 MG 24 hr capsule   losartan (COZAAR) 50 MG tablet   Misc Natural Products (OSTEO BI-FLEX ADV DOUBLE ST PO)   Multiple Vitamin (MULTIVITAMINS PO)   order for DME   simvastatin (ZOCOR) 40 MG tablet   warfarin (COUMADIN) 5 MG tablet   Acetaminophen (TYLENOL PO)   traMADol (ULTRAM) 50 MG tablet         Review of Systems   Constitutional: Negative for fever.   Respiratory: Negative for shortness of breath.    Cardiovascular: Negative for chest pain.   Gastrointestinal: Negative for abdominal pain.   All other systems reviewed and are negative.      Physical Exam   BP: (!) 152/119  Pulse: 102  Heart Rate: 109  Temp: 98.3  F (36.8  C)  Resp: 22  SpO2: 99 %  Lying Orthostatic BP: 118/95  Lying Orthostatic Pulse: 106 bpm  Sitting Orthostatic BP: 159/108  Sitting Orthostatic Pulse: 116 bpm  Standing Orthostatic BP: 147/99  Standing Orthostatic Pulse: 128 bpm      Physical Exam   Constitutional: She is oriented to  person, place, and time. No distress.   Weak and wasted   HENT:   Head: Normocephalic and atraumatic.   Mouth/Throat: Oropharynx is clear and moist. No oropharyngeal exudate.   Eyes: EOM are normal. Pupils are equal, round, and reactive to light.   Cardiovascular: Normal rate, regular rhythm and normal heart sounds.    Pulmonary/Chest: Breath sounds normal. No respiratory distress.   Abdominal: Soft. Bowel sounds are normal. She exhibits no distension. There is no tenderness.   Musculoskeletal: She exhibits no edema or tenderness.   Neurological: She is alert and oriented to person, place, and time. No cranial nerve deficit.   Skin: She is not diaphoretic.   Nursing note and vitals reviewed.      ED Course   Patient evaluated and laboratory tests ordered.  CT head ordered.  EKG ordered.    ED Course     Procedures          EKG Interpretation:      EKG: Atrial fibrillation        Results for orders placed or performed during the hospital encounter of 11/14/18 (from the past 24 hour(s))   CT Head w/o Contrast    Narrative    PROCEDURE: CT HEAD W/O CONTRAST   11/14/2018 9:44 AM    HISTORY:Female, age,  83 years, , , Dizziness;     COMPARISON:None    TECHNIQUE: CT of the brain without contrast.    FINDINGS: Ventricles and sulci are prominent in size and shape. Gray  and white matter demonstrate scattered areas of decreased density.    There is no evidence of mass, mass effect or midline shift. No  evidence of acute hemorrhage.    The bones are unremarkable. No fracture.       Impression    IMPRESSION:   1.  No acute intracranial abnormality.  No acute fracture.     2.  Mild generalized atrophy.    3.  White matter changes suggesting small vessel disease.    BALTAZAR LEAL MD   XR Chest 2 Views    Narrative    XR CHEST 2 VW    HISTORY: 83 years Female Dizziness;     COMPARISON: 6/6/2018    TECHNIQUE: 2 views of the chest were obtained.    FINDINGS: There is bilateral interstitial prominence. This is  unchanged to  slightly worse compared to prior study. There is mild  cardiomegaly.    There is osteopenia. There are multilevel compression fractures of the  thoracic spine similar to the prior CT scan of 2014      Impression    IMPRESSION: Slight increased interstitial prominence. Question mild  versus early pulmonary edema.    Osteopenia with multilevel compression fractures of the thoracic  spine. These are similar to the 10/29/2014 CT scan.    ALKA WEIR MD   Comprehensive metabolic panel   Result Value Ref Range    Sodium 135 133 - 144 mmol/L    Potassium 4.1 3.4 - 5.3 mmol/L    Chloride 100 94 - 109 mmol/L    Carbon Dioxide 30 20 - 32 mmol/L    Anion Gap 5 3 - 14 mmol/L    Glucose 101 (H) 70 - 99 mg/dL    Urea Nitrogen 18 7 - 30 mg/dL    Creatinine 0.77 0.52 - 1.04 mg/dL    GFR Estimate 71 >60 mL/min/1.7m2    GFR Estimate If Black 86 >60 mL/min/1.7m2    Calcium 9.4 8.5 - 10.1 mg/dL    Bilirubin Total 0.7 0.2 - 1.3 mg/dL    Albumin 3.7 3.4 - 5.0 g/dL    Protein Total 8.5 6.8 - 8.8 g/dL    Alkaline Phosphatase 90 40 - 150 U/L    ALT 19 0 - 50 U/L    AST 16 0 - 45 U/L   D-Dimer ([facility])   Result Value Ref Range    D-Dimer ng/mL 218 0 - 300 ng/ml D-DU   Lipase   Result Value Ref Range    Lipase 77 73 - 393 U/L   Troponin I   Result Value Ref Range    Troponin I ES <0.015 0.000 - 0.045 ug/L   CRP inflammation   Result Value Ref Range    CRP Inflammation 20.2 (H) 0.0 - 8.0 mg/L   Nt probnp inpatient (BNP)   Result Value Ref Range    N-Terminal Pro BNP Inpatient 1072 0 - 1800 pg/mL   Lactic acid whole blood   Result Value Ref Range    Lactic Acid 1.0 0.7 - 2.0 mmol/L   CBC with platelets differential   Result Value Ref Range    WBC 9.9 4.0 - 11.0 10e9/L    RBC Count 4.73 3.8 - 5.2 10e12/L    Hemoglobin 14.6 11.7 - 15.7 g/dL    Hematocrit 42.7 35.0 - 47.0 %    MCV 90 78 - 100 fl    MCH 30.9 26.5 - 33.0 pg    MCHC 34.2 31.5 - 36.5 g/dL    RDW 12.7 10.0 - 15.0 %    Platelet Count 407 150 - 450 10e9/L    Diff Method Automated  Method     % Neutrophils 74.8 %    % Lymphocytes 16.2 %    % Monocytes 7.2 %    % Eosinophils 0.9 %    % Basophils 0.5 %    % Immature Granulocytes 0.4 %    Nucleated RBCs 0 0 /100    Absolute Neutrophil 7.4 1.6 - 8.3 10e9/L    Absolute Lymphocytes 1.6 0.8 - 5.3 10e9/L    Absolute Monocytes 0.7 0.0 - 1.3 10e9/L    Absolute Eosinophils 0.1 0.0 - 0.7 10e9/L    Absolute Basophils 0.1 0.0 - 0.2 10e9/L    Abs Immature Granulocytes 0.0 0 - 0.4 10e9/L    Absolute Nucleated RBC 0.0        Medications - No data to display    Assessments & Plan (with Medical Decision Making)   Dizziness: Unclear etiology.  Patient evaluated at the ED with EKG showing chronic atrial fibrillation for which patient is already anticoagulated.  Normal CBC, CMP, d-dimer, lipase, troponin, CRP, BNP and lactic acid.  Patient remained stable throughout ED stay and did not have any dizziness episodes in the ED.  Discharged home in stable condition.  Advised follow-up with PCP if she still continues to have episodes of dizziness.  Ms. Miller will return to ED if condition worsens.    I have reviewed the nursing notes.    I have reviewed the findings, diagnosis, plan and need for follow up with the patient.    Discharge Medication List as of 11/14/2018 11:28 AM          Final diagnoses:   Permanent atrial fibrillation (H)   Dizziness       11/14/2018   [facility]     Brian Dhaliwal MD  11/14/18 [id number]

## 2018-11-14 NOTE — ED AVS SNAPSHOT
[facility]    [address] [address]    Phone:  [phone]                                       Michael Christiansen   MRN: [id number]    Department:  [facility]   Date of Visit:  11/14/2018           Patient Information     Date Of Birth          1935        Your diagnoses for this visit were:     Permanent atrial fibrillation (H)     Dizziness        You were seen by Brian Dhaliwal MD.      Follow-up Information     Follow up with Chandrika Kumar PA.    Specialty:  Family Practice    Why:  As needed, If symptoms worsen    Contact information:    [address] [place] [id number]  [phone]          Discharge Instructions         Discharge Instructions for Atrial Fibrillation  You have been diagnosed with an abnormal heart rhythm called atrial fibrillation. With this condition, your heart s 2 upper chambers quiver rather than squeeze the blood out in a normal pattern. This leads to an irregular and sometimes rapid heartbeat. Some people will develop associated symptoms such as a flip-flopping heartbeat, chest pain, lightheadedness, or shortness of breath. Other people may have no symptoms at all. Atrial fibrillation is serious because it affects the heart s ability to fill with blood as it should. Blood clots may form. This increases the risk for stroke. Untreated atrial fibrillation can also lead to heart failure. Atrial fibrillation can be controlled. With treatment, most people with atrial fibrillation lead normal lives.  Treatment options  Recommended treatment for atrial fibrillation depends on your age, symptoms, how long you have had atrial fibrillation, and other factors. You will have a complete evaluation to find out if you have any abnormalities that caused your heart to go into atrial fibrillation. This might be blocked heart arteries or a thyroid problem. Your doctor will assess your particular case and discuss choices with  you.  Treatment choices may include:    Treating an underlying disorder that puts you at risk for atrial fibrillation. For example, correcting an abnormal thyroid or electrolyte problem, or treating a blocked heart artery.    Restoring a normal heart rhythm with an electrical shock (cardioversion) or with an antiarrhythmic medicine (chemical cardioversion).    Using medicine to control your heart rate in atrial fibrillation.    Preventing the risk for blood clot and stroke using blood-thinning medicines. Your doctor will tell you what he or she recommends. Choices may include aspirin, clopidogrel, warfarin, dabigatran, rivaroxaban, apixaban, and edoxaban.    Doing catheter ablation or a surgical maze procedure. These use different methods to destroy certain areas of heart tissue. This interrupts the electrical signals causing atrial fibrillation. One of these procedures may be a choice when medicines do not work, or as an alternative to long-term medicine.    Other treatment choices may be recommended for you by your doctor.  Managing risk factors for stroke and preventing heart failure are important parts of any treatment plan for atrial fibrillation.  Home care    Take your medicines exactly as directed. Don t skip doses.    Work with your doctor to find the right medicines and doses for you.    Learn to take your own pulse. Keep a record of your results. Ask your doctor which pulse rates mean that you need medical attention. Slowing your pulse is often the goal of treatment. Ask your doctor if it s OK for you to use an automatic machine to check your pulse at home. Sometimes these machines don t count the pulse correctly when you have atrial fibrillation.    Limit your intake of coffee, tea, cola, and other beverages with caffeine. Talk with your doctor about whether you should eliminate caffeine.    Avoid over-the-counter medicines that have caffeine in them.    Let your doctor know what medicines you take,  including prescription and over-the-counter medicines, as well as any supplements. They interfere with some medicines given for atrial fibrillation.    Ask your doctor about whether you can drink alcohol. Some people need to avoid alcohol to better treat atrial fibrillation. If you are taking blood-thinner medicines, alcohol may interfere with them by increasing their effect.    Never take stimulants such as amphetamines or cocaine. These drugs can speed up your heart rate and trigger atrial fibrillation.  Follow-up care  Follow up with your doctor, or as advised.     When should I call my healthcare provider  Call your healthcare provider right away if you have any of the following:    Weakness    Dizziness    Fainting    Fatigue    Shortness of breath    Chest pain with increased activity    A change in the usual regularity of your heartbeat, or an unusually fast heartbeat   Date Last Reviewed: 4/23/2016 2000-2018 [employer]. [address] [place] [address], [place] [address]. All rights reserved. This information is not intended as a substitute for professional medical care. Always follow your healthcare professional's instructions.          Dizziness (Vertigo) and Balance Problems: Staying Safe     Replace burned-out light bulbs to keep your home safe and well lit.     Falls or accidents can lead to pain, broken bones, and fear of future falls. Protect yourself and others by preparing for episodes. Simple steps can help you stay safe at home and wherever you go.  Lighting  Keep all areas well lit. This helps your eyes send the right signals to the brain. It also makes you less likely to trip and fall. If bright lights make symptoms worse, dim the lights or lie in a dark room until the dizziness passes. Then turn the lights back to their normal level.  Tips:    Keep a flashlight by the bed.    Place nightlights in bathrooms and hallways.    Replace burned-out bulbs, or have someone replace them for  you.  Preventing falls  To reduce your risk of falling:    Get out of bed or up from a chair slowly.    Wear low-heeled shoes that fit properly and have slip-resistant soles.    Remove throw rugs. Clear clutter from walkways.    Use handrails on stairs. Have handrails installed or adjusted if needed.    Install grab bars in the bathroom. Don't use towel racks for balance.    Use a shower stool. Also put adhesive strips in the shower or on the tub floor.  Going out  With a little time and preparation, you can get around safely.  Tips:    Bring a cane or walking aid if needed.    Give yourself plenty of time in case you start to get dizzy.    Ask your healthcare provider what type of exercise is safe for your condition.    Be patient. If an activity such as walking through a crowded shop causes you stress, you may not be ready for it yet.  Driving  If you become dizzy or disoriented while driving, you could hurt yourself and others. That's why it's best to not drive until symptoms have gone away. In some cases, your license may be temporarily held until it's safe for you to drive again.  For safety:    Ask a friend to drive for you.    Take public transportation.    Walk to stores and other places when you can.  Asking for help  Don't be afraid to ask for help running errands, cooking meals, and doing exercise. Whether it's a friend, loved one, neighbor, or stranger on the street, a little help can make a world of difference.   Date Last Reviewed: 11/1/2016 2000-2018 [employer]. [address] [place] [address], [place] [address]. All rights reserved. This information is not intended as a substitute for professional medical care. Always follow your healthcare professional's instructions.          Your next 10 appointments already scheduled     Nov 19, 2018 10:00 AM CST   Anticoagulation Visit with  ANTI COAGULATION   [facility] ([facility] )    [address]  [place] [address]   [phone]            Dec 31, 2018 10:00 AM CST   LAB with HC LAB   [facility] [place] ([facility] [place] )    [address]  [place] [address]   [phone]            Dec 31, 2018 11:00 AM CST   Level O with HC INF RM 3314   [facility] [place] ([facility] [place] )    [address]  [place] [address]   [phone]            May 07, 2019  9:30 AM CDT   LAB with HC LAB   [facility] [place] ([facility] [place] )    [address]  [place] [address]   [phone]            May 07, 2019 10:00 AM CDT   MA SCREENING LEFT W/ YUKO with HCMA1   [facility] [place] ([facility] [place] )    [address]  [place] [address]   [phone]           How do I prepare for my exam? (Food and drink instructions) No Food and Drink Restrictions.  How do I prepare for my exam? (Other instructions) Do not use any powder, lotion or deodorant under your arms or on your breast. If you do, we will ask you to remove it before your exam.  What should I wear: Wear comfortable, two-piece clothing.  How long does the exam take: Most scans will take 15 minutes.  What should I bring: Bring any previous mammograms from other facilities or have them mailed to the breast center.  Do I need a :  No  is needed.  What do I need to tell my doctor: If you have any allergies, tell your care team.  What should I do after the exam: No restrictions, You may resume normal activities.  What is this test: This test is an x-ray of the breast to look for breast disease. The breast is pressed between two plates to flatten and spread the tissue. An X-ray is taken of the breast from different angles.  Who should I call with questions: If you have any questions, please call the Imaging Department where you will have your exam. Directions, parking instructions, and other information is available on  "our website, [email or web address].  Other information about my exam Three-dimensional (3D) mammograms are available at [facility] locations in [facility], [place], [place], [facility], [place], and [place]. [facility] locations include [place] and the [facility] in [place].  Benefits of 3D mammograms include: * Improved rate of cancer detection * Decreases your chance of having to go back for more tests, which means fewer: * \"False-positive\" results (This means that there is an abnormal area but it isn't cancer.) * Invasive testing procedures, such as a biopsy or surgery * Can provide clearer images of the breast if you have dense breast tissue.  *3D mammography is an optional exam that anyone can have with a 2D mammogram. It doesn't replace or take the place of a 2D mammogram. 2D mammograms remain an effective screening test for all women.  Not all insurance companies cover the cost of a 3D mammogram. Check with your insurance.            May 07, 2019 10:30 AM CDT   DX HIP/PELVIS/SPINE with HIDX1   [facility] ([facility] )    [address] [facility] [id number] [phone]           How do I prepare for my exam? (Food and drink instructions) No Food and Drink Restrictions.  How do I prepare for my exam? (Other instructions) Please do not take any of the following 24 hours prior to the day of your exam: vitamins, calcium tablets, antacids.  What should I wear: If possible, please wear clothes without metal (snaps, zippers). A sweat suit works well.  How long does the exam take: The exam takes about 20 minutes.  What should I bring: Bring a list of your current medicines to your exam (including vitamins, minerals and over-the-counter drugs).  Do I need a :  No  is needed.  What should I do after the exam: No restrictions, You may resume normal activities.  How do I prepare for my exam? (Food and drink instructions) A DEXA scan is a bone-density scan. It uses " a low level of radiation to check the strength of your bones. As you lie on a padded table, a machine will take X-rays. We most often scan the hips and lower spine.  Who should I call with questions: If you have any questions, please call the Imaging Department where you will have your exam. Directions, parking instructions, and other information is available on our website, [email or web address].            May 14, 2019 10:10 AM CDT   (Arrive by 9:55 AM)   Return Visit with Noemy Mahajan NP   [facility] ([facility] )    [address]  [place] [address]   [phone]            Jun 12, 2019  9:30 AM CDT   (Arrive by 9:15 AM)   Return Visit with Cam Kirby,    [facility] [place] ([facility] )    [address]  [place] [address]   [phone]                 Review of your medicines      Our records show that you are taking the medicines listed below. If these are incorrect, please call your family doctor or clinic.        Dose / Directions Last dose taken    amoxicillin-clavulanate 875-125 MG per tablet   Commonly known as:  AUGMENTIN   Dose:  1 tablet   Quantity:  20 tablet        Take 1 tablet by mouth 2 times daily   Refills:  0        CALCITRATE/VITAMIN D PO   Dose:  1 tablet        Take 1 tablet by mouth 2 times daily   Refills:  0        diltiazem 180 MG 24 hr capsule   Commonly known as:  DILACOR XR   Dose:  180 mg   Quantity:  30 capsule        Take 1 capsule (180 mg) by mouth daily   Refills:  11        losartan 50 MG tablet   Commonly known as:  COZAAR   Dose:  50 mg   Quantity:  30 tablet        Take 1 tablet (50 mg) by mouth daily   Refills:  8        MULTIVITAMINS PO   Dose:  1 tablet        Take 1 tablet by mouth daily   Refills:  0        order for DME   Quantity:  1 each        4 wheeled walker   Refills:  0        OSTEO BI-FLEX ADV DOUBLE ST PO        Take  by mouth daily.   Refills:  0         simvastatin 40 MG tablet   Commonly known as:  ZOCOR   Quantity:  45 tablet        TAKE 1/2 (ONE-HALF) TABLET BY MOUTH IN THE EVENING   Refills:  0        traMADol 50 MG tablet   Commonly known as:  ULTRAM   Quantity:  100 tablet        TAKE 1 TO 2 TABLETS BY MOUTH EVERY 4 HOURS AS NEEDED -MAX  OF  8  TABLETS  PER  DAY   Refills:  0        TYLENOL PO   Dose:  500 mg        Take 500 mg by mouth every 4 hours as needed   Refills:  0        warfarin 5 MG tablet   Commonly known as:  COUMADIN   Quantity:  100 tablet        2.5mg Fri and 5mg  all other days or as directed by warfarin clinic   Refills:  3                Procedures and tests performed during your visit     CBC with platelets differential    CRP inflammation    CT Head w/o Contrast    Comprehensive metabolic panel    D-Dimer ([facility])    EKG 12-lead, tracing only    Lactic acid whole blood    Lipase    Nt probnp inpatient (BNP)    Orthostatic blood pressure and pulse    Troponin I    XR Chest 2 Views      Orders Needing Specimen Collection     Ordered          11/14/18 0934  UA reflex to Microscopic and Culture - STAT, [facility]: STAT, Status: Sent     Scheduled Task Status   11/14/18 0997 [employer] [facility] Reminder: Open   Order Class:  PCU Collect                  Pending Results     No orders found from 11/12/2018 to 11/15/2018.            Pending Culture Results     No orders found from 11/12/2018 to 11/15/2018.            Thank you for choosing [facility]       Thank you for choosing [facility] for your care. Our goal is always to provide you with excellent care. Hearing back from our patients is one way we can continue to improve our services. Please take a few minutes to complete the written survey that you may receive in the mail after you visit with us. Thank you!        [employer] Information     [employer] lets you send messages to your doctor, view your test results, renew your prescriptions, schedule appointments and more. To sign up, go to [email or web address] .  "Click on \"Log in\" on the left side of the screen, which will take you to the Welcome page. Then click on \"Sign up Now\" on the right side of the page.     You will be asked to enter the access code listed below, as well as some personal information. Please follow the directions to create your username and password.     Your access code is: [email or web address]  Expires: 2018 10:45 AM     Your access code will  in 90 days. If you need help or a new code, please call your [place] clinic or [phone].        Care EveryWhere ID     This is your Care EveryWhere ID. This could be used by other organizations to access your [place] medical records  [id number]        Equal Access to Services     AMAYA CONSTANTINO : Jaxson Hampton, waramyond navarro, jenn negretealcheyanne solano, topher finley. So [facility] [phone].    ATENCIÓN: Si habla español, tiene a shay disposición servicios gratuitos de asistencia lingüística. Llame al [phone].    We comply with applicable federal civil rights laws and [place] laws. We do not discriminate on the basis of race, color, national origin, age, disability, sex, sexual orientation, or gender identity.            After Visit Summary       This is your record. Keep this with you and show to your community pharmacist(s) and doctor(s) at your next visit.                  "

## 2018-11-15 ENCOUNTER — TELEPHONE (OUTPATIENT)
Dept: FAMILY MEDICINE | Facility: OTHER | Age: 83
End: 2018-11-15

## 2018-11-15 NOTE — TELEPHONE ENCOUNTER
10:29 AM    Reason for Call: OVERBOOK    Patient is having the following symptoms: ER follow up for 1 days.    The patient is requesting an appointment for asap with provider.    Was an appointment offered for this call? No  If yes : Appointment type              Date    Preferred method for responding to this message: Telephone Call  What is your phone number ?[phone]    If we cannot reach you directly, may we leave a detailed response at the number you provided? Yes    Can this message wait until your PCP/provider returns, if unavailable today? Not applicable, provider is in    Isis Pollard

## 2018-11-16 ENCOUNTER — OFFICE VISIT (OUTPATIENT)
Dept: FAMILY MEDICINE | Facility: OTHER | Age: 83
End: 2018-11-16
Attending: PHYSICIAN ASSISTANT
Payer: MEDICARE

## 2018-11-16 VITALS
HEART RATE: 80 BPM | WEIGHT: 110 LBS | DIASTOLIC BLOOD PRESSURE: 60 MMHG | HEIGHT: 64 IN | SYSTOLIC BLOOD PRESSURE: 98 MMHG | BODY MASS INDEX: 18.78 KG/M2 | OXYGEN SATURATION: 94 % | TEMPERATURE: 98.7 F

## 2018-11-16 DIAGNOSIS — G89.4 CHRONIC PAIN SYNDROME: ICD-10-CM

## 2018-11-16 DIAGNOSIS — J20.8 ACUTE BRONCHITIS DUE TO OTHER SPECIFIED ORGANISMS: Primary | ICD-10-CM

## 2018-11-16 DIAGNOSIS — F41.1 GAD (GENERALIZED ANXIETY DISORDER): ICD-10-CM

## 2018-11-16 LAB
BASOPHILS # BLD AUTO: 0.1 10E9/L (ref 0–0.2)
BASOPHILS NFR BLD AUTO: 0.3 %
CRP SERPL-MCNC: 7.9 MG/L (ref 0–8)
DIFFERENTIAL METHOD BLD: ABNORMAL
EOSINOPHIL # BLD AUTO: 0.1 10E9/L (ref 0–0.7)
EOSINOPHIL NFR BLD AUTO: 0.8 %
ERYTHROCYTE [DISTWIDTH] IN BLOOD BY AUTOMATED COUNT: 12.9 % (ref 10–15)
HCT VFR BLD AUTO: 39.4 % (ref 35–47)
HGB BLD-MCNC: 13.4 G/DL (ref 11.7–15.7)
IMM GRANULOCYTES # BLD: 0.1 10E9/L (ref 0–0.4)
IMM GRANULOCYTES NFR BLD: 0.4 %
LYMPHOCYTES # BLD AUTO: 2.8 10E9/L (ref 0.8–5.3)
LYMPHOCYTES NFR BLD AUTO: 19.1 %
MCH RBC QN AUTO: 31 PG (ref 26.5–33)
MCHC RBC AUTO-ENTMCNC: 34 G/DL (ref 31.5–36.5)
MCV RBC AUTO: 91 FL (ref 78–100)
MONOCYTES # BLD AUTO: 1 10E9/L (ref 0–1.3)
MONOCYTES NFR BLD AUTO: 6.9 %
NEUTROPHILS # BLD AUTO: 10.6 10E9/L (ref 1.6–8.3)
NEUTROPHILS NFR BLD AUTO: 72.5 %
NRBC # BLD AUTO: 0 10*3/UL
NRBC BLD AUTO-RTO: 0 /100
PLATELET # BLD AUTO: 422 10E9/L (ref 150–450)
RBC # BLD AUTO: 4.32 10E12/L (ref 3.8–5.2)
WBC # BLD AUTO: 14.6 10E9/L (ref 4–11)

## 2018-11-16 PROCEDURE — 99214 OFFICE O/P EST MOD 30 MIN: CPT | Performed by: PHYSICIAN ASSISTANT

## 2018-11-16 PROCEDURE — 85025 COMPLETE CBC W/AUTO DIFF WBC: CPT | Mod: ZL | Performed by: PHYSICIAN ASSISTANT

## 2018-11-16 PROCEDURE — 86140 C-REACTIVE PROTEIN: CPT | Mod: ZL | Performed by: PHYSICIAN ASSISTANT

## 2018-11-16 PROCEDURE — 36415 COLL VENOUS BLD VENIPUNCTURE: CPT | Mod: ZL | Performed by: PHYSICIAN ASSISTANT

## 2018-11-16 PROCEDURE — G0463 HOSPITAL OUTPT CLINIC VISIT: HCPCS

## 2018-11-16 RX ORDER — ALBUTEROL SULFATE 90 UG/1
2 AEROSOL, METERED RESPIRATORY (INHALATION) EVERY 6 HOURS PRN
Qty: 1 INHALER | Refills: 1 | Status: SHIPPED | OUTPATIENT
Start: 2018-11-16 | End: 2020-01-01

## 2018-11-16 RX ORDER — SERTRALINE HYDROCHLORIDE 25 MG/1
25 TABLET, FILM COATED ORAL DAILY
Qty: 30 TABLET | Refills: 3 | Status: SHIPPED | OUTPATIENT
Start: 2018-11-16 | End: 2019-06-06

## 2018-11-16 RX ORDER — AMOXICILLIN 500 MG/1
500 CAPSULE ORAL 3 TIMES DAILY
Qty: 30 CAPSULE | Refills: 0 | Status: SHIPPED | OUTPATIENT
Start: 2018-11-16 | End: 2018-12-31

## 2018-11-16 RX ORDER — TRAMADOL HYDROCHLORIDE 50 MG/1
TABLET ORAL
Qty: 100 TABLET | Refills: 0 | Status: SHIPPED | OUTPATIENT
Start: 2018-11-16 | End: 2018-12-13

## 2018-11-16 ASSESSMENT — PAIN SCALES - GENERAL: PAINLEVEL: MILD PAIN (3)

## 2018-11-16 NOTE — PROGRESS NOTES
SUBJECTIVE:   Michael Christiansen is a 83 year old female who presents to clinic today for the following health issues:      ED/UC Followup:    Facility:  [facility]  Date of visit: 11/14/18  Reason for visit: Dizziness/a-fib/pneumonia  Current Status: improved             Problem list and histories reviewed & adjusted, as indicated.  Additional history: as documented    Patient Active Problem List   Diagnosis     Hyperlipidemia LDL goal <130     Essential hypertension     Osteoarthritis     Permanent atrial fibrillation (H)     Chronic pain syndrome     History of total knee replacement     Radiculitis     Osteoarthritis of knee     Long-term (current) use of anticoagulants [Z79.01]     ACP (advance care planning)     Osteoporosis     Malignant neoplasm of right breast in female, estrogen receptor positive, unspecified site of breast (H)     Pulmonary hypertension-moderate     Aortic stenosis, moderate     Moderate tricuspid insufficiency     Anticoagulated on Coumadin     Past Surgical History:   Procedure Laterality Date     BREAST SURGERY      right  side mastectomy     C TOTAL KNEE ARTHROPLASTY Left 01/26/15     cataract extraction and lens implantation       COLONOSCOPY       EYE SURGERY       GYN SURGERY      tubal pregnancy     ORTHOPEDIC SURGERY  2009    l. hip replacement LT     ORTHOPEDIC SURGERY  july 19th 2013    Right total knee     TUBAL/ECTOPIC PREGNANCY         Social History   Substance Use Topics     Smoking status: Former Smoker     Packs/day: 0.30     Years: 30.00     Types: Cigarettes     Quit date: 8/11/1987     Smokeless tobacco: Never Used      Comment: year quit 1987     Alcohol use No     Family History   Problem Relation Age of Onset     Diabetes Father 75     cause of death     Other - See Comments Father      PVD     HEART DISEASE Sister      Coronary Artery Disease Sister      Other - See Comments Mother 83     old age; cause of death     Other - See Comments Sister      14 year twin [employer]; cause of death     Chronic Obstructive Pulmonary Disease Daughter      Osteoporosis Daughter      Thyroid Disease Daughter      Cancer Sister      Other Cancer Sister      Other Cancer Sister      uterine     Hypertension No family hx of      Hyperlipidemia No family hx of      Cerebrovascular Disease No family hx of      Breast Cancer No family hx of      Prostate Cancer No family hx of      Colon Cancer No family hx of      Asthma No family hx of      Anesthesia Reaction No family hx of      Genetic Disorder No family hx of          Current Outpatient Prescriptions   Medication Sig Dispense Refill     Acetaminophen (TYLENOL PO) Take 500 mg by mouth every 4 hours as needed        Calcium Citrate-Vitamin D (CALCITRATE/VITAMIN D PO) Take 1 tablet by mouth 2 times daily        diltiazem (DILACOR XR) 180 MG 24 hr capsule Take 1 capsule (180 mg) by mouth daily 30 capsule 11     losartan (COZAAR) 50 MG tablet Take 1 tablet (50 mg) by mouth daily 30 tablet 8     Misc Natural Products (OSTEO BI-FLEX ADV DOUBLE ST PO) Take  by mouth daily.       Multiple Vitamin (MULTIVITAMINS PO) Take 1 tablet by mouth daily        order for DME 4 wheeled walker 1 each 0     simvastatin (ZOCOR) 40 MG tablet TAKE 1/2 (ONE-HALF) TABLET BY MOUTH IN THE EVENING 45 tablet 0     traMADol (ULTRAM) 50 MG tablet TAKE 1 TO 2 TABLETS BY MOUTH EVERY 4 HOURS AS NEEDED -MAX  OF  8  TABLETS  PER   tablet 0     warfarin (COUMADIN) 5 MG tablet 2.5mg Fri and 5mg  all other days or as directed by warfarin clinic 100 tablet 3     amoxicillin-clavulanate (AUGMENTIN) 875-125 MG per tablet Take 1 tablet by mouth 2 times daily (Patient not taking: Reported on 11/16/2018) 20 tablet 0     Allergies   Allergen Reactions     Atenolol Shortness Of Breath and Other (See Comments)     Dizziness  Severe vertigo and dizziness/SOB     Lisinopril Cough     Augmentin Diarrhea     Pollen Extract      Other reaction(s): Runny Nose     Recent Labs  "  Lab Test  11/14/18   1002  06/29/18   0952  04/25/18   1205  12/27/17   1215   06/21/17   1025   10/27/14   1450   LDL   --    --    --    --    --   77   --    --    HDL   --    --    --    --    --   76   --    --    TRIG   --    --    --    --    --   61   --    --    ALT  19   --   19  26   < >   --    < >  22   CR  0.77  0.75  0.86  0.76   < >   --    < >  0.92   GFRESTIMATED  71  74  63  73   < >   --    < >  59*   GFRESTBLACK  86  90  76  88   < >   --    < >  71   POTASSIUM  4.1   --   4.5  4.0   < >   --    < >  4.1   TSH   --    --    --    --    --    --    --   0.56    < > = values in this interval not displayed.      BP Readings from Last 3 Encounters:   11/16/18 98/60   11/14/18 (!) 163/104   11/12/18 126/72    Wt Readings from Last 3 Encounters:   11/16/18 110 lb (49.9 kg)   06/29/18 115 lb (52.2 kg)   06/13/18 115 lb (52.2 kg)                    Reviewed and updated as needed this visit by clinical staff  Tobacco  Allergies  Meds       Reviewed and updated as needed this visit by Provider         ROS:  Constitutional, neuro, ENT, endocrine, pulmonary, cardiac, gastrointestinal, genitourinary, musculoskeletal, integument and psychiatric systems are negative, except as otherwise noted.    OBJECTIVE:                                                    BP 98/60  Pulse 80  Temp 98.7  F (37.1  C) (Tympanic)  Ht 5' 4\" (1.626 m)  Wt 110 lb (49.9 kg)  SpO2 94%  BMI 18.88 kg/m2  Body mass index is 18.88 kg/(m^2).  GENERAL APPEARANCE: healthy, alert and no distress  EYES: Eyes grossly normal to inspection, PERRL and conjunctivae and sclerae normal  HENT: ear canals and TM's normal and nose and mouth without ulcers or lesions  NECK: no adenopathy, no asymmetry, masses, or scars and thyroid normal to palpation  RESP: lungs clear to auscultation - no rales, rhonchi or wheezes  CV: regular rates and rhythm, normal S1 S2, no S3 or S4 and no murmur, click or rub  ABDOMEN: soft, nontender, without " hepatosplenomegaly or masses and bowel sounds normal  MS: extremities normal- no gross deformities noted  SKIN: no suspicious lesions or rashes  NEURO: Normal strength and tone, mentation intact and speech normal  PSYCH: mentation appears normal and affect normal/bright    Diagnostic test results:  Diagnostic Test Results:  Results for orders placed or performed in visit on 11/16/18   CBC with platelets and differential   Result Value Ref Range    WBC 14.6 (H) 4.0 - 11.0 10e9/L    RBC Count 4.32 3.8 - 5.2 10e12/L    Hemoglobin 13.4 11.7 - 15.7 g/dL    Hematocrit 39.4 35.0 - 47.0 %    MCV 91 78 - 100 fl    MCH 31.0 26.5 - 33.0 pg    MCHC 34.0 31.5 - 36.5 g/dL    RDW 12.9 10.0 - 15.0 %    Platelet Count 422 150 - 450 10e9/L    Diff Method Automated Method     % Neutrophils 72.5 %    % Lymphocytes 19.1 %    % Monocytes 6.9 %    % Eosinophils 0.8 %    % Basophils 0.3 %    % Immature Granulocytes 0.4 %    Nucleated RBCs 0 0 /100    Absolute Neutrophil 10.6 (H) 1.6 - 8.3 10e9/L    Absolute Lymphocytes 2.8 0.8 - 5.3 10e9/L    Absolute Monocytes 1.0 0.0 - 1.3 10e9/L    Absolute Eosinophils 0.1 0.0 - 0.7 10e9/L    Absolute Basophils 0.1 0.0 - 0.2 10e9/L    Abs Immature Granulocytes 0.1 0 - 0.4 10e9/L    Absolute Nucleated RBC 0.0    CRP inflammation   Result Value Ref Range    CRP Inflammation 7.9 0.0 - 8.0 mg/L        ASSESSMENT/PLAN:                                                      1. Acute bronchitis due to other specified organisms  She was too sick from the Augmentin.  She was vomiting and had diarrhea. Now she is feelin better but xray not clear. We will try Amoxil and see if this helps her. She will be seeing us ack in 2 weeks. Given inhaler to help her Wheezing at night. labsa re improving. Xray will be done on follow up.   - amoxicillin (AMOXIL) 500 MG capsule; Take 1 capsule (500 mg) by mouth 3 times daily  Dispense: 30 capsule; Refill: 0  - CBC with platelets and differential  - CRP inflammation  - albuterol  (PROAIR HFA/PROVENTIL HFA/VENTOLIN HFA) 108 (90 Base) MCG/ACT inhaler; Inhale 2 puffs into the lungs every 6 hours as needed for shortness of breath / dyspnea or wheezing  Dispense: 1 Inhaler; Refill: 1    2. LISA (generalized anxiety disorder)  She is really trying to do a lot at home.  Taking care of her sick  and this is causing increased Anxiety. She is worrying and not sleeping well. We will try low dose Zoloft. Emotional today and she is always very up beat.   - sertraline (ZOLOFT) 25 MG tablet; Take 1 tablet (25 mg) by mouth daily  Dispense: 30 tablet; Refill: 3    3. Chronic pain syndrome  Refill of this for pain in her back. She is cripples and walking with a cane.   - traMADol (ULTRAM) 50 MG tablet; TAKE 1 TO 2 TABLETS BY MOUTH EVERY 4 HOURS AS NEEDED -MAX  OF  8  TABLETS  PER  DAY  Dispense: 100 tablet; Refill: 0      See Patient Instructions    MELISA Gillespie  [facility] - [facility]

## 2018-11-16 NOTE — MR AVS SNAPSHOT
After Visit Summary   11/16/2018    Michael Christiansen    MRN: [id number]           Patient Information     Date Of Birth          1935        Visit Information        Provider Department      11/16/2018 3:20 PM Chandrika Kumar PA [facility] [place]        Today's Diagnoses     Acute bronchitis due to other specified organisms    -  1    LISA (generalized anxiety disorder)        Chronic pain syndrome           Follow-ups after your visit        Follow-up notes from your care team     Return in about 1 week (around 11/23/2018).      Your next 10 appointments already scheduled     Nov 19, 2018 10:00 AM CST   Anticoagulation Visit with HC ANTI COAGULATION   [facility] [place] ([facility] [place] )    [address]  [place] [address]   [phone]            Dec 31, 2018 10:00 AM CST   LAB with HC LAB   [facility] [place] ([facility] [place] )    [address]  [place] [address]   [phone]            Dec 31, 2018 11:00 AM CST   Level O with HC INF RM [id number]   [facility] [place] ([facility] [place] )    [address]  [place] [address]   [phone]            May 07, 2019  9:30 AM CDT   LAB with HC LAB   [facility] [place] ([facility] [place] )    [address]  [place] [address]   [phone]            May 07, 2019 10:00 AM CDT   MA SCREENING LEFT W/ YUKO with HCMA1   [facility] [place] ([facility] [place] )    [address]  [place] [address]   [phone]           How do I prepare for my exam? (Food and drink instructions) No Food and Drink Restrictions.  How do I prepare for my exam? (Other instructions) Do not use any powder, lotion or deodorant under your arms or on your breast. If you do, we will ask you to remove it before your exam.  What should I wear: Wear comfortable, two-piece clothing.  How long does the  "exam take: Most scans will take 15 minutes.  What should I bring: Bring any previous mammograms from other facilities or have them mailed to the breast center.  Do I need a :  No  is needed.  What do I need to tell my doctor: If you have any allergies, tell your care team.  What should I do after the exam: No restrictions, You may resume normal activities.  What is this test: This test is an x-ray of the breast to look for breast disease. The breast is pressed between two plates to flatten and spread the tissue. An X-ray is taken of the breast from different angles.  Who should I call with questions: If you have any questions, please call the Imaging Department where you will have your exam. Directions, parking instructions, and other information is available on our website, [email or web address].  Other information about my exam Three-dimensional (3D) mammograms are available at [place] locations in [facility], [place], and [place]. [facility] locations include [place] and the [facility] in [place].  Benefits of 3D mammograms include: * Improved rate of cancer detection * Decreases your chance of having to go back for more tests, which means fewer: * \"False-positive\" results (This means that there is an abnormal area but it isn't cancer.) * Invasive testing procedures, such as a biopsy or surgery * Can provide clearer images of the breast if you have dense breast tissue.  *3D mammography is an optional exam that anyone can have with a 2D mammogram. It doesn't replace or take the place of a 2D mammogram. 2D mammograms remain an effective screening test for all women.  Not all insurance companies cover the cost of a 3D mammogram. Check with your insurance.            May 07, 2019 10:30 AM CDT   DX HIP/PELVIS/SPINE with HIDX1   [facility] DEXA ([facility] )    [address] [facility] [id number] [phone]           How do I " prepare for my exam? (Food and drink instructions) No Food and Drink Restrictions.  How do I prepare for my exam? (Other instructions) Please do not take any of the following 24 hours prior to the day of your exam: vitamins, calcium tablets, antacids.  What should I wear: If possible, please wear clothes without metal (snaps, zippers). A sweat suit works well.  How long does the exam take: The exam takes about 20 minutes.  What should I bring: Bring a list of your current medicines to your exam (including vitamins, minerals and over-the-counter drugs).  Do I need a :  No  is needed.  What should I do after the exam: No restrictions, You may resume normal activities.  How do I prepare for my exam? (Food and drink instructions) A DEXA scan is a bone-density scan. It uses a low level of radiation to check the strength of your bones. As you lie on a padded table, a machine will take X-rays. We most often scan the hips and lower spine.  Who should I call with questions: If you have any questions, please call the Imaging Department where you will have your exam. Directions, parking instructions, and other information is available on our website, [email or web address].            May 14, 2019 10:10 AM CDT   (Arrive by 9:55 AM)   Return Visit with Noemy Mahajan NP   [facility] ([facility] )    [address]  [place] [address]   [phone]            Jun 12, 2019  9:30 AM CDT   (Arrive by 9:15 AM)   Return Visit with Cam Kirby,    [facility] ([facility] [place] )    [address]  [place] [address]   [phone]              Who to contact     If you have questions or need follow up information about today's clinic visit or your schedule please contact [facility] directly at [phone].  Normal or non-critical lab and imaging results will be communicated to you by [employer], letter  "or phone within 4 business days after the clinic has received the results. If you do not hear from us within 7 days, please contact the clinic through [employer] or phone. If you have a critical or abnormal lab result, we will notify you by phone as soon as possible.  Submit refill requests through [employer] or call your pharmacy and they will forward the refill request to us. Please allow 3 business days for your refill to be completed.          Additional Information About Your Visit        [employer] Information     [employer] lets you send messages to your doctor, view your test results, renew your prescriptions, schedule appointments and more. To sign up, go to [email or web address] . Click on \"Log in\" on the left side of the screen, which will take you to the Welcome page. Then click on \"Sign up Now\" on the right side of the page.     You will be asked to enter the access code listed below, as well as some personal information. Please follow the directions to create your username and password.     Your access code is: [email or web address]  Expires: 2018 10:45 AM     Your access code will  in 90 days. If you need help or a new code, please call your [facility] clinic or [phone].        Care EveryWhere ID     This is your Care EveryWhere ID. This could be used by other organizations to access your [facility] medical records  [id number]        Your Vitals Were     Pulse Temperature Height Pulse Oximetry BMI (Body Mass Index)       80 98.7  F (37.1  C) (Tympanic) 5' 4\" (1.626 m) 94% 18.88 kg/m2        Blood Pressure from Last 3 Encounters:   18 98/60   18 (!) 163/104   18 126/72    Weight from Last 3 Encounters:   18 110 lb (49.9 kg)   18 115 lb (52.2 kg)   18 115 lb (52.2 kg)              We Performed the Following     CBC with platelets and differential     CRP inflammation          Today's Medication Changes          These changes are accurate as of 18  3:49 PM.  If you " have any questions, ask your nurse or doctor.               Start taking these medicines.        Dose/Directions    albuterol 108 (90 Base) MCG/ACT inhaler   Commonly known as:  PROAIR HFA/PROVENTIL HFA/VENTOLIN HFA   Used for:  Acute bronchitis due to other specified organisms   Started by:  Chandrika Kumar PA        Dose:  2 puff   Inhale 2 puffs into the lungs every 6 hours as needed for shortness of breath / dyspnea or wheezing   Quantity:  1 Inhaler   Refills:  1       amoxicillin 500 MG capsule   Commonly known as:  AMOXIL   Used for:  Acute bronchitis due to other specified organisms   Started by:  Chandrika Kumar PA        Dose:  500 mg   Take 1 capsule (500 mg) by mouth 3 times daily   Quantity:  30 capsule   Refills:  0       sertraline 25 MG tablet   Commonly known as:  ZOLOFT   Used for:  LISA (generalized anxiety disorder)   Started by:  Chandrika Kumar PA        Dose:  25 mg   Take 1 tablet (25 mg) by mouth daily   Quantity:  30 tablet   Refills:  3            Where to get your medicines      These medications were sent to [id number]  [place], [place] - [address] [facility] [address]  [address] [facility] [address], [facility] [address]     Phone:  [phone]     albuterol 108 (90 Base) MCG/ACT inhaler    amoxicillin 500 MG capsule    sertraline 25 MG tablet         Some of these will need a paper prescription and others can be bought over the counter.  Ask your nurse if you have questions.     Bring a paper prescription for each of these medications     traMADol 50 MG tablet               Information about OPIOIDS     PRESCRIPTION OPIOIDS: WHAT YOU NEED TO KNOW   We gave you an opioid (narcotic) pain medicine. It is important to manage your pain, but opioids are not always the best choice. You should first try all the other options your care team gave you. Take this medicine for as short a time (and as few doses) as possible.    Some activities can increase your pain, such as bandage changes or therapy sessions. It may  help to take your pain medicine 30 to 60 minutes before these activities. Reduce your stress by getting enough sleep, working on hobbies you enjoy and practicing relaxation or meditation. Talk to your care team about ways to manage your pain beyond prescription opioids.    These medicines have risks:    DO NOT drive when on new or higher doses of pain medicine. These medicines can affect your alertness and reaction times, and you could be arrested for driving under the influence (DUI). If you need to use opioids long-term, talk to your care team about driving.    DO NOT operate heavy machinery    DO NOT do any other dangerous activities while taking these medicines.    DO NOT drink any alcohol while taking these medicines.     If the opioid prescribed includes acetaminophen, DO NOT take with any other medicines that contain acetaminophen. Read all labels carefully. Look for the word  acetaminophen  or  Tylenol.  Ask your pharmacist if you have questions or are unsure.    You can get addicted to pain medicines, especially if you have a history of addiction (chemical, alcohol or substance dependence). Talk to your care team about ways to reduce this risk.    All opioids tend to cause constipation. Drink plenty of water and eat foods that have a lot of fiber, such as fruits, vegetables, prune juice, apple juice and high-fiber cereal. Take a laxative (Miralax, milk of magnesia, Colace, Senna) if you don t move your bowels at least every other day. Other side effects include upset stomach, sleepiness, dizziness, throwing up, tolerance (needing more of the medicine to have the same effect), physical dependence and slowed breathing.    Store your pills in a secure place, locked if possible. We will not replace any lost or stolen medicine. If you don t finish your medicine, please throw away (dispose) as directed by your pharmacist. The [place] Pollution Control Agency has more information about safe disposal:  [email or web address]         Primary Care Provider Office Phone # Fax #    ChandrikaMELISA Weaver [phone]       [address] [place] [address]        Equal Access to Services     AMAYA CONSTANTINO : Hadii anjelica ku hadasho Soomaali, waaxda luqadaha, qaybta kaalmada adeegyada, waxay clive hayharoon valladaresishkamar finley. So [facility] [phone].    ATENCIÓN: Si habla español, tiene a shay disposición servicios gratuitos de asistencia lingüística. Gera al [phone].    We comply with applicable federal civil rights laws and [place] laws. We do not discriminate on the basis of race, color, national origin, age, disability, sex, sexual orientation, or gender identity.            Thank you!     Thank you for choosing [facility]  for your care. Our goal is always to provide you with excellent care. Hearing back from our patients is one way we can continue to improve our services. Please take a few minutes to complete the written survey that you may receive in the mail after your visit with us. Thank you!             Your Updated Medication List - Protect others around you: Learn how to safely use, store and throw away your medicines at www.disposemymeds.org.          This list is accurate as of 11/16/18  3:49 PM.  Always use your most recent med list.                   Brand Name Dispense Instructions for use Diagnosis    albuterol 108 (90 Base) MCG/ACT inhaler    PROAIR HFA/PROVENTIL HFA/VENTOLIN HFA    1 Inhaler    Inhale 2 puffs into the lungs every 6 hours as needed for shortness of breath / dyspnea or wheezing    Acute bronchitis due to other specified organisms       amoxicillin 500 MG capsule    AMOXIL    30 capsule    Take 1 capsule (500 mg) by mouth 3 times daily    Acute bronchitis due to other specified organisms       amoxicillin-clavulanate 875-125 MG per tablet    AUGMENTIN    20 tablet    Take 1 tablet by mouth 2  times daily        CALCITRATE/VITAMIN D PO      Take 1 tablet by mouth 2 times daily        diltiazem 180 MG 24 hr capsule    DILACOR XR    30 capsule    Take 1 capsule (180 mg) by mouth daily    Permanent atrial fibrillation (H), Aortic stenosis, moderate, Moderate tricuspid insufficiency, Long-term (current) use of anticoagulants       losartan 50 MG tablet    COZAAR    30 tablet    Take 1 tablet (50 mg) by mouth daily    HTN (hypertension)       MULTIVITAMINS PO      Take 1 tablet by mouth daily        order for DME     1 each    4 wheeled walker    Chronic left-sided low back pain with left-sided sciatica       OSTEO BI-FLEX ADV DOUBLE ST PO      Take  by mouth daily.        sertraline 25 MG tablet    ZOLOFT    30 tablet    Take 1 tablet (25 mg) by mouth daily    LISA (generalized anxiety disorder)       simvastatin 40 MG tablet    ZOCOR    45 tablet    TAKE 1/2 (ONE-HALF) TABLET BY MOUTH IN THE EVENING    Hyperlipidemia LDL goal <100       traMADol 50 MG tablet    ULTRAM    100 tablet    TAKE 1 TO 2 TABLETS BY MOUTH EVERY 4 HOURS AS NEEDED -MAX  OF  8  TABLETS  PER  DAY    Chronic pain syndrome       TYLENOL PO      Take 500 mg by mouth every 4 hours as needed        warfarin 5 MG tablet    COUMADIN    100 tablet    2.5mg Fri and 5mg  all other days or as directed by warfarin clinic    Atrial fibrillation, unspecified type (H), Long term current use of anticoagulant therapy

## 2018-11-16 NOTE — NURSING NOTE
"Chief Complaint   Patient presents with     ER F/U       Initial BP 98/60  Pulse 80  Temp 98.7  F (37.1  C) (Tympanic)  Ht 5' 4\" (1.626 m)  Wt 110 lb (49.9 kg)  SpO2 94%  BMI 18.88 kg/m2 Estimated body mass index is 18.88 kg/(m^2) as calculated from the following:    Height as of this encounter: 5' 4\" (1.626 m).    Weight as of this encounter: 110 lb (49.9 kg).  Medication Reconciliation: complete    Shaylee Saini LPN  "

## 2018-11-19 ENCOUNTER — ANTICOAGULATION THERAPY VISIT (OUTPATIENT)
Dept: ANTICOAGULATION | Facility: OTHER | Age: 83
End: 2018-11-19
Attending: PHYSICIAN ASSISTANT
Payer: MEDICARE

## 2018-11-19 DIAGNOSIS — I48.91 ATRIAL FIBRILLATION, UNSPECIFIED TYPE (H): ICD-10-CM

## 2018-11-19 DIAGNOSIS — I48.21 PERMANENT ATRIAL FIBRILLATION (H): ICD-10-CM

## 2018-11-19 DIAGNOSIS — Z79.01 LONG TERM CURRENT USE OF ANTICOAGULANT THERAPY: ICD-10-CM

## 2018-11-19 LAB — INR POINT OF CARE: 1.7 (ref 0.86–1.14)

## 2018-11-19 PROCEDURE — 36416 COLLJ CAPILLARY BLOOD SPEC: CPT | Mod: ZL

## 2018-11-19 RX ORDER — WARFARIN SODIUM 5 MG/1
5 TABLET ORAL
Qty: 100 TABLET | Refills: 3 | COMMUNITY
Start: 2018-11-19 | End: 2020-01-01

## 2018-11-19 NOTE — PROGRESS NOTES
ANTICOAGULATION FOLLOW-UP CLINIC VISIT    Patient Name:  Michael Christiansen  Date:  11/19/2018  Contact Type:  Face to Face    SUBJECTIVE:     Patient Findings     Positives Change in medications, Antibiotic use or infection, Unexplained INR or factor level change    Comments Taking Amoxicillin TID x 10 days started 11/16/18 for Bronchitis; also started on Zoloft on 11/16/18; has follow up appt on 11/26 w/PCP.  Denies side effects from this medication.             OBJECTIVE    INR Protime   Date Value Ref Range Status   11/19/2018 1.7 (A) 0.86 - 1.14 Final       ASSESSMENT / PLAN  No question data found.  Anticoagulation Summary as of 11/19/2018     INR goal 2.0-3.0   Today's INR 1.7!   Warfarin maintenance plan 2.5 mg (5 mg x 0.5) on Mon, Fri; 5 mg (5 mg x 1) all other days   Full warfarin instructions 11/19: 5 mg; Otherwise 2.5 mg on Mon, Fri; 5 mg all other days   Weekly warfarin total 30 mg   Plan last modified Kasia Hercules RN (10/1/2018)   Next INR check 12/17/2018   Priority INR   Target end date Indefinite    Indications   Long-term (current) use of anticoagulants [Z79.01] [Z79.01]  Permanent atrial fibrillation (H) [I48.2]         Anticoagulation Episode Summary     INR check location     Preferred lab     Send INR reminders to  ANTICOAG POOL    Comments       Anticoagulation Care Providers     Provider Role Specialty Phone number    Chandrika Kumar PA [facility] [phone]            See the Encounter Report to view Anticoagulation Flowsheet and Dosing Calendar (Go to Encounters tab in chart review, and find the Anticoagulation Therapy Visit)        Kasia De Dios RN

## 2018-11-19 NOTE — MR AVS SNAPSHOT
Michael DEJESUS Елена   11/19/2018 10:00 AM   Anticoagulation Therapy Visit    Description:  83 year old female   Provider:   ANTI COAGULATION   Department:  [facility] Anti Coagulation           INR as of 11/19/2018     Today's INR 1.7!      Anticoagulation Summary as of 11/19/2018     INR goal 2.0-3.0   Today's INR 1.7!   Full warfarin instructions 11/19: 5 mg; Otherwise 2.5 mg on Mon, Fri; 5 mg all other days   Next INR check 12/17/2018    Indications   Long-term (current) use of anticoagulants [Z79.01] [Z79.01]  Permanent atrial fibrillation (H) [I48.2]         Your next Anticoagulation Clinic appointment(s)     Dec 17, 2018 10:00 AM CST   Anticoagulation Visit with  ANTI COAGULATION   [facility] [place] ([facility] [place] )    [address] [place] [address]   [phone]              November 2018 Details    Sun Mon Tue Wed Thu Fri Sat         1               2               3                 4               5               6               7               8               9               10                 11               12               13               14               15               16               17                 18               19      5 mg   See details      20      5 mg         21      5 mg         22      5 mg         23      2.5 mg         24      5 mg           25      5 mg         26      2.5 mg         27      5 mg         28      5 mg         29      5 mg         30      2.5 mg           Date Details   11/19 This INR check               How to take your warfarin dose     To take:  2.5 mg Take 0.5 of a 5 mg tablet.    To take:  5 mg Take 1 of the 5 mg tablets.           December 2018 Details    Sun Mon Tue Wed Thu Fri Sat           1      5 mg           2      5 mg         3      2.5 mg         4      5 mg         5      5 mg         6      5 mg         7      2.5 mg         8      5 mg           9      5 mg         10      2.5 mg         11      5 mg          12      5 mg         13      5 mg         14      2.5 mg         15      5 mg           16      5 mg         17            18               19               20               21               22                 23               24               25               26               27               28               29                 30               31                     Date Details   No additional details    Date of next INR:  12/17/2018         How to take your warfarin dose     To take:  2.5 mg Take 0.5 of a 5 mg tablet.    To take:  5 mg Take 1 of the 5 mg tablets.

## 2018-11-26 ENCOUNTER — RADIANT APPOINTMENT (OUTPATIENT)
Dept: GENERAL RADIOLOGY | Facility: OTHER | Age: 83
End: 2018-11-26
Attending: PHYSICIAN ASSISTANT
Payer: MEDICARE

## 2018-11-26 ENCOUNTER — OFFICE VISIT (OUTPATIENT)
Dept: FAMILY MEDICINE | Facility: OTHER | Age: 83
End: 2018-11-26
Attending: PHYSICIAN ASSISTANT
Payer: MEDICARE

## 2018-11-26 VITALS
WEIGHT: 110 LBS | TEMPERATURE: 98 F | HEIGHT: 64 IN | OXYGEN SATURATION: 97 % | BODY MASS INDEX: 18.78 KG/M2 | SYSTOLIC BLOOD PRESSURE: 112 MMHG | HEART RATE: 80 BPM | DIASTOLIC BLOOD PRESSURE: 62 MMHG

## 2018-11-26 DIAGNOSIS — I48.21 PERMANENT ATRIAL FIBRILLATION (H): ICD-10-CM

## 2018-11-26 DIAGNOSIS — F41.1 GAD (GENERALIZED ANXIETY DISORDER): ICD-10-CM

## 2018-11-26 DIAGNOSIS — I10 ESSENTIAL HYPERTENSION: ICD-10-CM

## 2018-11-26 DIAGNOSIS — J20.8 ACUTE BRONCHITIS DUE TO OTHER SPECIFIED ORGANISMS: ICD-10-CM

## 2018-11-26 DIAGNOSIS — J20.8 ACUTE BRONCHITIS DUE TO OTHER SPECIFIED ORGANISMS: Primary | ICD-10-CM

## 2018-11-26 LAB
BASOPHILS # BLD AUTO: 0.1 10E9/L (ref 0–0.2)
BASOPHILS NFR BLD AUTO: 0.5 %
DIFFERENTIAL METHOD BLD: ABNORMAL
EOSINOPHIL # BLD AUTO: 0.1 10E9/L (ref 0–0.7)
EOSINOPHIL NFR BLD AUTO: 0.8 %
ERYTHROCYTE [DISTWIDTH] IN BLOOD BY AUTOMATED COUNT: 13.2 % (ref 10–15)
HCT VFR BLD AUTO: 39.1 % (ref 35–47)
HGB BLD-MCNC: 13.3 G/DL (ref 11.7–15.7)
IMM GRANULOCYTES # BLD: 0 10E9/L (ref 0–0.4)
IMM GRANULOCYTES NFR BLD: 0.3 %
LYMPHOCYTES # BLD AUTO: 2.1 10E9/L (ref 0.8–5.3)
LYMPHOCYTES NFR BLD AUTO: 17.2 %
MCH RBC QN AUTO: 31.1 PG (ref 26.5–33)
MCHC RBC AUTO-ENTMCNC: 34 G/DL (ref 31.5–36.5)
MCV RBC AUTO: 92 FL (ref 78–100)
MONOCYTES # BLD AUTO: 1 10E9/L (ref 0–1.3)
MONOCYTES NFR BLD AUTO: 7.8 %
NEUTROPHILS # BLD AUTO: 9 10E9/L (ref 1.6–8.3)
NEUTROPHILS NFR BLD AUTO: 73.4 %
NRBC # BLD AUTO: 0 10*3/UL
NRBC BLD AUTO-RTO: 0 /100
PLATELET # BLD AUTO: 423 10E9/L (ref 150–450)
RBC # BLD AUTO: 4.27 10E12/L (ref 3.8–5.2)
WBC # BLD AUTO: 12.3 10E9/L (ref 4–11)

## 2018-11-26 PROCEDURE — 85025 COMPLETE CBC W/AUTO DIFF WBC: CPT | Mod: ZL | Performed by: PHYSICIAN ASSISTANT

## 2018-11-26 PROCEDURE — 99214 OFFICE O/P EST MOD 30 MIN: CPT | Performed by: PHYSICIAN ASSISTANT

## 2018-11-26 PROCEDURE — G0463 HOSPITAL OUTPT CLINIC VISIT: HCPCS | Mod: 25

## 2018-11-26 PROCEDURE — 36415 COLL VENOUS BLD VENIPUNCTURE: CPT | Mod: ZL | Performed by: PHYSICIAN ASSISTANT

## 2018-11-26 PROCEDURE — G0463 HOSPITAL OUTPT CLINIC VISIT: HCPCS

## 2018-11-26 PROCEDURE — 71046 X-RAY EXAM CHEST 2 VIEWS: CPT | Mod: TC

## 2018-11-26 ASSESSMENT — ANXIETY QUESTIONNAIRES
7. FEELING AFRAID AS IF SOMETHING AWFUL MIGHT HAPPEN: NOT AT ALL
6. BECOMING EASILY ANNOYED OR IRRITABLE: SEVERAL DAYS
5. BEING SO RESTLESS THAT IT IS HARD TO SIT STILL: MORE THAN HALF THE DAYS
3. WORRYING TOO MUCH ABOUT DIFFERENT THINGS: SEVERAL DAYS
GAD7 TOTAL SCORE: 7
1. FEELING NERVOUS, ANXIOUS, OR ON EDGE: SEVERAL DAYS
2. NOT BEING ABLE TO STOP OR CONTROL WORRYING: SEVERAL DAYS

## 2018-11-26 ASSESSMENT — PATIENT HEALTH QUESTIONNAIRE - PHQ9
5. POOR APPETITE OR OVEREATING: SEVERAL DAYS
SUM OF ALL RESPONSES TO PHQ QUESTIONS 1-9: 8

## 2018-11-26 ASSESSMENT — PAIN SCALES - GENERAL: PAINLEVEL: MILD PAIN (2)

## 2018-11-26 NOTE — PATIENT INSTRUCTIONS
Thank you for choosing [facility].   I have office hours 8:00 am to 4:30 pm on Monday's, Wednesday's, Thursday's and Friday's. My nurse and I are out of the office every Tuesday.    Following your visit, when your labs and diagnostic testing have returned, I will review then and you will be contacted by my nurse.  If you are on My Chart, you can also view results there.    For refills, notify your pharmacy regarding what you need and the pharmacy will generate a refill request. Do not call my nurse as she is unable to process refill request. Please plan ahead and allow 3-5 days for refill requests.    You will generally receive a reminder call the day prior to your appointment.  If you cannot attend your appointment, please cancel your appointment with as much notice as possible.  If there is a pattern of failure to present for your appointments, I cannot provide consistent, meaningful, ongoing care for you. It is very important to me that you come in for your care, so we can best assist you with your health care needs.    IMPORTANT:  Please note that it is my standard of practice to NOT participate in prescribing ongoing requested Narcotic Analgesic therapy, and/or participate in the prescribing of other controlled substances.  My nurse and I am happy to assist you with the process of referral for alternative pain management as needed, and other treatment modalities including but not limited to:  Physical Therapy, Physical Medicine and Rehab, Counseling, Chiropractic Care, Orthopedic Care, and non-narcotic medication management.     In the event that you need to be seen for emergent concerns and I am out of office,  please see one of my colleagues for acute concerns.  You may also present to  or ER.  I appreciate the opportunity to serve you and look forward to supporting your healthcare needs in the future. Please contact me with any questions or concerns that you may  have.    Sincerely,      Chandrika Kumar RN, PA-C

## 2018-11-26 NOTE — PROGRESS NOTES
SUBJECTIVE:   Michael Christiansen is a 83 year old female who presents to clinic today for the following health issues:      Anxiety Follow-Up    Status since last visit: No change    Other associated symptoms:None    Complicating factors:   Significant life event: No   Current substance abuse: None  Depression symptoms: No  LISA-7 SCORE 11/30/2017 12/20/2017 6/6/2018   Total Score 7 3 10       LISA-7    Amount of exercise or physical activity: 6-7 days/week for an average of 30-45 minutes    Problems taking medications regularly: No    Medication side effects: none    Diet: regular (no restrictions)        Bronchitis f/u      Duration: 1 week follow up     Description (location/character/radiation): bronchitis    Intensity:  moderate    Accompanying signs and symptoms: Productive cough    History (similar episodes/previous evaluation): Has had for awhile, feeling better    Precipitating or alleviating factors: None    Therapies tried and outcome: Amoxicillin, improved       Problem list and histories reviewed & adjusted, as indicated.  Additional history: as documented    Patient Active Problem List   Diagnosis     Hyperlipidemia LDL goal <130     Essential hypertension     Osteoarthritis     Permanent atrial fibrillation (H)     Chronic pain syndrome     History of total knee replacement     Radiculitis     Osteoarthritis of knee     Long-term (current) use of anticoagulants [Z79.01]     ACP (advance care planning)     Osteoporosis     Malignant neoplasm of right breast in female, estrogen receptor positive, unspecified site of breast (H)     Pulmonary hypertension-moderate     Aortic stenosis, moderate     Moderate tricuspid insufficiency     Anticoagulated on Coumadin     Past Surgical History:   Procedure Laterality Date     BREAST SURGERY      right  side mastectomy     C TOTAL KNEE ARTHROPLASTY Left 01/26/15     cataract extraction and lens implantation       COLONOSCOPY       EYE SURGERY       GYN SURGERY       tubal pregnancy     ORTHOPEDIC SURGERY  2009    l. hip replacement LT     ORTHOPEDIC SURGERY  july 19th 2013    Right total knee     TUBAL/ECTOPIC PREGNANCY         Social History   Substance Use Topics     Smoking status: Former Smoker     Packs/day: 0.30     Years: 30.00     Types: Cigarettes     Quit date: 8/11/1987     Smokeless tobacco: Never Used      Comment: year quit 1987     Alcohol use No     Family History   Problem Relation Age of Onset     Diabetes Father 75     cause of death     Other - See Comments Father      PVD     HEART DISEASE Sister      Coronary Artery Disease Sister      Other - See Comments Mother 83     old age; cause of death     Other - See Comments Sister      14 year twin [employer]; cause of death     Chronic Obstructive Pulmonary Disease Daughter      Osteoporosis Daughter      Thyroid Disease Daughter      Cancer Sister      Other Cancer Sister      Other Cancer Sister      uterine     Hypertension No family hx of      Hyperlipidemia No family hx of      Cerebrovascular Disease No family hx of      Breast Cancer No family hx of      Prostate Cancer No family hx of      Colon Cancer No family hx of      Asthma No family hx of      Anesthesia Reaction No family hx of      Genetic Disorder No family hx of          Current Outpatient Prescriptions   Medication Sig Dispense Refill     Acetaminophen (TYLENOL PO) Take 500 mg by mouth every 4 hours as needed        albuterol (PROAIR HFA/PROVENTIL HFA/VENTOLIN HFA) 108 (90 Base) MCG/ACT inhaler Inhale 2 puffs into the lungs every 6 hours as needed for shortness of breath / dyspnea or wheezing 1 Inhaler 1     amoxicillin (AMOXIL) 500 MG capsule Take 1 capsule (500 mg) by mouth 3 times daily 30 capsule 0     Calcium Citrate-Vitamin D (CALCITRATE/VITAMIN D PO) Take 1 tablet by mouth 2 times daily        diltiazem (DILACOR XR) 180 MG 24 hr capsule Take 1 capsule (180 mg) by mouth daily 30 capsule 11     losartan (COZAAR) 50 MG tablet  Take 1 tablet (50 mg) by mouth daily 30 tablet 8     Misc Natural Products (OSTEO BI-FLEX ADV DOUBLE ST PO) Take  by mouth daily.       Multiple Vitamin (MULTIVITAMINS PO) Take 1 tablet by mouth daily        order for DME 4 wheeled walker 1 each 0     sertraline (ZOLOFT) 25 MG tablet Take 1 tablet (25 mg) by mouth daily 30 tablet 3     simvastatin (ZOCOR) 40 MG tablet TAKE 1/2 (ONE-HALF) TABLET BY MOUTH IN THE EVENING 45 tablet 0     traMADol (ULTRAM) 50 MG tablet TAKE 1 TO 2 TABLETS BY MOUTH EVERY 4 HOURS AS NEEDED -MAX  OF  8  TABLETS  PER   tablet 0     warfarin (COUMADIN) 5 MG tablet 2.5mg Mon/Fri and 5mg  all other days or as directed by warfarin clinic 100 tablet 3     Allergies   Allergen Reactions     Atenolol Shortness Of Breath and Other (See Comments)     Dizziness  Severe vertigo and dizziness/SOB     Lisinopril Cough     Augmentin Diarrhea     Pollen Extract      Other reaction(s): Runny Nose     Recent Labs   Lab Test  11/14/18   1002  06/29/18   0952  04/25/18   1205  12/27/17   1215   06/21/17   1025   10/27/14   1450   LDL   --    --    --    --    --   77   --    --    HDL   --    --    --    --    --   76   --    --    TRIG   --    --    --    --    --   61   --    --    ALT  19   --   19  26   < >   --    < >  22   CR  0.77  0.75  0.86  0.76   < >   --    < >  0.92   GFRESTIMATED  71  74  63  73   < >   --    < >  59*   GFRESTBLACK  86  90  76  88   < >   --    < >  71   POTASSIUM  4.1   --   4.5  4.0   < >   --    < >  4.1   TSH   --    --    --    --    --    --    --   0.56    < > = values in this interval not displayed.      BP Readings from Last 3 Encounters:   11/26/18 112/62   11/16/18 98/60   11/14/18 (!) 163/104    Wt Readings from Last 3 Encounters:   11/26/18 110 lb (49.9 kg)   11/16/18 110 lb (49.9 kg)   06/29/18 115 lb (52.2 kg)                    Reviewed and updated as needed this visit by clinical staff  Tobacco  Allergies  Meds       Reviewed and updated as needed this  "visit by Provider         ROS:  Constitutional, neuro, ENT, endocrine, pulmonary, cardiac, gastrointestinal, genitourinary, musculoskeletal, integument and psychiatric systems are negative, except as otherwise noted.    OBJECTIVE:                                                    /62 (BP Location: Left arm, Patient Position: Sitting, Cuff Size: Adult Regular)  Pulse 80  Temp 98  F (36.7  C) (Tympanic)  Ht 5' 4\" (1.626 m)  Wt 110 lb (49.9 kg)  SpO2 97%  BMI 18.88 kg/m2  Body mass index is 18.88 kg/(m^2).  GENERAL APPEARANCE: healthy, alert and no distress  EYES: Eyes grossly normal to inspection, PERRL and conjunctivae and sclerae normal  HENT: ear canals and TM's normal and nose and mouth without ulcers or lesions  NECK: no adenopathy, no asymmetry, masses, or scars and thyroid normal to palpation  RESP: lungs clear to auscultation - no rales, rhonchi or wheezes  CV:irregular rates and rhythm, normal S1 S2, no S3 or S4 and 2/6  murmur, click or rub  LYMPHATICS: no cervical adenopathy  ABDOMEN: soft, nontender, without hepatosplenomegaly or masses and bowel sounds normal  MS: extremities normal- no gross deformities noted  SKIN: no suspicious lesions or rashes  NEURO: Normal strength and tone, mentation intact and speech normal  PSYCH: mentation appears normal and affect normal/bright    Diagnostic test results:  Diagnostic Test Results:  CXR - has improved and has stable changes from the past.      ASSESSMENT/PLAN:                                                    1. Acute bronchitis due to other specified organisms  Feeling much better. No fever coughing minimal. Not gaining weight yet. But vitals are normalized. Recheck CXR and her labs. crp was ok but WBC still high.   - XR CHEST 2 VW (Clinic Performed); Future  - CBC with platelets and differential    2. Permanent atrial fibrillation (H)  Has irregular rhythm. Is on coumadin. LAURI VAS score is 4. Murmur appreciated.     3. Essential " hypertension  Good numbers today. Will continue.     4. LISA (generalized anxiety disorder)  Zoloft has help her quite a bit.         See Patient Instructions    MELISA Gillespie  [facility]

## 2018-11-26 NOTE — PATIENT INSTRUCTIONS
Thank you for choosing [facility].   I have office hours 8:00 am to 4:30 pm on Monday's, Wednesday's, Thursday's and Friday's. My nurse and I are out of the office every Tuesday.    Following your visit, when your labs and diagnostic testing have returned, I will review then and you will be contacted by my nurse.  If you are on My Chart, you can also view results there.    For refills, notify your pharmacy regarding what you need and the pharmacy will generate a refill request. Do not call my nurse as she is unable to process refill request. Please plan ahead and allow 3-5 days for refill requests.    You will generally receive a reminder call the day prior to your appointment.  If you cannot attend your appointment, please cancel your appointment with as much notice as possible.  If there is a pattern of failure to present for your appointments, I cannot provide consistent, meaningful, ongoing care for you. It is very important to me that you come in for your care, so we can best assist you with your health care needs.    IMPORTANT:  Please note that it is my standard of practice to NOT participate in prescribing ongoing requested Narcotic Analgesic therapy, and/or participate in the prescribing of other controlled substances.  My nurse and I am happy to assist you with the process of referral for alternative pain management as needed, and other treatment modalities including but not limited to:  Physical Therapy, Physical Medicine and Rehab, Counseling, Chiropractic Care, Orthopedic Care, and non-narcotic medication management.     In the event that you need to be seen for emergent concerns and I am out of office,  please see one of my colleagues for acute concerns.  You may also present to  or ER.  I appreciate the opportunity to serve you and look forward to supporting your healthcare needs in the future. Please contact me with any questions or concerns that you may  have.    Sincerely,      Chandrika Kumar RN, PA-C

## 2018-11-26 NOTE — NURSING NOTE
"Chief Complaint   Patient presents with     Cough     follow up     Anxiety       Initial /62 (BP Location: Left arm, Patient Position: Sitting, Cuff Size: Adult Regular)  Pulse 80  Temp 98  F (36.7  C) (Tympanic)  Ht 5' 4\" (1.626 m)  Wt 110 lb (49.9 kg)  SpO2 97%  BMI 18.88 kg/m2 Estimated body mass index is 18.88 kg/(m^2) as calculated from the following:    Height as of this encounter: 5' 4\" (1.626 m).    Weight as of this encounter: 110 lb (49.9 kg).  Medication Reconciliation: complete    Shaylee Saini LPN  "

## 2018-11-26 NOTE — MR AVS SNAPSHOT
After Visit Summary   11/26/2018    Michael Christiansen    MRN: [id number]           Patient Information     Date Of Birth          1935        Visit Information        Provider Department      11/26/2018 1:20 PM Chandrika Kumar PA [facility]        Today's Diagnoses     Acute bronchitis due to other specified organisms    -  1    Permanent atrial fibrillation (H)        Essential hypertension        LISA (generalized anxiety disorder)          Care Instructions      Thank you for choosing [facility].   I have office hours 8:00 am to 4:30 pm on Monday's, Wednesday's, Thursday's and Friday's. My nurse and I are out of the office every Tuesday.    Following your visit, when your labs and diagnostic testing have returned, I will review then and you will be contacted by my nurse.  If you are on My Chart, you can also view results there.    For refills, notify your pharmacy regarding what you need and the pharmacy will generate a refill request. Do not call my nurse as she is unable to process refill request. Please plan ahead and allow 3-5 days for refill requests.    You will generally receive a reminder call the day prior to your appointment.  If you cannot attend your appointment, please cancel your appointment with as much notice as possible.  If there is a pattern of failure to present for your appointments, I cannot provide consistent, meaningful, ongoing care for you. It is very important to me that you come in for your care, so we can best assist you with your health care needs.    IMPORTANT:  Please note that it is my standard of practice to NOT participate in prescribing ongoing requested Narcotic Analgesic therapy, and/or participate in the prescribing of other controlled substances.  My nurse and I am happy to assist you with the process of referral for alternative pain management as needed, and other treatment modalities including but not limited to:   Physical Therapy, Physical Medicine and Rehab, Counseling, Chiropractic Care, Orthopedic Care, and non-narcotic medication management.     In the event that you need to be seen for emergent concerns and I am out of office,  please see one of my colleagues for acute concerns.  You may also present to UC or ER.  I appreciate the opportunity to serve you and look forward to supporting your healthcare needs in the future. Please contact me with any questions or concerns that you may have.    Sincerely,      Chandrika Kumar RN, PA-C               Follow-ups after your visit        Follow-up notes from your care team     Return in about 3 months (around 2/26/2019).      Your next 10 appointments already scheduled     Dec 17, 2018 10:00 AM CST   Anticoagulation Visit with HC ANTI COAGULATION   [facility] [place] ([facility] [place] [facility]    [address]  [place] [address]   [phone]            Dec 31, 2018 10:00 AM CST   LAB with HC LAB   [facility] [place] ([facility] [place] )    [address]  [place] [address]   [phone]            Dec 31, 2018 11:00 AM CST   Level O with HC INF RM [id number]   [facility] [place] ([facility] [place] )    [address]  [place] [address]   [phone]            May 07, 2019  9:30 AM CDT   LAB with HC LAB   [facility] [place] ([facility] [place] )    [address]  [place] [address]   [phone]            May 07, 2019 10:00 AM CDT   MA SCREENING LEFT W/ YUKO with HCMA1   [facility] [place] ([facility] [place] )    [address]  [place] [address]   [phone]           How do I prepare for my exam? (Food and drink instructions) No Food and Drink Restrictions.  How do I prepare for my exam? (Other instructions) Do not use any powder, lotion or deodorant under your arms or on your breast. If you do, we will ask you to remove it  "before your exam.  What should I wear: Wear comfortable, two-piece clothing.  How long does the exam take: Most scans will take 15 minutes.  What should I bring: Bring any previous mammograms from other facilities or have them mailed to the breast center.  Do I need a :  No  is needed.  What do I need to tell my doctor: If you have any allergies, tell your care team.  What should I do after the exam: No restrictions, You may resume normal activities.  What is this test: This test is an x-ray of the breast to look for breast disease. The breast is pressed between two plates to flatten and spread the tissue. An X-ray is taken of the breast from different angles.  Who should I call with questions: If you have any questions, please call the Imaging Department where you will have your exam. Directions, parking instructions, and other information is available on our website, [email or web address].  Other information about my exam Three-dimensional (3D) mammograms are available at [place] locations in [facility], [place], and [place]. [facility] locations include [place] and the [facility] in [place].  Benefits of 3D mammograms include: * Improved rate of cancer detection * Decreases your chance of having to go back for more tests, which means fewer: * \"False-positive\" results (This means that there is an abnormal area but it isn't cancer.) * Invasive testing procedures, such as a biopsy or surgery * Can provide clearer images of the breast if you have dense breast tissue.  *3D mammography is an optional exam that anyone can have with a 2D mammogram. It doesn't replace or take the place of a 2D mammogram. 2D mammograms remain an effective screening test for all women.  Not all insurance companies cover the cost of a 3D mammogram. Check with your insurance.            May 07, 2019 10:30 AM CDT   DX HIP/PELVIS/SPINE with HIDX1   HI DEXA ([facility] )    [address] [facility] [address]   [phone]           How do I prepare for my exam? (Food and drink instructions) No Food and Drink Restrictions.  How do I prepare for my exam? (Other instructions) Please do not take any of the following 24 hours prior to the day of your exam: vitamins, calcium tablets, antacids.  What should I wear: If possible, please wear clothes without metal (snaps, zippers). A sweat suit works well.  How long does the exam take: The exam takes about 20 minutes.  What should I bring: Bring a list of your current medicines to your exam (including vitamins, minerals and over-the-counter drugs).  Do I need a :  No  is needed.  What should I do after the exam: No restrictions, You may resume normal activities.  How do I prepare for my exam? (Food and drink instructions) A DEXA scan is a bone-density scan. It uses a low level of radiation to check the strength of your bones. As you lie on a padded table, a machine will take X-rays. We most often scan the hips and lower spine.  Who should I call with questions: If you have any questions, please call the Imaging Department where you will have your exam. Directions, parking instructions, and other information is available on our website, [email or web address].            May 14, 2019 10:10 AM CDT   (Arrive by 9:55 AM)   Return Visit with Noemy Mahajan NP   [facility]    [address] [facility] [address]   [phone]            Jun 12, 2019  9:30 AM CDT   (Arrive by 9:15 AM)   Return Visit with Cam Kirby,    [facility] ([facility] )    [address]  [place] [address]   [phone]              Future tests that were ordered for you today     Open Future Orders        Priority Expected Expires Ordered    XR CHEST 2 VW (Clinic Performed) Routine 11/26/2018 11/26/2019 11/26/2018        "     Who to contact     If you have questions or need follow up information about today's clinic visit or your schedule please contact [facility] directly at [phone].  Normal or non-critical lab and imaging results will be communicated to you by MyChart, letter or phone within 4 business days after the clinic has received the results. If you do not hear from us within 7 days, please contact the clinic through MyChart or phone. If you have a critical or abnormal lab result, we will notify you by phone as soon as possible.  Submit refill requests through [employer] or call your pharmacy and they will forward the refill request to us. Please allow 3 business days for your refill to be completed.          Additional Information About Your Visit        Your Vitals Were     Pulse Temperature Height Pulse Oximetry BMI (Body Mass Index)       80 98  F (36.7  C) (Tympanic) 5' 4\" (1.626 m) 97% 18.88 kg/m2        Blood Pressure from Last 3 Encounters:   11/26/18 112/62   11/16/18 98/60   11/14/18 (!) 163/104    Weight from Last 3 Encounters:   11/26/18 110 lb (49.9 kg)   11/16/18 110 lb (49.9 kg)   06/29/18 115 lb (52.2 kg)              We Performed the Following     CBC with platelets and differential        Primary Care Provider Office Phone # Fax #    MELISA Murray [phone]       [address] [place] [address]        Equal Access to Services     STACEY CONSTANTINO AH: Hadii aad ku hadasho Soomaali, waaxda luqadaha, qaybta kaalmada adeegyada, topher salvador . So [facility] [phone].    ATENCIÓN: Si habla español, tiene a shay disposición servicios gratuitos de asistencia lingüística. Gera al [phone].    We comply with applicable federal civil rights laws and [place] laws. We do not discriminate on the basis of race, color, national origin, age, disability, sex, sexual orientation, or gender identity.            Thank you!     Thank you for " choosing [facility]  for your care. Our goal is always to provide you with excellent care. Hearing back from our patients is one way we can continue to improve our services. Please take a few minutes to complete the written survey that you may receive in the mail after your visit with us. Thank you!             Your Updated Medication List - Protect others around you: Learn how to safely use, store and throw away your medicines at www.disposemymeds.org.          This list is accurate as of 11/26/18  1:58 PM.  Always use your most recent med list.                   Brand Name Dispense Instructions for use Diagnosis    albuterol 108 (90 Base) MCG/ACT inhaler    PROAIR HFA/PROVENTIL HFA/VENTOLIN HFA    1 Inhaler    Inhale 2 puffs into the lungs every 6 hours as needed for shortness of breath / dyspnea or wheezing    Acute bronchitis due to other specified organisms       amoxicillin 500 MG capsule    AMOXIL    30 capsule    Take 1 capsule (500 mg) by mouth 3 times daily    Acute bronchitis due to other specified organisms       CALCITRATE/VITAMIN D PO      Take 1 tablet by mouth 2 times daily        diltiazem 180 MG 24 hr capsule    DILACOR XR    30 capsule    Take 1 capsule (180 mg) by mouth daily    Permanent atrial fibrillation (H), Aortic stenosis, moderate, Moderate tricuspid insufficiency, Long-term (current) use of anticoagulants       losartan 50 MG tablet    COZAAR    30 tablet    Take 1 tablet (50 mg) by mouth daily    HTN (hypertension)       MULTIVITAMINS PO      Take 1 tablet by mouth daily        order for DME     1 each    4 wheeled walker    Chronic left-sided low back pain with left-sided sciatica       OSTEO BI-FLEX ADV DOUBLE ST PO      Take  by mouth daily.        sertraline 25 MG tablet    ZOLOFT    30 tablet    Take 1 tablet (25 mg) by mouth daily    LISA (generalized anxiety disorder)       simvastatin 40 MG tablet    ZOCOR    45 tablet    TAKE 1/2 (ONE-HALF) TABLET BY MOUTH  IN THE EVENING    Hyperlipidemia LDL goal <100       traMADol 50 MG tablet    ULTRAM    100 tablet    TAKE 1 TO 2 TABLETS BY MOUTH EVERY 4 HOURS AS NEEDED -MAX  OF  8  TABLETS  PER  DAY    Chronic pain syndrome       TYLENOL PO      Take 500 mg by mouth every 4 hours as needed        warfarin 5 MG tablet    COUMADIN    100 tablet    2.5mg Mon/Fri and 5mg  all other days or as directed by warfarin clinic    Atrial fibrillation, unspecified type (H), Long term current use of anticoagulant therapy

## 2018-11-27 ASSESSMENT — ANXIETY QUESTIONNAIRES: GAD7 TOTAL SCORE: 7

## 2018-12-13 DIAGNOSIS — G89.4 CHRONIC PAIN SYNDROME: ICD-10-CM

## 2018-12-14 NOTE — TELEPHONE ENCOUNTER
traMADol  Last Written Prescription Date: 11/16/18  Last Fill Quantity: 100 # of Refills: 0  Last Office Visit: 11/26/18

## 2018-12-17 RX ORDER — TRAMADOL HYDROCHLORIDE 50 MG/1
TABLET ORAL
Qty: 100 TABLET | Refills: 0 | Status: SHIPPED | OUTPATIENT
Start: 2018-12-17 | End: 2018-12-27

## 2018-12-20 ENCOUNTER — ANTICOAGULATION THERAPY VISIT (OUTPATIENT)
Dept: ANTICOAGULATION | Facility: OTHER | Age: 83
End: 2018-12-20
Attending: PHYSICIAN ASSISTANT
Payer: MEDICARE

## 2018-12-20 DIAGNOSIS — I48.21 PERMANENT ATRIAL FIBRILLATION (H): ICD-10-CM

## 2018-12-20 LAB — INR POINT OF CARE: 2.2 (ref 0.86–1.14)

## 2018-12-20 PROCEDURE — 85610 PROTHROMBIN TIME: CPT | Mod: QW,ZL

## 2018-12-20 NOTE — PROGRESS NOTES
ANTICOAGULATION FOLLOW-UP CLINIC VISIT    Patient Name:  Michael Christiansen  Date:  2018  Contact Type:  Face to Face    SUBJECTIVE:     Patient Findings     Positives:   No Problem Findings           OBJECTIVE    INR Protime   Date Value Ref Range Status   2018 2.2 (A) 0.86 - 1.14 Final       ASSESSMENT / PLAN  INR assessment THER    Recheck INR In: 6 WEEKS    INR Location Clinic      Anticoagulation Summary  As of 2018    INR goal:   2.0-3.0   TTR:   54.9 % (2.4 y)   INR used for dosin.2 (2018)   Warfarin maintenance plan:   2.5 mg (5 mg x 0.5) every Mon, Fri; 5 mg (5 mg x 1) all other days   Full warfarin instructions:   2.5 mg every Mon, Fri; 5 mg all other days   Weekly warfarin total:   30 mg   No change documented:   Kasia Hercules RN   Plan last modified:   Kasia Hercules RN (10/1/2018)   Next INR check:   2019   Priority:   INR   Target end date:   Indefinite    Indications    Long-term (current) use of anticoagulants [Z79.01] [Z79.01]  Permanent atrial fibrillation (H) [I48.2]             Anticoagulation Episode Summary     INR check location:       Preferred lab:       Send INR reminders to:   [facility] ANTICOAG POOL    Comments:         Anticoagulation Care Providers     Provider Role Specialty Phone number    Chandrika Kumar PA [facility] [phone]            See the Encounter Report to view Anticoagulation Flowsheet and Dosing Calendar (Go to Encounters tab in chart review, and find the Anticoagulation Therapy Visit)        Kasia Hercules RN

## 2018-12-23 DIAGNOSIS — I10 HTN (HYPERTENSION): ICD-10-CM

## 2018-12-26 RX ORDER — LOSARTAN POTASSIUM 50 MG/1
TABLET ORAL
Qty: 90 TABLET | Refills: 2 | Status: SHIPPED | OUTPATIENT
Start: 2018-12-26 | End: 2019-09-09

## 2018-12-26 NOTE — TELEPHONE ENCOUNTER
losartan (COZAAR) 50 MG tablet  Last Written Prescription Date:  3/2/18  Last Fill Quantity: 30,   # refills: 8  Last Office Visit: 11//26/18  Future Office visit:

## 2018-12-27 DIAGNOSIS — G89.4 CHRONIC PAIN SYNDROME: ICD-10-CM

## 2018-12-27 RX ORDER — TRAMADOL HYDROCHLORIDE 50 MG/1
TABLET ORAL
Qty: 100 TABLET | Refills: 0 | Status: SHIPPED | OUTPATIENT
Start: 2018-12-27 | End: 2019-02-03

## 2018-12-31 ENCOUNTER — INFUSION THERAPY VISIT (OUTPATIENT)
Dept: INFUSION THERAPY | Facility: OTHER | Age: 83
End: 2018-12-31
Attending: PHYSICIAN ASSISTANT
Payer: MEDICARE

## 2018-12-31 VITALS
OXYGEN SATURATION: 95 % | HEART RATE: 80 BPM | TEMPERATURE: 97.5 F | DIASTOLIC BLOOD PRESSURE: 74 MMHG | SYSTOLIC BLOOD PRESSURE: 122 MMHG | RESPIRATION RATE: 16 BRPM | WEIGHT: 104 LBS | BODY MASS INDEX: 17.75 KG/M2 | HEIGHT: 64 IN

## 2018-12-31 DIAGNOSIS — E78.5 HYPERLIPIDEMIA LDL GOAL <100: ICD-10-CM

## 2018-12-31 DIAGNOSIS — M81.0 OSTEOPOROSIS, UNSPECIFIED OSTEOPOROSIS TYPE, UNSPECIFIED PATHOLOGICAL FRACTURE PRESENCE: Primary | ICD-10-CM

## 2018-12-31 LAB
ALBUMIN SERPL-MCNC: 3.5 G/DL (ref 3.4–5)
CALCIUM SERPL-MCNC: 8.8 MG/DL (ref 8.5–10.1)
CHOLEST SERPL-MCNC: 142 MG/DL
CREAT SERPL-MCNC: 0.87 MG/DL (ref 0.52–1.04)
GFR SERPL CREATININE-BSD FRML MDRD: 61 ML/MIN/{1.73_M2}
HDLC SERPL-MCNC: 69 MG/DL
LDLC SERPL CALC-MCNC: 60 MG/DL
NONHDLC SERPL-MCNC: 73 MG/DL
PHOSPHATE SERPL-MCNC: 2.9 MG/DL (ref 2.5–4.5)
TRIGL SERPL-MCNC: 63 MG/DL

## 2018-12-31 PROCEDURE — 82565 ASSAY OF CREATININE: CPT | Mod: ZL | Performed by: NURSE PRACTITIONER

## 2018-12-31 PROCEDURE — 80061 LIPID PANEL: CPT | Mod: ZL | Performed by: PHYSICIAN ASSISTANT

## 2018-12-31 PROCEDURE — 82040 ASSAY OF SERUM ALBUMIN: CPT | Mod: ZL | Performed by: NURSE PRACTITIONER

## 2018-12-31 PROCEDURE — 82310 ASSAY OF CALCIUM: CPT | Mod: ZL | Performed by: NURSE PRACTITIONER

## 2018-12-31 PROCEDURE — 25000128 H RX IP 250 OP 636: Performed by: NURSE PRACTITIONER

## 2018-12-31 PROCEDURE — 36415 COLL VENOUS BLD VENIPUNCTURE: CPT | Mod: ZL | Performed by: PHYSICIAN ASSISTANT

## 2018-12-31 PROCEDURE — 96372 THER/PROPH/DIAG INJ SC/IM: CPT

## 2018-12-31 PROCEDURE — 84100 ASSAY OF PHOSPHORUS: CPT | Mod: ZL | Performed by: NURSE PRACTITIONER

## 2018-12-31 RX ADMIN — DENOSUMAB 60 MG: 60 INJECTION SUBCUTANEOUS at 11:27

## 2018-12-31 ASSESSMENT — MIFFLIN-ST. JEOR: SCORE: 911.99

## 2018-12-31 NOTE — PROGRESS NOTES
Patient is a 83 year old female here today accompanied by self for injection of Prolia per order of Sandi Mahajan.  Labs within parameters to administer injection; today's lab values are Calcium: 8.8, Creatinine: 0.87, Phosphous: 2.9.  Patient denies questions nor concerns regarding medication, administration site, side effects, nor aftercare.  Patient identified with two identifiers, order verified, and verbal consent for today's injection obtained from patient.

## 2018-12-31 NOTE — PROGRESS NOTES
Patient education provided on s/s of injection site infection, and/or medication specific side effects, and when to call a provider.  Patient instructed to report any adverse effects.   Medication administered per protocol in left upper posterior arm at 45 degree angle.  Site covered with sterile bandage.  Patient tolerated injection well, no verbal nor non-verbal signs of discomfort noted.  No adverse effects noted at this time.   Patient instructed to call with further questions or concerns.  Patient states understanding and is in agreement with this plan.  Declined copy of appointments, and after visit summary (AVS). Given medication guide and patient brochure, however patient declined to take them. Patient discharged ambulatory.

## 2018-12-31 NOTE — PATIENT INSTRUCTIONS
Patient Education     Denosumab Solution for injection  What is this medicine?  DENOSUMAB (den oh rafia mab) slows bone breakdown. Prolia is used to treat osteoporosis in women after menopause and in men. Xgeva is used to prevent bone fractures and other bone problems caused by cancer bone metastases. Xgeva is also used to treat giant cell tumor of the bone.  This medicine may be used for other purposes; ask your health care provider or pharmacist if you have questions.  What should I tell my health care provider before I take this medicine?  They need to know if you have any of these conditions:    dental disease    eczema    infection or history of infections    kidney disease or on dialysis    low blood calcium or vitamin D    malabsorption syndrome    scheduled to have surgery or tooth extraction    taking medicine that contains denosumab    thyroid or parathyroid disease    an unusual reaction to denosumab, other medicines, foods, dyes, or preservatives    pregnant or trying to get pregnant    breast-feeding  How should I use this medicine?  This medicine is for injection under the skin. It is given by a health care professional in a hospital or clinic setting.  If you are getting Prolia, a special MedGuide will be given to you by the pharmacist with each prescription and refill. Be sure to read this information carefully each time.  For Prolia, talk to your pediatrician regarding the use of this medicine in children. Special care may be needed. For Xgeva, talk to your pediatrician regarding the use of this medicine in children. While this drug may be prescribed for children as young as 13 years for selected conditions, precautions do apply.  Overdosage: If you think you've taken too much of this medicine contact a poison control center or emergency room at once.  NOTE: This medicine is only for you. Do not share this medicine with others.  What if I miss a dose?  It is important not to miss your dose. Call your  doctor or health care professional if you are unable to keep an appointment.  What may interact with this medicine?  Do not take this medicine with any of the following medications:    other medicines containing denosumab  This medicine may also interact with the following medications:    medicines that suppress the immune system    medicines that treat cancer    steroid medicines like prednisone or cortisone  This list may not describe all possible interactions. Give your health care provider a list of all the medicines, herbs, non-prescription drugs, or dietary supplements you use. Also tell them if you smoke, drink alcohol, or use illegal drugs. Some items may interact with your medicine.  What should I watch for while using this medicine?  Visit your doctor or health care professional for regular checks on your progress. Your doctor or health care professional may order blood tests and other tests to see how you are doing.  Call your doctor or health care professional if you get a cold or other infection while receiving this medicine. Do not treat yourself. This medicine may decrease your body's ability to fight infection.  You should make sure you get enough calcium and vitamin D while you are taking this medicine, unless your doctor tells you not to. Discuss the foods you eat and the vitamins you take with your health care professional.  See your dentist regularly. Brush and floss your teeth as directed. Before you have any dental work done, tell your dentist you are receiving this medicine.  Do not become pregnant while taking this medicine or for 5 months after stopping it. Women should inform their doctor if they wish to become pregnant or think they might be pregnant. There is a potential for serious side effects to an unborn child. Talk to your health care professional or pharmacist for more information.  What side effects may I notice from receiving this medicine?  Side effects that you should report to  your doctor or health care professional as soon as possible:    allergic reactions like skin rash, itching or hives, swelling of the face, lips, or tongue    breathing problems    chest pain    fast, irregular heartbeat    feeling faint or lightheaded, falls    fever, chills, or any other sign of infection    muscle spasms, tightening, or twitches    numbness or tingling    skin blisters or bumps, or is dry, peels, or red    slow healing or unexplained pain in the mouth or jaw    unusual bleeding or bruising  Side effects that usually do not require medical attention (Report these to your doctor or health care professional if they continue or are bothersome.):    muscle pain    stomach upset, gas  This list may not describe all possible side effects. Call your doctor for medical advice about side effects. You may report side effects to FDA at 7-170-FDA-0566.  Where should I keep my medicine?  This medicine is only given in a clinic, doctor's office, or other health care setting and will not be stored at home.  NOTE: This sheet is a summary. It may not cover all possible information. If you have questions about this medicine, talk to your doctor, pharmacist, or health care provider.  NOTE:This sheet is a summary. It may not cover all possible information. If you have questions about this medicine, talk to your doctor, pharmacist, or health care provider. Copyright  2016 Gold Standard

## 2019-01-01 ENCOUNTER — ANESTHESIA (OUTPATIENT)
Dept: SURGERY | Facility: HOSPITAL | Age: 84
End: 2019-01-01
Payer: MEDICARE

## 2019-01-01 ENCOUNTER — APPOINTMENT (OUTPATIENT)
Dept: CT IMAGING | Facility: HOSPITAL | Age: 84
End: 2019-01-01
Attending: FAMILY MEDICINE
Payer: MEDICARE

## 2019-01-01 ENCOUNTER — OFFICE VISIT (OUTPATIENT)
Dept: FAMILY MEDICINE | Facility: OTHER | Age: 84
End: 2019-01-01
Attending: PHYSICIAN ASSISTANT
Payer: MEDICARE

## 2019-01-01 ENCOUNTER — ANTICOAGULATION THERAPY VISIT (OUTPATIENT)
Dept: ANTICOAGULATION | Facility: OTHER | Age: 84
End: 2019-01-01
Attending: PHYSICIAN ASSISTANT
Payer: MEDICARE

## 2019-01-01 ENCOUNTER — HOSPITAL ENCOUNTER (OUTPATIENT)
Dept: CARDIOLOGY | Facility: HOSPITAL | Age: 84
Discharge: HOME | End: 2019-12-19
Attending: INTERNAL MEDICINE | Admitting: INTERNAL MEDICINE
Payer: MEDICARE

## 2019-01-01 ENCOUNTER — TELEPHONE (OUTPATIENT)
Dept: FAMILY MEDICINE | Facility: OTHER | Age: 84
End: 2019-01-01

## 2019-01-01 ENCOUNTER — HOSPITAL ENCOUNTER (EMERGENCY)
Facility: HOSPITAL | Age: 84
Discharge: HOME | End: 2019-10-04
Attending: FAMILY MEDICINE | Admitting: FAMILY MEDICINE
Payer: MEDICARE

## 2019-01-01 ENCOUNTER — TELEPHONE (OUTPATIENT)
Dept: SURGERY | Facility: OTHER | Age: 84
End: 2019-01-01

## 2019-01-01 ENCOUNTER — HOSPITAL ENCOUNTER (OUTPATIENT)
Dept: RESPIRATORY THERAPY | Facility: HOSPITAL | Age: 84
Discharge: HOME | End: 2019-11-06
Attending: PHYSICIAN ASSISTANT | Admitting: PHYSICIAN ASSISTANT
Payer: MEDICARE

## 2019-01-01 ENCOUNTER — ANTICOAGULATION THERAPY VISIT (OUTPATIENT)
Dept: ANTICOAGULATION | Facility: OTHER | Age: 84
End: 2019-01-01

## 2019-01-01 ENCOUNTER — OFFICE VISIT (OUTPATIENT)
Dept: SURGERY | Facility: OTHER | Age: 84
End: 2019-01-01
Attending: PHYSICIAN ASSISTANT
Payer: MEDICARE

## 2019-01-01 ENCOUNTER — HOSPITAL ENCOUNTER (OUTPATIENT)
Dept: CT IMAGING | Facility: HOSPITAL | Age: 84
Discharge: HOME | End: 2019-10-16
Attending: PHYSICIAN ASSISTANT | Admitting: PHYSICIAN ASSISTANT
Payer: MEDICARE

## 2019-01-01 ENCOUNTER — APPOINTMENT (OUTPATIENT)
Dept: LAB | Facility: HOSPITAL | Age: 84
End: 2019-01-01
Attending: SURGERY
Payer: MEDICARE

## 2019-01-01 ENCOUNTER — ANESTHESIA EVENT (OUTPATIENT)
Dept: SURGERY | Facility: HOSPITAL | Age: 84
End: 2019-01-01
Payer: MEDICARE

## 2019-01-01 ENCOUNTER — APPOINTMENT (OUTPATIENT)
Dept: GENERAL RADIOLOGY | Facility: HOSPITAL | Age: 84
End: 2019-01-01
Attending: FAMILY MEDICINE
Payer: MEDICARE

## 2019-01-01 ENCOUNTER — HOSPITAL ENCOUNTER (OUTPATIENT)
Facility: HOSPITAL | Age: 84
Discharge: HOME | End: 2019-11-01
Attending: SURGERY | Admitting: SURGERY
Payer: MEDICARE

## 2019-01-01 VITALS
SYSTOLIC BLOOD PRESSURE: 133 MMHG | RESPIRATION RATE: 18 BRPM | HEART RATE: 115 BPM | OXYGEN SATURATION: 99 % | TEMPERATURE: 97.9 F | DIASTOLIC BLOOD PRESSURE: 83 MMHG

## 2019-01-01 VITALS
DIASTOLIC BLOOD PRESSURE: 72 MMHG | OXYGEN SATURATION: 95 % | HEIGHT: 63 IN | SYSTOLIC BLOOD PRESSURE: 112 MMHG | HEART RATE: 77 BPM | WEIGHT: 95 LBS | BODY MASS INDEX: 16.83 KG/M2

## 2019-01-01 VITALS
HEIGHT: 63 IN | BODY MASS INDEX: 17.01 KG/M2 | TEMPERATURE: 98 F | SYSTOLIC BLOOD PRESSURE: 110 MMHG | OXYGEN SATURATION: 95 % | WEIGHT: 96 LBS | HEART RATE: 78 BPM | DIASTOLIC BLOOD PRESSURE: 64 MMHG

## 2019-01-01 VITALS
HEART RATE: 96 BPM | SYSTOLIC BLOOD PRESSURE: 100 MMHG | TEMPERATURE: 96 F | BODY MASS INDEX: 16.9 KG/M2 | OXYGEN SATURATION: 98 % | HEIGHT: 63 IN | DIASTOLIC BLOOD PRESSURE: 48 MMHG | WEIGHT: 95.4 LBS

## 2019-01-01 VITALS
SYSTOLIC BLOOD PRESSURE: 98 MMHG | BODY MASS INDEX: 17.08 KG/M2 | HEART RATE: 84 BPM | TEMPERATURE: 97.2 F | OXYGEN SATURATION: 97 % | HEIGHT: 63 IN | WEIGHT: 96.4 LBS | DIASTOLIC BLOOD PRESSURE: 58 MMHG

## 2019-01-01 VITALS
DIASTOLIC BLOOD PRESSURE: 77 MMHG | OXYGEN SATURATION: 96 % | RESPIRATION RATE: 18 BRPM | SYSTOLIC BLOOD PRESSURE: 119 MMHG

## 2019-01-01 DIAGNOSIS — R10.12 LEFT UPPER QUADRANT PAIN: ICD-10-CM

## 2019-01-01 DIAGNOSIS — I48.21 PERMANENT ATRIAL FIBRILLATION (H): ICD-10-CM

## 2019-01-01 DIAGNOSIS — E87.1 LOW SODIUM LEVELS: ICD-10-CM

## 2019-01-01 DIAGNOSIS — I35.0 AORTIC STENOSIS, MODERATE: ICD-10-CM

## 2019-01-01 DIAGNOSIS — Z79.01 LONG TERM CURRENT USE OF ANTICOAGULANT THERAPY: ICD-10-CM

## 2019-01-01 DIAGNOSIS — G89.4 CHRONIC PAIN SYNDROME: ICD-10-CM

## 2019-01-01 DIAGNOSIS — I10 ESSENTIAL HYPERTENSION: Primary | ICD-10-CM

## 2019-01-01 DIAGNOSIS — R10.84 ABDOMINAL PAIN, GENERALIZED: Primary | ICD-10-CM

## 2019-01-01 DIAGNOSIS — Z86.79 HX OF RHEUMATIC FEVER: ICD-10-CM

## 2019-01-01 DIAGNOSIS — F41.1 GAD (GENERALIZED ANXIETY DISORDER): ICD-10-CM

## 2019-01-01 DIAGNOSIS — Z01.818 PREOP GENERAL PHYSICAL EXAM: Primary | ICD-10-CM

## 2019-01-01 DIAGNOSIS — I27.20 PULMONARY HYPERTENSION (H): ICD-10-CM

## 2019-01-01 DIAGNOSIS — I38 VALVULAR HEART DISEASE: ICD-10-CM

## 2019-01-01 DIAGNOSIS — J84.9 ILD (INTERSTITIAL LUNG DISEASE) (H): Primary | ICD-10-CM

## 2019-01-01 DIAGNOSIS — J84.9 ILD (INTERSTITIAL LUNG DISEASE) (H): ICD-10-CM

## 2019-01-01 DIAGNOSIS — I07.1 MODERATE TRICUSPID INSUFFICIENCY: ICD-10-CM

## 2019-01-01 DIAGNOSIS — R10.12 LEFT UPPER QUADRANT PAIN: Primary | ICD-10-CM

## 2019-01-01 DIAGNOSIS — R10.84 ABDOMINAL PAIN, GENERALIZED: ICD-10-CM

## 2019-01-01 LAB
ALBUMIN SERPL-MCNC: 4.3 G/DL (ref 3.4–5)
ALBUMIN UR-MCNC: 30 MG/DL
ALP SERPL-CCNC: 73 U/L (ref 40–150)
ALT SERPL W P-5'-P-CCNC: 24 U/L (ref 0–50)
ANION GAP SERPL CALCULATED.3IONS-SCNC: 3 MMOL/L (ref 3–14)
ANION GAP SERPL CALCULATED.3IONS-SCNC: 6 MMOL/L (ref 3–14)
APPEARANCE UR: CLEAR
AST SERPL W P-5'-P-CCNC: 28 U/L (ref 0–45)
BACTERIA #/AREA URNS HPF: ABNORMAL /HPF
BASOPHILS # BLD AUTO: 0 10E9/L (ref 0–0.2)
BASOPHILS NFR BLD AUTO: 0.4 %
BILIRUB SERPL-MCNC: 0.6 MG/DL (ref 0.2–1.3)
BILIRUB UR QL STRIP: NEGATIVE
BUN SERPL-MCNC: 19 MG/DL (ref 7–30)
BUN SERPL-MCNC: 33 MG/DL (ref 7–30)
CALCIUM SERPL-MCNC: 9.1 MG/DL (ref 8.5–10.1)
CALCIUM SERPL-MCNC: 9.3 MG/DL (ref 8.5–10.1)
CHLORIDE SERPL-SCNC: 95 MMOL/L (ref 94–109)
CHLORIDE SERPL-SCNC: 99 MMOL/L (ref 94–109)
CO2 SERPL-SCNC: 27 MMOL/L (ref 20–32)
CO2 SERPL-SCNC: 29 MMOL/L (ref 20–32)
COHGB MFR BLD: 1.2 % (ref 0–2)
COLOR UR AUTO: ABNORMAL
COPATH REPORT: NORMAL
CREAT SERPL-MCNC: 0.63 MG/DL (ref 0.52–1.04)
CREAT SERPL-MCNC: 0.86 MG/DL (ref 0.52–1.04)
CRP SERPL-MCNC: <2.9 MG/L (ref 0–8)
DIFFERENTIAL METHOD BLD: ABNORMAL
EOSINOPHIL # BLD AUTO: 0 10E9/L (ref 0–0.7)
EOSINOPHIL NFR BLD AUTO: 0.2 %
ERYTHROCYTE [DISTWIDTH] IN BLOOD BY AUTOMATED COUNT: 12.8 % (ref 10–15)
ERYTHROCYTE [DISTWIDTH] IN BLOOD BY AUTOMATED COUNT: 13.4 % (ref 10–15)
ERYTHROCYTE [SEDIMENTATION RATE] IN BLOOD BY WESTERGREN METHOD: 12 MM/H (ref 0–30)
GFR SERPL CREATININE-BSD FRML MDRD: 62 ML/MIN/{1.73_M2}
GFR SERPL CREATININE-BSD FRML MDRD: 82 ML/MIN/{1.73_M2}
GLUCOSE SERPL-MCNC: 119 MG/DL (ref 70–99)
GLUCOSE SERPL-MCNC: 99 MG/DL (ref 70–99)
GLUCOSE UR STRIP-MCNC: NEGATIVE MG/DL
HCT VFR BLD AUTO: 38.6 % (ref 35–47)
HCT VFR BLD AUTO: 42.3 % (ref 35–47)
HGB BLD-MCNC: 13.3 G/DL (ref 11.7–15.7)
HGB BLD-MCNC: 13.5 G/DL (ref 11.7–15.7)
HGB BLD-MCNC: 14.8 G/DL (ref 11.7–15.7)
HGB UR QL STRIP: ABNORMAL
IMM GRANULOCYTES # BLD: 0.1 10E9/L (ref 0–0.4)
IMM GRANULOCYTES NFR BLD: 0.4 %
INR POINT OF CARE: 1.4 (ref 0.86–1.14)
INR POINT OF CARE: 2.1 (ref 0.86–1.14)
INR POINT OF CARE: 2.4 (ref 0.86–1.14)
INR POINT OF CARE: 3.6 (ref 0.86–1.14)
INR PPP: 1.14 (ref 0.86–1.14)
INR PPP: 4.12 (ref 0.8–1.2)
KETONES UR STRIP-MCNC: NEGATIVE MG/DL
LEUKOCYTE ESTERASE UR QL STRIP: NEGATIVE
LIPASE SERPL-CCNC: 110 U/L (ref 73–393)
LYMPHOCYTES # BLD AUTO: 1.7 10E9/L (ref 0.8–5.3)
LYMPHOCYTES NFR BLD AUTO: 15.1 %
MAGNESIUM SERPL-MCNC: 2.1 MG/DL (ref 1.6–2.3)
MCH RBC QN AUTO: 31.4 PG (ref 26.5–33)
MCH RBC QN AUTO: 32 PG (ref 26.5–33)
MCHC RBC AUTO-ENTMCNC: 34.5 G/DL (ref 31.5–36.5)
MCHC RBC AUTO-ENTMCNC: 35 G/DL (ref 31.5–36.5)
MCV RBC AUTO: 90 FL (ref 78–100)
MCV RBC AUTO: 93 FL (ref 78–100)
MONOCYTES # BLD AUTO: 0.9 10E9/L (ref 0–1.3)
MONOCYTES NFR BLD AUTO: 7.8 %
MUCOUS THREADS #/AREA URNS LPF: PRESENT /LPF
NEUTROPHILS # BLD AUTO: 8.5 10E9/L (ref 1.6–8.3)
NEUTROPHILS NFR BLD AUTO: 76.1 %
NITRATE UR QL: NEGATIVE
NRBC # BLD AUTO: 0 10*3/UL
NRBC BLD AUTO-RTO: 0 /100
PH UR STRIP: 7 PH (ref 4.7–8)
PLATELET # BLD AUTO: 380 10E9/L (ref 150–450)
PLATELET # BLD AUTO: 387 10E9/L (ref 150–450)
POTASSIUM SERPL-SCNC: 4.1 MMOL/L (ref 3.4–5.3)
POTASSIUM SERPL-SCNC: 4.4 MMOL/L (ref 3.4–5.3)
PROT SERPL-MCNC: 8.9 G/DL (ref 6.8–8.8)
RBC # BLD AUTO: 4.16 10E12/L (ref 3.8–5.2)
RBC # BLD AUTO: 4.71 10E12/L (ref 3.8–5.2)
RBC #/AREA URNS AUTO: 14 /HPF (ref 0–2)
SODIUM SERPL-SCNC: 128 MMOL/L (ref 133–144)
SODIUM SERPL-SCNC: 131 MMOL/L (ref 133–144)
SOURCE: ABNORMAL
SP GR UR STRIP: 1.02 (ref 1–1.03)
TROPONIN I SERPL-MCNC: <0.015 UG/L (ref 0–0.04)
UROBILINOGEN UR STRIP-MCNC: NORMAL MG/DL (ref 0–2)
WBC # BLD AUTO: 11.1 10E9/L (ref 4–11)
WBC # BLD AUTO: 15.3 10E9/L (ref 4–11)
WBC #/AREA URNS AUTO: 3 /HPF (ref 0–5)

## 2019-01-01 PROCEDURE — G0463 HOSPITAL OUTPT CLINIC VISIT: HCPCS

## 2019-01-01 PROCEDURE — 96375 TX/PRO/DX INJ NEW DRUG ADDON: CPT

## 2019-01-01 PROCEDURE — 40000305 ZZH STATISTIC PRE PROC ASSESS I: Performed by: SURGERY

## 2019-01-01 PROCEDURE — 71000027 ZZH RECOVERY PHASE 2 EACH 15 MINS: Performed by: SURGERY

## 2019-01-01 PROCEDURE — 88305 TISSUE EXAM BY PATHOLOGIST: CPT | Mod: TC | Performed by: SURGERY

## 2019-01-01 PROCEDURE — 86140 C-REACTIVE PROTEIN: CPT | Mod: ZL | Performed by: PHYSICIAN ASSISTANT

## 2019-01-01 PROCEDURE — 85610 PROTHROMBIN TIME: CPT | Performed by: FAMILY MEDICINE

## 2019-01-01 PROCEDURE — 74176 CT ABD & PELVIS W/O CONTRAST: CPT | Mod: TC,76

## 2019-01-01 PROCEDURE — 93005 ELECTROCARDIOGRAM TRACING: CPT

## 2019-01-01 PROCEDURE — 27210794 ZZH OR GENERAL SUPPLY STERILE: Performed by: SURGERY

## 2019-01-01 PROCEDURE — 74176 CT ABD & PELVIS W/O CONTRAST: CPT | Mod: TC

## 2019-01-01 PROCEDURE — 85025 COMPLETE CBC W/AUTO DIFF WBC: CPT | Performed by: FAMILY MEDICINE

## 2019-01-01 PROCEDURE — 71046 X-RAY EXAM CHEST 2 VIEWS: CPT | Mod: TC

## 2019-01-01 PROCEDURE — 99214 OFFICE O/P EST MOD 30 MIN: CPT | Performed by: PHYSICIAN ASSISTANT

## 2019-01-01 PROCEDURE — 82375 ASSAY CARBOXYHB QUANT: CPT | Performed by: PHYSICIAN ASSISTANT

## 2019-01-01 PROCEDURE — 43239 EGD BIOPSY SINGLE/MULTIPLE: CPT | Performed by: NURSE ANESTHETIST, CERTIFIED REGISTERED

## 2019-01-01 PROCEDURE — 25000125 ZZHC RX 250: Performed by: NURSE ANESTHETIST, CERTIFIED REGISTERED

## 2019-01-01 PROCEDURE — 25800030 ZZH RX IP 258 OP 636: Performed by: FAMILY MEDICINE

## 2019-01-01 PROCEDURE — 85610 PROTHROMBIN TIME: CPT | Mod: QW,ZL

## 2019-01-01 PROCEDURE — 83735 ASSAY OF MAGNESIUM: CPT | Performed by: FAMILY MEDICINE

## 2019-01-01 PROCEDURE — 94729 DIFFUSING CAPACITY: CPT

## 2019-01-01 PROCEDURE — 94010 BREATHING CAPACITY TEST: CPT

## 2019-01-01 PROCEDURE — 85652 RBC SED RATE AUTOMATED: CPT | Mod: ZL | Performed by: PHYSICIAN ASSISTANT

## 2019-01-01 PROCEDURE — 80053 COMPREHEN METABOLIC PANEL: CPT | Performed by: FAMILY MEDICINE

## 2019-01-01 PROCEDURE — 25000128 H RX IP 250 OP 636: Performed by: FAMILY MEDICINE

## 2019-01-01 PROCEDURE — 85027 COMPLETE CBC AUTOMATED: CPT | Mod: ZL | Performed by: PHYSICIAN ASSISTANT

## 2019-01-01 PROCEDURE — 99213 OFFICE O/P EST LOW 20 MIN: CPT | Performed by: SURGERY

## 2019-01-01 PROCEDURE — 71260 CT THORAX DX C+: CPT | Mod: TC

## 2019-01-01 PROCEDURE — 25500064 ZZH RX 255 OP 636: Performed by: RADIOLOGY

## 2019-01-01 PROCEDURE — 36000050 ZZH SURGERY LEVEL 2 1ST 30 MIN: Performed by: SURGERY

## 2019-01-01 PROCEDURE — 36415 COLL VENOUS BLD VENIPUNCTURE: CPT | Performed by: FAMILY MEDICINE

## 2019-01-01 PROCEDURE — 93306 TTE W/DOPPLER COMPLETE: CPT | Mod: 26 | Performed by: INTERNAL MEDICINE

## 2019-01-01 PROCEDURE — 99100 ANES PT EXTEME AGE<1 YR&>70: CPT | Performed by: NURSE ANESTHETIST, CERTIFIED REGISTERED

## 2019-01-01 PROCEDURE — 96374 THER/PROPH/DIAG INJ IV PUSH: CPT

## 2019-01-01 PROCEDURE — 25000128 H RX IP 250 OP 636: Performed by: NURSE ANESTHETIST, CERTIFIED REGISTERED

## 2019-01-01 PROCEDURE — 83690 ASSAY OF LIPASE: CPT | Performed by: FAMILY MEDICINE

## 2019-01-01 PROCEDURE — 81001 URINALYSIS AUTO W/SCOPE: CPT | Performed by: FAMILY MEDICINE

## 2019-01-01 PROCEDURE — 80048 BASIC METABOLIC PNL TOTAL CA: CPT | Mod: ZL | Performed by: PHYSICIAN ASSISTANT

## 2019-01-01 PROCEDURE — 94726 PLETHYSMOGRAPHY LUNG VOLUMES: CPT

## 2019-01-01 PROCEDURE — 93010 ELECTROCARDIOGRAM REPORT: CPT | Performed by: INTERNAL MEDICINE

## 2019-01-01 PROCEDURE — 93306 TTE W/DOPPLER COMPLETE: CPT | Mod: TC

## 2019-01-01 PROCEDURE — 84484 ASSAY OF TROPONIN QUANT: CPT | Performed by: FAMILY MEDICINE

## 2019-01-01 PROCEDURE — 36415 COLL VENOUS BLD VENIPUNCTURE: CPT | Mod: ZL | Performed by: PHYSICIAN ASSISTANT

## 2019-01-01 PROCEDURE — 25800030 ZZH RX IP 258 OP 636: Performed by: NURSE ANESTHETIST, CERTIFIED REGISTERED

## 2019-01-01 PROCEDURE — 94010 BREATHING CAPACITY TEST: CPT | Mod: 26 | Performed by: INTERNAL MEDICINE

## 2019-01-01 PROCEDURE — 85610 PROTHROMBIN TIME: CPT | Performed by: NURSE ANESTHETIST, CERTIFIED REGISTERED

## 2019-01-01 PROCEDURE — 94729 DIFFUSING CAPACITY: CPT | Mod: 26 | Performed by: INTERNAL MEDICINE

## 2019-01-01 PROCEDURE — 99285 EMERGENCY DEPT VISIT HI MDM: CPT | Mod: Z6 | Performed by: FAMILY MEDICINE

## 2019-01-01 PROCEDURE — 43239 EGD BIOPSY SINGLE/MULTIPLE: CPT | Performed by: SURGERY

## 2019-01-01 PROCEDURE — 36415 COLL VENOUS BLD VENIPUNCTURE: CPT | Performed by: NURSE ANESTHETIST, CERTIFIED REGISTERED

## 2019-01-01 PROCEDURE — 99285 EMERGENCY DEPT VISIT HI MDM: CPT | Mod: 25

## 2019-01-01 PROCEDURE — 85018 HEMOGLOBIN: CPT | Performed by: PHYSICIAN ASSISTANT

## 2019-01-01 PROCEDURE — 94726 PLETHYSMOGRAPHY LUNG VOLUMES: CPT | Mod: 26 | Performed by: INTERNAL MEDICINE

## 2019-01-01 PROCEDURE — 37000008 ZZH ANESTHESIA TECHNICAL FEE, 1ST 30 MIN: Performed by: SURGERY

## 2019-01-01 RX ORDER — MORPHINE SULFATE 2 MG/ML
2 INJECTION, SOLUTION INTRAMUSCULAR; INTRAVENOUS ONCE
Status: COMPLETED | OUTPATIENT
Start: 2019-01-01 | End: 2019-01-01

## 2019-01-01 RX ORDER — ONDANSETRON 4 MG/1
4 TABLET, ORALLY DISINTEGRATING ORAL EVERY 30 MIN PRN
Status: DISCONTINUED | OUTPATIENT
Start: 2019-01-01 | End: 2019-01-01 | Stop reason: HOSPADM

## 2019-01-01 RX ORDER — LIDOCAINE 40 MG/G
CREAM TOPICAL
Status: DISCONTINUED | OUTPATIENT
Start: 2019-01-01 | End: 2019-01-01 | Stop reason: HOSPADM

## 2019-01-01 RX ORDER — TRAMADOL HYDROCHLORIDE 50 MG/1
TABLET ORAL
Qty: 100 TABLET | Refills: 0 | Status: SHIPPED | OUTPATIENT
Start: 2019-01-01 | End: 2019-01-01

## 2019-01-01 RX ORDER — TRAMADOL HYDROCHLORIDE 50 MG/1
TABLET ORAL
Qty: 100 TABLET | Refills: 0 | Status: SHIPPED | OUTPATIENT
Start: 2019-01-01 | End: 2020-01-01

## 2019-01-01 RX ORDER — IOPAMIDOL 612 MG/ML
100 INJECTION, SOLUTION INTRAVASCULAR ONCE
Status: COMPLETED | OUTPATIENT
Start: 2019-01-01 | End: 2019-01-01

## 2019-01-01 RX ORDER — ONDANSETRON 2 MG/ML
4 INJECTION INTRAMUSCULAR; INTRAVENOUS ONCE
Status: COMPLETED | OUTPATIENT
Start: 2019-01-01 | End: 2019-01-01

## 2019-01-01 RX ORDER — NALOXONE HYDROCHLORIDE 0.4 MG/ML
.1-.4 INJECTION, SOLUTION INTRAMUSCULAR; INTRAVENOUS; SUBCUTANEOUS
Status: DISCONTINUED | OUTPATIENT
Start: 2019-01-01 | End: 2019-01-01 | Stop reason: HOSPADM

## 2019-01-01 RX ORDER — PROPOFOL 10 MG/ML
INJECTION, EMULSION INTRAVENOUS PRN
Status: DISCONTINUED | OUTPATIENT
Start: 2019-01-01 | End: 2019-01-01

## 2019-01-01 RX ORDER — SUCRALFATE 1 G/1
1 TABLET ORAL 4 TIMES DAILY
Qty: 120 TABLET | Refills: 3 | Status: SHIPPED | OUTPATIENT
Start: 2019-01-01 | End: 2020-01-01

## 2019-01-01 RX ORDER — ALBUTEROL SULFATE 0.83 MG/ML
2.5 SOLUTION RESPIRATORY (INHALATION)
Status: DISCONTINUED | OUTPATIENT
Start: 2019-01-01 | End: 2019-01-01 | Stop reason: HOSPADM

## 2019-01-01 RX ORDER — LIDOCAINE HYDROCHLORIDE 20 MG/ML
INJECTION, SOLUTION INFILTRATION; PERINEURAL PRN
Status: DISCONTINUED | OUTPATIENT
Start: 2019-01-01 | End: 2019-01-01

## 2019-01-01 RX ORDER — SODIUM CHLORIDE, SODIUM LACTATE, POTASSIUM CHLORIDE, CALCIUM CHLORIDE 600; 310; 30; 20 MG/100ML; MG/100ML; MG/100ML; MG/100ML
INJECTION, SOLUTION INTRAVENOUS CONTINUOUS
Status: DISCONTINUED | OUTPATIENT
Start: 2019-01-01 | End: 2019-01-01 | Stop reason: HOSPADM

## 2019-01-01 RX ORDER — ONDANSETRON 2 MG/ML
4 INJECTION INTRAMUSCULAR; INTRAVENOUS EVERY 30 MIN PRN
Status: DISCONTINUED | OUTPATIENT
Start: 2019-01-01 | End: 2019-01-01 | Stop reason: HOSPADM

## 2019-01-01 RX ORDER — SODIUM CHLORIDE 9 MG/ML
1000 INJECTION, SOLUTION INTRAVENOUS CONTINUOUS
Status: DISCONTINUED | OUTPATIENT
Start: 2019-01-01 | End: 2019-01-01 | Stop reason: HOSPADM

## 2019-01-01 RX ADMIN — IOPAMIDOL 75 ML: 612 INJECTION, SOLUTION INTRAVENOUS at 14:37

## 2019-01-01 RX ADMIN — SODIUM CHLORIDE, POTASSIUM CHLORIDE, SODIUM LACTATE AND CALCIUM CHLORIDE: 600; 310; 30; 20 INJECTION, SOLUTION INTRAVENOUS at 08:33

## 2019-01-01 RX ADMIN — SODIUM CHLORIDE 1000 ML: 9 INJECTION, SOLUTION INTRAVENOUS at 17:38

## 2019-01-01 RX ADMIN — PROPOFOL 20 MG: 10 INJECTION, EMULSION INTRAVENOUS at 09:12

## 2019-01-01 RX ADMIN — ONDANSETRON 4 MG: 2 INJECTION INTRAMUSCULAR; INTRAVENOUS at 18:47

## 2019-01-01 RX ADMIN — PROPOFOL 20 MG: 10 INJECTION, EMULSION INTRAVENOUS at 09:06

## 2019-01-01 RX ADMIN — PROPOFOL 20 MG: 10 INJECTION, EMULSION INTRAVENOUS at 09:03

## 2019-01-01 RX ADMIN — PROPOFOL 20 MG: 10 INJECTION, EMULSION INTRAVENOUS at 09:15

## 2019-01-01 RX ADMIN — SODIUM CHLORIDE 1000 ML: 9 INJECTION, SOLUTION INTRAVENOUS at 21:08

## 2019-01-01 RX ADMIN — PROPOFOL 20 MG: 10 INJECTION, EMULSION INTRAVENOUS at 09:09

## 2019-01-01 RX ADMIN — LIDOCAINE HYDROCHLORIDE 40 MG: 20 INJECTION, SOLUTION INFILTRATION; PERINEURAL at 09:03

## 2019-01-01 RX ADMIN — MORPHINE SULFATE 2 MG: 2 INJECTION, SOLUTION INTRAMUSCULAR; INTRAVENOUS at 18:47

## 2019-01-01 ASSESSMENT — ENCOUNTER SYMPTOMS
DYSURIA: 0
DYSRHYTHMIAS: 1
LIGHT-HEADEDNESS: 0
DIZZINESS: 0
WEAKNESS: 1
FATIGUE: 1
VOMITING: 0
MUSCULOSKELETAL NEGATIVE: 1
DYSPHORIC MOOD: 1
SHORTNESS OF BREATH: 1
NAUSEA: 0
CONSTIPATION: 0
FEVER: 0
ABDOMINAL PAIN: 1
WHEEZING: 0
FREQUENCY: 0
ACTIVITY CHANGE: 1
DIARRHEA: 1

## 2019-01-01 ASSESSMENT — PAIN SCALES - GENERAL
PAINLEVEL: MILD PAIN (3)
PAINLEVEL: MILD PAIN (3)
PAINLEVEL: MODERATE PAIN (4)
PAINLEVEL: MILD PAIN (2)

## 2019-01-01 ASSESSMENT — MIFFLIN-ST. JEOR
SCORE: 854.58
SCORE: 856.4
SCORE: 850.05
SCORE: 851.86

## 2019-01-01 ASSESSMENT — LIFESTYLE VARIABLES: TOBACCO_USE: 1

## 2019-01-14 DIAGNOSIS — I10 ESSENTIAL HYPERTENSION: ICD-10-CM

## 2019-01-14 DIAGNOSIS — I48.20 CHRONIC ATRIAL FIBRILLATION (H): ICD-10-CM

## 2019-01-15 RX ORDER — DILTIAZEM HYDROCHLORIDE 180 MG/1
CAPSULE, EXTENDED RELEASE ORAL
Qty: 30 CAPSULE | Refills: 11 | OUTPATIENT
Start: 2019-01-15

## 2019-02-03 DIAGNOSIS — G89.4 CHRONIC PAIN SYNDROME: ICD-10-CM

## 2019-02-04 NOTE — TELEPHONE ENCOUNTER
traMADol (ULTRAM) 50 MG tablet      Last Written Prescription Date:  12/27/18  Last Fill Quantity: 100,   # refills: 0  Last Office Visit: 11/26/18  Future Office visit:       Routing refill request to provider for review/approval because:  Drug not on the [facility], [facility] or [facility] refill protocol or controlled substance

## 2019-02-05 RX ORDER — TRAMADOL HYDROCHLORIDE 50 MG/1
TABLET ORAL
Qty: 100 TABLET | Refills: 0 | Status: SHIPPED | OUTPATIENT
Start: 2019-02-05 | End: 2019-02-28

## 2019-02-13 ENCOUNTER — ANTICOAGULATION THERAPY VISIT (OUTPATIENT)
Dept: ANTICOAGULATION | Facility: OTHER | Age: 84
End: 2019-02-13
Attending: PHYSICIAN ASSISTANT
Payer: MEDICARE

## 2019-02-13 ENCOUNTER — TELEPHONE (OUTPATIENT)
Dept: FAMILY MEDICINE | Facility: OTHER | Age: 84
End: 2019-02-13

## 2019-02-13 DIAGNOSIS — E78.5 HYPERLIPIDEMIA LDL GOAL <100: ICD-10-CM

## 2019-02-13 DIAGNOSIS — I48.21 PERMANENT ATRIAL FIBRILLATION (H): ICD-10-CM

## 2019-02-13 LAB — INR POINT OF CARE: 2.2 (ref 0.86–1.14)

## 2019-02-13 PROCEDURE — 85610 PROTHROMBIN TIME: CPT | Mod: QW,ZL

## 2019-02-13 RX ORDER — SIMVASTATIN 40 MG
40 TABLET ORAL AT BEDTIME
Qty: 45 TABLET | Refills: 2 | Status: SHIPPED | OUTPATIENT
Start: 2019-02-13 | End: 2019-02-19

## 2019-02-13 NOTE — TELEPHONE ENCOUNTER
zocor      Last Written Prescription Date:  11/5/18  Last Fill Quantity: 45,   # refills: 0  Last Office Visit: 11/26/18  Future Office visit:    Next 5 appointments (look out 90 days)    May 14, 2019 10:10 AM CDT  (Arrive by 9:55 AM)  Return Visit with Noemy Mahajan NP  [facility] ([facility] ) [address] [place] [address]  [phone]

## 2019-02-13 NOTE — PROGRESS NOTES
ANTICOAGULATION FOLLOW-UP CLINIC VISIT    Patient Name:  Michael Christiansen  Date:  2019  Contact Type:  Face to Face    SUBJECTIVE:     Patient Findings     Positives:   No Problem Findings           OBJECTIVE    INR Protime   Date Value Ref Range Status   2019 2.2 (A) 0.86 - 1.14 Final       ASSESSMENT / PLAN  INR assessment THER    Recheck INR In: 6 WEEKS    INR Location Clinic      Anticoagulation Summary  As of 2019    INR goal:   2.0-3.0   TTR:   57.6 % (2.5 y)   INR used for dosin.2 (2019)   Warfarin maintenance plan:   2.5 mg (5 mg x 0.5) every Mon, Fri; 5 mg (5 mg x 1) all other days   Full warfarin instructions:   2.5 mg every Mon, Fri; 5 mg all other days   Weekly warfarin total:   30 mg   No change documented:   Kasia Hercules RN   Plan last modified:   Kasia Hercules RN (10/1/2018)   Next INR check:   3/27/2019   Priority:   INR   Target end date:   Indefinite    Indications    Long-term (current) use of anticoagulants [Z79.01] [Z79.01]  Permanent atrial fibrillation (H) [I48.2]             Anticoagulation Episode Summary     INR check location:       Preferred lab:       Send INR reminders to:   [facility] ANTICOAG POOL    Comments:         Anticoagulation Care Providers     Provider Role Specialty Phone number    Chandrika Kumar PA [facility] [phone]            See the Encounter Report to view Anticoagulation Flowsheet and Dosing Calendar (Go to Encounters tab in chart review, and find the Anticoagulation Therapy Visit)        Kasia Hercules RN

## 2019-02-19 RX ORDER — SIMVASTATIN 40 MG
TABLET ORAL
Qty: 45 TABLET | Refills: 0 | Status: SHIPPED | OUTPATIENT
Start: 2019-02-19 | End: 2019-05-28

## 2019-02-19 NOTE — TELEPHONE ENCOUNTER
Please resend medication below as patient stated the pharmacy as of 20 minutes ago didn't have it.  Thank you.

## 2019-02-19 NOTE — TELEPHONE ENCOUNTER
Pt shaina Feng.Called [facility] and they did receive order for Zocor on 2.13.18 but they had sent for clarification because the last refill was for Simvastatin 40mg take 1/2 tab every evening. The order sent on 2.13.18 was for Simvastatin 40mg.Called the patient and she is taking the 1/2 tablet. Pended this dose .Thank you.

## 2019-02-28 DIAGNOSIS — G89.4 CHRONIC PAIN SYNDROME: ICD-10-CM

## 2019-02-28 RX ORDER — TRAMADOL HYDROCHLORIDE 50 MG/1
TABLET ORAL
Qty: 100 TABLET | Refills: 0 | Status: SHIPPED | OUTPATIENT
Start: 2019-02-28 | End: 2019-03-21

## 2019-02-28 NOTE — TELEPHONE ENCOUNTER
Tramadol 50 mg      Last Written Prescription Date:  2/5/2019  Last Fill Quantity: 100,   # refills: 0  Last Office Visit: 11/26/2018  Future Office visit:    Next 5 appointments (look out 90 days)    May 14, 2019 10:10 AM CDT  (Arrive by 9:55 AM)  Return Visit with Noemy Mahajan NP  [facility] ([facility] ) [address] [place] [address]  [phone]         Елена Leal LPN on 2/28/2019 at 1:44 PM

## 2019-03-21 DIAGNOSIS — G89.4 CHRONIC PAIN SYNDROME: ICD-10-CM

## 2019-03-21 NOTE — TELEPHONE ENCOUNTER
Tramadol 50mg      Last Written Prescription Date:  2/28/19  Last Fill Quantity: 100,   # refills: 0  Last Office Visit: 11/26/18  Future Office visit:    Next 5 appointments (look out 90 days)    May 14, 2019 10:10 AM CDT  (Arrive by 9:55 AM)  Return Visit with Noemy Mahajan NP  [facility] ([facility] ) [address]  [place] [address]  [phone]   Jun 12, 2019  9:30 AM CDT  (Arrive by 9:15 AM)  Return Visit with Cam Kirby DO  [facility] ([facility] ) [address]  [place] [address]  [phone]           Routing refill request to provider for review/approval because:  Drug not on the [facility], [facility] or [facility] refill protocol or controlled substance

## 2019-03-22 RX ORDER — TRAMADOL HYDROCHLORIDE 50 MG/1
TABLET ORAL
Qty: 100 TABLET | Refills: 0 | Status: SHIPPED | OUTPATIENT
Start: 2019-03-22 | End: 2019-04-16

## 2019-04-16 ENCOUNTER — ANTICOAGULATION THERAPY VISIT (OUTPATIENT)
Dept: ANTICOAGULATION | Facility: OTHER | Age: 84
End: 2019-04-16
Attending: PHYSICIAN ASSISTANT
Payer: MEDICARE

## 2019-04-16 DIAGNOSIS — G89.4 CHRONIC PAIN SYNDROME: ICD-10-CM

## 2019-04-16 DIAGNOSIS — I48.21 PERMANENT ATRIAL FIBRILLATION (H): ICD-10-CM

## 2019-04-16 LAB — INR POINT OF CARE: 2.2 (ref 0.86–1.14)

## 2019-04-16 PROCEDURE — 85610 PROTHROMBIN TIME: CPT | Mod: QW,ZL

## 2019-04-16 RX ORDER — TRAMADOL HYDROCHLORIDE 50 MG/1
TABLET ORAL
Qty: 100 TABLET | Refills: 0 | Status: SHIPPED | OUTPATIENT
Start: 2019-04-16 | End: 2019-05-07

## 2019-04-16 NOTE — TELEPHONE ENCOUNTER
traMADol (ULTRAM) 50 MG tablet      Last Written Prescription Date:  3-22-19  Last Fill Quantity: 100,   # refills: 0  Last Office Visit: 11-26-18  Future Office visit:    Next 5 appointments (look out 90 days)    May 14, 2019  2:30 PM CDT  (Arrive by 2:15 PM)  Return Visit with Noemy Mahajan NP  [facility] ) [address] [place] [address]  [phone]   Jun 12, 2019  9:30 AM CDT  (Arrive by 9:15 AM)  Return Visit with Cam Kirby DO  [facility] ([facility] ) [address]  [place] [address]  [phone]           Routing refill request to provider for review/approval because:  Drug not on the [facility], [facility] or [facility] refill protocol or controlled substance

## 2019-04-16 NOTE — PROGRESS NOTES
ANTICOAGULATION FOLLOW-UP CLINIC VISIT    Patient Name:  Michael Christiansen  Date:  2019  Contact Type:  Face to Face    SUBJECTIVE:     Patient Findings     Positives:   Change in activity (change in activity, increased)    Comments:    has been ill and is in LTC for a short stay. She has been going back and forth to LTC daily.           OBJECTIVE    INR Protime   Date Value Ref Range Status   2019 2.2 (A) 0.86 - 1.14 Final       ASSESSMENT / PLAN  INR assessment THER    Recheck INR In: 6 WEEKS    INR Location Clinic      Anticoagulation Summary  As of 2019    INR goal:   2.0-3.0   TTR:   60.2 % (2.7 y)   INR used for dosin.2 (2019)   Warfarin maintenance plan:   2.5 mg (5 mg x 0.5) every Mon, Fri; 5 mg (5 mg x 1) all other days   Full warfarin instructions:   2.5 mg every Mon, Fri; 5 mg all other days   Weekly warfarin total:   30 mg   No change documented:   Kasia Hercules RN   Plan last modified:   Kasia Hercules RN (10/1/2018)   Next INR check:   2019   Priority:   INR   Target end date:   Indefinite    Indications    Long-term (current) use of anticoagulants [Z79.01] [Z79.01]  Permanent atrial fibrillation (H) [I48.2]             Anticoagulation Episode Summary     INR check location:       Preferred lab:       Send INR reminders to:   [facility] ANTICOAG POOL    Comments:         Anticoagulation Care Providers     Provider Role Specialty Phone number    Chandrika Kumar PA [facility] [phone]            See the Encounter Report to view Anticoagulation Flowsheet and Dosing Calendar (Go to Encounters tab in chart review, and find the Anticoagulation Therapy Visit)        Kasia Hercules RN

## 2019-04-22 ENCOUNTER — NURSE TRIAGE (OUTPATIENT)
Dept: FAMILY MEDICINE | Facility: OTHER | Age: 84
End: 2019-04-22

## 2019-04-22 ENCOUNTER — APPOINTMENT (OUTPATIENT)
Dept: GENERAL RADIOLOGY | Facility: HOSPITAL | Age: 84
End: 2019-04-22
Attending: PHYSICIAN ASSISTANT
Payer: MEDICARE

## 2019-04-22 ENCOUNTER — HOSPITAL ENCOUNTER (EMERGENCY)
Facility: HOSPITAL | Age: 84
Discharge: HOME | End: 2019-04-22
Attending: PHYSICIAN ASSISTANT | Admitting: PHYSICIAN ASSISTANT
Payer: MEDICARE

## 2019-04-22 VITALS
HEIGHT: 64 IN | WEIGHT: 103 LBS | DIASTOLIC BLOOD PRESSURE: 77 MMHG | HEART RATE: 72 BPM | SYSTOLIC BLOOD PRESSURE: 120 MMHG | BODY MASS INDEX: 17.58 KG/M2 | RESPIRATION RATE: 16 BRPM | OXYGEN SATURATION: 95 % | TEMPERATURE: 98.5 F

## 2019-04-22 DIAGNOSIS — S22.32XA FRACTURE OF RIB OF LEFT SIDE: ICD-10-CM

## 2019-04-22 DIAGNOSIS — S22.39XA RIB FRACTURE: ICD-10-CM

## 2019-04-22 PROCEDURE — G0463 HOSPITAL OUTPT CLINIC VISIT: HCPCS

## 2019-04-22 PROCEDURE — 99214 OFFICE O/P EST MOD 30 MIN: CPT | Mod: Z6 | Performed by: PHYSICIAN ASSISTANT

## 2019-04-22 PROCEDURE — 71101 X-RAY EXAM UNILAT RIBS/CHEST: CPT | Mod: TC,LT

## 2019-04-22 ASSESSMENT — ENCOUNTER SYMPTOMS
CHILLS: 0
WHEEZING: 0
FATIGUE: 0
SHORTNESS OF BREATH: 0
CHEST TIGHTNESS: 0
COUGH: 0
ACTIVITY CHANGE: 0

## 2019-04-22 ASSESSMENT — MIFFLIN-ST. JEOR: SCORE: 907.2

## 2019-04-22 NOTE — ED AVS SNAPSHOT
[facility]  [address] [address]  Phone:  [phone]                                    Michael Christiansen   MRN: [id number]    Department:  [facility]   Date of Visit:  4/22/2019           After Visit Summary Signature Page    I have received my discharge instructions, and my questions have been answered. I have discussed any challenges I see with this plan with the nurse or doctor.    ..........................................................................................................................................  Patient/Patient Representative Signature      ..........................................................................................................................................  Patient Representative Print Name and Relationship to Patient    ..................................................               ................................................  Date                                   Time    ..........................................................................................................................................  Reviewed by Signature/Title    ...................................................              ..............................................  Date                                               Time          22EPIC Rev 08/18

## 2019-04-22 NOTE — ED TRIAGE NOTES
Pt was knocked over by her screen door on Saturday, causing her to fall onto the grass on her left side. Denies other injuries. C/o left lateral rib pain.

## 2019-04-22 NOTE — DISCHARGE INSTRUCTIONS
Pain control with tylenol 500 mg every 4 hours as needed (max 3,000 mg per 24 hour period)  Also can use your home tramadol 50 mg every 6 hours as needed over the next 3-5 days  Home spirometry breathing machine 5 times daily for 5-7 days  Encourage daily ambulation and daily activities  Seem medical attention if you experience increased chest pain, shortness of breath, or difficulty breathing

## 2019-04-22 NOTE — ED PROVIDER NOTES
History     Chief Complaint   Patient presents with     Fall     left lateral rib pain     HPI  Michael Christiansen is a 83 year old female with PMHx coumadin for a.fib, mitral valve dz, and HTN who was involved in a fall from standing incident on Saturday 4/20. She was walking through her screen door, when she tripped and fell onto the grass covered ground. Has increased pain of her left rib cage, denies shortness of breath or severe dyspnea.    Allergies:  Allergies   Allergen Reactions     Atenolol Shortness Of Breath and Other (See Comments)     Dizziness  Severe vertigo and dizziness/SOB     Lisinopril Cough     Augmentin Diarrhea     Pollen Extract      Other reaction(s): Runny Nose       Problem List:    Patient Active Problem List    Diagnosis Date Noted     LISA (generalized anxiety disorder) 11/26/2018     Priority: Medium     Anticoagulated on Coumadin 06/13/2018     Priority: Medium     Pulmonary hypertension-moderate 12/20/2017     Priority: Medium     Aortic stenosis, moderate 12/20/2017     Priority: Medium     Moderate tricuspid insufficiency 12/20/2017     Priority: Medium     Malignant neoplasm of right breast in female, estrogen receptor positive, unspecified site of breast (H) 08/02/2017     Priority: Medium     ACP (advance care planning) 01/26/2017     Priority: Medium     Advance Care Planning 3/27/2017: Receipt of ACP document:  Received: invalid Advance Directive document dated 2-20-16 due to the document has not been signed.  Document not previously scanned. Validation form completed and sent to be scanned indicating invalid document. Letter sent to client with information and facilitation resources.  Confirmed/documented designated decision maker(s).  Added by Kerry Pichardo RN, System Director ACP-Honoring Choices   Advance Care Planning 1/26/2017: ACP Review of Chart / Resources Provided:  Reviewed chart for advance care plan.  Michael Christiansen has been provided information and resources to  begin or update their advance care plan.  Added by Iris Bennett         Osteoporosis 01/26/2017     Priority: Medium     Long-term (current) use of anticoagulants [Z79.01] 07/27/2016     Priority: Medium     History of total knee replacement 01/26/2015     Priority: Medium     Chronic pain syndrome 01/23/2015     Priority: Medium     Permanent atrial fibrillation (H) 05/01/2013     Priority: Medium     Overview:   Chronic        Hyperlipidemia LDL goal <130 03/25/2013     Priority: Medium     Essential hypertension 03/25/2013     Priority: Medium     Osteoarthritis 03/25/2013     Priority: Medium     Radiculitis 06/16/2011     Priority: Medium     Overview:   IMO Update 10/11       Osteoarthritis of knee 09/16/2010     Priority: Medium     Overview:   IMO Update 10/11          Past Medical History:    Past Medical History:   Diagnosis Date     Arthritis      Backache, unspecified 1/1/2011     Breast cancer, stage 1 3/19/2010     Chronic pain syndrome 1/23/2015     Claustrophobia 1/1/2011     Hip joint replacement by other means 1/1/2011     Osteoarthrosis, unspecified whether generalized or localized, lower leg 8/14/2006     Posttraumatic stress disorder 1/1/2011       Past Surgical History:    Past Surgical History:   Procedure Laterality Date     BREAST SURGERY      right  side mastectomy     C TOTAL KNEE ARTHROPLASTY Left 01/26/15     cataract extraction and lens implantation       COLONOSCOPY       EYE SURGERY       GYN SURGERY      tubal pregnancy     ORTHOPEDIC SURGERY  2009    l. hip replacement LT     ORTHOPEDIC SURGERY  july 19th 2013    Right total knee     TUBAL/ECTOPIC PREGNANCY         Family History:    Family History   Problem Relation Age of Onset     Diabetes Father 75        cause of death     Other - See Comments Father         PVD     Heart Disease Sister      Coronary Artery Disease Sister      Other - See Comments Mother 83        old age; cause of death     Other - See Comments  "Sister         14 year twin pow [employer] [place]; cause of death     Chronic Obstructive Pulmonary Disease Daughter      Osteoporosis Daughter      Thyroid Disease Daughter      Cancer Sister      Other Cancer Sister      Other Cancer Sister         uterine     Hypertension No family hx of      Hyperlipidemia No family hx of      Cerebrovascular Disease No family hx of      Breast Cancer No family hx of      Prostate Cancer No family hx of      Colon Cancer No family hx of      Asthma No family hx of      Anesthesia Reaction No family hx of      Genetic Disorder No family hx of        Social History:  Marital Status:   [2]  Social History     Tobacco Use     Smoking status: Former Smoker     Packs/day: 0.30     Years: 30.00     Pack years: 9.00     Types: Cigarettes     Last attempt to quit: 1987     Years since quittin.7     Smokeless tobacco: Never Used     Tobacco comment: year quit    Substance Use Topics     Alcohol use: No     Drug use: No        Medications:      Acetaminophen (TYLENOL PO)   albuterol (PROAIR HFA/PROVENTIL HFA/VENTOLIN HFA) 108 (90 Base) MCG/ACT inhaler   Calcium Citrate-Vitamin D (CALCITRATE/VITAMIN D PO)   diltiazem (DILACOR XR) 180 MG 24 hr capsule   losartan (COZAAR) 50 MG tablet   Misc Natural Products (OSTEO BI-FLEX ADV DOUBLE ST PO)   Multiple Vitamin (MULTIVITAMINS PO)   Multiple Vitamins-Minerals (PRESERVISION AREDS 2 PO)   order for DME   sertraline (ZOLOFT) 25 MG tablet   simvastatin (ZOCOR) 40 MG tablet   traMADol (ULTRAM) 50 MG tablet   warfarin (COUMADIN) 5 MG tablet         Review of Systems   Constitutional: Negative for activity change, chills and fatigue.   Respiratory: Negative for cough, chest tightness, shortness of breath and wheezing.    Cardiovascular:        Left thoracic rib cage pain          Physical Exam   BP: 120/77  Pulse: 72  Temp: 98.5  F (36.9  C)  Resp: 16  Height: 162.6 cm (5' 4\")  Weight: 46.7 kg (103 lb)(stated)  SpO2: 95 " %      Physical Exam   NAD, appears quite comfortable  Awake, alert, oriented, clear speech, follows commands  Non-labored respiratory effort, no accessory muscles, CTAB, no wheezing or rhonchi   O2 sats 95% on room air, RR 16  Left upper anterior/lateral thorax rib pain in 2 areas approximately rib 6-7, no ecchymosis or hematoma  Skin warm, perfused  Observed mobilizing and removing her own fleece top    ED Course       Results for orders placed or performed during the hospital encounter of 04/22/19 (from the past 24 hour(s))   Ribs XR, unilat 3 views + PA chest,  left    Narrative    PROCEDURE: XR RIBS & CHEST LT 3VW 4/22/2019 10:23 AM    HISTORY: pain    COMPARISONS: All November 2018    TECHNIQUE: Chest 1 view, left RIBS 2 views    FINDINGS: Widespread interstitial thickening is again noted without  change. The heart and pulmonary vessels are within normal limits.    Left RIBS 2 views: There is a left seventh rib fracture anteriorly.  The remainder the ribs appear intact. Severe degenerative changes are  seen at the left glenohumeral articulation.         Impression    IMPRESSION: Left seventh rib fracture    NARDA WHITE MD       Medications - No data to display    Assessments & Plan (with Medical Decision Making)   Michael Christiansen presents to the Urgent Care with diagnosis of left rib rib fractures.    -xray shows #7 left rib fracture, but clinically pt appears to have at least 2 fractures  -pt has a home spirometry machine, advised to use daily for 1 week  -encourage mobilization and ambulation  -pain control with home tramadol and tylenol, OTC pain patch recommended    -Patient understands to seek further medical evaluation if respiratory symptoms worsen.  -Follow-Up: PRN        I have reviewed the nursing notes.    I have reviewed the findings, diagnosis, plan and need for follow up with the patient.       Medication List      There are no discharge medications for this visit.         Final diagnoses:    Rib fracture       4/22/2019   [facility]     Cornelio Coronado PA-C  04/22/19 111

## 2019-04-22 NOTE — TELEPHONE ENCOUNTER
"Patient advised ED/Urgent care due to left sided rib pain from fall and difficulty breathing.      Reason for Disposition    [1] Difficulty breathing AND [2] not severe    Additional Information    Negative: Major injury from dangerous force or speed (e.g., MVA, fall > 10 feet or 3 meters)    Negative: Bullet wound, knife wound, or other penetrating object    Negative: Puncture wound that sounds life-threatening to the triager    Negative: Severe difficulty breathing (e.g., struggling for each breath, speaks in single words)    Negative: [1] Major bleeding (e.g., actively dripping or spurting) AND [2] can't be stopped    Negative: Open wound of the chest with sound of moving air (sucking wound) or visible air bubbles    Negative: Shock suspected (e.g., cold/pale/clammy skin, too weak to stand, low BP, rapid pulse)    Negative: Coughing or spitting up blood    Negative: Bluish lips, tongue, or face now    Negative: Unconscious or was unconscious    Negative: Sounds like a life-threatening emergency to the triager    Negative: [1] Injuries at more than 1 site AND [2] unsure which guideline to use    Negative: Chest pain not from an injury OR cause is unknown    Negative: Wound looks infected    Negative: SEVERE chest pain    Answer Assessment - Initial Assessment Questions  1. MECHANISM: \"How did the injury happen?\"      Fell onto left side    2. ONSET: \"When did the injury happen?\" (Minutes or hours ago)      x2 days ago    3. LOCATION: \"Where on the chest is the injury located?\"      Left sided rib pain    6. SEVERITY: \"Any difficulty with breathing?\"      Yes    8. PAIN: \"Is there pain?\" If so, ask: \"How bad is the pain?\"   (e.g., Scale 1-10; or mild, moderate, severe)      Yes moderate pain 4/10, recently took Ultram    Protocols used: CHEST INJURY-ADULT-      "

## 2019-04-22 NOTE — ED NOTES
Patient presents with Left side rib pain x 3 days.  No bruising at this time.  Painful to the touch.

## 2019-05-07 DIAGNOSIS — G89.4 CHRONIC PAIN SYNDROME: ICD-10-CM

## 2019-05-07 NOTE — TELEPHONE ENCOUNTER
Tramadol      Last Written Prescription Date:  4/16/19  Last Fill Quantity: 100,   # refills: 0  Last Office Visit: 11/26/18  Future Office visit:    Next 5 appointments (look out 90 days)    May 14, 2019  2:30 PM CDT  (Arrive by 2:15 PM)  Return Visit with Noemy Mahajan NP  [facility] [address] [place] [address]  [phone]   Jun 12, 2019  9:30 AM CDT  (Arrive by 9:15 AM)  Return Visit with Cam Kirby DO  [facility] [address]  [place] [address]  [phone]           Routing refill request to provider for review/approval because:  Drug not on the [facility], P or [facility] refill protocol or controlled substance

## 2019-05-08 ENCOUNTER — HOSPITAL ENCOUNTER (OUTPATIENT)
Dept: BONE DENSITY | Facility: HOSPITAL | Age: 84
Discharge: HOME | End: 2019-05-08
Attending: NURSE PRACTITIONER | Admitting: NURSE PRACTITIONER
Payer: MEDICARE

## 2019-05-08 ENCOUNTER — TELEPHONE (OUTPATIENT)
Dept: FAMILY MEDICINE | Facility: OTHER | Age: 84
End: 2019-05-08

## 2019-05-08 DIAGNOSIS — M81.0 OSTEOPOROSIS, UNSPECIFIED OSTEOPOROSIS TYPE, UNSPECIFIED PATHOLOGICAL FRACTURE PRESENCE: ICD-10-CM

## 2019-05-08 DIAGNOSIS — Z12.31 ENCOUNTER FOR SCREENING MAMMOGRAM FOR BREAST CANCER: ICD-10-CM

## 2019-05-08 DIAGNOSIS — C50.911 MALIGNANT NEOPLASM OF RIGHT BREAST IN FEMALE, ESTROGEN RECEPTOR POSITIVE, UNSPECIFIED SITE OF BREAST (H): ICD-10-CM

## 2019-05-08 DIAGNOSIS — Z17.0 MALIGNANT NEOPLASM OF RIGHT BREAST IN FEMALE, ESTROGEN RECEPTOR POSITIVE, UNSPECIFIED SITE OF BREAST (H): ICD-10-CM

## 2019-05-08 LAB
ALBUMIN SERPL-MCNC: 3.9 G/DL (ref 3.4–5)
ALP SERPL-CCNC: 150 U/L (ref 40–150)
ALT SERPL W P-5'-P-CCNC: 19 U/L (ref 0–50)
ANION GAP SERPL CALCULATED.3IONS-SCNC: 4 MMOL/L (ref 3–14)
AST SERPL W P-5'-P-CCNC: 20 U/L (ref 0–45)
BASOPHILS # BLD AUTO: 0.1 10E9/L (ref 0–0.2)
BASOPHILS NFR BLD AUTO: 0.5 %
BILIRUB SERPL-MCNC: 0.4 MG/DL (ref 0.2–1.3)
BUN SERPL-MCNC: 22 MG/DL (ref 7–30)
CALCIUM SERPL-MCNC: 8.7 MG/DL (ref 8.5–10.1)
CHLORIDE SERPL-SCNC: 101 MMOL/L (ref 94–109)
CO2 SERPL-SCNC: 29 MMOL/L (ref 20–32)
CREAT SERPL-MCNC: 0.67 MG/DL (ref 0.52–1.04)
DIFFERENTIAL METHOD BLD: ABNORMAL
EOSINOPHIL # BLD AUTO: 0 10E9/L (ref 0–0.7)
EOSINOPHIL NFR BLD AUTO: 0.3 %
ERYTHROCYTE [DISTWIDTH] IN BLOOD BY AUTOMATED COUNT: 13.6 % (ref 10–15)
GFR SERPL CREATININE-BSD FRML MDRD: 81 ML/MIN/{1.73_M2}
GLUCOSE SERPL-MCNC: 132 MG/DL (ref 70–99)
HCT VFR BLD AUTO: 41.8 % (ref 35–47)
HGB BLD-MCNC: 14.3 G/DL (ref 11.7–15.7)
IMM GRANULOCYTES # BLD: 0 10E9/L (ref 0–0.4)
IMM GRANULOCYTES NFR BLD: 0.2 %
LYMPHOCYTES # BLD AUTO: 2.1 10E9/L (ref 0.8–5.3)
LYMPHOCYTES NFR BLD AUTO: 16.4 %
MCH RBC QN AUTO: 31.4 PG (ref 26.5–33)
MCHC RBC AUTO-ENTMCNC: 34.2 G/DL (ref 31.5–36.5)
MCV RBC AUTO: 92 FL (ref 78–100)
MONOCYTES # BLD AUTO: 1 10E9/L (ref 0–1.3)
MONOCYTES NFR BLD AUTO: 7.7 %
NEUTROPHILS # BLD AUTO: 9.5 10E9/L (ref 1.6–8.3)
NEUTROPHILS NFR BLD AUTO: 74.9 %
NRBC # BLD AUTO: 0 10*3/UL
NRBC BLD AUTO-RTO: 0 /100
PLATELET # BLD AUTO: 426 10E9/L (ref 150–450)
POTASSIUM SERPL-SCNC: 4.2 MMOL/L (ref 3.4–5.3)
PROT SERPL-MCNC: 8.4 G/DL (ref 6.8–8.8)
RBC # BLD AUTO: 4.56 10E12/L (ref 3.8–5.2)
SODIUM SERPL-SCNC: 134 MMOL/L (ref 133–144)
WBC # BLD AUTO: 12.7 10E9/L (ref 4–11)

## 2019-05-08 PROCEDURE — 86300 IMMUNOASSAY TUMOR CA 15-3: CPT | Mod: ZL | Performed by: NURSE PRACTITIONER

## 2019-05-08 PROCEDURE — 85025 COMPLETE CBC W/AUTO DIFF WBC: CPT | Mod: ZL | Performed by: NURSE PRACTITIONER

## 2019-05-08 PROCEDURE — 36415 COLL VENOUS BLD VENIPUNCTURE: CPT | Mod: ZL | Performed by: NURSE PRACTITIONER

## 2019-05-08 PROCEDURE — 80053 COMPREHEN METABOLIC PANEL: CPT | Mod: ZL | Performed by: NURSE PRACTITIONER

## 2019-05-08 PROCEDURE — 77080 DXA BONE DENSITY AXIAL: CPT | Mod: TC

## 2019-05-08 RX ORDER — TRAMADOL HYDROCHLORIDE 50 MG/1
TABLET ORAL
Qty: 100 TABLET | Refills: 0 | Status: SHIPPED | OUTPATIENT
Start: 2019-05-08 | End: 2019-05-28

## 2019-05-09 LAB — CANCER AG27-29 SERPL-ACNC: 51 U/ML (ref 0–39)

## 2019-05-13 NOTE — PROGRESS NOTES
Oncology Follow-up Visit:  May 14, 2019    Reason for Visit:  Patient presents with:  RECHECK: Follow up Malignant neoplasm of right breast in female, estrogen receptor positive, unspecified site of breast      Nursing Note and documentation reviewed: yes    HPI:  This is a 83-year-old female patient who presents to the oncology clinic today in followup of breast cancer.  She was diagnosed with carcinoma of the right breast in April 2010; she underwent mastectomy; tumor was ER/CA positive, HER-2/nery negative.  Arimidex was discontinued in January of 2017 related to worsening osteoporosis and BCI showing essentially no benefit from long-term hormonal therapy.     She presents to the clinic today telling me she is doing okay.  She has lost about 20 pounds since last year and she relates this to taking care of her  at home stating she is very busy with him and she has no appetite.  On 4/22/2019, she presented to the emergency room as she had fallen and fractured her left seventh rib.  She continues to have discomfort in this area but states it is slowly improving.  She denies any issue with shortness of breath or coughing.  She continues on calcium with vitamin D twice a day and receives Prolia every 6 months.  She had a DEXA scan completed prior to this appointment and is here for that result.  She did forget to do her mammogram in April so this will need to be scheduled.    Oncologic History: Michael presented in April 2010 with an abnormal mammogram. She underwent a right simple mastectomy and sentinel node biopsy on 4/15/2010 by Dr. Rogelio Pina. Pathology revealed a 1.2 cm infiltrating ductal carcinoma of the right breast, grade 1/3; ER/CA positive, HER-2/nery negative. Delton node biopsy negative. She was then seen initially by Dr. Malin, oncology, on 5/4/2010 and patient was placed on tamoxifen 20 mg daily. Patient remained on tamoxifen until May of 2012 and had been complaining of cramps in her calves as  well as hot flashes and she was switched to Arimidex 1 mg daily.      Current Chemo Regime/TX: n/a  Current Cycle: n/a  # of completed cycles: n/a      Previous treatment: Started Tamoxifen May 2010 and switched to Arimidex 1mg daily May 2012 and discontinued 1/2017     Past Medical History:   Diagnosis Date     Arthritis      Backache, unspecified 1/1/2011     Breast cancer, stage 1 3/19/2010     Chronic pain syndrome 1/23/2015     Claustrophobia 1/1/2011     Hip joint replacement by other means 1/1/2011     Osteoarthrosis, unspecified whether generalized or localized, lower leg 8/14/2006     Posttraumatic stress disorder 1/1/2011       Past Surgical History:   Procedure Laterality Date     BREAST SURGERY      right  side mastectomy     C TOTAL KNEE ARTHROPLASTY Left 01/26/15     cataract extraction and lens implantation       COLONOSCOPY       EYE SURGERY       GYN SURGERY      tubal pregnancy     ORTHOPEDIC SURGERY  2009    l. hip replacement LT     ORTHOPEDIC SURGERY  july 19th 2013    Right total knee     TUBAL/ECTOPIC PREGNANCY         Family History   Problem Relation Age of Onset     Diabetes Father 75        cause of death     Other - See Comments Father         PVD     Heart Disease Sister      Coronary Artery Disease Sister      Other - See Comments Mother 83        old age; cause of death     Other - See Comments Sister         14 year twin [employer]; cause of death     Chronic Obstructive Pulmonary Disease Daughter      Osteoporosis Daughter      Thyroid Disease Daughter      Cancer Sister      Other Cancer Sister      Other Cancer Sister         uterine     Hypertension No family hx of      Hyperlipidemia No family hx of      Cerebrovascular Disease No family hx of      Breast Cancer No family hx of      Prostate Cancer No family hx of      Colon Cancer No family hx of      Asthma No family hx of      Anesthesia Reaction No family hx of      Genetic Disorder No family hx of        Social  History     Socioeconomic History     Marital status:      Spouse name: Not on file     Number of children: Not on file     Years of education: Not on file     Highest education level: Not on file   Occupational History     Not on file   Social Needs     Financial resource strain: Not on file     Food insecurity:     Worry: Not on file     Inability: Not on file     Transportation needs:     Medical: Not on file     Non-medical: Not on file   Tobacco Use     Smoking status: Former Smoker     Packs/day: 0.30     Years: 30.00     Pack years: 9.00     Types: Cigarettes     Last attempt to quit: 1987     Years since quittin.7     Smokeless tobacco: Never Used     Tobacco comment: year quit    Substance and Sexual Activity     Alcohol use: No     Drug use: No     Sexual activity: Not on file   Lifestyle     Physical activity:     Days per week: Not on file     Minutes per session: Not on file     Stress: Not on file   Relationships     Social connections:     Talks on phone: Not on file     Gets together: Not on file     Attends Christianity service: Not on file     Active member of club or organization: Not on file     Attends meetings of clubs or organizations: Not on file     Relationship status: Not on file     Intimate partner violence:     Fear of current or ex partner: Not on file     Emotionally abused: Not on file     Physically abused: Not on file     Forced sexual activity: Not on file   Other Topics Concern     Parent/sibling w/ CABG, MI or angioplasty before 65F 55M? No      Service Not Asked     Blood Transfusions Yes     Caffeine Concern Not Asked     Occupational Exposure Not Asked     Hobby Hazards Not Asked     Sleep Concern Not Asked     Stress Concern Not Asked     Weight Concern Not Asked     Special Diet Not Asked     Back Care Not Asked     Exercise Not Asked     Bike Helmet Not Asked     Seat Belt Not Asked     Self-Exams Not Asked   Social History Narrative     Not on  "file       Current Outpatient Medications   Medication     Calcium Citrate-Vitamin D (CALCITRATE/VITAMIN D PO)     diltiazem (DILACOR XR) 180 MG 24 hr capsule     losartan (COZAAR) 50 MG tablet     Misc Natural Products (OSTEO BI-FLEX ADV DOUBLE ST PO)     Multiple Vitamin (MULTIVITAMINS PO)     Multiple Vitamins-Minerals (PRESERVISION AREDS 2 PO)     order for DME     sertraline (ZOLOFT) 25 MG tablet     simvastatin (ZOCOR) 40 MG tablet     traMADol (ULTRAM) 50 MG tablet     warfarin (COUMADIN) 5 MG tablet     Acetaminophen (TYLENOL PO)     albuterol (PROAIR HFA/PROVENTIL HFA/VENTOLIN HFA) 108 (90 Base) MCG/ACT inhaler     No current facility-administered medications for this visit.         Allergies   Allergen Reactions     Atenolol Shortness Of Breath and Other (See Comments)     Dizziness  Severe vertigo and dizziness/SOB     Lisinopril Cough     Augmentin Diarrhea     Pollen Extract      Other reaction(s): Runny Nose       Review Of Systems:  Constitutional:    denies fever, chills, and night sweats.  Eyes:    denies blurred or double vision  Ears/Nose/Throat:   denies ear pain, nose problems, difficulty swallowing  Respiratory:   denies shortness of breath, cough   Skin:   denies rash, lesions  Breast/Chest wall:   denies pain, lumps or discharge  Cardiovascular:   denies chest pain, palpitations, edema  Gastrointestinal:   denies abdominal pain, bloating, nausea, early satiety; no change in stool habits and no blood in her stool  Genitourinary:   denies difficulty with urination, blood in urine  Musculoskeletal:   See HPI  Neurologic:   denies lightheadedness, headaches, numbness or tingling  Psychiatric:   denies anxiety, depression  Hematologic/Lymphatic/Immunologic:   denies easy bruising, easy bleeding, lumps or bumps noted  Endocrine:   Denies increased thirst    Physical Exam:  /60   Pulse 88   Temp 97.5  F (36.4  C) (Tympanic)   Resp 18   Ht 1.651 m (5' 5\")   Wt 43.9 kg (96 lb 12.8 oz)   " SpO2 96%   BMI 16.11 kg/m      GENERAL APPEARANCE: Thin elderly female, alert and in no acute distress.  HEENT: Normocephalic, Sclerae anicteric. Oropharynx without ulcers, lesions, or thrush.  NECK:   No asymmetry or masses, no thyromegaly.  LYMPHATICS: No palpable cervical, supraclavicular, axillary, or inguinal nodes   RESP: Lungs clear to auscultation bilaterally but dim throughout, respirations regular and easy  CARDIOVASCULAR: Regular rate and rhythm. Normal S1, S2; no murmur, gallop, or rub.  ABDOMEN: Soft, nontender. Bowel sounds auscultated all 4 quadrants. No palpable organomegaly or masses.  BREAST/Chest wall: no lumps, masses or tenderness bilaterally  MUSCULOSKELETAL: Extremities without gross deformities noted. No edema of bilateral lower extremities.  NEURO: Alert and oriented x 3.  Gait steady.  PSYCHIATRIC: Mentation and affect appear normal.  Mood appropriate.    Laboratory:  Component Value Flag Ref Range Units Status Collected Lab   WBC 12.7  High   4.0 - 11.0 10e9/L Final 05/08/2019 12:48 PM HI   RBC Count 4.56   3.8 - 5.2 10e12/L Final 05/08/2019 12:48 PM HI   Hemoglobin 14.3   11.7 - 15.7 g/dL Final 05/08/2019 12:48 PM HI   Hematocrit 41.8   35.0 - 47.0 % Final 05/08/2019 12:48 PM HI   MCV 92   78 - 100 fl Final 05/08/2019 12:48 PM HI   MCH 31.4   26.5 - 33.0 pg Final 05/08/2019 12:48 PM HI   MCHC 34.2   31.5 - 36.5 g/dL Final 05/08/2019 12:48 PM HI   RDW 13.6   10.0 - 15.0 % Final 05/08/2019 12:48 PM HI   Platelet Count 426   150 - 450 10e9/L Final 05/08/2019 12:48 PM HI   Diff Method     Final 05/08/2019 12:48 PM HI   Automated Method    % Neutrophils 74.9    % Final 05/08/2019 12:48 PM HI   % Lymphocytes 16.4    % Final 05/08/2019 12:48 PM HI   % Monocytes 7.7    % Final 05/08/2019 12:48 PM HI   % Eosinophils 0.3    % Final 05/08/2019 12:48 PM HI   % Basophils 0.5    % Final 05/08/2019 12:48 PM HI   % Immature Granulocytes 0.2    % Final 05/08/2019 12:48 PM HI   Nucleated RBCs 0   0 /100  Final 05/08/2019 12:48 PM HI   Absolute Neutrophil 9.5  High   1.6 - 8.3 10e9/L Final 05/08/2019 12:48 PM HI   Absolute Lymphocytes 2.1   0.8 - 5.3 10e9/L Final 05/08/2019 12:48 PM HI   Absolute Monocytes 1.0   0.0 - 1.3 10e9/L Final 05/08/2019 12:48 PM HI   Absolute Eosinophils 0.0   0.0 - 0.7 10e9/L Final 05/08/2019 12:48 PM HI   Absolute Basophils 0.1   0.0 - 0.2 10e9/L Final 05/08/2019 12:48 PM HI   Abs Immature Granulocytes 0.0   0 - 0.4 10e9/L Final 05/08/2019 12:48 PM HI   Absolute Nucleated RBC 0.0     Final 05/08/2019 12:48 PM      Component Value Flag Ref Range Units Status Collected Lab   Sodium 134   133 - 144 mmol/L Final 05/08/2019 12:48 PM HI   Potassium 4.2   3.4 - 5.3 mmol/L Final 05/08/2019 12:48 PM HI   Chloride 101   94 - 109 mmol/L Final 05/08/2019 12:48 PM HI   Carbon Dioxide 29   20 - 32 mmol/L Final 05/08/2019 12:48 PM HI   Anion Gap 4   3 - 14 mmol/L Final 05/08/2019 12:48 PM HI   Glucose 132  High   70 - 99 mg/dL Final 05/08/2019 12:48 PM HI   Urea Nitrogen 22   7 - 30 mg/dL Final 05/08/2019 12:48 PM HI   Creatinine 0.67   0.52 - 1.04 mg/dL Final 05/08/2019 12:48 PM HI   GFR Estimate 81   >60 mL/min/ Final 05/08/2019 12:48 PM HI   Comment:   Non  GFR Calc   Starting 12/18/2018, serum creatinine based estimated GFR (eGFR) will be   calculated using the Chronic Kidney Disease Epidemiology Collaboration   (CKD-EPI) equation.    GFR Estimate If Black >90   >60 mL/min/ Final 05/08/2019 12:48 PM HI   Comment:    GFR Calc   Starting 12/18/2018, serum creatinine based estimated GFR (eGFR) will be   calculated using the Chronic Kidney Disease Epidemiology Collaboration   (CKD-EPI) equation.    Calcium 8.7   8.5 - 10.1 mg/dL Final 05/08/2019 12:48 PM HI   Bilirubin Total 0.4   0.2 - 1.3 mg/dL Final 05/08/2019 12:48 PM HI   Albumin 3.9   3.4 - 5.0 g/dL Final 05/08/2019 12:48 PM HI   Protein Total 8.4   6.8 - 8.8 g/dL Final 05/08/2019 12:48 PM HI   Alkaline Phosphatase  150   40 - 150 U/L Final 05/08/2019 12:48 PM HI   ALT 19   0 - 50 U/L Final 05/08/2019 12:48 PM HI   AST 20   0 - 45 U/L Final 05/08/2019 12:48 PM HI     Component Value Flag Ref Range Units Status Collected Lab   CA 27-29 51  High   0 - 39 U/mL Final 05/08/2019 12:48 PM 51       Imaging Studies:    Results for orders placed or performed during the hospital encounter of 05/08/19   DX Hip/Pelvis/Spine    Narrative    PROCEDURE: DX HIP/PELVIS/SPINE 5/8/2019 1:43 PM    HISTORY: Malignant neoplasm of right breast in female, estrogen  receptor positive, unspecified site of breast (H); Malignant neoplasm  of right breast in female, estrogen receptor positive, unspecified  site of breast (H); Encounter for screening mammogram for breast  cancer; Osteoporosis, unspecified osteoporosis type, unspecified  pathological fracture presence    COMPARISONS: 5/1/2018.    TECHNIQUE: Bone mineral density was calculated in the right hip and in  the lumbar spine.    FINDINGS: Lowest bone mineral density is in the right hip. Bone  mineral density measures 0.638 g/sq cm with a T score -2.5. This  patient is considered osteoporotic according to World Health  Organization guidelines. Bone mineral density has increased 9.3% when  compared to the prior exam. This is a statistically significant  increase.         Impression    IMPRESSION: Osteoporosis with increase in bone mineral density when  compared to the prior exam.    NAVID ADDISON MD       ASSESSMENT/PLAN:    #1 Breast cancer: Diagnosed 4/2010 with stage I carcinoma of the right breast; status post right mastectomy with sentinel node biopsy negative; tumor was 1.2 cm, ER/NH positive, HER-2/nery negative.  She completed 7 years of antiestrogen therapy.  BCI showed low risk and low benefit from extended hormonal therapy so the arimidex was discontinued in January 2017.  CA 27.29 is slightly elevated so would like to repeat this in 4 to 6 weeks and will call her with the plan for  follow-up.  Mammogram has been rescheduled.    #2  Osteoporosis: Continues with osteoporosis but she has had improvement in her bone density since initiating the Prolia.  She will continue with the Prolia every 6 months along with her calcium and vitamin D.  Next DEXA scan will need to be completed in May 2020.    #3 weight loss: I do have some concerns in regard to the amount of weight loss she has had over the past year.  We did discuss possibly obtaining some scans related to the slight elevation of the CA 2729 and her issues with weight loss and patient declines at this time.  We will recheck her CA 2729 in 6 weeks.  We also discussed possible visit with nutrition therapy and increasing her protein supplements.    #4 rib fracture: We discussed her osteoporosis situation and her recent fracture and discussed the need for continuing with her calcium and vitamin D along with the Prolia injections.  She states this seems to be healing and I recommended following up with her PCP with any worsening.  I did offer her something for pain and patient declines.    I encouraged patient to call with any questions or concerns.      Noemy ROMAN, FNP-BC, AOCNP

## 2019-05-14 ENCOUNTER — ONCOLOGY VISIT (OUTPATIENT)
Dept: ONCOLOGY | Facility: OTHER | Age: 84
End: 2019-05-14
Attending: NURSE PRACTITIONER
Payer: MEDICARE

## 2019-05-14 VITALS
HEART RATE: 88 BPM | RESPIRATION RATE: 18 BRPM | WEIGHT: 96.8 LBS | BODY MASS INDEX: 16.13 KG/M2 | TEMPERATURE: 97.5 F | HEIGHT: 65 IN | DIASTOLIC BLOOD PRESSURE: 60 MMHG | SYSTOLIC BLOOD PRESSURE: 100 MMHG | OXYGEN SATURATION: 96 %

## 2019-05-14 DIAGNOSIS — Z85.3 PERSONAL HISTORY OF MALIGNANT NEOPLASM OF BREAST: Primary | ICD-10-CM

## 2019-05-14 DIAGNOSIS — S22.32XD CLOSED FRACTURE OF ONE RIB OF LEFT SIDE WITH ROUTINE HEALING, SUBSEQUENT ENCOUNTER: ICD-10-CM

## 2019-05-14 DIAGNOSIS — Z12.31 SCREENING MAMMOGRAM, ENCOUNTER FOR: ICD-10-CM

## 2019-05-14 DIAGNOSIS — R63.4 WEIGHT LOSS: ICD-10-CM

## 2019-05-14 DIAGNOSIS — M81.0 OSTEOPOROSIS, UNSPECIFIED OSTEOPOROSIS TYPE, UNSPECIFIED PATHOLOGICAL FRACTURE PRESENCE: ICD-10-CM

## 2019-05-14 PROCEDURE — 99214 OFFICE O/P EST MOD 30 MIN: CPT | Performed by: NURSE PRACTITIONER

## 2019-05-14 PROCEDURE — G0463 HOSPITAL OUTPT CLINIC VISIT: HCPCS

## 2019-05-14 ASSESSMENT — PAIN SCALES - GENERAL: PAINLEVEL: MILD PAIN (3)

## 2019-05-14 ASSESSMENT — MIFFLIN-ST. JEOR: SCORE: 894.96

## 2019-05-14 ASSESSMENT — PATIENT HEALTH QUESTIONNAIRE - PHQ9: SUM OF ALL RESPONSES TO PHQ QUESTIONS 1-9: 6

## 2019-05-14 NOTE — NURSING NOTE
"Chief Complaint   Patient presents with     RECHECK     Follow up Malignant neoplasm of right breast in female, estrogen receptor positive, unspecified site of breast        Initial /60   Pulse 88   Temp 97.5  F (36.4  C) (Tympanic)   Resp 18   Ht 1.651 m (5' 5\")   Wt 43.9 kg (96 lb 12.8 oz)   SpO2 96%   BMI 16.11 kg/m   Estimated body mass index is 16.11 kg/m  as calculated from the following:    Height as of this encounter: 1.651 m (5' 5\").    Weight as of this encounter: 43.9 kg (96 lb 12.8 oz).  Medication Reconciliation: complete.  Immunizations and advance directives status reviewed. Pain scale 3/10 left ribs , PHQ-9 = 6..              Lindsay Zaldivar LPN    "

## 2019-05-14 NOTE — PATIENT INSTRUCTIONS
Please come to lab in 1 month for a repeat lab and we will call you with the result and plan for follow up.    If you have any questions please call [phone]    Other instructions:  If you would like a referral to Nutrition Therapy, please let us know and we will send a referral.

## 2019-05-21 DIAGNOSIS — Z85.3 PERSONAL HISTORY OF MALIGNANT NEOPLASM OF BREAST: ICD-10-CM

## 2019-05-21 PROCEDURE — 36415 COLL VENOUS BLD VENIPUNCTURE: CPT | Mod: ZL | Performed by: NURSE PRACTITIONER

## 2019-05-21 PROCEDURE — 86300 IMMUNOASSAY TUMOR CA 15-3: CPT | Mod: ZL | Performed by: NURSE PRACTITIONER

## 2019-05-22 LAB — CANCER AG27-29 SERPL-ACNC: 47 U/ML (ref 0–39)

## 2019-05-28 ENCOUNTER — TELEPHONE (OUTPATIENT)
Dept: FAMILY MEDICINE | Facility: OTHER | Age: 84
End: 2019-05-28

## 2019-05-28 ENCOUNTER — HOSPITAL ENCOUNTER (EMERGENCY)
Facility: HOSPITAL | Age: 84
Discharge: HOME | End: 2019-05-28
Attending: FAMILY MEDICINE | Admitting: FAMILY MEDICINE
Payer: MEDICARE

## 2019-05-28 ENCOUNTER — APPOINTMENT (OUTPATIENT)
Dept: GENERAL RADIOLOGY | Facility: HOSPITAL | Age: 84
End: 2019-05-28
Attending: FAMILY MEDICINE
Payer: MEDICARE

## 2019-05-28 VITALS
OXYGEN SATURATION: 97 % | SYSTOLIC BLOOD PRESSURE: 149 MMHG | DIASTOLIC BLOOD PRESSURE: 96 MMHG | TEMPERATURE: 98 F | HEART RATE: 100 BPM | RESPIRATION RATE: 20 BRPM

## 2019-05-28 DIAGNOSIS — G89.4 CHRONIC PAIN SYNDROME: ICD-10-CM

## 2019-05-28 DIAGNOSIS — E78.5 HYPERLIPIDEMIA LDL GOAL <100: ICD-10-CM

## 2019-05-28 DIAGNOSIS — R10.13 DYSPEPSIA: Primary | ICD-10-CM

## 2019-05-28 LAB
ALBUMIN SERPL-MCNC: 4 G/DL (ref 3.4–5)
ALP SERPL-CCNC: 99 U/L (ref 40–150)
ALT SERPL W P-5'-P-CCNC: 23 U/L (ref 0–50)
ANION GAP SERPL CALCULATED.3IONS-SCNC: 8 MMOL/L (ref 3–14)
AST SERPL W P-5'-P-CCNC: 26 U/L (ref 0–45)
BASOPHILS # BLD AUTO: 0.1 10E9/L (ref 0–0.2)
BASOPHILS NFR BLD AUTO: 0.4 %
BILIRUB SERPL-MCNC: 0.6 MG/DL (ref 0.2–1.3)
BUN SERPL-MCNC: 24 MG/DL (ref 7–30)
CALCIUM SERPL-MCNC: 9.3 MG/DL (ref 8.5–10.1)
CHLORIDE SERPL-SCNC: 99 MMOL/L (ref 94–109)
CO2 SERPL-SCNC: 29 MMOL/L (ref 20–32)
CREAT SERPL-MCNC: 0.72 MG/DL (ref 0.52–1.04)
DIFFERENTIAL METHOD BLD: ABNORMAL
EOSINOPHIL # BLD AUTO: 0 10E9/L (ref 0–0.7)
EOSINOPHIL NFR BLD AUTO: 0.3 %
ERYTHROCYTE [DISTWIDTH] IN BLOOD BY AUTOMATED COUNT: 13.2 % (ref 10–15)
GFR SERPL CREATININE-BSD FRML MDRD: 77 ML/MIN/{1.73_M2}
GLUCOSE SERPL-MCNC: 93 MG/DL (ref 70–99)
HCT VFR BLD AUTO: 42.3 % (ref 35–47)
HGB BLD-MCNC: 14.7 G/DL (ref 11.7–15.7)
IMM GRANULOCYTES # BLD: 0 10E9/L (ref 0–0.4)
IMM GRANULOCYTES NFR BLD: 0.4 %
INR PPP: 2.68 (ref 0.8–1.2)
LACTATE BLD-SCNC: 0.8 MMOL/L (ref 0.7–2)
LYMPHOCYTES # BLD AUTO: 2 10E9/L (ref 0.8–5.3)
LYMPHOCYTES NFR BLD AUTO: 17.3 %
MCH RBC QN AUTO: 31.5 PG (ref 26.5–33)
MCHC RBC AUTO-ENTMCNC: 34.8 G/DL (ref 31.5–36.5)
MCV RBC AUTO: 91 FL (ref 78–100)
MONOCYTES # BLD AUTO: 0.9 10E9/L (ref 0–1.3)
MONOCYTES NFR BLD AUTO: 7.5 %
NEUTROPHILS # BLD AUTO: 8.4 10E9/L (ref 1.6–8.3)
NEUTROPHILS NFR BLD AUTO: 74.1 %
NRBC # BLD AUTO: 0 10*3/UL
NRBC BLD AUTO-RTO: 0 /100
PLATELET # BLD AUTO: 382 10E9/L (ref 150–450)
POTASSIUM SERPL-SCNC: 4.5 MMOL/L (ref 3.4–5.3)
PROT SERPL-MCNC: 8.6 G/DL (ref 6.8–8.8)
RBC # BLD AUTO: 4.67 10E12/L (ref 3.8–5.2)
SODIUM SERPL-SCNC: 136 MMOL/L (ref 133–144)
WBC # BLD AUTO: 11.4 10E9/L (ref 4–11)

## 2019-05-28 PROCEDURE — 36415 COLL VENOUS BLD VENIPUNCTURE: CPT | Performed by: FAMILY MEDICINE

## 2019-05-28 PROCEDURE — 83605 ASSAY OF LACTIC ACID: CPT | Performed by: FAMILY MEDICINE

## 2019-05-28 PROCEDURE — 99284 EMERGENCY DEPT VISIT MOD MDM: CPT

## 2019-05-28 PROCEDURE — 85025 COMPLETE CBC W/AUTO DIFF WBC: CPT | Performed by: FAMILY MEDICINE

## 2019-05-28 PROCEDURE — 99284 EMERGENCY DEPT VISIT MOD MDM: CPT | Mod: Z6 | Performed by: FAMILY MEDICINE

## 2019-05-28 PROCEDURE — 74019 RADEX ABDOMEN 2 VIEWS: CPT | Mod: TC

## 2019-05-28 PROCEDURE — 85610 PROTHROMBIN TIME: CPT | Performed by: FAMILY MEDICINE

## 2019-05-28 PROCEDURE — 80053 COMPREHEN METABOLIC PANEL: CPT | Performed by: FAMILY MEDICINE

## 2019-05-28 RX ORDER — CIMETIDINE 400 MG
400 TABLET ORAL 2 TIMES DAILY
Qty: 60 TABLET | Refills: 1 | Status: SHIPPED | OUTPATIENT
Start: 2019-05-28 | End: 2019-06-06

## 2019-05-28 ASSESSMENT — ENCOUNTER SYMPTOMS
DIFFICULTY URINATING: 0
ARTHRALGIAS: 0
HEADACHES: 0
SHORTNESS OF BREATH: 0
ABDOMINAL PAIN: 0
COLOR CHANGE: 0
CONFUSION: 0
FEVER: 0
EYE REDNESS: 0
NECK STIFFNESS: 0

## 2019-05-28 NOTE — ED NOTES
"Pt states she has had abdominal pain \"for more than a month\", \"years ago this happened and they gave me tagamet and it went away\". \"I don't have any appetite and when I eat I feel full and I have lost a lot of weight recently\". Pt states she feels she has been understress at home \"I am worried about my \".  "

## 2019-05-28 NOTE — TELEPHONE ENCOUNTER
9:00 AM    Reason for Call: OVERBOOK    Patient is having the following symptoms: fell and cracked a rib // cough  for 1 months.    The patient is requesting an appointment for ASAP with Chandrika Kumar.    Was an appointment offered for this call? No  If yes : Appointment type              Date    Preferred method for responding to this message: Telephone Call  What is your phone number ?[phone]    If we cannot reach you directly, may we leave a detailed response at the number you provided? Yes    Can this message wait until your PCP/provider returns, if unavailable today? Not applicable, pcp is in     [facility]     Backpain  Laminectomy  Hip Joint Replacement  left hip replacement  Knee Problem  Arthroscopy left knee

## 2019-05-28 NOTE — TELEPHONE ENCOUNTER
2:40 PM    Reason for Call: OVERBOOK    Patient is having the following symptoms: follow up ER  for 1 days.    The patient is requesting an appointment for ASAP with Chandrika Kumar.    Was an appointment offered for this call? No  If yes : Appointment type              Date    Preferred method for responding to this message: Telephone Call  What is your phone number ?[phone]    If we cannot reach you directly, may we leave a detailed response at the number you provided? Yes    Can this message wait until your PCP/provider returns, if unavailable today? Yes pcp is out     Cristy Stanley

## 2019-05-28 NOTE — ED PROVIDER NOTES
History     Chief Complaint   Patient presents with     Abdominal Pain     Abdominal pain off and on for one month, worse with eating.     HPI  Michael Christiansen is a 83 year old woman who presents with mild, intermittent abdominal pain over the last month that is worse with eating. Taking Tagamet several years ago helped with a similar pain. She met with the ER SW today and notes about a 40 pound weight loss over the last several months. (She reports weighing 145 pounds about 6 months ago and weights 95.7 pounds today.) She just doesn't feel hungry, but drinks Ensure shakes when she can. The stress of caring for her  with Parkinson Disease is overwhelming and she just doesn't want to eat anymore. She relays that they don't qualify for any more help than the minimal day nurse care there receive, but she promised her  that she wouldn't let him go to a nursing home. She has no fevers/chills or night sweats. She called for a PCP appointment, but couldn't get in so she came to the ER.    Allergies:  Allergies   Allergen Reactions     Atenolol Shortness Of Breath and Other (See Comments)     Dizziness  Severe vertigo and dizziness/SOB     Lisinopril Cough     Augmentin Diarrhea     Pollen Extract      Other reaction(s): Runny Nose       Problem List:    Patient Active Problem List    Diagnosis Date Noted     LISA (generalized anxiety disorder) 11/26/2018     Priority: Medium     Anticoagulated on Coumadin 06/13/2018     Priority: Medium     Pulmonary hypertension-moderate 12/20/2017     Priority: Medium     Aortic stenosis, moderate 12/20/2017     Priority: Medium     Moderate tricuspid insufficiency 12/20/2017     Priority: Medium     Malignant neoplasm of right breast in female, estrogen receptor positive, unspecified site of breast (H) 08/02/2017     Priority: Medium     ACP (advance care planning) 01/26/2017     Priority: Medium     Advance Care Planning 3/27/2017: Receipt of ACP document:  Received:  invalid Advance Directive document dated 2-20-16 due to the document has not been signed.  Document not previously scanned. Validation form completed and sent to be scanned indicating invalid document. Letter sent to client with information and facilitation resources.  Confirmed/documented designated decision maker(s).  Added by Kerry Pichardo RN, System Director ACP-Honoring Choices   Advance Care Planning 1/26/2017: ACP Review of Chart / Resources Provided:  Reviewed chart for advance care plan.  Michael Christiansen has been provided information and resources to begin or update their advance care plan.  Added by Iris Bennett         Osteoporosis 01/26/2017     Priority: Medium     Long-term (current) use of anticoagulants [Z79.01] 07/27/2016     Priority: Medium     History of total knee replacement 01/26/2015     Priority: Medium     Chronic pain syndrome 01/23/2015     Priority: Medium     Acute blood loss anemia 07/19/2013     Priority: Medium     Permanent atrial fibrillation (H) 05/01/2013     Priority: Medium     Overview:   Chronic        Hyperlipidemia LDL goal <130 03/25/2013     Priority: Medium     Essential hypertension 03/25/2013     Priority: Medium     Osteoarthritis 03/25/2013     Priority: Medium     Lumbar pain with radiation down right leg 07/05/2011     Priority: Medium     Overview:   IMO Update 10/11       Lumbar spinal stenosis 07/05/2011     Priority: Medium     Radiculitis 06/16/2011     Priority: Medium     Overview:   IMO Update 10/11       Osteoarthritis of knee 09/16/2010     Priority: Medium     Overview:   IMO Update 10/11          Past Medical History:    Past Medical History:   Diagnosis Date     Arthritis      Backache, unspecified 1/1/2011     Breast cancer, stage 1 3/19/2010     Chronic pain syndrome 1/23/2015     Claustrophobia 1/1/2011     Hip joint replacement by other means 1/1/2011     Osteoarthrosis, unspecified whether generalized or localized, lower leg 8/14/2006      Posttraumatic stress disorder 2011       Past Surgical History:    Past Surgical History:   Procedure Laterality Date     BREAST SURGERY      right  side mastectomy     C TOTAL KNEE ARTHROPLASTY Left 01/26/15     cataract extraction and lens implantation       COLONOSCOPY       EYE SURGERY       GYN SURGERY      tubal pregnancy     ORTHOPEDIC SURGERY      l. hip replacement LT     ORTHOPEDIC SURGERY  2013    Right total knee     TUBAL/ECTOPIC PREGNANCY         Family History:    Family History   Problem Relation Age of Onset     Diabetes Father 75        cause of death     Other - See Comments Father         PVD     Heart Disease Sister      Coronary Artery Disease Sister      Other - See Comments Mother 83        old age; cause of death     Other - See Comments Sister         14 year twin [employer]; cause of death     Chronic Obstructive Pulmonary Disease Daughter      Osteoporosis Daughter      Thyroid Disease Daughter      Cancer Sister      Other Cancer Sister      Other Cancer Sister         uterine     Hypertension No family hx of      Hyperlipidemia No family hx of      Cerebrovascular Disease No family hx of      Breast Cancer No family hx of      Prostate Cancer No family hx of      Colon Cancer No family hx of      Asthma No family hx of      Anesthesia Reaction No family hx of      Genetic Disorder No family hx of        Social History:  Marital Status:   [2]  Social History     Tobacco Use     Smoking status: Former Smoker     Packs/day: 0.30     Years: 30.00     Pack years: 9.00     Types: Cigarettes     Last attempt to quit: 1987     Years since quittin.8     Smokeless tobacco: Never Used     Tobacco comment: year quit    Substance Use Topics     Alcohol use: No     Drug use: No        Medications:      cimetidine (TAGAMET) 400 MG tablet   Acetaminophen (TYLENOL PO)   albuterol (PROAIR HFA/PROVENTIL HFA/VENTOLIN HFA) 108 (90 Base) MCG/ACT inhaler    Calcium Citrate-Vitamin D (CALCITRATE/VITAMIN D PO)   diltiazem (DILACOR XR) 180 MG 24 hr capsule   losartan (COZAAR) 50 MG tablet   Misc Natural Products (OSTEO BI-FLEX ADV DOUBLE ST PO)   Multiple Vitamin (MULTIVITAMINS PO)   Multiple Vitamins-Minerals (PRESERVISION AREDS 2 PO)   order for DME   sertraline (ZOLOFT) 25 MG tablet   simvastatin (ZOCOR) 40 MG tablet   traMADol (ULTRAM) 50 MG tablet   warfarin (COUMADIN) 5 MG tablet         Review of Systems   Constitutional: Negative for fever.   HENT: Negative for congestion.    Eyes: Negative for redness.   Respiratory: Negative for shortness of breath.    Cardiovascular: Negative for chest pain.   Gastrointestinal: Negative for abdominal pain.   Genitourinary: Negative for difficulty urinating.   Musculoskeletal: Negative for arthralgias and neck stiffness.   Skin: Negative for color change.   Neurological: Negative for headaches.   Psychiatric/Behavioral: Negative for confusion.       Physical Exam   BP: 149/96  Pulse: 100  Temp: 98  F (36.7  C)  Resp: 20  SpO2: 97 %      Physical Exam    General Appearance: well-developed, well-nourished, alert & oriented, no apparent distress.    HEENT: atraumatic. Pupils equal, round & reactive to light, extraocular movements intact. Bilateral ear canals clear. Nose without rhinorrhea or epistaxis. Clear oropharynx. Moist mucous membranes.    Neck: normal inspection, non-tender, full & painless ROM, supple, no lymphadenopathy or nuchal rigidity. No jugular venous distension.    Cardiovascular: regular rate, rhythm, normal S1 & S2, no murmurs, rubs or gallops.    Respiratory: clear lung sounds with good air entry, no wheezes rales or rhonchi, no acute respiratory distress.    Gastrointestinal: normal inspection, normal bowel sounds, non-tender, no masses or organomegaly.    Extremities: normal inspection, 2+/4+ pulses bilaterally, normal & painless ROM, non-tender, joints normal.    Neurologic: CN II - XII intact, no  motor/sensory deficit.    Skin: normal color, no skin rash.    ED Course      Procedures           Results for orders placed or performed during the hospital encounter of 05/28/19 (from the past 24 hour(s))   CBC with platelets differential   Result Value Ref Range    WBC 11.4 (H) 4.0 - 11.0 10e9/L    RBC Count 4.67 3.8 - 5.2 10e12/L    Hemoglobin 14.7 11.7 - 15.7 g/dL    Hematocrit 42.3 35.0 - 47.0 %    MCV 91 78 - 100 fl    MCH 31.5 26.5 - 33.0 pg    MCHC 34.8 31.5 - 36.5 g/dL    RDW 13.2 10.0 - 15.0 %    Platelet Count 382 150 - 450 10e9/L    Diff Method Automated Method     % Neutrophils 74.1 %    % Lymphocytes 17.3 %    % Monocytes 7.5 %    % Eosinophils 0.3 %    % Basophils 0.4 %    % Immature Granulocytes 0.4 %    Nucleated RBCs 0 0 /100    Absolute Neutrophil 8.4 (H) 1.6 - 8.3 10e9/L    Absolute Lymphocytes 2.0 0.8 - 5.3 10e9/L    Absolute Monocytes 0.9 0.0 - 1.3 10e9/L    Absolute Eosinophils 0.0 0.0 - 0.7 10e9/L    Absolute Basophils 0.1 0.0 - 0.2 10e9/L    Abs Immature Granulocytes 0.0 0 - 0.4 10e9/L    Absolute Nucleated RBC 0.0    Comprehensive metabolic panel   Result Value Ref Range    Sodium 136 133 - 144 mmol/L    Potassium 4.5 3.4 - 5.3 mmol/L    Chloride 99 94 - 109 mmol/L    Carbon Dioxide 29 20 - 32 mmol/L    Anion Gap 8 3 - 14 mmol/L    Glucose 93 70 - 99 mg/dL    Urea Nitrogen 24 7 - 30 mg/dL    Creatinine 0.72 0.52 - 1.04 mg/dL    GFR Estimate 77 >60 mL/min/[1.73_m2]    GFR Estimate If Black 89 >60 mL/min/[1.73_m2]    Calcium 9.3 8.5 - 10.1 mg/dL    Bilirubin Total 0.6 0.2 - 1.3 mg/dL    Albumin 4.0 3.4 - 5.0 g/dL    Protein Total 8.6 6.8 - 8.8 g/dL    Alkaline Phosphatase 99 40 - 150 U/L    ALT 23 0 - 50 U/L    AST 26 0 - 45 U/L   INR   Result Value Ref Range    INR 2.68 (H) 0.80 - 1.20   Lactic acid whole blood   Result Value Ref Range    Lactic Acid 0.8 0.7 - 2.0 mmol/L   XR Abdomen 2 Views    Narrative    PROCEDURE: XR ABDOMEN 2 VW 5/28/2019 2:46 PM    HISTORY: abdominal  pain    COMPARISONS: None.    TECHNIQUE: LAT and upright    FINDINGS: The intestinal gas pattern is normal. There is no  extraluminal gas. There are compression fractures of several thoracic  and lumbar vertebrae.         Impression    IMPRESSION: Normal abdominal gas pattern    NARDA WHITE MD       Medications - No data to display    Assessments & Plan (with Medical Decision Making)   The patient is an 83 year old woman who presents with dyspepsia.    1) Dyspepsia - patient responded well to treatment with cimetidine which will be prescribed again. Patient's anxiety occurs with caring for her  and the stress is likely causing her weight loss due to lack of appetite. She is not interested in pursuing NH care for her . Patient will further discuss care possibilities with her PCP.    Plan for PCP follow-up in 5 - 7 days regarding this ER visit. The patient verbalizes agreement with this plan as well as to immediately return to the ER with any new/worsening S/Sx.      I have reviewed the nursing notes.    I have reviewed the findings, diagnosis, plan and need for follow up with the patient.       Medication List      Started    cimetidine 400 MG tablet  Commonly known as:  TAGAMET  400 mg, Oral, 2 TIMES DAILY            Final diagnoses:   Dyspepsia       5/28/2019   [facility]     Michael Donald MD  05/28/19 [id number]

## 2019-05-29 ENCOUNTER — ANTICOAGULATION THERAPY VISIT (OUTPATIENT)
Dept: ANTICOAGULATION | Facility: OTHER | Age: 84
End: 2019-05-29

## 2019-05-29 DIAGNOSIS — I48.21 PERMANENT ATRIAL FIBRILLATION (H): ICD-10-CM

## 2019-05-29 NOTE — PROGRESS NOTES
ANTICOAGULATION FOLLOW-UP CLINIC VISIT    Patient Name:  Michael Christiansen  Date:  2019  Contact Type:  Telephone    SUBJECTIVE:  Patient Findings     Comments:   INR done on 19 when patient in ER.  Call placed to patient and we discussed INR result, warfarin dosing and INR recheck date. She verbalized understanding and has no questions. She states that the ER MD put her on Tagamet yesterday. We discussed that she should not take warfarin within an hour of the tagamet. She has no bleeding/bruising and no other changes in diet/meds/activity. She states she is still having some abdominal pain but a little better.         Clinical Outcomes     Negatives:   Major bleeding event, Thromboembolic event, Anticoagulation-related hospital admission, Anticoagulation-related ED visit, Anticoagulation-related fatality    Comments:   INR done on 19 when patient in ER.  Call placed to patient and we discussed INR result, warfarin dosing and INR recheck date. She verbalized understanding and has no questions. She states that the ER MD put her on Tagamet yesterday. We discussed that she should not take warfarin within an hour of the tagamet. She has no bleeding/bruising and no other changes in diet/meds/activity. She states she is still having some abdominal pain but a little better.            OBJECTIVE    INR   Date Value Ref Range Status   2019 2.68 (H) 0.80 - 1.20 Final       ASSESSMENT / PLAN  INR assessment THER    Recheck INR In: 6 WEEKS    INR Location Clinic      Anticoagulation Summary  As of 2019    INR goal:   2.0-3.0   TTR:   61.9 % (2.8 y)   INR used for dosin.68 (2019)   Warfarin maintenance plan:   2.5 mg (5 mg x 0.5) every Mon, Fri; 5 mg (5 mg x 1) all other days   Full warfarin instructions:   2.5 mg every Mon, Fri; 5 mg all other days   Weekly warfarin total:   30 mg   No change documented:   Kasia Hercules RN   Plan last modified:   Kasia Hercules RN (10/1/2018)   Next INR  check:   7/9/2019   Priority:   INR   Target end date:   Indefinite    Indications    Long-term (current) use of anticoagulants [Z79.01] [Z79.01]  Permanent atrial fibrillation (H) [I48.2]             Anticoagulation Episode Summary     INR check location:       Preferred lab:       Send INR reminders to:   [facility] ANTICOAG POOL    Comments:         Anticoagulation Care Providers     Provider Role Specialty Phone number    Chandrika Kumar, PA [facility] [phone]            See the Encounter Report to view Anticoagulation Flowsheet and Dosing Calendar (Go to Encounters tab in chart review, and find the Anticoagulation Therapy Visit)        Kasia Hercules RN

## 2019-05-29 NOTE — TELEPHONE ENCOUNTER
simvastatin  Last Written Prescription Date: 2/19/19  Last Fill Quantity: 45 # of Refills: 0  Last Office Visit: 11/26/18

## 2019-05-29 NOTE — TELEPHONE ENCOUNTER
ultram  Last Written Prescription Date: 5/8/19  Last Fill Quantity: 100 # of Refills: 0  Last Office Visit: 11/26/18

## 2019-05-30 RX ORDER — SIMVASTATIN 40 MG
TABLET ORAL
Qty: 45 TABLET | Refills: 0 | Status: SHIPPED | OUTPATIENT
Start: 2019-05-30 | End: 2019-08-28

## 2019-05-31 RX ORDER — TRAMADOL HYDROCHLORIDE 50 MG/1
TABLET ORAL
Qty: 100 TABLET | Refills: 0 | Status: SHIPPED | OUTPATIENT
Start: 2019-05-31 | End: 2019-06-25

## 2019-06-01 DIAGNOSIS — G89.4 CHRONIC PAIN SYNDROME: ICD-10-CM

## 2019-06-03 ENCOUNTER — HOSPITAL ENCOUNTER (OUTPATIENT)
Dept: CARDIOLOGY | Facility: HOSPITAL | Age: 84
Discharge: HOME | End: 2019-06-03
Attending: INTERNAL MEDICINE | Admitting: INTERNAL MEDICINE
Payer: MEDICARE

## 2019-06-03 DIAGNOSIS — I07.1 MODERATE TRICUSPID INSUFFICIENCY: ICD-10-CM

## 2019-06-03 DIAGNOSIS — I48.20 CHRONIC ATRIAL FIBRILLATION (H): ICD-10-CM

## 2019-06-03 DIAGNOSIS — I27.20 PULMONARY HYPERTENSION (H): ICD-10-CM

## 2019-06-03 DIAGNOSIS — I35.0 AORTIC STENOSIS, MODERATE: ICD-10-CM

## 2019-06-03 PROCEDURE — 93306 TTE W/DOPPLER COMPLETE: CPT | Mod: 26 | Performed by: INTERNAL MEDICINE

## 2019-06-03 PROCEDURE — 93306 TTE W/DOPPLER COMPLETE: CPT | Mod: TC

## 2019-06-04 RX ORDER — TRAMADOL HYDROCHLORIDE 50 MG/1
TABLET ORAL
Qty: 100 TABLET | Refills: 0 | OUTPATIENT
Start: 2019-06-04

## 2019-06-04 NOTE — PROGRESS NOTES
Subjective     Michael Christiansen is a 83 year old female who presents to clinic today for the following health issues:    HPI   ED/UC Followup:    [facility]  Date of visit: 19  Reason for visit: dyspepsia  Current Status: continued symptoms           Patient Active Problem List   Diagnosis     Hyperlipidemia LDL goal <130     Essential hypertension     Osteoarthritis     Permanent atrial fibrillation (H)     Chronic pain syndrome     History of total knee replacement     Radiculitis     Osteoarthritis of knee     Long-term (current) use of anticoagulants [Z79.01]     ACP (advance care planning)     Osteoporosis     Malignant neoplasm of right breast in female, estrogen receptor positive, unspecified site of breast (H)     Pulmonary hypertension-moderate     Aortic stenosis, moderate     Moderate tricuspid insufficiency     Anticoagulated on Coumadin     LISA (generalized anxiety disorder)     Acute blood loss anemia     Lumbar pain with radiation down right leg     Lumbar spinal stenosis     Protein-calorie malnutrition (H)     Past Surgical History:   Procedure Laterality Date     BREAST SURGERY      right  side mastectomy     C TOTAL KNEE ARTHROPLASTY Left 01/26/15     cataract extraction and lens implantation       COLONOSCOPY       EYE SURGERY       GYN SURGERY      tubal pregnancy     ORTHOPEDIC SURGERY      l. hip replacement LT     ORTHOPEDIC SURGERY  2013    Right total knee     TUBAL/ECTOPIC PREGNANCY         Social History     Tobacco Use     Smoking status: Former Smoker     Packs/day: 0.30     Years: 30.00     Pack years: 9.00     Types: Cigarettes     Last attempt to quit: 1987     Years since quittin.8     Smokeless tobacco: Never Used     Tobacco comment: year quit    Substance Use Topics     Alcohol use: No     Family History   Problem Relation Age of Onset     Diabetes Father 75        cause of death     Other - See Comments Father         PVD     Heart Disease  Sister      Coronary Artery Disease Sister      Other - See Comments Mother 83        old age; cause of death     Other - See Comments Sister         14 year twin pow [facility]; cause of death     Chronic Obstructive Pulmonary Disease Daughter      Osteoporosis Daughter      Thyroid Disease Daughter      Cancer Sister      Other Cancer Sister      Other Cancer Sister         uterine     Hypertension No family hx of      Hyperlipidemia No family hx of      Cerebrovascular Disease No family hx of      Breast Cancer No family hx of      Prostate Cancer No family hx of      Colon Cancer No family hx of      Asthma No family hx of      Anesthesia Reaction No family hx of      Genetic Disorder No family hx of          Current Outpatient Medications   Medication Sig Dispense Refill     Acetaminophen (TYLENOL PO) Take 500 mg by mouth every 4 hours as needed        albuterol (PROAIR HFA/PROVENTIL HFA/VENTOLIN HFA) 108 (90 Base) MCG/ACT inhaler Inhale 2 puffs into the lungs every 6 hours as needed for shortness of breath / dyspnea or wheezing 1 Inhaler 1     Calcium Citrate-Vitamin D (CALCITRATE/VITAMIN D PO) Take 1 tablet by mouth 2 times daily        diltiazem (DILACOR XR) 180 MG 24 hr capsule Take 1 capsule (180 mg) by mouth daily 30 capsule 11     losartan (COZAAR) 50 MG tablet TAKE 1 TABLET BY MOUTH ONCE DAILY 90 tablet 2     Misc Natural Products (OSTEO BI-FLEX ADV DOUBLE ST PO) Take  by mouth daily.       Multiple Vitamin (MULTIVITAMINS PO) Take 1 tablet by mouth daily        Multiple Vitamins-Minerals (PRESERVISION AREDS 2 PO)        order for DME 4 wheeled walker 1 each 0     sertraline (ZOLOFT) 25 MG tablet Take 1 tablet (25 mg) by mouth daily 30 tablet 3     simvastatin (ZOCOR) 40 MG tablet TAKE ONE-HALF TABLET BY MOUTH EVERY EVENING 45 tablet 0     traMADol (ULTRAM) 50 MG tablet TAKE 1 TO 2 TABLETS BY MOUTH EVERY 4 HOURS AS NEEDED FOR PAIN -MAXIMUM  OF  8  TABLETS  PER   tablet 0     warfarin  "(COUMADIN) 5 MG tablet 2.5mg Mon/Fri and 5mg  all other days or as directed by warfarin clinic 100 tablet 3     Allergies   Allergen Reactions     Atenolol Shortness Of Breath and Other (See Comments)     Dizziness  Severe vertigo and dizziness/SOB     Lisinopril Cough     Augmentin Diarrhea     Pollen Extract      Other reaction(s): Runny Nose     Recent Labs   Lab Test 05/28/19  1424 05/08/19  1248 12/31/18  1007 11/14/18  1002  06/21/17  1025  10/27/14  1450   LDL  --   --  60  --   --  77  --   --    HDL  --   --  69  --   --  76  --   --    TRIG  --   --  63  --   --  61  --   --    ALT 23 19  --  19   < >  --    < > 22   CR 0.72 0.67 0.87 0.77   < >  --    < > 0.92   GFRESTIMATED 77 81 61 71   < >  --    < > 59*   GFRESTBLACK 89 >90 71 86   < >  --    < > 71   POTASSIUM 4.5 4.2  --  4.1   < >  --    < > 4.1   TSH  --   --   --   --   --   --   --  0.56    < > = values in this interval not displayed.      Annual Wellness Visit    Are you in the first 12 months of your Medicare Part B coverage?  No    Physical Health:    In general, how would you rate your overall physical health? poor    If you drink alcohol do you typically have >3 drinks per day or >7 drinks per week? No    Do you usually eat at least 4 servings of fruit and vegetables a day, include whole grains & fiber and avoid regularly eating high fat or \"junk\" foods? Yes    Needs assistance for the following daily activities: no assistance needed    Which of the following safety concerns are present in your home?  none identified     Hearing impairment: No    In the past 6 months, have you been bothered by leaking of urine? no    Mental Health:    In general, how would you rate your overall mental or emotional health? fair  PHQ-2 Score: 2    Do you feel safe in your environment? Yes and caring for her disabled .     Do you have a Health Care Directive? No: Advance care planning reviewed with patient; information given to patient to " "review.    Fall risk:  Fallen 2 or more times in the past year?: No  Any fall with injury in the past year?: Yes  Timed Up and Go Test (>13.5 is fall risk; contact physician) : 9    Cognitive Screenin) Repeat 3 items (Leader, Season, Table)    2) Clock draw:   3) 3 item recall: Recalls 3 objects  Results: NORMAL clock, 1-2 items recalled: COGNITIVE IMPAIRMENT LESS LIKELY    Mini-CogTM Copyright MARVIN Marie. Licensed by the author for use in [facility]; reprinted with permission (oumar@[facility]). All rights reserved.      Current providers sharing in care for this patient include:   Patient Care Team:  Chandrika Kumar PA as PCP - Kasia Harvey RN as Registered Nurse  Cam Kirby, DO as MD (Cardiology)  Chandrika Kumar PA as Assigned PCP        Do you have sleep apnea, excessive snoring or daytime drowsiness?: no  Reviewed and updated as needed this visit by Provider         Review of Systems   ROS COMP: Constitutional, HEENT, cardiovascular, pulmonary, GI, , musculoskeletal, neuro, skin, endocrine and psych systems are negative, except as otherwise noted.      Objective    /68   Pulse 88   Temp 97.9  F (36.6  C) (Tympanic)   Resp 20   Ht 1.626 m (5' 4\")   Wt 43 kg (94 lb 12.8 oz)   SpO2 95%   BMI 16.27 kg/m    Body mass index is 16.27 kg/m .  Physical Exam   GENERAL: healthy, alert and no distress. Very thin appearing. Just had her hair done today.   EYES: Eyes grossly normal to inspection, PERRL and conjunctivae and sclerae normal  HENT: ear canals and TM's normal, nose and mouth without ulcers or lesions  NECK: no adenopathy, no asymmetry, masses, or scars and thyroid normal to palpation  RESP: lungs clear to auscultation - no rales, rhonchi or wheezes  BREAST: normal without masses, tenderness or nipple discharge and no palpable axillary masses or adenopathy  CV: irregular rate and loud murmur across chest.  Legs without edema.  Pulses +1. 4  ABDOMEN: soft, " nontender, no hepatosplenomegaly, no masses and bowel sounds normal  MS: severe OA of her back and pain in ribs. Old fracture rib on right is still painful from last   SKIN: no suspicious lesions or rashes  NEURO: Normal strength and tone, mentation intact and speech normal  PSYCH: mentation appears normal, affect normal/bright    Diagnostic Test Results:  Labs reviewed in Epic  Results for orders placed or performed during the hospital encounter of 19   Echocardiogram Complete    Narrative    [id number]  [id number]  [id number]  [id number]^PATITO^PHANI^SIGRID           [facility]  Echocardiography Laboratory  [address] [place] [id number]     Name: KIMMIE AMAYA  MRN: [id number]  : 1935  Study Date: 2019 09:57 AM  Age: 83 yrs  Gender: Female  Patient Location: [facility]  Reason For Study: Pulmonary hypertension (H), Aortic stenosis, moderate,  Moderate  History: pulmonary HTN  Ordering Physician: PHANI CAPUTO  Referring Physician: PHANI CAPUTO  Performed By: Darlene Banerjee     BSA: 1.4 m2  Height: 63 in  Weight: 95 lb  _____________________________________________________________________________  __     _____________________________________________________________________________  __        Interpretation Summary  No pericardial effusion is present.  Global and regional left ventricular function is normal with an EF of 60-65%.  The right ventricle is normal size.  Mild left atrial enlargement is present.  Mild right atrial enlargement is present.  Mild mitral annular calcification is present.  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  Mild mitral insufficiency is present.  Moderate aortic valve calcification is present.  Mild aortic insufficiency is present.  The peak aortic velocity is 3.66 m/sec.  Peak AoV pressure gradient 53.7  The mean gradient across the aortic valve is32.4 mmHg.  Moderate to severe aortic stenosis is  present.  Moderate tricuspid insufficiency is present.  Right ventricular systolic pressure is 50mmHg above the right atrial pressure.  The pulmonic valve is normal.  The aorta root is normal.  _____________________________________________________________________________  __        Left Ventricle  Global and regional left ventricular function is normal with an EF of 60-65%.     Right Ventricle  The right ventricle is normal size.     Atria  Mild left atrial enlargement is present. Mild right atrial enlargement is  present.     Mitral Valve  Mild mitral annular calcification is present. Rheumatic mitral valve disease  is present with moderate calcification. Mild mitral insufficiency is present.     Aortic Valve  Moderate aortic valve calcification is present. Mild aortic insufficiency is  present. The peak aortic velocity is 3.66 m/sec. The peak AoV pressure  gradient is 53.7 mmHg. The mean AoV pressure gradient is 32.4 mmHg. Peak AoV  pressure gradient 53.7. The mean gradient across the aortic valve is32.4 mmHg.  The calculated aortic valve are is 0.88 cm^2. Moderate to severe aortic  stenosis is present.        Tricuspid Valve  Moderate tricuspid insufficiency is present. Right ventricular systolic  pressure is 50mmHg above the right atrial pressure.     Pulmonic Valve  The pulmonic valve is normal.     Vessels  The aorta root is normal.     Pericardium  No pericardial effusion is present.     _____________________________________________________________________________  __  MMode/2D Measurements & Calculations  RVDd: 3.2 cm  IVSd: 0.83 cm  IVSs: 1.5 cm  LVIDd: 4.8 cm  LVIDs: 3.1 cm  LVPWd: 0.83 cm  LVPWs: 1.5 cm  FS: 34.8 %  LV mass(C)d: 129.9 grams  LV mass(C)dI: 92.2 grams/m2  LV mass(C)sI: 115.2 grams/m2  Ao root diam: 3.5 cm  LA dimension: 4.2 cm  LA/Ao: 1.2  LVOT diam: 2.0 cm  LVOT area: 3.0 cm2     RWT: 0.35     Time Measurements  Pulm. HR: 172.3 BPM     Doppler Measurements & Calculations  Ao V2 max: 366.5  cm/sec  Ao max P.7 mmHg  Ao V2 mean: 271.9 cm/sec  Ao mean P.4 mmHg  Ao V2 VTI: 89.9 cm  GLEN(I,D): 0.88 cm2  GLEN(V,D): 0.86 cm2  AI P1/2t: 583.6 msec  LV V1 max P.4 mmHg  LV V1 max: 105.0 cm/sec  LV V1 VTI: 26.4 cm  CO(LVOT): 14.3 l/min  CI(LVOT): 10.2 l/min/m2  SV(LVOT): 79.0 ml  SI(LVOT): 56.1 ml/m2  TV max P.4 mmHg  PA V2 max: 192.4 cm/sec  PA max P.8 mmHg  PA mean PG: 3.2 mmHg  PA V2 VTI: 28.8 cm  TR max kraig: 354.9 cm/sec  TR max P.4 mmHg  AV Kraig Ratio (DI): 0.29  GLEN Index (cm2/m2): 0.62        _____________________________________________________________________________  __        Report approved by: Shira Aaron 2019 08:56 PM              Assessment & Plan     1. Moderate protein-calorie malnutrition (H)  Severe weight loss. Discussion on her getting a scope. Not sure she is wanting one. She has no appetite and thinks it is maybe getting better.  Worries all the time. Not taking Zoloft like she should be.     2. Unexplained weight loss  Possible scope upper and low due to weight loss.   - GENERAL SURG ADULT REFERRAL    3. LISA (generalized anxiety disorder)  Start back on daily at bedtime. Only taking it prn which is not helpful. She is going to start this again and will see her back in a month.   - sertraline (ZOLOFT) 25 MG tablet; Take 1 tablet (25 mg) by mouth daily  Dispense: 30 tablet; Refill: 3    4. Valvular heart disease  Aortic Valve is moderate to severe Mitral and tricuspid are also affected.  Has fallen a few times denies syncope. Heart rate is irregular and is ok on rate.           See Patient Instructions    No follow-ups on file.    MELISA Gillespie  [facility]

## 2019-06-05 DIAGNOSIS — I07.1 MODERATE TRICUSPID INSUFFICIENCY: ICD-10-CM

## 2019-06-05 DIAGNOSIS — I27.20 PULMONARY HYPERTENSION (H): Primary | ICD-10-CM

## 2019-06-05 DIAGNOSIS — I35.0 AORTIC STENOSIS, MODERATE: ICD-10-CM

## 2019-06-06 ENCOUNTER — OFFICE VISIT (OUTPATIENT)
Dept: FAMILY MEDICINE | Facility: OTHER | Age: 84
End: 2019-06-06
Attending: PHYSICIAN ASSISTANT
Payer: MEDICARE

## 2019-06-06 VITALS
TEMPERATURE: 97.9 F | DIASTOLIC BLOOD PRESSURE: 68 MMHG | HEIGHT: 64 IN | WEIGHT: 94.8 LBS | SYSTOLIC BLOOD PRESSURE: 110 MMHG | OXYGEN SATURATION: 95 % | RESPIRATION RATE: 20 BRPM | HEART RATE: 88 BPM | BODY MASS INDEX: 16.18 KG/M2

## 2019-06-06 DIAGNOSIS — I38 VALVULAR HEART DISEASE: ICD-10-CM

## 2019-06-06 DIAGNOSIS — F41.1 GAD (GENERALIZED ANXIETY DISORDER): ICD-10-CM

## 2019-06-06 DIAGNOSIS — R63.4 UNEXPLAINED WEIGHT LOSS: Primary | ICD-10-CM

## 2019-06-06 DIAGNOSIS — E44.0 MODERATE PROTEIN-CALORIE MALNUTRITION (H): ICD-10-CM

## 2019-06-06 PROBLEM — E46 PROTEIN-CALORIE MALNUTRITION (H): Status: ACTIVE | Noted: 2019-06-06

## 2019-06-06 PROCEDURE — G0463 HOSPITAL OUTPT CLINIC VISIT: HCPCS

## 2019-06-06 PROCEDURE — G0439 PPPS, SUBSEQ VISIT: HCPCS | Performed by: PHYSICIAN ASSISTANT

## 2019-06-06 RX ORDER — SERTRALINE HYDROCHLORIDE 25 MG/1
25 TABLET, FILM COATED ORAL DAILY
Qty: 30 TABLET | Refills: 3 | Status: SHIPPED | OUTPATIENT
Start: 2019-06-06 | End: 2019-07-08

## 2019-06-06 ASSESSMENT — ANXIETY QUESTIONNAIRES
7. FEELING AFRAID AS IF SOMETHING AWFUL MIGHT HAPPEN: SEVERAL DAYS
3. WORRYING TOO MUCH ABOUT DIFFERENT THINGS: MORE THAN HALF THE DAYS
5. BEING SO RESTLESS THAT IT IS HARD TO SIT STILL: MORE THAN HALF THE DAYS
1. FEELING NERVOUS, ANXIOUS, OR ON EDGE: SEVERAL DAYS
6. BECOMING EASILY ANNOYED OR IRRITABLE: SEVERAL DAYS
2. NOT BEING ABLE TO STOP OR CONTROL WORRYING: NEARLY EVERY DAY
GAD7 TOTAL SCORE: 12

## 2019-06-06 ASSESSMENT — PATIENT HEALTH QUESTIONNAIRE - PHQ9
SUM OF ALL RESPONSES TO PHQ QUESTIONS 1-9: 9
5. POOR APPETITE OR OVEREATING: MORE THAN HALF THE DAYS

## 2019-06-06 ASSESSMENT — MIFFLIN-ST. JEOR: SCORE: 870.01

## 2019-06-06 ASSESSMENT — PAIN SCALES - GENERAL: PAINLEVEL: NO PAIN (0)

## 2019-06-06 NOTE — NURSING NOTE
"Chief Complaint   Patient presents with     Hospital F/U       Initial /68   Pulse 88   Temp 97.9  F (36.6  C) (Tympanic)   Resp 20   Ht 1.626 m (5' 4\")   Wt 43 kg (94 lb 12.8 oz)   SpO2 95%   BMI 16.27 kg/m   Estimated body mass index is 16.27 kg/m  as calculated from the following:    Height as of this encounter: 1.626 m (5' 4\").    Weight as of this encounter: 43 kg (94 lb 12.8 oz).  Medication Reconciliation: complete    Paola Ballesteros LPN    "

## 2019-06-06 NOTE — PATIENT INSTRUCTIONS
Thank you for choosing [facility].   I have office hours 8:00 am to 4:30 pm on Monday's, Wednesday's, Thursday's and Friday's. My nurse and I are out of the office every Tuesday.    Following your visit, when your labs and diagnostic testing have returned, I will review then and you will be contacted by my nurse.  If you are on My Chart, you can also view results there.    For refills, notify your pharmacy regarding what you need and the pharmacy will generate a refill request. Do not call my nurse as she is unable to process refill request. Please plan ahead and allow 3-5 days for refill requests.    You will generally receive a reminder call the day prior to your appointment.  If you cannot attend your appointment, please cancel your appointment with as much notice as possible.  If there is a pattern of failure to present for your appointments, I cannot provide consistent, meaningful, ongoing care for you. It is very important to me that you come in for your care, so we can best assist you with your health care needs.    IMPORTANT:  Please note that it is my standard of practice to NOT participate in prescribing ongoing requested Narcotic Analgesic therapy, and/or participate in the prescribing of other controlled substances.  My nurse and I am happy to assist you with the process of referral for alternative pain management as needed, and other treatment modalities including but not limited to:  Physical Therapy, Physical Medicine and Rehab, Counseling, Chiropractic Care, Orthopedic Care, and non-narcotic medication management.     In the event that you need to be seen for emergent concerns and I am out of office,  please see one of my colleagues for acute concerns.  You may also present to  or ER.  I appreciate the opportunity to serve you and look forward to supporting your healthcare needs in the future. Please contact me with any questions or concerns that you may  have.    Sincerely,      Chandrika Kumar RN, PA-C   '

## 2019-06-07 ASSESSMENT — ANXIETY QUESTIONNAIRES: GAD7 TOTAL SCORE: 12

## 2019-06-11 ENCOUNTER — OFFICE VISIT (OUTPATIENT)
Dept: SURGERY | Facility: OTHER | Age: 84
End: 2019-06-11
Attending: PHYSICIAN ASSISTANT
Payer: MEDICARE

## 2019-06-11 VITALS
OXYGEN SATURATION: 96 % | BODY MASS INDEX: 16.9 KG/M2 | HEIGHT: 64 IN | TEMPERATURE: 98.5 F | WEIGHT: 99 LBS | SYSTOLIC BLOOD PRESSURE: 102 MMHG | HEART RATE: 88 BPM | DIASTOLIC BLOOD PRESSURE: 64 MMHG

## 2019-06-11 DIAGNOSIS — R63.4 UNEXPLAINED WEIGHT LOSS: ICD-10-CM

## 2019-06-11 PROCEDURE — 99203 OFFICE O/P NEW LOW 30 MIN: CPT | Performed by: SURGERY

## 2019-06-11 PROCEDURE — G0463 HOSPITAL OUTPT CLINIC VISIT: HCPCS

## 2019-06-11 ASSESSMENT — MIFFLIN-ST. JEOR: SCORE: 889.06

## 2019-06-11 ASSESSMENT — PAIN SCALES - GENERAL: PAINLEVEL: NO PAIN (0)

## 2019-06-11 NOTE — NURSING NOTE
"Chief Complaint   Patient presents with     Consult     unexplained weight loss/dyspepsia- DAMON Kumar is primary       Initial /64 (BP Location: Left arm, Patient Position: Chair, Cuff Size: Adult Regular)   Pulse 88   Temp 98.5  F (36.9  C) (Tympanic)   Ht 1.626 m (5' 4\")   Wt 44.9 kg (99 lb)   SpO2 96%   BMI 16.99 kg/m   Estimated body mass index is 16.99 kg/m  as calculated from the following:    Height as of this encounter: 1.626 m (5' 4\").    Weight as of this encounter: 44.9 kg (99 lb).  Medication Reconciliation: complete    RONNELL DICKINSON LPN    "

## 2019-06-11 NOTE — PATIENT INSTRUCTIONS
Thank you for allowing Dr. Carroll and our surgical team to participate in your care.  If you have a scheduling or an appointment question please contact Sasha, our Health Unit Coordinator at her direct line [phone].   ALL nursing questions or concerns can be directed to your surgical nurse at: [phone]

## 2019-06-12 ENCOUNTER — TELEPHONE (OUTPATIENT)
Dept: CARDIOLOGY | Facility: OTHER | Age: 84
End: 2019-06-12

## 2019-06-12 NOTE — PROGRESS NOTES
[facility] Consultation    CC: history of epigastric pain, weight loss     HPI:  This 83 year old year old female is seen at the request of Chandrika Kumar for evaluation of weight loss and epigastric pain. She was seen in the ED at the end of May for epigastric pain and weight loss. She today is not having pain and has gained a few pounds. She reports her appetite is improving. She has been under a considerable amount of stress attempting to care for her  who was recently in the hospital with a kidney infection. She denies prior abdominal surgery. She does believe she was diagnosed with ulcers in the past. She believes her weight loss is from not eating much while caring for her . She does have a history of breast cancer.      Past Medical History:   Diagnosis Date     Arthritis      Backache, unspecified 1/1/2011     Breast cancer, stage 1 3/19/2010     Chronic pain syndrome 1/23/2015     Claustrophobia 1/1/2011     Hip joint replacement by other means 1/1/2011     Osteoarthrosis, unspecified whether generalized or localized, lower leg 8/14/2006     Posttraumatic stress disorder 1/1/2011       Past Surgical History:   Procedure Laterality Date     BREAST SURGERY      right  side mastectomy     C TOTAL KNEE ARTHROPLASTY Left 01/26/15     cataract extraction and lens implantation       COLONOSCOPY       EYE SURGERY       GYN SURGERY      tubal pregnancy     ORTHOPEDIC SURGERY  2009    l. hip replacement LT     ORTHOPEDIC SURGERY  july 19th 2013    Right total knee     TUBAL/ECTOPIC PREGNANCY         Allergies   Allergen Reactions     Atenolol Shortness Of Breath and Other (See Comments)     Dizziness  Severe vertigo and dizziness/SOB     Lisinopril Cough     Augmentin Diarrhea     Pollen Extract      Other reaction(s): Runny Nose       Current Outpatient Medications   Medication     Acetaminophen (TYLENOL PO)     albuterol (PROAIR HFA/PROVENTIL HFA/VENTOLIN HFA) 108 (90 Base) MCG/ACT inhaler      Calcium Citrate-Vitamin D (CALCITRATE/VITAMIN D PO)     diltiazem (DILACOR XR) 180 MG 24 hr capsule     losartan (COZAAR) 50 MG tablet     Misc Natural Products (OSTEO BI-FLEX ADV DOUBLE ST PO)     Multiple Vitamin (MULTIVITAMINS PO)     Multiple Vitamins-Minerals (PRESERVISION AREDS 2 PO)     order for DME     sertraline (ZOLOFT) 25 MG tablet     simvastatin (ZOCOR) 40 MG tablet     traMADol (ULTRAM) 50 MG tablet     warfarin (COUMADIN) 5 MG tablet     No current facility-administered medications for this visit.        HABITS:    Social History     Tobacco Use     Smoking status: Former Smoker     Packs/day: 0.30     Years: 30.00     Pack years: 9.00     Types: Cigarettes     Last attempt to quit: 1987     Years since quittin.8     Smokeless tobacco: Never Used     Tobacco comment: year quit    Substance Use Topics     Alcohol use: No     Drug use: No       Family History   Problem Relation Age of Onset     Diabetes Father 75        cause of death     Other - See Comments Father         PVD     Heart Disease Sister      Coronary Artery Disease Sister      Other - See Comments Mother 83        old age; cause of death     Other - See Comments Sister         14 year twin [employer]; cause of death     Chronic Obstructive Pulmonary Disease Daughter      Osteoporosis Daughter      Thyroid Disease Daughter      Cancer Sister      Other Cancer Sister      Other Cancer Sister         uterine     Hypertension No family hx of      Hyperlipidemia No family hx of      Cerebrovascular Disease No family hx of      Breast Cancer No family hx of      Prostate Cancer No family hx of      Colon Cancer No family hx of      Asthma No family hx of      Anesthesia Reaction No family hx of      Genetic Disorder No family hx of        REVIEW OF SYSTEMS:  Ten point review of systems negative except those mentioned in the HPI.     OBJECTIVE:    /64 (BP Location: Left arm, Patient Position: Chair, Cuff  "Size: Adult Regular)   Pulse 88   Temp 98.5  F (36.9  C) (Tympanic)   Ht 1.626 m (5' 4\")   Wt 44.9 kg (99 lb)   SpO2 96%   BMI 16.99 kg/m      GENERAL: Generally appears well, in no distress with appropriate affect.  HEENT:   Sclerae anicteric - normocephalic atraumatic   Respiratory:  No acute distress, no splinting   Cardiovascular:  Regular Rate and Rhythm  Abdomen: scaphoid abdomen, no obvious mass.   :  deferred  Extremities:  Extremities normal. No deformities, edema, or skin discoloration.  Skin:  no suspicious lesions or rashes  Neurological: grossly intact  Psych:  Alert, oriented, affect appropriate with normal decision making ability.    IMPRESSION:    83 y.o. With possible history of ulcers presents with history of epigastric pain and profound weight loss over the last 6 months. Due to her social situation it would not be easy for her to arrange form someone to drive her for endoscopy procedures and her pain appears to be doing better with treatment. I discussed that we could at the very least due a CT of her Chest abdomen and pelvis to look for any possible reasons for her weight loss. Discussed the importance of keeping a healthy weight at her age, and eating high calorie foods to try and achieve this.     PLAN:     CT chest abdomen pelvis   Hold on upper endoscopy for now/.     Fab Carroll MD,     6/12/2019  2:17 PM        "

## 2019-06-12 NOTE — TELEPHONE ENCOUNTER
Patient was a no-show for her follow-up cardiology appointment today 6/12/2019 at 9:30am with Dr. Kirby.      Call received at 12 pm today from Crystal at the  that patient had presented to clinic at noon for her appointment and had mistaken the time of her appointment.  Spoke with Dr. Kirby who states that the patient will have to be rescheduled due to she is past her appointment time.  Advised Crystal that Dr. Kirby has advised to reschedule and advised Crystal that Dr. Kirby's next available appointment is on 6/19/2019 at 1:15pm to check-in.      When checking Epic appointment at this time, it is noted that patient was scheduled for this date and time.

## 2019-06-17 ENCOUNTER — ANCILLARY PROCEDURE (OUTPATIENT)
Dept: MAMMOGRAPHY | Facility: OTHER | Age: 84
End: 2019-06-17
Attending: NURSE PRACTITIONER
Payer: MEDICARE

## 2019-06-17 DIAGNOSIS — Z17.0 MALIGNANT NEOPLASM OF RIGHT BREAST IN FEMALE, ESTROGEN RECEPTOR POSITIVE, UNSPECIFIED SITE OF BREAST (H): Primary | ICD-10-CM

## 2019-06-17 DIAGNOSIS — Z12.31 SCREENING MAMMOGRAM, ENCOUNTER FOR: ICD-10-CM

## 2019-06-17 DIAGNOSIS — C50.911 MALIGNANT NEOPLASM OF RIGHT BREAST IN FEMALE, ESTROGEN RECEPTOR POSITIVE, UNSPECIFIED SITE OF BREAST (H): Primary | ICD-10-CM

## 2019-06-17 DIAGNOSIS — Z85.3 PERSONAL HISTORY OF MALIGNANT NEOPLASM OF BREAST: ICD-10-CM

## 2019-06-17 LAB — CANCER AG27-29 SERPL-ACNC: 42 U/ML (ref 0–39)

## 2019-06-17 PROCEDURE — 36415 COLL VENOUS BLD VENIPUNCTURE: CPT | Mod: ZL | Performed by: NURSE PRACTITIONER

## 2019-06-17 PROCEDURE — 77063 BREAST TOMOSYNTHESIS BI: CPT | Mod: TC,52

## 2019-06-17 PROCEDURE — 86300 IMMUNOASSAY TUMOR CA 15-3: CPT | Mod: ZL | Performed by: NURSE PRACTITIONER

## 2019-06-18 ENCOUNTER — TELEPHONE (OUTPATIENT)
Dept: INFUSION THERAPY | Facility: OTHER | Age: 84
End: 2019-06-18

## 2019-06-18 NOTE — TELEPHONE ENCOUNTER
Our records show this pt is due for her next prolia injection would you like her to receive her next injection?

## 2019-06-19 ENCOUNTER — OFFICE VISIT (OUTPATIENT)
Dept: CARDIOLOGY | Facility: OTHER | Age: 84
End: 2019-06-19
Attending: INTERNAL MEDICINE
Payer: MEDICARE

## 2019-06-19 VITALS
TEMPERATURE: 97.7 F | WEIGHT: 92.9 LBS | HEART RATE: 104 BPM | RESPIRATION RATE: 16 BRPM | DIASTOLIC BLOOD PRESSURE: 72 MMHG | HEIGHT: 63 IN | SYSTOLIC BLOOD PRESSURE: 121 MMHG | BODY MASS INDEX: 16.46 KG/M2 | OXYGEN SATURATION: 97 %

## 2019-06-19 DIAGNOSIS — Z86.79 HX OF RHEUMATIC FEVER: ICD-10-CM

## 2019-06-19 DIAGNOSIS — I07.1 MODERATE TRICUSPID INSUFFICIENCY: ICD-10-CM

## 2019-06-19 DIAGNOSIS — Z79.01 ANTICOAGULATED ON COUMADIN: ICD-10-CM

## 2019-06-19 DIAGNOSIS — I48.21 PERMANENT ATRIAL FIBRILLATION (H): Primary | ICD-10-CM

## 2019-06-19 DIAGNOSIS — I27.20 PULMONARY HYPERTENSION (H): ICD-10-CM

## 2019-06-19 DIAGNOSIS — E78.5 HYPERLIPIDEMIA LDL GOAL <130: ICD-10-CM

## 2019-06-19 DIAGNOSIS — I10 ESSENTIAL HYPERTENSION: ICD-10-CM

## 2019-06-19 DIAGNOSIS — I35.0 AORTIC STENOSIS, MODERATE: ICD-10-CM

## 2019-06-19 DIAGNOSIS — I38 VALVULAR HEART DISEASE: ICD-10-CM

## 2019-06-19 DIAGNOSIS — Z79.01 LONG TERM CURRENT USE OF ANTICOAGULANT THERAPY: ICD-10-CM

## 2019-06-19 PROCEDURE — G0463 HOSPITAL OUTPT CLINIC VISIT: HCPCS

## 2019-06-19 PROCEDURE — 99214 OFFICE O/P EST MOD 30 MIN: CPT | Performed by: INTERNAL MEDICINE

## 2019-06-19 ASSESSMENT — PAIN SCALES - GENERAL: PAINLEVEL: MILD PAIN (2)

## 2019-06-19 ASSESSMENT — MIFFLIN-ST. JEOR: SCORE: 845.52

## 2019-06-19 NOTE — PROGRESS NOTES
Cardiology Progress Note     Assessment & Plan   Michael Christiansen is a 83 year old female who is being seen by cardiology with concerns for atrial fibrillation, moderate AS, moderate TR, and moderate pulmonary hypertension.  She is here for her echo results which were completed on 6/3/19.     Impression:  1.  Permanent atrial fibrillation.  2.  Pulmonary hypertension-moderate.  3.  Moderate tricuspid regurgitation.  4.  Moderate aortic stenosis.  5.  Hypertension-controlled.  6.  Hyperlipidemia-controlled.  7.  History of rheumatic fever.  8.  Mild left and right atrial enlargement     PLAN:   1.  Repeat echo in 6 months to reassess moderate aortic stenosis, moderate tricuspid regurgitation, and moderate pulmonary hypertension.  2.  She will continue Coumadin and diltiazem for atrial fibrillation.  3.  She will be seen 6 months follow-up after the echocardiogram is completed.    Cam Kirby    Interval History   Michael is being seen in follow-up. She has a history of permanent atrial fibrillation currently on Coumadin and diltiazem. Previously, she was on diltiazem 60 mg, 3 times a day. This was changed to a daily dose of diltiazem. She denies any palpitations, fluttering, or racing sensation in her chest.  She has not been dizzy, lightheaded, had near syncope, or syncope.  She has not any chest pain, chest tightness, or chest discomfort.  He has minimal to no lower extremity edema.  She is largely without complaints.    We did review her echo from today. She had an echo on 6/3/19 which showed an EF 60-65%, mild left and right atrial enlargement, mild mitral insufficiency with rheumatic heart disease, mild aortic insufficiency, moderate aortic stenosis, moderate tricuspid regurgitation, and RVSP of 50.    Previously, she had been following up with  Dr. Moreira in cardiology through [facility] on 7/10/12.  She was diagnosed with atrial fibrillation at that time.  She had been on diltiazem 60 mg 3 times a day  and Coumadin. She's been on Coumadin since diagnosis. She has not been cardioverted or been on antiarrhythmics in the past.      She is minimally short of breath with climbing stairs with considerable exertion. Otherwise, she has no additional complaints.    Physical Exam   Temp: 97.7  F (36.5  C) Temp src: Tympanic BP: 121/72 Pulse: 104   Resp: 16 SpO2: 97 %      Vitals:    06/19/19 1301   Weight: 42.1 kg (92 lb 14.4 oz)     Vital Signs with Ranges  Temp:  [97.7  F (36.5  C)] 97.7  F (36.5  C)  Pulse:  [101-104] 104  Resp:  [16] 16  BP: (121)/(72) 121/72  SpO2:  [97 %] 97 %  ROS is negative except that which was noted in the HPI.          Constitutional: awake, alert, cooperative, no apparent distress, and appears stated age  Eyes: Lids and lashes normal, pupils equal, sclera clear, conjunctiva normal  ENT: Normocephalic, without obvious abnormality, atraumatic.  Respiratory: No increased work of breathing, good air exchange, clear to auscultation bilaterally, no crackles or wheezing  Cardiovascular: Normal apical impulse, regular rate and rhythm, normal S1 and S2, no S3 or S4, and no murmur noted  GI: No scars, normal bowel sounds, soft.  Musculoskeletal: Minimal lower extremity edema.  Neurologic: Awake, alert, oriented to name, place and time.  Neuropsychiatric: General: normal, calm and normal eye contact    Medications         Data   No results found for this or any previous visit (from the past 24 hour(s)).

## 2019-06-19 NOTE — PATIENT INSTRUCTIONS
You were seen by Dr. Kirby, 6/19/2019.     1.  You will have an echocardiogram performed.  This is an ultrasound of the heart, that evaluates heart function.  The hospital scheduling department will call to schedule you for this test. This will be scheduled in December 2019.      2.  Please continue all medication as prescribed.     3.  If you develop new or worsening symptoms please call the cardiology office as you may need to be seen sooner.    You will follow up with Dr. Kirby in 6 months.       Please call the cardiology office with problems, questions, or concerns at [phone].    If you experience chest pain, chest pressure, chest tightness, shortness of breath, fainting, lightheadedness, nausea, vomiting, or other concerning symptoms, please report to the Emergency Department or call 911. These symptoms may be emergent, and best treated in the Emergency Department.       Faby MCCORD RN-BSN  Cardiology   [facility]  [phone]

## 2019-06-19 NOTE — NURSING NOTE
"Chief Complaint   Patient presents with     RECHECK     1 year cardiology follow-up and follow-up test results       Initial /72 (BP Location: Left arm, Patient Position: Chair, Cuff Size: Adult Regular)   Pulse 104   Temp 97.7  F (36.5  C) (Tympanic)   Resp 16   Ht 1.6 m (5' 3\")   Wt 42.1 kg (92 lb 14.4 oz)   SpO2 97%   BMI 16.46 kg/m   Estimated body mass index is 16.46 kg/m  as calculated from the following:    Height as of this encounter: 1.6 m (5' 3\").    Weight as of this encounter: 42.1 kg (92 lb 14.4 oz).  Medication Reconciliation: complete        "

## 2019-06-21 DIAGNOSIS — I07.1 MODERATE TRICUSPID INSUFFICIENCY: ICD-10-CM

## 2019-06-21 DIAGNOSIS — I48.21 PERMANENT ATRIAL FIBRILLATION (H): ICD-10-CM

## 2019-06-21 DIAGNOSIS — I35.0 AORTIC STENOSIS, MODERATE: ICD-10-CM

## 2019-06-21 DIAGNOSIS — Z79.01 LONG TERM CURRENT USE OF ANTICOAGULANT THERAPY: ICD-10-CM

## 2019-06-21 RX ORDER — DILTIAZEM HYDROCHLORIDE 180 MG/1
180 CAPSULE, EXTENDED RELEASE ORAL DAILY
Qty: 90 CAPSULE | Refills: 3 | Status: SHIPPED | OUTPATIENT
Start: 2019-06-21 | End: 2020-01-01

## 2019-06-25 ENCOUNTER — HOSPITAL ENCOUNTER (OUTPATIENT)
Dept: CT IMAGING | Facility: HOSPITAL | Age: 84
Discharge: HOME | End: 2019-06-25
Attending: SURGERY | Admitting: SURGERY
Payer: MEDICARE

## 2019-06-25 DIAGNOSIS — R63.4 UNEXPLAINED WEIGHT LOSS: ICD-10-CM

## 2019-06-25 DIAGNOSIS — G89.4 CHRONIC PAIN SYNDROME: ICD-10-CM

## 2019-06-25 PROCEDURE — 25500064 ZZH RX 255 OP 636: Performed by: RADIOLOGY

## 2019-06-25 PROCEDURE — 71260 CT THORAX DX C+: CPT | Mod: TC

## 2019-06-25 PROCEDURE — 74177 CT ABD & PELVIS W/CONTRAST: CPT | Mod: TC

## 2019-06-25 RX ORDER — IOPAMIDOL 612 MG/ML
100 INJECTION, SOLUTION INTRAVASCULAR ONCE
Status: COMPLETED | OUTPATIENT
Start: 2019-06-25 | End: 2019-06-25

## 2019-06-25 RX ADMIN — IOPAMIDOL 100 ML: 612 INJECTION, SOLUTION INTRAVENOUS at 14:08

## 2019-06-25 RX ADMIN — DIATRIZOATE MEGLUMINE AND DIATRIZOATE SODIUM 30 ML: 660; 100 SOLUTION ORAL; RECTAL at 14:08

## 2019-06-26 NOTE — TELEPHONE ENCOUNTER
traMADol (ULTRAM) 50 MG tablet      Last Written Prescription Date:  5/31/19  Last Fill Quantity: 100,   # refills: 0  Last Office Visit: 6/6/19  Future Office visit:    Next 5 appointments (look out 90 days)    Jul 08, 2019  2:20 PM CDT  (Arrive by 2:05 PM)  [facility] [address]  [place] [address]  [phone]           Routing refill request to provider for review/approval because:  Drug not on the [facility], P or [facility] refill protocol or controlled substance

## 2019-06-27 RX ORDER — TRAMADOL HYDROCHLORIDE 50 MG/1
TABLET ORAL
Qty: 100 TABLET | Refills: 0 | Status: SHIPPED | OUTPATIENT
Start: 2019-06-27 | End: 2019-07-22

## 2019-06-27 NOTE — TELEPHONE ENCOUNTER
patient called and is wondering if she could have her tramadol refilled today as she would like to pick it up today.  Ayana John, CMA

## 2019-06-28 ENCOUNTER — TELEPHONE (OUTPATIENT)
Dept: ONCOLOGY | Facility: OTHER | Age: 84
End: 2019-06-28

## 2019-06-28 NOTE — TELEPHONE ENCOUNTER
Please call and let her know that she does not need to follow through Oncology any longer as it has been 9 years since diagnoses and and she is no longer on an AI.  She may continue to follow through her PCP.

## 2019-07-03 NOTE — PROGRESS NOTES
Subjective     Michael Christiansen is a 83 year old female who presents to clinic today for the following health issues:    HPI   Weight Loss      Duration: 1 year     Description (location/character/radiation): unexplained weight loss    Intensity:  moderate    Accompanying signs and symptoms: states she is getting her appetite back , still has times of worry.     History (similar episodes/previous evaluation): has hx of anxiety and is worrying a lot.  is doing better.     Precipitating or alleviating factors: None    Therapies tried and outcome: None         Patient Active Problem List   Diagnosis     Hyperlipidemia LDL goal <130     Essential hypertension     Osteoarthritis     Permanent atrial fibrillation (H)     Chronic pain syndrome     History of total knee replacement     Radiculitis     Osteoarthritis of knee     Long-term (current) use of anticoagulants [Z79.01]     ACP (advance care planning)     Osteoporosis     Malignant neoplasm of right breast in female, estrogen receptor positive, unspecified site of breast (H)     Pulmonary hypertension-moderate     Aortic stenosis, moderate     Moderate tricuspid insufficiency     Anticoagulated on Coumadin     LISA (generalized anxiety disorder)     Acute blood loss anemia     Lumbar pain with radiation down right leg     Lumbar spinal stenosis     Protein-calorie malnutrition (H)     Valvular heart disease     Unexplained weight loss     Hx of rheumatic fever     Past Surgical History:   Procedure Laterality Date     BREAST SURGERY      right  side mastectomy     C TOTAL KNEE ARTHROPLASTY Left 01/26/15     cataract extraction and lens implantation       COLONOSCOPY       EYE SURGERY       GYN SURGERY      tubal pregnancy     ORTHOPEDIC SURGERY  2009    l. hip replacement LT     ORTHOPEDIC SURGERY  july 19th 2013    Right total knee     TUBAL/ECTOPIC PREGNANCY         Social History     Tobacco Use     Smoking status: Former Smoker     Packs/day: 0.30      Years: 30.00     Pack years: 9.00     Types: Cigarettes     Last attempt to quit: 1987     Years since quittin.9     Smokeless tobacco: Never Used     Tobacco comment: year quit    Substance Use Topics     Alcohol use: No     Family History   Problem Relation Age of Onset     Diabetes Father 75        cause of death     Other - See Comments Father         PVD     Heart Disease Sister      Coronary Artery Disease Sister      Other - See Comments Mother 83        old age; cause of death     Other - See Comments Sister         14 year twin [facility]; cause of death     Chronic Obstructive Pulmonary Disease Daughter      Osteoporosis Daughter      Thyroid Disease Daughter      Cancer Sister      Other Cancer Sister      Other Cancer Sister         uterine     Hypertension No family hx of      Hyperlipidemia No family hx of      Cerebrovascular Disease No family hx of      Breast Cancer No family hx of      Prostate Cancer No family hx of      Colon Cancer No family hx of      Asthma No family hx of      Anesthesia Reaction No family hx of      Genetic Disorder No family hx of          Current Outpatient Medications   Medication Sig Dispense Refill     Acetaminophen (TYLENOL PO) Take 500 mg by mouth every 4 hours as needed        albuterol (PROAIR HFA/PROVENTIL HFA/VENTOLIN HFA) 108 (90 Base) MCG/ACT inhaler Inhale 2 puffs into the lungs every 6 hours as needed for shortness of breath / dyspnea or wheezing 1 Inhaler 1     Calcium Citrate-Vitamin D (CALCITRATE/VITAMIN D PO) Take 1 tablet by mouth 2 times daily        diltiazem ER (DILT-XR) 180 MG 24 hr capsule Take 1 capsule (180 mg) by mouth daily 90 capsule 3     losartan (COZAAR) 50 MG tablet TAKE 1 TABLET BY MOUTH ONCE DAILY 90 tablet 2     Misc Natural Products (OSTEO BI-FLEX ADV DOUBLE ST PO) Take  by mouth daily.       Multiple Vitamin (MULTIVITAMINS PO) Take 1 tablet by mouth daily        Multiple Vitamins-Minerals (PRESERVISION AREDS 2  PO) Take by mouth 2 times daily        order for DME 4 wheeled walker 1 each 0     sertraline (ZOLOFT) 50 MG tablet Take 1 tablet (50 mg) by mouth daily 30 tablet 3     simvastatin (ZOCOR) 40 MG tablet TAKE ONE-HALF TABLET BY MOUTH EVERY EVENING 45 tablet 0     traMADol (ULTRAM) 50 MG tablet TAKE 1 TO 2 TABLETS BY MOUTH EVERY 4 HOURS AS NEEDED FOR PAIN --MAX  OF  8  PER   tablet 0     warfarin (COUMADIN) 5 MG tablet 2.5mg Mon/Fri and 5mg  all other days or as directed by warfarin clinic 100 tablet 3     Allergies   Allergen Reactions     Atenolol Shortness Of Breath and Other (See Comments)     Dizziness  Severe vertigo and dizziness/SOB     Lisinopril Cough     Augmentin Diarrhea     Pollen Extract      Other reaction(s): Runny Nose     Recent Labs   Lab Test 05/28/19  1424 05/08/19  1248 12/31/18  1007 11/14/18  1002  06/21/17  1025  10/27/14  1450   LDL  --   --  60  --   --  77  --   --    HDL  --   --  69  --   --  76  --   --    TRIG  --   --  63  --   --  61  --   --    ALT 23 19  --  19   < >  --    < > 22   CR 0.72 0.67 0.87 0.77   < >  --    < > 0.92   GFRESTIMATED 77 81 61 71   < >  --    < > 59*   GFRESTBLACK 89 >90 71 86   < >  --    < > 71   POTASSIUM 4.5 4.2  --  4.1   < >  --    < > 4.1   TSH  --   --   --   --   --   --   --  0.56    < > = values in this interval not displayed.      BP Readings from Last 3 Encounters:   07/08/19 116/66   06/19/19 121/72   06/11/19 102/64    Wt Readings from Last 3 Encounters:   07/08/19 43.2 kg (95 lb 3.2 oz)   06/19/19 42.1 kg (92 lb 14.4 oz)   06/11/19 44.9 kg (99 lb)                      Reviewed and updated as needed this visit by Provider         Review of Systems   ROS COMP: Constitutional, HEENT, cardiovascular, pulmonary, gi and gu systems are negative, except as otherwise noted.      Objective    /66 (Patient Position: Sitting)   Pulse 81   Wt 43.2 kg (95 lb 3.2 oz)   SpO2 97%   BMI 16.86 kg/m    Body mass index is 16.86 kg/m .  Physical  Exam   GENERAL: healthy, alert and no distress  EYES: Eyes grossly normal to inspection, PERRL and conjunctivae and sclerae normal  HENT: ear canals and TM's normal, nose and mouth without ulcers or lesions  NECK: no adenopathy, no asymmetry, masses, or scars and thyroid normal to palpation  RESP: lungs clear to auscultation - no rales, rhonchi or wheezes  CV: bradycardia, grade 4/6  murmur heard best over the entire chest wall. , decreased pulse of her feet and no peripheral edema  ABDOMEN: soft, nontender, no hepatosplenomegaly, no masses and bowel sounds normal  MS: no gross musculoskeletal defects noted, no edema    Diagnostic Test Results:  Labs reviewed in Epic  Results for orders placed or performed during the hospital encounter of 06/25/19   CT Chest/Abdomen/Pelvis w Contrast    Narrative    PROCEDURE: CT CHEST/ABDOMEN/PELVIS W CONTRAST 6/25/2019 2:38 PM    HISTORY: History of breast cancer, 40 pound weight loss over last  year.; Unexplained weight loss    COMPARISONS: CT dated 10/29/2014    Meds/Dose Given: Isovue 300 100ml Given    TECHNIQUE: Axial postcontrast enhanced images with coronal and  sagittal reformatted images.    FINDINGS: There are several low-attenuation thyroid masses, similar to  the prior exam.    No enlarged lymph nodes are seen in the mediastinum, odalys or axilla.    No new lung nodule or mass is seen. There are increasing reticular  nodular densities bilaterally but more prominent in the right upper  lobe. This may represent progression of interstitial lung disease but  lymphangitic spread of tumor could have a similar appearance. This  will require follow-up.    There is no pleural or pericardial effusion. There is coronary artery  calcification. Ascending aorta measures 3.9 cm in maximum transverse  dimension. There is atherosclerotic calcification.    No focal abnormality is seen in the liver, spleen or pancreas. No  adrenal mass is seen. There is a small left renal cyst.    No bowel  abnormality is seen. There is no inflammatory change or focal  fluid collection.    There is an aneurysm of the distal abdominal aorta. This measures 3.7  cm in AP dimension, slightly increased from the prior exam where it  measured 3.4 cm.    There are multiple thoracic compression fractures, similar to the  prior exam. There are no lumbar compression deformities with no  superior endplate compression deformity at L5. Anterior wedging of L1  is stable. Superiorly and inferior compression deformities at L2 are  relatively stable.    There is generalized osteopenia. There is fracture although the left  pubic symphysis inferiorly, possibly pathologic as there is some  lucency in the left side of the pubic symphysis. However, appearance  is stable compared to the prior exam. There is generalized osteopenia  in the pelvis with partial fusion of sacroiliac joints. Severe  degenerative changes seen in lower lumbar facet joints.         Impression    IMPRESSION:   1. Progression of lung findings, probably progression of interstitial  lung disease although lymphangitic spread of tumor would have a  similar appearance. This will require follow-up.  2. Slight increase in size of abdominal aortic aneurysm.  3. Multiple compression deformities including no superior endplate  compression deformity at the L5 level. No other new bone abnormality.    NAVID ADDISON MD           Assessment & Plan     1. LISA (generalized anxiety disorder)  She is going to be increased to 50 mg on Zoloft. Helps her mood and anxiety. Still very anxios woman. She has a  who is sick and taking care of him.     - sertraline (ZOLOFT) 50 MG tablet; Take 1 tablet (50 mg) by mouth daily  Dispense: 30 tablet; Refill: 3    2. Unexplained weight loss  She is on shakes and other supplements. We will see her back in 6 weeks. Has not lost now. She states apeitie better. Breathing is fine. No ton inhalers.          See Patient Instructions    No follow-ups  on file.    Chandrika Kumar PA-C  [facility] - [facility]

## 2019-07-08 ENCOUNTER — OFFICE VISIT (OUTPATIENT)
Dept: FAMILY MEDICINE | Facility: OTHER | Age: 84
End: 2019-07-08
Attending: PHYSICIAN ASSISTANT
Payer: MEDICARE

## 2019-07-08 VITALS
SYSTOLIC BLOOD PRESSURE: 116 MMHG | DIASTOLIC BLOOD PRESSURE: 66 MMHG | OXYGEN SATURATION: 97 % | BODY MASS INDEX: 16.86 KG/M2 | WEIGHT: 95.2 LBS | HEART RATE: 81 BPM

## 2019-07-08 DIAGNOSIS — R63.4 UNEXPLAINED WEIGHT LOSS: Primary | ICD-10-CM

## 2019-07-08 DIAGNOSIS — F41.1 GAD (GENERALIZED ANXIETY DISORDER): ICD-10-CM

## 2019-07-08 PROCEDURE — G0463 HOSPITAL OUTPT CLINIC VISIT: HCPCS

## 2019-07-08 PROCEDURE — 99213 OFFICE O/P EST LOW 20 MIN: CPT | Performed by: PHYSICIAN ASSISTANT

## 2019-07-08 ASSESSMENT — PAIN SCALES - GENERAL: PAINLEVEL: NO PAIN (0)

## 2019-07-08 ASSESSMENT — ANXIETY QUESTIONNAIRES
1. FEELING NERVOUS, ANXIOUS, OR ON EDGE: SEVERAL DAYS
7. FEELING AFRAID AS IF SOMETHING AWFUL MIGHT HAPPEN: NOT AT ALL
4. TROUBLE RELAXING: MORE THAN HALF THE DAYS
5. BEING SO RESTLESS THAT IT IS HARD TO SIT STILL: SEVERAL DAYS
3. WORRYING TOO MUCH ABOUT DIFFERENT THINGS: SEVERAL DAYS
GAD7 TOTAL SCORE: 6
2. NOT BEING ABLE TO STOP OR CONTROL WORRYING: SEVERAL DAYS
6. BECOMING EASILY ANNOYED OR IRRITABLE: NOT AT ALL

## 2019-07-08 ASSESSMENT — PATIENT HEALTH QUESTIONNAIRE - PHQ9: SUM OF ALL RESPONSES TO PHQ QUESTIONS 1-9: 5

## 2019-07-08 NOTE — NURSING NOTE
"Chief Complaint   Patient presents with     Weight Loss       Initial /66 (Patient Position: Sitting)   Pulse 81   Wt 43.2 kg (95 lb 3.2 oz)   SpO2 97%   BMI 16.86 kg/m   Estimated body mass index is 16.86 kg/m  as calculated from the following:    Height as of 6/19/19: 1.6 m (5' 3\").    Weight as of this encounter: 43.2 kg (95 lb 3.2 oz).  Medication Reconciliation: complete  "

## 2019-07-08 NOTE — PATIENT INSTRUCTIONS
Thank you for choosing [facility].   I have office hours 8:00 am to 4:30 pm on Monday's, Wednesday's, Thursday's and Friday's. My nurse and I are out of the office every Tuesday.    Following your visit, when your labs and diagnostic testing have returned, I will review then and you will be contacted by my nurse.  If you are on My Chart, you can also view results there.    For refills, notify your pharmacy regarding what you need and the pharmacy will generate a refill request. Do not call my nurse as she is unable to process refill request. Please plan ahead and allow 3-5 days for refill requests.    You will generally receive a reminder call the day prior to your appointment.  If you cannot attend your appointment, please cancel your appointment with as much notice as possible.  If there is a pattern of failure to present for your appointments, I cannot provide consistent, meaningful, ongoing care for you. It is very important to me that you come in for your care, so we can best assist you with your health care needs.    IMPORTANT:  Please note that it is my standard of practice to NOT participate in prescribing ongoing requested Narcotic Analgesic therapy, and/or participate in the prescribing of other controlled substances.  My nurse and I am happy to assist you with the process of referral for alternative pain management as needed, and other treatment modalities including but not limited to:  Physical Therapy, Physical Medicine and Rehab, Counseling, Chiropractic Care, Orthopedic Care, and non-narcotic medication management.     In the event that you need to be seen for emergent concerns and I am out of office,  please see one of my colleagues for acute concerns.  You may also present to  or ER.  I appreciate the opportunity to serve you and look forward to supporting your healthcare needs in the future. Please contact me with any questions or concerns that you may  have.    Sincerely,      Chandrika Kumar RN, PA-C

## 2019-07-09 ENCOUNTER — ANTICOAGULATION THERAPY VISIT (OUTPATIENT)
Dept: ANTICOAGULATION | Facility: OTHER | Age: 84
End: 2019-07-09
Attending: PHYSICIAN ASSISTANT
Payer: MEDICARE

## 2019-07-09 DIAGNOSIS — Z79.01 LONG TERM CURRENT USE OF ANTICOAGULANT THERAPY: ICD-10-CM

## 2019-07-09 DIAGNOSIS — I48.21 PERMANENT ATRIAL FIBRILLATION (H): ICD-10-CM

## 2019-07-09 LAB — INR POINT OF CARE: 2 (ref 0.86–1.14)

## 2019-07-09 PROCEDURE — 85610 PROTHROMBIN TIME: CPT | Mod: QW,ZL

## 2019-07-09 ASSESSMENT — ANXIETY QUESTIONNAIRES: GAD7 TOTAL SCORE: 6

## 2019-07-09 NOTE — PROGRESS NOTES
ANTICOAGULATION FOLLOW-UP CLINIC VISIT    Patient Name:  Michael Christiansen  Date:  2019  Contact Type:  Face to Face    SUBJECTIVE:  Patient Findings     Comments:   No changes        Clinical Outcomes     Negatives:   Major bleeding event, Thromboembolic event, Anticoagulation-related hospital admission, Anticoagulation-related ED visit, Anticoagulation-related fatality    Comments:   No changes           OBJECTIVE    INR Protime   Date Value Ref Range Status   2019 2.0 (A) 0.86 - 1.14 Final       ASSESSMENT / PLAN  INR assessment THER    Recheck INR In: 6 WEEKS    INR Location Clinic      Anticoagulation Summary  As of 2019    INR goal:   2.0-3.0   TTR:   63.4 % (2.9 y)   INR used for dosin.0 (2019)   Warfarin maintenance plan:   2.5 mg (5 mg x 0.5) every Mon, Fri; 5 mg (5 mg x 1) all other days   Full warfarin instructions:   2.5 mg every Mon, Fri; 5 mg all other days   Weekly warfarin total:   30 mg   No change documented:   Kasia Hercules RN   Plan last modified:   Kasia Hercules RN (10/1/2018)   Next INR check:   2019   Priority:   INR   Target end date:   Indefinite    Indications    Long-term (current) use of anticoagulants [Z79.01] [Z79.01]  Permanent atrial fibrillation (H) [I48.2]             Anticoagulation Episode Summary     INR check location:       Preferred lab:       Send INR reminders to:   [facility] ANTICOAG POOL    Comments:         Anticoagulation Care Providers     Provider Role Specialty Phone number    Chandrika Kumar PA [facility] [phone]            See the Encounter Report to view Anticoagulation Flowsheet and Dosing Calendar (Go to Encounters tab in chart review, and find the Anticoagulation Therapy Visit)        Kasia Hercules RN

## 2019-07-17 ENCOUNTER — TELEPHONE (OUTPATIENT)
Dept: ONCOLOGY | Facility: OTHER | Age: 84
End: 2019-07-17

## 2019-07-17 NOTE — TELEPHONE ENCOUNTER
Yes please.  I would like further orders to go through her PCP though as I am no longer following.

## 2019-07-22 DIAGNOSIS — G89.4 CHRONIC PAIN SYNDROME: ICD-10-CM

## 2019-07-22 RX ORDER — TRAMADOL HYDROCHLORIDE 50 MG/1
TABLET ORAL
Qty: 100 TABLET | Refills: 0 | Status: SHIPPED | OUTPATIENT
Start: 2019-07-22 | End: 2019-08-15

## 2019-07-22 NOTE — TELEPHONE ENCOUNTER
traMADol (ULTRAM) 50 MG tablet      Last Written Prescription Date:  6/27/19  Last Fill Quantity: 100,   # refills: 0  Last Office Visit: 7/8/19  Future Office visit:       Routing refill request to provider for review/approval because:  Drug not on the [facility], [facility] or [facility] refill protocol or controlled substance

## 2019-07-23 ENCOUNTER — INFUSION THERAPY VISIT (OUTPATIENT)
Dept: INFUSION THERAPY | Facility: OTHER | Age: 84
End: 2019-07-23
Attending: PHYSICIAN ASSISTANT
Payer: MEDICARE

## 2019-07-23 VITALS
RESPIRATION RATE: 16 BRPM | SYSTOLIC BLOOD PRESSURE: 137 MMHG | TEMPERATURE: 97.9 F | HEART RATE: 105 BPM | OXYGEN SATURATION: 97 % | BODY MASS INDEX: 16.39 KG/M2 | HEIGHT: 63 IN | WEIGHT: 92.5 LBS | DIASTOLIC BLOOD PRESSURE: 80 MMHG

## 2019-07-23 DIAGNOSIS — G89.4 CHRONIC PAIN SYNDROME: ICD-10-CM

## 2019-07-23 DIAGNOSIS — M81.0 OSTEOPOROSIS, UNSPECIFIED OSTEOPOROSIS TYPE, UNSPECIFIED PATHOLOGICAL FRACTURE PRESENCE: Primary | ICD-10-CM

## 2019-07-23 LAB
CALCIUM SERPL-MCNC: 9.9 MG/DL (ref 8.5–10.1)
CREAT SERPL-MCNC: 0.77 MG/DL (ref 0.52–1.04)
GFR SERPL CREATININE-BSD FRML MDRD: 71 ML/MIN/{1.73_M2}
PHOSPHATE SERPL-MCNC: 3.8 MG/DL (ref 2.5–4.5)

## 2019-07-23 PROCEDURE — 82310 ASSAY OF CALCIUM: CPT | Mod: ZL | Performed by: NURSE PRACTITIONER

## 2019-07-23 PROCEDURE — 84100 ASSAY OF PHOSPHORUS: CPT | Mod: ZL | Performed by: NURSE PRACTITIONER

## 2019-07-23 PROCEDURE — 36415 COLL VENOUS BLD VENIPUNCTURE: CPT | Mod: ZL | Performed by: NURSE PRACTITIONER

## 2019-07-23 PROCEDURE — 96372 THER/PROPH/DIAG INJ SC/IM: CPT

## 2019-07-23 PROCEDURE — 82565 ASSAY OF CREATININE: CPT | Mod: ZL | Performed by: NURSE PRACTITIONER

## 2019-07-23 PROCEDURE — 25000128 H RX IP 250 OP 636: Performed by: NURSE PRACTITIONER

## 2019-07-23 RX ORDER — TRAMADOL HYDROCHLORIDE 50 MG/1
TABLET ORAL
Qty: 100 TABLET | Refills: 0 | OUTPATIENT
Start: 2019-07-23

## 2019-07-23 RX ADMIN — DENOSUMAB 60 MG: 60 INJECTION SUBCUTANEOUS at 11:28

## 2019-07-23 ASSESSMENT — PAIN SCALES - GENERAL: PAINLEVEL: NO PAIN (0)

## 2019-07-23 ASSESSMENT — MIFFLIN-ST. JEOR: SCORE: 843.71

## 2019-07-23 NOTE — PROGRESS NOTES
1128 Medication administered per protocol in left back of upper arm at 45 degrees.  Site covered with sterile bandage.  Patient tolerated injection well, no verbal nor non-verbal signs of discomfort noted.  No adverse effects noted at this time.   Patient instructed to call with further questions or concerns.  Patient states understanding and is in agreement with this plan. Patient discharged ambulatory.

## 2019-07-23 NOTE — PROGRESS NOTES
Patient is a 83 year old female here today accompanied by self for injection of Prolia per order of Sandi Mahajan NP under the supervision of Dr. Levine. Labs within parameters to administer injection; today's lab values are Calcium: 9.9, Phosphorus 3.8, Creatinine 0.77.  Patient denies questions nor concerns regarding medication, administration site, side effects, nor aftercare.  Patient identified with two identifiers, order verified, and verbal consent for today's injection obtained from patient.     Patient education provided on s/s of injection site infection, and/or medication specific side effects, and when to call a provider.  Patient instructed to report any adverse effects.

## 2019-07-23 NOTE — PATIENT INSTRUCTIONS
Patient Education     Denosumab Solution for injection  What is this medicine?  DENOSUMAB (den oh rafia mab) slows bone breakdown. Prolia is used to treat osteoporosis in women after menopause and in men. Xgeva is used to prevent bone fractures and other bone problems caused by cancer bone metastases. Xgeva is also used to treat giant cell tumor of the bone.  This medicine may be used for other purposes; ask your health care provider or pharmacist if you have questions.  What should I tell my health care provider before I take this medicine?  They need to know if you have any of these conditions:    dental disease    eczema    infection or history of infections    kidney disease or on dialysis    low blood calcium or vitamin D    malabsorption syndrome    scheduled to have surgery or tooth extraction    taking medicine that contains denosumab    thyroid or parathyroid disease    an unusual reaction to denosumab, other medicines, foods, dyes, or preservatives    pregnant or trying to get pregnant    breast-feeding  How should I use this medicine?  This medicine is for injection under the skin. It is given by a health care professional in a hospital or clinic setting.  If you are getting Prolia, a special MedGuide will be given to you by the pharmacist with each prescription and refill. Be sure to read this information carefully each time.  For Prolia, talk to your pediatrician regarding the use of this medicine in children. Special care may be needed. For Xgeva, talk to your pediatrician regarding the use of this medicine in children. While this drug may be prescribed for children as young as 13 years for selected conditions, precautions do apply.  Overdosage: If you think you've taken too much of this medicine contact a poison control center or emergency room at once.  NOTE: This medicine is only for you. Do not share this medicine with others.  What if I miss a dose?  It is important not to miss your dose. Call your  doctor or health care professional if you are unable to keep an appointment.  What may interact with this medicine?  Do not take this medicine with any of the following medications:    other medicines containing denosumab  This medicine may also interact with the following medications:    medicines that suppress the immune system    medicines that treat cancer    steroid medicines like prednisone or cortisone  This list may not describe all possible interactions. Give your health care provider a list of all the medicines, herbs, non-prescription drugs, or dietary supplements you use. Also tell them if you smoke, drink alcohol, or use illegal drugs. Some items may interact with your medicine.  What should I watch for while using this medicine?  Visit your doctor or health care professional for regular checks on your progress. Your doctor or health care professional may order blood tests and other tests to see how you are doing.  Call your doctor or health care professional if you get a cold or other infection while receiving this medicine. Do not treat yourself. This medicine may decrease your body's ability to fight infection.  You should make sure you get enough calcium and vitamin D while you are taking this medicine, unless your doctor tells you not to. Discuss the foods you eat and the vitamins you take with your health care professional.  See your dentist regularly. Brush and floss your teeth as directed. Before you have any dental work done, tell your dentist you are receiving this medicine.  Do not become pregnant while taking this medicine or for 5 months after stopping it. Women should inform their doctor if they wish to become pregnant or think they might be pregnant. There is a potential for serious side effects to an unborn child. Talk to your health care professional or pharmacist for more information.  What side effects may I notice from receiving this medicine?  Side effects that you should report to  your doctor or health care professional as soon as possible:    allergic reactions like skin rash, itching or hives, swelling of the face, lips, or tongue    breathing problems    chest pain    fast, irregular heartbeat    feeling faint or lightheaded, falls    fever, chills, or any other sign of infection    muscle spasms, tightening, or twitches    numbness or tingling    skin blisters or bumps, or is dry, peels, or red    slow healing or unexplained pain in the mouth or jaw    unusual bleeding or bruising  Side effects that usually do not require medical attention (Report these to your doctor or health care professional if they continue or are bothersome.):    muscle pain    stomach upset, gas  This list may not describe all possible side effects. Call your doctor for medical advice about side effects. You may report side effects to FDA at 1-713-FDA-0591.  Where should I keep my medicine?  This medicine is only given in a clinic, doctor's office, or other health care setting and will not be stored at home.  NOTE: This sheet is a summary. It may not cover all possible information. If you have questions about this medicine, talk to your doctor, pharmacist, or health care provider.  NOTE:This sheet is a summary. It may not cover all possible information. If you have questions about this medicine, talk to your doctor, pharmacist, or health care provider. Copyright  2016 Gold Standard

## 2019-08-15 DIAGNOSIS — G89.4 CHRONIC PAIN SYNDROME: ICD-10-CM

## 2019-08-15 RX ORDER — TRAMADOL HYDROCHLORIDE 50 MG/1
TABLET ORAL
Qty: 100 TABLET | Refills: 0 | Status: SHIPPED | OUTPATIENT
Start: 2019-08-15 | End: 2019-09-07

## 2019-08-15 NOTE — TELEPHONE ENCOUNTER
Tramadol 50mg      Last Written Prescription Date:  7/22/2019  Last Fill Quantity: 100,   # refills: 0  Last Office Visit: 7/8/2019  Future Office visit:       Routing refill request to provider for review/approval because:  Drug not on the [facility], [facility] or [facility] refill protocol or controlled substance

## 2019-08-17 DIAGNOSIS — G89.4 CHRONIC PAIN SYNDROME: ICD-10-CM

## 2019-08-20 RX ORDER — TRAMADOL HYDROCHLORIDE 50 MG/1
TABLET ORAL
Qty: 100 TABLET | Refills: 0 | OUTPATIENT
Start: 2019-08-20

## 2019-08-26 ENCOUNTER — ANTICOAGULATION THERAPY VISIT (OUTPATIENT)
Dept: ANTICOAGULATION | Facility: OTHER | Age: 84
End: 2019-08-26
Attending: PHYSICIAN ASSISTANT
Payer: MEDICARE

## 2019-08-26 DIAGNOSIS — I48.21 PERMANENT ATRIAL FIBRILLATION (H): ICD-10-CM

## 2019-08-26 DIAGNOSIS — Z79.01 LONG TERM CURRENT USE OF ANTICOAGULANT THERAPY: ICD-10-CM

## 2019-08-26 LAB — INR POINT OF CARE: 3.7 (ref 0.86–1.14)

## 2019-08-26 PROCEDURE — 85610 PROTHROMBIN TIME: CPT | Mod: QW,ZL

## 2019-08-26 NOTE — PROGRESS NOTES
ANTICOAGULATION FOLLOW-UP CLINIC VISIT    Patient Name:  Michael Christiansen  Date:  8/26/2019  Contact Type:  Face to Face    SUBJECTIVE:  Patient Findings     Positives:   Change in diet/appetite (decreased vit K intake)    Comments:   No bleeding/bruising, no new changes in meds/activity but decreased vit K intake.        Clinical Outcomes     Negatives:   Major bleeding event, Thromboembolic event, Anticoagulation-related hospital admission, Anticoagulation-related ED visit, Anticoagulation-related fatality    Comments:   No bleeding/bruising, no new changes in meds/activity but decreased vit K intake.           OBJECTIVE    INR Protime   Date Value Ref Range Status   08/26/2019 3.7 (A) 0.86 - 1.14 Final       ASSESSMENT / PLAN  INR assessment SUPRA decreased vit K intake   Recheck INR In: 3 WEEKS    INR Location Clinic      Anticoagulation Summary  As of 8/26/2019    INR goal:   2.0-3.0   TTR:   63.2 % (3.1 y)   INR used for dosing:   3.7! (8/26/2019)   Warfarin maintenance plan:   2.5 mg (5 mg x 0.5) every Mon, Fri; 5 mg (5 mg x 1) all other days   Full warfarin instructions:   8/26: Hold; Otherwise 2.5 mg every Mon, Fri; 5 mg all other days   Weekly warfarin total:   30 mg   Plan last modified:   Kasia Hercules RN (10/1/2018)   Next INR check:   9/16/2019   Priority:   INR   Target end date:   Indefinite    Indications    Long-term (current) use of anticoagulants [Z79.01] [Z79.01]  Permanent atrial fibrillation (H) [I48.2]             Anticoagulation Episode Summary     INR check location:       Preferred lab:       Send INR reminders to:   [facility] ANTICOAG POOL    Comments:         Anticoagulation Care Providers     Provider Role Specialty Phone number    Chandrika Kumar PA [facility] [phone]            See the Encounter Report to view Anticoagulation Flowsheet and Dosing Calendar (Go to Encounters tab in chart review, and find the Anticoagulation Therapy Visit)        Kasia Hercules,  RN

## 2019-08-28 DIAGNOSIS — E78.5 HYPERLIPIDEMIA LDL GOAL <100: ICD-10-CM

## 2019-08-30 RX ORDER — SIMVASTATIN 40 MG
TABLET ORAL
Qty: 45 TABLET | Refills: 0 | Status: SHIPPED | OUTPATIENT
Start: 2019-08-30 | End: 2019-09-11 | Stop reason: DRUGHIGH

## 2019-09-07 DIAGNOSIS — G89.4 CHRONIC PAIN SYNDROME: ICD-10-CM

## 2019-09-09 DIAGNOSIS — I48.91 ATRIAL FIBRILLATION, UNSPECIFIED TYPE (H): ICD-10-CM

## 2019-09-09 DIAGNOSIS — Z79.01 LONG TERM CURRENT USE OF ANTICOAGULANT THERAPY: ICD-10-CM

## 2019-09-09 DIAGNOSIS — I10 HTN (HYPERTENSION): ICD-10-CM

## 2019-09-11 DIAGNOSIS — E78.5 HYPERLIPIDEMIA LDL GOAL <100: Primary | ICD-10-CM

## 2019-09-11 RX ORDER — LOSARTAN POTASSIUM 50 MG/1
TABLET ORAL
Qty: 90 TABLET | Refills: 2 | Status: SHIPPED | OUTPATIENT
Start: 2019-09-11 | End: 2020-01-01

## 2019-09-11 RX ORDER — SIMVASTATIN 10 MG
10 TABLET ORAL AT BEDTIME
Qty: 90 TABLET | Refills: 3 | Status: SHIPPED | OUTPATIENT
Start: 2019-09-11 | End: 2020-01-01

## 2019-09-11 RX ORDER — WARFARIN SODIUM 5 MG/1
TABLET ORAL
Qty: 64 TABLET | Refills: 6 | Status: SHIPPED | OUTPATIENT
Start: 2019-09-11 | End: 2020-01-01

## 2019-09-11 RX ORDER — TRAMADOL HYDROCHLORIDE 50 MG/1
TABLET ORAL
Qty: 100 TABLET | Refills: 0 | Status: SHIPPED | OUTPATIENT
Start: 2019-09-11 | End: 2019-01-01

## 2019-09-11 NOTE — TELEPHONE ENCOUNTER
Warfarin 5mg      Last Written Prescription Date:  11/19/2018  Last Fill Quantity: 100,   # refills: 3  Last Office Visit: 8/26/2019  Future Office visit:       Routing refill request to provider for review/approval because:  Failed protocol due to INR on 8/26/2019 was 3.7 Please advise

## 2019-09-11 NOTE — TELEPHONE ENCOUNTER
traMADol  Last Written Prescription Date: 8/15/19  Last Fill Quantity: 100 # of Refills: 0  Last Office Visit: 7/8/19

## 2019-09-16 ENCOUNTER — ANTICOAGULATION THERAPY VISIT (OUTPATIENT)
Dept: ANTICOAGULATION | Facility: OTHER | Age: 84
End: 2019-09-16
Attending: PHYSICIAN ASSISTANT
Payer: MEDICARE

## 2019-09-16 DIAGNOSIS — I48.21 PERMANENT ATRIAL FIBRILLATION (H): ICD-10-CM

## 2019-09-16 DIAGNOSIS — Z79.01 LONG TERM CURRENT USE OF ANTICOAGULANT THERAPY: ICD-10-CM

## 2019-09-16 LAB — INR POINT OF CARE: 3.2 (ref 0.86–1.14)

## 2019-09-16 PROCEDURE — 85610 PROTHROMBIN TIME: CPT | Mod: QW,ZL

## 2019-09-16 NOTE — PROGRESS NOTES
ANTICOAGULATION FOLLOW-UP CLINIC VISIT    Patient Name:  Michael Christiansen  Date:  9/16/2019  Contact Type:  Face to Face    SUBJECTIVE:  Patient Findings     Positives:   Change in diet/appetite (decreased vit K intake)    Comments:   Decreased vit K intake.        Clinical Outcomes     Negatives:   Major bleeding event, Thromboembolic event, Anticoagulation-related hospital admission, Anticoagulation-related ED visit, Anticoagulation-related fatality    Comments:   Decreased vit K intake.           OBJECTIVE    INR Protime   Date Value Ref Range Status   09/16/2019 3.2 (A) 0.86 - 1.14 Final       ASSESSMENT / PLAN  INR assessment SUPRA decreased vit K intake   Recheck INR In: 5 WEEKS    INR Location Clinic      Anticoagulation Summary  As of 9/16/2019    INR goal:   2.0-3.0   TTR:   62.0 % (3.1 y)   INR used for dosing:   3.2! (9/16/2019)   Warfarin maintenance plan:   2.5 mg (5 mg x 0.5) every Mon, Fri; 5 mg (5 mg x 1) all other days   Full warfarin instructions:   9/16: Hold; Otherwise 2.5 mg every Mon, Fri; 5 mg all other days   Weekly warfarin total:   30 mg   Plan last modified:   Kasia Hercules RN (10/1/2018)   Next INR check:   10/21/2019   Priority:   INR   Target end date:   Indefinite    Indications    Long-term (current) use of anticoagulants [Z79.01] [Z79.01]  Permanent atrial fibrillation (H) [I48.2]             Anticoagulation Episode Summary     INR check location:       Preferred lab:       Send INR reminders to:   [facility] ANTICOAG POOL    Comments:         Anticoagulation Care Providers     Provider Role Specialty Phone number    Chandrika Kumar, PA [facility] [phone]            See the Encounter Report to view Anticoagulation Flowsheet and Dosing Calendar (Go to Encounters tab in chart review, and find the Anticoagulation Therapy Visit)        Kasia Hercules, RN

## 2019-09-23 PROBLEM — F11.90 CHRONIC, CONTINUOUS USE OF OPIOIDS: Chronic | Status: ACTIVE | Noted: 2019-01-01

## 2019-10-01 NOTE — TELEPHONE ENCOUNTER
Tramadol      Last Written Prescription Date:  9/11/19  Last Fill Quantity: 100,   # refills: 0  Last Office Visit: 7/8/19  Future Office visit:    Next 5 appointments (look out 90 days)    Dec 30, 2019  1:00 PM CST  (Arrive by 12:45 PM)  [facility] [address]  [place] [address]  [phone]           Routing refill request to provider for review/approval because:  Drug not on the [facility], [facility] or [facility] refill protocol or controlled substance

## 2019-10-03 NOTE — ED AVS SNAPSHOT
[facility]  [address] [address]  Phone:  [phone]                                    Michael Christiansen   MRN: [id number]    Department:  [facility]   Date of Visit:  10/3/2019           After Visit Summary Signature Page    I have received my discharge instructions, and my questions have been answered. I have discussed any challenges I see with this plan with the nurse or doctor.    ..........................................................................................................................................  Patient/Patient Representative Signature      ..........................................................................................................................................  Patient Representative Print Name and Relationship to Patient    ..................................................               ................................................  Date                                   Time    ..........................................................................................................................................  Reviewed by Signature/Title    ...................................................              ..............................................  Date                                               Time          22EPIC Rev 08/18

## 2019-10-03 NOTE — ED NOTES
Patient presents to emergency room with c/o abdominal pain and chest wall pain. C/o on going abdominal pain. Daughter states pt has lost a lot of weight recently and is really stressed out with taking care of her dad who has parkinson's. Appears in distress. Nauseated. Dry heaving. C/o cough. No s/s of respiratory distress. Monitors on. IV established.

## 2019-10-04 NOTE — ED NOTES
Pt up to commode.  Had some results. Liquid and dark brown.  Accidentally got some on the floor and herself.  Pt back in bed at this time

## 2019-10-04 NOTE — PROGRESS NOTES
ANTICOAGULATION FOLLOW-UP CLINIC VISIT    Patient Name:  Michael Christiansen  Date:  10/4/2019  Contact Type:  Telephone/ called and talked to patient.    SUBJECTIVE:  Patient Findings     Positives:   Missed doses, Hospital admission    Comments:   INR completed in ER.  Call placed to patient and spoke to  and patient busy at the moment.  Nurse to call patient back.  Called patient and spoke to her re: INR result, warfarin dosing and next INR recheck date.  Patient verbalized understanding.  States no bleeding/bruising.  She wasn't feeling well yesterday and wasn't eating much or doing much.  She didn't take her coumadin yesterday.  She states feeling better today.  No changes to medications.  Patient will come in next week to recheck.              Clinical Outcomes     Comments:   INR completed in ER.  Call placed to patient and spoke to  and patient busy at the moment.  Nurse to call patient back.  Called patient and spoke to her re: INR result, warfarin dosing and next INR recheck date.  Patient verbalized understanding.  States no bleeding/bruising.  She wasn't feeling well yesterday and wasn't eating much or doing much.  She didn't take her coumadin yesterday.  She states feeling better today.  No changes to medications.  Patient will come in next week to recheck.                 OBJECTIVE    INR   Date Value Ref Range Status   10/03/2019 4.12 (H) 0.80 - 1.20 Final       ASSESSMENT / PLAN  INR assessment SUPRA not feeling well   Recheck INR In: 4 DAYS    INR Location Clinic      Anticoagulation Summary  As of 10/4/2019    INR goal:   2.0-3.0   TTR:   61.0 % (3.2 y)   INR used for dosin.12! (10/3/2019)   Warfarin maintenance plan:   2.5 mg (5 mg x 0.5) every Mon, Fri; 5 mg (5 mg x 1) all other days   Full warfarin instructions:   2.5 mg every Mon, Fri; 5 mg all other days   Weekly warfarin total:   30 mg   Plan last modified:   Kasia Hercules RN (10/1/2018)   Next INR check:   10/8/2019    Priority:   INR   Target end date:   Indefinite    Indications    Long-term (current) use of anticoagulants [Z79.01] [Z79.01]  Permanent atrial fibrillation [I48.21]             Anticoagulation Episode Summary     INR check location:       Preferred lab:       Send INR reminders to:   [facility] ANTICOAG POOL    Comments:         Anticoagulation Care Providers     Provider Role Specialty Phone number    Chandrika Kumar PA [facility] [phone]            See the Encounter Report to view Anticoagulation Flowsheet and Dosing Calendar (Go to Encounters tab in chart review, and find the Anticoagulation Therapy Visit)      Jacqueline Licea RN

## 2019-10-04 NOTE — ED NOTES
Up to the bathroom to void. Denies pain. Denies nausea. Tolerated oral contrast. Awaiting CT scan

## 2019-10-05 NOTE — ED PROVIDER NOTES
History     Chief Complaint   Patient presents with     Abdominal Pain     HPI  Michael Christiansen is a 84 year old female who presented to the ED with chest and abdominal pain.  On arrival in the ED she had a.fib with RVR and stated she had been coughing, that her pain seemed to be more muscular than heart.  She is most distressed about her abdominal pain. Her daughter states she is losing weight, and has lost 60+ pounds in the last year.  She is the primary caregiver for her , who has Parkinson's disease and needs assistance frequently.  Patient has not been eating well and states food tastes bad.  Daughter is very concerned and feeling helpless to do anything for her Mom.     Allergies:  Allergies   Allergen Reactions     Atenolol Shortness Of Breath and Other (See Comments)     Dizziness  Severe vertigo and dizziness/SOB     Lisinopril Cough     Augmentin Diarrhea     Pollen Extract      Other reaction(s): Runny Nose       Problem List:    Patient Active Problem List    Diagnosis Date Noted     Chronic, continuous use of opioids 09/23/2019     Priority: Medium     Hx of rheumatic fever 06/19/2019     Priority: Medium     Protein-calorie malnutrition (H) 06/06/2019     Priority: Medium     Valvular heart disease 06/06/2019     Priority: Medium     Unexplained weight loss 06/06/2019     Priority: Medium     LISA (generalized anxiety disorder) 11/26/2018     Priority: Medium     Anticoagulated on Coumadin 06/13/2018     Priority: Medium     Pulmonary hypertension-moderate 12/20/2017     Priority: Medium     Aortic stenosis, moderate 12/20/2017     Priority: Medium     Moderate tricuspid insufficiency 12/20/2017     Priority: Medium     Malignant neoplasm of right breast in female, estrogen receptor positive, unspecified site of breast (H) 08/02/2017     Priority: Medium     ACP (advance care planning) 01/26/2017     Priority: Medium     Advance Care Planning 3/27/2017: Receipt of ACP document:  Received:  invalid Advance Directive document dated 2-20-16 due to the document has not been signed.  Document not previously scanned. Validation form completed and sent to be scanned indicating invalid document. Letter sent to client with information and facilitation resources.  Confirmed/documented designated decision maker(s).  Added by Kerry Pichardo RN, System Director ACP-Honoring Choices   Advance Care Planning 1/26/2017: ACP Review of Chart / Resources Provided:  Reviewed chart for advance care plan.  Michael Christiansen has been provided information and resources to begin or update their advance care plan.  Added by Iris Bennett         Osteoporosis 01/26/2017     Priority: Medium     Long-term (current) use of anticoagulants [Z79.01] 07/27/2016     Priority: Medium     History of total knee replacement 01/26/2015     Priority: Medium     Chronic pain syndrome 01/23/2015     Priority: Medium     Acute blood loss anemia 07/19/2013     Priority: Medium     Permanent atrial fibrillation 05/01/2013     Priority: Medium     Overview:   Chronic        Hyperlipidemia LDL goal <130 03/25/2013     Priority: Medium     Essential hypertension 03/25/2013     Priority: Medium     Osteoarthritis 03/25/2013     Priority: Medium     Lumbar pain with radiation down right leg 07/05/2011     Priority: Medium     Overview:   IMO Update 10/11       Lumbar spinal stenosis 07/05/2011     Priority: Medium     Radiculitis 06/16/2011     Priority: Medium     Overview:   IMO Update 10/11       Osteoarthritis of knee 09/16/2010     Priority: Medium     Overview:   IMO Update 10/11          Past Medical History:    Past Medical History:   Diagnosis Date     Arthritis      Backache, unspecified 1/1/2011     Breast cancer, stage 1 3/19/2010     Chronic pain syndrome 1/23/2015     Claustrophobia 1/1/2011     Hip joint replacement by other means 1/1/2011     Osteoarthrosis, unspecified whether generalized or localized, lower leg 8/14/2006      Posttraumatic stress disorder 2011       Past Surgical History:    Past Surgical History:   Procedure Laterality Date     BREAST SURGERY      right  side mastectomy     C TOTAL KNEE ARTHROPLASTY Left 01/26/15     cataract extraction and lens implantation       COLONOSCOPY       EYE SURGERY       GYN SURGERY      tubal pregnancy     ORTHOPEDIC SURGERY      l. hip replacement LT     ORTHOPEDIC SURGERY  2013    Right total knee     TUBAL/ECTOPIC PREGNANCY         Family History:    Family History   Problem Relation Age of Onset     Diabetes Father 75        cause of death     Other - See Comments Father         PVD     Heart Disease Sister      Coronary Artery Disease Sister      Other - See Comments Mother 83        old age; cause of death     Other - See Comments Sister         14 year twin [employer]; cause of death     Chronic Obstructive Pulmonary Disease Daughter      Osteoporosis Daughter      Thyroid Disease Daughter      Cancer Sister      Other Cancer Sister      Other Cancer Sister         uterine     Hypertension No family hx of      Hyperlipidemia No family hx of      Cerebrovascular Disease No family hx of      Breast Cancer No family hx of      Prostate Cancer No family hx of      Colon Cancer No family hx of      Asthma No family hx of      Anesthesia Reaction No family hx of      Genetic Disorder No family hx of        Social History:  Marital Status:   [2]  Social History     Tobacco Use     Smoking status: Former Smoker     Packs/day: 0.30     Years: 30.00     Pack years: 9.00     Types: Cigarettes     Last attempt to quit: 1987     Years since quittin.1     Smokeless tobacco: Never Used     Tobacco comment: year quit    Substance Use Topics     Alcohol use: No     Drug use: No        Medications:    Acetaminophen (TYLENOL PO)  albuterol (PROAIR HFA/PROVENTIL HFA/VENTOLIN HFA) 108 (90 Base) MCG/ACT inhaler  Calcium Citrate-Vitamin D  (CALCITRATE/VITAMIN D PO)  diltiazem ER (DILT-XR) 180 MG 24 hr capsule  losartan (COZAAR) 50 MG tablet  Misc Natural Products (OSTEO BI-FLEX ADV DOUBLE ST PO)  Multiple Vitamin (MULTIVITAMINS PO)  Multiple Vitamins-Minerals (PRESERVISION AREDS 2 PO)  order for DME  sertraline (ZOLOFT) 50 MG tablet  simvastatin (ZOCOR) 10 MG tablet  traMADol (ULTRAM) 50 MG tablet  warfarin (COUMADIN) 5 MG tablet  warfarin ANTICOAGULANT (COUMADIN) 5 MG tablet          Review of Systems   Constitutional: Positive for activity change and fatigue. Negative for fever.   HENT: Positive for congestion.    Respiratory: Positive for shortness of breath. Negative for wheezing.    Cardiovascular: Positive for chest pain.        Due to coughing   Gastrointestinal: Positive for abdominal pain and diarrhea. Negative for constipation, nausea and vomiting.   Genitourinary: Negative for dysuria, frequency and urgency.   Musculoskeletal: Negative.    Skin: Positive for pallor.   Neurological: Positive for weakness. Negative for dizziness and light-headedness.   Psychiatric/Behavioral: Positive for dysphoric mood.       Physical Exam   BP: (!) 168/116  Pulse: 115  Heart Rate: 112  Temp: 98.1  F (36.7  C)  Resp: 16  SpO2: 100 %      Physical Exam  Constitutional:       Appearance: She is well-developed. She is cachectic. She is ill-appearing.   HENT:      Head: Normocephalic and atraumatic.      Mouth/Throat:      Mouth: Mucous membranes are dry.   Eyes:      Extraocular Movements: Extraocular movements intact.      Pupils: Pupils are equal, round, and reactive to light.   Cardiovascular:      Rate and Rhythm: Tachycardia present. Rhythm irregularly irregular. Occasional extrasystoles are present.     Pulses: Normal pulses.      Heart sounds: Murmur present. Systolic murmur present with a grade of 3/6.   Pulmonary:      Effort: Pulmonary effort is normal. No respiratory distress.      Breath sounds: Normal breath sounds.   Abdominal:      General:  Abdomen is scaphoid. Bowel sounds are normal. There is no distension.      Palpations: Abdomen is soft.      Tenderness: There is tenderness in the epigastric area and left upper quadrant.   Musculoskeletal:      Right lower leg: No edema.      Left lower leg: No edema.   Skin:     General: Skin is warm and dry.      Capillary Refill: Capillary refill takes less than 2 seconds.      Coloration: Skin is pale.   Neurological:      Mental Status: She is alert and oriented to person, place, and time.   Psychiatric:         Mood and Affect: Affect is blunt.         Speech: Speech normal.         Behavior: Behavior normal.         Cognition and Memory: Cognition normal.         ED Course        Procedures    EKG shows atrial fibrillation with RVR.  LVH by voltage.  Q waves present in aVL, V1, V2, no ST or T wave abnormalities noted.  No change when compared to previous EKG.       Results for orders placed or performed during the hospital encounter of 10/03/19 (from the past 24 hour(s))   CT Abdomen Pelvis w/o Contrast    Narrative    EXAMINATION: CT ABDOMEN PELVIS W/O CONTRAST, 10/3/2019 11:40 PM    TECHNIQUE:  Helical CT images from the lung bases through the  symphysis pubis were obtained  without IV contrast. Contrast dose:    COMPARISON: none    HISTORY: Abd pain, acute, generalized; oral contrast only, 2h delay  per Dr. Segura    FINDINGS:    Interstitial thickening is seen in both lung bases.    The liver is free of masses or biliary ductal enlargement. No  calcified gallstones are seen.    The the spleen and pancreas appear normal.    The adrenal glands are normal.    There is a benign cyst seen in the left kidney. The calyces are mildly  distended no renal calculi are noted.    The periaortic lymph nodes are normal in caliber. There is a 3.8 cm  abdominal aortic aneurysm.    No intraperitoneal masses or inflammatory changes are noted. The  bubbles of gas seen in the pelvis on previous CT scan are in  multiple  small bowel loops.    In the pelvis the bladder and rectum appear normal.    Compression fractures of L1 L2 L3 and L5 are noted.      Impression    IMPRESSION: The presacral gaseous bubbles seen on recent CT scan  appear to be due to loops of small bowel containing bubbles of gas. No  pelvic abscess is seen. No intra-abdominal inflammatory changes are  noted.    Interstitial thickening at both lung bases.     NARDA WHITE MD       Medications   0.9% sodium chloride BOLUS (0 mLs Intravenous Stopped 10/3/19 1924)   morphine (PF) injection 2 mg (2 mg Intravenous Given 10/3/19 1847)   ondansetron (ZOFRAN) injection 4 mg (4 mg Intravenous Given 10/3/19 1847)       Assessments & Plan (with Medical Decision Making)   Initial CT shows bubbles in bowel that cannot be fully evaluated.  Spoke with Dr. White, who recommended CT with oral contrast to evaluate.  No renal or ureteral stone found on initial study, hematuria unexplained.  Patient is hyponatremic and has minimal elevation in WBC, but labs are otherwise unremarkable.    Patient signed out to Dr. Yvan Duke at change of shift with 2nd CT pending.    I have reviewed the nursing notes.    I have reviewed the findings, diagnosis, plan and need for follow up with the patient.  Discharge Medication List as of 10/4/2019 12:48 AM          Final diagnoses:   Abdominal pain, generalized       10/3/2019   [facility]     Elvira Ventura MD  10/04/19 [id number]

## 2019-10-07 NOTE — TELEPHONE ENCOUNTER
Reason for ER/UC visit:   Can you explain to me in your own words what your main problem/concern is for your recent ER visit?: abdominal pain  What did the medical team at the hospital tell you to watch out for to make sure you're ok?: Pain still there every now and then and right in the middle of the abdomen, has been taking Pepto bismol  Do you have any questions for me regarding your main diagnosis? Is there anything I can better explain to you?: CT's done and can't find anything wrong.  Pain 4/10    New or Worsening symptoms:  same     Prescription Received/Picked up from Pharmacy?: none Any questions regarding your prescription?:     Follow-up Results or Labs that are pending: Normal CT    Do you have any questions or concerns?: Still having pain, would like advice on what she can do    ER Recommends Follow-up By: with provider    RN Recommendations:     Thank you for your time, if you start feeling worse, or have any further questions, please feel free to contact us again at [phone].  If needing immediate medical attention at any time please call 911/Go to the ER.

## 2019-10-08 NOTE — PROGRESS NOTES
ANTICOAGULATION FOLLOW-UP CLINIC VISIT    Patient Name:  Michael Christiansen  Date:  10/8/2019  Contact Type:  Face to Face    SUBJECTIVE:  Patient Findings     Positives:   Change in diet/appetite (decreased vit K intake)    Comments:   States her stomach is bothering her and she is forcing herself to eat so vit K intake decreased.         Clinical Outcomes     Negatives:   Major bleeding event, Thromboembolic event, Anticoagulation-related hospital admission, Anticoagulation-related ED visit, Anticoagulation-related fatality    Comments:   States her stomach is bothering her and she is forcing herself to eat so vit K intake decreased.            OBJECTIVE    INR Protime   Date Value Ref Range Status   10/08/2019 3.6 (A) 0.86 - 1.14 Final       ASSESSMENT / PLAN  INR assessment SUPRA decreased vit K intake   Recheck INR In: 3 WEEKS    INR Location Clinic      Anticoagulation Summary  As of 10/8/2019    INR goal:   2.0-3.0   TTR:   60.8 % (3.2 y)   INR used for dosing:   3.6! (10/8/2019)   Warfarin maintenance plan:   2.5 mg (5 mg x 0.5) every Mon, Fri; 5 mg (5 mg x 1) all other days   Full warfarin instructions:   10/8: Hold; Otherwise 2.5 mg every Mon, Fri; 5 mg all other days   Weekly warfarin total:   30 mg   Plan last modified:   Kasia Hercules RN (10/1/2018)   Next INR check:   10/29/2019   Priority:   INR   Target end date:   Indefinite    Indications    Long-term (current) use of anticoagulants [Z79.01] [Z79.01]  Permanent atrial fibrillation [I48.21]             Anticoagulation Episode Summary     INR check location:       Preferred lab:       Send INR reminders to:   [facility] ANTICOAG POOL    Comments:         Anticoagulation Care Providers     Provider Role Specialty Phone number    Chandrika Kumar, PA [facility] [phone]            See the Encounter Report to view Anticoagulation Flowsheet and Dosing Calendar (Go to Encounters tab in chart review, and find the Anticoagulation Therapy  Visit)        Kasia Hercules RN

## 2019-10-10 NOTE — PROGRESS NOTES
Subjective     Michael Christiansen is a 84 year old female who presents to clinic today for the following health issues:    HPI   ED/UC Followup:    Facility:  [facility] [place]  Date of visit: 10/03/2019  Reason for visit: Abdominal Pain  Current Status: no change           Patient Active Problem List   Diagnosis     Hyperlipidemia LDL goal <130     Essential hypertension     Osteoarthritis     Permanent atrial fibrillation     Chronic pain syndrome     History of total knee replacement     Radiculitis     Osteoarthritis of knee     Long-term (current) use of anticoagulants [Z79.01]     ACP (advance care planning)     Osteoporosis     Malignant neoplasm of right breast in female, estrogen receptor positive, unspecified site of breast (H)     Pulmonary hypertension-moderate     Aortic stenosis, moderate     Moderate tricuspid insufficiency     Anticoagulated on Coumadin     LISA (generalized anxiety disorder)     Acute blood loss anemia     Lumbar pain with radiation down right leg     Lumbar spinal stenosis     Protein-calorie malnutrition (H)     Valvular heart disease     Unexplained weight loss     Hx of rheumatic fever     Chronic, continuous use of opioids     Past Surgical History:   Procedure Laterality Date     BREAST SURGERY      right  side mastectomy     C TOTAL KNEE ARTHROPLASTY Left 01/26/15     cataract extraction and lens implantation       COLONOSCOPY       EYE SURGERY       GYN SURGERY      tubal pregnancy     ORTHOPEDIC SURGERY      l. hip replacement LT     ORTHOPEDIC SURGERY  2013    Right total knee     TUBAL/ECTOPIC PREGNANCY         Social History     Tobacco Use     Smoking status: Former Smoker     Packs/day: 0.30     Years: 30.00     Pack years: 9.00     Types: Cigarettes     Last attempt to quit: 1987     Years since quittin.1     Smokeless tobacco: Never Used     Tobacco comment: year quit    Substance Use Topics     Alcohol use: No     Family History   Problem Relation Age of  Onset     Diabetes Father 75        cause of death     Other - See Comments Father         PVD     Heart Disease Sister      Coronary Artery Disease Sister      Other - See Comments Mother 83        old age; cause of death     Other - See Comments Sister         14 year twin [employer]; cause of death     Chronic Obstructive Pulmonary Disease Daughter      Osteoporosis Daughter      Thyroid Disease Daughter      Cancer Sister      Other Cancer Sister      Other Cancer Sister         uterine     Hypertension No family hx of      Hyperlipidemia No family hx of      Cerebrovascular Disease No family hx of      Breast Cancer No family hx of      Prostate Cancer No family hx of      Colon Cancer No family hx of      Asthma No family hx of      Anesthesia Reaction No family hx of      Genetic Disorder No family hx of          Current Outpatient Medications   Medication Sig Dispense Refill     Acetaminophen (TYLENOL PO) Take 500 mg by mouth every 4 hours as needed        albuterol (PROAIR HFA/PROVENTIL HFA/VENTOLIN HFA) 108 (90 Base) MCG/ACT inhaler Inhale 2 puffs into the lungs every 6 hours as needed for shortness of breath / dyspnea or wheezing 1 Inhaler 1     Calcium Citrate-Vitamin D (CALCITRATE/VITAMIN D PO) Take 1 tablet by mouth 2 times daily        diltiazem ER (DILT-XR) 180 MG 24 hr capsule Take 1 capsule (180 mg) by mouth daily 90 capsule 3     losartan (COZAAR) 50 MG tablet TAKE 1 TABLET BY MOUTH ONCE DAILY 90 tablet 2     Misc Natural Products (OSTEO BI-FLEX ADV DOUBLE ST PO) Take  by mouth daily.       Multiple Vitamin (MULTIVITAMINS PO) Take 1 tablet by mouth daily        Multiple Vitamins-Minerals (PRESERVISION AREDS 2 PO) Take by mouth 2 times daily        order for DME 4 wheeled walker 1 each 0     sertraline (ZOLOFT) 50 MG tablet Take 1 tablet (50 mg) by mouth daily 30 tablet 3     simvastatin (ZOCOR) 10 MG tablet Take 1 tablet (10 mg) by mouth At Bedtime 90 tablet 3     traMADol (ULTRAM) 50  "MG tablet TAKE 1 TO 2 TABLETS BY MOUTH EVERY 4 HOURS AS NEEDED FOR PAIN DO  NOT  EXCEED  8  TABLETS  PER   tablet 0     warfarin (COUMADIN) 5 MG tablet 2.5mg Mon/Fri and 5mg  all other days or as directed by warfarin clinic 100 tablet 3     warfarin ANTICOAGULANT (COUMADIN) 5 MG tablet TAKE ONE-HALF TABLET BY MOUTH ON FRIDAYS, AND 1 TABLET ALL OTHER DAYS OF THE WEEK OR AS DIRECTED BY THE WARFARIN CLINIC 64 tablet 6     Allergies   Allergen Reactions     Atenolol Shortness Of Breath and Other (See Comments)     Dizziness  Severe vertigo and dizziness/SOB     Lisinopril Cough     Augmentin Diarrhea     Pollen Extract      Other reaction(s): Runny Nose     Recent Labs   Lab Test 10/03/19  1628 07/23/19  0918 05/28/19  1424 05/08/19  1248 12/31/18  1007  06/21/17  1025  10/27/14  1450   LDL  --   --   --   --  60  --  77  --   --    HDL  --   --   --   --  69  --  76  --   --    TRIG  --   --   --   --  63  --  61  --   --    ALT 24  --  23 19  --    < >  --    < > 22   CR 0.63 0.77 0.72 0.67 0.87   < >  --    < > 0.92   GFRESTIMATED 82 71 77 81 61   < >  --    < > 59*   GFRESTBLACK >90 82 89 >90 71   < >  --    < > 71   POTASSIUM 4.1  --  4.5 4.2  --    < >  --    < > 4.1   TSH  --   --   --   --   --   --   --   --  0.56    < > = values in this interval not displayed.      BP Readings from Last 3 Encounters:   10/10/19 100/48   10/04/19 133/83   07/23/19 137/80    Wt Readings from Last 3 Encounters:   10/10/19 43.3 kg (95 lb 6.4 oz)   07/23/19 42 kg (92 lb 8 oz)   07/08/19 43.2 kg (95 lb 3.2 oz)                    Reviewed and updated as needed this visit by Provider         Review of Systems   ROS COMP: Constitutional, HEENT, cardiovascular, pulmonary, gi and gu systems are negative, except as otherwise noted.      Objective    /48 (BP Location: Left arm, Patient Position: Sitting, Cuff Size: Adult Regular)   Pulse 96   Temp 96  F (35.6  C) (Tympanic)   Ht 1.6 m (5' 3\")   Wt 43.3 kg (95 lb 6.4 oz)   " SpO2 98%   BMI 16.90 kg/m    Body mass index is 16.9 kg/m .  Physical Exam   GENERAL: healthy, alert and no distress  EYES: Eyes grossly normal to inspection, PERRL and conjunctivae and sclerae normal  NECK: no adenopathy, no asymmetry, masses, or scars and thyroid normal to palpation  RESP: lungs clear to auscultation - no rales, rhonchi or wheezes  CV: regular rate and rhythm, normal S1 S2, no S3 or S4, no murmur, click or rub, no peripheral edema and peripheral pulses strong  ABDOMEN: soft, nontender, no hepatosplenomegaly, no masses and bowel sounds normal  PSYCH: mentation appears normal, tearful and anxious    Diagnostic Test Results:  Labs reviewed in Epic  Results for orders placed or performed in visit on 10/10/19   Basic metabolic panel   Result Value Ref Range    Sodium 131 (L) 133 - 144 mmol/L    Potassium 4.4 3.4 - 5.3 mmol/L    Chloride 99 94 - 109 mmol/L    Carbon Dioxide 29 20 - 32 mmol/L    Anion Gap 3 3 - 14 mmol/L    Glucose 99 70 - 99 mg/dL    Urea Nitrogen 33 (H) 7 - 30 mg/dL    Creatinine 0.86 0.52 - 1.04 mg/dL    GFR Estimate 62 >60 mL/min/[1.73_m2]    GFR Estimate If Black 71 >60 mL/min/[1.73_m2]    Calcium 9.1 8.5 - 10.1 mg/dL   ESR: Erythrocyte sedimentation rate   Result Value Ref Range    Sed Rate 12 0 - 30 mm/h   CRP inflammation   Result Value Ref Range    CRP Inflammation <2.9 0.0 - 8.0 mg/L   CBC with platelets   Result Value Ref Range    WBC 15.3 (H) 4.0 - 11.0 10e9/L    RBC Count 4.16 3.8 - 5.2 10e12/L    Hemoglobin 13.3 11.7 - 15.7 g/dL    Hematocrit 38.6 35.0 - 47.0 %    MCV 93 78 - 100 fl    MCH 32.0 26.5 - 33.0 pg    MCHC 34.5 31.5 - 36.5 g/dL    RDW 13.4 10.0 - 15.0 %    Platelet Count 380 150 - 450 10e9/L           Assessment & Plan     1. Abdominal pain, generalized  She is really getting very concerning with her health. Trying to take care of her  to is declining quite quickly.  He has dementia with his Parkinson's.  He is falling and she is trying to take care of  him and her son. She has had low sodium. Hx of breast caner. We will be imaging her check and having her see surgery. Has had unintentional weight loss of 20 #   - GENERAL SURG ADULT REFERRAL  - Basic metabolic panel  - ESR: Erythrocyte sedimentation rate  - CRP inflammation  - CBC with platelets    2. Low sodium levels  Concern for secondary reason for weight loss and low sodium. involving . Hope to get her some help in the home.   - GENERAL SURG ADULT REFERRAL  - CT Chest w Contrast; Future  - Basic metabolic panel  - ESR: Erythrocyte sedimentation rate  - CRP inflammation       See Patient Instructions    No follow-ups on file.    MELISA Gillespie  [facility]

## 2019-10-10 NOTE — PATIENT INSTRUCTIONS
Thank you for choosing [facility].   I have office hours 8:00 am to 4:30 pm on Monday's, Wednesday's, Thursday's and Friday's. My nurse and I are out of the office every Tuesday.    Following your visit, when your labs and diagnostic testing have returned, I will review then and you will be contacted by my nurse.  If you are on My Chart, you can also view results there.    For refills, notify your pharmacy regarding what you need and the pharmacy will generate a refill request. Do not call my nurse as she is unable to process refill request. Please plan ahead and allow 3-5 days for refill requests.    You will generally receive a reminder call the day prior to your appointment.  If you cannot attend your appointment, please cancel your appointment with as much notice as possible.  If there is a pattern of failure to present for your appointments, I cannot provide consistent, meaningful, ongoing care for you. It is very important to me that you come in for your care, so we can best assist you with your health care needs.    IMPORTANT:  Please note that it is my standard of practice to NOT participate in prescribing ongoing requested Narcotic Analgesic therapy, and/or participate in the prescribing of other controlled substances.  My nurse and I am happy to assist you with the process of referral for alternative pain management as needed, and other treatment modalities including but not limited to:  Physical Therapy, Physical Medicine and Rehab, Counseling, Chiropractic Care, Orthopedic Care, and non-narcotic medication management.     In the event that you need to be seen for emergent concerns and I am out of office,  please see one of my colleagues for acute concerns.  You may also present to  or ER.  I appreciate the opportunity to serve you and look forward to supporting your healthcare needs in the future. Please contact me with any questions or concerns that you may  have.    Sincerely,      Chandrika Kumar RN, PA-C

## 2019-10-18 NOTE — PROGRESS NOTES
Subjective     Michael Christiansen is a 84 year old female who presents to clinic today for the following health issues:    HPI   Abdominal Pain      Duration: follow up    Description (location/character/radiation): abdominal pain, weight loss, no appetite, low sodium       Associated flank pain: None    Intensity:  mild    Accompanying signs and symptoms:        Fever/Chills: no        Gas/Bloating: no        Nausea/vomitting: no        Diarrhea: no        Dysuria or Hematuria: no     History (previous similar pain/trauma/previous testing): none    Precipitating or alleviating factors:       Pain worse with eating/BM/urination: none       Pain relieved by BM: no     Therapies tried and outcome: None    LMP:  not applicable        Patient Active Problem List   Diagnosis     Hyperlipidemia LDL goal <130     Essential hypertension     Osteoarthritis     Permanent atrial fibrillation     Chronic pain syndrome     History of total knee replacement     Radiculitis     Osteoarthritis of knee     Long-term (current) use of anticoagulants [Z79.01]     ACP (advance care planning)     Osteoporosis     Malignant neoplasm of right breast in female, estrogen receptor positive, unspecified site of breast (H)     Pulmonary hypertension-moderate     Aortic stenosis, moderate     Moderate tricuspid insufficiency     Anticoagulated on Coumadin     LISA (generalized anxiety disorder)     Acute blood loss anemia     Lumbar pain with radiation down right leg     Lumbar spinal stenosis     Protein-calorie malnutrition (H)     Valvular heart disease     Unexplained weight loss     Hx of rheumatic fever     Chronic, continuous use of opioids     Past Surgical History:   Procedure Laterality Date     BREAST SURGERY      right  side mastectomy     C TOTAL KNEE ARTHROPLASTY Left 01/26/15     cataract extraction and lens implantation       COLONOSCOPY       EYE SURGERY       GYN SURGERY      tubal pregnancy     ORTHOPEDIC SURGERY  2009    l. hip  replacement LT     ORTHOPEDIC SURGERY  2013    Right total knee     TUBAL/ECTOPIC PREGNANCY         Social History     Tobacco Use     Smoking status: Former Smoker     Packs/day: 0.30     Years: 30.00     Pack years: 9.00     Types: Cigarettes     Last attempt to quit: 1987     Years since quittin.2     Smokeless tobacco: Never Used     Tobacco comment: year quit    Substance Use Topics     Alcohol use: No     Family History   Problem Relation Age of Onset     Diabetes Father 75        cause of death     Other - See Comments Father         PVD     Heart Disease Sister      Coronary Artery Disease Sister      Other - See Comments Mother 83        old age; cause of death     Other - See Comments Sister         14 year twin [employer]; cause of death     Chronic Obstructive Pulmonary Disease Daughter      Osteoporosis Daughter      Thyroid Disease Daughter      Cancer Sister      Other Cancer Sister      Other Cancer Sister         uterine     Hypertension No family hx of      Hyperlipidemia No family hx of      Cerebrovascular Disease No family hx of      Breast Cancer No family hx of      Prostate Cancer No family hx of      Colon Cancer No family hx of      Asthma No family hx of      Anesthesia Reaction No family hx of      Genetic Disorder No family hx of          Current Outpatient Medications   Medication Sig Dispense Refill     Acetaminophen (TYLENOL PO) Take 500 mg by mouth every 4 hours as needed        albuterol (PROAIR HFA/PROVENTIL HFA/VENTOLIN HFA) 108 (90 Base) MCG/ACT inhaler Inhale 2 puffs into the lungs every 6 hours as needed for shortness of breath / dyspnea or wheezing 1 Inhaler 1     Calcium Citrate-Vitamin D (CALCITRATE/VITAMIN D PO) Take 1 tablet by mouth 2 times daily        diltiazem ER (DILT-XR) 180 MG 24 hr capsule Take 1 capsule (180 mg) by mouth daily 90 capsule 3     losartan (COZAAR) 50 MG tablet TAKE 1 TABLET BY MOUTH ONCE DAILY 90 tablet 2     Misc  Natural Products (OSTEO BI-FLEX ADV DOUBLE ST PO) Take  by mouth daily.       Multiple Vitamin (MULTIVITAMINS PO) Take 1 tablet by mouth daily        Multiple Vitamins-Minerals (PRESERVISION AREDS 2 PO) Take by mouth 2 times daily        order for DME 4 wheeled walker 1 each 0     sertraline (ZOLOFT) 50 MG tablet Take 1 tablet (50 mg) by mouth daily 30 tablet 3     simvastatin (ZOCOR) 10 MG tablet Take 1 tablet (10 mg) by mouth At Bedtime 90 tablet 3     sucralfate (CARAFATE) 1 GM tablet Take 1 tablet (1 g) by mouth 4 times daily 120 tablet 3     traMADol (ULTRAM) 50 MG tablet TAKE 1 TO 2 TABLETS BY MOUTH EVERY 4 HOURS AS NEEDED FOR PAIN DO  NOT  EXCEED  8  TABLETS  PER   tablet 0     warfarin (COUMADIN) 5 MG tablet 2.5mg Mon/Fri and 5mg  all other days or as directed by warfarin clinic 100 tablet 3     warfarin ANTICOAGULANT (COUMADIN) 5 MG tablet TAKE ONE-HALF TABLET BY MOUTH ON FRIDAYS, AND 1 TABLET ALL OTHER DAYS OF THE WEEK OR AS DIRECTED BY THE WARFARIN CLINIC 64 tablet 6     Allergies   Allergen Reactions     Atenolol Shortness Of Breath and Other (See Comments)     Dizziness  Severe vertigo and dizziness/SOB     Lisinopril Cough     Augmentin Diarrhea     Pollen Extract      Other reaction(s): Runny Nose     Recent Labs   Lab Test 10/10/19  1551 10/03/19  1628  05/28/19  1424 05/08/19  1248 12/31/18  1007  06/21/17  1025  10/27/14  1450   LDL  --   --   --   --   --  60  --  77  --   --    HDL  --   --   --   --   --  69  --  76  --   --    TRIG  --   --   --   --   --  63  --  61  --   --    ALT  --  24  --  23 19  --    < >  --    < > 22   CR 0.86 0.63   < > 0.72 0.67 0.87   < >  --    < > 0.92   GFRESTIMATED 62 82   < > 77 81 61   < >  --    < > 59*   GFRESTBLACK 71 >90   < > 89 >90 71   < >  --    < > 71   POTASSIUM 4.4 4.1  --  4.5 4.2  --    < >  --    < > 4.1   TSH  --   --   --   --   --   --   --   --   --  0.56    < > = values in this interval not displayed.      BP Readings from Last 3  "Encounters:   10/21/19 98/58   10/10/19 100/48   10/04/19 133/83    Wt Readings from Last 3 Encounters:   10/21/19 43.7 kg (96 lb 6.4 oz)   10/10/19 43.3 kg (95 lb 6.4 oz)   07/23/19 42 kg (92 lb 8 oz)                      Reviewed and updated as needed this visit by Provider         Review of Systems   ROS COMP: Constitutional, HEENT, cardiovascular, pulmonary, gi and gu systems are negative, except as otherwise noted.      Objective    BP 98/58 (BP Location: Left arm, Patient Position: Sitting, Cuff Size: Adult Regular)   Pulse 84   Temp 97.2  F (36.2  C) (Tympanic)   Ht 1.6 m (5' 3\")   Wt 43.7 kg (96 lb 6.4 oz)   SpO2 97%   BMI 17.08 kg/m    Body mass index is 17.08 kg/m .  Physical Exam   GENERAL: healthy, alert and no distress  EYES: Eyes grossly normal to inspection, PERRL and conjunctivae and sclerae normal  HENT: ear canals and TM's normal, nose and mouth without ulcers or lesions  NECK: no adenopathy, no asymmetry, masses, or scars and thyroid normal to palpation  RESP: lungs clear to auscultation - no rales, rhonchi or wheezes  CV: irregular rate and rhythm, normal S1 S2, no S3 or S4, 2/6 murmur, no click or rub, no peripheral edema and peripheral pulses strong.    ABDOMEN: soft, nontender, no hepatosplenomegaly, no masses and bowel sounds normal  MS: no gross musculoskeletal defects noted, no edema  PSYCH: mentation appears normal, affect normal/bright  LYMPH: no cervical, supraclavicular, axillary, or inguinal adenopathy    Diagnostic Test Results:  Labs reviewed in Epic  Results for orders placed or performed during the hospital encounter of 10/16/19   CT Chest w Contrast    Narrative    PROCEDURE: CT CHEST W CONTRAST 10/16/2019 2:53 PM    HISTORY: Hypo-osmolality and hyponatremia; Low sodium levels    COMPARISONS: None.    Meds/Dose Given: Isovue 300 75ml Given    TECHNIQUE: CT scan of the chest with IV contrast sagittal coronal  reconstructions were obtained.    FINDINGS: Widespread interstitial " thickening is noted. The  interstitial opacities are more severe severe peripherally than  centrally. The heart is normal in size. The hilar and mediastinal  lymph nodes are normal. Axillary and supraclavicular lymph nodes  appear normal. There are some right thyroid nodules the largest  measures 1.5 cm in diameter. The upper portion of the liver spleen and  pancreas appear normal. There are multiple thoracolumbar compression  fractures. No fractures of remained stable as compared to 2014         Impression    IMPRESSION: Widespread interstitial lung disease more severe  peripherally than centrally.    NARDA WHITE MD           Assessment & Plan     1. ILD (interstitial lung disease) (H)  New diagnosis. She will be having PFT and get to see Pulmonary. See if we need any further intervention.  Has known heart disease and Afib properly anticoagulated. But has some fatigue with activity. Can't go up and down stairs well. Seeing Cardiology. She is in a better mood today. Not as overwhelmed and feeling she is stable today.   - General PFT Lab (Please always keep checked); Future  - Pulmonary Function Test; Future  - General PFT Lab (Please always keep checked)    2. LISA (generalized anxiety disorder)  Refill needed. Taking at bedtime.   - sertraline (ZOLOFT) 50 MG tablet; Take 1 tablet (50 mg) by mouth daily  Dispense: 30 tablet; Refill: 3     40 minutes with her and her daughter.   See Patient Instructions    No follow-ups on file.    MELISA Gillespie  [facility] - ERNIE

## 2019-10-21 NOTE — PROGRESS NOTES
ANTICOAGULATION FOLLOW-UP CLINIC VISIT    Patient Name:  Michael Christiansen  Date:  10/21/2019  Contact Type:  Face to Face    SUBJECTIVE:         OBJECTIVE    INR Protime   Date Value Ref Range Status   10/21/2019 2.4 (A) 0.86 - 1.14 Final       ASSESSMENT / PLAN  INR assessment THER    Recheck INR In: 6 WEEKS    INR Location Clinic      Anticoagulation Summary  As of 10/21/2019    INR goal:   2.0-3.0   TTR:   60.7 % (3.2 y)   INR used for dosin.4 (10/21/2019)   Warfarin maintenance plan:   2.5 mg (5 mg x 0.5) every Mon, Fri; 5 mg (5 mg x 1) all other days   Full warfarin instructions:   2.5 mg every Mon, Fri; 5 mg all other days   Weekly warfarin total:   30 mg   No change documented:   Kasia Hercules RN   Plan last modified:   Kasia Hercules RN (10/1/2018)   Next INR check:   2019   Priority:   INR   Target end date:   Indefinite    Indications    Long-term (current) use of anticoagulants [Z79.01] [Z79.01]  Permanent atrial fibrillation [I48.21]             Anticoagulation Episode Summary     INR check location:       Preferred lab:       Send INR reminders to:   [facility] ANTICOAG POOL    Comments:         Anticoagulation Care Providers     Provider Role Specialty Phone number    Chandrika Kumar PA [facility] [phone]            See the Encounter Report to view Anticoagulation Flowsheet and Dosing Calendar (Go to Encounters tab in chart review, and find the Anticoagulation Therapy Visit)        Kasia Hercules RN

## 2019-10-21 NOTE — NURSING NOTE
"Chief Complaint   Patient presents with     RECHECK     Abdominal Pain       Initial BP 98/58 (BP Location: Left arm, Patient Position: Sitting, Cuff Size: Adult Regular)   Pulse 84   Temp 97.2  F (36.2  C) (Tympanic)   Ht 1.6 m (5' 3\")   Wt 43.7 kg (96 lb 6.4 oz)   SpO2 97%   BMI 17.08 kg/m   Estimated body mass index is 17.08 kg/m  as calculated from the following:    Height as of this encounter: 1.6 m (5' 3\").    Weight as of this encounter: 43.7 kg (96 lb 6.4 oz).  Medication Reconciliation: complete  Dary Peck LPN  "

## 2019-10-22 PROBLEM — R10.12 LEFT UPPER QUADRANT PAIN: Status: ACTIVE | Noted: 2019-01-01

## 2019-10-22 NOTE — NURSING NOTE
"Chief Complaint   Patient presents with     Consult     abdominal pain-above umbilicus and below ribs/low sodium levels-last seen 6/2019.  DAMON Kumar is referring.  Patient is on coumadin.       Initial /64 (BP Location: Right arm, Patient Position: Chair, Cuff Size: Adult Regular)   Pulse 78   Temp 98  F (36.7  C) (Tympanic)   Ht 1.6 m (5' 3\")   Wt 43.5 kg (96 lb)   SpO2 95%   BMI 17.01 kg/m   Estimated body mass index is 17.01 kg/m  as calculated from the following:    Height as of this encounter: 1.6 m (5' 3\").    Weight as of this encounter: 43.5 kg (96 lb).  Medication Reconciliation: complete  RONNELL DICKINSON LPN    "

## 2019-10-22 NOTE — PROGRESS NOTES
ANTICOAGULATION FOLLOW-UP CLINIC VISIT    Patient Name:  Michael Christiansen  Date:  10/22/2019  Contact Type:  Telephone/ message left on home phone voicemail    SUBJECTIVE:  Patient Findings     Positives:   Upcoming invasive procedure (EGD 11/1/19)    Comments:   Note from Alee in surgery that patient is having EGD done on 11/1/19. Call placed to patient  And message left on voicemail to let her know she is  to stop her warfarin on 10/27 and she will not take any warfarin until after her procedure on 11/1/19. Will send note to DAMON Kumar re: lovenox bridge and if needed.         Clinical Outcomes     Negatives:   Major bleeding event, Thromboembolic event, Anticoagulation-related hospital admission, Anticoagulation-related ED visit, Anticoagulation-related fatality    Comments:   Note from Alee in surgery that patient is having EGD done on 11/1/19. Call placed to patient  And message left on voicemail to let her know she is  to stop her warfarin on 10/27 and she will not take any warfarin until after her procedure on 11/1/19. Will send note to DAMON Kumar re: lovenox bridge and if needed.            OBJECTIVE    INR Protime   Date Value Ref Range Status   10/21/2019 2.4 (A) 0.86 - 1.14 Final       ASSESSMENT / PLAN  No question data found.  Anticoagulation Summary  As of 10/22/2019    INR goal:   2.0-3.0   TTR:   60.7 % (3.2 y)   INR used for dosing:   No new INR was available at the time of this encounter.   Warfarin maintenance plan:   2.5 mg (5 mg x 0.5) every Mon, Fri; 5 mg (5 mg x 1) all other days   Full warfarin instructions:   10/27: Hold; 10/28: Hold; 10/29: Hold; 10/30: Hold; 10/31: Hold; Otherwise 2.5 mg every Mon, Fri; 5 mg all other days   Weekly warfarin total:   30 mg   Plan last modified:   Kasia Hercules RN (10/1/2018)   Next INR check:   11/1/2019   Priority:   INR   Target end date:   Indefinite    Indications    Long-term (current) use of anticoagulants [Z79.01] [Z79.01]  Permanent atrial  fibrillation [I48.21]             Anticoagulation Episode Summary     INR check location:       Preferred lab:       Send INR reminders to:   [facility] ANTICOAG POOL    Comments:         Anticoagulation Care Providers     Provider Role Specialty Phone number    Chandrika Kumar, PA [facility] [phone]            See the Encounter Report to view Anticoagulation Flowsheet and Dosing Calendar (Go to Encounters tab in chart review, and find the Anticoagulation Therapy Visit)        Kasia Hercules RN

## 2019-10-22 NOTE — PATIENT INSTRUCTIONS
Thank you for allowing Dr Carroll and our surgical team to participate in your care.  If you have a scheduling or an appointment question please contact Sasha, our Health Unit Coordinator, at her direct line [phone].   ALL nursing questions or concerns can be directed to your surgical nurse at: [phone]    You are scheduled for a:  Esophagogastroduodenoscopy with possible biopsy.  Your procedure date is:  November 1, 2019    Your time is yet to be determined.  Same day surgery will call you the day prior with your arrival time.      Nothing to eat after midnight the night prior to your procedure.  Nothing to drink 4 hours prior to your arrival time.        HOW TO PREPARE-        You need a friend or family member available to drive you home AND stay with you for 4 hours after you leave the hospital. You will not be allowed to drive yourself. IF you need to take a taxi or the bus you MUST have a responsible person to ride with you. YOUR PROCEDURE WILL BE CANCELLED IF YOU DO NOT HAVE A RESPONSIBLE ADULT TO DRIVE YOU HOME.       You need to call our Surgery Education Nurses 1-2 weeks prior to your surgery date at [phone] or toll free [phone]. Please have you medication and allergy lists ready.       Stop your aspirin or other NSAIDs(Ibuprofen, Motrin, Aleve, Celebrex, Naproxen, etc...) 7 days before your surgery.  Tylenol is safe.      Hospital admitting will call you the day before your surgery with your arrival time. If you are scheduled on a Monday admitting will call you the Friday before.      Please call your primary care physician if you should become ill within 24 hours of scheduled surgery. (ex.vomiting, diarrhea, fever)

## 2019-10-23 NOTE — TELEPHONE ENCOUNTER
Patient notified of her pre-operative appointment with Chandrika Kumar on 10/30/19 at 2:05pm at [facility].  Patient repeated back the date and time.    She is scheduled for an upper endoscopy on 11-1-9 with Dr Carroll.

## 2019-10-23 NOTE — PROGRESS NOTES
"[facility] Progress Note    HPI: 83 y.o. With possible history of ulcers presents with history of epigastric pain and profound weight loss over the last 6 months. Seen back in June and CT scan performed.       S: She continues to lose weight, she has been taking care of her  with dementia and that presents many challenges. She continues to have upper abdominal pain. She admits to eating about one meal per day, though does drink a boost shake every day as well. She admits to shortness of breath especially when going up stairs which requires her to sit down and rest afterwards. She denies any changes in her stool habits or black stools, she had a terrible experience with her last colonoscopy which has left her traumatized.     O:   Vitals:  /64 (BP Location: Right arm, Patient Position: Chair, Cuff Size: Adult Regular)   Pulse 78   Temp 98  F (36.7  C) (Tympanic)   Ht 1.6 m (5' 3\")   Wt 43.5 kg (96 lb)   SpO2 95%   BMI 17.01 kg/m        Physical Exam:  G: alert oriented, no acute distress   ENT: sclera non-icteric   Pulm: no respiratory distress   CVS: RRR  ABD: scaphoid abdomen, No obvious masses no point tenderness.   Ext: WWP     Assessment/Plan:  84 y.o. female returns with continued upper abdominal pain. Discussed potential for ulcers in stomach as well as gallbladder disease. Will need to reverse her coumadin and then will perform EGD. Discussed possibility of colonoscopy but she declined.     As far as her hyponatremia and weight loss, I have concerns about her PO intake is adequate and have asked for her to keep a food journal as to what she eats during the day. It is unclear if her interstitial lung disease could be causing this as well. May benefit from meeting with pulmonologist pending PFTs.      Fab Carroll MD     "

## 2019-10-25 NOTE — OR NURSING
Client asked me to talk with dater Hui regarding instructions.  Hui called me back and reviewed instructions with Hui.  Will call both with admit time.  Verb. Good understanding.  Migdalia Perez RN

## 2019-10-28 NOTE — PROGRESS NOTES
[facility] [place]  [address]  [place] [address]  [phone]  Dept: [phone]    PRE-OP EVALUATION:  Today's date: 10/30/2019    Michael Christiansen (: 1935) presents for pre-operative evaluation assessment as requested by Dr. ASCENCOI.  She requires evaluation and anesthesia risk assessment prior to undergoing surgery/procedure for treatment of EGD .    Proposed Surgery/ Procedure: EGD  Date of Surgery/ Procedure: 19  Time of Surgery/ Procedure: TBD  Hospital/Surgical Facility: [facility]  Primary Physician: Chandrika Kumar  Type of Anesthesia Anticipated: to be determined    Patient has a Health Care Directive or Living Will:  YES not on file     1. NO - Do you have a history of heart attack, stroke, stent, bypass or surgery on an artery in the head, neck, heart or legs?  2. YES - DO YOU EVER HAVE ANY PAIN OR DISCOMFORT IN YOUR CHEST? Under sternum  3. NO - Do you have a history of  Heart Failure?  4. NO - Are you troubled by shortness of breath when: walking on the level, up a slight hill or at night?  5. NO - Do you currently have a cold, bronchitis or other respiratory infection?  6. NO - Do you have a cough, shortness of breath or wheezing?  7. NO - Do you sometimes get pains in the calves of your legs when you walk?  8. NO - Do you or anyone in your family have previous history of blood clots?  9. NO - Do you or does anyone in your family have a serious bleeding problem such as prolonged bleeding following surgeries or cuts?  10. YES - HAVE YOU EVER HAD PROBLEMS WITH ANEMIA OR BEEN TOLD TO TAKE IRON PILLS? In the past, many years ago  11. NO - Have you had any abnormal blood loss such as black, tarry or bloody stools, or abnormal vaginal bleeding?  12. YES - HAVE YOU EVER HAD A BLOOD TRANSFUSION? In the 60's, pt had a tubal pregnancy  13. NO - Have you or any of your relatives ever had problems with anesthesia?  14. NO - Do you have sleep apnea, excessive snoring or daytime  drowsiness?  15. NO - Do you have any prosthetic heart valves?  16. YES - DO YOU HAVE PROSTHETIC JOINTS? Hip and bilateral knees  17. NO - Is there any chance that you may be pregnant?      HPI:     HPI related to upcoming procedure: having endoscopy due to her weight loss and anorexia       See problem list for active medical problems.  Problems all longstanding and stable, except as noted/documented.  See ROS for pertinent symptoms related to these conditions.      MEDICAL HISTORY:     Patient Active Problem List    Diagnosis Date Noted     Left upper quadrant pain 10/22/2019     Priority: Medium     Added automatically from request for surgery [id number]       ILD (interstitial lung disease) (H) 10/21/2019     Priority: Medium     Chronic, continuous use of opioids 09/23/2019     Priority: Medium     Hx of rheumatic fever 06/19/2019     Priority: Medium     Protein-calorie malnutrition (H) 06/06/2019     Priority: Medium     Valvular heart disease 06/06/2019     Priority: Medium     Unexplained weight loss 06/06/2019     Priority: Medium     LISA (generalized anxiety disorder) 11/26/2018     Priority: Medium     Anticoagulated on Coumadin 06/13/2018     Priority: Medium     Pulmonary hypertension-moderate 12/20/2017     Priority: Medium     Aortic stenosis, moderate 12/20/2017     Priority: Medium     Moderate tricuspid insufficiency 12/20/2017     Priority: Medium     Malignant neoplasm of right breast in female, estrogen receptor positive, unspecified site of breast (H) 08/02/2017     Priority: Medium     ACP (advance care planning) 01/26/2017     Priority: Medium     Advance Care Planning 3/27/2017: Receipt of ACP document:  Received: invalid Advance Directive document dated 2-20-16 due to the document has not been signed.  Document not previously scanned. Validation form completed and sent to be scanned indicating invalid document. Letter sent to client with information and facilitation resources.   Confirmed/documented designated decision maker(s).  Added by Kerry Pichardo RN, System Director ACP-Honoring Choices   Advance Care Planning 1/26/2017: ACP Review of Chart / Resources Provided:  Reviewed chart for advance care plan.  Michael Christiansen has been provided information and resources to begin or update their advance care plan.  Added by Iris Bennett         Osteoporosis 01/26/2017     Priority: Medium     Long-term (current) use of anticoagulants [Z79.01] 07/27/2016     Priority: Medium     History of total knee replacement 01/26/2015     Priority: Medium     Chronic pain syndrome 01/23/2015     Priority: Medium     Acute blood loss anemia 07/19/2013     Priority: Medium     Permanent atrial fibrillation 05/01/2013     Priority: Medium     Overview:   Chronic        Hyperlipidemia LDL goal <130 03/25/2013     Priority: Medium     Essential hypertension 03/25/2013     Priority: Medium     Osteoarthritis 03/25/2013     Priority: Medium     Lumbar pain with radiation down right leg 07/05/2011     Priority: Medium     Overview:   IMO Update 10/11       Lumbar spinal stenosis 07/05/2011     Priority: Medium     Radiculitis 06/16/2011     Priority: Medium     Overview:   IMO Update 10/11       Osteoarthritis of knee 09/16/2010     Priority: Medium     Overview:   IMO Update 10/11        Past Medical History:   Diagnosis Date     Arthritis      Backache, unspecified 1/1/2011     Breast cancer, stage 1 3/19/2010     Chronic pain syndrome 1/23/2015     Claustrophobia 1/1/2011     Hip joint replacement by other means 1/1/2011     Osteoarthrosis, unspecified whether generalized or localized, lower leg 8/14/2006     Posttraumatic stress disorder 1/1/2011     Past Surgical History:   Procedure Laterality Date     BREAST SURGERY      right  side mastectomy     C TOTAL KNEE ARTHROPLASTY Left 01/26/15     cataract extraction and lens implantation       COLONOSCOPY       EYE SURGERY       GYN SURGERY      tubal  pregnancy     ORTHOPEDIC SURGERY  2009    l. hip replacement LT     ORTHOPEDIC SURGERY  july 19th 2013    Right total knee     TUBAL/ECTOPIC PREGNANCY       Current Outpatient Medications   Medication Sig Dispense Refill     Acetaminophen (TYLENOL PO) Take 500 mg by mouth every 4 hours as needed        albuterol (PROAIR HFA/PROVENTIL HFA/VENTOLIN HFA) 108 (90 Base) MCG/ACT inhaler Inhale 2 puffs into the lungs every 6 hours as needed for shortness of breath / dyspnea or wheezing 1 Inhaler 1     Calcium Citrate-Vitamin D (CALCITRATE/VITAMIN D PO) Take 1 tablet by mouth 2 times daily        diltiazem ER (DILT-XR) 180 MG 24 hr capsule Take 1 capsule (180 mg) by mouth daily 90 capsule 3     losartan (COZAAR) 50 MG tablet TAKE 1 TABLET BY MOUTH ONCE DAILY 90 tablet 2     Misc Natural Products (OSTEO BI-FLEX ADV DOUBLE ST PO) Take  by mouth daily.       Multiple Vitamin (MULTIVITAMINS PO) Take 1 tablet by mouth daily        Multiple Vitamins-Minerals (PRESERVISION AREDS 2 PO) Take by mouth 2 times daily        order for DME 4 wheeled walker 1 each 0     sertraline (ZOLOFT) 50 MG tablet Take 1 tablet (50 mg) by mouth daily 30 tablet 3     simvastatin (ZOCOR) 10 MG tablet Take 1 tablet (10 mg) by mouth At Bedtime 90 tablet 3     sucralfate (CARAFATE) 1 GM tablet Take 1 tablet (1 g) by mouth 4 times daily 120 tablet 3     traMADol (ULTRAM) 50 MG tablet TAKE 1 TO 2 TABLETS BY MOUTH EVERY 4 HOURS AS NEEDED FOR PAIN DO  NOT  EXCEED  8  TABLETS  PER   tablet 0     warfarin (COUMADIN) 5 MG tablet 2.5mg Mon/Fri and 5mg  all other days or as directed by warfarin clinic 100 tablet 3     warfarin ANTICOAGULANT (COUMADIN) 5 MG tablet TAKE ONE-HALF TABLET BY MOUTH ON FRIDAYS, AND 1 TABLET ALL OTHER DAYS OF THE WEEK OR AS DIRECTED BY THE WARFARIN CLINIC 64 tablet 6     OTC products: None, except as noted above    Allergies   Allergen Reactions     Atenolol Shortness Of Breath and Other (See Comments)     Dizziness  Severe vertigo  "and dizziness/SOB     Lisinopril Cough     Augmentin Diarrhea     Pollen Extract      Other reaction(s): Runny Nose      Latex Allergy: NO    Social History     Tobacco Use     Smoking status: Former Smoker     Packs/day: 0.30     Years: 30.00     Pack years: 9.00     Types: Cigarettes     Last attempt to quit: 1987     Years since quittin.2     Smokeless tobacco: Never Used     Tobacco comment: year quit    Substance Use Topics     Alcohol use: No     History   Drug Use No       REVIEW OF SYSTEMS:   CONSTITUTIONAL: NEGATIVE for fever, chills, change in weight  INTEGUMENTARY/SKIN: has hematoma on her right cheek.   ENT/MOUTH: NEGATIVE for ear, mouth and throat problems  RESP: NEGATIVE for significant cough or SOB  CV: NEGATIVE for chest pain, palpitations or peripheral edema  NEURO: POSITIVE for weakness from weight loss and no syncope, has used a walker and cane.    PSYCHIATRIC: NEGATIVE for changes in mood or affect and anxiety    EXAM:   /72 (Patient Position: Sitting)   Pulse 77   Ht 1.6 m (5' 3\")   Wt 43.1 kg (95 lb)   SpO2 95%   BMI 16.83 kg/m    GENERAL APPEARANCE: healthy, alert and no distress quiet pale and thin.   HENT: ear canals and TM's normal and nose and mouth without ulcers or lesions  RESP: lungs clear to auscultation - no rales, rhonchi or wheezes  CV: regular rate and rhythm, normal S1 S2, no S3 or S4 and 3-4/6 harsh murmur, click or rub . No  Ankle  Swelling.   ABDOMEN: soft, nontender, no HSM or masses and bowel sounds normal  NEURO: Normal strength and tone, sensory exam grossly normal, mentation intact and speech normal    DIAGNOSTICS:     EKG: appears normal, , atrial fibrillation, rate 130 , normal axis, normal intervals, no acute ST/T changes c/w ischemia  Labs Resulted Today:   Results for orders placed or performed in visit on 10/21/19   INR point of care   Result Value Ref Range    INR Protime 2.4 (A) 0.86 - 1.14       Recent Labs   Lab Test 10/21/19 " 10/10/19  1551 10/08/19  10/03/19  1628   HGB  --  13.3  --   --  14.8   PLT  --  380  --   --  387   INR 2.4*  --  3.6*   < >  --    NA  --  131*  --   --  128*   POTASSIUM  --  4.4  --   --  4.1   CR  --  0.86  --   --  0.63    < > = values in this interval not displayed.        IMPRESSION:   Reason for surgery/procedure: she is having endoscopy. Will be having bridging with Lovenox.   Diagnosis/reason for consult: medical  Clearance.     The proposed surgical procedure is considered LOW risk.    REVISED CARDIAC RISK INDEX  The patient has the following serious cardiovascular risks for perioperative complications such as (MI, PE, VFib and 3  AV Block):  No serious cardiac risks  INTERPRETATION: 1 risks: Class II (low risk - 0.9% complication rate)    The patient has the following additional risks for perioperative complications:  No identified additional risks      ICD-10-CM    1. Preop general physical exam Z01.818      Has A fib off her coumadin. Goal INR 1.2 per Dr. Garrido   RECOMMENDATIONS:     --Because of DVT or PE history, patient should be on low molecular weight heparin and wear compression hose before & after surgery    Pulmonary Risk  Copd known.  Not on oxygen.   DVT  Known has hx of A fib on coumadin and not bridging.      --Patient is to take all scheduled medications on the day of surgery EXCEPT for modifications listed below.    APPROVAL GIVEN to proceed with proposed procedure, without further diagnostic evaluation       Signed Electronically by: MELISA Gillespie    Copy of this evaluation report is provided to requesting physician.    [facility] Preop Guidelines    Revised Cardiac Risk Index

## 2019-10-29 NOTE — ANESTHESIA PREPROCEDURE EVALUATION
Anesthesia Pre-Procedure Evaluation    Patient: Michael Christiansen   MRN: [id number] : 1935          Preoperative Diagnosis: Left upper quadrant pain [R10.12]    Procedure(s):  UPPER ENDOSCOPY, POSSIBLE BIOPSY    Past Medical History:   Diagnosis Date     Arthritis      Backache, unspecified 2011     Breast cancer, stage 1 3/19/2010     Chronic pain syndrome 2015     Claustrophobia 2011     Hip joint replacement by other means 2011     Osteoarthrosis, unspecified whether generalized or localized, lower leg 2006     Posttraumatic stress disorder 2011     Past Surgical History:   Procedure Laterality Date     BREAST SURGERY      right  side mastectomy     C TOTAL KNEE ARTHROPLASTY Left 01/26/15     cataract extraction and lens implantation       COLONOSCOPY       EYE SURGERY       GYN SURGERY      tubal pregnancy     ORTHOPEDIC SURGERY      l. hip replacement LT     ORTHOPEDIC SURGERY  2013    Right total knee     TUBAL/ECTOPIC PREGNANCY         Anesthesia Evaluation     . Pt has had prior anesthetic. Type: General    No history of anesthetic complications          ROS/MED HX    ENT/Pulmonary:     (+)tobacco use, Past use , . Other pulmonary disease interstitial lung disease.    Neurologic:       Cardiovascular: Comment: Hx rheumatic fever and AAA    (+) Dyslipidemia, hypertension----. Taking blood thinners : . . . :. dysrhythmias a-fib, Irregular Heartbeat/Palpitations, valvular problems/murmurs type: AS tricuspid insufficiency:. pulmonary hypertension, Previous cardiac testing date:results:date: results:ECG reviewed date:10-3-19 results:A fib with rapid ventricular response septal infarct date: results:          METS/Exercise Tolerance:     Hematologic:     (+) Anemia, -      Musculoskeletal: Comment: Osteoarthritis radiculitis S/P TKA and lumbar stenosis        GI/Hepatic: Comment: Abdominal pain        Renal/Genitourinary:  - ROS Renal section negative       Endo:  -  "neg endo ROS       Psychiatric:     (+) psychiatric history anxiety      Infectious Disease:         Malignancy:   (+) Malignancy History of Breast  Breast CA status post Surgery.         Other:    (+) H/O Chronic Pain,H/O chronic opiod use , no other significant disability                         Physical Exam  Normal systems: cardiovascular and pulmonary    Airway   Mallampati: I  TM distance: >3 FB  Neck ROM: full    Dental     Cardiovascular   Rhythm and rate: irregular and normal  (+) murmur       Pulmonary    breath sounds clear to auscultation            Lab Results   Component Value Date    WBC 15.3 (H) 10/10/2019    HGB 13.3 10/10/2019    HCT 38.6 10/10/2019     10/10/2019    CRP <2.9 10/10/2019    SED 12 10/10/2019     (L) 10/10/2019    POTASSIUM 4.4 10/10/2019    CHLORIDE 99 10/10/2019    CO2 29 10/10/2019    BUN 33 (H) 10/10/2019    CR 0.86 10/10/2019    GLC 99 10/10/2019    LOIS 9.1 10/10/2019    PHOS 3.8 07/23/2019    MAG 2.1 10/03/2019    ALBUMIN 4.3 10/03/2019    PROTTOTAL 8.9 (H) 10/03/2019    ALT 24 10/03/2019    AST 28 10/03/2019    ALKPHOS 73 10/03/2019    BILITOTAL 0.6 10/03/2019    LIPASE 110 10/03/2019    INR 2.4 (A) 10/21/2019    TSH 0.56 10/27/2014       Preop Vitals  BP Readings from Last 3 Encounters:   10/22/19 110/64   10/21/19 98/58   10/10/19 100/48    Pulse Readings from Last 3 Encounters:   10/22/19 78   10/21/19 84   10/10/19 96      Resp Readings from Last 3 Encounters:   10/04/19 18   07/23/19 16   06/19/19 16    SpO2 Readings from Last 3 Encounters:   10/22/19 95%   10/21/19 97%   10/10/19 98%      Temp Readings from Last 1 Encounters:   10/22/19 98  F (36.7  C) (Tympanic)    Ht Readings from Last 1 Encounters:   10/22/19 1.6 m (5' 3\")      Wt Readings from Last 1 Encounters:   10/22/19 43.5 kg (96 lb)    Estimated body mass index is 17.01 kg/m  as calculated from the following:    Height as of 10/22/19: 1.6 m (5' 3\").    Weight as of 10/22/19: 43.5 kg (96 lb). "       Anesthesia Plan      History & Physical Review  History and physical reviewed and following examination; no interval change.    ASA Status:  4 .    NPO Status:  > 8 hours    Plan for MAC Reason for MAC:  Chronic cardiopulmonary disease (G9) and Deep or markedly invasive procedure (G8)         Postoperative Care      Consents  Anesthetic plan, risks, benefits and alternatives discussed with:  Patient..                 JESSIE Newton CRNA

## 2019-11-01 NOTE — PROGRESS NOTES
ANTICOAGULATION FOLLOW-UP CLINIC VISIT    Patient Name:  Michael Christiansen  Date:  2019  Contact Type:  Telephone    SUBJECTIVE:  Patient Findings     Positives:   Upcoming invasive procedure (EGD today)    Comments:   Patient having EGD today. New warfarin dosing/INR recheck date AVS will be brought to [facility] to give to patient and will also call her later today to make sure she got paperwork and understands what she needs to do for warfarin dosing.        Clinical Outcomes     Negatives:   Major bleeding event, Thromboembolic event, Anticoagulation-related hospital admission, Anticoagulation-related ED visit, Anticoagulation-related fatality    Comments:   Patient having EGD today. New warfarin dosing/INR recheck date AVS will be brought to [facility] to give to patient and will also call her later today to make sure she got paperwork and understands what she needs to do for warfarin dosing.           OBJECTIVE    INR   Date Value Ref Range Status   2019 1.14 0.86 - 1.14 Final       ASSESSMENT / PLAN  INR assessment THER INR to be below 1.5 for procedure today   Recheck INR In: 6 DAYS    INR Location Clinic      Anticoagulation Summary  As of 2019    INR goal:   2.0-3.0   TTR:   60.4 % (3.2 y)   INR used for dosin.14! (2019)   Warfarin maintenance plan:   2.5 mg (5 mg x 0.5) every Mon, Fri; 5 mg (5 mg x 1) all other days   Full warfarin instructions:   : 7.5 mg; 11/3: 10 mg; : 5 mg; Otherwise 2.5 mg every Mon, Fri; 5 mg all other days   Weekly warfarin total:   30 mg   Plan last modified:   Kasia Hercules RN (10/1/2018)   Next INR check:   2019   Priority:   INR   Target end date:   Indefinite    Indications    Long-term (current) use of anticoagulants [Z79.01] [Z79.01]  Permanent atrial fibrillation [I48.21]             Anticoagulation Episode Summary     INR check location:       Preferred lab:       Send INR reminders to:   [facility] ANTICOAG POOL    Comments:         Anticoagulation Care  Providers     Provider Role Specialty Phone number    Chandrika Kumar PA [facility] [phone]            See the Encounter Report to view Anticoagulation Flowsheet and Dosing Calendar (Go to Encounters tab in chart review, and find the Anticoagulation Therapy Visit)        Kasia Hercules RN

## 2019-11-01 NOTE — ANESTHESIA CARE TRANSFER NOTE
Patient: Michael Christiansen    Procedure(s):  UPPER ENDOSCOPY, WITH BIOPSY    Diagnosis: Left upper quadrant pain [R10.12]  Diagnosis Additional Information: No value filed.    Anesthesia Type:   MAC     Note:  Airway :Nasal Cannula  Patient transferred to:Phase II  Handoff Report: Identifed the Patient, Identified the Reponsible Provider, Reviewed the pertinent medical history, Discussed the surgical course, Reviewed Intra-OP anesthesia mangement and issues during anesthesia, Set expectations for post-procedure period and Allowed opportunity for questions and acknowledgement of understanding      Vitals: (Last set prior to Anesthesia Care Transfer)    CRNA VITALS  11/1/2019 0848 - 11/1/2019 0922      11/1/2019             Resp Rate (set):  8                Electronically Signed By: JESSIE Garcia CRNA  November 1, 2019  9:22 AM

## 2019-11-01 NOTE — BRIEF OP NOTE
[facility] [address]    Brief Operative Note    Pre-operative diagnosis: Left upper quadrant pain [R10.12]  Post-operative diagnosis Normal upper Gi tract grossly     Procedure: Procedure(s):  UPPER ENDOSCOPY, WITH BIOPSY  Surgeon: Surgeon(s) and Role:     * Fab Carroll MD - Primary  Anesthesia: Monitor Anesthesia Care   Estimated blood loss: Minimal  Drains: None  Specimens:   ID Type Source Tests Collected by Time Destination   A :  Biopsy Stomach, Antrum SURGICAL PATHOLOGY EXAM Fab Carroll MD 11/1/2019  9:16 AM      Findings:   Normal upper Gi tract grossly .  Complications: None.  Implants: * No implants in log *

## 2019-11-01 NOTE — DISCHARGE INSTRUCTIONS
UPPER ENDOSCOPY    AFTER THE PROCEDURE    You will return to Same Day Surgery to rest for about an hour before you go home.    The doctor will talk with you and your family.    A family member/friend may visit with you.    You may burp up any air remaining in your stomach.    You may feel dizzy or light-headed from the medicine.    Your nurse will go over the discharge instructions with you and your caregiver and answer any of your questions.      You will be contacted the next day to check on how you are doing.    If biopsies were taken, you will be contacted with the results usually within 3 days.  BACK AT HOME    Rest for an hour or two after you get home.    When your throat is no longer numb and you have a gag reflex, take a few sips of cool water.  If you can swallow comfortably, you may start eating again.    You may have a mild sore throat for the rest of the day.    You will be contacted with results by the surgeons office within a week. If not contacted in one week, call the surgeons office.  WHAT TO WATCH FOR:  Problems rarely occur after the exam, but it is important to be aware of the early signs of a complication.  Call your doctor immediately if you have:    Difficulty swallowing or breathing    Unusual pain in your stomach or chest    Vomiting blood or dark material that looks like coffee grounds    Black or tarry stools    Temperature over 101.5 degrees    MORE QUESTIONS?  Please ask your doctor or nurse before the exam begins  or call your doctor at the clinic.    IF YOU MUST CANCEL YOUR PROCEDURE THE EVENING/NIGHT BEFORE, PLEASE CALL HOSPITAL ADMITTING -[phone] OR TOLL FREE [phone], EXT. [phone].    Phone Numbers:  [facility] - [phone] [phone]  [facility] - [phone]  Surgery Patient Education - [phone] or toll free [phone]    Post-Anesthesia Patient Instructions    IMMEDIATELY FOLLOWING SURGERY:  Do not drive or operate machinery for the first twenty  four hours after surgery.  Do not make any important decisions for twenty four hours after surgery or while taking narcotic pain medications or sedatives.  If you develop intractable nausea and vomiting or a severe headache please notify your doctor immediately.    FOLLOW-UP:  Please make an appointment with your surgeon as instructed. You do not need to follow up with anesthesia unless specifically instructed to do so.    WOUND CARE INSTRUCTIONS (if applicable):  Keep a dry clean dressing on the anesthesia/puncture wound site if there is drainage.  Once the wound has quit draining you may leave it open to air.  Generally you should leave the bandage intact for twenty four hours unless there is drainage.  If the epidural site drains for more than 36-48 hours please call the anesthesia department.    QUESTIONS?:  Please feel free to call your physician or the hospital  if you have any questions, and they will be happy to assist you.

## 2019-11-01 NOTE — OR NURSING
Took water w/o nausea.Up w/o vertigo.Patient and responsible adult given discharge instructions with no questions regarding instructions. Dorys score 20. Pain level 0/10.  Discharged from unit via walking. Patient discharged to home.

## 2019-11-01 NOTE — ANESTHESIA POSTPROCEDURE EVALUATION
Patient: Michael Christiansen    Procedure(s):  UPPER ENDOSCOPY, WITH BIOPSY    Diagnosis:Left upper quadrant pain [R10.12]  Diagnosis Additional Information: No value filed.    Anesthesia Type:  MAC    Note:  Anesthesia Post Evaluation    Patient location during evaluation: Bedside  Patient participation: Able to fully participate in evaluation  Level of consciousness: awake and alert  Pain management: adequate  Airway patency: patent  Cardiovascular status: acceptable and stable  Respiratory status: acceptable and room air  Hydration status: acceptable  PONV: none     Anesthetic complications: None          Last vitals:  Vitals:    11/01/19 0930 11/01/19 0935 11/01/19 0940   BP: 108/69 117/97 126/83   Resp: 16 16 16   SpO2: 97% 98% 95%         Electronically Signed By: JESSIE Newton CRNA  November 1, 2019  9:48 AM

## 2019-11-02 NOTE — OP NOTE
Michael Christiansen MRN# [id number]   YOB: 1935 Age: 84 year old      Date of Admission:  11/1/2019    Primary care provider: Chandrika Kumar    PREOPERATIVE DIAGNOSIS:  Abdominal pain, weight loss       POSTOPERATIVE DIAGNOSES:    Grossly normal upper Gi tract       PROCEDURE:  Esophagogastroduodenoscopy with cold forceps biopsies.       HISTORY OF PRESENT ILLNESS:  See clinic notes.      OPERATIVE FINDINGS:  No abnormalities seen grossly in esophagus, stomach and first portion of esophagus.     Specimen(s) submitted to pathology:  Antral biopsies    PROCEDURE:  With the patient in the supine position on the transport cart, IV sedation was administered by the nurse anesthetist.  Her correct identity and planned procedure were confirmed during a requisite timeout pause.  A bite block was placed and the fiberoptic endoscope was introduced and negotiated through the cricopharyngeus without difficulty.  The length of the esophagus was examined without findings.  The gastroesophageal junction was noted 35 cm from the incisors; the Z line was regular without ulceration, bleeding source or stricture.  There was no  evidence of Pérez's change.  The stomach was entered and the pylorus was traversed easily.  The gastric mucosa was normal; there was no evidence of gastric polyp, ulcer or bleeding source.  The duodenum was similarly without finding.  The endoscope was returned to the body of the stomach and retroflex confirmed the absence of abnormality in the cardia.      Random cold forceps biopsies were obtained of the antrum for H. pylori.  Hemostasis was judged adequate on visualization.   The endoscope was returned to the GE junction.  A second circumferential examination of the esophagus was performed on slow withdrawal of the endoscope without additional finding.  The procedure was satisfactorially tolerated; there was no appreciable blood loss and the patient was returned to Day Surgery in good  condition.      Fab Carroll MD  11/2/2019  10:31 AM

## 2019-11-07 NOTE — PROGRESS NOTES
ANTICOAGULATION FOLLOW-UP CLINIC VISIT    Patient Name:  Michael Christiansen  Date:  2019  Contact Type:  Face to Face    SUBJECTIVE:  Patient Findings     Comments:   INR done. No bleeding/bruising, no changes in diet/meds/activity or questions. INR results dicussed and advised patient re: warfarin dosing/INR recheck date. Patient verbalized understanding.          Clinical Outcomes     Negatives:   Major bleeding event, Thromboembolic event, Anticoagulation-related hospital admission, Anticoagulation-related ED visit, Anticoagulation-related fatality    Comments:   INR done. No bleeding/bruising, no changes in diet/meds/activity or questions. INR results dicussed and advised patient re: warfarin dosing/INR recheck date. Patient verbalized understanding.             OBJECTIVE    INR Protime   Date Value Ref Range Status   2019 2.1 (A) 0.86 - 1.14 Final       ASSESSMENT / PLAN  INR assessment THER    Recheck INR In: 6 WEEKS    INR Location Clinic      Anticoagulation Summary  As of 2019    INR goal:   2.0-3.0   TTR:   60.2 % (3.3 y)   INR used for dosin.1 (2019)   Warfarin maintenance plan:   2.5 mg (5 mg x 0.5) every Mon, Fri; 5 mg (5 mg x 1) all other days   Full warfarin instructions:   2.5 mg every Mon, Fri; 5 mg all other days   Weekly warfarin total:   30 mg   Plan last modified:   Ksaia Hercules RN (10/1/2018)   Next INR check:   2019   Priority:   INR   Target end date:   Indefinite    Indications    Long-term (current) use of anticoagulants [Z79.01] [Z79.01]  Permanent atrial fibrillation [I48.21]             Anticoagulation Episode Summary     INR check location:       Preferred lab:       Send INR reminders to:   [facility] ANTICOAG POOL    Comments:         Anticoagulation Care Providers     Provider Role Specialty Phone number    Chandrika Kumar PA [facility] [phone]            See the Encounter Report to view Anticoagulation Flowsheet and Dosing Calendar  (Go to Encounters tab in chart review, and find the Anticoagulation Therapy Visit)        Amarilis Gibson RN

## 2019-11-26 NOTE — TELEPHONE ENCOUNTER
traMADol (ULTRAM) 50 MG tablet      Last Written Prescription Date:  10-30-19  Last Fill Quantity: 100,   # refills: 0  Last Office Visit: 10-30-19  Future Office visit:    Next 5 appointments (look out 90 days)    Jan 03, 2020  2:00 PM CST  (Arrive by 1:45 PM)  Return Visit with Cam Kirby DO  [facility] [place] ([facility] ) [address]  [place] [address]  [phone]           Routing refill request to provider for review/approval because:  Drug not on the [facility], [facility] or  Health refill protocol or controlled substance

## 2019-12-17 NOTE — TELEPHONE ENCOUNTER
tramdol      Last Written Prescription Date:  11/27/19  Last Fill Quantity: 100,   # refills: 0  Last Office Visit: 10/30/19  Future Office visit:    Next 5 appointments (look out 90 days)    Jan 03, 2020  2:00 PM CST  (Arrive by 1:45 PM)  [facility] [address]  [place] [address]  [phone]           Routing refill request to provider for review/approval because:  Drug not on the [facility], P or [facility] refill protocol or controlled substance

## 2019-12-19 NOTE — PROGRESS NOTES
ANTICOAGULATION FOLLOW-UP CLINIC VISIT    Patient Name:  Michael Christiansen  Date:  2019  Contact Type:  Face to Face    SUBJECTIVE:  Patient Findings     Positives:   Change in activity (decrease in activity)    Comments:   INR done. No bleeding/bruising, no changes in diet/meds/activity or questions. INR results dicussed and advised patient re: warfarin dosing/INR recheck date. Patient verbalized understanding.          Clinical Outcomes     Negatives:   Major bleeding event, Thromboembolic event, Anticoagulation-related hospital admission, Anticoagulation-related ED visit, Anticoagulation-related fatality    Comments:   INR done. No bleeding/bruising, no changes in diet/meds/activity or questions. INR results dicussed and advised patient re: warfarin dosing/INR recheck date. Patient verbalized understanding.             OBJECTIVE    INR Protime   Date Value Ref Range Status   2019 1.4 (A) 0.86 - 1.14 Final       ASSESSMENT / PLAN  INR assessment SUB    Recheck INR In: 2 WEEKS    INR Location Clinic      Anticoagulation Summary  As of 2019    INR goal:   2.0-3.0   TTR:   67.7 % (1 y)   INR used for dosin.4! (2019)   Warfarin maintenance plan:   2.5 mg (5 mg x 0.5) every Mon, Fri; 5 mg (5 mg x 1) all other days   Full warfarin instructions:   : 10 mg; Otherwise 2.5 mg every Mon, Fri; 5 mg all other days   Weekly warfarin total:   30 mg   Plan last modified:   Kasia Hercules RN (10/1/2018)   Next INR check:   2020   Priority:   INR   Target end date:   Indefinite    Indications    Long-term (current) use of anticoagulants [Z79.01] [Z79.01]  Permanent atrial fibrillation [I48.21]             Anticoagulation Episode Summary     INR check location:       Preferred lab:       Send INR reminders to:   [facility] ANTICOAG POOL    Comments:         Anticoagulation Care Providers     Provider Role Specialty Phone number    Chandrika Kumar, PA [facility] [phone]             See the Encounter Report to view Anticoagulation Flowsheet and Dosing Calendar (Go to Encounters tab in chart review, and find the Anticoagulation Therapy Visit)        Amarilis Gibson RN

## 2020-01-01 ENCOUNTER — TELEPHONE (OUTPATIENT)
Dept: CARDIOLOGY | Facility: OTHER | Age: 85
End: 2020-01-01

## 2020-01-01 ENCOUNTER — OFFICE VISIT (OUTPATIENT)
Dept: FAMILY MEDICINE | Facility: OTHER | Age: 85
End: 2020-01-01
Attending: PHYSICIAN ASSISTANT
Payer: MEDICARE

## 2020-01-01 ENCOUNTER — ANTICOAGULATION THERAPY VISIT (OUTPATIENT)
Dept: FAMILY MEDICINE | Facility: OTHER | Age: 85
End: 2020-01-01

## 2020-01-01 ENCOUNTER — TELEPHONE (OUTPATIENT)
Dept: FAMILY MEDICINE | Facility: OTHER | Age: 85
End: 2020-01-01

## 2020-01-01 ENCOUNTER — ANTICOAGULATION THERAPY VISIT (OUTPATIENT)
Dept: ANTICOAGULATION | Facility: OTHER | Age: 85
End: 2020-01-01

## 2020-01-01 ENCOUNTER — TRANSFERRED RECORDS (OUTPATIENT)
Dept: HEALTH INFORMATION MANAGEMENT | Facility: CLINIC | Age: 85
End: 2020-01-01

## 2020-01-01 ENCOUNTER — APPOINTMENT (OUTPATIENT)
Dept: GENERAL RADIOLOGY | Facility: HOSPITAL | Age: 85
DRG: 698 | End: 2020-01-01
Attending: EMERGENCY MEDICINE
Payer: MEDICARE

## 2020-01-01 ENCOUNTER — APPOINTMENT (OUTPATIENT)
Dept: CT IMAGING | Facility: HOSPITAL | Age: 85
End: 2020-01-01
Attending: EMERGENCY MEDICINE
Payer: MEDICARE

## 2020-01-01 ENCOUNTER — TELEPHONE (OUTPATIENT)
Dept: INFUSION THERAPY | Facility: OTHER | Age: 85
End: 2020-01-01

## 2020-01-01 ENCOUNTER — CARE COORDINATION (OUTPATIENT)
Dept: CARDIOLOGY | Facility: CLINIC | Age: 85
End: 2020-01-01

## 2020-01-01 ENCOUNTER — TELEPHONE (OUTPATIENT)
Dept: CARDIOLOGY | Facility: CLINIC | Age: 85
End: 2020-01-01

## 2020-01-01 ENCOUNTER — VIRTUAL VISIT (OUTPATIENT)
Dept: FAMILY MEDICINE | Facility: OTHER | Age: 85
End: 2020-01-01
Attending: FAMILY MEDICINE
Payer: MEDICARE

## 2020-01-01 ENCOUNTER — APPOINTMENT (OUTPATIENT)
Dept: GENERAL RADIOLOGY | Facility: HOSPITAL | Age: 85
DRG: 698 | End: 2020-01-01
Attending: INTERNAL MEDICINE
Payer: MEDICARE

## 2020-01-01 ENCOUNTER — APPOINTMENT (OUTPATIENT)
Dept: GENERAL RADIOLOGY | Facility: HOSPITAL | Age: 85
End: 2020-01-01
Attending: EMERGENCY MEDICINE
Payer: MEDICARE

## 2020-01-01 ENCOUNTER — HOSPITAL ENCOUNTER (EMERGENCY)
Facility: HOSPITAL | Age: 85
Discharge: HOME | End: 2020-05-07
Attending: EMERGENCY MEDICINE | Admitting: EMERGENCY MEDICINE
Payer: MEDICARE

## 2020-01-01 ENCOUNTER — ANTICOAGULATION THERAPY VISIT (OUTPATIENT)
Dept: ANTICOAGULATION | Facility: OTHER | Age: 85
End: 2020-01-01
Attending: PHYSICIAN ASSISTANT
Payer: MEDICARE

## 2020-01-01 ENCOUNTER — APPOINTMENT (OUTPATIENT)
Dept: MEDSURG UNIT | Facility: CLINIC | Age: 85
End: 2020-01-01
Attending: INTERNAL MEDICINE
Payer: MEDICARE

## 2020-01-01 ENCOUNTER — MEDICAL CORRESPONDENCE (OUTPATIENT)
Dept: HEALTH INFORMATION MANAGEMENT | Facility: CLINIC | Age: 85
End: 2020-01-01

## 2020-01-01 ENCOUNTER — RESULTS ONLY (OUTPATIENT)
Dept: LAB | Age: 85
End: 2020-01-01

## 2020-01-01 ENCOUNTER — HOSPITAL ENCOUNTER (OUTPATIENT)
Facility: CLINIC | Age: 85
Discharge: HOME | End: 2020-03-06
Attending: INTERNAL MEDICINE | Admitting: INTERNAL MEDICINE
Payer: MEDICARE

## 2020-01-01 ENCOUNTER — HOSPITAL ENCOUNTER (EMERGENCY)
Facility: HOSPITAL | Age: 85
Discharge: ANOTHER HOSPITAL | End: 2020-07-02
Attending: PHYSICIAN ASSISTANT | Admitting: PHYSICIAN ASSISTANT
Payer: MEDICARE

## 2020-01-01 ENCOUNTER — PRE VISIT (OUTPATIENT)
Dept: SURGERY | Facility: CLINIC | Age: 85
End: 2020-01-01

## 2020-01-01 ENCOUNTER — HOSPITAL ENCOUNTER (OUTPATIENT)
Dept: CT IMAGING | Facility: CLINIC | Age: 85
End: 2020-03-05
Attending: INTERNAL MEDICINE
Payer: MEDICARE

## 2020-01-01 ENCOUNTER — APPOINTMENT (OUTPATIENT)
Dept: CT IMAGING | Facility: HOSPITAL | Age: 85
End: 2020-01-01
Attending: PHYSICIAN ASSISTANT
Payer: MEDICARE

## 2020-01-01 ENCOUNTER — HOSPITAL ENCOUNTER (INPATIENT)
Facility: HOSPITAL | Age: 85
LOS: 7 days | Discharge: ANOTHER HOSPITAL | DRG: 698 | End: 2020-08-09
Attending: EMERGENCY MEDICINE | Admitting: INTERNAL MEDICINE
Payer: MEDICARE

## 2020-01-01 ENCOUNTER — OFFICE VISIT (OUTPATIENT)
Dept: CARDIOLOGY | Facility: CLINIC | Age: 85
End: 2020-01-01
Attending: NURSE PRACTITIONER
Payer: MEDICARE

## 2020-01-01 ENCOUNTER — APPOINTMENT (OUTPATIENT)
Dept: INTERVENTIONAL RADIOLOGY/VASCULAR | Facility: HOSPITAL | Age: 85
DRG: 698 | End: 2020-01-01
Attending: INTERNAL MEDICINE
Payer: MEDICARE

## 2020-01-01 ENCOUNTER — HOSPITAL ENCOUNTER (EMERGENCY)
Facility: HOSPITAL | Age: 85
Discharge: ANOTHER HOSPITAL | End: 2020-05-15
Attending: EMERGENCY MEDICINE | Admitting: EMERGENCY MEDICINE
Payer: MEDICARE

## 2020-01-01 ENCOUNTER — HOSPITAL ENCOUNTER (EMERGENCY)
Facility: HOSPITAL | Age: 85
Discharge: NURSING, REHAB OR LONG-TERM CARE | End: 2020-07-16
Attending: EMERGENCY MEDICINE | Admitting: EMERGENCY MEDICINE
Payer: MEDICARE

## 2020-01-01 ENCOUNTER — TELEPHONE (OUTPATIENT)
Dept: FAMILY MEDICINE | Facility: OTHER | Age: 85
End: 2020-01-01
Payer: MEDICARE

## 2020-01-01 ENCOUNTER — OFFICE VISIT (OUTPATIENT)
Dept: FAMILY MEDICINE | Facility: OTHER | Age: 85
End: 2020-01-01
Attending: NURSE PRACTITIONER
Payer: MEDICARE

## 2020-01-01 ENCOUNTER — APPOINTMENT (OUTPATIENT)
Dept: ULTRASOUND IMAGING | Facility: HOSPITAL | Age: 85
End: 2020-01-01
Attending: EMERGENCY MEDICINE
Payer: MEDICARE

## 2020-01-01 ENCOUNTER — APPOINTMENT (OUTPATIENT)
Dept: GENERAL RADIOLOGY | Facility: HOSPITAL | Age: 85
End: 2020-01-01
Attending: INTERNAL MEDICINE
Payer: MEDICARE

## 2020-01-01 ENCOUNTER — ANCILLARY PROCEDURE (OUTPATIENT)
Dept: GENERAL RADIOLOGY | Facility: OTHER | Age: 85
End: 2020-01-01
Attending: PHYSICIAN ASSISTANT
Payer: MEDICARE

## 2020-01-01 ENCOUNTER — HOSPITAL ENCOUNTER (INPATIENT)
Facility: CLINIC | Age: 85
Setting detail: SURGERY ADMIT
End: 2020-01-01
Attending: INTERNAL MEDICINE | Admitting: INTERNAL MEDICINE
Payer: MEDICARE

## 2020-01-01 ENCOUNTER — HOSPITAL ENCOUNTER (OUTPATIENT)
Dept: CARDIOLOGY | Facility: OTHER | Age: 85
Discharge: HOME | End: 2020-02-11
Attending: INTERNAL MEDICINE | Admitting: INTERNAL MEDICINE
Payer: MEDICARE

## 2020-01-01 ENCOUNTER — APPOINTMENT (OUTPATIENT)
Dept: GENERAL RADIOLOGY | Facility: HOSPITAL | Age: 85
End: 2020-01-01
Attending: PHYSICIAN ASSISTANT
Payer: MEDICARE

## 2020-01-01 ENCOUNTER — PATIENT OUTREACH (OUTPATIENT)
Dept: CARE COORDINATION | Facility: OTHER | Age: 85
End: 2020-01-01

## 2020-01-01 ENCOUNTER — ANCILLARY PROCEDURE (OUTPATIENT)
Dept: ULTRASOUND IMAGING | Facility: CLINIC | Age: 85
End: 2020-01-01
Attending: INTERNAL MEDICINE
Payer: MEDICARE

## 2020-01-01 ENCOUNTER — APPOINTMENT (OUTPATIENT)
Dept: LAB | Facility: CLINIC | Age: 85
End: 2020-01-01
Attending: INTERNAL MEDICINE
Payer: MEDICARE

## 2020-01-01 ENCOUNTER — DOCUMENTATION ONLY (OUTPATIENT)
Dept: FAMILY MEDICINE | Facility: OTHER | Age: 85
End: 2020-01-01

## 2020-01-01 ENCOUNTER — OFFICE VISIT (OUTPATIENT)
Dept: CARDIOLOGY | Facility: OTHER | Age: 85
End: 2020-01-01
Attending: INTERNAL MEDICINE
Payer: MEDICARE

## 2020-01-01 ENCOUNTER — OFFICE VISIT (OUTPATIENT)
Dept: CARDIOLOGY | Facility: CLINIC | Age: 85
End: 2020-01-01
Attending: SURGERY
Payer: MEDICARE

## 2020-01-01 ENCOUNTER — DOCUMENTATION ONLY (OUTPATIENT)
Dept: OTHER | Facility: CLINIC | Age: 85
End: 2020-01-01

## 2020-01-01 ENCOUNTER — SURGERY (OUTPATIENT)
Age: 85
End: 2020-01-01
Payer: MEDICARE

## 2020-01-01 ENCOUNTER — APPOINTMENT (OUTPATIENT)
Dept: GENERAL RADIOLOGY | Facility: HOSPITAL | Age: 85
DRG: 698 | End: 2020-01-01
Attending: NURSE PRACTITIONER
Payer: MEDICARE

## 2020-01-01 VITALS
RESPIRATION RATE: 18 BRPM | TEMPERATURE: 97.8 F | DIASTOLIC BLOOD PRESSURE: 66 MMHG | HEART RATE: 60 BPM | OXYGEN SATURATION: 92 % | SYSTOLIC BLOOD PRESSURE: 110 MMHG

## 2020-01-01 VITALS
OXYGEN SATURATION: 97 % | BODY MASS INDEX: 16.39 KG/M2 | RESPIRATION RATE: 20 BRPM | HEART RATE: 67 BPM | HEIGHT: 64 IN | TEMPERATURE: 98.1 F | DIASTOLIC BLOOD PRESSURE: 78 MMHG | WEIGHT: 96 LBS | SYSTOLIC BLOOD PRESSURE: 133 MMHG

## 2020-01-01 VITALS
SYSTOLIC BLOOD PRESSURE: 123 MMHG | OXYGEN SATURATION: 99 % | HEART RATE: 84 BPM | DIASTOLIC BLOOD PRESSURE: 84 MMHG | RESPIRATION RATE: 16 BRPM | TEMPERATURE: 98.4 F

## 2020-01-01 VITALS
SYSTOLIC BLOOD PRESSURE: 177 MMHG | DIASTOLIC BLOOD PRESSURE: 101 MMHG | OXYGEN SATURATION: 97 % | HEART RATE: 88 BPM | BODY MASS INDEX: 15.91 KG/M2 | WEIGHT: 93.2 LBS | HEIGHT: 64 IN

## 2020-01-01 VITALS
RESPIRATION RATE: 16 BRPM | OXYGEN SATURATION: 97 % | DIASTOLIC BLOOD PRESSURE: 81 MMHG | HEART RATE: 87 BPM | SYSTOLIC BLOOD PRESSURE: 135 MMHG | TEMPERATURE: 99.3 F

## 2020-01-01 VITALS
BODY MASS INDEX: 14.25 KG/M2 | HEART RATE: 99 BPM | SYSTOLIC BLOOD PRESSURE: 110 MMHG | DIASTOLIC BLOOD PRESSURE: 70 MMHG | WEIGHT: 83 LBS | TEMPERATURE: 97.5 F | OXYGEN SATURATION: 99 %

## 2020-01-01 VITALS
BODY MASS INDEX: 17.54 KG/M2 | TEMPERATURE: 97.7 F | HEART RATE: 100 BPM | RESPIRATION RATE: 20 BRPM | OXYGEN SATURATION: 93 % | WEIGHT: 102.73 LBS | DIASTOLIC BLOOD PRESSURE: 71 MMHG | SYSTOLIC BLOOD PRESSURE: 141 MMHG | HEIGHT: 64 IN

## 2020-01-01 VITALS
SYSTOLIC BLOOD PRESSURE: 170 MMHG | OXYGEN SATURATION: 94 % | BODY MASS INDEX: 16.24 KG/M2 | DIASTOLIC BLOOD PRESSURE: 68 MMHG | HEIGHT: 64 IN | WEIGHT: 95.1 LBS | HEART RATE: 87 BPM

## 2020-01-01 VITALS
DIASTOLIC BLOOD PRESSURE: 80 MMHG | TEMPERATURE: 98.5 F | OXYGEN SATURATION: 95 % | HEART RATE: 117 BPM | BODY MASS INDEX: 17.24 KG/M2 | WEIGHT: 101 LBS | HEIGHT: 64 IN | SYSTOLIC BLOOD PRESSURE: 132 MMHG

## 2020-01-01 VITALS
BODY MASS INDEX: 16.05 KG/M2 | SYSTOLIC BLOOD PRESSURE: 141 MMHG | DIASTOLIC BLOOD PRESSURE: 72 MMHG | WEIGHT: 94 LBS | HEART RATE: 75 BPM | OXYGEN SATURATION: 93 % | HEIGHT: 64 IN | TEMPERATURE: 98.7 F | RESPIRATION RATE: 14 BRPM

## 2020-01-01 VITALS
RESPIRATION RATE: 20 BRPM | WEIGHT: 94.4 LBS | HEART RATE: 106 BPM | DIASTOLIC BLOOD PRESSURE: 70 MMHG | TEMPERATURE: 97.3 F | SYSTOLIC BLOOD PRESSURE: 132 MMHG | BODY MASS INDEX: 16.2 KG/M2 | OXYGEN SATURATION: 96 %

## 2020-01-01 VITALS
TEMPERATURE: 97.8 F | BODY MASS INDEX: 15.95 KG/M2 | DIASTOLIC BLOOD PRESSURE: 55 MMHG | HEIGHT: 63 IN | WEIGHT: 90 LBS | RESPIRATION RATE: 14 BRPM | SYSTOLIC BLOOD PRESSURE: 84 MMHG | HEART RATE: 80 BPM | OXYGEN SATURATION: 97 %

## 2020-01-01 VITALS
HEART RATE: 90 BPM | HEIGHT: 64 IN | OXYGEN SATURATION: 95 % | SYSTOLIC BLOOD PRESSURE: 84 MMHG | DIASTOLIC BLOOD PRESSURE: 42 MMHG | BODY MASS INDEX: 16.15 KG/M2 | WEIGHT: 94.6 LBS | TEMPERATURE: 98.8 F

## 2020-01-01 VITALS
OXYGEN SATURATION: 95 % | RESPIRATION RATE: 16 BRPM | TEMPERATURE: 98.1 F | HEART RATE: 73 BPM | DIASTOLIC BLOOD PRESSURE: 81 MMHG | SYSTOLIC BLOOD PRESSURE: 130 MMHG

## 2020-01-01 DIAGNOSIS — I35.0 SEVERE AORTIC STENOSIS: Primary | ICD-10-CM

## 2020-01-01 DIAGNOSIS — Z79.01 LONG TERM CURRENT USE OF ANTICOAGULANT THERAPY: ICD-10-CM

## 2020-01-01 DIAGNOSIS — G89.4 CHRONIC PAIN SYNDROME: Primary | ICD-10-CM

## 2020-01-01 DIAGNOSIS — I35.0 SEVERE AORTIC STENOSIS: ICD-10-CM

## 2020-01-01 DIAGNOSIS — M15.8 OTHER OSTEOARTHRITIS INVOLVING MULTIPLE JOINTS: ICD-10-CM

## 2020-01-01 DIAGNOSIS — I48.21 PERMANENT ATRIAL FIBRILLATION (H): ICD-10-CM

## 2020-01-01 DIAGNOSIS — Z78.9 NURSING HOME RESIDENT: Primary | ICD-10-CM

## 2020-01-01 DIAGNOSIS — G45.9 TIA (TRANSIENT ISCHEMIC ATTACK): ICD-10-CM

## 2020-01-01 DIAGNOSIS — S72.113A GREATER TROCHANTER FRACTURE (H): ICD-10-CM

## 2020-01-01 DIAGNOSIS — I51.89 OTHER ILL-DEFINED HEART DISEASES: ICD-10-CM

## 2020-01-01 DIAGNOSIS — I07.1 MODERATE TRICUSPID INSUFFICIENCY: ICD-10-CM

## 2020-01-01 DIAGNOSIS — L89.154 DECUBITUS ULCER OF SACRAL REGION, STAGE 4 (H): Primary | ICD-10-CM

## 2020-01-01 DIAGNOSIS — N30.01 ACUTE CYSTITIS WITH HEMATURIA: ICD-10-CM

## 2020-01-01 DIAGNOSIS — M81.8 OTHER OSTEOPOROSIS WITHOUT CURRENT PATHOLOGICAL FRACTURE: ICD-10-CM

## 2020-01-01 DIAGNOSIS — A41.9 SEPSIS WITHOUT ACUTE ORGAN DYSFUNCTION, DUE TO UNSPECIFIED ORGANISM (H): Primary | ICD-10-CM

## 2020-01-01 DIAGNOSIS — Z95.3 STATUS POST TRANSCATHETER AORTIC VALVE REPLACEMENT (TAVR) USING BIOPROSTHESIS: ICD-10-CM

## 2020-01-01 DIAGNOSIS — I48.21 PERMANENT ATRIAL FIBRILLATION (H): Primary | ICD-10-CM

## 2020-01-01 DIAGNOSIS — F41.1 GAD (GENERALIZED ANXIETY DISORDER): ICD-10-CM

## 2020-01-01 DIAGNOSIS — I27.20 PULMONARY HYPERTENSION (H): ICD-10-CM

## 2020-01-01 DIAGNOSIS — Z53.9 PERSONS ENCOUNTERING HEALTH SERVICES FOR SPECIFIC PROCEDURES, NOT CARRIED OUT: Primary | ICD-10-CM

## 2020-01-01 DIAGNOSIS — G89.4 CHRONIC PAIN SYNDROME: ICD-10-CM

## 2020-01-01 DIAGNOSIS — I38 VALVULAR HEART DISEASE: ICD-10-CM

## 2020-01-01 DIAGNOSIS — Z79.01 ANTICOAGULATED ON COUMADIN: ICD-10-CM

## 2020-01-01 DIAGNOSIS — D62 ACUTE BLOOD LOSS AS CAUSE OF POSTOPERATIVE ANEMIA: ICD-10-CM

## 2020-01-01 DIAGNOSIS — J69.0 ASPIRATION PNEUMONITIS (H): ICD-10-CM

## 2020-01-01 DIAGNOSIS — R79.89 D-DIMER, ELEVATED: ICD-10-CM

## 2020-01-01 DIAGNOSIS — N17.9 ACUTE RENAL FAILURE SUPERIMPOSED ON CHRONIC KIDNEY DISEASE, UNSPECIFIED CKD STAGE, UNSPECIFIED ACUTE RENAL FAILURE TYPE: ICD-10-CM

## 2020-01-01 DIAGNOSIS — E46 PROTEIN-CALORIE MALNUTRITION, UNSPECIFIED SEVERITY (H): ICD-10-CM

## 2020-01-01 DIAGNOSIS — Z17.0 MALIGNANT NEOPLASM OF RIGHT BREAST IN FEMALE, ESTROGEN RECEPTOR POSITIVE, UNSPECIFIED SITE OF BREAST (H): ICD-10-CM

## 2020-01-01 DIAGNOSIS — R09.89 CAROTID BRUIT, UNSPECIFIED LATERALITY: ICD-10-CM

## 2020-01-01 DIAGNOSIS — S42.031D CLOSED DISPLACED FRACTURE OF ACROMIAL END OF RIGHT CLAVICLE WITH ROUTINE HEALING, SUBSEQUENT ENCOUNTER: ICD-10-CM

## 2020-01-01 DIAGNOSIS — Z78.9 NURSING HOME RESIDENT: ICD-10-CM

## 2020-01-01 DIAGNOSIS — R55 SYNCOPE, UNSPECIFIED SYNCOPE TYPE: ICD-10-CM

## 2020-01-01 DIAGNOSIS — I10 ESSENTIAL HYPERTENSION: ICD-10-CM

## 2020-01-01 DIAGNOSIS — E78.5 DYSLIPIDEMIA: ICD-10-CM

## 2020-01-01 DIAGNOSIS — N39.0 URINARY TRACT INFECTION WITHOUT HEMATURIA, SITE UNSPECIFIED: ICD-10-CM

## 2020-01-01 DIAGNOSIS — K21.00 GASTROESOPHAGEAL REFLUX DISEASE WITH ESOPHAGITIS: ICD-10-CM

## 2020-01-01 DIAGNOSIS — Z09 HOSPITAL DISCHARGE FOLLOW-UP: ICD-10-CM
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
## 2020-01-01 DIAGNOSIS — L89.303 PRESSURE INJURY OF BUTTOCK, STAGE 3, UNSPECIFIED LATERALITY (H): Primary | ICD-10-CM

## 2020-01-01 DIAGNOSIS — E87.1 HYPONATREMIA: ICD-10-CM

## 2020-01-01 DIAGNOSIS — Z09 HOSPITAL DISCHARGE FOLLOW-UP: Primary | ICD-10-CM

## 2020-01-01 DIAGNOSIS — Z79.01 CHRONIC ANTICOAGULATION: ICD-10-CM

## 2020-01-01 DIAGNOSIS — I27.20 PULMONARY HYPERTENSION (H): Primary | ICD-10-CM

## 2020-01-01 DIAGNOSIS — Z86.79 HX OF RHEUMATIC FEVER: ICD-10-CM

## 2020-01-01 DIAGNOSIS — E44.0 MODERATE PROTEIN-CALORIE MALNUTRITION (H): ICD-10-CM

## 2020-01-01 DIAGNOSIS — J84.9 ILD (INTERSTITIAL LUNG DISEASE) (H): ICD-10-CM

## 2020-01-01 DIAGNOSIS — Z51.5 END OF LIFE CARE: ICD-10-CM

## 2020-01-01 DIAGNOSIS — Z87.09 HISTORY OF INTERSTITIAL LUNG DISEASE: ICD-10-CM

## 2020-01-01 DIAGNOSIS — M48.061 SPINAL STENOSIS OF LUMBAR REGION, UNSPECIFIED WHETHER NEUROGENIC CLAUDICATION PRESENT: ICD-10-CM

## 2020-01-01 DIAGNOSIS — M79.604 LUMBAR PAIN WITH RADIATION DOWN RIGHT LEG: ICD-10-CM

## 2020-01-01 DIAGNOSIS — N18.9 ACUTE RENAL FAILURE SUPERIMPOSED ON CHRONIC KIDNEY DISEASE, UNSPECIFIED CKD STAGE, UNSPECIFIED ACUTE RENAL FAILURE TYPE: ICD-10-CM

## 2020-01-01 DIAGNOSIS — I48.11 LONGSTANDING PERSISTENT ATRIAL FIBRILLATION (H): ICD-10-CM

## 2020-01-01 DIAGNOSIS — I25.10 CORONARY ARTERY DISEASE INVOLVING NATIVE CORONARY ARTERY OF NATIVE HEART WITHOUT ANGINA PECTORIS: ICD-10-CM

## 2020-01-01 DIAGNOSIS — M79.89 RIGHT LEG SWELLING: ICD-10-CM

## 2020-01-01 DIAGNOSIS — I48.20 CHRONIC ATRIAL FIBRILLATION (H): Primary | ICD-10-CM

## 2020-01-01 DIAGNOSIS — J18.9 MULTIFOCAL PNEUMONIA: ICD-10-CM

## 2020-01-01 DIAGNOSIS — I35.0 NONRHEUMATIC AORTIC VALVE STENOSIS: Primary | ICD-10-CM

## 2020-01-01 DIAGNOSIS — J96.01 ACUTE RESPIRATORY FAILURE WITH HYPOXIA (H): ICD-10-CM

## 2020-01-01 DIAGNOSIS — I48.20 CHRONIC ATRIAL FIBRILLATION (H): ICD-10-CM

## 2020-01-01 DIAGNOSIS — M54.50 LUMBAR PAIN WITH RADIATION DOWN RIGHT LEG: ICD-10-CM

## 2020-01-01 DIAGNOSIS — I10 ESSENTIAL HYPERTENSION: Primary | ICD-10-CM

## 2020-01-01 DIAGNOSIS — I27.21 MODERATE PULMONARY ARTERIAL SYSTOLIC HYPERTENSION (H): ICD-10-CM

## 2020-01-01 DIAGNOSIS — I44.7 NEW ONSET LEFT BUNDLE BRANCH BLOCK (LBBB): ICD-10-CM

## 2020-01-01 DIAGNOSIS — R47.81 SLURRED SPEECH: ICD-10-CM

## 2020-01-01 DIAGNOSIS — Z01.818 PREOP GENERAL PHYSICAL EXAM: Primary | ICD-10-CM

## 2020-01-01 DIAGNOSIS — R78.81 BACTEREMIA: ICD-10-CM

## 2020-01-01 DIAGNOSIS — A49.9 ESBL (EXTENDED SPECTRUM BETA-LACTAMASE) PRODUCING BACTERIA INFECTION: ICD-10-CM

## 2020-01-01 DIAGNOSIS — Z95.2 S/P TAVR (TRANSCATHETER AORTIC VALVE REPLACEMENT): Primary | ICD-10-CM

## 2020-01-01 DIAGNOSIS — C50.911 MALIGNANT NEOPLASM OF RIGHT BREAST IN FEMALE, ESTROGEN RECEPTOR POSITIVE, UNSPECIFIED SITE OF BREAST (H): ICD-10-CM

## 2020-01-01 DIAGNOSIS — S72.001D CLOSED FRACTURE OF RIGHT HIP WITH ROUTINE HEALING, SUBSEQUENT ENCOUNTER: Primary | ICD-10-CM

## 2020-01-01 DIAGNOSIS — Z16.12 ESBL (EXTENDED SPECTRUM BETA-LACTAMASE) PRODUCING BACTERIA INFECTION: ICD-10-CM

## 2020-01-01 LAB
ALBUMIN SERPL-MCNC: 2.4 G/DL (ref 3.4–5)
ALBUMIN SERPL-MCNC: 2.8 G/DL (ref 3.4–5)
ALBUMIN SERPL-MCNC: 3.2 G/DL (ref 3.4–5)
ALBUMIN SERPL-MCNC: 3.4 G/DL (ref 3.4–5)
ALBUMIN SERPL-MCNC: 3.8 G/DL (ref 3.4–5)
ALBUMIN UR-MCNC: 10 MG/DL
ALBUMIN UR-MCNC: 10 MG/DL
ALBUMIN UR-MCNC: 30 MG/DL
ALBUMIN UR-MCNC: NEGATIVE MG/DL
ALP SERPL-CCNC: 135 U/L (ref 40–150)
ALP SERPL-CCNC: 187 U/L (ref 40–150)
ALP SERPL-CCNC: 64 U/L (ref 40–150)
ALP SERPL-CCNC: 73 U/L (ref 40–150)
ALP SERPL-CCNC: 84 U/L (ref 40–150)
ALT SERPL W P-5'-P-CCNC: 14 U/L (ref 0–50)
ALT SERPL W P-5'-P-CCNC: 18 U/L (ref 0–50)
ALT SERPL W P-5'-P-CCNC: 20 U/L (ref 0–50)
ALT SERPL W P-5'-P-CCNC: 29 U/L (ref 0–50)
ALT SERPL W P-5'-P-CCNC: 36 U/L (ref 0–50)
ALT SERPL-CCNC: 12 IU/L (ref 6–31)
ANION GAP SERPL CALCULATED.3IONS-SCNC: 1 MMOL/L (ref 3–14)
ANION GAP SERPL CALCULATED.3IONS-SCNC: 1 MMOL/L (ref 3–14)
ANION GAP SERPL CALCULATED.3IONS-SCNC: 2 MMOL/L (ref 3–14)
ANION GAP SERPL CALCULATED.3IONS-SCNC: 2 MMOL/L (ref 3–14)
ANION GAP SERPL CALCULATED.3IONS-SCNC: 3 MMOL/L (ref 3–14)
ANION GAP SERPL CALCULATED.3IONS-SCNC: 3 MMOL/L (ref 3–14)
ANION GAP SERPL CALCULATED.3IONS-SCNC: 4 MMOL/L (ref 3–14)
ANION GAP SERPL CALCULATED.3IONS-SCNC: 4 MMOL/L (ref 3–14)
ANION GAP SERPL CALCULATED.3IONS-SCNC: 5 MMOL/L (ref 3–14)
ANION GAP SERPL CALCULATED.3IONS-SCNC: 5 MMOL/L (ref 3–14)
ANION GAP SERPL CALCULATED.3IONS-SCNC: 6 MMOL/L (ref 3–14)
ANION GAP SERPL CALCULATED.3IONS-SCNC: 6 MMOL/L (ref 3–14)
ANION GAP SERPL CALCULATED.3IONS-SCNC: 7 MMOL/L (ref 3–14)
APPEARANCE UR: ABNORMAL
APPEARANCE UR: ABNORMAL
APPEARANCE UR: CLEAR
APPEARANCE UR: CLEAR
APTT PPP: 59 SEC (ref 22–37)
AST SERPL W P-5'-P-CCNC: 26 U/L (ref 0–45)
AST SERPL W P-5'-P-CCNC: 26 U/L (ref 0–45)
AST SERPL W P-5'-P-CCNC: 43 U/L (ref 0–45)
AST SERPL W P-5'-P-CCNC: 45 U/L (ref 0–45)
AST SERPL W P-5'-P-CCNC: 46 U/L (ref 0–45)
AST SERPL-CCNC: 25 IU/L (ref 10–40)
BACTERIA #/AREA URNS HPF: ABNORMAL /HPF
BACTERIA SPEC CULT: ABNORMAL
BACTERIA SPEC CULT: NO GROWTH
BACTERIA SPEC CULT: NORMAL
BASE EXCESS BLDV CALC-SCNC: 3.6 MMOL/L
BASOPHILS # BLD AUTO: 0 10E9/L (ref 0–0.2)
BASOPHILS # BLD AUTO: 0.1 10E9/L (ref 0–0.2)
BASOPHILS NFR BLD AUTO: 0.1 %
BASOPHILS NFR BLD AUTO: 0.2 %
BASOPHILS NFR BLD AUTO: 0.2 %
BASOPHILS NFR BLD AUTO: 0.3 %
BASOPHILS NFR BLD AUTO: 0.4 %
BASOPHILS NFR BLD AUTO: 0.4 %
BASOPHILS NFR BLD AUTO: 0.5 %
BILIRUB SERPL-MCNC: 0.3 MG/DL (ref 0.2–1.3)
BILIRUB SERPL-MCNC: 0.6 MG/DL (ref 0.2–1.3)
BILIRUB SERPL-MCNC: 0.7 MG/DL (ref 0.2–1.3)
BILIRUB SERPL-MCNC: 0.7 MG/DL (ref 0.2–1.3)
BILIRUB SERPL-MCNC: 0.8 MG/DL (ref 0.2–1.3)
BILIRUB UR QL STRIP: NEGATIVE
BUN SERPL-MCNC: 14 MG/DL (ref 7–30)
BUN SERPL-MCNC: 15 MG/DL (ref 7–30)
BUN SERPL-MCNC: 16 MG/DL (ref 7–30)
BUN SERPL-MCNC: 17 MG/DL (ref 7–30)
BUN SERPL-MCNC: 18 MG/DL (ref 7–30)
BUN SERPL-MCNC: 19 MG/DL (ref 7–30)
BUN SERPL-MCNC: 20 MG/DL (ref 7–30)
BUN SERPL-MCNC: 20 MG/DL (ref 7–30)
BUN SERPL-MCNC: 24 MG/DL (ref 7–30)
BUN SERPL-MCNC: 25 MG/DL (ref 7–30)
BUN SERPL-MCNC: 26 MG/DL (ref 7–30)
BUN SERPL-MCNC: 27 MG/DL (ref 7–30)
BUN SERPL-MCNC: 33 MG/DL (ref 7–30)
CALCIUM SERPL-MCNC: 7.9 MG/DL (ref 8.5–10.1)
CALCIUM SERPL-MCNC: 8.2 MG/DL (ref 8.5–10.1)
CALCIUM SERPL-MCNC: 8.4 MG/DL (ref 8.5–10.1)
CALCIUM SERPL-MCNC: 8.4 MG/DL (ref 8.5–10.1)
CALCIUM SERPL-MCNC: 8.5 MG/DL (ref 8.5–10.1)
CALCIUM SERPL-MCNC: 8.6 MG/DL (ref 8.5–10.1)
CALCIUM SERPL-MCNC: 8.7 MG/DL (ref 8.5–10.1)
CALCIUM SERPL-MCNC: 8.8 MG/DL (ref 8.5–10.1)
CALCIUM SERPL-MCNC: 8.8 MG/DL (ref 8.5–10.1)
CALCIUM SERPL-MCNC: 9.1 MG/DL (ref 8.5–10.1)
CALCIUM SERPL-MCNC: 9.2 MG/DL (ref 8.5–10.1)
CALCIUM SERPL-MCNC: 9.3 MG/DL (ref 8.5–10.1)
CALCIUM SERPL-MCNC: 9.7 MG/DL (ref 8.5–10.1)
CHLORIDE SERPL-SCNC: 100 MMOL/L (ref 94–109)
CHLORIDE SERPL-SCNC: 101 MMOL/L (ref 94–109)
CHLORIDE SERPL-SCNC: 103 MMOL/L (ref 94–109)
CHLORIDE SERPL-SCNC: 103 MMOL/L (ref 94–109)
CHLORIDE SERPL-SCNC: 87 MMOL/L (ref 94–109)
CHLORIDE SERPL-SCNC: 95 MMOL/L (ref 94–109)
CHLORIDE SERPL-SCNC: 96 MMOL/L (ref 94–109)
CHLORIDE SERPL-SCNC: 96 MMOL/L (ref 94–109)
CHLORIDE SERPL-SCNC: 97 MMOL/L (ref 94–109)
CHLORIDE SERPL-SCNC: 97 MMOL/L (ref 94–109)
CHLORIDE SERPL-SCNC: 98 MMOL/L (ref 94–109)
CHLORIDE SERPL-SCNC: 99 MMOL/L (ref 94–109)
CHLORIDE SERPL-SCNC: 99 MMOL/L (ref 94–109)
CK SERPL-CCNC: 60 U/L (ref 30–225)
CO2 SERPL-SCNC: 27 MMOL/L (ref 20–32)
CO2 SERPL-SCNC: 28 MMOL/L (ref 20–32)
CO2 SERPL-SCNC: 28 MMOL/L (ref 20–32)
CO2 SERPL-SCNC: 30 MMOL/L (ref 20–32)
CO2 SERPL-SCNC: 30 MMOL/L (ref 20–32)
CO2 SERPL-SCNC: 31 MMOL/L (ref 20–32)
CO2 SERPL-SCNC: 32 MMOL/L (ref 20–32)
CO2 SERPL-SCNC: 33 MMOL/L (ref 20–32)
CO2 SERPL-SCNC: 39 MMOL/L (ref 20–32)
CO2 SERPL-SCNC: 41 MMOL/L (ref 20–32)
COLOR UR AUTO: ABNORMAL
COLOR UR AUTO: YELLOW
COPATH REPORT: NORMAL
CREAT SERPL-MCNC: 0.37 MG/DL (ref 0.52–1.04)
CREAT SERPL-MCNC: 0.42 MG/DL (ref 0.52–1.04)
CREAT SERPL-MCNC: 0.42 MG/DL (ref 0.52–1.04)
CREAT SERPL-MCNC: 0.44 MG/DL (ref 0.52–1.04)
CREAT SERPL-MCNC: 0.47 MG/DL (ref 0.52–1.04)
CREAT SERPL-MCNC: 0.5 MG/DL (ref 0.52–1.04)
CREAT SERPL-MCNC: 0.62 MG/DL (ref 0.52–1.04)
CREAT SERPL-MCNC: 0.67 MG/DL (ref 0.52–1.04)
CREAT SERPL-MCNC: 0.68 MG/DL (ref 0.52–1.04)
CREAT SERPL-MCNC: 0.7 MG/DL (ref 0.4–1)
CREAT SERPL-MCNC: 0.73 MG/DL (ref 0.52–1.04)
CREAT SERPL-MCNC: 0.91 MG/DL (ref 0.52–1.04)
CREAT SERPL-MCNC: 0.91 MG/DL (ref 0.52–1.04)
CREAT SERPL-MCNC: 1.31 MG/DL (ref 0.52–1.04)
CRP SERPL-MCNC: 145 MG/L (ref 0–8)
CRP SERPL-MCNC: 181 MG/L (ref 0–8)
D DIMER PPP FEU-MCNC: 18.6 UG/ML FEU (ref 0–0.5)
DIFFERENTIAL METHOD BLD: ABNORMAL
EJECTION FRACTION: NORMAL %
EJECTION FRACTION: NORMAL %
EOSINOPHIL # BLD AUTO: 0 10E9/L (ref 0–0.7)
EOSINOPHIL # BLD AUTO: 0.1 10E9/L (ref 0–0.7)
EOSINOPHIL # BLD AUTO: 0.2 10E9/L (ref 0–0.7)
EOSINOPHIL # BLD AUTO: 0.3 10E9/L (ref 0–0.7)
EOSINOPHIL NFR BLD AUTO: 0 %
EOSINOPHIL NFR BLD AUTO: 0 %
EOSINOPHIL NFR BLD AUTO: 0.1 %
EOSINOPHIL NFR BLD AUTO: 0.2 %
EOSINOPHIL NFR BLD AUTO: 0.6 %
EOSINOPHIL NFR BLD AUTO: 1 %
EOSINOPHIL NFR BLD AUTO: 1.5 %
EOSINOPHIL NFR BLD AUTO: 1.6 %
ERYTHROCYTE [DISTWIDTH] IN BLOOD BY AUTOMATED COUNT: 12.8 % (ref 10–15)
ERYTHROCYTE [DISTWIDTH] IN BLOOD BY AUTOMATED COUNT: 12.9 % (ref 10–15)
ERYTHROCYTE [DISTWIDTH] IN BLOOD BY AUTOMATED COUNT: 13.2 % (ref 10–15)
ERYTHROCYTE [DISTWIDTH] IN BLOOD BY AUTOMATED COUNT: 13.2 % (ref 10–15)
ERYTHROCYTE [DISTWIDTH] IN BLOOD BY AUTOMATED COUNT: 16 % (ref 10–15)
ERYTHROCYTE [DISTWIDTH] IN BLOOD BY AUTOMATED COUNT: 16.3 % (ref 10–15)
ERYTHROCYTE [DISTWIDTH] IN BLOOD BY AUTOMATED COUNT: 16.4 % (ref 10–15)
ERYTHROCYTE [DISTWIDTH] IN BLOOD BY AUTOMATED COUNT: 16.4 % (ref 10–15)
ERYTHROCYTE [DISTWIDTH] IN BLOOD BY AUTOMATED COUNT: 16.6 % (ref 10–15)
ERYTHROCYTE [DISTWIDTH] IN BLOOD BY AUTOMATED COUNT: 16.7 % (ref 10–15)
ERYTHROCYTE [DISTWIDTH] IN BLOOD BY AUTOMATED COUNT: 16.8 % (ref 10–15)
ERYTHROCYTE [DISTWIDTH] IN BLOOD BY AUTOMATED COUNT: 17.2 % (ref 10–15)
GFR SERPL CREATININE-BSD FRML MDRD: 37 ML/MIN/{1.73_M2}
GFR SERPL CREATININE-BSD FRML MDRD: 58 ML/MIN/{1.73_M2}
GFR SERPL CREATININE-BSD FRML MDRD: 58 ML/MIN/{1.73_M2}
GFR SERPL CREATININE-BSD FRML MDRD: 75 ML/MIN/{1.73_M2}
GFR SERPL CREATININE-BSD FRML MDRD: 80 ML/MIN/{1.73_M2}
GFR SERPL CREATININE-BSD FRML MDRD: 80 ML/MIN/{1.73_M2}
GFR SERPL CREATININE-BSD FRML MDRD: 82 ML/MIN/{1.73_M2}
GFR SERPL CREATININE-BSD FRML MDRD: 88 ML/MIN/{1.73_M2}
GFR SERPL CREATININE-BSD FRML MDRD: >60 ML/MIN/1.73M2
GFR SERPL CREATININE-BSD FRML MDRD: >90 ML/MIN/{1.73_M2}
GLUCOSE SERPL-MCNC: 101 MG/DL (ref 70–99)
GLUCOSE SERPL-MCNC: 104 MG/DL (ref 70–99)
GLUCOSE SERPL-MCNC: 111 MG/DL (ref 70–99)
GLUCOSE SERPL-MCNC: 114 MG/DL (ref 70–99)
GLUCOSE SERPL-MCNC: 115 MG/DL (ref 70–99)
GLUCOSE SERPL-MCNC: 116 MG/DL (ref 70–99)
GLUCOSE SERPL-MCNC: 117 MG/DL (ref 70–99)
GLUCOSE SERPL-MCNC: 118 MG/DL (ref 70–99)
GLUCOSE SERPL-MCNC: 122 MG/DL (ref 70–99)
GLUCOSE SERPL-MCNC: 156 MG/DL (ref 70–99)
GLUCOSE SERPL-MCNC: 164 MG/DL (ref 70–99)
GLUCOSE SERPL-MCNC: 172 MG/DL (ref 70–99)
GLUCOSE SERPL-MCNC: 73 MG/DL (ref 70–99)
GLUCOSE SERPL-MCNC: 81 MG/DL (ref 70–99)
GLUCOSE UR STRIP-MCNC: NEGATIVE MG/DL
GRAM STN SPEC: ABNORMAL
HCO3 BLDV-SCNC: 29 MMOL/L (ref 21–28)
HCT VFR BLD AUTO: 28.6 % (ref 35–47)
HCT VFR BLD AUTO: 29.7 % (ref 35–47)
HCT VFR BLD AUTO: 29.8 % (ref 35–47)
HCT VFR BLD AUTO: 30.3 % (ref 35–47)
HCT VFR BLD AUTO: 30.3 % (ref 35–47)
HCT VFR BLD AUTO: 30.9 % (ref 35–47)
HCT VFR BLD AUTO: 31.4 % (ref 35–47)
HCT VFR BLD AUTO: 32 % (ref 35–47)
HCT VFR BLD AUTO: 33.3 % (ref 35–47)
HCT VFR BLD AUTO: 34.5 % (ref 35–47)
HCT VFR BLD AUTO: 37.2 % (ref 35–47)
HCT VFR BLD AUTO: 41.4 % (ref 35–47)
HGB BLD-MCNC: 10 G/DL (ref 11.7–15.7)
HGB BLD-MCNC: 10.1 G/DL (ref 11.7–15.7)
HGB BLD-MCNC: 10.1 G/DL (ref 11.7–15.7)
HGB BLD-MCNC: 10.3 G/DL (ref 11.7–15.7)
HGB BLD-MCNC: 11.1 G/DL (ref 11.7–15.7)
HGB BLD-MCNC: 11.4 G/DL (ref 11.7–15.7)
HGB BLD-MCNC: 11.7 G/DL (ref 11.7–15.7)
HGB BLD-MCNC: 12.3 G/DL (ref 11.7–15.7)
HGB BLD-MCNC: 13.7 G/DL (ref 11.7–15.7)
HGB BLD-MCNC: 9.5 G/DL (ref 11.7–15.7)
HGB BLD-MCNC: 9.9 G/DL (ref 11.7–15.7)
HGB BLD-MCNC: 9.9 G/DL (ref 11.7–15.7)
HGB UR QL STRIP: ABNORMAL
HGB UR QL STRIP: NEGATIVE
HYALINE CASTS #/AREA URNS LPF: 3 /LPF
HYALINE CASTS #/AREA URNS LPF: 7 /LPF
IMM GRANULOCYTES # BLD: 0.1 10E9/L (ref 0–0.4)
IMM GRANULOCYTES # BLD: 0.1 10E9/L (ref 0–0.4)
IMM GRANULOCYTES # BLD: 0.2 10E9/L (ref 0–0.4)
IMM GRANULOCYTES # BLD: 0.3 10E9/L (ref 0–0.4)
IMM GRANULOCYTES # BLD: 0.4 10E9/L (ref 0–0.4)
IMM GRANULOCYTES # BLD: 0.5 10E9/L (ref 0–0.4)
IMM GRANULOCYTES # BLD: 0.6 10E9/L (ref 0–0.4)
IMM GRANULOCYTES NFR BLD: 0.5 %
IMM GRANULOCYTES NFR BLD: 0.6 %
IMM GRANULOCYTES NFR BLD: 0.6 %
IMM GRANULOCYTES NFR BLD: 0.9 %
IMM GRANULOCYTES NFR BLD: 0.9 %
IMM GRANULOCYTES NFR BLD: 1 %
IMM GRANULOCYTES NFR BLD: 1.1 %
IMM GRANULOCYTES NFR BLD: 1.1 %
IMM GRANULOCYTES NFR BLD: 1.6 %
IMM GRANULOCYTES NFR BLD: 1.8 %
IMM GRANULOCYTES NFR BLD: 2.8 %
INR POINT OF CARE: 1.5 (ref 0.86–1.14)
INR POINT OF CARE: 2.4 (ref 0.86–1.14)
INR POINT OF CARE: 2.7 (ref 0.86–1.14)
INR POINT OF CARE: 2.7 (ref 0.86–1.14)
INR PPP: 1.19 (ref 0.86–1.14)
INR PPP: 1.2 (ref 0.9–1.1)
INR PPP: 1.33 (ref 0.86–1.14)
INR PPP: 1.47 (ref 0.86–1.14)
INR PPP: 1.6 (ref 0.86–1.14)
INR PPP: 1.61 (ref 0.86–1.14)
INR PPP: 1.67 (ref 0.86–1.14)
INR PPP: 1.7 (ref 0.9–1.1)
INR PPP: 1.9 (ref 0.9–1.1)
INR PPP: 2 (ref 0.9–1.1)
INR PPP: 2 (ref 0.9–1.1)
INR PPP: 2.13 (ref 0.86–1.14)
INR PPP: 2.6 (ref 0.86–1.14)
INR PPP: 2.62 (ref 0.86–1.14)
INR PPP: 2.9 (ref 0.9–1.1)
INR PPP: 2.94 (ref 0.86–1.14)
INR PPP: 3.08 (ref 0.86–1.14)
INR PPP: 3.29 (ref 0.86–1.14)
INR PPP: 3.3 (ref 0.9–1.1)
INR PPP: 3.33 (ref 0.86–1.14)
INR PPP: 3.4 (ref 0.9–1.1)
INR PPP: 4.19 (ref 0.86–1.14)
INR PPP: 4.2 (ref 0.9–1.1)
INR PPP: 4.2 (ref 0.9–1.1)
INR PPP: 4.28 (ref 0.86–1.14)
INR PPP: 4.5 (ref 0.9–1.1)
INR PPP: 4.6 (ref 0.9–1.1)
INR PPP: 4.7 (ref 0.9–1.1)
INR PPP: 6.2 (ref 0.9–1.1)
INTERPRETATION ECG - MUSE: NORMAL
INTERPRETATION ECG - MUSE: NORMAL
KETONES UR STRIP-MCNC: NEGATIVE MG/DL
LABORATORY COMMENT REPORT: NORMAL
LABORATORY COMMENT REPORT: NORMAL
LACTATE BLD-SCNC: 0.7 MMOL/L (ref 0.7–2)
LACTATE BLD-SCNC: 1 MMOL/L (ref 0.7–2)
LACTATE BLD-SCNC: 1.1 MMOL/L (ref 0.7–2)
LACTATE BLD-SCNC: 1.3 MMOL/L (ref 0.7–2)
LACTATE BLD-SCNC: 1.5 MMOL/L (ref 0.7–2)
LACTATE BLD-SCNC: 1.6 MMOL/L (ref 0.7–2)
LACTATE BLD-SCNC: 2 MMOL/L (ref 0.7–2)
LEUKOCYTE ESTERASE UR QL STRIP: ABNORMAL
LEUKOCYTE ESTERASE UR QL STRIP: NEGATIVE
LIPASE SERPL-CCNC: 33 U/L (ref 73–393)
LYMPHOCYTES # BLD AUTO: 0.7 10E9/L (ref 0.8–5.3)
LYMPHOCYTES # BLD AUTO: 0.8 10E9/L (ref 0.8–5.3)
LYMPHOCYTES # BLD AUTO: 1 10E9/L (ref 0.8–5.3)
LYMPHOCYTES # BLD AUTO: 1 10E9/L (ref 0.8–5.3)
LYMPHOCYTES # BLD AUTO: 1.3 10E9/L (ref 0.8–5.3)
LYMPHOCYTES # BLD AUTO: 1.3 10E9/L (ref 0.8–5.3)
LYMPHOCYTES # BLD AUTO: 1.6 10E9/L (ref 0.8–5.3)
LYMPHOCYTES # BLD AUTO: 1.7 10E9/L (ref 0.8–5.3)
LYMPHOCYTES # BLD AUTO: 1.8 10E9/L (ref 0.8–5.3)
LYMPHOCYTES # BLD AUTO: 1.9 10E9/L (ref 0.8–5.3)
LYMPHOCYTES # BLD AUTO: 2.3 10E9/L (ref 0.8–5.3)
LYMPHOCYTES NFR BLD AUTO: 11 %
LYMPHOCYTES NFR BLD AUTO: 11.1 %
LYMPHOCYTES NFR BLD AUTO: 13.4 %
LYMPHOCYTES NFR BLD AUTO: 17.9 %
LYMPHOCYTES NFR BLD AUTO: 2.6 %
LYMPHOCYTES NFR BLD AUTO: 3.6 %
LYMPHOCYTES NFR BLD AUTO: 3.6 %
LYMPHOCYTES NFR BLD AUTO: 3.7 %
LYMPHOCYTES NFR BLD AUTO: 4.7 %
LYMPHOCYTES NFR BLD AUTO: 4.8 %
LYMPHOCYTES NFR BLD AUTO: 5.8 %
MAGNESIUM SERPL-MCNC: 1.8 MG/DL (ref 1.6–2.3)
MAGNESIUM SERPL-MCNC: 1.8 MG/DL (ref 1.6–2.3)
MAGNESIUM SERPL-MCNC: 2 MG/DL (ref 1.6–2.3)
MAGNESIUM SERPL-MCNC: 2 MG/DL (ref 1.6–2.3)
MCH RBC QN AUTO: 30.2 PG (ref 26.5–33)
MCH RBC QN AUTO: 30.5 PG (ref 26.5–33)
MCH RBC QN AUTO: 30.6 PG (ref 26.5–33)
MCH RBC QN AUTO: 30.6 PG (ref 26.5–33)
MCH RBC QN AUTO: 30.7 PG (ref 26.5–33)
MCH RBC QN AUTO: 30.8 PG (ref 26.5–33)
MCH RBC QN AUTO: 31 PG (ref 26.5–33)
MCH RBC QN AUTO: 31 PG (ref 26.5–33)
MCH RBC QN AUTO: 31.4 PG (ref 26.5–33)
MCH RBC QN AUTO: 31.4 PG (ref 26.5–33)
MCH RBC QN AUTO: 31.5 PG (ref 26.5–33)
MCH RBC QN AUTO: 31.5 PG (ref 26.5–33)
MCHC RBC AUTO-ENTMCNC: 32.7 G/DL (ref 31.5–36.5)
MCHC RBC AUTO-ENTMCNC: 32.8 G/DL (ref 31.5–36.5)
MCHC RBC AUTO-ENTMCNC: 33 G/DL (ref 31.5–36.5)
MCHC RBC AUTO-ENTMCNC: 33.1 G/DL (ref 31.5–36.5)
MCHC RBC AUTO-ENTMCNC: 33.1 G/DL (ref 31.5–36.5)
MCHC RBC AUTO-ENTMCNC: 33.2 G/DL (ref 31.5–36.5)
MCHC RBC AUTO-ENTMCNC: 33.2 G/DL (ref 31.5–36.5)
MCHC RBC AUTO-ENTMCNC: 33.3 G/DL (ref 31.5–36.5)
MCHC RBC AUTO-ENTMCNC: 33.3 G/DL (ref 31.5–36.5)
MCHC RBC AUTO-ENTMCNC: 33.9 G/DL (ref 31.5–36.5)
MCHC RBC AUTO-ENTMCNC: 34.2 G/DL (ref 31.5–36.5)
MCHC RBC AUTO-ENTMCNC: 34.7 G/DL (ref 31.5–36.5)
MCV RBC AUTO: 91 FL (ref 78–100)
MCV RBC AUTO: 91 FL (ref 78–100)
MCV RBC AUTO: 92 FL (ref 78–100)
MCV RBC AUTO: 93 FL (ref 78–100)
MCV RBC AUTO: 93 FL (ref 78–100)
MCV RBC AUTO: 94 FL (ref 78–100)
MCV RBC AUTO: 94 FL (ref 78–100)
MCV RBC AUTO: 95 FL (ref 78–100)
MCV RBC AUTO: 95 FL (ref 78–100)
MONOCYTES # BLD AUTO: 0.9 10E9/L (ref 0–1.3)
MONOCYTES # BLD AUTO: 1 10E9/L (ref 0–1.3)
MONOCYTES # BLD AUTO: 1.4 10E9/L (ref 0–1.3)
MONOCYTES # BLD AUTO: 1.4 10E9/L (ref 0–1.3)
MONOCYTES # BLD AUTO: 1.8 10E9/L (ref 0–1.3)
MONOCYTES # BLD AUTO: 2 10E9/L (ref 0–1.3)
MONOCYTES # BLD AUTO: 2.1 10E9/L (ref 0–1.3)
MONOCYTES # BLD AUTO: 2.1 10E9/L (ref 0–1.3)
MONOCYTES # BLD AUTO: 2.2 10E9/L (ref 0–1.3)
MONOCYTES NFR BLD AUTO: 10 %
MONOCYTES NFR BLD AUTO: 14.9 %
MONOCYTES NFR BLD AUTO: 4.7 %
MONOCYTES NFR BLD AUTO: 4.8 %
MONOCYTES NFR BLD AUTO: 7.2 %
MONOCYTES NFR BLD AUTO: 7.7 %
MONOCYTES NFR BLD AUTO: 8 %
MONOCYTES NFR BLD AUTO: 8.3 %
MONOCYTES NFR BLD AUTO: 8.3 %
MONOCYTES NFR BLD AUTO: 8.6 %
MONOCYTES NFR BLD AUTO: 9.3 %
MUCOUS THREADS #/AREA URNS LPF: PRESENT /LPF
MUCOUS THREADS #/AREA URNS LPF: PRESENT /LPF
NEUTROPHILS # BLD AUTO: 11.5 10E9/L (ref 1.6–8.3)
NEUTROPHILS # BLD AUTO: 15.5 10E9/L (ref 1.6–8.3)
NEUTROPHILS # BLD AUTO: 17.3 10E9/L (ref 1.6–8.3)
NEUTROPHILS # BLD AUTO: 22.9 10E9/L (ref 1.6–8.3)
NEUTROPHILS # BLD AUTO: 22.9 10E9/L (ref 1.6–8.3)
NEUTROPHILS # BLD AUTO: 23 10E9/L (ref 1.6–8.3)
NEUTROPHILS # BLD AUTO: 24.2 10E9/L (ref 1.6–8.3)
NEUTROPHILS # BLD AUTO: 24.5 10E9/L (ref 1.6–8.3)
NEUTROPHILS # BLD AUTO: 27.3 10E9/L (ref 1.6–8.3)
NEUTROPHILS # BLD AUTO: 7.1 10E9/L (ref 1.6–8.3)
NEUTROPHILS # BLD AUTO: 9.8 10E9/L (ref 1.6–8.3)
NEUTROPHILS NFR BLD AUTO: 69.1 %
NEUTROPHILS NFR BLD AUTO: 70.1 %
NEUTROPHILS NFR BLD AUTO: 75.7 %
NEUTROPHILS NFR BLD AUTO: 77.4 %
NEUTROPHILS NFR BLD AUTO: 84.2 %
NEUTROPHILS NFR BLD AUTO: 85.4 %
NEUTROPHILS NFR BLD AUTO: 85.7 %
NEUTROPHILS NFR BLD AUTO: 86.7 %
NEUTROPHILS NFR BLD AUTO: 88.1 %
NEUTROPHILS NFR BLD AUTO: 90.1 %
NEUTROPHILS NFR BLD AUTO: 91.6 %
NITRATE UR QL: NEGATIVE
NITRATE UR QL: POSITIVE
NRBC # BLD AUTO: 0 10*3/UL
NRBC BLD AUTO-RTO: 0 /100
NT-PROBNP SERPL-MCNC: 2110 PG/ML (ref 0–1800)
NT-PROBNP SERPL-MCNC: 9697 PG/ML (ref 0–1800)
NT-PROBNP SERPL-MCNC: ABNORMAL PG/ML (ref 0–1800)
O2/TOTAL GAS SETTING VFR VENT: ABNORMAL %
OXYHGB MFR BLDV: 87 %
PCO2 BLDV: 44 MM HG (ref 40–50)
PH BLDV: 7.42 PH (ref 7.32–7.43)
PH UR STRIP: 5 PH (ref 4.7–8)
PH UR STRIP: 6.5 PH (ref 4.7–8)
PLATELET # BLD AUTO: 213 10E9/L (ref 150–450)
PLATELET # BLD AUTO: 259 10E9/L (ref 150–450)
PLATELET # BLD AUTO: 348 10E9/L (ref 150–450)
PLATELET # BLD AUTO: 368 10E9/L (ref 150–450)
PLATELET # BLD AUTO: 381 10E9/L (ref 150–450)
PLATELET # BLD AUTO: 426 10E9/L (ref 150–450)
PLATELET # BLD AUTO: 430 10E9/L (ref 150–450)
PLATELET # BLD AUTO: 450 10E9/L (ref 150–450)
PLATELET # BLD AUTO: 455 10E9/L (ref 150–450)
PLATELET # BLD AUTO: 482 10E9/L (ref 150–450)
PLATELET # BLD AUTO: 520 10E9/L (ref 150–450)
PLATELET # BLD AUTO: 570 10E9/L (ref 150–450)
PO2 BLDV: 58 MM HG (ref 25–47)
POTASSIUM SERPL-SCNC: 3.2 MMOL/L (ref 3.4–5.3)
POTASSIUM SERPL-SCNC: 3.7 MMOL/L (ref 3.4–5.3)
POTASSIUM SERPL-SCNC: 3.9 MMOL/L (ref 3.4–5.3)
POTASSIUM SERPL-SCNC: 3.9 MMOL/L (ref 3.4–5.3)
POTASSIUM SERPL-SCNC: 4 MMOL/L (ref 3.4–5.3)
POTASSIUM SERPL-SCNC: 4.1 MEQ/L (ref 3.4–5.1)
POTASSIUM SERPL-SCNC: 4.2 MMOL/L (ref 3.4–5.3)
POTASSIUM SERPL-SCNC: 4.3 MMOL/L (ref 3.4–5.3)
POTASSIUM SERPL-SCNC: 4.5 MMOL/L (ref 3.4–5.3)
POTASSIUM SERPL-SCNC: 5.2 MMOL/L (ref 3.4–5.3)
POTASSIUM SERPL-SCNC: 5.3 MMOL/L (ref 3.4–5.3)
POTASSIUM SERPL-SCNC: 5.3 MMOL/L (ref 3.4–5.3)
PROCALCITONIN SERPL-MCNC: 0.11 NG/ML
PROCALCITONIN SERPL-MCNC: 0.26 NG/ML
PROCALCITONIN SERPL-MCNC: 0.28 NG/ML
PROCALCITONIN SERPL-MCNC: 0.72 NG/ML
PROT SERPL-MCNC: 7.1 G/DL (ref 6.8–8.8)
PROT SERPL-MCNC: 7.1 G/DL (ref 6.8–8.8)
PROT SERPL-MCNC: 7.4 G/DL (ref 6.8–8.8)
PROT SERPL-MCNC: 8 G/DL (ref 6.8–8.8)
PROT SERPL-MCNC: 8 G/DL (ref 6.8–8.8)
RBC # BLD AUTO: 3.06 10E12/L (ref 3.8–5.2)
RBC # BLD AUTO: 3.14 10E12/L (ref 3.8–5.2)
RBC # BLD AUTO: 3.24 10E12/L (ref 3.8–5.2)
RBC # BLD AUTO: 3.28 10E12/L (ref 3.8–5.2)
RBC # BLD AUTO: 3.3 10E12/L (ref 3.8–5.2)
RBC # BLD AUTO: 3.31 10E12/L (ref 3.8–5.2)
RBC # BLD AUTO: 3.38 10E12/L (ref 3.8–5.2)
RBC # BLD AUTO: 3.53 10E12/L (ref 3.8–5.2)
RBC # BLD AUTO: 3.63 10E12/L (ref 3.8–5.2)
RBC # BLD AUTO: 3.78 10E12/L (ref 3.8–5.2)
RBC # BLD AUTO: 4.01 10E12/L (ref 3.8–5.2)
RBC # BLD AUTO: 4.35 10E12/L (ref 3.8–5.2)
RBC #/AREA URNS AUTO: 1 /HPF (ref 0–2)
RBC #/AREA URNS AUTO: 5 /HPF (ref 0–2)
RBC #/AREA URNS AUTO: 68 /HPF (ref 0–2)
RETICS # AUTO: 102.6 10E9/L (ref 25–95)
RETICS/RBC NFR AUTO: 2.7 % (ref 0.5–2)
SARS-COV-2 RNA SPEC QL NAA+PROBE: NEGATIVE
SARS-COV-2 RNA SPEC QL NAA+PROBE: NEGATIVE
SARS-COV-2 RNA SPEC QL NAA+PROBE: NORMAL
SARS-COV-2 RNA SPEC QL NAA+PROBE: NORMAL
SODIUM SERPL-SCNC: 129 MMOL/L (ref 133–144)
SODIUM SERPL-SCNC: 130 MMOL/L (ref 133–144)
SODIUM SERPL-SCNC: 131 MMOL/L (ref 133–144)
SODIUM SERPL-SCNC: 132 MMOL/L (ref 133–144)
SODIUM SERPL-SCNC: 132 MMOL/L (ref 133–144)
SODIUM SERPL-SCNC: 133 MMOL/L (ref 133–144)
SODIUM SERPL-SCNC: 134 MMOL/L (ref 133–144)
SODIUM SERPL-SCNC: 135 MMOL/L (ref 133–144)
SODIUM SERPL-SCNC: 136 MMOL/L (ref 133–144)
SOURCE: ABNORMAL
SP GR UR STRIP: 1.01 (ref 1–1.03)
SP GR UR STRIP: 1.01 (ref 1–1.03)
SP GR UR STRIP: 1.02 (ref 1–1.03)
SP GR UR STRIP: 1.03 (ref 1–1.03)
SPECIMEN SOURCE: ABNORMAL
SPECIMEN SOURCE: NORMAL
TROPONIN I SERPL-MCNC: 0.03 UG/L (ref 0–0.04)
TROPONIN I SERPL-MCNC: 0.1 UG/L (ref 0–0.04)
TROPONIN I SERPL-MCNC: <0.015 UG/L (ref 0–0.04)
TSH SERPL DL<=0.005 MIU/L-ACNC: 0.81 MU/L (ref 0.4–4)
TSH SERPL DL<=0.005 MIU/L-ACNC: 0.82 MU/L (ref 0.4–4)
UROBILINOGEN UR STRIP-MCNC: NORMAL MG/DL (ref 0–2)
WBC # BLD AUTO: 10.2 10E9/L (ref 4–11)
WBC # BLD AUTO: 11.9 10E9/L (ref 4–11)
WBC # BLD AUTO: 14.2 10E9/L (ref 4–11)
WBC # BLD AUTO: 14.9 10E9/L (ref 4–11)
WBC # BLD AUTO: 19.2 10E9/L (ref 4–11)
WBC # BLD AUTO: 20.5 10E9/L (ref 4–11)
WBC # BLD AUTO: 26.8 10E9/L (ref 4–11)
WBC # BLD AUTO: 26.9 10E9/L (ref 4–11)
WBC # BLD AUTO: 27.2 10E9/L (ref 4–11)
WBC # BLD AUTO: 27.8 10E9/L (ref 4–11)
WBC # BLD AUTO: 27.9 10E9/L (ref 4–11)
WBC # BLD AUTO: 29.8 10E9/L (ref 4–11)
WBC #/AREA URNS AUTO: 1 /HPF (ref 0–5)
WBC #/AREA URNS AUTO: 8 /HPF (ref 0–5)
WBC #/AREA URNS AUTO: >182 /HPF (ref 0–5)
WBC CLUMPS #/AREA URNS HPF: PRESENT /HPF

## 2020-01-01 PROCEDURE — 99285 EMERGENCY DEPT VISIT HI MDM: CPT | Mod: 25

## 2020-01-01 PROCEDURE — 94799 UNLISTED PULMONARY SVC/PX: CPT

## 2020-01-01 PROCEDURE — 36415 COLL VENOUS BLD VENIPUNCTURE: CPT | Performed by: PHYSICIAN ASSISTANT

## 2020-01-01 PROCEDURE — 25000132 ZZH RX MED GY IP 250 OP 250 PS 637: Mod: GY | Performed by: INTERNAL MEDICINE

## 2020-01-01 PROCEDURE — 85610 PROTHROMBIN TIME: CPT | Mod: QW,ZL

## 2020-01-01 PROCEDURE — 96361 HYDRATE IV INFUSION ADD-ON: CPT

## 2020-01-01 PROCEDURE — 85025 COMPLETE CBC W/AUTO DIFF WBC: CPT | Performed by: EMERGENCY MEDICINE

## 2020-01-01 PROCEDURE — G0463 HOSPITAL OUTPT CLINIC VISIT: HCPCS

## 2020-01-01 PROCEDURE — 12000000 ZZH R&B MED SURG/OB

## 2020-01-01 PROCEDURE — 71045 X-RAY EXAM CHEST 1 VIEW: CPT | Mod: TC

## 2020-01-01 PROCEDURE — 82550 ASSAY OF CK (CPK): CPT | Performed by: EMERGENCY MEDICINE

## 2020-01-01 PROCEDURE — 85610 PROTHROMBIN TIME: CPT

## 2020-01-01 PROCEDURE — 87040 BLOOD CULTURE FOR BACTERIA: CPT | Performed by: EMERGENCY MEDICINE

## 2020-01-01 PROCEDURE — 85045 AUTOMATED RETICULOCYTE COUNT: CPT | Performed by: EMERGENCY MEDICINE

## 2020-01-01 PROCEDURE — 71275 CT ANGIOGRAPHY CHEST: CPT | Mod: 26 | Performed by: INTERNAL MEDICINE

## 2020-01-01 PROCEDURE — 25500064 ZZH RX 255 OP 636: Performed by: RADIOLOGY

## 2020-01-01 PROCEDURE — 25800030 ZZH RX IP 258 OP 636: Performed by: INTERNAL MEDICINE

## 2020-01-01 PROCEDURE — 93005 ELECTROCARDIOGRAM TRACING: CPT

## 2020-01-01 PROCEDURE — 25000128 H RX IP 250 OP 636: Performed by: EMERGENCY MEDICINE

## 2020-01-01 PROCEDURE — 25000128 H RX IP 250 OP 636: Performed by: INTERNAL MEDICINE

## 2020-01-01 PROCEDURE — 73552 X-RAY EXAM OF FEMUR 2/>: CPT | Mod: TC,RT

## 2020-01-01 PROCEDURE — 80048 BASIC METABOLIC PNL TOTAL CA: CPT | Performed by: NURSE PRACTITIONER

## 2020-01-01 PROCEDURE — 73030 X-RAY EXAM OF SHOULDER: CPT | Mod: TC,RT

## 2020-01-01 PROCEDURE — 25000132 ZZH RX MED GY IP 250 OP 250 PS 637: Mod: GY | Performed by: NURSE PRACTITIONER

## 2020-01-01 PROCEDURE — 99233 SBSQ HOSP IP/OBS HIGH 50: CPT | Performed by: NURSE PRACTITIONER

## 2020-01-01 PROCEDURE — 25500064 ZZH RX 255 OP 636: Performed by: PHYSICIAN ASSISTANT

## 2020-01-01 PROCEDURE — 83735 ASSAY OF MAGNESIUM: CPT | Performed by: NURSE PRACTITIONER

## 2020-01-01 PROCEDURE — 36415 COLL VENOUS BLD VENIPUNCTURE: CPT | Performed by: INTERNAL MEDICINE

## 2020-01-01 PROCEDURE — 85610 PROTHROMBIN TIME: CPT | Performed by: INTERNAL MEDICINE

## 2020-01-01 PROCEDURE — 87077 CULTURE AEROBIC IDENTIFY: CPT | Performed by: EMERGENCY MEDICINE

## 2020-01-01 PROCEDURE — 93010 ELECTROCARDIOGRAM REPORT: CPT | Performed by: INTERNAL MEDICINE

## 2020-01-01 PROCEDURE — 87635 SARS-COV-2 COVID-19 AMP PRB: CPT | Performed by: EMERGENCY MEDICINE

## 2020-01-01 PROCEDURE — 25800030 ZZH RX IP 258 OP 636: Performed by: EMERGENCY MEDICINE

## 2020-01-01 PROCEDURE — 80053 COMPREHEN METABOLIC PANEL: CPT | Performed by: EMERGENCY MEDICINE

## 2020-01-01 PROCEDURE — 36415 COLL VENOUS BLD VENIPUNCTURE: CPT | Performed by: NURSE PRACTITIONER

## 2020-01-01 PROCEDURE — 73590 X-RAY EXAM OF LOWER LEG: CPT | Mod: TC,RT

## 2020-01-01 PROCEDURE — 25000125 ZZHC RX 250: Performed by: INTERNAL MEDICINE

## 2020-01-01 PROCEDURE — 93321 DOPPLER ECHO F-UP/LMTD STD: CPT | Mod: TC

## 2020-01-01 PROCEDURE — 96360 HYDRATION IV INFUSION INIT: CPT

## 2020-01-01 PROCEDURE — 99204 OFFICE O/P NEW MOD 45 MIN: CPT | Mod: GC | Performed by: INTERNAL MEDICINE

## 2020-01-01 PROCEDURE — 80053 COMPREHEN METABOLIC PANEL: CPT | Performed by: INTERNAL MEDICINE

## 2020-01-01 PROCEDURE — 36416 COLLJ CAPILLARY BLOOD SPEC: CPT | Mod: ZL

## 2020-01-01 PROCEDURE — 86140 C-REACTIVE PROTEIN: CPT | Performed by: NURSE PRACTITIONER

## 2020-01-01 PROCEDURE — C1887 CATHETER, GUIDING: HCPCS | Performed by: INTERNAL MEDICINE

## 2020-01-01 PROCEDURE — 84484 ASSAY OF TROPONIN QUANT: CPT | Performed by: EMERGENCY MEDICINE

## 2020-01-01 PROCEDURE — 99284 EMERGENCY DEPT VISIT MOD MDM: CPT | Mod: Z6 | Performed by: PHYSICIAN ASSISTANT

## 2020-01-01 PROCEDURE — 85025 COMPLETE CBC W/AUTO DIFF WBC: CPT | Performed by: NURSE PRACTITIONER

## 2020-01-01 PROCEDURE — 70450 CT HEAD/BRAIN W/O DYE: CPT | Mod: TC

## 2020-01-01 PROCEDURE — 81001 URINALYSIS AUTO W/SCOPE: CPT | Performed by: NURSE PRACTITIONER

## 2020-01-01 PROCEDURE — 25800030 ZZH RX IP 258 OP 636: Performed by: NURSE PRACTITIONER

## 2020-01-01 PROCEDURE — 71046 X-RAY EXAM CHEST 2 VIEWS: CPT | Mod: TC

## 2020-01-01 PROCEDURE — C1769 GUIDE WIRE: HCPCS | Performed by: INTERNAL MEDICINE

## 2020-01-01 PROCEDURE — C9803 HOPD COVID-19 SPEC COLLECT: HCPCS

## 2020-01-01 PROCEDURE — 74177 CT ABD & PELVIS W/CONTRAST: CPT | Mod: TC

## 2020-01-01 PROCEDURE — 83605 ASSAY OF LACTIC ACID: CPT | Performed by: NURSE PRACTITIONER

## 2020-01-01 PROCEDURE — 73600 X-RAY EXAM OF ANKLE: CPT | Mod: TC,RT

## 2020-01-01 PROCEDURE — 36415 COLL VENOUS BLD VENIPUNCTURE: CPT | Performed by: EMERGENCY MEDICINE

## 2020-01-01 PROCEDURE — 40000275 ZZH STATISTIC RCP TIME EA 10 MIN

## 2020-01-01 PROCEDURE — 99223 1ST HOSP IP/OBS HIGH 75: CPT | Mod: AI | Performed by: INTERNAL MEDICINE

## 2020-01-01 PROCEDURE — 84145 PROCALCITONIN (PCT): CPT | Performed by: NURSE PRACTITIONER

## 2020-01-01 PROCEDURE — 96374 THER/PROPH/DIAG INJ IV PUSH: CPT | Mod: XU

## 2020-01-01 PROCEDURE — 80048 BASIC METABOLIC PNL TOTAL CA: CPT | Mod: ZL | Performed by: PHYSICIAN ASSISTANT

## 2020-01-01 PROCEDURE — 99308 SBSQ NF CARE LOW MDM 20: CPT | Mod: 95 | Performed by: FAMILY MEDICINE

## 2020-01-01 PROCEDURE — 25000128 H RX IP 250 OP 636: Performed by: PHYSICIAN ASSISTANT

## 2020-01-01 PROCEDURE — 83880 ASSAY OF NATRIURETIC PEPTIDE: CPT | Performed by: EMERGENCY MEDICINE

## 2020-01-01 PROCEDURE — 25000128 H RX IP 250 OP 636: Performed by: NURSE PRACTITIONER

## 2020-01-01 PROCEDURE — 96365 THER/PROPH/DIAG IV INF INIT: CPT

## 2020-01-01 PROCEDURE — G0180 MD CERTIFICATION HHA PATIENT: HCPCS | Performed by: FAMILY MEDICINE

## 2020-01-01 PROCEDURE — 99239 HOSP IP/OBS DSCHRG MGMT >30: CPT | Performed by: NURSE PRACTITIONER

## 2020-01-01 PROCEDURE — U0003 INFECTIOUS AGENT DETECTION BY NUCLEIC ACID (DNA OR RNA); SEVERE ACUTE RESPIRATORY SYNDROME CORONAVIRUS 2 (SARS-COV-2) (CORONAVIRUS DISEASE [COVID-19]), AMPLIFIED PROBE TECHNIQUE, MAKING USE OF HIGH THROUGHPUT TECHNOLOGIES AS DESCRIBED BY CMS-2020-01-R: HCPCS | Performed by: NURSE PRACTITIONER

## 2020-01-01 PROCEDURE — 25000125 ZZHC RX 250: Performed by: PHYSICIAN ASSISTANT

## 2020-01-01 PROCEDURE — 80048 BASIC METABOLIC PNL TOTAL CA: CPT | Performed by: INTERNAL MEDICINE

## 2020-01-01 PROCEDURE — C1894 INTRO/SHEATH, NON-LASER: HCPCS | Performed by: INTERNAL MEDICINE

## 2020-01-01 PROCEDURE — 93350 STRESS TTE ONLY: CPT | Mod: 26 | Performed by: INTERNAL MEDICINE

## 2020-01-01 PROCEDURE — 85610 PROTHROMBIN TIME: CPT | Performed by: EMERGENCY MEDICINE

## 2020-01-01 PROCEDURE — 81001 URINALYSIS AUTO W/SCOPE: CPT | Performed by: INTERNAL MEDICINE

## 2020-01-01 PROCEDURE — 85027 COMPLETE CBC AUTOMATED: CPT | Performed by: INTERNAL MEDICINE

## 2020-01-01 PROCEDURE — 84484 ASSAY OF TROPONIN QUANT: CPT | Performed by: INTERNAL MEDICINE

## 2020-01-01 PROCEDURE — 87086 URINE CULTURE/COLONY COUNT: CPT | Performed by: NURSE PRACTITIONER

## 2020-01-01 PROCEDURE — B518YZA FLUOROSCOPY OF SUPERIOR VENA CAVA USING OTHER CONTRAST, GUIDANCE: ICD-10-PCS | Performed by: RADIOLOGY

## 2020-01-01 PROCEDURE — 36573 INSJ PICC RS&I 5 YR+: CPT | Mod: TC

## 2020-01-01 PROCEDURE — 82805 BLOOD GASES W/O2 SATURATION: CPT | Performed by: EMERGENCY MEDICINE

## 2020-01-01 PROCEDURE — 83605 ASSAY OF LACTIC ACID: CPT | Performed by: INTERNAL MEDICINE

## 2020-01-01 PROCEDURE — 93005 ELECTROCARDIOGRAM TRACING: CPT | Mod: ZF

## 2020-01-01 PROCEDURE — 99214 OFFICE O/P EST MOD 30 MIN: CPT | Performed by: PHYSICIAN ASSISTANT

## 2020-01-01 PROCEDURE — 36415 COLL VENOUS BLD VENIPUNCTURE: CPT | Mod: ZL

## 2020-01-01 PROCEDURE — 99152 MOD SED SAME PHYS/QHP 5/>YRS: CPT | Mod: GC | Performed by: INTERNAL MEDICINE

## 2020-01-01 PROCEDURE — G0463 HOSPITAL OUTPT CLINIC VISIT: HCPCS | Mod: 25,ZF

## 2020-01-01 PROCEDURE — 81001 URINALYSIS AUTO W/SCOPE: CPT | Performed by: EMERGENCY MEDICINE

## 2020-01-01 PROCEDURE — G0180 MD CERTIFICATION HHA PATIENT: HCPCS | Performed by: PHYSICIAN ASSISTANT

## 2020-01-01 PROCEDURE — 27210794 ZZH OR GENERAL SUPPLY STERILE: Performed by: INTERNAL MEDICINE

## 2020-01-01 PROCEDURE — 71275 CT ANGIOGRAPHY CHEST: CPT

## 2020-01-01 PROCEDURE — 93456 R HRT CORONARY ARTERY ANGIO: CPT | Mod: 26 | Performed by: INTERNAL MEDICINE

## 2020-01-01 PROCEDURE — 84443 ASSAY THYROID STIM HORMONE: CPT | Performed by: EMERGENCY MEDICINE

## 2020-01-01 PROCEDURE — 99291 CRITICAL CARE FIRST HOUR: CPT | Performed by: EMERGENCY MEDICINE

## 2020-01-01 PROCEDURE — 80048 BASIC METABOLIC PNL TOTAL CA: CPT | Performed by: PHYSICIAN ASSISTANT

## 2020-01-01 PROCEDURE — 99285 EMERGENCY DEPT VISIT HI MDM: CPT | Mod: Z6 | Performed by: EMERGENCY MEDICINE

## 2020-01-01 PROCEDURE — 93321 DOPPLER ECHO F-UP/LMTD STD: CPT | Mod: 26 | Performed by: INTERNAL MEDICINE

## 2020-01-01 PROCEDURE — 93456 R HRT CORONARY ARTERY ANGIO: CPT | Performed by: INTERNAL MEDICINE

## 2020-01-01 PROCEDURE — 02HV33Z INSERTION OF INFUSION DEVICE INTO SUPERIOR VENA CAVA, PERCUTANEOUS APPROACH: ICD-10-PCS | Performed by: RADIOLOGY

## 2020-01-01 PROCEDURE — 83735 ASSAY OF MAGNESIUM: CPT | Performed by: INTERNAL MEDICINE

## 2020-01-01 PROCEDURE — 96376 TX/PRO/DX INJ SAME DRUG ADON: CPT

## 2020-01-01 PROCEDURE — 70496 CT ANGIOGRAPHY HEAD: CPT | Mod: TC

## 2020-01-01 PROCEDURE — 99232 SBSQ HOSP IP/OBS MODERATE 35: CPT | Performed by: INTERNAL MEDICINE

## 2020-01-01 PROCEDURE — 99283 EMERGENCY DEPT VISIT LOW MDM: CPT | Mod: Z6 | Performed by: EMERGENCY MEDICINE

## 2020-01-01 PROCEDURE — 93926 LOWER EXTREMITY STUDY: CPT | Mod: TC,RT

## 2020-01-01 PROCEDURE — 85025 COMPLETE CBC W/AUTO DIFF WBC: CPT | Performed by: INTERNAL MEDICINE

## 2020-01-01 PROCEDURE — G0463 HOSPITAL OUTPT CLINIC VISIT: HCPCS | Mod: ZF

## 2020-01-01 PROCEDURE — 83605 ASSAY OF LACTIC ACID: CPT | Performed by: EMERGENCY MEDICINE

## 2020-01-01 PROCEDURE — 84443 ASSAY THYROID STIM HORMONE: CPT | Performed by: NURSE PRACTITIONER

## 2020-01-01 PROCEDURE — 84145 PROCALCITONIN (PCT): CPT | Performed by: EMERGENCY MEDICINE

## 2020-01-01 PROCEDURE — 85379 FIBRIN DEGRADATION QUANT: CPT | Performed by: EMERGENCY MEDICINE

## 2020-01-01 PROCEDURE — 87070 CULTURE OTHR SPECIMN AEROBIC: CPT | Performed by: NURSE PRACTITIONER

## 2020-01-01 PROCEDURE — 99207 ZZC NO CHARGE LOS: CPT | Mod: TEL | Performed by: PHYSICIAN ASSISTANT

## 2020-01-01 PROCEDURE — 40000065 ZZH STATISTIC EKG NON-CHARGEABLE

## 2020-01-01 PROCEDURE — 93018 CV STRESS TEST I&R ONLY: CPT | Performed by: INTERNAL MEDICINE

## 2020-01-01 PROCEDURE — 74174 CTA ABD&PLVS W/CONTRAST: CPT | Mod: 26 | Performed by: INTERNAL MEDICINE

## 2020-01-01 PROCEDURE — 87040 BLOOD CULTURE FOR BACTERIA: CPT | Performed by: NURSE PRACTITIONER

## 2020-01-01 PROCEDURE — 93016 CV STRESS TEST SUPVJ ONLY: CPT | Performed by: INTERNAL MEDICINE

## 2020-01-01 PROCEDURE — 99153 MOD SED SAME PHYS/QHP EA: CPT

## 2020-01-01 PROCEDURE — 40000847 ZZHCL STATISTIC MORPHOLOGY W/INTERP HISTOLOGY TC 85060: Performed by: EMERGENCY MEDICINE

## 2020-01-01 PROCEDURE — 87186 SC STD MICRODIL/AGAR DIL: CPT | Performed by: EMERGENCY MEDICINE

## 2020-01-01 PROCEDURE — 99153 MOD SED SAME PHYS/QHP EA: CPT | Performed by: INTERNAL MEDICINE

## 2020-01-01 PROCEDURE — 99152 MOD SED SAME PHYS/QHP 5/>YRS: CPT | Performed by: INTERNAL MEDICINE

## 2020-01-01 PROCEDURE — 96372 THER/PROPH/DIAG INJ SC/IM: CPT | Mod: XU

## 2020-01-01 PROCEDURE — 87077 CULTURE AEROBIC IDENTIFY: CPT | Performed by: NURSE PRACTITIONER

## 2020-01-01 PROCEDURE — 72125 CT NECK SPINE W/O DYE: CPT | Mod: TC

## 2020-01-01 PROCEDURE — 83880 ASSAY OF NATRIURETIC PEPTIDE: CPT | Performed by: NURSE PRACTITIONER

## 2020-01-01 PROCEDURE — 25000125 ZZHC RX 250: Performed by: RADIOLOGY

## 2020-01-01 PROCEDURE — 85730 THROMBOPLASTIN TIME PARTIAL: CPT | Performed by: EMERGENCY MEDICINE

## 2020-01-01 PROCEDURE — 40000172 ZZH STATISTIC PROCEDURE PREP ONLY

## 2020-01-01 PROCEDURE — 96375 TX/PRO/DX INJ NEW DRUG ADDON: CPT

## 2020-01-01 PROCEDURE — 84484 ASSAY OF TROPONIN QUANT: CPT | Mod: 91 | Performed by: EMERGENCY MEDICINE

## 2020-01-01 PROCEDURE — 99214 OFFICE O/P EST MOD 30 MIN: CPT | Performed by: INTERNAL MEDICINE

## 2020-01-01 PROCEDURE — 25000128 H RX IP 250 OP 636

## 2020-01-01 PROCEDURE — 25000125 ZZHC RX 250: Performed by: NURSE PRACTITIONER

## 2020-01-01 PROCEDURE — 93325 DOPPLER ECHO COLOR FLOW MAPG: CPT | Mod: 26 | Performed by: INTERNAL MEDICINE

## 2020-01-01 PROCEDURE — 83690 ASSAY OF LIPASE: CPT | Performed by: EMERGENCY MEDICINE

## 2020-01-01 PROCEDURE — 93010 ELECTROCARDIOGRAM REPORT: CPT | Mod: ZP | Performed by: INTERNAL MEDICINE

## 2020-01-01 PROCEDURE — 85610 PROTHROMBIN TIME: CPT | Mod: ZL

## 2020-01-01 PROCEDURE — 93971 EXTREMITY STUDY: CPT | Mod: TC,RT

## 2020-01-01 PROCEDURE — G0463 HOSPITAL OUTPT CLINIC VISIT: HCPCS | Mod: 25

## 2020-01-01 PROCEDURE — 99214 OFFICE O/P EST MOD 30 MIN: CPT | Performed by: FAMILY MEDICINE

## 2020-01-01 PROCEDURE — 83735 ASSAY OF MAGNESIUM: CPT | Performed by: EMERGENCY MEDICINE

## 2020-01-01 PROCEDURE — 85025 COMPLETE CBC W/AUTO DIFF WBC: CPT | Performed by: PHYSICIAN ASSISTANT

## 2020-01-01 PROCEDURE — 25000125 ZZHC RX 250

## 2020-01-01 PROCEDURE — 94640 AIRWAY INHALATION TREATMENT: CPT

## 2020-01-01 PROCEDURE — 84484 ASSAY OF TROPONIN QUANT: CPT | Performed by: PHYSICIAN ASSISTANT

## 2020-01-01 PROCEDURE — 87205 SMEAR GRAM STAIN: CPT | Performed by: NURSE PRACTITIONER

## 2020-01-01 PROCEDURE — 99207 ZZC NO CHARGE LOS: CPT | Performed by: NURSE PRACTITIONER

## 2020-01-01 RX ORDER — ARGATROBAN 1 MG/ML
150 INJECTION, SOLUTION INTRAVENOUS
Status: DISCONTINUED | OUTPATIENT
Start: 2020-01-01 | End: 2020-01-01 | Stop reason: HOSPADM

## 2020-01-01 RX ORDER — TRAMADOL HYDROCHLORIDE 50 MG/1
TABLET ORAL
Qty: 100 TABLET | Refills: 0 | Status: SHIPPED | OUTPATIENT
Start: 2020-01-01 | End: 2020-01-01

## 2020-01-01 RX ORDER — HYDROMORPHONE HYDROCHLORIDE 1 MG/ML
0.25 INJECTION, SOLUTION INTRAMUSCULAR; INTRAVENOUS; SUBCUTANEOUS
Status: COMPLETED | OUTPATIENT
Start: 2020-01-01 | End: 2020-01-01

## 2020-01-01 RX ORDER — IOPAMIDOL 755 MG/ML
INJECTION, SOLUTION INTRAVASCULAR
Status: DISCONTINUED | OUTPATIENT
Start: 2020-01-01 | End: 2020-01-01 | Stop reason: HOSPADM

## 2020-01-01 RX ORDER — LIDOCAINE 40 MG/G
CREAM TOPICAL
Status: DISCONTINUED | OUTPATIENT
Start: 2020-01-01 | End: 2020-01-01 | Stop reason: HOSPADM

## 2020-01-01 RX ORDER — SODIUM CHLORIDE 9 MG/ML
INJECTION, SOLUTION INTRAVENOUS CONTINUOUS
Status: DISPENSED | OUTPATIENT
Start: 2020-01-01 | End: 2020-01-01

## 2020-01-01 RX ORDER — LIDOCAINE HYDROCHLORIDE 10 MG/ML
INJECTION, SOLUTION EPIDURAL; INFILTRATION; INTRACAUDAL; PERINEURAL
Status: DISCONTINUED
Start: 2020-01-01 | End: 2020-01-01 | Stop reason: HOSPADM

## 2020-01-01 RX ORDER — DOCUSATE SODIUM 100 MG/1
200 CAPSULE, LIQUID FILLED ORAL 2 TIMES DAILY
COMMUNITY
End: 2020-01-01

## 2020-01-01 RX ORDER — IOPAMIDOL 755 MG/ML
50 INJECTION, SOLUTION INTRAVASCULAR ONCE
Status: COMPLETED | OUTPATIENT
Start: 2020-01-01 | End: 2020-01-01

## 2020-01-01 RX ORDER — NALOXONE HYDROCHLORIDE 0.4 MG/ML
.2-.4 INJECTION, SOLUTION INTRAMUSCULAR; INTRAVENOUS; SUBCUTANEOUS
Status: DISCONTINUED | OUTPATIENT
Start: 2020-01-01 | End: 2020-01-01 | Stop reason: HOSPADM

## 2020-01-01 RX ORDER — MULTIVITAMIN,THERAPEUTIC
1 TABLET ORAL DAILY
COMMUNITY

## 2020-01-01 RX ORDER — METOPROLOL TARTRATE 1 MG/ML
5 INJECTION, SOLUTION INTRAVENOUS ONCE
Status: COMPLETED | OUTPATIENT
Start: 2020-01-01 | End: 2020-01-01

## 2020-01-01 RX ORDER — SODIUM CHLORIDE 9 MG/ML
INJECTION, SOLUTION INTRAVENOUS CONTINUOUS
Status: DISCONTINUED | OUTPATIENT
Start: 2020-01-01 | End: 2020-01-01

## 2020-01-01 RX ORDER — DOBUTAMINE HYDROCHLORIDE 200 MG/100ML
5-20 INJECTION INTRAVENOUS CONTINUOUS
Status: DISCONTINUED | OUTPATIENT
Start: 2020-01-01 | End: 2020-01-01 | Stop reason: HOSPADM

## 2020-01-01 RX ORDER — FUROSEMIDE 10 MG/ML
40 INJECTION INTRAMUSCULAR; INTRAVENOUS ONCE
Status: COMPLETED | OUTPATIENT
Start: 2020-01-01 | End: 2020-01-01

## 2020-01-01 RX ORDER — FUROSEMIDE 10 MG/ML
20 INJECTION INTRAMUSCULAR; INTRAVENOUS ONCE
Status: COMPLETED | OUTPATIENT
Start: 2020-01-01 | End: 2020-01-01

## 2020-01-01 RX ORDER — VANCOMYCIN HYDROCHLORIDE 1 G/200ML
1000 INJECTION, SOLUTION INTRAVENOUS ONCE
Status: COMPLETED | OUTPATIENT
Start: 2020-01-01 | End: 2020-01-01

## 2020-01-01 RX ORDER — OXYCODONE HYDROCHLORIDE 5 MG/1
TABLET ORAL
Qty: 60 TABLET | Refills: 0 | Status: SHIPPED | OUTPATIENT
Start: 2020-01-01

## 2020-01-01 RX ORDER — FUROSEMIDE 10 MG/ML
INJECTION INTRAMUSCULAR; INTRAVENOUS
Status: COMPLETED
Start: 2020-01-01 | End: 2020-01-01

## 2020-01-01 RX ORDER — SODIUM CHLORIDE 9 MG/ML
INJECTION, SOLUTION INTRAVENOUS CONTINUOUS
Status: CANCELLED | OUTPATIENT
Start: 2020-01-01

## 2020-01-01 RX ORDER — WARFARIN SODIUM 5 MG/1
1 TABLET ORAL
COMMUNITY

## 2020-01-01 RX ORDER — METOPROLOL TARTRATE 1 MG/ML
2.5 INJECTION, SOLUTION INTRAVENOUS ONCE
Status: COMPLETED | OUTPATIENT
Start: 2020-01-01 | End: 2020-01-01

## 2020-01-01 RX ORDER — FENTANYL CITRATE 50 UG/ML
25-50 INJECTION, SOLUTION INTRAMUSCULAR; INTRAVENOUS
Status: DISCONTINUED | OUTPATIENT
Start: 2020-01-01 | End: 2020-01-01 | Stop reason: HOSPADM

## 2020-01-01 RX ORDER — CYCLOBENZAPRINE HCL 5 MG
5 TABLET ORAL 3 TIMES DAILY PRN
COMMUNITY
Start: 2020-01-01 | End: 2020-01-01

## 2020-01-01 RX ORDER — WARFARIN SODIUM 2.5 MG/1
5 TABLET ORAL ONCE
Status: COMPLETED | OUTPATIENT
Start: 2020-01-01 | End: 2020-01-01

## 2020-01-01 RX ORDER — IOPAMIDOL 612 MG/ML
50 INJECTION, SOLUTION INTRAVASCULAR ONCE
Status: COMPLETED | OUTPATIENT
Start: 2020-01-01 | End: 2020-01-01

## 2020-01-01 RX ORDER — METOPROLOL TARTRATE 25 MG/1
25 TABLET, FILM COATED ORAL 2 TIMES DAILY
Status: DISCONTINUED | OUTPATIENT
Start: 2020-01-01 | End: 2020-01-01 | Stop reason: HOSPADM

## 2020-01-01 RX ORDER — POTASSIUM CHLORIDE 750 MG/1
40 TABLET, EXTENDED RELEASE ORAL
Status: DISCONTINUED | OUTPATIENT
Start: 2020-01-01 | End: 2020-01-01 | Stop reason: HOSPADM

## 2020-01-01 RX ORDER — ZINC GLUCONATE 50 MG
50 TABLET ORAL DAILY
COMMUNITY

## 2020-01-01 RX ORDER — POTASSIUM CHLORIDE 1500 MG/1
20 TABLET, EXTENDED RELEASE ORAL
Status: CANCELLED | OUTPATIENT
Start: 2020-01-01

## 2020-01-01 RX ORDER — FUROSEMIDE 10 MG/ML
20 INJECTION INTRAMUSCULAR; INTRAVENOUS ONCE
Status: DISCONTINUED | OUTPATIENT
Start: 2020-01-01 | End: 2020-01-01

## 2020-01-01 RX ORDER — CEFAZOLIN SODIUM 2 G/50ML
2 SOLUTION INTRAVENOUS
Status: CANCELLED | OUTPATIENT
Start: 2020-01-01

## 2020-01-01 RX ORDER — DOPAMINE HYDROCHLORIDE 160 MG/100ML
2-20 INJECTION, SOLUTION INTRAVENOUS CONTINUOUS PRN
Status: DISCONTINUED | OUTPATIENT
Start: 2020-01-01 | End: 2020-01-01 | Stop reason: HOSPADM

## 2020-01-01 RX ORDER — LOSARTAN POTASSIUM 25 MG/1
25 TABLET ORAL DAILY
Qty: 30 TABLET | Refills: 11 | Status: SHIPPED | OUTPATIENT
Start: 2020-01-01 | End: 2020-01-01

## 2020-01-01 RX ORDER — MORPHINE SULFATE 2 MG/ML
1 INJECTION, SOLUTION INTRAMUSCULAR; INTRAVENOUS
Status: DISCONTINUED | OUTPATIENT
Start: 2020-01-01 | End: 2020-01-01 | Stop reason: HOSPADM

## 2020-01-01 RX ORDER — POTASSIUM CHLORIDE 750 MG/1
40 CAPSULE, EXTENDED RELEASE ORAL ONCE
Status: COMPLETED | OUTPATIENT
Start: 2020-01-01 | End: 2020-01-01

## 2020-01-01 RX ORDER — SERTRALINE HYDROCHLORIDE 25 MG/1
50 TABLET, FILM COATED ORAL DAILY
Status: DISCONTINUED | OUTPATIENT
Start: 2020-01-01 | End: 2020-01-01 | Stop reason: HOSPADM

## 2020-01-01 RX ORDER — CIPROFLOXACIN 500 MG/1
500 TABLET, FILM COATED ORAL 2 TIMES DAILY
Status: ON HOLD | COMMUNITY
Start: 2020-01-01 | End: 2020-01-01

## 2020-01-01 RX ORDER — LIDOCAINE 40 MG/G
CREAM TOPICAL
Status: CANCELLED | OUTPATIENT
Start: 2020-01-01

## 2020-01-01 RX ORDER — ALBUTEROL SULFATE 5 MG/ML
2.5 SOLUTION RESPIRATORY (INHALATION) EVERY 6 HOURS PRN
Status: DISCONTINUED | OUTPATIENT
Start: 2020-01-01 | End: 2020-01-01 | Stop reason: HOSPADM

## 2020-01-01 RX ORDER — CALCIUM CARBONATE/VITAMIN D3 500-10/5ML
400 LIQUID (ML) ORAL DAILY
COMMUNITY
End: 2020-01-01

## 2020-01-01 RX ORDER — NITROGLYCERIN 5 MG/ML
VIAL (ML) INTRAVENOUS
Status: DISCONTINUED | OUTPATIENT
Start: 2020-01-01 | End: 2020-01-01 | Stop reason: HOSPADM

## 2020-01-01 RX ORDER — WARFARIN SODIUM 2.5 MG/1
TABLET ORAL
Qty: 9 TABLET | Refills: 0 | Status: SHIPPED | OUTPATIENT
Start: 2020-01-01 | End: 2020-01-01

## 2020-01-01 RX ORDER — FENTANYL CITRATE 50 UG/ML
INJECTION, SOLUTION INTRAMUSCULAR; INTRAVENOUS
Status: DISCONTINUED | OUTPATIENT
Start: 2020-01-01 | End: 2020-01-01 | Stop reason: HOSPADM

## 2020-01-01 RX ORDER — IOPAMIDOL 612 MG/ML
100 INJECTION, SOLUTION INTRAVASCULAR ONCE
Status: COMPLETED | OUTPATIENT
Start: 2020-01-01 | End: 2020-01-01

## 2020-01-01 RX ORDER — POTASSIUM CL/LIDO/0.9 % NACL 10MEQ/0.1L
10 INTRAVENOUS SOLUTION, PIGGYBACK (ML) INTRAVENOUS
Status: DISCONTINUED | OUTPATIENT
Start: 2020-01-01 | End: 2020-01-01 | Stop reason: HOSPADM

## 2020-01-01 RX ORDER — MAGNESIUM SULFATE HEPTAHYDRATE 40 MG/ML
4 INJECTION, SOLUTION INTRAVENOUS EVERY 4 HOURS PRN
Status: DISCONTINUED | OUTPATIENT
Start: 2020-01-01 | End: 2020-01-01 | Stop reason: HOSPADM

## 2020-01-01 RX ORDER — ALBUTEROL SULFATE 0.83 MG/ML
SOLUTION RESPIRATORY (INHALATION)
Status: COMPLETED
Start: 2020-01-01 | End: 2020-01-01

## 2020-01-01 RX ORDER — ONDANSETRON 4 MG/1
4 TABLET, ORALLY DISINTEGRATING ORAL EVERY 6 HOURS PRN
Status: DISCONTINUED | OUTPATIENT
Start: 2020-01-01 | End: 2020-01-01 | Stop reason: HOSPADM

## 2020-01-01 RX ORDER — METOPROLOL TARTRATE 25 MG/1
TABLET, FILM COATED ORAL
Qty: 30 TABLET | Refills: 11 | Status: SHIPPED | OUTPATIENT
Start: 2020-01-01

## 2020-01-01 RX ORDER — POTASSIUM CHLORIDE 7.45 MG/ML
10 INJECTION INTRAVENOUS
Status: DISCONTINUED | OUTPATIENT
Start: 2020-01-01 | End: 2020-01-01 | Stop reason: HOSPADM

## 2020-01-01 RX ORDER — VERAPAMIL HYDROCHLORIDE 2.5 MG/ML
INJECTION, SOLUTION INTRAVENOUS
Status: DISCONTINUED | OUTPATIENT
Start: 2020-01-01 | End: 2020-01-01 | Stop reason: HOSPADM

## 2020-01-01 RX ORDER — CEPHALEXIN 500 MG/1
500 CAPSULE ORAL 2 TIMES DAILY
COMMUNITY
Start: 2020-01-01 | End: 2020-01-01

## 2020-01-01 RX ORDER — METOPROLOL TARTRATE 1 MG/ML
2.5 INJECTION, SOLUTION INTRAVENOUS EVERY 4 HOURS PRN
Status: DISCONTINUED | OUTPATIENT
Start: 2020-01-01 | End: 2020-01-01 | Stop reason: HOSPADM

## 2020-01-01 RX ORDER — ACETAMINOPHEN 500 MG
500 TABLET ORAL 4 TIMES DAILY
COMMUNITY
End: 2020-01-01

## 2020-01-01 RX ORDER — WARFARIN SODIUM 2.5 MG/1
5 TABLET ORAL
Status: COMPLETED | OUTPATIENT
Start: 2020-01-01 | End: 2020-01-01

## 2020-01-01 RX ORDER — EPTIFIBATIDE 2 MG/ML
180 INJECTION, SOLUTION INTRAVENOUS EVERY 10 MIN PRN
Status: DISCONTINUED | OUTPATIENT
Start: 2020-01-01 | End: 2020-01-01 | Stop reason: HOSPADM

## 2020-01-01 RX ORDER — ONDANSETRON 2 MG/ML
4 INJECTION INTRAMUSCULAR; INTRAVENOUS EVERY 6 HOURS PRN
Status: DISCONTINUED | OUTPATIENT
Start: 2020-01-01 | End: 2020-01-01 | Stop reason: HOSPADM

## 2020-01-01 RX ORDER — HEPARIN SODIUM 1000 [USP'U]/ML
INJECTION, SOLUTION INTRAVENOUS; SUBCUTANEOUS
Status: DISCONTINUED | OUTPATIENT
Start: 2020-01-01 | End: 2020-01-01 | Stop reason: HOSPADM

## 2020-01-01 RX ORDER — FLUMAZENIL 0.1 MG/ML
0.2 INJECTION, SOLUTION INTRAVENOUS
Status: DISCONTINUED | OUTPATIENT
Start: 2020-01-01 | End: 2020-01-01 | Stop reason: HOSPADM

## 2020-01-01 RX ORDER — LEVOFLOXACIN 5 MG/ML
750 INJECTION, SOLUTION INTRAVENOUS EVERY 24 HOURS
Status: DISCONTINUED | OUTPATIENT
Start: 2020-01-01 | End: 2020-01-01 | Stop reason: HOSPADM

## 2020-01-01 RX ORDER — METOPROLOL TARTRATE 25 MG/1
TABLET, FILM COATED ORAL
Qty: 30 TABLET | Refills: 0 | Status: SHIPPED | OUTPATIENT
Start: 2020-01-01 | End: 2020-01-01

## 2020-01-01 RX ORDER — FLUCONAZOLE 100 MG/1
100 TABLET ORAL DAILY
COMMUNITY
End: 2020-01-01

## 2020-01-01 RX ORDER — IOPAMIDOL 755 MG/ML
120 INJECTION, SOLUTION INTRAVASCULAR ONCE
Status: COMPLETED | OUTPATIENT
Start: 2020-01-01 | End: 2020-01-01

## 2020-01-01 RX ORDER — LIDOCAINE HYDROCHLORIDE 10 MG/ML
10 INJECTION, SOLUTION INFILTRATION; PERINEURAL ONCE
Status: COMPLETED | OUTPATIENT
Start: 2020-01-01 | End: 2020-01-01

## 2020-01-01 RX ORDER — POTASSIUM CHLORIDE 1500 MG/1
40 TABLET, EXTENDED RELEASE ORAL
Status: CANCELLED | OUTPATIENT
Start: 2020-01-01

## 2020-01-01 RX ORDER — CYCLOBENZAPRINE HCL 5 MG
5 TABLET ORAL 3 TIMES DAILY PRN
Status: DISCONTINUED | OUTPATIENT
Start: 2020-01-01 | End: 2020-01-01 | Stop reason: HOSPADM

## 2020-01-01 RX ORDER — POTASSIUM CHLORIDE 1.5 G/1.58G
20-40 POWDER, FOR SOLUTION ORAL
Status: DISCONTINUED | OUTPATIENT
Start: 2020-01-01 | End: 2020-01-01 | Stop reason: HOSPADM

## 2020-01-01 RX ORDER — ASCORBIC ACID 500 MG
500 TABLET ORAL
COMMUNITY

## 2020-01-01 RX ORDER — ACETAMINOPHEN 325 MG/1
325-650 TABLET ORAL EVERY 4 HOURS PRN
COMMUNITY

## 2020-01-01 RX ORDER — ATROPINE SULFATE 0.1 MG/ML
.2-2 INJECTION INTRAVENOUS
Status: DISCONTINUED | OUTPATIENT
Start: 2020-01-01 | End: 2020-01-01 | Stop reason: HOSPADM

## 2020-01-01 RX ORDER — OXYCODONE HYDROCHLORIDE 5 MG/1
5 TABLET ORAL EVERY 4 HOURS
COMMUNITY
Start: 2020-01-01 | End: 2020-01-01

## 2020-01-01 RX ORDER — SODIUM CHLORIDE 9 MG/ML
1000 INJECTION, SOLUTION INTRAVENOUS CONTINUOUS
Status: DISCONTINUED | OUTPATIENT
Start: 2020-01-01 | End: 2020-01-01 | Stop reason: HOSPADM

## 2020-01-01 RX ORDER — CEPHALEXIN 500 MG/1
500 CAPSULE ORAL 3 TIMES DAILY
Qty: 12 CAPSULE | Refills: 0 | Status: SHIPPED | OUTPATIENT
Start: 2020-01-01 | End: 2020-01-01

## 2020-01-01 RX ORDER — HYDROMORPHONE HYDROCHLORIDE 1 MG/ML
0.25 INJECTION, SOLUTION INTRAMUSCULAR; INTRAVENOUS; SUBCUTANEOUS
Status: DISCONTINUED | OUTPATIENT
Start: 2020-01-01 | End: 2020-01-01 | Stop reason: HOSPADM

## 2020-01-01 RX ORDER — ARGATROBAN 1 MG/ML
350 INJECTION, SOLUTION INTRAVENOUS
Status: DISCONTINUED | OUTPATIENT
Start: 2020-01-01 | End: 2020-01-01 | Stop reason: HOSPADM

## 2020-01-01 RX ORDER — ACETAMINOPHEN 325 MG/1
325-650 TABLET ORAL EVERY 4 HOURS PRN
Status: DISCONTINUED | OUTPATIENT
Start: 2020-01-01 | End: 2020-01-01 | Stop reason: HOSPADM

## 2020-01-01 RX ORDER — DOBUTAMINE HYDROCHLORIDE 200 MG/100ML
2-20 INJECTION INTRAVENOUS CONTINUOUS PRN
Status: DISCONTINUED | OUTPATIENT
Start: 2020-01-01 | End: 2020-01-01 | Stop reason: HOSPADM

## 2020-01-01 RX ORDER — METOPROLOL TARTRATE 25 MG/1
TABLET, FILM COATED ORAL
COMMUNITY
Start: 2020-01-01 | End: 2020-01-01

## 2020-01-01 RX ORDER — AMOXICILLIN 250 MG
1 CAPSULE ORAL 2 TIMES DAILY PRN
Status: DISCONTINUED | OUTPATIENT
Start: 2020-01-01 | End: 2020-01-01 | Stop reason: HOSPADM

## 2020-01-01 RX ORDER — POTASSIUM CHLORIDE 750 MG/1
20 TABLET, EXTENDED RELEASE ORAL
Status: DISCONTINUED | OUTPATIENT
Start: 2020-01-01 | End: 2020-01-01 | Stop reason: HOSPADM

## 2020-01-01 RX ORDER — NALOXONE HYDROCHLORIDE 0.4 MG/ML
.1-.4 INJECTION, SOLUTION INTRAMUSCULAR; INTRAVENOUS; SUBCUTANEOUS
Status: DISCONTINUED | OUTPATIENT
Start: 2020-01-01 | End: 2020-01-01 | Stop reason: HOSPADM

## 2020-01-01 RX ORDER — HEPARIN SODIUM 10000 [USP'U]/100ML
100-1000 INJECTION, SOLUTION INTRAVENOUS CONTINUOUS PRN
Status: DISCONTINUED | OUTPATIENT
Start: 2020-01-01 | End: 2020-01-01 | Stop reason: HOSPADM

## 2020-01-01 RX ORDER — METHYLPREDNISOLONE 4 MG
500 TABLET, DOSE PACK ORAL DAILY
COMMUNITY
End: 2020-01-01

## 2020-01-01 RX ORDER — CYCLOBENZAPRINE HCL 5 MG
5 TABLET ORAL 3 TIMES DAILY PRN
Qty: 30 TABLET | Refills: 0 | Status: SHIPPED | OUTPATIENT
Start: 2020-01-01

## 2020-01-01 RX ORDER — DILTIAZEM HYDROCHLORIDE 5 MG/ML
15 INJECTION INTRAVENOUS ONCE
Status: COMPLETED | OUTPATIENT
Start: 2020-01-01 | End: 2020-01-01

## 2020-01-01 RX ORDER — SODIUM CHLORIDE 9 MG/ML
INJECTION, SOLUTION INTRAVENOUS CONTINUOUS
Status: DISCONTINUED | OUTPATIENT
Start: 2020-01-01 | End: 2020-01-01 | Stop reason: HOSPADM

## 2020-01-01 RX ORDER — EPTIFIBATIDE 2 MG/ML
1 INJECTION, SOLUTION INTRAVENOUS CONTINUOUS PRN
Status: DISCONTINUED | OUTPATIENT
Start: 2020-01-01 | End: 2020-01-01 | Stop reason: HOSPADM

## 2020-01-01 RX ORDER — OXYCODONE HYDROCHLORIDE 5 MG/1
5 TABLET ORAL EVERY 4 HOURS PRN
Status: DISCONTINUED | OUTPATIENT
Start: 2020-01-01 | End: 2020-01-01 | Stop reason: HOSPADM

## 2020-01-01 RX ORDER — IPRATROPIUM BROMIDE AND ALBUTEROL SULFATE 2.5; .5 MG/3ML; MG/3ML
1 SOLUTION RESPIRATORY (INHALATION) EVERY 6 HOURS PRN
Qty: 100 VIAL | Refills: 0 | Status: SHIPPED | OUTPATIENT
Start: 2020-01-01

## 2020-01-01 RX ORDER — WARFARIN SODIUM 1 MG/1
1 TABLET ORAL
Status: COMPLETED | OUTPATIENT
Start: 2020-01-01 | End: 2020-01-01

## 2020-01-01 RX ORDER — ATROPINE SULFATE 0.1 MG/ML
0.5 INJECTION INTRAVENOUS EVERY 5 MIN PRN
Status: DISCONTINUED | OUTPATIENT
Start: 2020-01-01 | End: 2020-01-01 | Stop reason: HOSPADM

## 2020-01-01 RX ORDER — WARFARIN SODIUM 5 MG/1
TABLET ORAL
Qty: 95 TABLET | Refills: 2 | Status: SHIPPED | OUTPATIENT
Start: 2020-01-01 | End: 2020-01-01

## 2020-01-01 RX ORDER — METOPROLOL TARTRATE 25 MG/1
25 TABLET, FILM COATED ORAL 2 TIMES DAILY
Status: DISCONTINUED | OUTPATIENT
Start: 2020-01-01 | End: 2020-01-01

## 2020-01-01 RX ORDER — NITROGLYCERIN 20 MG/100ML
.07-2 INJECTION INTRAVENOUS CONTINUOUS PRN
Status: DISCONTINUED | OUTPATIENT
Start: 2020-01-01 | End: 2020-01-01 | Stop reason: HOSPADM

## 2020-01-01 RX ORDER — PREDNISONE 5 MG/1
5 TABLET ORAL DAILY
COMMUNITY
End: 2020-01-01

## 2020-01-01 RX ORDER — AMOXICILLIN 250 MG
1 CAPSULE ORAL 2 TIMES DAILY PRN
COMMUNITY
Start: 2020-01-01

## 2020-01-01 RX ORDER — SIMVASTATIN 20 MG
20 TABLET ORAL DAILY
COMMUNITY

## 2020-01-01 RX ORDER — IOPAMIDOL 612 MG/ML
100 INJECTION, SOLUTION INTRAVASCULAR ONCE
Status: DISCONTINUED | OUTPATIENT
Start: 2020-01-01 | End: 2020-01-01 | Stop reason: CLARIF

## 2020-01-01 RX ORDER — CEPHALEXIN 500 MG/1
500 CAPSULE ORAL ONCE
Status: COMPLETED | OUTPATIENT
Start: 2020-01-01 | End: 2020-01-01

## 2020-01-01 RX ORDER — OXYCODONE HYDROCHLORIDE 5 MG/1
TABLET ORAL
Qty: 15 TABLET | Refills: 0 | Status: ON HOLD | OUTPATIENT
Start: 2020-01-01 | End: 2020-01-01

## 2020-01-01 RX ORDER — POTASSIUM CHLORIDE 1500 MG/1
20-40 TABLET, EXTENDED RELEASE ORAL
Status: DISCONTINUED | OUTPATIENT
Start: 2020-01-01 | End: 2020-01-01 | Stop reason: HOSPADM

## 2020-01-01 RX ORDER — ONDANSETRON 2 MG/ML
4 INJECTION INTRAMUSCULAR; INTRAVENOUS EVERY 30 MIN PRN
Status: DISCONTINUED | OUTPATIENT
Start: 2020-01-01 | End: 2020-01-01 | Stop reason: HOSPADM

## 2020-01-01 RX ORDER — SIMVASTATIN 10 MG
20 TABLET ORAL DAILY
Status: DISCONTINUED | OUTPATIENT
Start: 2020-01-01 | End: 2020-01-01 | Stop reason: HOSPADM

## 2020-01-01 RX ORDER — LACTOBACILLUS RHAMNOSUS GG 10B CELL
1 CAPSULE ORAL 2 TIMES DAILY
Status: DISCONTINUED | OUTPATIENT
Start: 2020-01-01 | End: 2020-01-01 | Stop reason: HOSPADM

## 2020-01-01 RX ADMIN — IMIPENEM AND CILASTATIN SODIUM 500 MG: 500; 500 INJECTION, POWDER, FOR SOLUTION INTRAVENOUS at 14:48

## 2020-01-01 RX ADMIN — METOPROLOL TARTRATE 12.5 MG: 25 TABLET, FILM COATED ORAL at 20:25

## 2020-01-01 RX ADMIN — IMIPENEM AND CILASTATIN SODIUM 500 MG: 500; 500 INJECTION, POWDER, FOR SOLUTION INTRAVENOUS at 20:32

## 2020-01-01 RX ADMIN — NITROGLYCERIN 200 MCG: 5 INJECTION, SOLUTION INTRAVENOUS at 12:32

## 2020-01-01 RX ADMIN — Medication 1 CAPSULE: at 09:28

## 2020-01-01 RX ADMIN — SODIUM CHLORIDE: 9 INJECTION, SOLUTION INTRAVENOUS at 04:22

## 2020-01-01 RX ADMIN — OXYCODONE HYDROCHLORIDE 5 MG: 5 TABLET ORAL at 13:33

## 2020-01-01 RX ADMIN — HYDROMORPHONE HYDROCHLORIDE 0.25 MG: 1 INJECTION, SOLUTION INTRAMUSCULAR; INTRAVENOUS; SUBCUTANEOUS at 22:16

## 2020-01-01 RX ADMIN — SERTRALINE HYDROCHLORIDE 50 MG: 25 TABLET, FILM COATED ORAL at 09:28

## 2020-01-01 RX ADMIN — ALBUTEROL SULFATE 2.5 MG: 2.5 SOLUTION RESPIRATORY (INHALATION) at 08:06

## 2020-01-01 RX ADMIN — SODIUM CHLORIDE: 9 INJECTION, SOLUTION INTRAVENOUS at 14:44

## 2020-01-01 RX ADMIN — IMIPENEM AND CILASTATIN SODIUM 500 MG: 500; 500 INJECTION, POWDER, FOR SOLUTION INTRAVENOUS at 14:51

## 2020-01-01 RX ADMIN — Medication 5 MG: at 12:47

## 2020-01-01 RX ADMIN — SERTRALINE HYDROCHLORIDE 50 MG: 25 TABLET, FILM COATED ORAL at 09:15

## 2020-01-01 RX ADMIN — TAZOBACTAM SODIUM AND PIPERACILLIN SODIUM 3.38 G: 375; 3 INJECTION, SOLUTION INTRAVENOUS at 23:35

## 2020-01-01 RX ADMIN — SIMVASTATIN 20 MG: 10 TABLET, FILM COATED ORAL at 08:48

## 2020-01-01 RX ADMIN — SERTRALINE HYDROCHLORIDE 50 MG: 25 TABLET, FILM COATED ORAL at 08:47

## 2020-01-01 RX ADMIN — Medication 1 CAPSULE: at 20:25

## 2020-01-01 RX ADMIN — ACETAMINOPHEN 650 MG: 325 TABLET, FILM COATED ORAL at 19:36

## 2020-01-01 RX ADMIN — LEVOFLOXACIN 750 MG: 5 INJECTION, SOLUTION INTRAVENOUS at 10:45

## 2020-01-01 RX ADMIN — SIMVASTATIN 20 MG: 10 TABLET, FILM COATED ORAL at 08:25

## 2020-01-01 RX ADMIN — ACETAMINOPHEN 650 MG: 325 TABLET, FILM COATED ORAL at 20:39

## 2020-01-01 RX ADMIN — IMIPENEM AND CILASTATIN SODIUM 500 MG: 500; 500 INJECTION, POWDER, FOR SOLUTION INTRAVENOUS at 14:25

## 2020-01-01 RX ADMIN — MIDAZOLAM 0.5 MG: 1 INJECTION INTRAMUSCULAR; INTRAVENOUS at 12:10

## 2020-01-01 RX ADMIN — Medication 1 CAPSULE: at 20:32

## 2020-01-01 RX ADMIN — ERTAPENEM SODIUM 1 G: 1 INJECTION, POWDER, LYOPHILIZED, FOR SOLUTION INTRAMUSCULAR; INTRAVENOUS at 09:28

## 2020-01-01 RX ADMIN — Medication 1 CAPSULE: at 08:25

## 2020-01-01 RX ADMIN — MORPHINE SULFATE 1 MG: 2 INJECTION, SOLUTION INTRAMUSCULAR; INTRAVENOUS at 01:03

## 2020-01-01 RX ADMIN — IMIPENEM AND CILASTATIN SODIUM 500 MG: 500; 500 INJECTION, POWDER, FOR SOLUTION INTRAVENOUS at 03:04

## 2020-01-01 RX ADMIN — METOPROLOL TARTRATE 12.5 MG: 25 TABLET, FILM COATED ORAL at 08:48

## 2020-01-01 RX ADMIN — DILTIAZEM HYDROCHLORIDE 15 MG: 5 INJECTION, SOLUTION INTRAVENOUS at 23:53

## 2020-01-01 RX ADMIN — Medication 1 CAPSULE: at 08:17

## 2020-01-01 RX ADMIN — METOPROLOL 25 MG: 25 TABLET ORAL at 19:36

## 2020-01-01 RX ADMIN — TAZOBACTAM SODIUM AND PIPERACILLIN SODIUM 3.38 G: 375; 3 INJECTION, SOLUTION INTRAVENOUS at 11:26

## 2020-01-01 RX ADMIN — ENOXAPARIN SODIUM 50 MG: 60 INJECTION SUBCUTANEOUS at 19:13

## 2020-01-01 RX ADMIN — OXYCODONE HYDROCHLORIDE 5 MG: 5 TABLET ORAL at 23:25

## 2020-01-01 RX ADMIN — OXYCODONE HYDROCHLORIDE 5 MG: 5 TABLET ORAL at 19:25

## 2020-01-01 RX ADMIN — LIDOCAINE HYDROCHLORIDE 7 ML: 10 INJECTION, SOLUTION EPIDURAL; INFILTRATION; INTRACAUDAL; PERINEURAL at 12:09

## 2020-01-01 RX ADMIN — FUROSEMIDE 20 MG: 10 INJECTION, SOLUTION INTRAMUSCULAR; INTRAVENOUS at 16:26

## 2020-01-01 RX ADMIN — SODIUM CHLORIDE 250 ML: 9 INJECTION, SOLUTION INTRAVENOUS at 03:30

## 2020-01-01 RX ADMIN — METOPROLOL TARTRATE 12.5 MG: 25 TABLET, FILM COATED ORAL at 10:21

## 2020-01-01 RX ADMIN — IMIPENEM AND CILASTATIN SODIUM 500 MG: 500; 500 INJECTION, POWDER, FOR SOLUTION INTRAVENOUS at 14:28

## 2020-01-01 RX ADMIN — METOPROLOL TARTRATE 12.5 MG: 25 TABLET, FILM COATED ORAL at 08:47

## 2020-01-01 RX ADMIN — FUROSEMIDE 40 MG: 10 INJECTION, SOLUTION INTRAMUSCULAR; INTRAVENOUS at 08:15

## 2020-01-01 RX ADMIN — ACETAMINOPHEN 650 MG: 325 TABLET, FILM COATED ORAL at 15:24

## 2020-01-01 RX ADMIN — Medication 5 MG: at 22:58

## 2020-01-01 RX ADMIN — SODIUM CHLORIDE: 9 INJECTION, SOLUTION INTRAVENOUS at 05:11

## 2020-01-01 RX ADMIN — LIDOCAINE HYDROCHLORIDE 2 ML: 10 INJECTION, SOLUTION EPIDURAL; INFILTRATION; INTRACAUDAL; PERINEURAL at 12:25

## 2020-01-01 RX ADMIN — OMEPRAZOLE 20 MG: 20 CAPSULE, DELAYED RELEASE ORAL at 07:51

## 2020-01-01 RX ADMIN — ACETAMINOPHEN 325 MG: 325 TABLET, FILM COATED ORAL at 03:16

## 2020-01-01 RX ADMIN — TAZOBACTAM SODIUM AND PIPERACILLIN SODIUM 3.38 G: 375; 3 INJECTION, SOLUTION INTRAVENOUS at 05:11

## 2020-01-01 RX ADMIN — IMIPENEM AND CILASTATIN SODIUM 500 MG: 500; 500 INJECTION, POWDER, FOR SOLUTION INTRAVENOUS at 08:12

## 2020-01-01 RX ADMIN — OXYCODONE HYDROCHLORIDE 5 MG: 5 TABLET ORAL at 08:48

## 2020-01-01 RX ADMIN — METOPROLOL TARTRATE 12.5 MG: 25 TABLET, FILM COATED ORAL at 11:13

## 2020-01-01 RX ADMIN — OXYCODONE HYDROCHLORIDE 5 MG: 5 TABLET ORAL at 03:36

## 2020-01-01 RX ADMIN — ERTAPENEM SODIUM 1 G: 1 INJECTION, POWDER, LYOPHILIZED, FOR SOLUTION INTRAMUSCULAR; INTRAVENOUS at 09:11

## 2020-01-01 RX ADMIN — FENTANYL CITRATE 25 MCG: 50 INJECTION, SOLUTION INTRAMUSCULAR; INTRAVENOUS at 12:10

## 2020-01-01 RX ADMIN — METOPROLOL TARTRATE 2.5 MG: 5 INJECTION INTRAVENOUS at 23:35

## 2020-01-01 RX ADMIN — METOPROLOL TARTRATE 12.5 MG: 25 TABLET, FILM COATED ORAL at 20:32

## 2020-01-01 RX ADMIN — IMIPENEM AND CILASTATIN SODIUM 500 MG: 500; 500 INJECTION, POWDER, FOR SOLUTION INTRAVENOUS at 03:57

## 2020-01-01 RX ADMIN — IMIPENEM AND CILASTATIN SODIUM 500 MG: 500; 500 INJECTION, POWDER, FOR SOLUTION INTRAVENOUS at 08:47

## 2020-01-01 RX ADMIN — SERTRALINE HYDROCHLORIDE 50 MG: 25 TABLET, FILM COATED ORAL at 08:25

## 2020-01-01 RX ADMIN — ONDANSETRON 4 MG: 2 INJECTION INTRAMUSCULAR; INTRAVENOUS at 19:15

## 2020-01-01 RX ADMIN — IMIPENEM AND CILASTATIN SODIUM 500 MG: 500; 500 INJECTION, POWDER, FOR SOLUTION INTRAVENOUS at 02:32

## 2020-01-01 RX ADMIN — SERTRALINE HYDROCHLORIDE 50 MG: 25 TABLET, FILM COATED ORAL at 14:48

## 2020-01-01 RX ADMIN — OMEPRAZOLE 20 MG: 20 CAPSULE, DELAYED RELEASE ORAL at 08:17

## 2020-01-01 RX ADMIN — ENOXAPARIN SODIUM 40 MG: 40 INJECTION SUBCUTANEOUS at 22:08

## 2020-01-01 RX ADMIN — Medication 1 CAPSULE: at 19:37

## 2020-01-01 RX ADMIN — LIDOCAINE HYDROCHLORIDE 2.5 ML: 10 INJECTION, SOLUTION EPIDURAL; INFILTRATION; INTRACAUDAL; PERINEURAL at 09:26

## 2020-01-01 RX ADMIN — METOPROLOL TARTRATE 12.5 MG: 25 TABLET, FILM COATED ORAL at 20:30

## 2020-01-01 RX ADMIN — METOPROLOL TARTRATE 2.5 MG: 5 INJECTION INTRAVENOUS at 03:04

## 2020-01-01 RX ADMIN — CEPHALEXIN 500 MG: 500 CAPSULE ORAL at 22:11

## 2020-01-01 RX ADMIN — VANCOMYCIN HYDROCHLORIDE 1000 MG: 1 INJECTION, SOLUTION INTRAVENOUS at 12:22

## 2020-01-01 RX ADMIN — IMIPENEM AND CILASTATIN SODIUM 500 MG: 500; 500 INJECTION, POWDER, FOR SOLUTION INTRAVENOUS at 03:03

## 2020-01-01 RX ADMIN — ENOXAPARIN SODIUM 50 MG: 60 INJECTION SUBCUTANEOUS at 18:48

## 2020-01-01 RX ADMIN — ACETAMINOPHEN 650 MG: 325 TABLET, FILM COATED ORAL at 14:48

## 2020-01-01 RX ADMIN — IMIPENEM AND CILASTATIN SODIUM 500 MG: 500; 500 INJECTION, POWDER, FOR SOLUTION INTRAVENOUS at 08:54

## 2020-01-01 RX ADMIN — OXYCODONE HYDROCHLORIDE 5 MG: 5 TABLET ORAL at 19:36

## 2020-01-01 RX ADMIN — LEVOFLOXACIN 750 MG: 5 INJECTION, SOLUTION INTRAVENOUS at 09:15

## 2020-01-01 RX ADMIN — OMEPRAZOLE 20 MG: 20 CAPSULE, DELAYED RELEASE ORAL at 08:15

## 2020-01-01 RX ADMIN — IMIPENEM AND CILASTATIN SODIUM 500 MG: 500; 500 INJECTION, POWDER, FOR SOLUTION INTRAVENOUS at 09:46

## 2020-01-01 RX ADMIN — LEVOFLOXACIN 750 MG: 5 INJECTION, SOLUTION INTRAVENOUS at 10:04

## 2020-01-01 RX ADMIN — IMIPENEM AND CILASTATIN SODIUM 500 MG: 500; 500 INJECTION, POWDER, FOR SOLUTION INTRAVENOUS at 20:33

## 2020-01-01 RX ADMIN — IOPAMIDOL 100 ML: 755 INJECTION, SOLUTION INTRAVENOUS at 14:30

## 2020-01-01 RX ADMIN — SERTRALINE HYDROCHLORIDE 50 MG: 25 TABLET, FILM COATED ORAL at 08:48

## 2020-01-01 RX ADMIN — ACETAMINOPHEN 650 MG: 325 TABLET, FILM COATED ORAL at 23:25

## 2020-01-01 RX ADMIN — IMIPENEM AND CILASTATIN SODIUM 500 MG: 500; 500 INJECTION, POWDER, FOR SOLUTION INTRAVENOUS at 09:47

## 2020-01-01 RX ADMIN — SIMVASTATIN 20 MG: 10 TABLET, FILM COATED ORAL at 09:28

## 2020-01-01 RX ADMIN — OXYCODONE HYDROCHLORIDE 5 MG: 5 TABLET ORAL at 20:25

## 2020-01-01 RX ADMIN — POTASSIUM CHLORIDE 40 MEQ: 750 CAPSULE, EXTENDED RELEASE ORAL at 14:17

## 2020-01-01 RX ADMIN — WARFARIN SODIUM 5 MG: 2.5 TABLET ORAL at 10:59

## 2020-01-01 RX ADMIN — SIMVASTATIN 20 MG: 10 TABLET, FILM COATED ORAL at 09:15

## 2020-01-01 RX ADMIN — LEVOFLOXACIN 750 MG: 5 INJECTION, SOLUTION INTRAVENOUS at 08:50

## 2020-01-01 RX ADMIN — IMIPENEM AND CILASTATIN SODIUM 500 MG: 500; 500 INJECTION, POWDER, FOR SOLUTION INTRAVENOUS at 21:05

## 2020-01-01 RX ADMIN — IMIPENEM AND CILASTATIN SODIUM 500 MG: 500; 500 INJECTION, POWDER, FOR SOLUTION INTRAVENOUS at 15:21

## 2020-01-01 RX ADMIN — WARFARIN SODIUM 5 MG: 2.5 TABLET ORAL at 17:32

## 2020-01-01 RX ADMIN — HYDROMORPHONE HYDROCHLORIDE 0.25 MG: 1 INJECTION, SOLUTION INTRAMUSCULAR; INTRAVENOUS; SUBCUTANEOUS at 20:17

## 2020-01-01 RX ADMIN — Medication 1 CAPSULE: at 08:47

## 2020-01-01 RX ADMIN — CYCLOBENZAPRINE 5 MG: 5 TABLET, FILM COATED ORAL at 21:05

## 2020-01-01 RX ADMIN — METOPROLOL TARTRATE 5 MG: 5 INJECTION INTRAVENOUS at 08:25

## 2020-01-01 RX ADMIN — METOPROLOL TARTRATE 12.5 MG: 25 TABLET, FILM COATED ORAL at 08:17

## 2020-01-01 RX ADMIN — OXYCODONE HYDROCHLORIDE 5 MG: 5 TABLET ORAL at 20:58

## 2020-01-01 RX ADMIN — SODIUM CHLORIDE: 9 INJECTION, SOLUTION INTRAVENOUS at 03:12

## 2020-01-01 RX ADMIN — OXYCODONE HYDROCHLORIDE 5 MG: 5 TABLET ORAL at 00:20

## 2020-01-01 RX ADMIN — ACETAMINOPHEN 650 MG: 325 TABLET, FILM COATED ORAL at 20:25

## 2020-01-01 RX ADMIN — OXYCODONE HYDROCHLORIDE 5 MG: 5 TABLET ORAL at 10:42

## 2020-01-01 RX ADMIN — OMEPRAZOLE 20 MG: 20 CAPSULE, DELAYED RELEASE ORAL at 08:54

## 2020-01-01 RX ADMIN — HEPARIN SODIUM 3000 UNITS: 1000 INJECTION INTRAVENOUS; SUBCUTANEOUS at 12:40

## 2020-01-01 RX ADMIN — OXYCODONE HYDROCHLORIDE 5 MG: 5 TABLET ORAL at 21:05

## 2020-01-01 RX ADMIN — IOPAMIDOL 7 ML: 612 INJECTION, SOLUTION INTRAVENOUS at 09:33

## 2020-01-01 RX ADMIN — ACETAMINOPHEN 650 MG: 325 TABLET, FILM COATED ORAL at 01:03

## 2020-01-01 RX ADMIN — IOPAMIDOL 50 ML: 755 INJECTION, SOLUTION INTRAVENOUS at 20:12

## 2020-01-01 RX ADMIN — ENOXAPARIN SODIUM 50 MG: 60 INJECTION SUBCUTANEOUS at 08:09

## 2020-01-01 RX ADMIN — SIMVASTATIN 20 MG: 10 TABLET, FILM COATED ORAL at 08:01

## 2020-01-01 RX ADMIN — OXYCODONE HYDROCHLORIDE 5 MG: 5 TABLET ORAL at 23:44

## 2020-01-01 RX ADMIN — Medication 1 CAPSULE: at 21:05

## 2020-01-01 RX ADMIN — Medication 1 CAPSULE: at 08:01

## 2020-01-01 RX ADMIN — METOPROLOL TARTRATE 12.5 MG: 25 TABLET, FILM COATED ORAL at 20:53

## 2020-01-01 RX ADMIN — ACETAMINOPHEN 325 MG: 325 TABLET, FILM COATED ORAL at 08:48

## 2020-01-01 RX ADMIN — HYDROMORPHONE HYDROCHLORIDE 0.25 MG: 1 INJECTION, SOLUTION INTRAMUSCULAR; INTRAVENOUS; SUBCUTANEOUS at 19:21

## 2020-01-01 RX ADMIN — SODIUM CHLORIDE 1000 ML: 9 INJECTION, SOLUTION INTRAVENOUS at 10:45

## 2020-01-01 RX ADMIN — IMIPENEM AND CILASTATIN SODIUM 500 MG: 500; 500 INJECTION, POWDER, FOR SOLUTION INTRAVENOUS at 20:54

## 2020-01-01 RX ADMIN — ACETAMINOPHEN 650 MG: 325 TABLET, FILM COATED ORAL at 20:58

## 2020-01-01 RX ADMIN — METOPROLOL TARTRATE 12.5 MG: 25 TABLET, FILM COATED ORAL at 08:15

## 2020-01-01 RX ADMIN — IMIPENEM AND CILASTATIN SODIUM 500 MG: 500; 500 INJECTION, POWDER, FOR SOLUTION INTRAVENOUS at 20:25

## 2020-01-01 RX ADMIN — VERAPAMIL HYDROCHLORIDE 5 MG: 2.5 INJECTION, SOLUTION INTRAVENOUS at 12:32

## 2020-01-01 RX ADMIN — METOPROLOL TARTRATE 12.5 MG: 25 TABLET, FILM COATED ORAL at 08:01

## 2020-01-01 RX ADMIN — SIMVASTATIN 20 MG: 10 TABLET, FILM COATED ORAL at 08:47

## 2020-01-01 RX ADMIN — POTASSIUM CHLORIDE 40 MEQ: 750 CAPSULE, EXTENDED RELEASE ORAL at 11:34

## 2020-01-01 RX ADMIN — OXYCODONE HYDROCHLORIDE 5 MG: 5 TABLET ORAL at 01:03

## 2020-01-01 RX ADMIN — SERTRALINE HYDROCHLORIDE 50 MG: 25 TABLET, FILM COATED ORAL at 08:01

## 2020-01-01 RX ADMIN — OMEPRAZOLE 20 MG: 20 CAPSULE, DELAYED RELEASE ORAL at 06:40

## 2020-01-01 RX ADMIN — OXYCODONE HYDROCHLORIDE 5 MG: 5 TABLET ORAL at 14:48

## 2020-01-01 RX ADMIN — METOPROLOL TARTRATE 2.5 MG: 5 INJECTION INTRAVENOUS at 21:16

## 2020-01-01 RX ADMIN — METOPROLOL TARTRATE 12.5 MG: 25 TABLET, FILM COATED ORAL at 21:05

## 2020-01-01 RX ADMIN — IMIPENEM AND CILASTATIN SODIUM 500 MG: 500; 500 INJECTION, POWDER, FOR SOLUTION INTRAVENOUS at 02:12

## 2020-01-01 RX ADMIN — FUROSEMIDE 20 MG: 10 INJECTION, SOLUTION INTRAMUSCULAR; INTRAVENOUS at 08:01

## 2020-01-01 RX ADMIN — TAZOBACTAM SODIUM AND PIPERACILLIN SODIUM 3.38 G: 375; 3 INJECTION, SOLUTION INTRAVENOUS at 17:15

## 2020-01-01 RX ADMIN — OMEPRAZOLE 20 MG: 20 CAPSULE, DELAYED RELEASE ORAL at 08:02

## 2020-01-01 RX ADMIN — ACETAMINOPHEN 650 MG: 325 TABLET, FILM COATED ORAL at 13:10

## 2020-01-01 RX ADMIN — Medication 1 CAPSULE: at 20:53

## 2020-01-01 RX ADMIN — OXYCODONE HYDROCHLORIDE 5 MG: 5 TABLET ORAL at 13:56

## 2020-01-01 RX ADMIN — IOPAMIDOL 100 ML: 612 INJECTION, SOLUTION INTRAVENOUS at 20:41

## 2020-01-01 RX ADMIN — METOPROLOL TARTRATE 12.5 MG: 25 TABLET, FILM COATED ORAL at 20:14

## 2020-01-01 RX ADMIN — IOPAMIDOL 96 ML: 755 INJECTION, SOLUTION INTRAVENOUS at 13:12

## 2020-01-01 RX ADMIN — FUROSEMIDE 20 MG: 10 INJECTION INTRAMUSCULAR; INTRAVENOUS at 08:01

## 2020-01-01 RX ADMIN — WARFARIN SODIUM 1 MG: 1 TABLET ORAL at 16:27

## 2020-01-01 RX ADMIN — OXYCODONE HYDROCHLORIDE 5 MG: 5 TABLET ORAL at 03:16

## 2020-01-01 RX ADMIN — METOPROLOL 25 MG: 25 TABLET ORAL at 09:28

## 2020-01-01 RX ADMIN — ACETAMINOPHEN 650 MG: 325 TABLET, FILM COATED ORAL at 11:47

## 2020-01-01 RX ADMIN — WARFARIN SODIUM 5 MG: 2.5 TABLET ORAL at 18:47

## 2020-01-01 ASSESSMENT — MIFFLIN-ST. JEOR
SCORE: 866.37
SCORE: 912
SCORE: 857.75
SCORE: 864.1
SCORE: 929.42
SCORE: 893.13
SCORE: 919
SCORE: 903
SCORE: 896
SCORE: 916
SCORE: 861.38
SCORE: 893
SCORE: 870.45
SCORE: 827.37

## 2020-01-01 ASSESSMENT — ACTIVITIES OF DAILY LIVING (ADL)
ADLS_ACUITY_SCORE: 23
ADLS_ACUITY_SCORE: 19
ADLS_ACUITY_SCORE: 23
ADLS_ACUITY_SCORE: 19
ADLS_ACUITY_SCORE: 23
ADLS_ACUITY_SCORE: 21
ADLS_ACUITY_SCORE: 21
ADLS_ACUITY_SCORE: 23
ADLS_ACUITY_SCORE: 26
ADLS_ACUITY_SCORE: 23
ADLS_ACUITY_SCORE: 24
ADLS_ACUITY_SCORE: 21
ADLS_ACUITY_SCORE: 23
ADLS_ACUITY_SCORE: 26
ADLS_ACUITY_SCORE: 26
ADLS_ACUITY_SCORE: 23
ADLS_ACUITY_SCORE: 23
ADLS_ACUITY_SCORE: 26
ADLS_ACUITY_SCORE: 24
ADLS_ACUITY_SCORE: 26
ADLS_ACUITY_SCORE: 23
ADLS_ACUITY_SCORE: 26
ADLS_ACUITY_SCORE: 23

## 2020-01-01 ASSESSMENT — ENCOUNTER SYMPTOMS
CONFUSION: 0
RHINORRHEA: 0
POLYPHAGIA: 0
FATIGUE: 0
LIGHT-HEADEDNESS: 0
ENDOCRINE NEGATIVE: 1
NECK STIFFNESS: 0
ARTHRALGIAS: 1
DIFFICULTY URINATING: 0
CHEST TIGHTNESS: 0
COLOR CHANGE: 0
FLANK PAIN: 0
EYE REDNESS: 0
PSYCHIATRIC NEGATIVE: 1
LIGHT-HEADEDNESS: 1
NAUSEA: 0
FEVER: 1
ARTHRALGIAS: 0
CONSTIPATION: 0
PHOTOPHOBIA: 0
VOMITING: 0
ALLERGIC/IMMUNOLOGIC NEGATIVE: 1
FEVER: 0
ACTIVITY CHANGE: 0
TREMORS: 0
SHORTNESS OF BREATH: 0
UNEXPECTED WEIGHT CHANGE: 0
NEUROLOGICAL NEGATIVE: 1
NECK STIFFNESS: 0
WEAKNESS: 0
DIZZINESS: 0
CONFUSION: 0
COLOR CHANGE: 0
NECK STIFFNESS: 0
WEAKNESS: 0
HEADACHES: 0
CONSTITUTIONAL NEGATIVE: 1
JOINT SWELLING: 0
CHILLS: 0
SEIZURES: 0
ABDOMINAL PAIN: 0
CARDIOVASCULAR NEGATIVE: 1
SHORTNESS OF BREATH: 0
FEVER: 0
MUSCULOSKELETAL NEGATIVE: 1
SHORTNESS OF BREATH: 1
RESPIRATORY NEGATIVE: 1
PHOTOPHOBIA: 0
DECREASED CONCENTRATION: 0
EYE REDNESS: 0
DYSURIA: 0
HEADACHES: 0
SLEEP DISTURBANCE: 0
HEMATOLOGIC/LYMPHATIC NEGATIVE: 1
EYES NEGATIVE: 1
CHEST TIGHTNESS: 0
FACIAL ASYMMETRY: 0
ABDOMINAL DISTENTION: 0
ACTIVITY CHANGE: 1
ABDOMINAL PAIN: 1
NECK PAIN: 1
COUGH: 0
GASTROINTESTINAL NEGATIVE: 1
ANAL BLEEDING: 0
FREQUENCY: 0
ARTHRALGIAS: 0
FEVER: 0

## 2020-01-01 ASSESSMENT — PAIN SCALES - GENERAL
PAINLEVEL: SEVERE PAIN (6)
PAINLEVEL: NO PAIN (0)

## 2020-01-01 ASSESSMENT — ANXIETY QUESTIONNAIRES
1. FEELING NERVOUS, ANXIOUS, OR ON EDGE: SEVERAL DAYS
6. BECOMING EASILY ANNOYED OR IRRITABLE: NOT AT ALL
7. FEELING AFRAID AS IF SOMETHING AWFUL MIGHT HAPPEN: NOT AT ALL
GAD7 TOTAL SCORE: 1
3. WORRYING TOO MUCH ABOUT DIFFERENT THINGS: NOT AT ALL
5. BEING SO RESTLESS THAT IT IS HARD TO SIT STILL: NOT AT ALL
2. NOT BEING ABLE TO STOP OR CONTROL WORRYING: NOT AT ALL
GAD7 TOTAL SCORE: 1

## 2020-01-01 ASSESSMENT — PATIENT HEALTH QUESTIONNAIRE - PHQ9
SUM OF ALL RESPONSES TO PHQ QUESTIONS 1-9: 3
5. POOR APPETITE OR OVEREATING: NOT AT ALL

## 2020-01-02 NOTE — PROGRESS NOTES
Cardiology Progress Note     Assessment & Plan   Michael Christiansen is a 84 year old female who is being seen by cardiology with concerns for atrial fibrillation, moderate AS, moderate TR, and moderate pulmonary hypertension.  She is here for her echo results which were completed on 2019.    Michael is being seen in follow-up. She has a history of permanent atrial fibrillation currently on Coumadin and diltiazem 180 mg daily. Previously, she was on diltiazem 60 mg, 3 times a day. This was changed to a daily dose of diltiazem. She denies any palpitations, fluttering, or racing sensation in her chest.  She has not been dizzy, lightheaded, had near syncope, or syncope.  She has not any chest pain, chest tightness, or chest discomfort. She has minimal to no lower extremity edema.  She is largely without complaints.    We did review her echo from 6/3/2019.  This echo showed an EF 60-65%, mild left and right atrial enlargement, mild mitral insufficiency with rheumatic heart disease, mild aortic insufficiency, moderate aortic stenosis, moderate tricuspid regurgitation, and RVSP of 50.    Previously, she had been following up with  Dr. Moreira in cardiology through [facility] on 7/10/12.  She was diagnosed with atrial fibrillation at that time.  She had been on diltiazem 60 mg 3 times a day and Coumadin. She's been on Coumadin since diagnosis. She has not been cardioverted or been on antiarrhythmics in the past.      1/3/2020:  We reviewed her echo from 2019.  She has moderate to severe aortic stenosis which I suspect is severe aortic stenosis.  It was recommended she have a low-dose dobutamine stress echo to fully define the situation.  Her  recently .  Obviously, this has created lots of stress.  She has not even had the reviewal at this point.  Essentially, they would like to get through the burial of her  and then would like to revisit this.  They also would like to talk to family about  her findings.  They plan to contact us in the next few weeks with their/her decision.  I have recommended a low-dose dobutamine stress echo in [place] as they are not performed in [place].  If she is found to be severe aortic stenosis which I suspect it will be, it was recommended she be referred to the [facility] for consideration of TAVR.  She has denied heart failure symptoms, lower extreme edema, anginal symptoms, dizziness, near-syncope, syncope, or fatigue.      Impression:  1.  Permanent atrial fibrillation.  2.  Pulmonary hypertension-mild on 12/19/2019.  3.  Moderate to severe tricuspid regurgitation on 12/19/2019.  4.  Moderate to severe aortic stenosis on 12/19/2019.  5.  Hypertension-controlled.  6.  Hyperlipidemia-controlled.  7.  H/O rheumatic fever.  8.  Mild left and moderate right atrial enlargement     PLAN:   1.  We reviewed her echo from 12/19/2019.  Peak velocity of 4.2 M/sec, mean pressure gradient 32.8 mmHg, and aortic valve area 0.44 cm .  I suspect she has severe aortic stenosis.  As mentioned above, I recommended a low-dose dobutamine stress echo in [place].  This was declined as her  recently passed away in the last 2 weeks.  She would like to get through this rough situation and will get in contact with us when she would like to have this test done.  If she is found to have severe stenosis, I have recommended she have her valve replaced at [facility].  She is not having any overt symptoms.  2.  No additional changes in her medications.  3.  Continue on diltiazem 180 mg daily, losartan 50 mg daily, Zocor 10 mg at bedtime, and Coumadin.  4.  I suspect she will call agreeing to a low-dose dobutamine stress echo.  This test will be ordered.  Upon completion of this test, she will be seen in follow-up to discuss results and likely recommendations for TAVR.    Cam Kirby        Physical Exam                      There were no vitals filed for this visit.  Vital  Signs with Ranges     ROS is negative except that which was noted in the HPI.     Peripheral IV 11/01/19 Left Hand (Active)   Site Assessment WDL 11/1/2019  8:29 AM   Line Status Infusing 11/1/2019  8:29 AM   Phlebitis Scale 0-->no symptoms 11/1/2019  8:29 AM   Infiltration Scale 0 11/1/2019  8:29 AM   Extravasation? No 11/1/2019  8:29 AM   Dressing Intervention New dressing  11/1/2019  8:29 AM   Reason Not Rotated Anticipated discharge 11/1/2019  8:29 AM   Number of days: 62       Constitutional: awake, alert, cooperative, no apparent distress, and appears stated age  Eyes: Lids and lashes normal, pupils equal, sclera clear, conjunctiva normal  ENT: Normocephalic, without obvious abnormality, atraumatic.  Respiratory: No increased work of breathing, good air exchange, clear to auscultation bilaterally, no crackles or wheezing  Cardiovascular: Normal apical impulse, regular rate and rhythm, normal S1 and S2, no S3 or S4, and no murmur noted  GI: Soft.  Musculoskeletal: Minimal lower extremity edema.  Neurologic: Awake, alert.  Neuropsychiatric: General: normal, calm and normal eye contact    Medications         Data   No results found for this or any previous visit (from the past 24 hour(s)).

## 2020-01-03 PROBLEM — I35.0 SEVERE AORTIC STENOSIS: Status: ACTIVE | Noted: 2020-01-01

## 2020-01-03 NOTE — NURSING NOTE
"Chief Complaint   Patient presents with     RECHECK     6 month cardiology follow-up       Initial BP (!) 148/74 (BP Location: Left arm, Patient Position: Chair, Cuff Size: Child)   Pulse 85   Temp 98.1  F (36.7  C) (Tympanic)   Resp 20   Ht 1.626 m (5' 4\")   Wt 43.5 kg (96 lb)   SpO2 97%   BMI 16.48 kg/m   Estimated body mass index is 16.48 kg/m  as calculated from the following:    Height as of this encounter: 1.626 m (5' 4\").    Weight as of this encounter: 43.5 kg (96 lb).  Medication Reconciliation: complete  Margareth Glass LPN    "

## 2020-01-03 NOTE — PATIENT INSTRUCTIONS
You were seen by Dr. Kirby, 01/03/20.     1.  Please call the cardiology department when you would like to proceed with the Stress Test in [place].      2. Please continue all other medications as they have been prescribed.     3. If you develop new or worsening symptoms, please call the cardiology office as you may need to be evaluated.     You will follow up with Dr. Kirby when you decide when you want the stress test or sooner if needed.       Please call the cardiology office with problems, questions, or concerns at [phone].    If you experience chest pain, chest pressure, chest tightness, shortness of breath, fainting, lightheadedness, nausea, vomiting, or other concerning symptoms, please report to the Emergency Department or call 911. These symptoms may be emergent, and best treated in the Emergency Department.       Millie PLAZA RN  Cardiology   [facility]  [phone]

## 2020-01-03 NOTE — PROGRESS NOTES
ANTICOAGULATION FOLLOW-UP CLINIC VISIT    Patient Name:  Michael Christiansen  Date:  1/3/2020  Contact Type:  Face to Face    SUBJECTIVE:  Patient Findings     Comments:   INR done. Patient reports no bleeding/bruising, no changes in diet/meds/activity or questions. INR results dicussed and advised patient re: warfarin dosing/INR recheck date. Patient verbalized understanding.          Clinical Outcomes     Negatives:   Major bleeding event, Thromboembolic event, Anticoagulation-related hospital admission, Anticoagulation-related ED visit, Anticoagulation-related fatality    Comments:   INR done. Patient reports no bleeding/bruising, no changes in diet/meds/activity or questions. INR results dicussed and advised patient re: warfarin dosing/INR recheck date. Patient verbalized understanding.             OBJECTIVE    INR Protime   Date Value Ref Range Status   2020 2.4 (A) 0.86 - 1.14 Final       ASSESSMENT / PLAN  INR assessment THER    Recheck INR In: 6 WEEKS    INR Location Clinic      Anticoagulation Summary  As of 1/3/2020    INR goal:   2.0-3.0   TTR:   65.2 % (1 y)   INR used for dosin.4 (1/3/2020)   Warfarin maintenance plan:   2.5 mg (5 mg x 0.5) every Mon, Fri; 5 mg (5 mg x 1) all other days   Full warfarin instructions:   2.5 mg every Mon, Fri; 5 mg all other days   Weekly warfarin total:   30 mg   No change documented:   Arthur, Amarilis, RN   Plan last modified:   Kasia Hercules RN (10/1/2018)   Next INR check:   2020   Priority:   Maintenance   Target end date:   Indefinite    Indications    Long-term (current) use of anticoagulants [Z79.01] [Z79.01]  Permanent atrial fibrillation [I48.21]             Anticoagulation Episode Summary     INR check location:       Preferred lab:       Send INR reminders to:   [facility] ANTICOAG POOL    Comments:         Anticoagulation Care Providers     Provider Role Specialty Phone number    Chandrika Kumar PA [facility] [phone]            See  the Encounter Report to view Anticoagulation Flowsheet and Dosing Calendar (Go to Encounters tab in chart review, and find the Anticoagulation Therapy Visit)        Amarilis Gibson RN

## 2020-01-04 PROBLEM — Z71.89 ACP (ADVANCE CARE PLANNING): Status: RESOLVED | Noted: 2017-01-26 | Resolved: 2020-01-01

## 2020-01-14 NOTE — TELEPHONE ENCOUNTER
traMADol (ULTRAM) 50 MG tablet      Last Written Prescription Date:  12/18/19  Last Fill Quantity: 100,   # refills: 0  Last Office Visit: 10/30/19  Future Office visit:    Next 5 appointments (look out 90 days)    Jan 24, 2020  8:40 AM CST  (Arrive by 8:25 AM)  SHORT with MELISA Murray  [facility] ([facility] ) [address]  [place] [address]  [phone]           Routing refill request to provider for review/approval because:  Drug not on the [facility], P or [facility] refill protocol or controlled substance

## 2020-01-21 NOTE — PROGRESS NOTES
Subjective     Michael Christiansen is a 84 year old female who presents to clinic today for the following health issues:    HPI   Hypertension Follow-up      Do you check your blood pressure regularly outside of the clinic? No     Are you following a low salt diet? No    Are your blood pressures ever more than 140 on the top number (systolic) OR more   than 90 on the bottom number (diastolic), for example 140/90? No    Anxiety Follow-Up    How are you doing with your anxiety since your last visit? Improved     Are you having other symptoms that might be associated with anxiety? No    Have you had a significant life event? Grief or Loss of      Are you feeling depressed? No    Do you have any concerns with your use of alcohol or other drugs? No    Social History     Tobacco Use     Smoking status: Former Smoker     Packs/day: 0.30     Years: 30.00     Pack years: 9.00     Types: Cigarettes     Last attempt to quit: 1987     Years since quittin.4     Smokeless tobacco: Never Used     Tobacco comment: year quit    Substance Use Topics     Alcohol use: No     Drug use: No     LISA-7 SCORE 2019   Total Score 12 6 1     PHQ 2019   PHQ-9 Total Score 9 5 3   Q9: Thoughts of better off dead/self-harm past 2 weeks Not at all Not at all Not at all     Last PHQ-9 2020   1.  Little interest or pleasure in doing things 0   2.  Feeling down, depressed, or hopeless 0   3.  Trouble falling or staying asleep, or sleeping too much 1   4.  Feeling tired or having little energy 1   5.  Poor appetite or overeating 0   6.  Feeling bad about yourself 0   7.  Trouble concentrating 1   8.  Moving slowly or restless 0   Q9: Thoughts of better off dead/self-harm past 2 weeks 0   PHQ-9 Total Score 3   Difficulty at work, home, or with people Not difficult at all     LISA-7  2020   1. Feeling nervous, anxious, or on edge 1   2. Not being able to stop or control worrying 0    3. Worrying too much about different things 0   4. Trouble relaxing 0   5. Being so restless that it is hard to sit still 0   6. Becoming easily annoyed or irritable 0   7. Feeling afraid, as if something awful might happen 0   LISA-7 Total Score 1   If you checked any problems, how difficult have they made it for you to do your work, take care of things at home, or get along with other people? -       Chronic Pain Follow-Up       Type / Location of Pain: low back  Analgesia/pain control:       Recent changes:  same      Overall control: Tolerable with discomfort  Activity level/function:      Daily activities:  Can do most things most days, with some rest    Work:  not applicable  Adverse effects:  No  Adherance    Taking medication as directed?  Yes    Participating in other treatments: not applicable  Risk Factors:    Sleep:  Good    Mood/anxiety:  controlled    Recent family or social stressors:  death in family:      Other aggravating factors: prolonged standing  PHQ-9 SCORE 6/6/2019 7/8/2019 1/24/2020   PHQ-9 Total Score 9 5 3     LISA-7 SCORE 6/6/2019 7/8/2019 1/24/2020   Total Score 12 6 1     Encounter-Level CSA:    There are no encounter-level csa.     Patient-Level CSA:    There are no patient-level csa.         How many servings of fruits and vegetables do you eat daily?  0-1    On average, how many sweetened beverages do you drink each day (Examples: soda, juice, sweet tea, etc.  Do NOT count diet or artificially sweetened beverages)?   0    How many days per week do you exercise enough to make your heart beat faster? 7    How many minutes a day do you exercise enough to make your heart beat faster? 9 or less    How many days per week do you miss taking your medication? 0        Patient Active Problem List   Diagnosis     Essential hypertension     Osteoarthritis     Permanent atrial fibrillation     Chronic pain syndrome     History of total knee replacement     Radiculitis     Osteoarthritis of  knee     Long-term (current) use of anticoagulants [Z79.01]     Osteoporosis     Malignant neoplasm of right breast in female, estrogen receptor positive, unspecified site of breast (H)     Pulmonary hypertension-moderate     Moderate tricuspid insufficiency     Anticoagulated on Coumadin     LISA (generalized anxiety disorder)     Acute blood loss anemia     Lumbar pain with radiation down right leg     Lumbar spinal stenosis     Protein-calorie malnutrition (H)     Valvular heart disease     Unexplained weight loss     Hx of rheumatic fever     Chronic, continuous use of opioids     ILD (interstitial lung disease) (H)     Left upper quadrant pain     Severe aortic stenosis     Past Surgical History:   Procedure Laterality Date     BREAST SURGERY      right  side mastectomy     C TOTAL KNEE ARTHROPLASTY Left 01/26/15     cataract extraction and lens implantation       COLONOSCOPY       ESOPHAGOSCOPY, GASTROSCOPY, DUODENOSCOPY (EGD), COMBINED N/A 2019    Procedure: UPPER ENDOSCOPY, WITH BIOPSY;  Surgeon: Fab Carroll MD;  Location: [facility]     EYE SURGERY       GYN SURGERY      tubal pregnancy     ORTHOPEDIC SURGERY      l. hip replacement LT     ORTHOPEDIC SURGERY  2013    Right total knee     TUBAL/ECTOPIC PREGNANCY         Social History     Tobacco Use     Smoking status: Former Smoker     Packs/day: 0.30     Years: 30.00     Pack years: 9.00     Types: Cigarettes     Last attempt to quit: 1987     Years since quittin.4     Smokeless tobacco: Never Used     Tobacco comment: year quit    Substance Use Topics     Alcohol use: No     Family History   Problem Relation Age of Onset     Diabetes Father 75        cause of death     Other - See Comments Father         PVD     Heart Disease Sister      Coronary Artery Disease Sister      Other - See Comments Mother 83        old age; cause of death     Other - See Comments Sister         14 year twin [employer]; cause of  death     Chronic Obstructive Pulmonary Disease Daughter      Osteoporosis Daughter      Thyroid Disease Daughter      Cancer Sister      Other Cancer Sister      Other Cancer Sister         uterine     Hypertension No family hx of      Hyperlipidemia No family hx of      Cerebrovascular Disease No family hx of      Breast Cancer No family hx of      Prostate Cancer No family hx of      Colon Cancer No family hx of      Asthma No family hx of      Anesthesia Reaction No family hx of      Genetic Disorder No family hx of          Current Outpatient Medications   Medication Sig Dispense Refill     Acetaminophen (TYLENOL PO) Take 500 mg by mouth every 4 hours as needed        albuterol (PROAIR HFA/PROVENTIL HFA/VENTOLIN HFA) 108 (90 Base) MCG/ACT inhaler Inhale 2 puffs into the lungs every 6 hours as needed for shortness of breath / dyspnea or wheezing 1 Inhaler 1     Calcium Citrate-Vitamin D (CALCITRATE/VITAMIN D PO) Take 1 tablet by mouth 2 times daily        diltiazem ER (DILT-XR) 180 MG 24 hr capsule Take 1 capsule (180 mg) by mouth daily 90 capsule 3     losartan (COZAAR) 50 MG tablet TAKE 1 TABLET BY MOUTH ONCE DAILY 90 tablet 2     Misc Natural Products (OSTEO BI-FLEX ADV DOUBLE ST PO) Take  by mouth daily.       Multiple Vitamin (MULTIVITAMINS PO) Take 1 tablet by mouth daily        Multiple Vitamins-Minerals (PRESERVISION AREDS 2 PO) Take by mouth 2 times daily        order for DME 4 wheeled walker 1 each 0     sertraline (ZOLOFT) 50 MG tablet Take 1 tablet (50 mg) by mouth daily 30 tablet 3     simvastatin (ZOCOR) 10 MG tablet Take 1 tablet (10 mg) by mouth At Bedtime 90 tablet 3     traMADol (ULTRAM) 50 MG tablet TAKE 1 TO 2 TABLETS BY MOUTH EVERY 4 HOURS AS NEEDED FOR PAIN DO  NOT  EXCEED  8  PER  24  HOURS 100 tablet 0     warfarin (COUMADIN) 5 MG tablet 2.5mg Mon/Fri and 5mg  all other days or as directed by warfarin clinic 100 tablet 3     warfarin ANTICOAGULANT (COUMADIN) 5 MG tablet TAKE ONE-HALF  TABLET BY MOUTH ON FRIDAYS, AND 1 TABLET ALL OTHER DAYS OF THE WEEK OR AS DIRECTED BY THE WARFARIN CLINIC 64 tablet 6     Allergies   Allergen Reactions     Atenolol Shortness Of Breath and Other (See Comments)     Dizziness  Severe vertigo and dizziness/SOB     Lisinopril Cough     Augmentin Diarrhea     Pollen Extract      Other reaction(s): Runny Nose     Recent Labs   Lab Test 10/10/19  1551 10/03/19  1628  05/28/19  1424 05/08/19  1248 12/31/18  1007  06/21/17  1025  10/27/14  1450   LDL  --   --   --   --   --  60  --  77  --   --    HDL  --   --   --   --   --  69  --  76  --   --    TRIG  --   --   --   --   --  63  --  61  --   --    ALT  --  24  --  23 19  --    < >  --    < > 22   CR 0.86 0.63   < > 0.72 0.67 0.87   < >  --    < > 0.92   GFRESTIMATED 62 82   < > 77 81 61   < >  --    < > 59*   GFRESTBLACK 71 >90   < > 89 >90 71   < >  --    < > 71   POTASSIUM 4.4 4.1  --  4.5 4.2  --    < >  --    < > 4.1   TSH  --   --   --   --   --   --   --   --   --  0.56    < > = values in this interval not displayed.      BP Readings from Last 3 Encounters:   01/24/20 132/70   01/03/20 133/78   11/01/19 119/77    Wt Readings from Last 3 Encounters:   01/24/20 42.8 kg (94 lb 6.4 oz)   01/03/20 43.5 kg (96 lb)   10/30/19 43.1 kg (95 lb)                      Reviewed and updated as needed this visit by Provider         Review of Systems   ROS COMP: Constitutional, HEENT, cardiovascular, pulmonary, GI, , musculoskeletal, neuro, skin, endocrine and psych systems are negative, except as otherwise noted.      Objective    /70 (BP Location: Left arm, Patient Position: Chair, Cuff Size: Adult Regular)   Pulse 106   Temp 97.3  F (36.3  C) (Tympanic)   Resp 20   Wt 42.8 kg (94 lb 6.4 oz)   SpO2 96%   BMI 16.20 kg/m    Body mass index is 16.2 kg/m .  Physical Exam   GENERAL: healthy, alert and no distress, recent loss of  discussion on this.   EYES: Eyes grossly normal to inspection, PERRL and conjunctivae  and sclerae normal  HENT: ear canals and TM's normal, nose and mouth without ulcers or lesions  NECK: no adenopathy, no asymmetry, masses, or scars and thyroid normal to palpation  RESP: lungs clear to auscultation - no rales, rhonchi or wheezes  CV: irregular rate and rhythm  Her heart is mildly tachycardic.   Loud harsh murmur on her left sternal border 3/6. No ankle edema  ABDOMEN: soft, nontender, no hepatosplenomegaly, no masses and bowel sounds normal  MS: she is arthritic.  Severe scoliosis.    SKIN: no suspicious lesions or rashes  NEURO: Normal strength and tone, mentation intact and speech normal daughter with her today.     Diagnostic Test Results:  Labs reviewed in Epic  No results found for this or any previous visit (from the past 24 hour(s)).        Assessment & Plan     1. Moderate protein-calorie malnutrition (H)  Her weight is down still. Working on her nutrition.     2. ILD (interstitial lung disease) (H)  Saw pulmonary, needing new stress test. Saw cardiology and will be doing dobutamine stress test.  She is going to be seeing us back in 3 months. Working with Cardiology for valve replacement.     3. Malignant neoplasm of right breast in female, estrogen receptor positive, unspecified site of breast (H)  Cancer free. Seeing us now for her cancer.       4. Essential hypertension  Good pressures today. Happy with improvement her heart rate is up. Showing no signs of failure.     5. Permanent atrial fibrillation  Remains ton coumadin.  HR up a little bit getting stress test due to severe valve disease. No syncope or dizziness.           See Patient Instructions    No follow-ups on file.    MELISA Gillespie  [facility] - [facility]

## 2020-01-24 NOTE — TELEPHONE ENCOUNTER
Spoke with patient PCP and she says that patient is ready to proceed with the dobutamine stress test that was discussed in last appointment with Dr. Kirby. Per patient when scheduling stress test to call daughter as she would be the one bringing her.

## 2020-01-24 NOTE — NURSING NOTE
"Chief Complaint   Patient presents with     Hypertension     Anxiety       Initial /70 (BP Location: Left arm, Patient Position: Chair, Cuff Size: Adult Regular)   Pulse 106   Temp 97.3  F (36.3  C) (Tympanic)   Resp 20   Wt 42.8 kg (94 lb 6.4 oz)   SpO2 96%   BMI 16.20 kg/m   Estimated body mass index is 16.2 kg/m  as calculated from the following:    Height as of 1/3/20: 1.626 m (5' 4\").    Weight as of this encounter: 42.8 kg (94 lb 6.4 oz).  Medication Reconciliation: complete  Heydi Thomas LPN    "

## 2020-01-27 NOTE — TELEPHONE ENCOUNTER
Cam Kirby, DO  You 2 days ago     Order for low dose dobutamine echo placed in [facility]. Thanks!     Dr. Kirby    Routing comment

## 2020-01-29 NOTE — TELEPHONE ENCOUNTER
Patient daughter called re:  Instructions for stress test.  She verified the .   Instructions given to hold Diltiazem day of the test per  and to bring along to take directly after the test.  All other instructions given and verbalized understanding.  She will be bringing her mother and staying with her.

## 2020-02-07 NOTE — TELEPHONE ENCOUNTER
Tramadol   Last Written Prescription Date:  1/15/2020  Last Fill Quantity: 100,   # refills: 0  Last Office Visit: 1/24/2020  Future Office visit:    Next 5 appointments (look out 90 days)    Apr 24, 2020  9:20 AM CDT  (Arrive by 9:05 AM)  [facility] [address]  [place] [address]  [phone]           Routing refill request to provider for review/approval because:  Drug not on the [facility], [facility] or  Health refill protocol or controlled substance

## 2020-02-11 NOTE — PROGRESS NOTES
1350 The patient arrived for a low dose Dobutamine stress echo for aortic stenosis.  The procedure, risks and benefits were discussed and the consent was signed.  The patient was prepped for the stress test, an IV was started,  and the echo sonographer did the initial images with Definity for image enhancement.   Dr. Rosado arrived, and the Dobutamine protocol was started via multistep infusion.  The patient had no complaints during or after the infusion.  Stress images were completed.  The IV was removed.The patient took her held Cardiezem, after the test, brought in her purse. The patient was informed that the test results would be given to her daughter Hui Camilo, by Dr. Kirby. They were instructed to call in 2 days, if they did not hear from the doctor.  The patient was released in stable condition.  Please see the chart for complete test results.

## 2020-02-13 NOTE — PROGRESS NOTES
Referral to Valve clinic from Dr. Kirby.  Pt will be called and NPP with appointments will be mailed out.    Date: 2/13/2020    Time of Call: 3:28 PM     Diagnosis:  Aortic stenosis     [ TORB ] Ordering provider: Sin Andrade MD  Order:   TAVR CT  Carotid US  CASE REQUEST:  [facility]  EKG  CBC, CMP, INR     Order received by: Iris Mcknight RN

## 2020-02-13 NOTE — TELEPHONE ENCOUNTER
Left VM with a welcome call. Gave my contact info to call me back to talk about the scheduled appointments for 3/5/20. And needing to schedule a [facility] / [facility]

## 2020-02-14 NOTE — PROGRESS NOTES
ANTICOAGULATION FOLLOW-UP CLINIC VISIT    Patient Name:  Michael Christiansen  Date:  2020  Contact Type:  Face to Face    SUBJECTIVE:  Patient Findings     Positives:   Missed doses (missed one dose)    Comments:   No bleeding/bruising. She states she is eating fairly good. She is cooking for herself and going out with her children (death of her  6 weeks ago). She has no bleeding/bruising. She states she did forget one dose of warfarin        Clinical Outcomes     Negatives:   Major bleeding event, Thromboembolic event, Anticoagulation-related hospital admission, Anticoagulation-related ED visit, Anticoagulation-related fatality    Comments:   No bleeding/bruising. She states she is eating fairly good. She is cooking for herself and going out with her children (death of her  6 weeks ago). She has no bleeding/bruising. She states she did forget one dose of warfarin           OBJECTIVE    INR Protime   Date Value Ref Range Status   2020 2.7 (A) 0.86 - 1.14 Final       ASSESSMENT / PLAN  INR assessment THER    Recheck INR In: 6 WEEKS    INR Location Clinic      Anticoagulation Summary  As of 2020    INR goal:   2.0-3.0   TTR:   65.2 % (1 y)   INR used for dosin.7 (2020)   Warfarin maintenance plan:   2.5 mg (5 mg x 0.5) every Mon, Fri; 5 mg (5 mg x 1) all other days   Full warfarin instructions:   2.5 mg every Mon, Fri; 5 mg all other days   Weekly warfarin total:   30 mg   No change documented:   Kasia Hercules RN   Plan last modified:   Kasia Hercules RN (10/1/2018)   Next INR check:   3/27/2020   Priority:   Maintenance   Target end date:   Indefinite    Indications    Long-term (current) use of anticoagulants [Z79.01] [Z79.01]  Permanent atrial fibrillation [I48.21]             Anticoagulation Episode Summary     INR check location:       Preferred lab:       Send INR reminders to:   [facility] ANTICOAG POOL    Comments:         Anticoagulation Care Providers     Provider Role  Specialty Phone number    Chandrika Kumra PA [facility] [phone]            See the Encounter Report to view Anticoagulation Flowsheet and Dosing Calendar (Go to Encounters tab in chart review, and find the Anticoagulation Therapy Visit)        Kasia Hercules RN

## 2020-02-28 NOTE — PROGRESS NOTES
ANTICOAGULATION FOLLOW-UP CLINIC VISIT    Patient Name:  Michael Christiansen  Date:  2/28/2020  Contact Type:  Telephone/ spoke to patient daughter    SUBJECTIVE:  Patient Findings     Positives:   Upcoming invasive procedure (angiogram 3/6/2020, hold x 5 days per [facility] instruction)    Comments:   Call from patient daughter stating they were told to hold patient warfarin for 5 days for anticipated angiogram on 3/6/2020 (per [facility] instruction).  Daughter states last dose of warfarin is 2/29/2020. We discussed warfarin hold and we also discussed post procedure warfarin dosing and new INR recheck date. Daughter verbalized understanding of instructions and has no questions. Daughter also states that patient will be having a valve repair sometime in April but date is not set yet.         Clinical Outcomes     Negatives:   Major bleeding event, Thromboembolic event, Anticoagulation-related hospital admission, Anticoagulation-related ED visit, Anticoagulation-related fatality    Comments:   Call from patient daughter stating they were told to hold patient warfarin for 5 days for anticipated angiogram on 3/6/2020 (per [facility] instruction).  Daughter states last dose of warfarin is 2/29/2020. We discussed warfarin hold and we also discussed post procedure warfarin dosing and new INR recheck date. Daughter verbalized understanding of instructions and has no questions. Daughter also states that patient will be having a valve repair sometime in April but date is not set yet.            OBJECTIVE    INR Protime   Date Value Ref Range Status   02/14/2020 2.7 (A) 0.86 - 1.14 Final       ASSESSMENT / PLAN  No question data found.  Anticoagulation Summary  As of 2/28/2020    INR goal:   2.0-3.0   TTR:   63.8 % (11.7 mo)   INR used for dosing:   No new INR was available at the time of this encounter.   Warfarin maintenance plan:   2.5 mg (5 mg x 0.5) every Mon, Fri; 5 mg (5 mg x 1) all other days   Full warfarin instructions:   3/1:  Hold; 3/2: Hold; 3/3: Hold; 3/4: Hold; 3/5: Hold; 3/7: 7.5 mg; 3/8: 7.5 mg; 3/9: 5 mg; Otherwise 2.5 mg every Mon, Fri; 5 mg all other days   Weekly warfarin total:   30 mg   Plan last modified:   Kasia Hercules RN (10/1/2018)   Next INR check:   3/11/2020   Priority:   Maintenance   Target end date:   Indefinite    Indications    Long-term (current) use of anticoagulants [Z79.01] [Z79.01]  Permanent atrial fibrillation [I48.21]             Anticoagulation Episode Summary     INR check location:       Preferred lab:       Send INR reminders to:   [facility] ANTICOAG POOL    Comments:         Anticoagulation Care Providers     Provider Role Specialty Phone number    Chandrika Kumar, PA [facility] [phone]            See the Encounter Report to view Anticoagulation Flowsheet and Dosing Calendar (Go to Encounters tab in chart review, and find the Anticoagulation Therapy Visit)        Kasia Hercules RN

## 2020-03-02 NOTE — PROGRESS NOTES
ANTICOAGULATION FOLLOW-UP CLINIC VISIT    Patient Name:  Michael Christiansen  Date:  3/2/2020  Contact Type:  Telephone/ spoke to patient daughter via phone    SUBJECTIVE:  Patient Findings     Positives:   Extra doses (warfarin on hold, patient took dose last night)    Comments:   Call from patient daughter. Patient is currently suppose to hold warfarin due to angiogram at the end of this week. Daughter states that patient took her warfarin last night by mistake. We discussed that she should not take any more warfarin. Daughter to have patient increase vit K intake for a few days. Daughter verbalized understanding and has no questions.         Clinical Outcomes     Negatives:   Major bleeding event, Thromboembolic event, Anticoagulation-related hospital admission, Anticoagulation-related ED visit, Anticoagulation-related fatality    Comments:   Call from patient daughter. Patient is currently suppose to hold warfarin due to angiogram at the end of this week. Daughter states that patient took her warfarin last night by mistake. We discussed that she should not take any more warfarin. Daughter to have patient increase vit K intake for a few days. Daughter verbalized understanding and has no questions.            OBJECTIVE    INR Protime   Date Value Ref Range Status   02/14/2020 2.7 (A) 0.86 - 1.14 Final       ASSESSMENT / PLAN  No question data found.  Anticoagulation Summary  As of 3/2/2020    INR goal:   2.0-3.0   TTR:   63.5 % (11.6 mo)   INR used for dosing:   No new INR was available at the time of this encounter.   Warfarin maintenance plan:   2.5 mg (5 mg x 0.5) every Mon, Fri; 5 mg (5 mg x 1) all other days   Full warfarin instructions:   3/2: Hold; 3/3: Hold; 3/4: Hold; 3/5: Hold; 3/7: 7.5 mg; 3/8: 7.5 mg; 3/9: 5 mg; Otherwise 2.5 mg every Mon, Fri; 5 mg all other days   Weekly warfarin total:   30 mg   No change documented:   Kasia Hercules RN   Plan last modified:   Kasia Hercules RN (10/1/2018)   Next INR  check:   3/11/2020   Priority:   Maintenance   Target end date:   Indefinite    Indications    Long-term (current) use of anticoagulants [Z79.01] [Z79.01]  Permanent atrial fibrillation [I48.21]             Anticoagulation Episode Summary     INR check location:       Preferred lab:       Send INR reminders to:   [facility] ANTICOAG POOL    Comments:         Anticoagulation Care Providers     Provider Role Specialty Phone number    Chandrika Kumar, PA [facility] [phone]            See the Encounter Report to view Anticoagulation Flowsheet and Dosing Calendar (Go to Encounters tab in chart review, and find the Anticoagulation Therapy Visit)        Kasia Hercules RN

## 2020-03-03 PROBLEM — I51.89 OTHER ILL-DEFINED HEART DISEASES: Status: ACTIVE | Noted: 2020-01-01

## 2020-03-04 NOTE — PROGRESS NOTES
Date: 3/4/2020    Time of Call: 10:08 AM     Diagnosis:  Severe aortic stenosis     [ TORB ] Ordering provider: Sin Andrade MD  Order:   Pre-procedure angiogram_[facility] orders     Order received by: Iris Mcknight RN     Follow-up/additional notes:   Ordered for upcoming angiogram

## 2020-03-05 NOTE — PROGRESS NOTES
[facility] HEART CARE  CARDIOVASCULAR DIVISION    VALVE CLINIC INITIAL CONSULTATION    PRIMARY CARDIOLOGIST: Dr. Kirby      PERTINENT CLINICAL HISTORY:     Michael Christiansen is a very pleasant 84-year old female with severe aortic valvular stenosis referred to our clinic for evaluation and consideration for potential transcatheter aortic valve replacement (TAVR).  Her severe aortic stenosis is characterized with an area of 0.44 cm2, mean gradient 31 mmHg and peak velocity of 4.1 m/sec with LVEF 60-65% by echocardiogram on 12/19/19.  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         PAST MEDICAL HISTORY:     Past Medical History:   Diagnosis Date     Arthritis      Backache, unspecified 1/1/2011     Breast cancer, stage 1 3/19/2010     Chronic pain syndrome 1/23/2015     Claustrophobia 1/1/2011     Hip joint replacement by other means 1/1/2011     Osteoarthrosis, unspecified whether generalized or localized, lower leg 8/14/2006     Posttraumatic stress disorder 1/1/2011        PAST SURGICAL HISTORY:     Past Surgical History:   Procedure Laterality Date     BREAST SURGERY      right  side mastectomy     C TOTAL KNEE ARTHROPLASTY Left 01/26/15     cataract extraction and lens implantation       COLONOSCOPY       ESOPHAGOSCOPY, GASTROSCOPY, DUODENOSCOPY (EGD), COMBINED N/A 11/1/2019    Procedure: UPPER ENDOSCOPY, WITH BIOPSY;  Surgeon: Fab Carroll MD;  Location: [facility]     EYE SURGERY       GYN SURGERY      tubal pregnancy     ORTHOPEDIC SURGERY  2009    l. hip replacement LT     ORTHOPEDIC SURGERY  july 19th 2013    Right total knee     TUBAL/ECTOPIC PREGNANCY          CURRENT MEDICATIONS:     Current Outpatient Medications   Medication Sig Dispense Refill     Acetaminophen (TYLENOL PO) Take 500 mg by mouth every 4 hours as needed        albuterol (PROAIR HFA/PROVENTIL HFA/VENTOLIN HFA) 108 (90 Base)  MCG/ACT inhaler Inhale 2 puffs into the lungs every 6 hours as needed for shortness of breath / dyspnea or wheezing 1 Inhaler 1     Calcium Citrate-Vitamin D (CALCITRATE/VITAMIN D PO) Take 1 tablet by mouth 2 times daily        diltiazem ER (DILT-XR) 180 MG 24 hr capsule Take 1 capsule (180 mg) by mouth daily 90 capsule 3     losartan (COZAAR) 50 MG tablet TAKE 1 TABLET BY MOUTH ONCE DAILY 90 tablet 2     Misc Natural Products (OSTEO BI-FLEX ADV DOUBLE ST PO) Take  by mouth daily.       Multiple Vitamin (MULTIVITAMINS PO) Take 1 tablet by mouth daily        Multiple Vitamins-Minerals (PRESERVISION AREDS 2 PO) Take by mouth 2 times daily        order for DME 4 wheeled walker 1 each 0     sertraline (ZOLOFT) 50 MG tablet Take 1 tablet (50 mg) by mouth daily 30 tablet 3     simvastatin (ZOCOR) 10 MG tablet Take 1 tablet (10 mg) by mouth At Bedtime 90 tablet 3     traMADol (ULTRAM) 50 MG tablet TAKE 1 TO 2 TABLETS BY MOUTH EVERY 4 HOURS AS NEEDED FOR PAIN--DO  NOT  EXCEED  8  TABLETS  PER  24 HOURS 100 tablet 0     warfarin (COUMADIN) 5 MG tablet 2.5mg Mon/Fri and 5mg  all other days or as directed by warfarin clinic 100 tablet 3     warfarin ANTICOAGULANT (COUMADIN) 5 MG tablet TAKE ONE-HALF TABLET BY MOUTH ON FRIDAYS, AND 1 TABLET ALL OTHER DAYS OF THE WEEK OR AS DIRECTED BY THE WARFARIN CLINIC 64 tablet 6        ALLERGIES:     Allergies   Allergen Reactions     Atenolol Shortness Of Breath and Other (See Comments)     Dizziness  Severe vertigo and dizziness/SOB     Lisinopril Cough     Augmentin Diarrhea     Pollen Extract      Other reaction(s): Runny Nose        FAMILY HISTORY:     Family History   Problem Relation Age of Onset     Diabetes Father 75        cause of death     Other - See Comments Father         PVD     Heart Disease Sister      Coronary Artery Disease Sister      Other - See Comments Mother 83        old age; cause of death     Other - See Comments Sister         14 year twin pow concentration  Somers; cause of death     Chronic Obstructive Pulmonary Disease Daughter      Osteoporosis Daughter      Thyroid Disease Daughter      Cancer Sister      Other Cancer Sister      Other Cancer Sister         uterine     Hypertension No family hx of      Hyperlipidemia No family hx of      Cerebrovascular Disease No family hx of      Breast Cancer No family hx of      Prostate Cancer No family hx of      Colon Cancer No family hx of      Asthma No family hx of      Anesthesia Reaction No family hx of      Genetic Disorder No family hx of         SOCIAL HISTORY:     Social History     Socioeconomic History     Marital status:      Spouse name: Not on file     Number of children: Not on file     Years of education: Not on file     Highest education level: Not on file   Occupational History     Not on file   Social Needs     Financial resource strain: Not on file     Food insecurity:     Worry: Not on file     Inability: Not on file     Transportation needs:     Medical: Not on file     Non-medical: Not on file   Tobacco Use     Smoking status: Former Smoker     Packs/day: 0.30     Years: 30.00     Pack years: 9.00     Types: Cigarettes     Last attempt to quit: 1987     Years since quittin.5     Smokeless tobacco: Never Used     Tobacco comment: year quit    Substance and Sexual Activity     Alcohol use: No     Drug use: No     Sexual activity: Not Currently     Partners: Male   Lifestyle     Physical activity:     Days per week: Not on file     Minutes per session: Not on file     Stress: Not on file   Relationships     Social connections:     Talks on phone: Not on file     Gets together: Not on file     Attends [employer] service: Not on file     Active member of club or organization: Not on file     Attends meetings of clubs or organizations: Not on file     Relationship status: Not on file     Intimate partner violence:     Fear of current or ex partner: Not on file     Emotionally abused: Not on  file     Physically abused: Not on file     Forced sexual activity: Not on file   Other Topics Concern     Parent/sibling w/ CABG, MI or angioplasty before 65F 55M? No      Service Not Asked     Blood Transfusions Yes     Caffeine Concern Not Asked     Occupational Exposure Not Asked     Hobby Hazards Not Asked     Sleep Concern Not Asked     Stress Concern Not Asked     Weight Concern Not Asked     Special Diet Not Asked     Back Care Not Asked     Exercise Not Asked     Bike Helmet Not Asked     Seat Belt Not Asked     Self-Exams Not Asked   Social History Narrative     Not on file        REVIEW OF SYSTEMS:     Constitutional: No fevers or chills  Skin: No new rash or itching  Eyes: No acute change in vision  Ears/Nose/Throat: No purulent rhinorrhea, new hearing loss, or new vertigo  Respiratory: No cough or hemoptysis  Cardiovascular: See HPI  Gastrointestinal: No change in appetite, vomiting, hematemesis or diarrhea  Genitourinary: No dysuria or hematuria  Musculoskeletal: No new back pain, neck pain or muscle pain  Neurologic: No new headaches, focal weakness or behavior changes  Psychiatric: No hallucinations, excessive alcohol consumption or illegal drug usage  Hematologic/Lymphatic/Immunologic: No bleeding, chills, fever, night sweats or weight loss  Endocrine: No new cold intolerance, heat intolerance, polyphagia, polydipsia or polyuria      PHYSICAL EXAMINATION:     There were no vitals taken for this visit.    GENERAL: No acute distress.  HEENT: EOMI. Sclerae white, not injected. Pharynx without erythema or exudate.   Neck: No adenopathy. No thyromegaly. Symmetrical.   Heart: Regular rate and rhythm. Normal S1 with diminished S2. Harsh crescendo decrescendo late peaking systolic murmur with radiation to carotids. Delayed carotid pulses.   Lungs: Clear to auscultation. No ronchi, wheezes, rales.   Abdomen: Soft, nontender, nondistended. Bowel sounds present.  Extremities: No clubbing, cyanosis, or  edema.   Neurologic: Alert and oriented to person/place/time, normal speech and affect. No focal deficits.  Skin: No petechiae, purpura or rash.     LABORATORY DATA:     LIPID RESULTS:  Lab Results   Component Value Date    CHOL 142 12/31/2018    HDL 69 12/31/2018    LDL 60 12/31/2018    TRIG 63 12/31/2018       LIVER ENZYME RESULTS:  Lab Results   Component Value Date    AST 26 03/05/2020    ALT 18 03/05/2020       CBC RESULTS:  Lab Results   Component Value Date    WBC 11.9 (H) 03/05/2020    RBC 4.35 03/05/2020    HGB 13.7 03/05/2020    HCT 41.4 03/05/2020    MCV 95 03/05/2020    MCH 31.5 03/05/2020    MCHC 33.1 03/05/2020    RDW 12.8 03/05/2020     03/05/2020       BMP RESULTS:  Lab Results   Component Value Date     03/05/2020    POTASSIUM 4.5 03/05/2020    CHLORIDE 100 03/05/2020    CO2 31 03/05/2020    ANIONGAP 4 03/05/2020    GLC 73 03/05/2020    BUN 26 03/05/2020    CR 0.91 03/05/2020    GFRESTIMATED 58 (L) 03/05/2020    GFRESTBLACK 67 03/05/2020    LOIS 9.7 03/05/2020        A1C RESULTS:  No results found for: A1C    INR RESULTS:  Lab Results   Component Value Date    INR 1.67 (H) 03/05/2020    INR 2.7 (A) 02/14/2020    INR 2.4 (A) 01/03/2020    INR 1.14 11/01/2019          PROCEDURES & FURTHER ASSESSMENTS:     Echo 12/19/19  Interpretation Summary  No pericardial effusion is present.  Global and regional left ventricular function is normal with an EF of 60-65%.  Mild to moderate left atrial enlargement is present.  Moderate right atrial enlargement is present.  Moderate mitral annular calcification is present.  Mild mitral insufficiency is present.  Severe aortic valve calcification is present.  Mild aortic insufficiency is present.  The peak aortic velocity is 4.2 m/sec.  Peak AoV pressure gradient 70.7 mm Hg  The mean gradient across the aortic valve is32.8 mmHg.  Moderate to severe aortic stenosis is present.  Moderate to severe tricuspid insufficiency is present.  Right ventricular systolic  pressure is 40mmHg above the right atrial pressure.  Mild pulmonic insufficiency is present.    Dobutamine echo 2/11/20  Interpretation Summary  Stress test performed for aortic valve stenosis evaluation. The rhythm is atrial fibrillation.     Baseline measurement: Aov mean: 30 mmHg, GLEN (VTI) 0.72 cm2 . With stress (10mcg/kg/min) Vmax increased to 4.42cm/s, max gradient was increased to 78mmHg and a mean of 47mmHg. The GLEN was calculated at 0.7cm2. This is consistent with severe AS.  There is mild MR. Moderate mitral annular calcification. Mild TR. There is moderate pulmonaray hypertension.  There proximal septal thickening. LV EF at baseline is 6-65% and increased to 65+%.  No symptoms to suggest ischemia during the study.  Inferolateral ST segment depressions noted with stress consistent with  strain/ischemia.      STS PROM RISK SCORE: 13.5%  FRAILTY SCORE: Pending  NYHA CLASS: III     CLINICAL IMPRESSION:     Michael Christiansen is a 84 year old female with symptomatic severe aortic stenosis who is a good candidate for transcatheter aortic valve replacement based on age, frailty, co-morbidities and overall surgical mortality risk estimation of 13.5% based on the STS score.      The pre-procedural TAVR evaluation currently requires additional assessment with CT TAVR, coronary angiogram/right heart cath prior to presentation to the Heart team for discussion during our multi-disciplinary conference for consensus decision and procedural planning.    Plan Summary:  1) Severe Aortic Stenosis:  -coronary angiogram/ RHC and CT TAVR pending  -Presentation of data to the Heart team to assess the optimal treatment of her severe aortic stenosis        CC  Patient Care Team:  Chandrika Kumar PA as PCP - General  Chandrika Kumar PA as Assigned PCP  Kasia Hercules RN as Registered Nurse  Cam Kirby,  as MD (Cardiology)  Amarilis Gibson, DAVI as Clinic Care Coordinator  SELF, REFERRED    I have seen and examined the  patient with the TAVR team. I agree with the assessment and plan of the note above.I have reviewed pertinent labs.     Owen Hedrick MD  Interventional Cardiology  Pager: [phone]

## 2020-03-05 NOTE — PATIENT INSTRUCTIONS
You were seen today in the Cardiovascular Clinic at the [facility].      Cardiology Providers you saw during your visit:  Dr. Floridalma MD    Recommendations:      Coronary angiogram      Pre-procedure Instructions:      (Coronary angiogram):   March 6, check in at 9:00 a.m.  You will be scheduled for a Coronary Angiogram at the [facility], [address], [place] [address]. You are to check in at the [facility] Waiting Area.   Pre procedure instructions:  1. Please make arrangements to have a  as you will not be allowed to drive following the procedure.  2. Do not eat or drink after midnight the day of your procedure.  3. You may take your usual morning medications with a sip of water the morning of your procedure.  4. Take a 325 mg aspirin the day before and the day of your procedure.  5. Make arrangements to have someone stay with you the night after your procedure.      After all of your imaging/testing is completed, your case will be discussed at our Valve conference.  After this conference, the recommendations will be discussed with you, along with the appropriate follow up appointments.        For questions, you may call the following numbers:    1.  [phone]:  Chantel, Structural Heart Lead CMA    2.  For emergencies call 911.    It was a pleasure to see you in clinic.  If you have any questions at all, please feel free to give me a call.      Migdalia Mcknight RN  Structural Heart Care Coordinator   TAVR and MitraClip Programs  [facility]  Office: [phone]    Clinics and Surgery Center  [address] [place] [address]  Cardiology Clinic [id number]  [place] [address]

## 2020-03-05 NOTE — LETTER
3/5/2020      RE: Michael Christiansen  [address] [facility] [address]       Dear Colleague,    Thank you for the opportunity to participate in the care of your patient, Michael Christiansen, at the [facility] at [facility]. Please see a copy of my visit note below.    [facility] HEART CARE  CARDIOVASCULAR DIVISION    VALVE CLINIC INITIAL CONSULTATION    PRIMARY CARDIOLOGIST: Dr. Kirby      PERTINENT CLINICAL HISTORY:     Michael Christiansen is a very pleasant 84-year old female with severe aortic valvular stenosis referred to our clinic for evaluation and consideration for potential transcatheter aortic valve replacement (TAVR).  Her severe aortic stenosis is characterized with an area of 0.44 cm2, mean gradient 31 mmHg and peak velocity of 4.1 m/sec with LVEF 60-65% by echocardiogram on 12/19/19.  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         PAST MEDICAL HISTORY:     Past Medical History:   Diagnosis Date     Arthritis      Backache, unspecified 1/1/2011     Breast cancer, stage 1 3/19/2010     Chronic pain syndrome 1/23/2015     Claustrophobia 1/1/2011     Hip joint replacement by other means 1/1/2011     Osteoarthrosis, unspecified whether generalized or localized, lower leg 8/14/2006     Posttraumatic stress disorder 1/1/2011        PAST SURGICAL HISTORY:     Past Surgical History:   Procedure Laterality Date     BREAST SURGERY      right  side mastectomy     C TOTAL KNEE ARTHROPLASTY Left 01/26/15     cataract extraction and lens implantation       COLONOSCOPY       ESOPHAGOSCOPY, GASTROSCOPY, DUODENOSCOPY (EGD), COMBINED N/A 11/1/2019    Procedure: UPPER ENDOSCOPY, WITH BIOPSY;  Surgeon: Fab Carroll MD;  Location: [facility]     EYE SURGERY       GYN SURGERY      tubal pregnancy     ORTHOPEDIC SURGERY  2009    l. hip replacement LT     ORTHOPEDIC SURGERY   july 19th 2013    Right total knee     TUBAL/ECTOPIC PREGNANCY          CURRENT MEDICATIONS:     Current Outpatient Medications   Medication Sig Dispense Refill     Acetaminophen (TYLENOL PO) Take 500 mg by mouth every 4 hours as needed        albuterol (PROAIR HFA/PROVENTIL HFA/VENTOLIN HFA) 108 (90 Base) MCG/ACT inhaler Inhale 2 puffs into the lungs every 6 hours as needed for shortness of breath / dyspnea or wheezing 1 Inhaler 1     Calcium Citrate-Vitamin D (CALCITRATE/VITAMIN D PO) Take 1 tablet by mouth 2 times daily        diltiazem ER (DILT-XR) 180 MG 24 hr capsule Take 1 capsule (180 mg) by mouth daily 90 capsule 3     losartan (COZAAR) 50 MG tablet TAKE 1 TABLET BY MOUTH ONCE DAILY 90 tablet 2     Misc Natural Products (OSTEO BI-FLEX ADV DOUBLE ST PO) Take  by mouth daily.       Multiple Vitamin (MULTIVITAMINS PO) Take 1 tablet by mouth daily        Multiple Vitamins-Minerals (PRESERVISION AREDS 2 PO) Take by mouth 2 times daily        order for DME 4 wheeled walker 1 each 0     sertraline (ZOLOFT) 50 MG tablet Take 1 tablet (50 mg) by mouth daily 30 tablet 3     simvastatin (ZOCOR) 10 MG tablet Take 1 tablet (10 mg) by mouth At Bedtime 90 tablet 3     traMADol (ULTRAM) 50 MG tablet TAKE 1 TO 2 TABLETS BY MOUTH EVERY 4 HOURS AS NEEDED FOR PAIN--DO  NOT  EXCEED  8  TABLETS  PER  24 HOURS 100 tablet 0     warfarin (COUMADIN) 5 MG tablet 2.5mg Mon/Fri and 5mg  all other days or as directed by warfarin clinic 100 tablet 3     warfarin ANTICOAGULANT (COUMADIN) 5 MG tablet TAKE ONE-HALF TABLET BY MOUTH ON FRIDAYS, AND 1 TABLET ALL OTHER DAYS OF THE WEEK OR AS DIRECTED BY THE WARFARIN CLINIC 64 tablet 6        ALLERGIES:     Allergies   Allergen Reactions     Atenolol Shortness Of Breath and Other (See Comments)     Dizziness  Severe vertigo and dizziness/SOB     Lisinopril Cough     Augmentin Diarrhea     Pollen Extract      Other reaction(s): Runny Nose        FAMILY HISTORY:     Family History   Problem  Relation Age of Onset     Diabetes Father 75        cause of death     Other - See Comments Father         PVD     Heart Disease Sister      Coronary Artery Disease Sister      Other - See Comments Mother 83        old age; cause of death     Other - See Comments Sister         14 year twin [employer]; cause of death     Chronic Obstructive Pulmonary Disease Daughter      Osteoporosis Daughter      Thyroid Disease Daughter      Cancer Sister      Other Cancer Sister      Other Cancer Sister         uterine     Hypertension No family hx of      Hyperlipidemia No family hx of      Cerebrovascular Disease No family hx of      Breast Cancer No family hx of      Prostate Cancer No family hx of      Colon Cancer No family hx of      Asthma No family hx of      Anesthesia Reaction No family hx of      Genetic Disorder No family hx of         SOCIAL HISTORY:     Social History     Socioeconomic History     Marital status:      Spouse name: Not on file     Number of children: Not on file     Years of education: Not on file     Highest education level: Not on file   Occupational History     Not on file   Social Needs     Financial resource strain: Not on file     Food insecurity:     Worry: Not on file     Inability: Not on file     Transportation needs:     Medical: Not on file     Non-medical: Not on file   Tobacco Use     Smoking status: Former Smoker     Packs/day: 0.30     Years: 30.00     Pack years: 9.00     Types: Cigarettes     Last attempt to quit: 1987     Years since quittin.5     Smokeless tobacco: Never Used     Tobacco comment: year quit    Substance and Sexual Activity     Alcohol use: No     Drug use: No     Sexual activity: Not Currently     Partners: Male   Lifestyle     Physical activity:     Days per week: Not on file     Minutes per session: Not on file     Stress: Not on file   Relationships     Social connections:     Talks on phone: Not on file     Gets together: Not  on file     Attends Sabianist service: Not on file     Active member of club or organization: Not on file     Attends meetings of clubs or organizations: Not on file     Relationship status: Not on file     Intimate partner violence:     Fear of current or ex partner: Not on file     Emotionally abused: Not on file     Physically abused: Not on file     Forced sexual activity: Not on file   Other Topics Concern     Parent/sibling w/ CABG, MI or angioplasty before 65F 55M? No      Service Not Asked     Blood Transfusions Yes     Caffeine Concern Not Asked     Occupational Exposure Not Asked     Hobby Hazards Not Asked     Sleep Concern Not Asked     Stress Concern Not Asked     Weight Concern Not Asked     Special Diet Not Asked     Back Care Not Asked     Exercise Not Asked     Bike Helmet Not Asked     Seat Belt Not Asked     Self-Exams Not Asked   Social History Narrative     Not on file        REVIEW OF SYSTEMS:     Constitutional: No fevers or chills  Skin: No new rash or itching  Eyes: No acute change in vision  Ears/Nose/Throat: No purulent rhinorrhea, new hearing loss, or new vertigo  Respiratory: No cough or hemoptysis  Cardiovascular: See HPI  Gastrointestinal: No change in appetite, vomiting, hematemesis or diarrhea  Genitourinary: No dysuria or hematuria  Musculoskeletal: No new back pain, neck pain or muscle pain  Neurologic: No new headaches, focal weakness or behavior changes  Psychiatric: No hallucinations, excessive alcohol consumption or illegal drug usage  Hematologic/Lymphatic/Immunologic: No bleeding, chills, fever, night sweats or weight loss  Endocrine: No new cold intolerance, heat intolerance, polyphagia, polydipsia or polyuria      PHYSICAL EXAMINATION:     There were no vitals taken for this visit.    GENERAL: No acute distress.  HEENT: EOMI. Sclerae white, not injected. Pharynx without erythema or exudate.   Neck: No adenopathy. No thyromegaly. Symmetrical.   Heart: Regular rate  and rhythm. Normal S1 with diminished S2. Harsh crescendo decrescendo late peaking systolic murmur with radiation to carotids. Delayed carotid pulses.   Lungs: Clear to auscultation. No ronchi, wheezes, rales.   Abdomen: Soft, nontender, nondistended. Bowel sounds present.  Extremities: No clubbing, cyanosis, or edema.   Neurologic: Alert and oriented to person/place/time, normal speech and affect. No focal deficits.  Skin: No petechiae, purpura or rash.     LABORATORY DATA:     LIPID RESULTS:  Lab Results   Component Value Date    CHOL 142 12/31/2018    HDL 69 12/31/2018    LDL 60 12/31/2018    TRIG 63 12/31/2018       LIVER ENZYME RESULTS:  Lab Results   Component Value Date    AST 26 03/05/2020    ALT 18 03/05/2020       CBC RESULTS:  Lab Results   Component Value Date    WBC 11.9 (H) 03/05/2020    RBC 4.35 03/05/2020    HGB 13.7 03/05/2020    HCT 41.4 03/05/2020    MCV 95 03/05/2020    MCH 31.5 03/05/2020    MCHC 33.1 03/05/2020    RDW 12.8 03/05/2020     03/05/2020       BMP RESULTS:  Lab Results   Component Value Date     03/05/2020    POTASSIUM 4.5 03/05/2020    CHLORIDE 100 03/05/2020    CO2 31 03/05/2020    ANIONGAP 4 03/05/2020    GLC 73 03/05/2020    BUN 26 03/05/2020    CR 0.91 03/05/2020    GFRESTIMATED 58 (L) 03/05/2020    GFRESTBLACK 67 03/05/2020    LOIS 9.7 03/05/2020        A1C RESULTS:  No results found for: A1C    INR RESULTS:  Lab Results   Component Value Date    INR 1.67 (H) 03/05/2020    INR 2.7 (A) 02/14/2020    INR 2.4 (A) 01/03/2020    INR 1.14 11/01/2019          PROCEDURES & FURTHER ASSESSMENTS:     Echo 12/19/19  Interpretation Summary  No pericardial effusion is present.  Global and regional left ventricular function is normal with an EF of 60-65%.  Mild to moderate left atrial enlargement is present.  Moderate right atrial enlargement is present.  Moderate mitral annular calcification is present.  Mild mitral insufficiency is present.  Severe aortic valve calcification is  present.  Mild aortic insufficiency is present.  The peak aortic velocity is 4.2 m/sec.  Peak AoV pressure gradient 70.7 mm Hg  The mean gradient across the aortic valve is32.8 mmHg.  Moderate to severe aortic stenosis is present.  Moderate to severe tricuspid insufficiency is present.  Right ventricular systolic pressure is 40mmHg above the right atrial pressure.  Mild pulmonic insufficiency is present.    Dobutamine echo 2/11/20  Interpretation Summary  Stress test performed for aortic valve stenosis evaluation. The rhythm is atrial fibrillation.     Baseline measurement: Aov mean: 30 mmHg, GLEN (VTI) 0.72 cm2 . With stress (10mcg/kg/min) Vmax increased to 4.42cm/s, max gradient was increased to 78mmHg and a mean of 47mmHg. The GLEN was calculated at 0.7cm2. This is consistent with severe AS.  There is mild MR. Moderate mitral annular calcification. Mild TR. There is moderate pulmonaray hypertension.  There proximal septal thickening. LV EF at baseline is 6-65% and increased to 65+%.  No symptoms to suggest ischemia during the study.  Inferolateral ST segment depressions noted with stress consistent with  strain/ischemia.      STS PROM RISK SCORE: 13.5%  FRAILTY SCORE: Pending  NYHA CLASS: III     CLINICAL IMPRESSION:     Michael Christiansen is a 84 year old female with symptomatic severe aortic stenosis who is a good candidate for transcatheter aortic valve replacement based on age, frailty, co-morbidities and overall surgical mortality risk estimation of 13.5% based on the STS score.      The pre-procedural TAVR evaluation currently requires additional assessment with CT TAVR, coronary angiogram/right heart cath prior to presentation to the Heart team for discussion during our multi-disciplinary conference for consensus decision and procedural planning.    Plan Summary:  1) Severe Aortic Stenosis:  -coronary angiogram/ RHC and CT TAVR pending  -Presentation of data to the Heart team to assess the optimal treatment of  her severe aortic stenosis        CC  Patient Care Team:  Chandrika Kumar PA as PCP - General  Chandrika Kumar PA as Assigned PCP  Kasia Hercules RN as Registered Nurse  Cam Kirby DO as MD (Cardiology)  Amarilis Gibson, DAVI as Clinic Care Coordinator  SELF, REFERRED    I have seen and examined the patient with the TAVR team. I agree with the assessment and plan of the note above.I have reviewed pertinent labs.     Owen Hedrick MD  Interventional Cardiology  Pager: [phone]        Please do not hesitate to contact me if you have any questions/concerns.     Sincerely,     [facility]

## 2020-03-06 PROBLEM — Z98.890 STATUS POST CORONARY ANGIOGRAM: Status: ACTIVE | Noted: 2020-01-01

## 2020-03-06 NOTE — DISCHARGE INSTRUCTIONS
Going Home after an Angioplasty or Stent Placement (Cardiac)  ______________________________________________      After you go home:    Have an adult stay with you for 24 hours.    Drink plenty of fluids.    You may eat your normal diet, unless your doctor tells you otherwise.    For 24 hours:    Relax and take it easy.    Do NOT smoke.    Do NOT make any important or legal decisions.    Do NOT drive or operate machines at home or at work.    Do NOT drink alcohol.    Remove the Band-Aid after 24 hours. If there is minor oozing, apply another Band-aid and remove it after 12 hours.    For 2 days, do NOT have sex or do any heavy exercise.    Do NOT take a bath, or use a hot tub or pool for at least 3 days. You may shower.      Care of wrist or arm site  It is normal to have soreness at the puncture site and mild tingling in your hand for up to 3 days.    For 2 days, do not use your hand or arm to support your weight (such as rising from a chair) or bend your wrist (such as lifting a garage door).    For 2 days, do not lift more than 5 pounds or exercise your arm (tennis, golf or bowling).    If you start bleeding from the site in your arm:    Sit down and press firmly on the site with your fingers for 10 minutes. Call your doctor as soon as you can.    If the bleeding stops, sit still and keep your wrist straight for 2 hours.    Medicines    If you have started taking Plavix or Effient, do not stop taking it until you talk to your heart doctor (cardiologist).    If you are on metformin (Glucophage), do not restart it until you have blood tests (within 2 to 3 days after discharge). When your doctor tells you it is safe, you may restart the metformin.    If you have stopped any other medicines, check with your nurse or provider about when to restart them.    Call 911 right away if you have bleeding that is heavy or does not stop.    Call your doctor if:    You have a large or growing hard lump around the site.    The  site is red, swollen, hot or tender.    Blood or fluid is draining from the site.    You have chills or a fever greater than 101 F (38 C).    Your leg or arm feels numb or cool.    You have hives, a rash or unusual itching.

## 2020-03-06 NOTE — PROGRESS NOTES
Patient arrived on 2A for RHC/CORS.  Limb alert for right arm.  IV placed.  Groin prepped, pedal pulses +3 bilaterally.  INR 1.33.  Awaiting consent and EKG, otherwise prep complete.

## 2020-03-06 NOTE — Clinical Note
Potential access sites were evaluated for patency using ultrasound.   The right radial artery was selected. Access was obtained under with Sonosite and Fluoroscopic guidance using a micropuncture 21 guage needle with direct visualization of needle entry.

## 2020-03-06 NOTE — PRE-PROCEDURE
GENERAL PRE-PROCEDURE:   Procedure:  Coronary angiogram, possible angioplasty and stent, RHC, possible LHC    Written consent obtained?: Yes    Risks and benefits: Risks, benefits and alternatives were discussed    Consent given by:  Patient  Patient states understanding of procedure being performed: Yes    Patient's understanding of procedure matches consent: Yes    Procedure consent matches procedure scheduled: Yes    Expected level of sedation:  Moderate  Appropriately NPO:  Yes  ASA Class:  Class 2- mild systemic disease, no acute problems, no functional limitations  Mallampati  :  Grade 2- soft palate, base of uvula, tonsillar pillars, and portion of posterior pharyngeal wall visible  Lungs:  Lungs clear with good breath sounds bilaterally  Heart:  Normal heart sounds and rate and systolic murmur  History & Physical reviewed:  History and physical reviewed and no updates needed  Statement of review:  I have reviewed the lab findings, diagnostic data, medications, and the plan for sedation      Michael Christiansen is a very pleasant 84-year old female with severe aortic valvular stenosis under consideration for potential transcatheter aortic valve replacement (TAVR).  Her severe aortic stenosis is characterized with an area of 0.44 cm2, mean gradient 31 mmHg and peak velocity of 4.1 m/sec with LVEF 60-65% by echocardiogram on 12/19/19.  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 She presents for the above procedures as part of her TAVR workup.     The procedures were explained and patient/family voiced understanding. The patient was consented; risks including bleeding, heart attack, death, infection, stroke, contrast nephropathy, were explained and the patient voiced understanding.     Geo Alonzo PA-C  [facility]  [phone]

## 2020-03-09 NOTE — TELEPHONE ENCOUNTER
----- Message from Joan Tineo RN sent at 3/7/2020 11:35 AM CST -----  Regarding: follow up angiogram and RHC  3/6/20  Nagi Pinto,      Right sided filling pressures are normal.    Normal PA pressures.    Left sided filling pressures are normal.    Reduced cardiac output level.    Hemodynamic data has been modified in Epic per physician review.     Non-obstructive CAD    RT Radial Arterial and Rt internal jugular used for access    No new meds at discharge    Areli

## 2020-03-11 NOTE — PROGRESS NOTES
ANTICOAGULATION FOLLOW-UP CLINIC VISIT    Patient Name:  Michael Christiansen  Date:  3/11/2020  Contact Type:  Face to Face    SUBJECTIVE:  Patient Findings     Positives:   Upcoming invasive procedure (TAVR, date unknown), Missed doses (off last week for angiogram)    Comments:   INR done. Patient had been off warfarin x 5 days for angiogram last week. She will be having a TAVR in near future but date not set yet. She will let us know when it is.  No bleeding/bruising, no new changes in diet/meds/activity.        Clinical Outcomes     Negatives:   Major bleeding event, Thromboembolic event, Anticoagulation-related hospital admission, Anticoagulation-related ED visit, Anticoagulation-related fatality    Comments:   INR done. Patient had been off warfarin x 5 days for angiogram last week. She will be having a TAVR in near future but date not set yet. She will let us know when it is.  No bleeding/bruising, no new changes in diet/meds/activity.           OBJECTIVE    INR Protime   Date Value Ref Range Status   2020 1.5 (A) 0.86 - 1.14 Final       ASSESSMENT / PLAN  INR assessment SUB doses held last week for angiogram   Recheck INR In: 10 DAYS    INR Location Clinic      Anticoagulation Summary  As of 3/11/2020    INR goal:   2.0-3.0   TTR:   61.9 % (1 y)   INR used for dosin.5! (3/11/2020)   Warfarin maintenance plan:   2.5 mg (5 mg x 0.5) every Mon, Fri; 5 mg (5 mg x 1) all other days   Full warfarin instructions:   3/11: 10 mg; Otherwise 2.5 mg every Mon, Fri; 5 mg all other days   Weekly warfarin total:   30 mg   Plan last modified:   Kasia Hercules RN (10/1/2018)   Next INR check:   3/23/2020   Priority:   Maintenance   Target end date:   Indefinite    Indications    Long-term (current) use of anticoagulants [Z79.01] [Z79.01]  Permanent atrial fibrillation [I48.21]             Anticoagulation Episode Summary     INR check location:       Preferred lab:       Send INR reminders to:   [facility] ANTICOAG POOL     Comments:         Anticoagulation Care Providers     Provider Role Specialty Phone number    Chandrika Kumar PA [facility] [phone]            See the Encounter Report to view Anticoagulation Flowsheet and Dosing Calendar (Go to Encounters tab in chart review, and find the Anticoagulation Therapy Visit)        Kasia Hercules RN

## 2020-03-11 NOTE — TELEPHONE ENCOUNTER
zoloft      Last Written Prescription Date:  10/21/19  Last Fill Quantity: 30,   # refills: 3  Last Office Visit: 1/24/20  Future Office visit:    Next 5 appointments (look out 90 days)    Apr 24, 2020  9:20 AM CDT  (Arrive by 9:05 AM)  SHORT with MELISA Murray  [facility] [place] ([facility] [place] [facility] [address] [facility] [address] [facility] [address]  [phone]         ultram      Last Written Prescription Date:  2/7/20  Last Fill Quantity: 100,   # refills: 0  Last Office Visit: 1/24/20  Future Office visit:    Next 5 appointments (look out 90 days)    Apr 24, 2020  9:20 AM CDT  (Arrive by 9:05 AM)  SHORT with MELISA Murray  [facility] [place] ([facility] [place] ) [address]  [place] [address]  [phone]

## 2020-03-12 NOTE — TELEPHONE ENCOUNTER
FUTURE VISIT INFORMATION      SURGERY INFORMATION:    Date: 3/18/20    Location: [facility] OR    Surgeon:  Owen Hedrick MD Helmer, MD Matilde Clark, Vaibhav HDEZ MD     Anesthesia Type:  MAC    Procedure: Transfemoral, subclavian or transapical (Landa)transcatheter aortic valve replacement possible emergency open heart bypass and or balloon pump placement, and any indicated procedure.       RECORDS REQUESTED FROM:       Primary Care Provider:Chandrika Kumar PA - [facility]    Pertinent Medical History: hypertension, permanent atrial fibrillation, moderate tricuspid insufficiency    Most recent EKG+ Tracing: 3/6/20    Most recent ECHO: 3/12/20    Most recent Coronary Angiogram: 3/6/20    Most recent PFT's: 11/6/19

## 2020-03-12 NOTE — PROGRESS NOTES
TAVR Procedure:  March 18, 2nd case    Check in to the [facility] Waiting area, on 3rd Floor at 8:30 a.m.      Medications Instructions:  --Please take Aspirin 325 mg, by mouth, the night before and the morning of the procedure.    --Regarding coumadin:  **STOP coumadin 5 days prior to procedure.  LAST dose of coumadin will be March 12.  NO coumadin starting March 13.    --All other medication instructions are at the discretion of the PAC.     If any questions please contact:  Migdalia Mcknight RN  Structural Heart Care Coordinator  [facility] Heart  Office: [phone]  Pager: [phone]    -----------------------------------------------------------------------------      Michael's case was discussed at Valve conference.    The plan will be for Michael to proceed with TAVR   Landa Valve  Size 23  Approach: TF  NO Energy:  Carotid too small  No BAV     Additional testings/orders/information discussed:   It was noted on carotid ultrasound of 80% degree of stenosis on Lt. Carotid artery.  Pt will be referred to vascular surgery after TAVR procedure.     All of this information was discussed with Hui.     ----------------------------------------------------------------------------    Date: 3/12/2020    Time of Call: 9:26 AM     Diagnosis:  Severe aortic stenosis     [ TORB ] Ordering provider: Sin Andrade MD  Order:   CASE REQUEST: TAVR  Echo- izabela procedural for TAVR  PAC referral  *Needs to see a surgeon  Pre TAVR procedure orders  Blood components     Order received by: Iris Mcknight RN

## 2020-03-12 NOTE — TELEPHONE ENCOUNTER
11:00 AM    Reason for Call: OVERBOOK    Patient is having the following symptoms: PreOP // 03/18/2020 // tavr // [facility]  for 1 weeks.    The patient is requesting an appointment for TOMORROW 03/13/2020 with Chandrika Kumar.    Was an appointment offered for this call? No  If yes : Appointment type              Date    Preferred method for responding to this message: Telephone Call  What is your phone number ?[phone] - Hui (daughter)    If we cannot reach you directly, may we leave a detailed response at the number you provided? Yes    Can this message wait until your PCP/provider returns, if unavailable today? Not applicable, pcp is in today     Zaira Meza

## 2020-03-13 NOTE — PROGRESS NOTES
[facility] - [place]  [address]  [place] [address]  [phone]  Dept: [phone]    PRE-OP EVALUATION:  Today's date: 3/13/2020    Michael Christiansen (: 1935) presents for pre-operative evaluation assessment as requested by [facility] Doctor.  She requires evaluation and anesthesia risk assessment prior to undergoing surgery/procedure for treatment of valve replacement .    Proposed Surgery/ Procedure: Valve Replacement  Date of Surgery/ Procedure: 3/18/20  Time of Surgery/ Procedure: [facility]  Hospital/Surgical Facility: [facility]   Fax number for surgical facility: [facility].  [facility] for clinic.   Primary Physician: Chandrika Kumar  Type of Anesthesia Anticipated: to be determined    Patient has a Health Care Directive or Living Will:  YES     1. NO - Do you have a history of heart attack, stroke, stent, bypass or surgery on an artery in the head, neck, heart or legs?  2. NO - Do you ever have any pain or discomfort in your chest?  3. NO - Do you have a history of  Heart Failure?  4. NO - Are you troubled by shortness of breath when: walking on the level, up a slight hill or at night?  5. NO - Do you currently have a cold, bronchitis or other respiratory infection?  6. NO - Do you have a cough, shortness of breath or wheezing?  7. NO - Do you sometimes get pains in the calves of your legs when you walk?  8. YES - DO YOU OR ANYONE IN YOUR FAMILY HAVE PREVIOUS HISTORY OF BLOOD CLOTS? Sister and Daughter  9. NO - Do you or does anyone in your family have a serious bleeding problem such as prolonged bleeding following surgeries or cuts?  10. YES - HAVE YOU EVER HAD PROBLEMS WITH ANEMIA OR BEEN TOLD TO TAKE IRON PILLS? Michael has   11. NO - Have you had any abnormal blood loss such as black, tarry or bloody stools, or abnormal vaginal bleeding?  12. YES - HAVE YOU EVER HAD A BLOOD TRANSFUSION? Had tubal pregnancy  13. NO - Have you or any of your relatives ever had  problems with anesthesia?  14. NO - Do you have sleep apnea, excessive snoring or daytime drowsiness?  15. NO - Do you have any prosthetic heart valves?  16. YES - DO YOU HAVE PROSTHETIC JOINTS? 2 new knees  17. NO - Is there any chance that you may be pregnant?      HPI:     HPI related to upcoming procedure: she is going to have valve repair done by [facility] physicians.  She is going off her coumadin today and aware there is no bridging. We will allow the [facility] to take care of her labs.       A-FIB - Patient has a longstanding history of chronic A-fib currently on rate control. Current treatment regimen includes Warfarin for stroke prevention and denies significant symptoms of lightheadedness, palpitations or dyspnea.     ANEMIA - Patient has a recent history of moderate-severe anemia, which has not been symptomatic. Work up to date has revealed anemia of Chronic disease. Treatment has been successful.     CAD - Patient has a longstanding history of moderate-severe valvular heart disease.  Patient denies recent chest pain or NTG use, has exercise induced dyspnea and no PND. Last Stress test2/11/2020  And echo 3/12/2020, EKG 2/13/2020.     DEPRESSION - Patient has a long history of Depression of moderate severity requiring medication for control with recent symptoms being stable..Current symptoms of depression include just lost her  in December of 2019.        MEDICAL HISTORY:     Patient Active Problem List    Diagnosis Date Noted     Status post coronary angiogram 03/06/2020     Priority: Medium     Other ill-defined heart diseases 02/17/2020     Priority: Medium     Added automatically from request for surgery [id number]       Severe aortic stenosis 01/03/2020     Priority: Medium     Left upper quadrant pain 10/22/2019     Priority: Medium     Added automatically from request for surgery [id number]       ILD (interstitial lung disease) (H) 10/21/2019     Priority: Medium     Chronic, continuous use of opioids  09/23/2019     Priority: Medium     Hx of rheumatic fever 06/19/2019     Priority: Medium     Protein-calorie malnutrition (H) 06/06/2019     Priority: Medium     Valvular heart disease 06/06/2019     Priority: Medium     Unexplained weight loss 06/06/2019     Priority: Medium     LISA (generalized anxiety disorder) 11/26/2018     Priority: Medium     Anticoagulated on Coumadin 06/13/2018     Priority: Medium     Pulmonary hypertension-moderate 12/20/2017     Priority: Medium     Moderate tricuspid insufficiency 12/20/2017     Priority: Medium     Malignant neoplasm of right breast in female, estrogen receptor positive, unspecified site of breast (H) 08/02/2017     Priority: Medium     Osteoporosis 01/26/2017     Priority: Medium     Long-term (current) use of anticoagulants [Z79.01] 07/27/2016     Priority: Medium     History of total knee replacement 01/26/2015     Priority: Medium     Chronic pain syndrome 01/23/2015     Priority: Medium     Acute blood loss anemia 07/19/2013     Priority: Medium     Permanent atrial fibrillation 05/01/2013     Priority: Medium     Overview:   Chronic        Essential hypertension 03/25/2013     Priority: Medium     Osteoarthritis 03/25/2013     Priority: Medium     Lumbar pain with radiation down right leg 07/05/2011     Priority: Medium     Overview:   IMO Update 10/11       Lumbar spinal stenosis 07/05/2011     Priority: Medium     Radiculitis 06/16/2011     Priority: Medium     Overview:   IMO Update 10/11       Osteoarthritis of knee 09/16/2010     Priority: Medium     Overview:   IMO Update 10/11        Past Medical History:   Diagnosis Date     Arthritis      Backache, unspecified 1/1/2011     Breast cancer, stage 1 3/19/2010     Chronic pain syndrome 1/23/2015     Claustrophobia 1/1/2011     Hip joint replacement by other means 1/1/2011     Osteoarthrosis, unspecified whether generalized or localized, lower leg 8/14/2006     Posttraumatic stress disorder 1/1/2011     Past  Surgical History:   Procedure Laterality Date     BREAST SURGERY      right  side mastectomy     C TOTAL KNEE ARTHROPLASTY Left 01/26/15     cataract extraction and lens implantation       COLONOSCOPY       CV CORONARY ANGIOGRAM N/A 3/6/2020    Procedure: CV CORONARY ANGIOGRAM;  Surgeon: Skyler Shepherd MD;  Location:  HEART CARDIAC CATH LAB     CV RIGHT HEART CATH N/A 3/6/2020    Procedure: CV RIGHT HEART CATH;  Surgeon: Skyler Shepherd MD;  Location:  HEART CARDIAC CATH LAB     ESOPHAGOSCOPY, GASTROSCOPY, DUODENOSCOPY (EGD), COMBINED N/A 11/1/2019    Procedure: UPPER ENDOSCOPY, WITH BIOPSY;  Surgeon: Fab Carroll MD;  Location: [facility]     EYE SURGERY       GYN SURGERY      tubal pregnancy     ORTHOPEDIC SURGERY  2009    l. hip replacement LT     ORTHOPEDIC SURGERY  july 19th 2013    Right total knee     TUBAL/ECTOPIC PREGNANCY       Current Outpatient Medications   Medication Sig Dispense Refill     Acetaminophen (TYLENOL PO) Take 500 mg by mouth every 4 hours as needed        albuterol (PROAIR HFA/PROVENTIL HFA/VENTOLIN HFA) 108 (90 Base) MCG/ACT inhaler Inhale 2 puffs into the lungs every 6 hours as needed for shortness of breath / dyspnea or wheezing 1 Inhaler 1     Calcium Citrate-Vitamin D (CALCITRATE/VITAMIN D PO) Take 1 tablet by mouth 2 times daily        diltiazem ER (DILT-XR) 180 MG 24 hr capsule Take 1 capsule (180 mg) by mouth daily 90 capsule 3     losartan (COZAAR) 50 MG tablet TAKE 1 TABLET BY MOUTH ONCE DAILY 90 tablet 2     Misc Natural Products (OSTEO BI-FLEX ADV DOUBLE ST PO) Take  by mouth daily.       Multiple Vitamin (MULTIVITAMINS PO) Take 1 tablet by mouth daily        Multiple Vitamins-Minerals (PRESERVISION AREDS 2 PO) Take by mouth 2 times daily        order for DME 4 wheeled walker 1 each 0     sertraline (ZOLOFT) 50 MG tablet Take 1 tablet by mouth once daily 30 tablet 0     simvastatin (ZOCOR) 10 MG tablet Take 1 tablet (10 mg) by mouth At Bedtime 90 tablet 3      traMADol (ULTRAM) 50 MG tablet TAKE 1 TO 2 TABLETS BY MOUTH EVERY 4 HOURS AS NEEDED FOR PAIN - DO NOT EXCEED 8 TABLETS PER 24 HOURS 100 tablet 0     warfarin (COUMADIN) 5 MG tablet 2.5mg Mon/Fri and 5mg  all other days or as directed by warfarin clinic 100 tablet 3     warfarin ANTICOAGULANT (COUMADIN) 5 MG tablet TAKE ONE-HALF TABLET BY MOUTH ON , AND 1 TABLET ALL OTHER DAYS OF THE WEEK OR AS DIRECTED BY THE WARFARIN CLINIC (Patient not taking: Reported on 3/13/2020) 64 tablet 6     OTC products: None, except as noted above    Allergies   Allergen Reactions     Atenolol Shortness Of Breath and Other (See Comments)     Dizziness  Severe vertigo and dizziness/SOB     Lisinopril Cough     Augmentin Diarrhea     Pollen Extract      Other reaction(s): Runny Nose      Latex Allergy: NO    Social History     Tobacco Use     Smoking status: Former Smoker     Packs/day: 0.30     Years: 30.00     Pack years: 9.00     Types: Cigarettes     Last attempt to quit: 1987     Years since quittin.6     Smokeless tobacco: Never Used     Tobacco comment: year quit    Substance Use Topics     Alcohol use: No     History   Drug Use No       REVIEW OF SYSTEMS:   CONSTITUTIONAL: NEGATIVE for fever, chills, change in weight  INTEGUMENTARY/SKIN: NEGATIVE for worrisome rashes, moles or lesions  EYES: NEGATIVE for vision changes or irritation  ENT/MOUTH: NEGATIVE for ear, mouth and throat problems  RESP: gross, able to lay flat. Walking less than a block.   CV: valvular disease  and irregular heart beat  GI: NEGATIVE for nausea, abdominal pain, heartburn, or change in bowel habits  : NEGATIVE for frequency, dysuria, or hematuria  MUSCULOSKELETAL: NEGATIVE for significant arthralgias or myalgia  NEURO: NEGATIVE for weakness, dizziness or paresthesias  ENDOCRINE: NEGATIVE for temperature intolerance, skin/hair changes  HEME: NEGATIVE for bleeding problems  PSYCHIATRIC: NEGATIVE for changes in mood or affect    EXAM:   BP  "132/80 (BP Location: Left arm, Patient Position: Sitting, Cuff Size: Adult Regular)   Pulse 117   Temp 98.5  F (36.9  C) (Tympanic)   Ht 1.626 m (5' 4\")   Wt 45.8 kg (101 lb)   SpO2 95%   BMI 17.34 kg/m      GENERAL APPEARANCE: healthy, alert and no distress     EYES: EOMI, PERRL     HENT: ear canals and TM's normal and nose and mouth without ulcers or lesions     NECK: no adenopathy, no asymmetry, masses, or scars and thyroid normal to palpation     RESP: lungs clear to auscultation - no rales, rhonchi or wheezes     CV: irregularly irregular rhythm and murmur loud murmur across left sternal border.      ABDOMEN:  soft, nontender, no HSM or masses and bowel sounds normal     MS: extremities normal- no gross deformities noted, no evidence of inflammation in joints, FROM in all extremities.     SKIN: no suspicious lesions or rashes     NEURO: mentation intact, speech normal and gait abnormal scoliosis and using cane.      PSYCH: mentation appears normal. and affect normal/bright, fearful and discussion on this.      LYMPHATICS: No cervical adenopathy    DIAGNOSTICS:   Her labs will be done at the [facility] and have been ordered.     Recent Labs   Lab Test 03/11/20 03/06/20  0857 03/05/20  1340  11/06/19  1405  10/10/19  1551   HGB  --   --  13.7  --  13.5  --  13.3   PLT  --   --  368  --   --   --  380   INR 1.5* 1.33* 1.67*   < >  --    < >  --    NA  --   --  135  --   --   --  131*   POTASSIUM  --   --  4.5  --   --   --  4.4   CR  --   --  0.91  --   --   --  0.86    < > = values in this interval not displayed.        IMPRESSION:   Reason for surgery/procedure: TVAR of mitral Valve.   Diagnosis/reason for consult: medical clearance.     The proposed surgical procedure is considered HIGH risk.    REVISED CARDIAC RISK INDEX  The patient has the following serious cardiovascular risks for perioperative complications such as (MI, PE, VFib and 3  AV Block):  High risk surgery (>5% cardiac complication " risk)  INTERPRETATION: 2 risks: Class III (moderate risk - 6.6% complication rate)    The patient has the following additional risks for perioperative complications:  No identified additional risks      ICD-10-CM    1. Preop general physical exam  Z01.818        RECOMMENDATIONS:     --Consult hospital rounder / IM to assist post-op medical management      Anticoagulant or Antiplatelet Medication Use  Holding coumadin per cardiology recommendations.         ACE Inhibitor or Angiotensin Receptor Blocker (ARB) Use  Ace inhibitor or Angiotensin Receptor Blocker (ARB) and should can be on HOLD this 24 hours prior to surgery.      She will be down in evaluation the day before surgery and will be given clearance on what to be given.        Signed Electronically by: MELISA Gillespie    Copy of this evaluation report is provided to requesting physician.    [facility] Preop Guidelines    Revised Cardiac Risk Index

## 2020-03-13 NOTE — NURSING NOTE
"Chief Complaint   Patient presents with     Pre-Op Exam       Initial /80 (BP Location: Left arm, Patient Position: Sitting, Cuff Size: Adult Regular)   Pulse 117   Temp 98.5  F (36.9  C) (Tympanic)   Ht 1.626 m (5' 4\")   Wt 45.8 kg (101 lb)   SpO2 95%   BMI 17.34 kg/m   Estimated body mass index is 17.34 kg/m  as calculated from the following:    Height as of this encounter: 1.626 m (5' 4\").    Weight as of this encounter: 45.8 kg (101 lb).  Medication Reconciliation: complete  Briana Colvin LPN  "

## 2020-03-16 NOTE — PROGRESS NOTES
All TAVR cases, including [place]'s scheduled for Wednesday, March 18 have been postponed. Hui was called and given this information.  Labs, scheduled for today were cancelled, however, the appt with Dr. Mercado will be kept.

## 2020-03-16 NOTE — NURSING NOTE
Chief Complaint   Patient presents with     New Patient     3/18/2020 TAVR procedure date.     Vitals were taken and medications were reconciled.     Briana Lam CMA    4:19 PM

## 2020-03-16 NOTE — LETTER
3/16/2020      RE: Michael Christiansen  [address] [facility] [address]       Dear Colleague,    Thank you for the opportunity to participate in the care of your patient, Michael Christiansen, at the [facility] at [facility]. Please see a copy of my visit note below.     CLINICAL HISTORY:      Patient is a very pleasant 84-year old female with severe aortic valvular stenosis referred to our clinic for evaluation of options for severe aortic stenosis. Her severe aortic stenosis is characterized with an area of 0.44 cm2, mean gradient 31 mmHg and peak velocity of 4.1 m/sec with LVEF 60-65% by echocardiogram on 12/19/19.  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            PAST MEDICAL HISTORY:      Past Medical History[]Expand by Default        Past Medical History:   Diagnosis Date     Arthritis       Backache, unspecified 1/1/2011     Breast cancer, stage 1 3/19/2010     Chronic pain syndrome 1/23/2015     Claustrophobia 1/1/2011     Hip joint replacement by other means 1/1/2011     Osteoarthrosis, unspecified whether generalized or localized, lower leg 8/14/2006     Posttraumatic stress disorder 1/1/2011            PAST SURGICAL HISTORY:      Past Surgical History[]Expand by Default         Past Surgical History:   Procedure Laterality Date     BREAST SURGERY         right  side mastectomy     C TOTAL KNEE ARTHROPLASTY Left 01/26/15     cataract extraction and lens implantation         COLONOSCOPY         ESOPHAGOSCOPY, GASTROSCOPY, DUODENOSCOPY (EGD), COMBINED N/A 11/1/2019     Procedure: UPPER ENDOSCOPY, WITH BIOPSY;  Surgeon: Fab Carroll MD;  Location: [facility]     EYE SURGERY         GYN SURGERY         tubal pregnancy     ORTHOPEDIC SURGERY   2009     l. hip replacement LT     ORTHOPEDIC SURGERY   july 19th 2013     Right total knee     TUBAL/ECTOPIC  PREGNANCY                CURRENT MEDICATIONS:      Current Outpatient Prescriptions[]Expand by Default          Current Outpatient Medications   Medication Sig Dispense Refill     Acetaminophen (TYLENOL PO) Take 500 mg by mouth every 4 hours as needed          albuterol (PROAIR HFA/PROVENTIL HFA/VENTOLIN HFA) 108 (90 Base) MCG/ACT inhaler Inhale 2 puffs into the lungs every 6 hours as needed for shortness of breath / dyspnea or wheezing 1 Inhaler 1     Calcium Citrate-Vitamin D (CALCITRATE/VITAMIN D PO) Take 1 tablet by mouth 2 times daily          diltiazem ER (DILT-XR) 180 MG 24 hr capsule Take 1 capsule (180 mg) by mouth daily 90 capsule 3     losartan (COZAAR) 50 MG tablet TAKE 1 TABLET BY MOUTH ONCE DAILY 90 tablet 2     Misc Natural Products (OSTEO BI-FLEX ADV DOUBLE ST PO) Take  by mouth daily.         Multiple Vitamin (MULTIVITAMINS PO) Take 1 tablet by mouth daily          Multiple Vitamins-Minerals (PRESERVISION AREDS 2 PO) Take by mouth 2 times daily          order for DME 4 wheeled walker 1 each 0     sertraline (ZOLOFT) 50 MG tablet Take 1 tablet (50 mg) by mouth daily 30 tablet 3     simvastatin (ZOCOR) 10 MG tablet Take 1 tablet (10 mg) by mouth At Bedtime 90 tablet 3     traMADol (ULTRAM) 50 MG tablet TAKE 1 TO 2 TABLETS BY MOUTH EVERY 4 HOURS AS NEEDED FOR PAIN--DO  NOT  EXCEED  8  TABLETS  PER  24 HOURS 100 tablet 0     warfarin (COUMADIN) 5 MG tablet 2.5mg Mon/Fri and 5mg  all other days or as directed by warfarin clinic 100 tablet 3     warfarin ANTICOAGULANT (COUMADIN) 5 MG tablet TAKE ONE-HALF TABLET BY MOUTH ON FRIDAYS, AND 1 TABLET ALL OTHER DAYS OF THE WEEK OR AS DIRECTED BY THE WARFARIN CLINIC 64 tablet 6            ALLERGIES:            Allergies   Allergen Reactions     Atenolol Shortness Of Breath and Other (See Comments)       Dizziness  Severe vertigo and dizziness/SOB     Lisinopril Cough     Augmentin Diarrhea     Pollen Extract         Other reaction(s): Runny Nose         FAMILY  HISTORY:      Family History         Family History   Problem Relation Age of Onset     Diabetes Father 75         cause of death     Other - See Comments Father           PVD     Heart Disease Sister       Coronary Artery Disease Sister       Other - See Comments Mother 83         old age; cause of death     Other - See Comments Sister           14 year twin [employer]; cause of death     Chronic Obstructive Pulmonary Disease Daughter       Osteoporosis Daughter       Thyroid Disease Daughter       Cancer Sister       Other Cancer Sister       Other Cancer Sister           uterine     Hypertension No family hx of       Hyperlipidemia No family hx of       Cerebrovascular Disease No family hx of       Breast Cancer No family hx of       Prostate Cancer No family hx of       Colon Cancer No family hx of       Asthma No family hx of       Anesthesia Reaction No family hx of       Genetic Disorder No family hx of              SOCIAL HISTORY:      Social History            Socioeconomic History     Marital status:        Spouse name: Not on file     Number of children: Not on file     Years of education: Not on file     Highest education level: Not on file   Occupational History     Not on file   Social Needs     Financial resource strain: Not on file     Food insecurity:       Worry: Not on file       Inability: Not on file     Transportation needs:       Medical: Not on file       Non-medical: Not on file   Tobacco Use     Smoking status: Former Smoker       Packs/day: 0.30       Years: 30.00       Pack years: 9.00       Types: Cigarettes       Last attempt to quit: 1987       Years since quittin.5     Smokeless tobacco: Never Used     Tobacco comment: year quit    Substance and Sexual Activity     Alcohol use: No     Drug use: No     Sexual activity: Not Currently       Partners: Male   Lifestyle     Physical activity:       Days per week: Not on file       Minutes per session: Not on  file     Stress: Not on file   Relationships     Social connections:       Talks on phone: Not on file       Gets together: Not on file       Attends Latter-day service: Not on file       Active member of club or organization: Not on file       Attends meetings of clubs or organizations: Not on file       Relationship status: Not on file     Intimate partner violence:       Fear of current or ex partner: Not on file       Emotionally abused: Not on file       Physically abused: Not on file       Forced sexual activity: Not on file   Other Topics Concern     Parent/sibling w/ CABG, MI or angioplasty before 65F 55M? No      Service Not Asked     Blood Transfusions Yes     Caffeine Concern Not Asked     Occupational Exposure Not Asked     Hobby Hazards Not Asked     Sleep Concern Not Asked     Stress Concern Not Asked     Weight Concern Not Asked     Special Diet Not Asked     Back Care Not Asked     Exercise Not Asked     Bike Helmet Not Asked     Seat Belt Not Asked     Self-Exams Not Asked   Social History Narrative     Not on file          REVIEW OF SYSTEMS:      10 point review of systems is within norrnal other than mentioned in history and physical.       PHYSICAL EXAMINATION:      There were no vitals taken for this visit.     GENERAL: No acute distress.  HEENT: EOMI. Sclerae white, not injected. Pharynx without erythema or exudate.   Neck: No adenopathy. No thyromegaly. Symmetrical.   Heart: Regular rate and rhythm. Normal S1 with diminished S2. Harsh crescendo decrescendo late peaking systolic murmur with radiation to carotids. Delayed carotid pulses.   Lungs: Clear to auscultation. No ronchi, wheezes, rales.   Abdomen: Soft, nontender, nondistended. Bowel sounds present.  Extremities: No clubbing, cyanosis, or edema.   Neurologic: Alert and oriented to person/place/time, normal speech and affect. No focal deficits.  Skin: No petechiae, purpura or rash.      LABORATORY DATA:      LIPID RESULTS:         Lab Results   Component Value Date     CHOL 142 12/31/2018     HDL 69 12/31/2018     LDL 60 12/31/2018     TRIG 63 12/31/2018        LIVER ENZYME RESULTS:        Lab Results   Component Value Date     AST 26 03/05/2020     ALT 18 03/05/2020        CBC RESULTS:        Lab Results   Component Value Date     WBC 11.9 (H) 03/05/2020     RBC 4.35 03/05/2020     HGB 13.7 03/05/2020     HCT 41.4 03/05/2020     MCV 95 03/05/2020     MCH 31.5 03/05/2020     MCHC 33.1 03/05/2020     RDW 12.8 03/05/2020      03/05/2020        BMP RESULTS:        Lab Results   Component Value Date      03/05/2020     POTASSIUM 4.5 03/05/2020     CHLORIDE 100 03/05/2020     CO2 31 03/05/2020     ANIONGAP 4 03/05/2020     GLC 73 03/05/2020     BUN 26 03/05/2020     CR 0.91 03/05/2020     GFRESTIMATED 58 (L) 03/05/2020     GFRESTBLACK 67 03/05/2020     LOIS 9.7 03/05/2020         A1C RESULTS:  No results found for: A1C     INR RESULTS:        Lab Results   Component Value Date     INR 1.67 (H) 03/05/2020     INR 2.7 (A) 02/14/2020     INR 2.4 (A) 01/03/2020     INR 1.14 11/01/2019            PROCEDURES & FURTHER ASSESSMENTS:      Echo 12/19/19  Interpretation Summary  No pericardial effusion is present.  Global and regional left ventricular function is normal with an EF of 60-65%.  Mild to moderate left atrial enlargement is present.  Moderate right atrial enlargement is present.  Moderate mitral annular calcification is present.  Mild mitral insufficiency is present.  Severe aortic valve calcification is present.  Mild aortic insufficiency is present.  The peak aortic velocity is 4.2 m/sec.  Peak AoV pressure gradient 70.7 mm Hg  The mean gradient across the aortic valve is32.8 mmHg.  Moderate to severe aortic stenosis is present.  Moderate to severe tricuspid insufficiency is present.  Right ventricular systolic pressure is 40mmHg above the right atrial pressure.  Mild pulmonic insufficiency is present.     Dobutamine echo  2/11/20  Interpretation Summary  Stress test performed for aortic valve stenosis evaluation. The rhythm is atrial fibrillation.     Baseline measurement: Aov mean: 30 mmHg, GLEN (VTI) 0.72 cm2 . With stress (10mcg/kg/min) Vmax increased to 4.42cm/s, max gradient was increased to 78mmHg and a mean of 47mmHg. The GLEN was calculated at 0.7cm2. This is consistent with severe AS.  There is mild MR. Moderate mitral annular calcification. Mild TR. There is moderate pulmonaray hypertension.  There proximal septal thickening. LV EF at baseline is 6-65% and increased to 65+%.  No symptoms to suggest ischemia during the study.  Inferolateral ST segment depressions noted with stress consistent with  strain/ischemia.        STS PROM RISK SCORE: 13.5%  NYHA CLASS: III      CLINICAL IMPRESSION:      Patient is a 84 year old female with symptomatic severe aortic stenosis who is at high risk for surgical aortic valve replacement based on age, frailty, co-morbidities and overall surgical mortality risk estimation of 13.5% based on the STS score and should be consIdered for TAVR.  Given her age and high STS risk score, I do not think she should be considered for cardiopulmonary bypass back. I will discuss this with the team and let the patient know.            Please do not hesitate to contact me if you have any questions/concerns.     Sincerely,     Edmund Mercado MD

## 2020-03-17 NOTE — PROGRESS NOTES
ANTICOAGULATION FOLLOW-UP CLINIC VISIT    Patient Name:  Michael Christiansen  Date:  3/17/2020  Contact Type:  Telephone/ spoke to patient daughterMargareth    SUBJECTIVE:  Patient Findings     Positives:   Missed doses, Other complaints (procedure cancelled)    Comments:   Call from patient daughter stating that procedure to be done tomorrow was cancelled. Patient warfarin had been held for 3 days prior to them being notified that procedure would be cancelled. We discussed warfarin dosing/INR recheck date. Daughter verbalized understanding and has no questions.         Clinical Outcomes     Negatives:   Major bleeding event, Thromboembolic event, Anticoagulation-related hospital admission, Anticoagulation-related ED visit, Anticoagulation-related fatality    Comments:   Call from patient daughter stating that procedure to be done tomorrow was cancelled. Patient warfarin had been held for 3 days prior to them being notified that procedure would be cancelled. We discussed warfarin dosing/INR recheck date. Daughter verbalized understanding and has no questions.            OBJECTIVE    INR Protime   Date Value Ref Range Status   03/11/2020 1.5 (A) 0.86 - 1.14 Final       ASSESSMENT / PLAN  No question data found.  Anticoagulation Summary  As of 3/17/2020    INR goal:   2.0-3.0   TTR:   61.3 % (11.9 mo)   INR used for dosing:   No new INR was available at the time of this encounter.   Warfarin maintenance plan:   2.5 mg (5 mg x 0.5) every Mon, Fri; 5 mg (5 mg x 1) all other days   Full warfarin instructions:   3/17: 10 mg; 3/18: 10 mg; Otherwise 2.5 mg every Mon, Fri; 5 mg all other days   Weekly warfarin total:   30 mg   Plan last modified:   Kasia Hercules RN (10/1/2018)   Next INR check:   3/20/2020   Priority:   Maintenance   Target end date:   Indefinite    Indications    Long-term (current) use of anticoagulants [Z79.01] [Z79.01]  Permanent atrial fibrillation [I48.21]             Anticoagulation Episode Summary      INR check location:       Preferred lab:       Send INR reminders to:   [facility] ANTICOAG POOL    Comments:         Anticoagulation Care Providers     Provider Role Specialty Phone number    Chandrika Kumar, PA [facility] [phone]            See the Encounter Report to view Anticoagulation Flowsheet and Dosing Calendar (Go to Encounters tab in chart review, and find the Anticoagulation Therapy Visit)        Kasia Hercules RN

## 2020-03-20 NOTE — PROGRESS NOTES
Follow up telephone call made to pt Michael, and daughter Hui.  Hui and Michael were strongly encouraged to let us know if anything in Michael's condition changes, more short of breath, more fatigue, new onset of chest pain. This is related to the timing of her postponed TAVR procedure, and the COVID 19 restrictions.

## 2020-03-20 NOTE — PROGRESS NOTES
ANTICOAGULATION FOLLOW-UP CLINIC VISIT    Patient Name:  Michael Christiansen  Date:  3/20/2020  Contact Type:  Face to Face    SUBJECTIVE:  Patient Findings     Comments:   Daughter states that valve surgery has been switched to 20. We discussed that she will have to stop warfarin 5 days prior to surgery and to let me know if [facility] requests anything different        Clinical Outcomes     Negatives:   Major bleeding event, Thromboembolic event, Anticoagulation-related hospital admission, Anticoagulation-related ED visit, Anticoagulation-related fatality    Comments:   Daughter states that valve surgery has been switched to 20. We discussed that she will have to stop warfarin 5 days prior to surgery and to let me know if [facility] requests anything different           OBJECTIVE    INR Protime   Date Value Ref Range Status   2020 2.7 (A) 0.86 - 1.14 Final       ASSESSMENT / PLAN  INR assessment THER    Recheck INR In: 8 WEEKS    INR Location Clinic      Anticoagulation Summary  As of 3/20/2020    INR goal:   2.0-3.0   TTR:   60.9 % (1 y)   INR used for dosin.7 (3/20/2020)   Warfarin maintenance plan:   2.5 mg (5 mg x 0.5) every Mon, Fri; 5 mg (5 mg x 1) all other days   Full warfarin instructions:   5/1: Hold; 5/2: Hold; 5/3: Hold; 5/4: Hold; 5/5: Hold; Otherwise 2.5 mg every Mon, Fri; 5 mg all other days   Weekly warfarin total:   30 mg   Plan last modified:   Kasia Hercules RN (10/1/2018)   Next INR check:   2020   Priority:   High   Target end date:   Indefinite    Indications    Long-term (current) use of anticoagulants [Z79.01] [Z79.01]  Permanent atrial fibrillation [I48.21]             Anticoagulation Episode Summary     INR check location:       Preferred lab:       Send INR reminders to:   [facility] ANTICOAG POOL    Comments:         Anticoagulation Care Providers     Provider Role Specialty Phone number    Chandrika Kumar PA [facility] [phone]            See the  Encounter Report to view Anticoagulation Flowsheet and Dosing Calendar (Go to Encounters tab in chart review, and find the Anticoagulation Therapy Visit)        Kasia Hercules RN

## 2020-03-31 NOTE — PROGRESS NOTES
Follow up telephone call made.  Voicemail was left.  Wanting to check on status of Michael, and if there were any possible new or worsening changes in symptoms since her TAVR procedure was postponed due to COVID.    Contact information was left for her to return my call.    Will continue to follow up.

## 2020-04-03 NOTE — TELEPHONE ENCOUNTER
Controlled Substance Refill Request for tramadol   Problem List Complete:  No     PROVIDER TO CONSIDER COMPLETION OF PROBLEM LIST AND OVERVIEW/CONTROLLED SUBSTANCE AGREEMENT    Last Written Prescription Date:  3/11/20  Last Fill Quantity: 100,   # refills: 0    THE MOST RECENT OFFICE VISIT MUST BE WITHIN THE PAST 3 MONTHS. AT LEAST ONE FACE TO FACE VISIT MUST OCCUR EVERY 6 MONTHS. ADDITIONAL VISITS CAN BE VIRTUAL.  (THIS STATEMENT SHOULD BE DELETED.)    Last Office Visit with [facility] primary care provider: 3/13/20    Future Office visit:     Controlled substance agreement:   Encounter-Level CSA:    There are no encounter-level csa.     Patient-Level CSA:    There are no patient-level csa.         Last Urine Drug Screen: No results found for: CDAUT, No results found for: COMDAT, No results found for: THC13, PCP13, COC13, MAMP13, OPI13, AMP13, BZO13, TCA13, MTD13, BAR13, OXY13, PPX13, BUP13     Processing:  Rx to be electronically transmitted to pharmacy by provider      [email or web address]       checked in past 3 months?

## 2020-04-07 NOTE — PROGRESS NOTES
Follow up telephone call made to Hui, daughter of the pt, Michael.  Hui states that Michael had admitted to her that 3 days ago she was standing and feeling very fatigued, and then at that time experienced a syncopal event.  She was able to make it to a bed and lay down.    Hui said that she told her mom, Michael, that she should call the hospital next time this happens.    Dr. Andrade will be notified of this event.

## 2020-04-09 NOTE — PROGRESS NOTES
Date: 4/9/2020    Time of Call: 2:54 PM     Diagnosis:  Severe aortic stenosis     [ TORB ] Ordering provider: Sin Andrade MD  Order:   TAVR scheduling orders  Intra procedure ECHO     Order received by: Alisa Gil RN     Follow-up/additional notes:

## 2020-04-09 NOTE — LETTER
[facility]  [address]  [place] [address]  Phone: [phone]    April 10, 2020        Michael Christiansen  [address] [facility] [address]          To whom it may concern:    RE: Michael Rodriguez is going to undergo heart surgery.  She is quite frail and would benefit from her daughter being able to be with her post op.      Please contact me for questions or concerns.      Sincerely,        MELISA Gillespie

## 2020-04-09 NOTE — TELEPHONE ENCOUNTER
Patient received call from the [employer] stating that they would like her to go down next week for her heart surgery. Daughter is concerned due to her vulnerability and would like a note stating that she is a vulnerable adult and cannot be left alone so that the daughter can be there with her since they are not allowing additional people/visitors during this covid-19 crisis unless needed. Please return call to daughter. Ashley A. Lechevalier, LPN on 4/9/2020 at 2:43 PM

## 2020-04-10 NOTE — TELEPHONE ENCOUNTER
Spoke with daughter Cailin, she needs a letter stating that her mother is a vulnerable adult and must be accompanied by her daughter at all times. She would like to stop by clinic today to pick letter up at registration.  Shaylee Saini LPN

## 2020-04-14 NOTE — PROGRESS NOTES
CLINICAL HISTORY:      Patient is a very pleasant 84-year old female with severe aortic valvular stenosis referred to our clinic for evaluation of options for severe aortic stenosis. Her severe aortic stenosis is characterized with an area of 0.44 cm2, mean gradient 31 mmHg and peak velocity of 4.1 m/sec with LVEF 60-65% by echocardiogram on 12/19/19.  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            PAST MEDICAL HISTORY:      Past Medical History[]Expand by Default        Past Medical History:   Diagnosis Date     Arthritis       Backache, unspecified 1/1/2011     Breast cancer, stage 1 3/19/2010     Chronic pain syndrome 1/23/2015     Claustrophobia 1/1/2011     Hip joint replacement by other means 1/1/2011     Osteoarthrosis, unspecified whether generalized or localized, lower leg 8/14/2006     Posttraumatic stress disorder 1/1/2011            PAST SURGICAL HISTORY:      Past Surgical History[]Expand by Default         Past Surgical History:   Procedure Laterality Date     BREAST SURGERY         right  side mastectomy     C TOTAL KNEE ARTHROPLASTY Left 01/26/15     cataract extraction and lens implantation         COLONOSCOPY         ESOPHAGOSCOPY, GASTROSCOPY, DUODENOSCOPY (EGD), COMBINED N/A 11/1/2019     Procedure: UPPER ENDOSCOPY, WITH BIOPSY;  Surgeon: Fab Carroll MD;  Location: [facility]     EYE SURGERY         GYN SURGERY         tubal pregnancy     ORTHOPEDIC SURGERY   2009     l. hip replacement LT     ORTHOPEDIC SURGERY   july 19th 2013     Right total knee     TUBAL/ECTOPIC PREGNANCY                CURRENT MEDICATIONS:      Current Outpatient Prescriptions[]Expand by Default          Current Outpatient Medications   Medication Sig Dispense Refill     Acetaminophen (TYLENOL PO) Take 500 mg by mouth every 4 hours as needed          albuterol (PROAIR HFA/PROVENTIL HFA/VENTOLIN HFA) 108  (90 Base) MCG/ACT inhaler Inhale 2 puffs into the lungs every 6 hours as needed for shortness of breath / dyspnea or wheezing 1 Inhaler 1     Calcium Citrate-Vitamin D (CALCITRATE/VITAMIN D PO) Take 1 tablet by mouth 2 times daily          diltiazem ER (DILT-XR) 180 MG 24 hr capsule Take 1 capsule (180 mg) by mouth daily 90 capsule 3     losartan (COZAAR) 50 MG tablet TAKE 1 TABLET BY MOUTH ONCE DAILY 90 tablet 2     Misc Natural Products (OSTEO BI-FLEX ADV DOUBLE ST PO) Take  by mouth daily.         Multiple Vitamin (MULTIVITAMINS PO) Take 1 tablet by mouth daily          Multiple Vitamins-Minerals (PRESERVISION AREDS 2 PO) Take by mouth 2 times daily          order for DME 4 wheeled walker 1 each 0     sertraline (ZOLOFT) 50 MG tablet Take 1 tablet (50 mg) by mouth daily 30 tablet 3     simvastatin (ZOCOR) 10 MG tablet Take 1 tablet (10 mg) by mouth At Bedtime 90 tablet 3     traMADol (ULTRAM) 50 MG tablet TAKE 1 TO 2 TABLETS BY MOUTH EVERY 4 HOURS AS NEEDED FOR PAIN--DO  NOT  EXCEED  8  TABLETS  PER  24 HOURS 100 tablet 0     warfarin (COUMADIN) 5 MG tablet 2.5mg Mon/Fri and 5mg  all other days or as directed by warfarin clinic 100 tablet 3     warfarin ANTICOAGULANT (COUMADIN) 5 MG tablet TAKE ONE-HALF TABLET BY MOUTH ON FRIDAYS, AND 1 TABLET ALL OTHER DAYS OF THE WEEK OR AS DIRECTED BY THE WARFARIN CLINIC 64 tablet 6            ALLERGIES:            Allergies   Allergen Reactions     Atenolol Shortness Of Breath and Other (See Comments)       Dizziness  Severe vertigo and dizziness/SOB     Lisinopril Cough     Augmentin Diarrhea     Pollen Extract         Other reaction(s): Runny Nose         FAMILY HISTORY:      Family History         Family History   Problem Relation Age of Onset     Diabetes Father 75         cause of death     Other - See Comments Father           PVD     Heart Disease Sister       Coronary Artery Disease Sister       Other - See Comments Mother 83         old age; cause of death     Other  - See Comments Sister           14 year twin [employer]; cause of death     Chronic Obstructive Pulmonary Disease Daughter       Osteoporosis Daughter       Thyroid Disease Daughter       Cancer Sister       Other Cancer Sister       Other Cancer Sister           uterine     Hypertension No family hx of       Hyperlipidemia No family hx of       Cerebrovascular Disease No family hx of       Breast Cancer No family hx of       Prostate Cancer No family hx of       Colon Cancer No family hx of       Asthma No family hx of       Anesthesia Reaction No family hx of       Genetic Disorder No family hx of              SOCIAL HISTORY:      Social History            Socioeconomic History     Marital status:        Spouse name: Not on file     Number of children: Not on file     Years of education: Not on file     Highest education level: Not on file   Occupational History     Not on file   Social Needs     Financial resource strain: Not on file     Food insecurity:       Worry: Not on file       Inability: Not on file     Transportation needs:       Medical: Not on file       Non-medical: Not on file   Tobacco Use     Smoking status: Former Smoker       Packs/day: 0.30       Years: 30.00       Pack years: 9.00       Types: Cigarettes       Last attempt to quit: 1987       Years since quittin.5     Smokeless tobacco: Never Used     Tobacco comment: year quit    Substance and Sexual Activity     Alcohol use: No     Drug use: No     Sexual activity: Not Currently       Partners: Male   Lifestyle     Physical activity:       Days per week: Not on file       Minutes per session: Not on file     Stress: Not on file   Relationships     Social connections:       Talks on phone: Not on file       Gets together: Not on file       Attends Hindu service: Not on file       Active member of club or organization: Not on file       Attends meetings of clubs or organizations: Not on file        Relationship status: Not on file     Intimate partner violence:       Fear of current or ex partner: Not on file       Emotionally abused: Not on file       Physically abused: Not on file       Forced sexual activity: Not on file   Other Topics Concern     Parent/sibling w/ CABG, MI or angioplasty before 65F 55M? No      Service Not Asked     Blood Transfusions Yes     Caffeine Concern Not Asked     Occupational Exposure Not Asked     Hobby Hazards Not Asked     Sleep Concern Not Asked     Stress Concern Not Asked     Weight Concern Not Asked     Special Diet Not Asked     Back Care Not Asked     Exercise Not Asked     Bike Helmet Not Asked     Seat Belt Not Asked     Self-Exams Not Asked   Social History Narrative     Not on file          REVIEW OF SYSTEMS:      10 point review of systems is within norrnal other than mentioned in history and physical.       PHYSICAL EXAMINATION:      There were no vitals taken for this visit.     GENERAL: No acute distress.  HEENT: EOMI. Sclerae white, not injected. Pharynx without erythema or exudate.   Neck: No adenopathy. No thyromegaly. Symmetrical.   Heart: Regular rate and rhythm. Normal S1 with diminished S2. Harsh crescendo decrescendo late peaking systolic murmur with radiation to carotids. Delayed carotid pulses.   Lungs: Clear to auscultation. No ronchi, wheezes, rales.   Abdomen: Soft, nontender, nondistended. Bowel sounds present.  Extremities: No clubbing, cyanosis, or edema.   Neurologic: Alert and oriented to person/place/time, normal speech and affect. No focal deficits.  Skin: No petechiae, purpura or rash.      LABORATORY DATA:      LIPID RESULTS:        Lab Results   Component Value Date     CHOL 142 12/31/2018     HDL 69 12/31/2018     LDL 60 12/31/2018     TRIG 63 12/31/2018        LIVER ENZYME RESULTS:        Lab Results   Component Value Date     AST 26 03/05/2020     ALT 18 03/05/2020        CBC RESULTS:        Lab Results   Component Value Date      WBC 11.9 (H) 03/05/2020     RBC 4.35 03/05/2020     HGB 13.7 03/05/2020     HCT 41.4 03/05/2020     MCV 95 03/05/2020     MCH 31.5 03/05/2020     MCHC 33.1 03/05/2020     RDW 12.8 03/05/2020      03/05/2020        BMP RESULTS:        Lab Results   Component Value Date      03/05/2020     POTASSIUM 4.5 03/05/2020     CHLORIDE 100 03/05/2020     CO2 31 03/05/2020     ANIONGAP 4 03/05/2020     GLC 73 03/05/2020     BUN 26 03/05/2020     CR 0.91 03/05/2020     GFRESTIMATED 58 (L) 03/05/2020     GFRESTBLACK 67 03/05/2020     LOIS 9.7 03/05/2020         A1C RESULTS:  No results found for: A1C     INR RESULTS:        Lab Results   Component Value Date     INR 1.67 (H) 03/05/2020     INR 2.7 (A) 02/14/2020     INR 2.4 (A) 01/03/2020     INR 1.14 11/01/2019            PROCEDURES & FURTHER ASSESSMENTS:      Echo 12/19/19  Interpretation Summary  No pericardial effusion is present.  Global and regional left ventricular function is normal with an EF of 60-65%.  Mild to moderate left atrial enlargement is present.  Moderate right atrial enlargement is present.  Moderate mitral annular calcification is present.  Mild mitral insufficiency is present.  Severe aortic valve calcification is present.  Mild aortic insufficiency is present.  The peak aortic velocity is 4.2 m/sec.  Peak AoV pressure gradient 70.7 mm Hg  The mean gradient across the aortic valve is32.8 mmHg.  Moderate to severe aortic stenosis is present.  Moderate to severe tricuspid insufficiency is present.  Right ventricular systolic pressure is 40mmHg above the right atrial pressure.  Mild pulmonic insufficiency is present.     Dobutamine echo 2/11/20  Interpretation Summary  Stress test performed for aortic valve stenosis evaluation. The rhythm is atrial fibrillation.     Baseline measurement: Aov mean: 30 mmHg, GLEN (VTI) 0.72 cm2 . With stress (10mcg/kg/min) Vmax increased to 4.42cm/s, max gradient was increased to 78mmHg and a mean of 47mmHg. The GLEN  was calculated at 0.7cm2. This is consistent with severe AS.  There is mild MR. Moderate mitral annular calcification. Mild TR. There is moderate pulmonaray hypertension.  There proximal septal thickening. LV EF at baseline is 6-65% and increased to 65+%.  No symptoms to suggest ischemia during the study.  Inferolateral ST segment depressions noted with stress consistent with  strain/ischemia.        STS PROM RISK SCORE: 13.5%  NYHA CLASS: III      CLINICAL IMPRESSION:      Patient is a 84 year old female with symptomatic severe aortic stenosis who is at high risk for surgical aortic valve replacement based on age, frailty, co-morbidities and overall surgical mortality risk estimation of 13.5% based on the STS score and should be consIdered for TAVR.  Given her age and high STS risk score, I do not think she should be considered for cardiopulmonary bypass back. I will discuss this with the team and let the patient know.

## 2020-04-23 NOTE — PROGRESS NOTES
"Michael was tentatively scheduled for TAVR at [facility] on April 15.      On April 9, a telephone call was made to daughter, Hui. While reviewing pre-op instructions, we also reviewed policy from [facility] that there would be no visitor's allowed for patient's having procedures.     Daughter, Hui, expressed concerns that it was important for Hui to be nearby Michael. Hui stated that her mom was becoming more forgetful and Hui was concerned for her safety.  In light of this information, we contacted [facility] management about the pt situation and the current; \"no visitor's\" policy.  The decision was was still \"no visitor's.\"  This information was shared with Hui.  Hui stated that a telephone call from one of the MD's of the TAVR team would be helpful.  This telephone call was made to Hui by Dr. Vale on April 10.      Today, April 23, a follow up telephone call was made to Hui, regarding Michael, the pt.   Hui states that her mom is \"doing well, the same.\"  Then Hui stated that [facility] in [place] would be calling and asking for Michael's records.  Hui said that Dr. Garcia would be doing her mom's TAVR procedure there.      I asked Hui if [facility] allows visitor's for in-patients.  Hui said, \"yes\".    I told Hui if there was anything I could assist with to please call.  I told Hui that until her mom had her TAVR, she was still considered part of our pt group, as we want to make sure that her mom receives timely care.      After this, I called [facility], and spoke to the floor where s/p TAVR pt's go after their procedures.  I asked their policy, and was told there is absolutely no visitors, at all.    I called Hui back.  I explained that I had called [place] and told her of my conversation.    She stated that \"at least I will only have an hour to drive home, instead of four hours.\"    I told Hui that policies change daily, and to be sure to ask what the current visitor policy for that time that Michael would be " having her TAVR.

## 2020-04-29 NOTE — TELEPHONE ENCOUNTER
Tramadol      Last Written Prescription Date:  4/3/20  Last Fill Quantity: 100,   # refills: 0  Last Office Visit: 3/13/20  Future Office visit:       Routing refill request to provider for review/approval because:  Drug not on the [facility], [facility] or [facility] refill protocol or controlled substance

## 2020-04-30 NOTE — PROGRESS NOTES
ANTICOAGULATION FOLLOW-UP CLINIC VISIT    Patient Name:  Michael Christiansen  Date:  4/30/2020  Contact Type:  message left on warfarin clinic voicemail by Jenny Block.    SUBJECTIVE:  Patient Findings     Positives:   Upcoming invasive procedure (TAVR  5/5/20)    Comments:   Call and message left by [facility], that patient will be having TAVR on 5/5/20. She was instructed to hold her warfarin starting on 5/1/20 by cardiology. Post procedure dosing will be done by [facility].  She does have follow up INR appointment set for 5/11/20 already. Warfarin dosing flowsheet will be faxed to [facility] per their request.         Clinical Outcomes     Negatives:   Major bleeding event, Thromboembolic event, Anticoagulation-related hospital admission, Anticoagulation-related ED visit, Anticoagulation-related fatality    Comments:   Call and message left by Quan Block, that patient will be having TAVR on 5/5/20. She was instructed to hold her warfarin starting on 5/1/20 by cardiology. Post procedure dosing will be done by [facility].  She does have follow up INR appointment set for 5/11/20 already. Warfarin dosing flowsheet will be faxed to [facility] per their request.            OBJECTIVE    INR Protime   Date Value Ref Range Status   03/20/2020 2.7 (A) 0.86 - 1.14 Final       ASSESSMENT / PLAN  No question data found.  Anticoagulation Summary  As of 4/30/2020    INR goal:   2.0-3.0   TTR:   55.9 % (10.8 mo)   INR used for dosing:   No new INR was available at the time of this encounter.   Warfarin maintenance plan:   2.5 mg (5 mg x 0.5) every Mon, Fri; 5 mg (5 mg x 1) all other days   Full warfarin instructions:   5/1: Hold; 5/2: Hold; 5/3: Hold; 5/4: Hold; 5/5: Hold; Otherwise 2.5 mg every Mon, Fri; 5 mg all other days   Weekly warfarin total:   30 mg   No change documented:   Kasia Hercules RN   Plan last modified:   Kasia Hercules, RN  (10/1/2018)   Next INR check:   5/11/2020   Priority:   High   Target end date:   Indefinite    Indications    Long-term (current) use of anticoagulants [Z79.01] [Z79.01]  Permanent atrial fibrillation [I48.21]             Anticoagulation Episode Summary     INR check location:       Preferred lab:       Send INR reminders to:   [facility] ANTICOAG POOL    Comments:         Anticoagulation Care Providers     Provider Role Specialty Phone number    Chandrika Kumar PA [facility] [phone]            See the Encounter Report to view Anticoagulation Flowsheet and Dosing Calendar (Go to Encounters tab in chart review, and find the Anticoagulation Therapy Visit)        Kasia Hercules RN

## 2020-05-07 NOTE — ED AVS SNAPSHOT
[facility]  [address]  [place] [address]  Phone:  [phone]                                    Michael Christiansen   MRN: [id number]    Department:  [facility]   Date of Visit:  5/7/2020           After Visit Summary Signature Page    I have received my discharge instructions, and my questions have been answered. I have discussed any challenges I see with this plan with the nurse or doctor.    ..........................................................................................................................................  Patient/Patient Representative Signature      ..........................................................................................................................................  Patient Representative Print Name and Relationship to Patient    ..................................................               ................................................  Date                                   Time    ..........................................................................................................................................  Reviewed by Signature/Title    ...................................................              ..............................................  Date                                               Time          22EPIC Rev 08/18

## 2020-05-07 NOTE — TELEPHONE ENCOUNTER
10:30 AM    Reason for Call: Phone Call    Description: Homecare would like verbal orders for admission to Home care.    Was an appointment offered for this call? No  If yes : Appointment type              Date    Preferred method for responding to this message: Telephone Call  What is your phone number ? [phone] Kalyani    Can this message wait until your PCP/provider returns, if available today? Not applicable    Latisha Erickson LPN

## 2020-05-08 NOTE — ED NOTES
Spoke with daughter Hui on the phone. Informed Hui of patient's need for Lovenox injections every 12 hours while at home. Hui states she is not able to give the injections but will speak with her daughter to see if she is OK with giving the Lovenox and will call back to let us know. Patient resting in bed. IV removed. Catheter intact. Coban applied. Instructed to keep coban in place for 25 minutes and then remove. Verbalized understanding. Denies pain. Assisted patient to get dressed. Tolerated well. Patient informed that daughter Hui is on her way to pick her up and to press call light if anything is needed. Verbalized understanding.

## 2020-05-08 NOTE — PROGRESS NOTES
"ANTICOAGULATION FOLLOW-UP CLINIC VISIT    Patient Name:  Michael Christiansen  Date:  5/8/2020  Contact Type:  Telephone/ spoke to her daughterHui    SUBJECTIVE:  Patient Findings     Positives:   Change in medications (lovenox 40mg q12h)    Comments:   Call from patient daughter who states that patient was in ER last night and had experienced what was called a TIA. Daughter states that patient was started on lovenox BID. She was asking if patient should still take her warfarin. We discussed that patient will be taking both drugs, warfarin and lovenox until her INR gets to 2.0 and then lovenox can be stopped. We discussed that since her warfarin had not been given when she was in the hospital her number is very low so the lovenox is doing the job of the warfarin until her warfarin number gets back to \"Normal\". Daughter verbalized understanding. We discussed new warfarin dosing which daughter understood. We also discussed that daughter needs to tell homecare nurse today, when she is there, that patient needs INR to be done on Monday 5/11/20. Daughter verbalized understanding of all things discussed and has no questions.         Clinical Outcomes     Negatives:   Major bleeding event, Thromboembolic event, Anticoagulation-related hospital admission, Anticoagulation-related ED visit, Anticoagulation-related fatality    Comments:   Call from patient daughter who states that patient was in ER last night and had experienced what was called a TIA. Daughter states that patient was started on lovenox BID. She was asking if patient should still take her warfarin. We discussed that patient will be taking both drugs, warfarin and lovenox until her INR gets to 2.0 and then lovenox can be stopped. We discussed that since her warfarin had not been given when she was in the hospital her number is very low so the lovenox is doing the job of the warfarin until her warfarin number gets back to \"Normal\". Daughter verbalized " understanding. We discussed new warfarin dosing which daughter understood. We also discussed that daughter needs to tell homecare nurse today, when she is there, that patient needs INR to be done on 20. Daughter verbalized understanding of all things discussed and has no questions.            OBJECTIVE    INR   Date Value Ref Range Status   2020 1.19 (H) 0.86 - 1.14 Final       ASSESSMENT / PLAN  INR assessment SUB INR in ER , patient had TIA, lovenox started. TAVR last week, warfarin subtherapeutic at discharge   Recheck INR In: 3 DAYS    INR Location Clinic      Anticoagulation Summary  As of 2020    INR goal:   2.0-3.0   TTR:   53.7 % (1 y)   INR used for dosin.19! (2020)   Warfarin maintenance plan:   2.5 mg (5 mg x 0.5) every Mon, Fri; 5 mg (5 mg x 1) all other days   Full warfarin instructions:   : 7.5 mg; : 7.5 mg; Otherwise 2.5 mg every Mon, Fri; 5 mg all other days   Weekly warfarin total:   30 mg   Plan last modified:   Kasia Hercules, RN (10/1/2018)   Next INR check:   2020   Priority:   High   Target end date:   Indefinite    Indications    Long-term (current) use of anticoagulants [Z79.01] [Z79.01]  Permanent atrial fibrillation [I48.21]             Anticoagulation Episode Summary     INR check location:       Preferred lab:       Send INR reminders to:   HASEEB KLEIN    Comments:         Anticoagulation Care Providers     Provider Role Specialty Phone number    Chandrika Kumar, PA [facility] [phone]            See the Encounter Report to view Anticoagulation Flowsheet and Dosing Calendar (Go to Encounters tab in chart review, and find the Anticoagulation Therapy Visit)        Kasia Hercules, RN

## 2020-05-08 NOTE — ED NOTES
18 G IV placed to LAC after non-contrast CT of head. Labs drawn and handed off to ladonna Mercado, who responded to code stroke. CT staff informed patient now has 18 G IV and is ready for CTA.

## 2020-05-08 NOTE — ED PROVIDER NOTES
History     Chief Complaint   Patient presents with     Slurred Speech     started at 1915     HPI  Michael Christiansen is a 84 year old female who presents today with complaints of slurred speech.  Symptoms began approximately 30 minutes prior to arrival.  Patient is status post TAVR and was discharged from the hospital today.    Allergies:  Allergies   Allergen Reactions     Atenolol Shortness Of Breath and Other (See Comments)     Dizziness  Severe vertigo and dizziness/SOB     Lisinopril Cough     Augmentin Diarrhea     Pollen Extract      Other reaction(s): Runny Nose       Problem List:    Patient Active Problem List    Diagnosis Date Noted     Status post coronary angiogram 03/06/2020     Priority: Medium     Other ill-defined heart diseases 02/17/2020     Priority: Medium     Added automatically from request for surgery [id number]       Severe aortic stenosis 01/03/2020     Priority: Medium     Left upper quadrant pain 10/22/2019     Priority: Medium     Added automatically from request for surgery [id number]       ILD (interstitial lung disease) (H) 10/21/2019     Priority: Medium     Chronic, continuous use of opioids 09/23/2019     Priority: Medium     Hx of rheumatic fever 06/19/2019     Priority: Medium     Protein-calorie malnutrition (H) 06/06/2019     Priority: Medium     Valvular heart disease 06/06/2019     Priority: Medium     Unexplained weight loss 06/06/2019     Priority: Medium     LISA (generalized anxiety disorder) 11/26/2018     Priority: Medium     Anticoagulated on Coumadin 06/13/2018     Priority: Medium     Pulmonary hypertension-moderate 12/20/2017     Priority: Medium     Moderate tricuspid insufficiency 12/20/2017     Priority: Medium     Malignant neoplasm of right breast in female, estrogen receptor positive, unspecified site of breast (H) 08/02/2017     Priority: Medium     Osteoporosis 01/26/2017     Priority: Medium     Long-term (current) use of anticoagulants [Z79.01] 07/27/2016      Priority: Medium     History of total knee replacement 01/26/2015     Priority: Medium     Chronic pain syndrome 01/23/2015     Priority: Medium     Acute blood loss anemia 07/19/2013     Priority: Medium     Permanent atrial fibrillation 05/01/2013     Priority: Medium     Overview:   Chronic        Essential hypertension 03/25/2013     Priority: Medium     Osteoarthritis 03/25/2013     Priority: Medium     Lumbar pain with radiation down right leg 07/05/2011     Priority: Medium     Overview:   IMO Update 10/11       Lumbar spinal stenosis 07/05/2011     Priority: Medium     Radiculitis 06/16/2011     Priority: Medium     Overview:   IMO Update 10/11       Osteoarthritis of knee 09/16/2010     Priority: Medium     Overview:   IMO Update 10/11          Past Medical History:    Past Medical History:   Diagnosis Date     Arthritis      Backache, unspecified 1/1/2011     Breast cancer, stage 1 3/19/2010     Chronic pain syndrome 1/23/2015     Claustrophobia 1/1/2011     Hip joint replacement by other means 1/1/2011     Osteoarthrosis, unspecified whether generalized or localized, lower leg 8/14/2006     Posttraumatic stress disorder 1/1/2011       Past Surgical History:    Past Surgical History:   Procedure Laterality Date     BREAST SURGERY      right  side mastectomy     C TOTAL KNEE ARTHROPLASTY Left 01/26/15     cataract extraction and lens implantation       COLONOSCOPY       CV CORONARY ANGIOGRAM N/A 3/6/2020    Procedure: CV CORONARY ANGIOGRAM;  Surgeon: Skyler Shepherd MD;  Location:  [facility]     CV RIGHT HEART CATH N/A 3/6/2020    Procedure: CV RIGHT HEART CATH;  Surgeon: Skyler Shepherd MD;  Location: [facility] CARDIAC CATH LAB     ESOPHAGOSCOPY, GASTROSCOPY, DUODENOSCOPY (EGD), COMBINED N/A 11/1/2019    Procedure: UPPER ENDOSCOPY, WITH BIOPSY;  Surgeon: Fab Carroll MD;  Location: [facility] OR     EYE SURGERY       GYN SURGERY      tubal pregnancy     ORTHOPEDIC SURGERY  2009    l. hip  replacement LT     ORTHOPEDIC SURGERY  2013    Right total knee     TUBAL/ECTOPIC PREGNANCY         Family History:    Family History   Problem Relation Age of Onset     Diabetes Father 75        cause of death     Other - See Comments Father         PVD     Heart Disease Sister      Coronary Artery Disease Sister      Other - See Comments Mother 83        old age; cause of death     Other - See Comments Sister         14 year twin [employer]; cause of death     Chronic Obstructive Pulmonary Disease Daughter      Osteoporosis Daughter      Thyroid Disease Daughter      Cancer Sister      Other Cancer Sister      Other Cancer Sister         uterine     Hypertension No family hx of      Hyperlipidemia No family hx of      Cerebrovascular Disease No family hx of      Breast Cancer No family hx of      Prostate Cancer No family hx of      Colon Cancer No family hx of      Asthma No family hx of      Anesthesia Reaction No family hx of      Genetic Disorder No family hx of        Social History:  Marital Status:   [5]  Social History     Tobacco Use     Smoking status: Former Smoker     Packs/day: 0.30     Years: 30.00     Pack years: 9.00     Types: Cigarettes     Last attempt to quit: 1987     Years since quittin.7     Smokeless tobacco: Never Used     Tobacco comment: year quit    Substance Use Topics     Alcohol use: No     Drug use: No        Medications:    docusate sodium (COLACE) 100 MG capsule  magnesium oxide 400 MG CAPS  Acetaminophen (TYLENOL PO)  albuterol (PROAIR HFA/PROVENTIL HFA/VENTOLIN HFA) 108 (90 Base) MCG/ACT inhaler  Calcium Citrate-Vitamin D (CALCITRATE/VITAMIN D PO)  diltiazem ER (DILT-XR) 180 MG 24 hr capsule  losartan (COZAAR) 50 MG tablet  Misc Natural Products (OSTEO BI-FLEX ADV DOUBLE ST PO)  Multiple Vitamin (MULTIVITAMINS PO)  Multiple Vitamins-Minerals (PRESERVISION AREDS 2 PO)  order for DME  sertraline (ZOLOFT) 50 MG tablet  simvastatin (ZOCOR)  10 MG tablet  traMADol (ULTRAM) 50 MG tablet  warfarin (COUMADIN) 5 MG tablet  warfarin ANTICOAGULANT (COUMADIN) 5 MG tablet          Review of Systems   Unable to perform ROS: Acuity of condition (COde stroke, patient rushed to CT)   Musculoskeletal: Negative for neck stiffness.   Skin: Negative.        Physical Exam          Physical Exam  Constitutional:       General: She is not in acute distress.     Appearance: Normal appearance. She is normal weight. She is not toxic-appearing.   HENT:      Head: Normocephalic and atraumatic.      Mouth/Throat:      Mouth: Mucous membranes are moist.   Neck:      Musculoskeletal: Normal range of motion and neck supple. No neck rigidity.   Cardiovascular:      Pulses: Normal pulses.      Heart sounds: Normal heart sounds.   Pulmonary:      Effort: Pulmonary effort is normal.   Abdominal:      General: Abdomen is flat. There is no distension.      Tenderness: There is no abdominal tenderness.   Musculoskeletal: Normal range of motion.   Neurological:      General: No focal deficit present.      Mental Status: She is alert.      Cranial Nerves: No cranial nerve deficit.      Comments: 5/5 muscle strength bilateral upper and lower extremities   Psychiatric:         Mood and Affect: Mood normal.         Behavior: Behavior normal.         ED Course     ED Course as of May 07 2124   Thu May 07, 2020   2008 Head CT negative per radiology      2011 Signed out to Dr. Duke      2050 Dr. Zuñiga reviewed CTs. States low NIH score amongst other historical facts make her not a TPA candidate. Code stroke called off.       2101 Radiologist from [facility] called - mild disease in neck, no acute findings.   Shaylee Carvajal CNP on 5/7/2020 at 9:02 PM          Procedures             Results for orders placed or performed during the hospital encounter of 05/07/20 (from the past 24 hour(s))   CBC with platelets differential   Result Value Ref Range    WBC 14.2 (H) 4.0 - 11.0 10e9/L    RBC Count 3.53  (L) 3.8 - 5.2 10e12/L    Hemoglobin 11.1 (L) 11.7 - 15.7 g/dL    Hematocrit 32.0 (L) 35.0 - 47.0 %    MCV 91 78 - 100 fl    MCH 31.4 26.5 - 33.0 pg    MCHC 34.7 31.5 - 36.5 g/dL    RDW 13.2 10.0 - 15.0 %    Platelet Count 213 150 - 450 10e9/L    Diff Method Automated Method     % Neutrophils 69.1 %    % Lymphocytes 13.4 %    % Monocytes 14.9 %    % Eosinophils 1.5 %    % Basophils 0.5 %    % Immature Granulocytes 0.6 %    Nucleated RBCs 0 0 /100    Absolute Neutrophil 9.8 (H) 1.6 - 8.3 10e9/L    Absolute Lymphocytes 1.9 0.8 - 5.3 10e9/L    Absolute Monocytes 2.1 (H) 0.0 - 1.3 10e9/L    Absolute Eosinophils 0.2 0.0 - 0.7 10e9/L    Absolute Basophils 0.1 0.0 - 0.2 10e9/L    Abs Immature Granulocytes 0.1 0 - 0.4 10e9/L    Absolute Nucleated RBC 0.0    INR   Result Value Ref Range    INR 1.19 (H) 0.86 - 1.14   Comprehensive metabolic panel   Result Value Ref Range    Sodium 133 133 - 144 mmol/L    Potassium 4.2 3.4 - 5.3 mmol/L    Chloride 98 94 - 109 mmol/L    Carbon Dioxide 33 (H) 20 - 32 mmol/L    Anion Gap 2 (L) 3 - 14 mmol/L    Glucose 81 70 - 99 mg/dL    Urea Nitrogen 24 7 - 30 mg/dL    Creatinine 0.73 0.52 - 1.04 mg/dL    GFR Estimate 75 >60 mL/min/[1.73_m2]    GFR Estimate If Black 87 >60 mL/min/[1.73_m2]    Calcium 8.8 8.5 - 10.1 mg/dL    Bilirubin Total 0.8 0.2 - 1.3 mg/dL    Albumin 3.2 (L) 3.4 - 5.0 g/dL    Protein Total 7.1 6.8 - 8.8 g/dL    Alkaline Phosphatase 64 40 - 150 U/L    ALT 14 0 - 50 U/L    AST 26 0 - 45 U/L   Troponin I   Result Value Ref Range    Troponin I ES 0.105 (H) 0.000 - 0.045 ug/L   Lactic acid whole blood   Result Value Ref Range    Lactic Acid 0.7 0.7 - 2.0 mmol/L   CT Head w/o Contrast    Narrative    EXAM:CT HEAD W/O CONTRAST     CLINICAL HISTORY: Patient Age  84 years Additional clinical info:  Patient who woke up from sleep with slurred speech. R/o stroke.    COMPARISON: 11/14/2018      TECHNIQUE: Axial images acquired without contrast with coronal and  sagittal two-dimensional  reformatted images    CONTRAST: None    FINDINGS: No intra-axial or extra-axial mass or hemorrhage. No midline  shift. Stable areas of low density in the periventricular white matter  of the cerebral hemispheres in a pattern most suggestive of chronic  small vessel ischemic change. Mild diffuse parenchymal atrophy.  Minimal fluid in the sphenoid sinuses. The remaining paranasal sinuses  and mastoid air cells appear normal. Postoperative changes in the  orbits. No fracture identified. No soft tissue swelling. Intracranial  arterial calcifications.           Impression    IMPRESSION:   1. No acute findings and no appreciable change since 11/14/2018.  2. Mild diffuse parenchymal atrophy and chronic white matter ischemic  change    ALYSA BAIN MD       Medications   iopamidol (ISOVUE-370) solution 50 mL (has no administration in time range)   sodium chloride (PF) 0.9% PF flush 100 mL (50 mLs Intravenous Given 5/7/20 2006)       Assessments & Plan (with Medical Decision Making)         New Prescriptions    No medications on file       Final diagnoses:   TIA (transient ischemic attack)   Urinary tract infection without hematuria, site unspecified   Slurred speech       5/7/2020   [facility]     David Campbell MD  05/08/20 2044

## 2020-05-08 NOTE — ED NOTES
"Arrived to ED via [facility] with c/o slurred speech that started approximately 30 to 35 minutes prior to patient arrival in ED. EMS reports patient just returned home from [facility] today after receiving aortic valve replacement there on 5/05/2020. Patient is alert and oriented x4. Has noted slurred speech, no facial droop. Is able to answer questions but will state \"yes\" when intending to state no. When asked if allergic to contrast dye for CT states \"yes.\" When asked further questions about contrast dye states \"I'm not allergic to that, I've never had a problem with that.\" When asked if diabetic states \"yes.\" When asked further questions about being diabetic states \"I'm not diabetic.\" Is able to state that she just had an aortic valve replacement 2 days ago and just returned home \"about 4 PM today.\" Denies any pain. Denies nausea, vomiting, headache, or fevers at home. Assessment as charted. Call light within reach.   "

## 2020-05-08 NOTE — ED NOTES
Daughter Hui here to drive patient home. Patient transferred from bed to wheelchair without issue. Wheeled out of ED to awaiting daughter Hui. Discharge instructions reviewed with patient and daughter in empty ED waiting room. Daughter verbalized understanding. Patient wheeled out to awaiting private vehicle and transferred to front passenger seat independently with steady gait.

## 2020-05-08 NOTE — TELEPHONE ENCOUNTER
Joan from [facility] called, need verbal orders for skilled nursing.    [phone]

## 2020-05-08 NOTE — ED NOTES
Attempted to draw blood cultures x 2. Unable. Dr. Duke informed and asked if he wants blood cultures with lactic acid WNL. Dr. Duke states he does want blood cultures drawn. Lab informed of need for blood cultures.

## 2020-05-11 NOTE — PROGRESS NOTES
ANTICOAGULATION FOLLOW-UP CLINIC VISIT    Patient Name:  Michael Christiansen  Date:  2020  Contact Type:  Telephone/ spoke to homecare nurseMary    SUBJECTIVE:  Patient Findings     Positives:   Change in medications (continue lovenox)    Comments:   INR done by homecare nurse and result called to warfarin clinic. Per nurse report, patient has no bleeding/bruising and no new changes in diet/meds/activity. She will continue on lovenox as INR is 1.9 and she had recent TIA. Per nurse, patient feeling good. We discussed INR result, warfarin/lovenox dosing and INR recheck date. Nurse verbalized understanding and has no questions. Lovenox x 3 syringes ordered for patient to last until Thursday evening.         Clinical Outcomes     Negatives:   Major bleeding event, Thromboembolic event, Anticoagulation-related hospital admission, Anticoagulation-related ED visit, Anticoagulation-related fatality    Comments:   INR done by homecare nurse and result called to warfarin clinic. Per nurse report, patient has no bleeding/bruising and no new changes in diet/meds/activity. She will continue on lovenox as INR is 1.9 and she had recent TIA. Per nurse, patient feeling good. We discussed INR result, warfarin/lovenox dosing and INR recheck date. Nurse verbalized understanding and has no questions. Lovenox x 3 syringes ordered for patient to last until Thursday evening.            OBJECTIVE    INR   Date Value Ref Range Status   2020 1.9 (A) 0.90 - 1.10 Final       ASSESSMENT / PLAN  INR assessment SUB TAVR last week, lovenox bridge as had recent TIA   Recheck INR In: 3 DAYS    INR Location [facility] INR      Anticoagulation Summary  As of 2020    INR goal:   2.0-3.0   TTR:   52.8 % (1 y)   INR used for dosin.9! (2020)   Warfarin maintenance plan:   2.5 mg (5 mg x 0.5) every Mon, Fri; 5 mg (5 mg x 1) all other days   Full warfarin instructions:   : 5 mg; Otherwise 2.5 mg every Mon, Fri; 5 mg all other days    Weekly warfarin total:   30 mg   Plan last modified:   Kasia Hercules RN (10/1/2018)   Next INR check:   5/14/2020   Priority:   High   Target end date:   Indefinite    Indications    Long-term (current) use of anticoagulants [Z79.01] [Z79.01]  Permanent atrial fibrillation [I48.21]             Anticoagulation Episode Summary     INR check location:       Preferred lab:       Send INR reminders to:   HASEEB KLEIN    Comments:         Anticoagulation Care Providers     Provider Role Specialty Phone number    Chandrika Kumar PA [facility] [phone]            See the Encounter Report to view Anticoagulation Flowsheet and Dosing Calendar (Go to Encounters tab in chart review, and find the Anticoagulation Therapy Visit)        Kasia Hercules, RN

## 2020-05-12 PROBLEM — Z95.2 S/P TAVR (TRANSCATHETER AORTIC VALVE REPLACEMENT): Status: ACTIVE | Noted: 2020-01-01

## 2020-05-12 PROBLEM — I27.21 MODERATE PULMONARY ARTERIAL SYSTOLIC HYPERTENSION (H): Status: ACTIVE | Noted: 2020-01-01

## 2020-05-12 PROBLEM — I25.10 CORONARY ARTERY DISEASE INVOLVING NATIVE CORONARY ARTERY OF NATIVE HEART WITHOUT ANGINA PECTORIS: Status: ACTIVE | Noted: 2020-01-01

## 2020-05-12 PROBLEM — I77.9 LEFT-SIDED CAROTID ARTERY DISEASE (H): Status: ACTIVE | Noted: 2020-01-01

## 2020-05-12 PROBLEM — E78.5 DYSLIPIDEMIA: Status: ACTIVE | Noted: 2020-01-01

## 2020-05-12 NOTE — PROGRESS NOTES
Subjective     Michael Christiansen is a 84 year old female who presents to clinic today for the following health issues:    HPI     Hospital Follow-up Visit:    Hospital/Nursing Home/IP Rehab Facility: [facility]   Date of Admission:05-  Date of Discharge: 05-  Reason(s) for Admission: heart valve replacement       Was your hospitalization related to COVID-19? No   Problems taking medications regularly:  None  Medication changes since discharge: Keflex ( LAST DOSE THIS AFTERNOON ) Lovenox , magnesium   Problems adhering to non-medication therapy:  None    Summary of hospitalization:  CareEverywhere information obtained and reviewed  Diagnostic Tests/Treatments reviewed.  Follow up needed: none  Other Healthcare Providers Involved in Patient s Care:         coumadin clinic, cardiac rehab, home health  Update since discharge: stable. Post Discharge Medication Reconciliation: discharge medications reconciled, continue medications without change.  Plan of care communicated with patient and family            Date of admission: 5/5/2020  Date of discharge: 5/7/2020    Site: [facility]    Primary provider: Chandrika Kumar PA-C  Cardiologist: Cam Kirby MD ([facility]) and Cornelio Garcia MD  Interventionalist: Cornelio Garcia MD  CT Surgeon: Dr. Oliveros    PRINCIPAL DIAGNOSIS:   Severe, Symptomatic Aortic Stenosis  -S/p Successful Transcatheter Aortic Valve Replacement with 23 mm Landa S3 via R transfemoral approach     Acute on chronic HFpEF  Chronic atrial fibrillation on warfarin   Hypotension    SECONDARY DIAGNOSES:   1. Aortic stenosis, severe   - 02/11/20 Dobutamine Stress TTE: Initial echocardiographic readings included in resting aortic valve area of 0.7 cm  with a mean gradient of 30 mmHg. By 15 mcg/kg/min of dobutamine, the patient's aortic valve area remained relatively stable at 0.8 cm/s but the gradient increased to 40 mmHg.   2. Coronary artery disease    - 03/10/20 angiogram: Non-obstructive CAD: Small LAD has a 20% lesion, mid LAD has a 30% lesion. First diagonal has a 30% lesion. Proximal circumflex has a 30% lesion. RPDA has a 20% lesion.   3. Hyperlipidemia  4. Chronic diastolic heart failure   - 02/11/20 TTE: Global and regional left ventricular function is normal with an EF of 60-65%.  5. Chronic atrial fibrillation   - YHC2VS0- Vasc score is 5 (HTN, age-2, carotid artery disease, gender)   - Rate controlled with diltiazem, anticoagulated with warfarin.   6. Left carotid artery disease   - 03/05/20 carotid ultrasound: Left side: Degree of stenosis of the internal carotid artery: >80%. Peak systolic velocity of 262 and end-diastolic velocity of 123 Siemens/sec in the mid internal carotid artery.   7. Essential hypertension   8. Interstitial lung disease   9. Primary pulmonary hypertension   10. Previous tobacco use, quit 1987    OPERATIONS/PROCEDURES:   5/5/20 Transfemoral Transcutaneous Aortic Valve Replacement with aortic valve prosthesis of a 23 mm Landa S3, performed by Dr. Garcia, please see report for details.     SUMMARY:  Please refer to recent documentation for details. In summary, the patient is a pleasant 84 year old female with a history of severe AS admitted for TAVR. Past medical history is also significant for CAD, chronic atrial fibrillation on warfarin, carotid artery disease, HTN, ILD, P HTN and remote tobacco use. She has been followed by Dr. Kirby at [facility] in [place] for her known severe aortic stenosis. It was recommended that she undergo TAVR the [facility]. She did do the required preprocedure TAVR CT as well as angiogram, carotid ultrasound and PFTs. Family ultimately made the decision that they wanted TAVR to be completed closer to home so she was referred to [facility]'s structural heart disease program. Ting met with Dr. Garcia via phone visit on 4/29/2020 and reported progressively  worsening symptoms of shortness of breath with exertion, decreased activity tolerance, repeat near syncopal episodes, fatigue, worsening cognition. He recommended that we proceed with TAVR on 5/5/2020.     After undergoing thorough evaluation, by the multidisciplinary Valve Clinic team, she was found to be an appropriate candidate for TAVR and thus the reason for her admission.     The patient underwent successful TAVR with 23 mm Landa S3 valve on 5/5/20.     Post procedure the patient did very well. She was transferred to [facility] on postop day 0 and monitored closely. She worked with PT/OT who felt she would benefit from an additional day for strengthening as she was quite unsteady and, in general, weak. By POD #2 she was considerably stronger, climbing stairs and walking in the hallway with PT and her cane. Noted significant improvement in her breathing and activity tolerance.  She will be discharged on warfarin and aspirin for life. SBE prophylaxis lifelong (wallet card given to the patient). Side effects and dosing of the medications were discussed in detail. Other prior to admission medications were resumed she will monitor for signs/symptoms of bleeding, infection, drug reaction, and stroke and will participate in cardiac rehab after returning home.      DISCHARGE INSTRUCTIONS:  Activity: Post-TAVR - See discharge instructions    Follow up:   With Kasia King CNP in 1 week in the Valve Clinic with ECG and labs  With Primary Provider in 2 weeks  In the Valve Clinic in 1 month with an echocardiogram, ECG, and labs    Cardiac Rehab Phase II (if patient requires STR / PT, to be started after)    Greater than 1 Hour 30 Minutes time spent with patient education and chart review.    Patients discharge planning was made in collaboration with Dr. Garcia.          She is feeling somewhat fatigued at times. No orthopnea, dyspnea, edema, fever    She did have a TIA 5 days ago, has recovered    Patient Active Problem  List   Diagnosis     Essential hypertension     Osteoarthritis     Permanent atrial fibrillation     Chronic pain syndrome     History of total knee replacement     Radiculitis     Osteoarthritis of knee     Long-term (current) use of anticoagulants [Z79.01]     Osteoporosis     Malignant neoplasm of right breast in female, estrogen receptor positive, unspecified site of breast (H)     Pulmonary hypertension-moderate     Moderate tricuspid insufficiency     Anticoagulated on Coumadin     LISA (generalized anxiety disorder)     Acute blood loss anemia     Lumbar pain with radiation down right leg     Lumbar spinal stenosis     Protein-calorie malnutrition (H)     Valvular heart disease     Unexplained weight loss     Hx of rheumatic fever     Chronic, continuous use of opioids     ILD (interstitial lung disease) (H)     Left upper quadrant pain     Severe aortic stenosis     Other ill-defined heart diseases     Status post coronary angiogram     Coronary artery disease involving native coronary artery of native heart without angina pectoris     Dyslipidemia     Left-sided carotid artery disease (H)     Moderate pulmonary arterial systolic hypertension (H)     S/P TAVR (transcatheter aortic valve replacement)     Past Surgical History:   Procedure Laterality Date     BREAST SURGERY      right  side mastectomy     C TOTAL KNEE ARTHROPLASTY Left 01/26/15     cataract extraction and lens implantation       COLONOSCOPY       CV CORONARY ANGIOGRAM N/A 3/6/2020    Procedure: CV CORONARY ANGIOGRAM;  Surgeon: Skyler Shepherd MD;  Location: [facility] CARDIAC CATH LAB     CV RIGHT HEART CATH N/A 3/6/2020    Procedure: CV RIGHT HEART CATH;  Surgeon: Skyler Shepherd MD;  Location: [facility] CARDIAC CATH LAB     ESOPHAGOSCOPY, GASTROSCOPY, DUODENOSCOPY (EGD), COMBINED N/A 11/1/2019    Procedure: UPPER ENDOSCOPY, WITH BIOPSY;  Surgeon: Fab Carroll MD;  Location: [facility] OR     EYE SURGERY       GYN SURGERY      tubal pregnancy      ORTHOPEDIC SURGERY  2009    l. hip replacement LT     ORTHOPEDIC SURGERY  2013    Right total knee     TUBAL/ECTOPIC PREGNANCY         Social History     Tobacco Use     Smoking status: Former Smoker     Packs/day: 0.30     Years: 30.00     Pack years: 9.00     Types: Cigarettes     Last attempt to quit: 1987     Years since quittin.7     Smokeless tobacco: Never Used     Tobacco comment: year quit    Substance Use Topics     Alcohol use: No     Family History   Problem Relation Age of Onset     Diabetes Father 75        cause of death     Other - See Comments Father         PVD     Heart Disease Sister      Coronary Artery Disease Sister      Other - See Comments Mother 83        old age; cause of death     Other - See Comments Sister         14 year twin [employer]; cause of death     Chronic Obstructive Pulmonary Disease Daughter      Osteoporosis Daughter      Thyroid Disease Daughter      Cancer Sister      Other Cancer Sister      Other Cancer Sister         uterine     Hypertension No family hx of      Hyperlipidemia No family hx of      Cerebrovascular Disease No family hx of      Breast Cancer No family hx of      Prostate Cancer No family hx of      Colon Cancer No family hx of      Asthma No family hx of      Anesthesia Reaction No family hx of      Genetic Disorder No family hx of          Current Outpatient Medications   Medication Sig Dispense Refill     Acetaminophen (TYLENOL PO) Take 500 mg by mouth every 4 hours as needed        Calcium Citrate-Vitamin D (CALCITRATE/VITAMIN D PO) Take 1 tablet by mouth 2 times daily        diltiazem ER (DILT-XR) 180 MG 24 hr capsule Take 1 capsule (180 mg) by mouth daily 90 capsule 3     enoxaparin ANTICOAGULANT (LOVENOX) 40 MG/0.4ML syringe Inject 0.4 mLs (40 mg) Subcutaneous every 12 hours for 3 doses 3 Syringe 1     losartan (COZAAR) 25 MG tablet Take 1 tablet (25 mg) by mouth daily 30 tablet 11     magnesium oxide 400 MG  CAPS Take 400 mg by mouth daily       [facility] Natural Products (OSTEO BI-FLEX ADV DOUBLE ST PO) Take  by mouth daily.       Multiple Vitamin (MULTIVITAMINS PO) Take 1 tablet by mouth daily        Multiple Vitamins-Minerals (PRESERVISION AREDS 2 PO) Take by mouth 2 times daily        order for DME 4 wheeled walker 1 each 0     sertraline (ZOLOFT) 50 MG tablet Take 1 tablet by mouth once daily 30 tablet 5     simvastatin (ZOCOR) 10 MG tablet Take 1 tablet (10 mg) by mouth At Bedtime 90 tablet 3     traMADol (ULTRAM) 50 MG tablet TAKE 1 TO 2 TABLETS BY MOUTH EVERY 4 HOURS AS NEEDED FOR PAIN UP  TO  8  TABLETS  PER   tablet 0     warfarin (COUMADIN) 5 MG tablet 5 mg 2.5mg Mon/Fri and 5mg  all other days or as directed by warfarin clinic 100 tablet 3     warfarin ANTICOAGULANT (COUMADIN) 5 MG tablet TAKE ONE-HALF TABLET BY MOUTH ON FRIDAYS, AND 1 TABLET ALL OTHER DAYS OF THE WEEK OR AS DIRECTED BY THE WARFARIN CLINIC (Patient not taking: Reported on 5/12/2020) 64 tablet 6     Allergies   Allergen Reactions     Atenolol Shortness Of Breath and Other (See Comments)     Dizziness  Severe vertigo and dizziness/SOB     Lisinopril Cough     Augmentin Diarrhea     Pollen Extract      Other reaction(s): Runny Nose     Recent Labs   Lab Test 05/07/20  1953 03/05/20  1340  10/03/19  1628  12/31/18  1007  06/21/17  1025  10/27/14  1450   LDL  --   --   --   --   --  60  --  77  --   --    HDL  --   --   --   --   --  69  --  76  --   --    TRIG  --   --   --   --   --  63  --  61  --   --    ALT 14 18  --  24   < >  --    < >  --    < > 22   CR 0.73 0.91   < > 0.63   < > 0.87   < >  --    < > 0.92   GFRESTIMATED 75 58*   < > 82   < > 61   < >  --    < > 59*   GFRESTBLACK 87 67   < > >90   < > 71   < >  --    < > 71   POTASSIUM 4.2 4.5   < > 4.1   < >  --    < >  --    < > 4.1   TSH  --   --   --   --   --   --   --   --   --  0.56    < > = values in this interval not displayed.      BP Readings from Last 3 Encounters:  "  05/12/20 (!) 84/42   05/07/20 135/81   03/16/20 (!) 170/68    Wt Readings from Last 3 Encounters:   05/12/20 42.9 kg (94 lb 9.6 oz)   03/16/20 43.1 kg (95 lb 1.6 oz)   03/13/20 45.8 kg (101 lb)                      Reviewed and updated as needed this visit by Provider         Review of Systems   Constitutional, HEENT, cardiovascular, pulmonary, gi and gu systems are negative, except as otherwise noted.      Objective    BP (!) 84/56 (BP Location: Left arm, Patient Position: Chair, Cuff Size: Adult Regular)   Pulse 90   Temp 98.8  F (37.1  C) (Tympanic)   Ht 1.626 m (5' 4\")   Wt 42.9 kg (94 lb 9.6 oz)   SpO2 95%   BMI 16.24 kg/m    Body mass index is 16.24 kg/m .  Physical Exam   GENERAL: healthy, alert and no distress  NECK: no adenopathy, no asymmetry, masses, or scars and thyroid normal to palpation  RESP: lungs clear to auscultation - no rales, rhonchi or wheezes  CV: irregularly irregular rhythm, normal S1 S2, no S3 or S4, grade 2/6  murmur heard best over the apex, peripheral pulses strong and no peripheral edema  MS: no gross musculoskeletal defects noted, no edema  NEURO: weakness of lower extremities, at baseline, sensory exam grossly normal and mentation intact  PSYCH: mentation appears normal, affect normal/bright            Assessment & Plan     1. S/P TAVR (transcatheter aortic valve replacement)  Doing well. Continue lovenox and coumadin until coumadin is stable     2. HTN (hypertension)  With hypotension. Will decrease losartan to 25 mg daily. Follow by phone in 2 weeks     3. Essential hypertension  As above   - losartan (COZAAR) 25 MG tablet; Take 1 tablet (25 mg) by mouth daily  Dispense: 30 tablet; Refill: 11    4. TIA (transient ischemic attack)  Last week, resolved.              Return in about 2 weeks (around 5/26/2020) for telephone visit with Chandrika Kumar.    Margareth Cotton MD  [facility] [place]      "

## 2020-05-12 NOTE — NURSING NOTE
"Chief Complaint   Patient presents with     Hospital F/U     TIA , HEART VALVE        Initial BP (!) 84/56 (BP Location: Left arm, Patient Position: Chair, Cuff Size: Adult Regular)   Pulse 90   Temp 98.8  F (37.1  C) (Tympanic)   Ht 1.626 m (5' 4\")   Wt 42.9 kg (94 lb 9.6 oz)   SpO2 95%   BMI 16.24 kg/m   Estimated body mass index is 16.24 kg/m  as calculated from the following:    Height as of this encounter: 1.626 m (5' 4\").    Weight as of this encounter: 42.9 kg (94 lb 9.6 oz).  Medication Reconciliation: complete  Sisi Smith LPN  "

## 2020-05-12 NOTE — Clinical Note
Chandrika, I cut back her losartan to 25 mg daily, HHnurse coming in, will record BP and set up telephone visit with you in 2 weeks.

## 2020-05-14 NOTE — PROGRESS NOTES
"ANTICOAGULATION FOLLOW-UP CLINIC VISIT    Patient Name:  Michael Christiansen  Date:  2020  Contact Type:  Telephone    SUBJECTIVE:  Patient Findings     Positives:   Change in diet/appetite (decreased appetite, decreased vit K intake)    Comments:   INR done by homecare nurse and result called to warfarin clinic. INR is therapeutic so patient can discontinue lovenox at this time. Per homecare nurse, patient has  No bleeding/bruising, no new changes in meds/activity. Per homecare nurse, patient is not eating much \"green stuff\".  I told her to talk to family and make sure patient eats something \"green 2-3 times weekly\" We discussed warfarin dosing/INR recheck date. Nurse verbalized understanding and has no questions.         Clinical Outcomes     Negatives:   Major bleeding event, Thromboembolic event, Anticoagulation-related hospital admission, Anticoagulation-related ED visit, Anticoagulation-related fatality    Comments:   INR done by homecare nurse and result called to warfarin clinic. INR is therapeutic so patient can discontinue lovenox at this time. Per homecare nurse, patient has  No bleeding/bruising, no new changes in meds/activity. Per homecare nurse, patient is not eating much \"green stuff\".  I told her to talk to family and make sure patient eats something \"green 2-3 times weekly\" We discussed warfarin dosing/INR recheck date. Nurse verbalized understanding and has no questions.            OBJECTIVE    Recent labs: (last 7 days)     20   INR 2.9*       ASSESSMENT / PLAN  INR assessment THER    Recheck INR In: 1 WEEK    INR Location Homecare INR      Anticoagulation Summary  As of 2020    INR goal:   2.0-3.0   TTR:   52.7 % (1 y)   INR used for dosin.9 (2020)   Warfarin maintenance plan:   2.5 mg (5 mg x 0.5) every Mon, Fri; 5 mg (5 mg x 1) all other days   Full warfarin instructions:   2.5 mg every Mon, Fri; 5 mg all other days   Weekly warfarin total:   30 mg   No change " documented:   Kasia Hercules, RN   Plan last modified:   Kasia Hercules RN (10/1/2018)   Next INR check:   5/21/2020   Priority:   High   Target end date:   Indefinite    Indications    Long-term (current) use of anticoagulants [Z79.01] [Z79.01]  Permanent atrial fibrillation [I48.21]             Anticoagulation Episode Summary     INR check location:       Preferred lab:       Send INR reminders to:   HASEEB KLEIN    Comments:         Anticoagulation Care Providers     Provider Role Specialty Phone number    Chandrika Kumar PA [facility] [phone]            See the Encounter Report to view Anticoagulation Flowsheet and Dosing Calendar (Go to Encounters tab in chart review, and find the Anticoagulation Therapy Visit)        Kasia Hercules RN

## 2020-05-15 NOTE — ED NOTES
"Pt updated on plan of care. Pt refusing to have any pacing pads placed at this time. Pt states she does not want to have any shocking, compressions, or machines to keep her alive. \"im too brittle for any of that\".  Pt is willing to continue to seek expert consultation and medical care at this point.  Dr. NAVAS updated.   "

## 2020-05-15 NOTE — ED NOTES
At 0219 Pause recording of 3.28 seconds followed by a 4.8 second pause. Recording was printed and given to and reviewed by Dr. WEEKS

## 2020-05-15 NOTE — ED PROVIDER NOTES
History     Chief Complaint   Patient presents with     Loss of Consciousness     HPI  Michael Christiansen is a 84 year old female who fainted at home in front of her daugher and was appeared to be having a seizure as well.  She did lose a large amount of urine on the couch.  Her daughter shook her several times and eventually she aroused slowly.  She did not have focal weakness.  She has had no previous history of head trauma or seizures but had a TIA a week ago.  Her history is also pertinent for history of bicuspid aortic valve with a TAVR on the fifth of this month by Dr. Garcia.    She was seen on the 11th with a TIA and placed on bridging enoxaparin by CV consultation which her daugher and granddaughter have been doing.     She had a negative CT head and obstuction of 67 percent on her LICA with lesser lesion( 40 %)  on SAIRA,   apical nodular densities were noted on her lungs  She has been getting lovenox at home by dick [facility] annater  She has not had a history of falls, autonomic disease or DM,  And she does not drink alcohol  Fam hx neg SCD   She lives with daugher and granddaughter   Remote history of rheumatic fever, interstitial lung disease and prior breaast CA and pulm hen with tricuspid insufficiency as well as blood loss anemia are all noted in the prior past history as risk factors for syncope.   She, at some time, has had note made of CAD .      Allergies   Allergen Reactions     Atenolol Shortness Of Breath and Other (See Comments)     Dizziness  Severe vertigo and dizziness/SOB     Lisinopril Cough     Augmentin Diarrhea     Pollen Extract      Other reaction(s): Runny Nose       Problem List:    Patient Active Problem List    Diagnosis Date Noted     S/P TAVR (transcatheter aortic valve replacement) 05/12/2020     Priority: Medium     Coronary artery disease involving native coronary artery of native heart without angina pectoris 05/04/2020     Priority: Medium     Left-sided carotid artery  disease (H) 05/04/2020     Priority: Medium     Dyslipidemia 04/29/2020     Priority: Medium     Moderate pulmonary arterial systolic hypertension (H) 04/29/2020     Priority: Medium     Status post coronary angiogram 03/06/2020     Priority: Medium     Other ill-defined heart diseases 02/17/2020     Priority: Medium     Added automatically from request for surgery [id number]       Severe aortic stenosis 01/03/2020     Priority: Medium     Left upper quadrant pain 10/22/2019     Priority: Medium     Added automatically from request for surgery [id number]       ILD (interstitial lung disease) (H) 10/21/2019     Priority: Medium     Chronic, continuous use of opioids 09/23/2019     Priority: Medium     Hx of rheumatic fever 06/19/2019     Priority: Medium     Protein-calorie malnutrition (H) 06/06/2019     Priority: Medium     Valvular heart disease 06/06/2019     Priority: Medium     Unexplained weight loss 06/06/2019     Priority: Medium     LISA (generalized anxiety disorder) 11/26/2018     Priority: Medium     Anticoagulated on Coumadin 06/13/2018     Priority: Medium     Pulmonary hypertension-moderate 12/20/2017     Priority: Medium     Moderate tricuspid insufficiency 12/20/2017     Priority: Medium     Malignant neoplasm of right breast in female, estrogen receptor positive, unspecified site of breast (H) 08/02/2017     Priority: Medium     Osteoporosis 01/26/2017     Priority: Medium     Long-term (current) use of anticoagulants [Z79.01] 07/27/2016     Priority: Medium     History of total knee replacement 01/26/2015     Priority: Medium     Chronic pain syndrome 01/23/2015     Priority: Medium     Acute blood loss anemia 07/19/2013     Priority: Medium     Permanent atrial fibrillation 05/01/2013     Priority: Medium     Overview:   Chronic        Essential hypertension 03/25/2013     Priority: Medium     Osteoarthritis 03/25/2013     Priority: Medium     Lumbar pain with radiation down right leg 07/05/2011      Priority: Medium     Overview:   IMO Update 10/11       Lumbar spinal stenosis 07/05/2011     Priority: Medium     Radiculitis 06/16/2011     Priority: Medium     Overview:   IMO Update 10/11       Osteoarthritis of knee 09/16/2010     Priority: Medium     Overview:   IMO Update 10/11          Past Medical History:    Past Medical History:   Diagnosis Date     Arthritis      Backache, unspecified 1/1/2011     Breast cancer, stage 1 3/19/2010     Chronic pain syndrome 1/23/2015     Claustrophobia 1/1/2011     Hip joint replacement by other means 1/1/2011     Osteoarthrosis, unspecified whether generalized or localized, lower leg 8/14/2006     Posttraumatic stress disorder 1/1/2011       Past Surgical History:    Past Surgical History:   Procedure Laterality Date     BREAST SURGERY      right  side mastectomy     C TOTAL KNEE ARTHROPLASTY Left 01/26/15     cataract extraction and lens implantation       COLONOSCOPY       CV CORONARY ANGIOGRAM N/A 3/6/2020    Procedure: CV CORONARY ANGIOGRAM;  Surgeon: Skyler Shepherd MD;  Location:  [facility]     CV RIGHT HEART CATH N/A 3/6/2020    Procedure: CV RIGHT HEART CATH;  Surgeon: Skyler Shepherd MD;  Location: [facility] CARDIAC CATH LAB     ESOPHAGOSCOPY, GASTROSCOPY, DUODENOSCOPY (EGD), COMBINED N/A 11/1/2019    Procedure: UPPER ENDOSCOPY, WITH BIOPSY;  Surgeon: Fab Carroll MD;  Location: [facility]     EYE SURGERY       GYN SURGERY      tubal pregnancy     ORTHOPEDIC SURGERY  2009    l. hip replacement LT     ORTHOPEDIC SURGERY  july 19th 2013    Right total knee     TUBAL/ECTOPIC PREGNANCY         Family History:    Family History   Problem Relation Age of Onset     Diabetes Father 75        cause of death     Other - See Comments Father         PVD     Heart Disease Sister      Coronary Artery Disease Sister      Other - See Comments Mother 83        old age; cause of death     Other - See Comments Sister         14 year twin [employer];  cause of death     Chronic Obstructive Pulmonary Disease Daughter      Osteoporosis Daughter      Thyroid Disease Daughter      Cancer Sister      Other Cancer Sister      Other Cancer Sister         uterine     Hypertension No family hx of      Hyperlipidemia No family hx of      Cerebrovascular Disease No family hx of      Breast Cancer No family hx of      Prostate Cancer No family hx of      Colon Cancer No family hx of      Asthma No family hx of      Anesthesia Reaction No family hx of      Genetic Disorder No family hx of        Social History:  Marital Status:   [5]  Social History     Tobacco Use     Smoking status: Former Smoker     Packs/day: 0.30     Years: 30.00     Pack years: 9.00     Types: Cigarettes     Last attempt to quit: 1987     Years since quittin.7     Smokeless tobacco: Never Used     Tobacco comment: year quit    Substance Use Topics     Alcohol use: No     Drug use: No        Medications:    Acetaminophen (TYLENOL PO)  Calcium Citrate-Vitamin D (CALCITRATE/VITAMIN D PO)  diltiazem ER (DILT-XR) 180 MG 24 hr capsule  losartan (COZAAR) 25 MG tablet  magnesium oxide 400 MG CAPS  Misc Natural Products (OSTEO BI-FLEX ADV DOUBLE ST PO)  Multiple Vitamin (MULTIVITAMINS PO)  Multiple Vitamins-Minerals (PRESERVISION AREDS 2 PO)  order for DME  sertraline (ZOLOFT) 50 MG tablet  simvastatin (ZOCOR) 10 MG tablet  traMADol (ULTRAM) 50 MG tablet  warfarin (COUMADIN) 5 MG tablet  warfarin ANTICOAGULANT (COUMADIN) 5 MG tablet          Review of Systems   Constitutional: Negative for activity change, fatigue, fever and unexpected weight change.   HENT: Negative for mouth sores, nosebleeds, postnasal drip and rhinorrhea.    Eyes: Negative for photophobia, redness and visual disturbance.   Respiratory: Negative for cough, chest tightness and shortness of breath.    Gastrointestinal: Negative for abdominal distention, anal bleeding, constipation and vomiting.   Endocrine: Negative for  "polyphagia and polyuria.   Genitourinary: Negative for flank pain, frequency and genital sores.   Musculoskeletal: Negative for arthralgias, joint swelling and neck stiffness.   Neurological: Positive for light-headedness. Negative for tremors, facial asymmetry and weakness.   Psychiatric/Behavioral: Negative for behavioral problems, confusion, decreased concentration and sleep disturbance.       Physical Exam   BP: 133/83  Pulse: 81  Heart Rate: 92  Temp: 97.8  F (36.6  C)  Resp: 20  Height: 162.6 cm (5' 4\")  Weight: 42.6 kg (94 lb)  SpO2: 93 %  Lying Orthostatic BP: 141/64  Lying Orthostatic Pulse: 84 bpm  Sitting Orthostatic BP: 136/84  Sitting Orthostatic Pulse: 96 bpm  Standing Orthostatic BP: 137/90  Standing Orthostatic Pulse: 87 bpm      Physical Exam  Vitals signs and nursing note reviewed.   Constitutional:       Appearance: She is not ill-appearing or diaphoretic.      Comments: Frail but alert anicteric normocephalic female    HENT:      Head: Normocephalic.      Right Ear: Ear canal and external ear normal.      Left Ear: Ear canal and external ear normal.      Mouth/Throat:      Mouth: Mucous membranes are moist.      Pharynx: Oropharynx is clear.   Eyes:      General:         Right eye: No discharge.         Left eye: No discharge.      Conjunctiva/sclera: Conjunctivae normal.      Pupils: Pupils are equal, round, and reactive to light.   Neck:      Musculoskeletal: No neck rigidity or muscular tenderness.   Cardiovascular:      Rate and Rhythm: Rhythm irregular.      Heart sounds: Murmur present. No friction rub. No gallop.    Pulmonary:      Breath sounds: No wheezing or rales.   Abdominal:      General: There is no distension.      Palpations: There is no mass.      Tenderness: There is no abdominal tenderness. There is no rebound.      Hernia: No hernia is present.   Musculoskeletal:         General: No deformity or signs of injury.      Right lower leg: No edema.   Lymphadenopathy:      Cervical: " No cervical adenopathy.   Skin:     Coloration: Skin is not jaundiced.      Findings: No bruising.   Neurological:      Mental Status: She is oriented to person, place, and time.      Cranial Nerves: No cranial nerve deficit.      Sensory: No sensory deficit.      Coordination: Coordination normal.      Deep Tendon Reflexes: Reflexes normal.   Psychiatric:         Mood and Affect: Mood normal.         Judgment: Judgment normal.         ED Course     ED Course as of May 15 0732   Fri May 15, 2020   0349 On the monitor, we captured a 2.4 second pause and her LBBB was noted to be new.  We put 2 second monitor for the pt to have any pauses noted and she had up to a 4.2 second pause      0526 I spoke with the hospitalist at  [facility] Dr Figueroa who accepted the pt and will consult cardiology.           Procedures            Results for orders placed or performed during the hospital encounter of 05/15/20 (from the past 24 hour(s))   Lactic acid whole blood   Result Value Ref Range    Lactic Acid 1.1 0.7 - 2.0 mmol/L   CBC with platelets differential   Result Value Ref Range    WBC 10.2 4.0 - 11.0 10e9/L    RBC Count 3.63 (L) 3.8 - 5.2 10e12/L    Hemoglobin 11.4 (L) 11.7 - 15.7 g/dL    Hematocrit 33.3 (L) 35.0 - 47.0 %    MCV 92 78 - 100 fl    MCH 31.4 26.5 - 33.0 pg    MCHC 34.2 31.5 - 36.5 g/dL    RDW 12.9 10.0 - 15.0 %    Platelet Count 348 150 - 450 10e9/L    Diff Method Automated Method     % Neutrophils 70.1 %    % Lymphocytes 17.9 %    % Monocytes 10.0 %    % Eosinophils 1.0 %    % Basophils 0.5 %    % Immature Granulocytes 0.5 %    Nucleated RBCs 0 0 /100    Absolute Neutrophil 7.1 1.6 - 8.3 10e9/L    Absolute Lymphocytes 1.8 0.8 - 5.3 10e9/L    Absolute Monocytes 1.0 0.0 - 1.3 10e9/L    Absolute Eosinophils 0.1 0.0 - 0.7 10e9/L    Absolute Basophils 0.1 0.0 - 0.2 10e9/L    Abs Immature Granulocytes 0.1 0 - 0.4 10e9/L    Absolute Nucleated RBC 0.0    D dimer quantitative   Result Value Ref Range    D  Dimer 18.6 (H) 0.0 - 0.50 ug/ml FEU   INR   Result Value Ref Range    INR 2.60 (H) 0.86 - 1.14   Partial thromboplastin time   Result Value Ref Range    PTT 59 (H) 22 - 37 sec   Comprehensive metabolic panel   Result Value Ref Range    Sodium 132 (L) 133 - 144 mmol/L    Potassium 5.2 3.4 - 5.3 mmol/L    Chloride 96 94 - 109 mmol/L    Carbon Dioxide 30 20 - 32 mmol/L    Anion Gap 6 3 - 14 mmol/L    Glucose 116 (H) 70 - 99 mg/dL    Urea Nitrogen 33 (H) 7 - 30 mg/dL    Creatinine 1.31 (H) 0.52 - 1.04 mg/dL    GFR Estimate 37 (L) >60 mL/min/[1.73_m2]    GFR Estimate If Black 43 (L) >60 mL/min/[1.73_m2]    Calcium 9.2 8.5 - 10.1 mg/dL    Bilirubin Total 0.3 0.2 - 1.3 mg/dL    Albumin 3.4 3.4 - 5.0 g/dL    Protein Total 8.0 6.8 - 8.8 g/dL    Alkaline Phosphatase 84 40 - 150 U/L    ALT 36 0 - 50 U/L    AST 45 0 - 45 U/L   Lipase   Result Value Ref Range    Lipase 33 (L) 73 - 393 U/L   Troponin I   Result Value Ref Range    Troponin I ES <0.015 0.000 - 0.045 ug/L   Nt probnp inpatient (BNP)   Result Value Ref Range    N-Terminal Pro BNP Inpatient 2,110 (H) 0 - 1,800 pg/mL   CK total   Result Value Ref Range    CK Total 60 30 - 225 U/L   Troponin I (second draw)   Result Value Ref Range    Troponin I ES 0.026 0.000 - 0.045 ug/L       Medications   sodium chloride 0.9% infusion ( Intravenous ED Infusing on Admission/transfer 5/15/20 7044)   0.9% sodium chloride BOLUS (0 mLs Intravenous Stopped 5/15/20 1232)       Assessments & Plan (with Medical Decision Making)     I have reviewed the nursing notes.    I have reviewed the findings, diagnosis, plan and need for follow up with the patient.      Discharge Medication List as of 5/15/2020  6:26 AM          Final diagnoses:   Syncope, unspecified syncope type   New onset left bundle branch block (LBBB)   Status post transcatheter aortic valve replacement (TAVR) using bioprosthesis   Acute renal failure superimposed on chronic kidney disease, unspecified CKD stage, unspecified  "acute renal failure type (H)   History of interstitial lung disease   D-dimer, elevated   Longstanding persistent atrial fibrillation   Chronic anticoagulation   CT of head done as pt hx compatible with seizure and prior history of ca of breast     It could also have easily been seizure like activity due to length of the pt having decreased cerebral perfusion from low BP or arrhythmia or CV pause which we did find later        EKG showed atrial fib with LBBB  5/14/2020 , LBBB appears to be new from prior EKGs time of EK 11:59 on alireza 14  Negative Sgarbossa criteria    Did consult with pt who is vague but does not want to be intubated or have CPR.  She does want med care     Als0, I spoke to her daughter Hui a couple of times and she states pt is DNR DNI but wants med care. I let her know the transfer plan    Pt has mild BARRERA and elevated D Dimer.  She is therapeurtic on her coumadin at 2.6 INR, I feel we must wait for cardiology intervention and evaluation  To see if contrast needed or would  At this point be beneficial to hydrate since she has no symptoms of PE at this point and stable oxygen sat and no tachycardia    I verified with the patient that she is DNR DNI but wants medical treatment  I spoke on arrival and at time of acceptance to [facility] to Hui her daughter to get history and let her know of impending transfer  .    CRITICAL CARE TIME EXCLUSIVE OF ANY PROCEDURES FOR EVAL OF NEW LBBB, EVALUATING OF THE PAUSES OF UP TO 4.2 SECONDS, SERIAL EXAMS OF PT AND COORD OF CARE WITH CONSULT TO INGRID, DISCUSSION OF DNR AND DNI WITH FAMILY AND WITH PT,  WELL {:[id number]::\" AND REVIEW OF RECORDS IS INEXCESS OF 31 MINUTES    1. Syncope, unspecified syncope type    2. New onset left bundle branch block (LBBB)    3. Status post transcatheter aortic valve replacement (TAVR) using bioprosthesis    4. Acute renal failure superimposed on chronic kidney disease, unspecified CKD stage, unspecified acute renal failure type " (H)    5. History of interstitial lung disease    6. D-dimer, elevated    7. Longstanding persistent atrial fibrillation    8. Chronic anticoagulation        [facility]     Gisselle Caballero MD  05/15/20 1997

## 2020-05-15 NOTE — ED NOTES
Pause strips from [id number]: 4 strips alarmed and printed with 2.1-2.4 seconds pauses.   Given to and reviewed by Dr. NAVAS

## 2020-05-15 NOTE — ED NOTES
Received a call from [facility] and stated plans have changed and they can have a crew here in 10 minutes to take the pt to [facility].

## 2020-05-15 NOTE — ED NOTES
Incoming call from daughter rafia. Rafia states she had a concern that this event today and her last transfer to ED for the TIA were both days of which pt had done a day of in home therapy with PT.  Dr. YOSEF isabel

## 2020-05-20 NOTE — TELEPHONE ENCOUNTER
Our records indicate Michael is due for her next prolia injection. Would you like her to receive another prolia injection

## 2020-05-20 NOTE — TELEPHONE ENCOUNTER
"Received fax regarding the following:    \"Res. currently has orders for zoloft 50mg q hs. Need diagnosis please provide\"  "

## 2020-05-20 NOTE — TELEPHONE ENCOUNTER
"Received fax regarding the followin) res admitted to facility 20: main dx. Syncope. Collapse, chronic A-fib, htn. Will you assume care while res at facility?    2) need ct for tramadol 50 mg 2 tabs bid, ct# 240?    3) coccyx reddened ok for barrier cream to coccyx/buttocks tid?\"  "

## 2020-05-20 NOTE — TELEPHONE ENCOUNTER
Lets wait a month.  She just had major surgery. And not sure where her kidneys are at.  Can you remind me in a month or so?

## 2020-05-21 NOTE — PROGRESS NOTES
ANTICOAGULATION FOLLOW-UP CLINIC VISIT    Patient Name:  Michael Christiansen  Date:  2020  Contact Type:  fax received with INR and returned RE: Warfarin dosing/INR recheck date    SUBJECTIVE:  Patient Findings     Comments:   INR done by [facility] facility and results faxed to warfarin clinic. No bleeding/bruising, changes in diet/meds/activity or questions per nursing communication sheet. Fax sent to [facility] with warfarin dosing/INR recheck date. Facility to notify warfarin clinic with any bruising/bleeding, changes in diet/meds/activity or questions         Clinical Outcomes     Negatives:   Major bleeding event, Thromboembolic event, Anticoagulation-related hospital admission, Anticoagulation-related ED visit, Anticoagulation-related fatality    Comments:   INR done by [facility] facility and results faxed to warfarin clinic. No bleeding/bruising, changes in diet/meds/activity or questions per nursing communication sheet. Fax sent to [facility] with warfarin dosing/INR recheck date. Facility to notify warfarin clinic with any bruising/bleeding, changes in diet/meds/activity or questions            OBJECTIVE    Recent labs: (last 7 days)     20   INR 1.7*       ASSESSMENT / PLAN  INR assessment SUB    Recheck INR In: 2 WEEKS    INR Location [facility]      Anticoagulation Summary  As of 2020    INR goal:   2.0-3.0   TTR:   52.2 % (1 y)   INR used for dosin.7! (2020)   Warfarin maintenance plan:   2.5 mg (5 mg x 0.5) every Mon, Fri; 5 mg (5 mg x 1) all other days   Full warfarin instructions:   : 7.5 mg; Otherwise 2.5 mg every Mon, Fri; 5 mg all other days   Weekly warfarin total:   30 mg   Plan last modified:   Kasia Hercules RN (10/1/2018)   Next INR check:   2020   Priority:   High   Target end date:   Indefinite    Indications    Long-term (current) use of anticoagulants [Z79.01] [Z79.01]  Permanent atrial fibrillation [I48.21]             Anticoagulation Episode Summary     INR check location:        Preferred lab:       Send INR reminders to:   HASEEB KLEIN    Comments:         Anticoagulation Care Providers     Provider Role Specialty Phone number    Chandrika Kumar, PA [facility] [phone]            See the Encounter Report to view Anticoagulation Flowsheet and Dosing Calendar (Go to Encounters tab in chart review, and find the Anticoagulation Therapy Visit)        Amarilis Gibson RN

## 2020-05-27 NOTE — TELEPHONE ENCOUNTER
Approval faxed back to facility per Dr. Henderson. Ashley A. Lechevalier, LPN on 5/27/2020 at 2:57 PM

## 2020-05-27 NOTE — TELEPHONE ENCOUNTER
Received fax from [facility] in regards to Pinochavezmelissa ANJELICA Christiansen   :  [phone]     The following information was received via fax:    Ok for resident to discharge home on/around 2020 with 30 day supply of current medications and in home PT/OT, nursing?    Ashley LeChevalier LPN

## 2020-05-28 NOTE — TELEPHONE ENCOUNTER
[facility] called looking for verbal orders to add occupational therapy to patient's current [facility] plan.    [phone]

## 2020-05-28 NOTE — TELEPHONE ENCOUNTER
CHICHO Cotton MD Anderson, Kerrisa, RN    Caller: Unspecified (Today,  2:17 PM)               OK

## 2020-05-29 NOTE — TELEPHONE ENCOUNTER
Call from [facility] requesting an order to resume Home Care Services. 2W1 (week of 6/1/20) then 1W4.     Please call Mary at [phone].

## 2020-06-01 NOTE — TELEPHONE ENCOUNTER
Call to Mary with [facility]. Notified ok for INR on 6/4/20 per Dr. MAURILIO Cotton.

## 2020-06-04 NOTE — PROGRESS NOTES
ANTICOAGULATION FOLLOW-UP CLINIC VISIT    Patient Name:  Michael Christiansen  Date:  2020  Contact Type:  Telephone/ spoke to homecare nurseMary    SUBJECTIVE:  Patient Findings     Positives:   Change in activity (increased), Change in diet/appetite (increased appetite)    Comments:   INR done by homecare nurse and result called to warfarin clinic. Since discharge from LTC, patient eating more and activity has increased. Homecare nurse states no bleeding/bruising. We discussed warfarin dosing/INR recheck date. Nurse verbalized understanding and has no questions.         Clinical Outcomes     Negatives:   Major bleeding event, Thromboembolic event, Anticoagulation-related hospital admission, Anticoagulation-related ED visit, Anticoagulation-related fatality    Comments:   INR done by homecare nurse and result called to warfarin clinic. Since discharge from LTC, patient eating more and activity has increased. Homecare nurse states no bleeding/bruising. We discussed warfarin dosing/INR recheck date. Nurse verbalized understanding and has no questions.            OBJECTIVE    Recent labs: (last 7 days)     20   INR 1.2*       ASSESSMENT / PLAN  INR assessment SUB    Recheck INR In: 4 DAYS    INR Location Homecare INR      Anticoagulation Summary  As of 2020    INR goal:   2.0-3.0   TTR:   48.4 % (1 y)   INR used for dosin.2! (2020)   Warfarin maintenance plan:   2.5 mg (5 mg x 0.5) every Mon, Fri; 5 mg (5 mg x 1) all other days   Full warfarin instructions:   : 7.5 mg; 6/: 7.5 mg; 6/6: 7.5 mg; Otherwise 2.5 mg every Mon, Fri; 5 mg all other days   Weekly warfarin total:   30 mg   Plan last modified:   Kasia Hercules RN (10/1/2018)   Next INR check:   2020   Priority:   High   Target end date:   Indefinite    Indications    Long-term (current) use of anticoagulants [Z79.01] [Z79.01]  Permanent atrial fibrillation [I48.21]             Anticoagulation Episode Summary     INR check location:        Preferred lab:       Send INR reminders to:   HASEEB KLEIN    Comments:         Anticoagulation Care Providers     Provider Role Specialty Phone number    Chandrika Kumar, PA [facility] [phone]            See the Encounter Report to view Anticoagulation Flowsheet and Dosing Calendar (Go to Encounters tab in chart review, and find the Anticoagulation Therapy Visit)        Kasia Hercules RN

## 2020-06-04 NOTE — TELEPHONE ENCOUNTER
Leilani with [facility] called stating she faxed a plan of care for OT that needs to be signed and faxed back to schedule patient.  Fax information verified.    [facility] contact information:  Phone:[phone]  fax [phone]

## 2020-06-05 NOTE — TELEPHONE ENCOUNTER
It was from [facility] OT. Radha can you please see if this was in completed faxes. Thank you

## 2020-06-05 NOTE — TELEPHONE ENCOUNTER
I wish I knew what form they are asking for.  I signed 3 forms yesterday and can't remember if she was in it or not , check with LOUISE

## 2020-06-05 NOTE — TELEPHONE ENCOUNTER
Leilani from [facility] called stating that she needs the form faxed back to her asap so they can schedule the patient for therapy.  States to let her know if another form is needed she will refax one.

## 2020-06-08 NOTE — PROGRESS NOTES
ANTICOAGULATION FOLLOW-UP CLINIC VISIT    Patient Name:  Michael Christiansen  Date:  2020  Contact Type:  Telephone/ spoke to homecare nurseMary, via phone    SUBJECTIVE:  Patient Findings     Comments:   INR done by homecare nurse and result called to warfarin clinic. Per nurse report, patient has no bleeding/bruising and no new changes in diet/meds/activity. We discussed INR result, warfarin dosing/INR recheck date. She verbalized understanding and has no questions. She will notify warfarin clinic if patient has any unusual bleeding/bruising, new changes in diet/meds/activity, questions or illness        Clinical Outcomes     Negatives:   Major bleeding event, Thromboembolic event, Anticoagulation-related hospital admission, Anticoagulation-related ED visit, Anticoagulation-related fatality    Comments:   INR done by homecare nurse and result called to warfarin clinic. Per nurse report, patient has no bleeding/bruising and no new changes in diet/meds/activity. We discussed INR result, warfarin dosing/INR recheck date. She verbalized understanding and has no questions. She will notify warfarin clinic if patient has any unusual bleeding/bruising, new changes in diet/meds/activity, questions or illness           OBJECTIVE    Recent labs: (last 7 days)     20   INR 2.0*       ASSESSMENT / PLAN  INR assessment THER    Recheck INR In: 10 DAYS    INR Location [facility]      Anticoagulation Summary  As of 2020    INR goal:   2.0-3.0   TTR:   47.3 % (1 y)   INR used for dosin.0 (2020)   Warfarin maintenance plan:   2.5 mg (5 mg x 0.5) every Mon, Fri; 5 mg (5 mg x 1) all other days   Full warfarin instructions:   2.5 mg every Mon, Fri; 5 mg all other days   Weekly warfarin total:   30 mg   No change documented:   Kasia Hercules, RN   Plan last modified:   Kasia Hercules, RN (10/1/2018)   Next INR check:   2020   Priority:   High   Target end date:   Indefinite    Indications    Long-term  (current) use of anticoagulants [Z79.01] [Z79.01]  Permanent atrial fibrillation [I48.21]             Anticoagulation Episode Summary     INR check location:       Preferred lab:       Send INR reminders to:   HASEEB KLEIN    Comments:         Anticoagulation Care Providers     Provider Role Specialty Phone number    Chandrika Kumar PA Referring [facility] [phone]            See the Encounter Report to view Anticoagulation Flowsheet and Dosing Calendar (Go to Encounters tab in chart review, and find the Anticoagulation Therapy Visit)        Kasia Hercules RN

## 2020-06-18 NOTE — PROGRESS NOTES
"ANTICOAGULATION FOLLOW-UP CLINIC VISIT    Patient Name:  Michael Christiansen  Date:  6/18/2020  Contact Type:  Telephone/ spoke to homecare nurseMary    SUBJECTIVE:  Patient Findings     Positives:   Change in diet/appetite (decreased vit K intake)    Comments:   INR done by homecare nurse and result called to warfarin clinic. Per homecare nurse, patient has no bleeding/bruising. No changes in activity/meds but patient did say she ate very little \"greens\" this past few weeks. We discussed INR result, warfarin dosing/INR recheck date. Nurse verbalized understanding and has no questions. Patient will increase vit K intake        Clinical Outcomes     Negatives:   Major bleeding event, Thromboembolic event, Anticoagulation-related hospital admission, Anticoagulation-related ED visit, Anticoagulation-related fatality    Comments:   INR done by homecare nurse and result called to warfarin clinic. Per homecare nurse, patient has no bleeding/bruising. No changes in activity/meds but patient did say she ate very little \"greens\" this past few weeks. We discussed INR result, warfarin dosing/INR recheck date. Nurse verbalized understanding and has no questions. Patient will increase vit K intake           OBJECTIVE    Recent labs: (last 7 days)     06/18/20   INR 3.4*       ASSESSMENT / PLAN  INR assessment SUPRA decreased vit K intake   Recheck INR In: 2 WEEKS    INR Location [facility]      Anticoagulation Summary  As of 6/18/2020    INR goal:   2.0-3.0   TTR:   46.5 % (1 y)   INR used for dosing:   3.4! (6/18/2020)   Warfarin maintenance plan:   2.5 mg (5 mg x 0.5) every Mon, Fri; 5 mg (5 mg x 1) all other days   Full warfarin instructions:   6/18: 2.5 mg; Otherwise 2.5 mg every Mon, Fri; 5 mg all other days   Weekly warfarin total:   30 mg   Plan last modified:   Kasia Hercules RN (10/1/2018)   Next INR check:   7/2/2020   Priority:   High   Target end date:   Indefinite    Indications    Long-term (current) use of " anticoagulants [Z79.01] [Z79.01]  Permanent atrial fibrillation (H) [I48.21]             Anticoagulation Episode Summary     INR check location:       Preferred lab:       Send INR reminders to:   HASEEB KLEIN    Comments:         Anticoagulation Care Providers     Provider Role Specialty Phone number    Chandrika Kumar PA [facility] [phone]            See the Encounter Report to view Anticoagulation Flowsheet and Dosing Calendar (Go to Encounters tab in chart review, and find the Anticoagulation Therapy Visit)        Kasia Hercules RN

## 2020-06-29 NOTE — TELEPHONE ENCOUNTER
metoprolol      Last Written Prescription Date:  6.4.2020  Last Fill Quantity: 0,   # refills: 0  Last Office Visit: 5.12.2020  Historical medication      Warfarin 5 mg      Last Written Prescription Date:  9.11.19  Last Fill Quantity: 64,   # refills: 6  Last Office Visit: 5.12.2020    Warfarin  2.5 mg     Last Written Prescription Date:  0  Last Fill Quantity: 0,   # refills: 0  Last Office Visit: 5.12.2020  Not currently active on medication list

## 2020-07-01 NOTE — ED TRIAGE NOTES
Patient brought in by [facility].   Fell in kitchen around 3:00pm today.   Was found by son around 4:00pm on floor.   C/o right shoulder pain and right hip pain.   Is on blood thinners.

## 2020-07-01 NOTE — ED TRIAGE NOTES
C/o posterior neck pain when back of neck palpated. C Collar placed. Pediatric size due to smaller size adult and adult size not fitting properlyl

## 2020-07-01 NOTE — ED TRIAGE NOTES
Pt fell at 1500 and son found her at 1745. Fell backwards and to the right. C/o right shoulder pain and right hip pain. Shortening and rotation present.

## 2020-07-02 NOTE — ED NOTES
"Pt reports that he pain is \"definitley getting better, but its still a 4/10.\" family at beside.   "

## 2020-07-02 NOTE — DISCHARGE INSTRUCTIONS
Discharge Instructions:  Dr. Av Black/[facility] will accept you as a transfer patient  Dr. Lares/Orthopedics plans to surgically repair your hip/femur fracture on 7/2/2020      What to expect when you have contrast    During your exam, we will inject  contrast  into your vein or artery. (Contrast is a clear liquid with iodine in it. It shows up on X-rays.)    You may feel warm or hot. You may have a metal taste in your mouth and a slight upset stomach. You may also feel pressure near the kidneys and bladder. These effects will last about 1 to 3 minutes.    Please tell us if you have:   Sneezing    Itching   Hives    Swelling in the face   A hoarse voice   Breathing problems   Other new symptoms    Serious problems are rare.  They may include:   Irregular heartbeat    Seizures   Kidney failure             Tissue damage   Shock     Death    If you have any problems during the exam, we  will treat them right away.    When you get home    Call your hospital if you have any new symptoms in the next 2 days, like hives or swelling. (Phone numbers are at the bottom of this page.) Or call your family doctor.     If you have wheezing or trouble breathing, call 911.    Self-care  -Drink at least 4 extra glasses of water today.   This reduces the stress on your kidneys.  -Keep taking your regular medicines.    The contrast will pass out of your body in your  Urine(pee). This will happen in the next 24 hours. You  will not feel this. Your urine will not  change color.    If you have kidney problems or take metformin    Drink 4 to 8 large glasses of water for the next  2 days, if you are not on a fluid restriction.    ?If you take metformin (Glucophage or Glucovance) for diabetes, keep taking it.      ?Your kidney function tests are abnormal.  If you take Metformin, do not take it for 48 hours. Please go to your clinic for a blood test within 3 days after your exam before the restarting this medicine.      (Note to provider:please give patient prescription for lab tests.)    ?Special instructions: -    I have read and understand the above information.    Patient Sign Here:______________________________________Date:________Time:______    Staff Sign Here:________________________________________Date:_______Time:______      Radiology Departments:     ?[facility]: [phone] ?[place]: [phone]     ?[place]: [phone] ?[facility]:[phone]      ?[place]: [phone]  ?[place]: [phone]  ?[place]:[phone]    ?[facility]:[phone]  ?[facility]:[phone]

## 2020-07-02 NOTE — ED NOTES
Pt c/o pain in right leg and right shoulder 3/10 at this time. CMS intact to right leg and right arm. Pt has bruising noted on anterior right shoulder. Daughter at bedside. Call light in reach.

## 2020-07-02 NOTE — ED PROVIDER NOTES
History     Chief Complaint   Patient presents with     Fall     HPI  Michael Christiansen is a 84 year old female with past medical history of a TAVR, coronary artery disease, chronic a.fib hypercholesterolemia on coumadin. She was involved in a fall from standing height when she lost her footing and fell in the kitchen. Incident occurred at 1500 today, but her son did not find her until 1 hour later. She c/o right hip pain, right shoulder pain, and neck pain.    Denies LOC, but did strike her posterior head. Denies chest pain, dyspnea, abdominal pain, nausea, or numbness/tingling of extremities. Denies any preceding events of chest pain, shortness of breath, or dizziness.     Allergies:  Allergies   Allergen Reactions     Atenolol Shortness Of Breath and Other (See Comments)     Dizziness  Severe vertigo and dizziness/SOB     Lisinopril Cough     Augmentin Diarrhea     Pollen Extract      Other reaction(s): Runny Nose       Problem List:    Patient Active Problem List    Diagnosis Date Noted     S/P TAVR (transcatheter aortic valve replacement) 05/12/2020     Priority: Medium     Coronary artery disease involving native coronary artery of native heart without angina pectoris 05/04/2020     Priority: Medium     Left-sided carotid artery disease (H) 05/04/2020     Priority: Medium     Dyslipidemia 04/29/2020     Priority: Medium     Moderate pulmonary arterial systolic hypertension (H) 04/29/2020     Priority: Medium     Status post coronary angiogram 03/06/2020     Priority: Medium     Other ill-defined heart diseases 02/17/2020     Priority: Medium     Added automatically from request for surgery [id number]       Severe aortic stenosis 01/03/2020     Priority: Medium     Left upper quadrant pain 10/22/2019     Priority: Medium     Added automatically from request for surgery [id number]       ILD (interstitial lung disease) (H) 10/21/2019     Priority: Medium     Chronic, continuous use of opioids 09/23/2019     Priority:  Medium     Hx of rheumatic fever 06/19/2019     Priority: Medium     Protein-calorie malnutrition (H) 06/06/2019     Priority: Medium     Valvular heart disease 06/06/2019     Priority: Medium     Unexplained weight loss 06/06/2019     Priority: Medium     LISA (generalized anxiety disorder) 11/26/2018     Priority: Medium     Anticoagulated on Coumadin 06/13/2018     Priority: Medium     Pulmonary hypertension-moderate 12/20/2017     Priority: Medium     Moderate tricuspid insufficiency 12/20/2017     Priority: Medium     Malignant neoplasm of right breast in female, estrogen receptor positive, unspecified site of breast (H) 08/02/2017     Priority: Medium     Osteoporosis 01/26/2017     Priority: Medium     Long-term (current) use of anticoagulants [Z79.01] 07/27/2016     Priority: Medium     History of total knee replacement 01/26/2015     Priority: Medium     Chronic pain syndrome 01/23/2015     Priority: Medium     Acute blood loss anemia 07/19/2013     Priority: Medium     Permanent atrial fibrillation (H) 05/01/2013     Priority: Medium     Overview:   Chronic        Essential hypertension 03/25/2013     Priority: Medium     Osteoarthritis 03/25/2013     Priority: Medium     Lumbar pain with radiation down right leg 07/05/2011     Priority: Medium     Overview:   IMO Update 10/11       Lumbar spinal stenosis 07/05/2011     Priority: Medium     Radiculitis 06/16/2011     Priority: Medium     Overview:   IMO Update 10/11       Osteoarthritis of knee 09/16/2010     Priority: Medium     Overview:   IMO Update 10/11          Past Medical History:    Past Medical History:   Diagnosis Date     Arthritis      Backache, unspecified 1/1/2011     Breast cancer, stage 1 3/19/2010     Chronic pain syndrome 1/23/2015     Claustrophobia 1/1/2011     Hip joint replacement by other means 1/1/2011     Osteoarthrosis, unspecified whether generalized or localized, lower leg 8/14/2006     Posttraumatic stress disorder 1/1/2011        Past Surgical History:    Past Surgical History:   Procedure Laterality Date     BREAST SURGERY      right  side mastectomy     C TOTAL KNEE ARTHROPLASTY Left 01/26/15     cataract extraction and lens implantation       COLONOSCOPY       CV CORONARY ANGIOGRAM N/A 3/6/2020    Procedure: CV CORONARY ANGIOGRAM;  Surgeon: Skyler Shepherd MD;  Location:  [facility]     CV RIGHT HEART CATH N/A 3/6/2020    Procedure: CV RIGHT HEART CATH;  Surgeon: Skyler Shepherd MD;  Location:  [facility]     ESOPHAGOSCOPY, GASTROSCOPY, DUODENOSCOPY (EGD), COMBINED N/A 11/1/2019    Procedure: UPPER ENDOSCOPY, WITH BIOPSY;  Surgeon: Fab Carroll MD;  Location: [facility]     EYE SURGERY       GYN SURGERY      tubal pregnancy     ORTHOPEDIC SURGERY  2009    l. hip replacement LT     ORTHOPEDIC SURGERY  july 19th 2013    Right total knee     TUBAL/ECTOPIC PREGNANCY         Family History:    Family History   Problem Relation Age of Onset     Diabetes Father 75        cause of death     Other - See Comments Father         PVD     Heart Disease Sister      Coronary Artery Disease Sister      Other - See Comments Mother 83        old age; cause of death     Other - See Comments Sister         14 year twin [employer]; cause of death     Chronic Obstructive Pulmonary Disease Daughter      Osteoporosis Daughter      Thyroid Disease Daughter      Cancer Sister      Other Cancer Sister      Other Cancer Sister         uterine     Hypertension No family hx of      Hyperlipidemia No family hx of      Cerebrovascular Disease No family hx of      Breast Cancer No family hx of      Prostate Cancer No family hx of      Colon Cancer No family hx of      Asthma No family hx of      Anesthesia Reaction No family hx of      Genetic Disorder No family hx of        Social History:  Marital Status:   [5]  Social History     Tobacco Use     Smoking status: Former Smoker     Packs/day: 0.30     Years:  "30.00     Pack years: 9.00     Types: Cigarettes     Last attempt to quit: 1987     Years since quittin.9     Smokeless tobacco: Never Used     Tobacco comment: year quit    Substance Use Topics     Alcohol use: No     Drug use: No        Medications:    Acetaminophen (TYLENOL PO)  Calcium Citrate-Vitamin D (CALCITRATE/VITAMIN D PO)  magnesium oxide 400 MG CAPS  metoprolol tartrate (LOPRESSOR) 25 MG tablet  Misc Natural Products (OSTEO BI-FLEX ADV DOUBLE ST PO)  Multiple Vitamin (MULTIVITAMINS PO)  Multiple Vitamins-Minerals (PRESERVISION AREDS 2 PO)  sertraline (ZOLOFT) 50 MG tablet  simvastatin (ZOCOR) 10 MG tablet  traMADol (ULTRAM) 50 MG tablet  order for DME  warfarin (COUMADIN) 5 MG tablet  warfarin ANTICOAGULANT (COUMADIN) 2.5 MG tablet  warfarin ANTICOAGULANT (COUMADIN) 5 MG tablet  warfarin ANTICOAGULANT (COUMADIN) 5 MG tablet          Review of Systems   Constitutional: Positive for activity change. Negative for chills and fever.   Respiratory: Negative for chest tightness and shortness of breath.    Cardiovascular: Negative for chest pain.   Gastrointestinal: Negative for abdominal pain and nausea.   Genitourinary: Negative for dysuria.   Musculoskeletal: Positive for arthralgias and neck pain.   Skin: Negative for color change.   Neurological: Negative for dizziness, seizures, syncope, weakness, light-headedness and headaches.       Physical Exam   BP: 144/91  Pulse: 97  Temp: 97.8  F (36.6  C)  Height: 160 cm (5' 3\")  Weight: 40.8 kg (90 lb)  SpO2: 96 %      Physical Exam  Vitals signs and nursing note reviewed.   Constitutional:       General: She is not in acute distress.     Appearance: Normal appearance. She is not ill-appearing or toxic-appearing.   HENT:      Mouth/Throat:      Mouth: Mucous membranes are moist.   Eyes:      Extraocular Movements: Extraocular movements intact.      Conjunctiva/sclera: Conjunctivae normal.      Pupils: Pupils are equal, round, and reactive to light. "   Neck:      Musculoskeletal: Normal range of motion and neck supple. No neck rigidity or muscular tenderness.   Cardiovascular:      Rate and Rhythm: Rhythm irregular.      Pulses: Normal pulses.   Pulmonary:      Effort: Pulmonary effort is normal. No respiratory distress.      Breath sounds: Normal breath sounds. No stridor. No rhonchi.   Abdominal:      General: Abdomen is flat.      Palpations: Abdomen is soft.   Musculoskeletal:      Comments: Right lateral proximal hip pain  Right shoulder ecchymosis and pain   Skin:     General: Skin is warm and dry.      Capillary Refill: Capillary refill takes less than 2 seconds.   Neurological:      General: No focal deficit present.      Mental Status: She is alert and oriented to person, place, and time.      Comments: Right lateral neck/trap ecchymosis    Psychiatric:         Mood and Affect: Mood normal.         ED Course     EKG with a.fib and RVR  Chronic a.fib compared with prior EKGs       Results for orders placed or performed during the hospital encounter of 07/01/20 (from the past 24 hour(s))   Basic metabolic panel   Result Value Ref Range    Sodium 136 133 - 144 mmol/L    Potassium 4.2 3.4 - 5.3 mmol/L    Chloride 99 94 - 109 mmol/L    Carbon Dioxide 30 20 - 32 mmol/L    Anion Gap 7 3 - 14 mmol/L    Glucose 156 (H) 70 - 99 mg/dL    Urea Nitrogen 27 7 - 30 mg/dL    Creatinine 0.91 0.52 - 1.04 mg/dL    GFR Estimate 58 (L) >60 mL/min/[1.73_m2]    GFR Estimate If Black 67 >60 mL/min/[1.73_m2]    Calcium 9.3 8.5 - 10.1 mg/dL   CBC with platelets differential   Result Value Ref Range    WBC 29.8 (H) 4.0 - 11.0 10e9/L    RBC Count 4.01 3.8 - 5.2 10e12/L    Hemoglobin 12.3 11.7 - 15.7 g/dL    Hematocrit 37.2 35.0 - 47.0 %    MCV 93 78 - 100 fl    MCH 30.7 26.5 - 33.0 pg    MCHC 33.1 31.5 - 36.5 g/dL    RDW 13.2 10.0 - 15.0 %    Platelet Count 259 150 - 450 10e9/L    Diff Method Automated Method     % Neutrophils 91.6 %    % Lymphocytes 2.6 %    % Monocytes 4.7 %     % Eosinophils 0.0 %    % Basophils 0.2 %    % Immature Granulocytes 0.9 %    Nucleated RBCs 0 0 /100    Absolute Neutrophil 27.3 (H) 1.6 - 8.3 10e9/L    Absolute Lymphocytes 0.8 0.8 - 5.3 10e9/L    Absolute Monocytes 1.4 (H) 0.0 - 1.3 10e9/L    Absolute Eosinophils 0.0 0.0 - 0.7 10e9/L    Absolute Basophils 0.1 0.0 - 0.2 10e9/L    Abs Immature Granulocytes 0.3 0 - 0.4 10e9/L    Absolute Nucleated RBC 0.0    Troponin I   Result Value Ref Range    Troponin I ES <0.015 0.000 - 0.045 ug/L   CT Head w/o Contrast    Narrative    PROCEDURE: CT HEAD W/O CONTRAST     HISTORY: Head trauma, minor, pt on anticoagulation.    COMPARISON: May 15, 2020    TECHNIQUE:  Helical images of the head from the foramen magnum to the  vertex were obtained without contrast.    FINDINGS: The ventricles and sulci are normal in volume. No acute  intracranial hemorrhage, mass effect, midline shift, hydrocephalus or  basilar cystern effacement are present.    The grey-white matter interface is preserved. There is some white  matter low density in both hemispheres consistent with small vessel  disease    The calvarium is intact. The mastoid air cells are clear.  The  visualized paranasal sinuses are clear.      Impression    IMPRESSION: No acute brain abnormality.      NARDA WHITE MD   Cervical spine CT w/o contrast    Narrative    PROCEDURE: CT CERVICAL SPINE W/O CONTRAST 7/1/2020 8:58 PM    HISTORY: C-spine trauma, high clinical risk (NEXUS/CCR); fall trauma,  c-spine pain    COMPARISONS: None.    Meds/Dose Given:    TECHNIQUE: CT scan of cervical spine with sagittal coronal  reconstructions    FINDINGS: There is now for subluxation of C7 on T1 due to facet joint  degenerative changes. Anterior osteophytes are seen at C5 C6 C6 C7 C7  T1 and T1-T2. There are no fractures of the vertebral bodies or  arches. Atherosclerotic plaquing is seen in the carotid arteries  bilaterally. Interstitial thickening is seen in the lung apices.          Impression    IMPRESSION: No acute cervical fracture    NARDA WHITE MD   CT Abdomen Pelvis w Contrast    Narrative    EXAMINATION: CT ABDOMEN PELVIS W CONTRAST, 7/1/2020 8:58 PM    TECHNIQUE:  Helical CT images from the lung bases through the  symphysis pubis were obtained  with IV contrast. Contrast dose: isovue  300; 100 ml    COMPARISON: none    HISTORY: Abdomen-pelvis trauma, minor, blunt; fall trauma, right  pelvis pain, on coumadin    FINDINGS:    There is interstitial thickening seen at the lung bases. The heart is  normal in size    The liver is free of masses or biliary ductal enlargement. No  calcified gallstones are seen.    The the spleen and pancreas appear normal.    The adrenal glands are normal.    There is a benign-appearing cyst seen in the left kidney. There is no  hydronephrosis.    The periaortic lymph nodes are normal in caliber. There is a 4 cm  infrarenal abdominal aortic aneurysm.    No intraperitoneal masses or inflammatory changes are noted.    In the pelvis the bladder and rectum appear normal.    Severe generalized osteopenia is noted with compression fractures of  all of the lumbar vertebra unchanged from October 2019. There is a  comminuted intertrochanteric fracture of the right proximal femur.  There is a left hip prosthesis in place. There is deformity in the  ischii and pubic bone on the left which appear to be secondary to old  injuries.      Impression    IMPRESSION: Comminuted intertrochanteric fracture right proximal  femur.    There is severe osteopenia with compression fractures of all the  lumbar vertebra stable from 2019    NARDA WHTIE MD   XR Femur Right 2 Views    Narrative    PROCEDURE:  XR FEMUR RT 2 VW    HISTORY: fall trauma    COMPARISON:  None.    TECHNIQUE:  2 views of the right femur were obtained.    FINDINGS:  There is an intertrochanteric fracture of the proximal  femur on the right. The portion of the fracture extends laterally  along the proximal  femoral shaft. There is approximately 30 degrees of  angulation convex laterally. Distal femur is intact.      Impression    IMPRESSION: Proximal femoral fracture.      NARDA WHITE MD   XR Shoulder Right G/E 3 Views    Narrative    PROCEDURE:  XR SHOULDER RT G/E 3 VW    HISTORY: fall trauma, shoulder pain    COMPARISON:  None.    TECHNIQUE:  4 views of the right shoulder were obtained.    FINDINGS:  There is mild deformity of the distal clavicle just  proximal to the acromial clavicular joint. I am uncertain if this is  an old injury or if this represents an acute abnormality. Correlation  with pain at this site is recommended. The scapula and proximal  humerus are intact severe generalized osteopenia is noted.       Impression    IMPRESSION: Distal clavicular deformity which may be acute or chronic       NARDA WHITE MD       Medications   ondansetron (ZOFRAN) injection 4 mg (4 mg Intravenous Given 7/1/20 1915)   HYDROmorphone (PF) (DILAUDID) injection 0.25 mg (0.25 mg Intravenous Given 7/1/20 2216)   HYDROmorphone (PF) (DILAUDID) injection 0.25 mg (0.25 mg Intravenous Given 7/1/20 1921)   iopamidol (ISOVUE-300) IV solution 61% 100 mL (100 mLs Intravenous Given 7/1/20 2041)   sodium chloride (PF) 0.9% PF flush 60 mL (60 mLs Intravenous Given 7/1/20 2041)       Assessments & Plan (with Medical Decision Making)   Michael Christiansen presents to the Emergency Department with c/o fall trauma from standing height. She sustained a right greater trochanteric hip/femur fracture.    -pt anticoagulated and struck her head (denies LOC), CT head without intracranial bleed  -CT c-spine negative for acute fx, c-spine cleared  -CT abd/pelvis reviewed, no solid organ injury, no free air, does show right hip fx  -XR RLE and RUE shows femur fx and questionable clavicle fx  -pain control with IV dilaudid, required some nausea control with zofran  -Discussed with Dr. Lares/Ortho [facility], he advised to talk with  Hospitalist vs Trauma due to co-morbidities. Plans to surgically fixate fx on 7/2/2020  -Contact Dr. Black/Trauma [facility] who accepted the patient for transfer    -pt also with a.fib/RVR per EKG (chronic a.fib) but HR intermittent up to 130-140s, given Cardizem 15 mg IV once  -pt attempted to drink fluids right before transfer and per RN choked a little on her water, will keep NPO, but if develops fevers or respiratory distress consider aspiration work-up, hold on abx at this time, no hypoxia     Disposition: transfer via ambulance to [facility]    I have reviewed the nursing notes.    I have reviewed the findings, diagnosis, plan and need for follow up with the patient.       ED to Inpatient Handoff:    Discussed with Dr. Av Black at [phone] (she contacted our ER nurses station)  Patient accepted for Inpatient Stay  Pending studies include none  Code Status: Full Code           New Prescriptions    No medications on file       Final diagnoses:   None       7/1/2020   [facility]     Cornelio Coronado PA-C  07/02/20 0008

## 2020-07-02 NOTE — ED NOTES
"Pt put on call light to state that she felt like the \"room was spinning\" .  Will inform provider  "

## 2020-07-02 NOTE — ED NOTES
Pt took a sip of ice water per pt request and pt started coughing and gagging, spitting up a small amount of water. Pt thinks the water went into her lungs. PA notified.

## 2020-07-02 NOTE — ED NOTES
PA at bedside to discuss disposition. Pt reports her pain 3/10 in right hip and right shoulder. EMS will be here shortly to  pt.

## 2020-07-08 NOTE — TELEPHONE ENCOUNTER
Received fax regarding the following:    Res is on 30 mg/ 0.3 ml of Lovenox daily @ 2000 currently receiving coumadin would you like to continue Lovenox injections?

## 2020-07-08 NOTE — PROGRESS NOTES
ANTICOAGULATION FOLLOW-UP CLINIC VISIT    Patient Name:  Michael Christiansen  Date:  2020  Contact Type:  fax received with INR and returned RE: Warfarin dosing/INR recheck date on nursing communication sheet to Renita meeks    SUBJECTIVE:  Patient Findings     Positives:   Change in medications (On cipro x 7 days)    Comments:   INR done by [facility] facility and results faxed to warfarin clinic. No bleeding/bruising, changes in diet/meds/activity or questions per nursing communication sheet. Fax sent to [facility] with warfarin dosing/INR recheck date. Facility to notify warfarin clinic with any bruising/bleeding, changes in diet/meds/activity or questions         Clinical Outcomes     Negatives:   Major bleeding event, Thromboembolic event, Anticoagulation-related hospital admission, Anticoagulation-related ED visit, Anticoagulation-related fatality    Comments:   INR done by [facility] facility and results faxed to warfarin clinic. No bleeding/bruising, changes in diet/meds/activity or questions per nursing communication sheet. Fax sent to [facility] with warfarin dosing/INR recheck date. Facility to notify warfarin clinic with any bruising/bleeding, changes in diet/meds/activity or questions            OBJECTIVE    Recent labs: (last 7 days)     20   INR 2.0*       ASSESSMENT / PLAN  INR assessment THER    Recheck INR In: 1 WEEK    INR Location [facility]      Anticoagulation Summary  As of 2020    INR goal:   2.0-3.0   TTR:   44.9 % (1 y)   INR used for dosin.0 (2020)   Warfarin maintenance plan:   2.5 mg (5 mg x 0.5) every Mon, Fri; 5 mg (5 mg x 1) all other days   Full warfarin instructions:   : 2.5 mg; Otherwise 2.5 mg every Mon, Fri; 5 mg all other days   Weekly warfarin total:   30 mg   Plan last modified:   Kasia Hercules RN (10/1/2018)   Next INR check:   7/15/2020   Priority:   High   Target end date:   Indefinite    Indications    Long-term (current) use of anticoagulants [Z79.01] [Z79.01]  Permanent atrial  fibrillation (H) [I48.21]             Anticoagulation Episode Summary     INR check location:       Preferred lab:       Send INR reminders to:   HASEEB KLEIN    Comments:         Anticoagulation Care Providers     Provider Role Specialty Phone number    Chandrika Kumar PA Referring [facility] [phone]            See the Encounter Report to view Anticoagulation Flowsheet and Dosing Calendar (Go to Encounters tab in chart review, and find the Anticoagulation Therapy Visit)        Amarilis Gibson RN

## 2020-07-08 NOTE — TELEPHONE ENCOUNTER
Received fax regarding the following:    Non-blanchable redness L inner buttock. Present upon admit. Ok for tx barrier cream tid and prn?

## 2020-07-09 PROBLEM — N17.9 AKI (ACUTE KIDNEY INJURY) (H): Status: ACTIVE | Noted: 2020-01-01

## 2020-07-09 PROBLEM — T83.511A URINARY TRACT INFECTION ASSOCIATED WITH INDWELLING URETHRAL CATHETER (H): Status: ACTIVE | Noted: 2020-01-01

## 2020-07-09 PROBLEM — R55 SYNCOPE AND COLLAPSE: Status: ACTIVE | Noted: 2020-01-01

## 2020-07-09 PROBLEM — N39.0 URINARY TRACT INFECTION ASSOCIATED WITH INDWELLING URETHRAL CATHETER (H): Status: ACTIVE | Noted: 2020-01-01

## 2020-07-09 PROBLEM — S42.031A: Status: ACTIVE | Noted: 2020-01-01
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
## 2020-07-09 PROBLEM — S72.001A CLOSED RIGHT HIP FRACTURE (H): Status: ACTIVE | Noted: 2020-01-01

## 2020-07-09 PROBLEM — Z78.9 NURSING HOME RESIDENT: Status: ACTIVE | Noted: 2020-01-01

## 2020-07-09 NOTE — PROGRESS NOTES
"Michael Christiansen is a 84 year old female who is being evaluated via a billable video visit.      The patient has been notified of following:     \"This video visit will be conducted via a call between you and your physician/provider. We have found that certain health care needs can be provided without the need for an in-person physical exam.  This service lets us provide the care you need with a video conversation.  If a prescription is necessary we can send it directly to your pharmacy.  If lab work is needed we can place an order for that and you can then stop by our lab to have the test done at a later time.    Video visits are billed at different rates depending on your insurance coverage.  Please reach out to your insurance provider with any questions.    If during the course of the call the physician/provider feels a video visit is not appropriate, you will not be charged for this service.\"    Patient has given verbal consent for Video visit? Yes  How would you like to obtain your AVS? Mail a copy  If you are dropped from the video visit, the video invite should be resent to: Text to cell phone: [phone]  Will anyone else be joining your video visit? No        Video-Visit Details    Type of service:  Video Visit    Video Start Time: 1415  Video End Time: 1430    Originating Location (pt. Location): Long term Care    Distant Location (provider location):  [facility]     Platform used for Video Visit: ZOCKO      HISTORY OF PRESENT ILLNESS:  Michael is a 84 year old female (1935)  resident of [facility] who is being seen today for initial Nursing Home Visit.     She has a history of atrial fibrillation, hypertension, osteoporosis, valvular heart disease, coronary heart disease  pulmonary hypertension, interstitial lung disease, left carotid stenosis, and well as chronic pain requiring the use of opioids.      Michael was admitted from [facility] after being " hospitalized with multiple fractures resulting from a fall.  This included an intertrochanteric fracture of the right femur as well as closed displaced fracture of right clavicle.  Her postoperative course was complicated by acute blood loss anemia.  She was eventually discharged to [facility] for short term rehabilitation.     The patient notes continued pain which is tolerable.  She is attending Physical Therapy      Discussed with nursing staff who note the following concerns:  None.    The patient is seen in her room.  Family is not present.     Medical records are reviewed.    Current medications, allergies, and interdisciplinary care plan are reviewed.      Patient Active Problem List    Diagnosis Date Noted     Urinary tract infection associated with indwelling urethral catheter (H) 07/07/2020     Priority: Medium     Closed right hip fracture (H) 07/02/2020     Priority: Medium     Fall from standing, initial encounter 07/02/2020     Priority: Medium     Fracture of acromial end of right clavicle 07/02/2020     Priority: Medium     BARRERA (acute kidney injury) (H) 05/15/2020     Priority: Medium     Syncope and collapse 05/15/2020     Priority: Medium     Status post transcatheter aortic valve replacement 05/12/2020     Priority: Medium     Coronary artery disease involving native coronary artery of native heart without angina pectoris 05/04/2020     Priority: Medium     Left-sided carotid artery disease (H) 05/04/2020     Priority: Medium     Dyslipidemia 04/29/2020     Priority: Medium     Moderate pulmonary arterial systolic hypertension (H) 04/29/2020     Priority: Medium     Status post coronary angiogram 03/06/2020     Priority: Medium     Other ill-defined heart diseases 02/17/2020     Priority: Medium     Added automatically from request for surgery [id number]       Severe aortic stenosis 01/03/2020     Priority: Medium     Left upper quadrant pain 10/22/2019     Priority: Medium      Added automatically from request for surgery [id number]       ILD (interstitial lung disease) (H) 10/21/2019     Priority: Medium     Chronic, continuous use of opioids 09/23/2019     Priority: Medium     Hx of rheumatic fever 06/19/2019     Priority: Medium     Protein-calorie malnutrition (H) 06/06/2019     Priority: Medium     Valvular heart disease 06/06/2019     Priority: Medium     Unexplained weight loss 06/06/2019     Priority: Medium     LISA (generalized anxiety disorder) 11/26/2018     Priority: Medium     Anticoagulated on Coumadin 06/13/2018     Priority: Medium     Pulmonary hypertension-moderate 12/20/2017     Priority: Medium     Moderate tricuspid insufficiency 12/20/2017     Priority: Medium     Malignant neoplasm of right breast in female, estrogen receptor positive, unspecified site of breast (H) 08/02/2017     Priority: Medium     Osteoporosis 01/26/2017     Priority: Medium     Long-term (current) use of anticoagulants [Z79.01] 07/27/2016     Priority: Medium     History of total knee replacement 01/26/2015     Priority: Medium     Chronic pain syndrome 01/23/2015     Priority: Medium     Acute blood loss as cause of postoperative anemia 07/19/2013     Priority: Medium     Permanent atrial fibrillation (H) 05/01/2013     Priority: Medium     Overview:   Chronic        Essential hypertension 03/25/2013     Priority: Medium     Osteoarthritis 03/25/2013     Priority: Medium     Health care directive on file 07/02/2012     Priority: Medium     Duplicate ACP problems. Resolving this one. Kerry Pichardo RN, System Director ACP-Honoring Choices          Lumbar pain with radiation down right leg 07/05/2011     Priority: Medium     Overview:   IMO Update 10/11       Lumbar spinal stenosis 07/05/2011     Priority: Medium     Radiculitis 06/16/2011     Priority: Medium     Overview:   IMO Update 10/11       Osteoarthritis of knee 09/16/2010     Priority: Medium     Overview:   IMO Update 10/11             Social History     Socioeconomic History     Marital status:      Spouse name: Not on file     Number of children: Not on file     Years of education: Not on file     Highest education level: Not on file   Occupational History     Not on file   Social Needs     Financial resource strain: Not on file     Food insecurity     Worry: Not on file     Inability: Not on file     Transportation needs     Medical: Not on file     Non-medical: Not on file   Tobacco Use     Smoking status: Former Smoker     Packs/day: 0.30     Years: 30.00     Pack years: 9.00     Types: Cigarettes     Last attempt to quit: 1987     Years since quittin.9     Smokeless tobacco: Never Used     Tobacco comment: year quit    Substance and Sexual Activity     Alcohol use: No     Drug use: No     Sexual activity: Not Currently     Partners: Male   Lifestyle     Physical activity     Days per week: Not on file     Minutes per session: Not on file     Stress: Not on file   Relationships     Social connections     Talks on phone: Not on file     Gets together: Not on file     Attends [employer] service: Not on file     Active member of club or organization: Not on file     Attends meetings of clubs or organizations: Not on file     Relationship status: Not on file     Intimate partner violence     Fear of current or ex partner: Not on file     Emotionally abused: Not on file     Physically abused: Not on file     Forced sexual activity: Not on file   Other Topics Concern     Parent/sibling w/ CABG, MI or angioplasty before 65F 55M? No      Service Not Asked     Blood Transfusions Yes     Caffeine Concern Not Asked     Occupational Exposure Not Asked     Hobby Hazards Not Asked     Sleep Concern Not Asked     Stress Concern Not Asked     Weight Concern Not Asked     Special Diet Not Asked     Back Care Not Asked     Exercise Not Asked     Bike Helmet Not Asked     Seat Belt Not Asked     Self-Exams Not Asked   Social  History Narrative     Not on file        Current Outpatient Medications   Medication Sig     Acetaminophen (TYLENOL PO) Take 500 mg by mouth every 4 hours as needed      Calcium Citrate-Vitamin D (CALCITRATE/VITAMIN D PO) Take 1 tablet by mouth 2 times daily      ciprofloxacin (CIPRO) 500 MG tablet Take 500 mg by mouth 2 times daily      cyclobenzaprine (FLEXERIL) 5 MG tablet Take 5 mg by mouth 3 times daily as needed     enoxaparin ANTICOAGULANT (LOVENOX) 30 MG/0.3ML syringe Inject 1 mg/kg Subcutaneous daily     metoprolol tartrate (LOPRESSOR) 25 MG tablet TAKE 1/2 (ONE-HALF) TABLET BY MOUTH ONCE DAILY IN THE MORNING AND 1/2 (ONE-HALF) IN THE EVENING     Multiple Vitamins-Minerals (PRESERVISION AREDS 2 PO) Take by mouth 2 times daily      omeprazole (PRILOSEC) 20 MG DR capsule Take 20 mg by mouth     oxyCODONE (ROXICODONE) 5 MG tablet Take 5 mg by mouth every 4 hours     senna-docusate (SENOKOT-S/PERICOLACE) 8.6-50 MG tablet Take 1 tablet by mouth 2 times daily as needed     No current facility-administered medications for this visit.        Allergies   Allergen Reactions     Atenolol Shortness Of Breath and Other (See Comments)     Dizziness  Severe vertigo and dizziness/SOB     Lisinopril Cough     Augmentin Diarrhea     Pollen Extract      Other reaction(s): Runny Nose       I have reviewed the care plan and do agree with the plan.      ROS:  No chest pain, shortness of breath, fever, chills, headache, nausea, vomiting, dysuria, or changes in bowel habits.  Appetite is normal.  Diffuse pain noted.          OBJECTIVE:  /66   Pulse 60   Temp 97.8  F (36.6  C)   Resp 18   SpO2 92%     GENERAL: Healthy, alert and no distress  EYES: Eyes grossly normal to inspection.  No discharge or erythema, or obvious scleral/conjunctival abnormalities.  RESP: No audible wheeze, cough, or visible cyanosis.  No visible retractions or increased work of breathing.    SKIN: Visible skin clear. No significant rash, abnormal  pigmentation or lesions.  NEURO: Cranial nerves grossly intact.  Mentation and speech appropriate for age.  PSYCH: Mentation appears normal, affect normal/bright, judgement and insight intact, normal speech and appearance well-groomed.              Lab/Diagnostic data:    Reviewed    ASSESSMENT/ORDERS:  Nursing Home Visit  Discussed with staff. Care plan reviewed and orders updated.  Chronic issues are stable. Routine 30 day Nursing Home follow up.     Closed fracture of right hip with routine healing, subsequent encounter  Pain is controlled.  She will continue Physical Therapy and Occupational Therapy     Closed displaced fracture of acromial end of right clavicle with routine healing, subsequent encounter  As above    Permanent atrial fibrillation (H)  Rate controlled     Long-term (current) use of anticoagulants [Z79.01]  Follow inr    Pulmonary hypertension-moderate  Stable    Acute blood loss as cause of postoperative anemia  Monitor hemoglobin    Valvular heart disease  Status post TAVR (TRANSCATHETER AORTIC VALVE REPLACEMENT).  Stable        Total time spent with patient visit was 35 min including patient visit, review of pertinent clinical information, and treatment plan.      Geo Henderson MD [facility] DC

## 2020-07-09 NOTE — NURSING NOTE
"Chief Complaint   Patient presents with     USP Regulatory       Initial /66   Pulse 60   Temp 97.8  F (36.6  C)   Resp 18   SpO2 92%  Estimated body mass index is 15.94 kg/m  as calculated from the following:    Height as of 7/1/20: 1.6 m (5' 3\").    Weight as of 7/1/20: 40.8 kg (90 lb).  Medication Reconciliation: complete  Ting De La Rosa LPN  "

## 2020-07-13 NOTE — TELEPHONE ENCOUNTER
oxycodone      Last Written Prescription Date:  7/7/20  Last Fill Quantity: ,   # refills:   Last Office Visit: 7/9/20  Future Office visit:    Next 5 appointments (look out 90 days)    Jul 14, 2020  1:45 PM CDT  (Arrive by 1:30 PM)  SHORT with Kalyani Orr NP  [facility] ([facility] [place] ) [address]  [place] [address]  [phone]           Routing refill request to provider for review/approval because:

## 2020-07-14 NOTE — TELEPHONE ENCOUNTER
Received fax from [facility] in regards to Michael Christiansen   :  1935     The following information was received via fax:    Need Oxy Ct. Resident is completely out. Ok for Oxycodone 5 mg 1 tab for pain rated 1-4/10 and 2 tab for pain rated 5-10/10 Ct # 120?      Ashley LeChevalier LPN

## 2020-07-15 NOTE — PROGRESS NOTES
ANTICOAGULATION FOLLOW-UP CLINIC VISIT    Patient Name:  Michael Christiansen  Date:  7/15/2020  Contact Type:  Telephone/ new warfarin dosing/INR recheck date faxed to [facility]    SUBJECTIVE:  Patient Findings     Positives:   Change in medications (cipro d/c, on augmentin x 7 days)    Comments:   INR done by [facility] nurse and result faxed to warfarin clinic. Per nursing communication sheet, Cipro has been discontinued and patient now on Augmentin x 7 days. No reported bleeding/bruising and no other changes in diet/meds/activity. New warfarin dosing/INR recheck date faxed back to [facility]. Staff to notify warfarin clinic if patient has any bleeding/bruising, new changes in diet/meds/activity, illness or staff questions.         Clinical Outcomes     Negatives:   Major bleeding event, Thromboembolic event, Anticoagulation-related hospital admission, Anticoagulation-related ED visit, Anticoagulation-related fatality    Comments:   INR done by [facility] nurse and result faxed to warfarin clinic. Per nursing communication sheet, Cipro has been discontinued and patient now on Augmentin x 7 days. No reported bleeding/bruising and no other changes in diet/meds/activity. New warfarin dosing/INR recheck date faxed back to [facility]. Staff to notify warfarin clinic if patient has any bleeding/bruising, new changes in diet/meds/activity, illness or staff questions.            OBJECTIVE    Recent labs: (last 7 days)     07/15/20   INR 3.3*       ASSESSMENT / PLAN  INR assessment SUPRA cipro   Recheck INR In: 1 WEEK    INR Location [facility]      Anticoagulation Summary  As of 7/15/2020    INR goal:   2.0-3.0   TTR:   44.5 % (1 y)   INR used for dosing:   3.3! (7/15/2020)   Warfarin maintenance plan:   2.5 mg (5 mg x 0.5) every Mon, Fri; 5 mg (5 mg x 1) all other days   Full warfarin instructions:   7/15: 2.5 mg; 7/18: 2.5 mg; Otherwise 2.5 mg every Mon, Fri; 5 mg all other days   Weekly warfarin total:   30 mg   Plan last  modified:   Kasia Hercules RN (10/1/2018)   Next INR check:   7/22/2020   Priority:   High   Target end date:   Indefinite    Indications    Long-term (current) use of anticoagulants [Z79.01] [Z79.01]  Permanent atrial fibrillation (H) [I48.21]             Anticoagulation Episode Summary     INR check location:       Preferred lab:       Send INR reminders to:   HASEEB KLEIN    Comments:         Anticoagulation Care Providers     Provider Role Specialty Phone number    Chandrika Kumar, PA [facility] [phone]            See the Encounter Report to view Anticoagulation Flowsheet and Dosing Calendar (Go to Encounters tab in chart review, and find the Anticoagulation Therapy Visit)        Kasia Hercules, RN

## 2020-07-15 NOTE — ED AVS SNAPSHOT
[facility]  [address]  [place] [address]  Phone:  [phone]                                    Michael Christiansen   MRN: [id number]    Department:  [facility]   Date of Visit:  7/15/2020           After Visit Summary Signature Page    I have received my discharge instructions, and my questions have been answered. I have discussed any challenges I see with this plan with the nurse or doctor.    ..........................................................................................................................................  Patient/Patient Representative Signature      ..........................................................................................................................................  Patient Representative Print Name and Relationship to Patient    ..................................................               ................................................  Date                                   Time    ..........................................................................................................................................  Reviewed by Signature/Title    ...................................................              ..............................................  Date                                               Time          22EPIC Rev 08/18

## 2020-07-16 NOTE — DISCHARGE INSTRUCTIONS
1) Follow the aftercare instructions provided  2) If you develop any new or worsening symptoms, return to the ER for a recheck.   3) You must eat foods containing salt and have your sodium level rechecked today by your doctor or return to the ER for a recheck.

## 2020-07-16 NOTE — ED PROVIDER NOTES
History     Chief Complaint   Patient presents with     Leg Swelling     Leg Pain     HPI  Michael Christiansen is a 84 year old female who presents today with complaints with leg swelling, leg pain and staff at nursing home where unable to feel pulses.  Patient denied any pain at this time.  Patient denied any complaints.  No shortness of breath or chest pain.    Allergies:  Allergies   Allergen Reactions     Atenolol Shortness Of Breath and Other (See Comments)     Dizziness  Severe vertigo and dizziness/SOB     Lisinopril Cough     Augmentin Diarrhea     Pollen Extract      Other reaction(s): Runny Nose       Problem List:    Patient Active Problem List    Diagnosis Date Noted     Nursing Home Visit 07/09/2020     Priority: Medium     Urinary tract infection associated with indwelling urethral catheter (H) 07/07/2020     Priority: Medium     Closed right hip fracture (H) 07/02/2020     Priority: Medium     Fall from standing, initial encounter 07/02/2020     Priority: Medium     Fracture of acromial end of right clavicle 07/02/2020     Priority: Medium     BARRERA (acute kidney injury) (H) 05/15/2020     Priority: Medium     Syncope and collapse 05/15/2020     Priority: Medium     Status post transcatheter aortic valve replacement 05/12/2020     Priority: Medium     Coronary artery disease involving native coronary artery of native heart without angina pectoris 05/04/2020     Priority: Medium     Left-sided carotid artery disease (H) 05/04/2020     Priority: Medium     Dyslipidemia 04/29/2020     Priority: Medium     Moderate pulmonary arterial systolic hypertension (H) 04/29/2020     Priority: Medium     Status post coronary angiogram 03/06/2020     Priority: Medium     Other ill-defined heart diseases 02/17/2020     Priority: Medium     Added automatically from request for surgery [id number]       Severe aortic stenosis 01/03/2020     Priority: Medium     Left upper quadrant pain 10/22/2019     Priority: Medium      Added automatically from request for surgery [id number]       ILD (interstitial lung disease) (H) 10/21/2019     Priority: Medium     Chronic, continuous use of opioids 09/23/2019     Priority: Medium     Hx of rheumatic fever 06/19/2019     Priority: Medium     Protein-calorie malnutrition (H) 06/06/2019     Priority: Medium     Valvular heart disease 06/06/2019     Priority: Medium     Unexplained weight loss 06/06/2019     Priority: Medium     LISA (generalized anxiety disorder) 11/26/2018     Priority: Medium     Anticoagulated on Coumadin 06/13/2018     Priority: Medium     Pulmonary hypertension-moderate 12/20/2017     Priority: Medium     Moderate tricuspid insufficiency 12/20/2017     Priority: Medium     Malignant neoplasm of right breast in female, estrogen receptor positive, unspecified site of breast (H) 08/02/2017     Priority: Medium     Osteoporosis 01/26/2017     Priority: Medium     Long-term (current) use of anticoagulants [Z79.01] 07/27/2016     Priority: Medium     History of total knee replacement 01/26/2015     Priority: Medium     Chronic pain syndrome 01/23/2015     Priority: Medium     Acute blood loss as cause of postoperative anemia 07/19/2013     Priority: Medium     Permanent atrial fibrillation (H) 05/01/2013     Priority: Medium     Overview:   Chronic        Essential hypertension 03/25/2013     Priority: Medium     Osteoarthritis 03/25/2013     Priority: Medium     Health care directive on file 07/02/2012     Priority: Medium     Duplicate ACP problems. Resolving this one. Kerry Pichardo RN, System Director ACP-[facility] Choices          Lumbar pain with radiation down right leg 07/05/2011     Priority: Medium     Overview:   IMO Update 10/11       Lumbar spinal stenosis 07/05/2011     Priority: Medium     Radiculitis 06/16/2011     Priority: Medium     Overview:   IMO Update 10/11       Osteoarthritis of knee 09/16/2010     Priority: Medium     Overview:   IMO Update 10/11          Past  Medical History:    Past Medical History:   Diagnosis Date     Arthritis      Backache, unspecified 1/1/2011     Breast cancer, stage 1 3/19/2010     Chronic pain syndrome 1/23/2015     Claustrophobia 1/1/2011     Hip joint replacement by other means 1/1/2011     Osteoarthrosis, unspecified whether generalized or localized, lower leg 8/14/2006     Posttraumatic stress disorder 1/1/2011       Past Surgical History:    Past Surgical History:   Procedure Laterality Date     BREAST SURGERY      right  side mastectomy     C TOTAL KNEE ARTHROPLASTY Left 01/26/15     cataract extraction and lens implantation       COLONOSCOPY       CV CORONARY ANGIOGRAM N/A 3/6/2020    Procedure: CV CORONARY ANGIOGRAM;  Surgeon: Skyler Shepherd MD;  Location:  [facility]     CV RIGHT HEART CATH N/A 3/6/2020    Procedure: CV RIGHT HEART CATH;  Surgeon: Skyler Shepherd MD;  Location:  [facility]     ESOPHAGOSCOPY, GASTROSCOPY, DUODENOSCOPY (EGD), COMBINED N/A 11/1/2019    Procedure: UPPER ENDOSCOPY, WITH BIOPSY;  Surgeon: Fab Carroll MD;  Location: [facility]     EYE SURGERY       GYN SURGERY      tubal pregnancy     ORTHOPEDIC SURGERY  2009    l. hip replacement LT     ORTHOPEDIC SURGERY  july 19th 2013    Right total knee     TUBAL/ECTOPIC PREGNANCY         Family History:    Family History   Problem Relation Age of Onset     Diabetes Father 75        cause of death     Other - See Comments Father         PVD     Heart Disease Sister      Coronary Artery Disease Sister      Other - See Comments Mother 83        old age; cause of death     Other - See Comments Sister         14 year twin [employer]; cause of death     Chronic Obstructive Pulmonary Disease Daughter      Osteoporosis Daughter      Thyroid Disease Daughter      Cancer Sister      Other Cancer Sister      Other Cancer Sister         uterine     Hypertension No family hx of      Hyperlipidemia No family hx of      Cerebrovascular  Disease No family hx of      Breast Cancer No family hx of      Prostate Cancer No family hx of      Colon Cancer No family hx of      Asthma No family hx of      Anesthesia Reaction No family hx of      Genetic Disorder No family hx of        Social History:  Marital Status:   [5]  Social History     Tobacco Use     Smoking status: Former Smoker     Packs/day: 0.30     Years: 30.00     Pack years: 9.00     Types: Cigarettes     Last attempt to quit: 1987     Years since quittin.9     Smokeless tobacco: Never Used     Tobacco comment: year quit    Substance Use Topics     Alcohol use: No     Drug use: No        Medications:    Acetaminophen (TYLENOL PO)  Calcium Citrate-Vitamin D (CALCITRATE/VITAMIN D PO)  cyclobenzaprine (FLEXERIL) 5 MG tablet  enoxaparin ANTICOAGULANT (LOVENOX) 30 MG/0.3ML syringe  metoprolol tartrate (LOPRESSOR) 25 MG tablet  Multiple Vitamins-Minerals (PRESERVISION AREDS 2 PO)  omeprazole (PRILOSEC) 20 MG DR capsule  oxyCODONE (ROXICODONE) 5 MG tablet  senna-docusate (SENOKOT-S/PERICOLACE) 8.6-50 MG tablet          Review of Systems   Constitutional: Negative.  Negative for fever.   HENT: Negative.    Eyes: Negative.  Negative for photophobia.   Respiratory: Negative.    Cardiovascular: Negative.  Negative for chest pain.   Gastrointestinal: Negative.    Endocrine: Negative.    Genitourinary: Negative.    Musculoskeletal: Negative.    Allergic/Immunologic: Negative.    Neurological: Negative.    Hematological: Negative.    Psychiatric/Behavioral: Negative.        Physical Exam   BP: 132/67  Heart Rate: 100  Temp: 98.1  F (36.7  C)  Resp: 16  SpO2: 98 %      Physical Exam  Constitutional:       General: She is not in acute distress.     Appearance: Normal appearance. She is normal weight. She is not toxic-appearing.   HENT:      Head: Normocephalic.   Neck:      Musculoskeletal: Normal range of motion and neck supple.   Cardiovascular:      Rate and Rhythm: Normal rate.  Rhythm irregular.   Pulmonary:      Effort: Pulmonary effort is normal.   Abdominal:      General: Abdomen is flat.      Palpations: Abdomen is soft.   Musculoskeletal:      Comments: Right lower leg swelling noted.  Intact pedal pulses. No calf swelling.    Neurological:      Mental Status: She is alert.             Picture of legs after observation in ER          ED Course     ED Course as of Jul 16 0337   Thu Jul 16, 2020 0212 Spoke with Dr. Perez Ortho she will look at films and will be called back.      0300 Consulted Dr. Perez who states that from orthopedic standpoint no acute intervention necessary      0315 Spoke with Dr. Coyle, vascular surgeon on call at [facility].  No acute intervention required at this time.        Procedures    Left arm bp - 112/79  Left leg bp - 91/62  Right arm bp - 96/60  Right leg bp - 104/48    SANFORD L - 0.8125  SANFORD R - 0.9285        EKG - Atrial fibrillation without ST elevation.      X-rays of femur, tibia fibula and ankle - no fracture  Duplex venous - No DVT  Duplex arterial ultrasound - no occlusion    Results for orders placed or performed during the hospital encounter of 07/15/20 (from the past 24 hour(s))   CBC with platelets differential   Result Value Ref Range    WBC 20.5 (H) 4.0 - 11.0 10e9/L    RBC Count 3.14 (L) 3.8 - 5.2 10e12/L    Hemoglobin 9.9 (L) 11.7 - 15.7 g/dL    Hematocrit 29.7 (L) 35.0 - 47.0 %    MCV 95 78 - 100 fl    MCH 31.5 26.5 - 33.0 pg    MCHC 33.3 31.5 - 36.5 g/dL    RDW 17.2 (H) 10.0 - 15.0 %    Platelet Count 570 (H) 150 - 450 10e9/L    Diff Method Automated Method     % Neutrophils 75.7 %    % Lymphocytes 11.0 %    % Monocytes 8.6 %    % Eosinophils 1.6 %    % Basophils 0.3 %    % Immature Granulocytes 2.8 %    Nucleated RBCs 0 0 /100    Absolute Neutrophil 15.5 (H) 1.6 - 8.3 10e9/L    Absolute Lymphocytes 2.3 0.8 - 5.3 10e9/L    Absolute Monocytes 1.8 (H) 0.0 - 1.3 10e9/L    Absolute Eosinophils 0.3 0.0 - 0.7 10e9/L    Absolute Basophils 0.1  0.0 - 0.2 10e9/L    Abs Immature Granulocytes 0.6 (H) 0 - 0.4 10e9/L    Absolute Nucleated RBC 0.0    INR   Result Value Ref Range    INR 3.08 (H) 0.86 - 1.14   Comprehensive metabolic panel   Result Value Ref Range    Sodium 130 (L) 133 - 144 mmol/L    Potassium 5.3 3.4 - 5.3 mmol/L    Chloride 97 94 - 109 mmol/L    Carbon Dioxide 31 20 - 32 mmol/L    Anion Gap 2 (L) 3 - 14 mmol/L    Glucose 104 (H) 70 - 99 mg/dL    Urea Nitrogen 25 7 - 30 mg/dL    Creatinine 0.68 0.52 - 1.04 mg/dL    GFR Estimate 80 >60 mL/min/[1.73_m2]    GFR Estimate If Black >90 >60 mL/min/[1.73_m2]    Calcium 8.8 8.5 - 10.1 mg/dL    Bilirubin Total 0.7 0.2 - 1.3 mg/dL    Albumin 2.8 (L) 3.4 - 5.0 g/dL    Protein Total 7.1 6.8 - 8.8 g/dL    Alkaline Phosphatase 135 40 - 150 U/L    ALT 29 0 - 50 U/L    AST 46 (H) 0 - 45 U/L   Troponin I   Result Value Ref Range    Troponin I ES <0.015 0.000 - 0.045 ug/L         Medications - No data to display    Assessments & Plan (with Medical Decision Making)     84-year-old female who presents today with complaints of swelling and change in skin color over right lower leg.  Staff at nursing home were concerned because they could not obtain a pulse in her right leg.  Patient status post femur surgery approximately 2 weeks ago.    On exam patient had an extremity that was larger in size than the left leg but on cooler to surface touch.  Pedal pulses obtained immediately with Doppler.  Patient had x-rays and a duplex arterial venous ultrasound of the lower leg.  There was no evidence of DVT.  There were no acute findings of arterial occlusion. Of note the right lower extremity coolness appeared to slowly resolve and after a few hours there was absolutely no temperature difference between her right lower leg and the left lower leg.     Case discussed with the orthopedics on call at [facility], Dr. Perez, who reviewed x-rays and felt there was no orthopedic emergency that would explain patient's symptoms.  States  that still expects that right leg will be slightly larger than left leg postop.  Also consulted with vascular surgeon who reviewed all results including ABIs and felt that no immediate intervention necessary at this time.  Suspects possible autonomic dysfunction to explain the initial relative temperature between the two legs.    Patient is going to be discharged home back to the nursing home.  There was incidental finding of hyponatremia and patient given instructions to eat foods higher with more salt in it and have the sodium rechecked.  White blood cell count noted. Patient has no fever and feels well other than sx noted on this visit. On recent hospitalization, patient also had a leukocytosis that went as high as 29 with no obvious explanation.  Patient to follow-up with PCP in 12 to 24 hours    Due to the nature of this electronic medical record, laboratory results, imaging results, diagnosis, other information and medications reported above may not represent information available to me at the the time of my care and disposition. Medications reported above may have not been ordered by me.     Portions of the record may have been created with voice recognition software. Occasional wrong-word or 'sound-a- like' substitution may have occurred due to the inherent limitations of voice recognition software. Though the chart has been reviewed, there may be inadvertent transcription errors. Read the chart carefully and recognize, using context, where substitutions have occurred.       New Prescriptions    No medications on file       Final diagnoses:   Right leg swelling   Hyponatremia       7/15/2020   [facility]     David Campbell MD  07/16/20 1606       David Campbell MD  07/17/20 0014

## 2020-07-16 NOTE — ED NOTES
Discharge instructions reviewed with patient.  Encouraged to return with new or worsening symptoms. Encouraged to return with new or worsening symptoms. Copy of AVS in hand on discharge. Patient to SNF via healthline

## 2020-07-16 NOTE — ED TRIAGE NOTES
"\"All of a sudden my leg started hurting and swelling up\". Pt with recent history of right femur fracture. Right leg swelling today.  "

## 2020-07-20 NOTE — TELEPHONE ENCOUNTER
Michael is due for her next prolia injection. Would you like her to receive another prolia injection?

## 2020-07-21 NOTE — TELEPHONE ENCOUNTER
Received fax from [facility] (7/15/20 1:50) in regards to Michael DEJESUS Елена   :  1935     The following information was received via fax:    Resident was taking the following medications before hospitalization and family wants them restarted, ok to restart the following?    Glucosamine 500mg daily      zoloft 50mg daily    zocor 20mg daily         Ting De La Rosa LPN

## 2020-07-22 NOTE — PROGRESS NOTES
ANTICOAGULATION FOLLOW-UP CLINIC VISIT    Patient Name:  Michael Christiansen  Date:  2020  Contact Type:  new warfarin dosing/INR recheck date faxed to [facility]    SUBJECTIVE:  Patient Findings     Comments:   INR done by [facility] nurse and result faxed to warfarin clinic. Per nursing communication sheet, patient has no bleeding/bruising and no new changes in diet/meds/activity. She is newer resident to [facility] so suspect vit K intake much decreased. New warfarin dosing/INR recheck date faxed to [facility] facility. Staff to notify warfarin clinic if patient has any new changes in diet/meds/activity, bleeding/bruising, illness or staff questions.         Clinical Outcomes     Negatives:   Major bleeding event, Thromboembolic event, Anticoagulation-related hospital admission, Anticoagulation-related ED visit, Anticoagulation-related fatality    Comments:   INR done by [facility] nurse and result faxed to warfarin clinic. Per nursing communication sheet, patient has no bleeding/bruising and no new changes in diet/meds/activity. She is newer resident to [facility] so suspect vit K intake much decreased. New warfarin dosing/INR recheck date faxed to [facility] facility. Staff to notify warfarin clinic if patient has any new changes in diet/meds/activity, bleeding/bruising, illness or staff questions.            OBJECTIVE    Recent labs: (last 7 days)     20   INR 4.6*       ASSESSMENT / PLAN  INR assessment SUPRA new admit to LT, ? decreased vit K intake, decreased activity   Recheck INR In: 5 DAYS    INR Location [facility]      Anticoagulation Summary  As of 2020    INR goal:   2.0-3.0   TTR:   42.5 % (1 y)   INR used for dosin.6! (2020)   Warfarin maintenance plan:   2.5 mg (5 mg x 0.5) every Mon, Wed, Fri; 5 mg (5 mg x 1) all other days   Full warfarin instructions:   : Hold; : 2.5 mg; Otherwise 2.5 mg every Mon, Wed, Fri; 5 mg all other days   Weekly warfarin total:   27.5 mg   Plan last modified:   Kasia Hercules RN (2020)    Next INR check:   7/27/2020   Priority:   High   Target end date:   Indefinite    Indications    Long-term (current) use of anticoagulants [Z79.01] [Z79.01]  Permanent atrial fibrillation (H) [I48.21]             Anticoagulation Episode Summary     INR check location:       Preferred lab:       Send INR reminders to:   HASEEB KLEIN    Comments:         Anticoagulation Care Providers     Provider Role Specialty Phone number    Chandrika Kumar, PA [facility] [phone]            See the Encounter Report to view Anticoagulation Flowsheet and Dosing Calendar (Go to Encounters tab in chart review, and find the Anticoagulation Therapy Visit)        Kasia Hercules RN

## 2020-07-23 NOTE — PROGRESS NOTES
Clinic Care Coordination Contact  Care Team Conversations    CC reviewed chart for PCP.  Conversation with PCP.  Patient in [facility] [place].  If patient returns home she would be appropriate for CC and PCP will let CC know.  No further outreach at this time.  Jacqueline Licea RN  Care Coordination

## 2020-07-27 NOTE — PROGRESS NOTES
ANTICOAGULATION FOLLOW-UP CLINIC VISIT    Patient Name:  Michael Christiansen  Date:  2020  Contact Type:  New warfarin dosing/INR recheck date faxed to [facility]      SUBJECTIVE:  Patient Findings     Comments:   INR done by [facility] nurse and result faxed to warfarin clinic. Per nursing communication sheet, patient has no bleeding/bruising and no new changes in diet/meds/activity. New warfarin dosing/INR recheck date faxed back to LT facility. Staff to notify warfarin clinic if patient has any new changes in diet/meds/activity, bleeding/bruising, changes/illness or staff questions.         Clinical Outcomes     Negatives:   Major bleeding event, Thromboembolic event, Anticoagulation-related hospital admission, Anticoagulation-related ED visit, Anticoagulation-related fatality    Comments:   INR done by [facility] nurse and result faxed to warfarin clinic. Per nursing communication sheet, patient has no bleeding/bruising and no new changes in diet/meds/activity. New warfarin dosing/INR recheck date faxed back to [facility] facility. Staff to notify warfarin clinic if patient has any new changes in diet/meds/activity, bleeding/bruising, changes/illness or staff questions.            OBJECTIVE    Recent labs: (last 7 days)     20   INR 4.2*       ASSESSMENT / PLAN  INR assessment SUPRA    Recheck INR In: 1 WEEK    INR Location [facility]      Anticoagulation Summary  As of 2020    INR goal:   2.0-3.0   TTR:   41.2 % (1 y)   INR used for dosin.2! (2020)   Warfarin maintenance plan:   5 mg (5 mg x 1) every Mon, Fri; 2.5 mg (5 mg x 0.5) all other days   Full warfarin instructions:   : Hold; : 2.5 mg; Otherwise 5 mg every Mon, Fri; 2.5 mg all other days   Weekly warfarin total:   22.5 mg   Plan last modified:   Kasia Hercules RN (2020)   Next INR check:   8/3/2020   Priority:   High   Target end date:   Indefinite    Indications    Long-term (current) use of anticoagulants [Z79.01] [Z79.01]  Permanent  atrial fibrillation (H) [I48.21]             Anticoagulation Episode Summary     INR check location:       Preferred lab:       Send INR reminders to:   HASEEB KLEIN    Comments:         Anticoagulation Care Providers     Provider Role Specialty Phone number    Chandrika Kumar PA Referring [facility] [phone]            See the Encounter Report to view Anticoagulation Flowsheet and Dosing Calendar (Go to Encounters tab in chart review, and find the Anticoagulation Therapy Visit)        Kasia Hercules RN

## 2020-07-29 NOTE — TELEPHONE ENCOUNTER
Received fax from [facility] in regards to Michael Christiansen   :  1935     The following information was received via fax:    Ok to continue oxycodone 5mg 1 tab for pain 1-4/10 and 2 tabs for pain 50-10/10 po q4hrs prn count #60?          Latisha Erickson, PATN

## 2020-07-30 NOTE — TELEPHONE ENCOUNTER
"Received fax from [facility] in regards to Michael DEJESUS Елена   :  1935     The following information was received via fax:    Resident noted to have burning with urination, increased tiredness, no participation with therapy this week, \"I don't feel normal\". Ok to check UA/UC?    Ting De La Rosa LPN            "

## 2020-07-30 NOTE — TELEPHONE ENCOUNTER
Received fax from [facility] in regards to Michael Christiansen   :  1935     The following information was received via fax:    Per RD recommendation ok for MVI 1 tablet every day?     Ting De La Rosa LPN

## 2020-07-31 NOTE — TELEPHONE ENCOUNTER
Received fax from [facility] in regards to Michael Christiansen   :  1935     The following information was received via fax:    Please review UA/UC in epic and advise of any new orders. Ting De La Rosa LPN

## 2020-07-31 NOTE — TELEPHONE ENCOUNTER
Start keflex 500mg bid x 5 days. Dismiss alert which will trigger due to 'allergy' to augmentin (diarrhea).     Dianne Santos MD

## 2020-08-02 PROBLEM — N12 PYELONEPHRITIS: Status: ACTIVE | Noted: 2020-01-01

## 2020-08-02 NOTE — PLAN OF CARE
VSS. Offered no complaints. Up to commode with assist x1 gait belt and WW.  Small pea size area of breakdown noted to the left of coccyx. Barrier cream applied. Currently resting in bed. Alarms active. Call light within reach.     Face to face report given with opportunity to observe patient.    Report given to Clare Nath RN   8/2/2020  6:57 PM

## 2020-08-02 NOTE — PHARMACY-ANTICOAGULATION SERVICE
Pharmacy Consult-Coumadin Assessment Day #1    Michael Christiansen is a 84 year old female admitted on 8/2/2020 with hospital acquired pneumonia.    Primary Indication(s) for Anticoagulation: Afib     Goal INR:2-3  FYI, patient is followed by the Anticoagulation/Protime Clinic at: [facility] [place]      Patient Active Problem List   Diagnosis     Essential hypertension     Osteoarthritis     Permanent atrial fibrillation (H)     Health care directive on file     Chronic pain syndrome     History of total knee replacement     Radiculitis     Osteoarthritis of knee     Long-term (current) use of anticoagulants [Z79.01]     Osteoporosis     Malignant neoplasm of right breast in female, estrogen receptor positive, unspecified site of breast (H)     Pulmonary hypertension-moderate     Moderate tricuspid insufficiency     Anticoagulated on Coumadin     LISA (generalized anxiety disorder)     Acute blood loss as cause of postoperative anemia     Lumbar pain with radiation down right leg     Lumbar spinal stenosis     Protein-calorie malnutrition (H)     Valvular heart disease     Unexplained weight loss     Hx of rheumatic fever     Chronic, continuous use of opioids     ILD (interstitial lung disease) (H)     Left upper quadrant pain     Severe aortic stenosis     Other ill-defined heart diseases     Status post coronary angiogram     Coronary artery disease involving native coronary artery of native heart without angina pectoris     Dyslipidemia     Left-sided carotid artery disease (H)     Moderate pulmonary arterial systolic hypertension (H)     Status post transcatheter aortic valve replacement     BARRERA (acute kidney injury) (H)     Closed right hip fracture (H)     Fall from standing, initial encounter     Fracture of acromial end of right clavicle     Syncope and collapse     Urinary tract infection associated with indwelling urethral catheter (H)     Nursing Home Visit     Pyelonephritis       Patient  previously anticoagulated on Coumadin at a dose of 22.5 mg/week; dosed as: (5 mg (5 mg x 1) every Mon, Fri; 2.5 mg (5 mg x 0.5)) all other days    Factors that may increase patient's bleeding risk and/or sensitivity to warfarin (Coumadin) include: omeprazole,infection     Factors that may decrease patient's sensitivity to warfarin (Coumadin) include: None at this time    Dietary Considerations: none at this time    Anticoagulation Dose History     Recent Dosing and Labs Latest Ref Rng & Units 6/18/2020 7/8/2020 7/15/2020 7/15/2020 7/22/2020 7/27/2020 8/2/2020    INR 0.86 - 1.14 3.4(A) 2.0(A) 3.3(A) 3.08(H) 4.6(A) 4.2(A) 4.19(H)    INR 0.86 - 1.14 - - - - - - -          Assessment/Plan: INR of 4.19 today and dose will be held.  Patient we be reassessed tomorrow.    Thank You for the Consult. Will continue to follow.    Edward Edwards [facility] ....................  8/2/2020   2:29 PM

## 2020-08-02 NOTE — H&P
[facility]    History and Physical  Hospitalist       Date of Admission:  8/2/2020  Date of Service (when I saw the patient): 08/02/20    Assessment & Plan   Michael Christiansen is a 84 year old woman who presents from her nursing home noting most recently weakness and fatigue worse after physical therapy sessions for the past week.  Over this period of time she is also had urinary frequency with minimal dysuria.  Interval history is significant for TAVR 5/5/2020 placed after an admission at [facility] with syncope although she had been undergoing extensive evaluation for this over a longer period of time during which she had intermittently had orthostatic symptoms.  During that admission she required medication reduction because of persistent hypotension or orthostasis.  She presented again 5/15 with an episode of syncope evaluation showed new intermittent left bundle branch block.  Echocardiogram was unrevealing showing adequate AVR function.  7/3 she experienced a fall from standing height resulting in a right intertrochanteric and proximal femur fracture as well as proximal right clavicular fracture.  She underwent ORIF.  Records indicate she developed catheter related cystitis.  She was treated with ciprofloxacin.  Subsequently cultures showed a significant titer of ESBL E. coli sensitive to piperacillin/tazobactam.  Because of her symptoms over the past week approximately 2 days ago she was preemptively begun with a course of oral cephalexin.  In emergency department she initially was markedly tachycardic (chronic atrial fibrillation).  She improved with intravenous crystalloid (approximately 20 mL/kg) and symptomatically felt somewhat improved.  Other evaluation included chest radiograph suggestive of diffuse patchy infiltrates although somewhat difficult to interpret in the context of her established interstitial lung disease.  She is admitted for further evaluation and  management.    1.  Pyelonephritis versus acute infectious cystitis  Although this may have been somewhat longstanding most reasonable to characterize this as an acute process as she may not have been receiving adequate antibiotics.  She is quite clear as is her daughter that initially after her most recent hospitalization she did feel improved although it does appear that her urinary frequency may be of longer duration than simply several days.  Difficult to be certain whether or not a upper urinary infection is present.  She has some left CVA tenderness although it is difficult to separate this from lumbar spinal tenderness which is chronic for her.  She did present with fever of 38.2 Celsius not expected in the case of a infectious cystitis alone.  There is noted cultures obtained at [facility] early in July did show a significant titer of ESBL E. coli relatively, as expected, resistant but sensitive to piperacillin/tazobactam.  We will continue treatment with this which would also address any possible lower respiratory tract infection.  She has a significant pyuria currently.  Culture at that time shows significant titer of gram-negative rods.  Hopeful that culture and sensitivity will be helpful however depending on its findings may be most judicious to regard this is resistant organism even if that is not demonstrated on that culture.  2.  Volume depletion  Presented with her known chronic atrial fibrillation and rapid ventricular response.  She responded rapidly to a modest volume of isotonic crystalloid.  She does not meet clear criteria for sepsis and in any case would be inclined to avoid even minimally excessive fluid.  During admission at [facility] for evaluation and treatment of aortic stenosis and syncope while she had issues with excessive lowering of blood pressure with agents such as losartan and diltiazem she also had episodes of volume overload requiring transient diuresis and therefore even  minimally excessive fluid is likely to be deleterious.  At the present time she appears nearly euvolemic.  Continue a low dose of isotonic crystalloid with saline as she will require multiple doses of antibiotics making low chloride solution such as lactated Ringer's difficult.  3.  Chronic atrial fibrillation with transient rapid ventricular response  As above it appears this is related to volume depletion.  Have been concerned in the past regarding tachyarrhythmias associated with episodes of syncope although that is not been borne out with outpatient cardiac monitoring.  Telemetry monitoring is present.  Continue her chronic regimen including low-dose of metoprolol as she has been intolerant of any increase.  Treatment of her volume status as above.  She is chronic anticoagulated.  4.  Chronic anticoagulation  Relatively elevated INR at approximately 4.  Likely related to poor oral intake in the past several days.  Withhold warfarin at present.  Goal INR 2-3.  5.  Possible lower respiratory tract infection  Chronic increase in interstitial markings which are present.  In fact difficult to be certain that there is in fact any acute increase in airspace infiltrates.  Relatively tenuous woman at high risk of decompensation.  She has had relatively recent endoluminal aortic valve replacement and therefore is at higher risk of endovascular infection.  All of these factors suggest that at least initial preemptive antibiotics are not unreasonable.  While staphylococcal infection is not highly suspected we will continue vancomycin perhaps with a lower goal trough of 10-15.  Suspect that unless cultures are revealing discontinuing vancomycin in the next 24-48 hours will be possible.  As above it is likely she will require intravenous extended spectrum beta-lactam or carbopenem if ESBL infection is demonstrated.  6.  Nutritional status  Good oral intake until several days ago.  Hopeful that with treatment of infection her  appetite will improve.  Somewhat asthenic.  Is difficult to assess weights given her multiple hospitalization and acute illnesses recently.  Dietitian consult may be helpful.  Will defer addition of any supplements until her oral intake is better established.  7.  Hyponatremia  This is been present for an extended period of time with sodiums usually in a range of approximately 130.  Likely multiple fluid factorial.  May represent myocardial dysfunction.  For the time being avoid excessive free water and monitor her response.  8.  Leukocytosis  This is been noted to be present at least since early July.  As discussion of infectious disease issues above it may be that she has had an incompletely treated urinary source infection.  Peripheral smear requested in emergency department and reasonable to evaluate that although it is unlikely to show a specific diagnosis.  She also shows a mild thrombocytosis which also may reflect an subacute inflammatory process.        DVT Prophylaxis: Currently over anticoagulated.  Resume warfarin anticoagulation when appropriate  Code Status: Full Code    Disposition: Expected discharge difficult to predict.  She may require more prolonged intravenous antibiotic treatment than otherwise anticipated if ESBL infection is confirmed.  Multiple comorbidities and is likely she will require a longer hospitalization than predicted otherwise.    Tarik Rodriguez    Primary Care Physician   Chandrika Kumar    Chief Complaint   Weakness and fatigue for the past week with increase in urinary frequency    History is obtained from the patient, with contributions from her daughter.  History also obtained from multiple electronic medical records.    History of Present Illness   Michael Christiansen is a 84 year old woman who presents with weakness and fatigue for the past week.  In retrospect while after recent hospitalization she transferred to nursing home now nearly a month ago for subacute  rehabilitation she showed some improvement she is not been feeling entirely well over this period of time.  She is also developed urinary frequency with minimal dysuria particularly over the past 2-4 days.  Urine culture obtained 7/31 has shown significant titer of gram-negative rods.  Preemptive cephalexin was begun approximately that time.  Fever 38.2 on arrival to emergency department otherwise she is not aware of fevers.  No cough.  No chest discomfort.  No headache.  No orthostasis.  No unilateral weakness or paresthesias.  Some anorexia over the past several days with excellent appetite before that time.  No nausea or vomiting.  No diarrhea.  Interval history is somewhat complicated.  Pertinent history goes back to early May when she was admitted at [facility] with syncope.  She underwent TAVR 5/5 with 23 mm Landa S3 valve after this episode of syncope.  In fact she had been undergoing extensive evaluation for possible aortic valve replacement first with Dr. Kirby and subsequently at [facility] his family wished to use an institution closer physically.  Valve replacement was essentially unremarkable although she had several episodes subsequently of hypotension requiring discontinuation of losartan and diltiazem as well as a decrease in the dose of metoprolol.  She did have at least one episode of volume overload requiring short periods of active diuresis.  She was readmitted 5/15 after another episode of syncope.  Evaluation also showed transient left bundle branch block.  Echocardiogram revealed adequate AVR function.  She was evaluated in part with outpatient cardiac monitoring.  This is well as inpatient cardiac monitoring showed no significant arrhythmias with the exception of her known chronic atrial fibrillation.  Subsequently 7/3 she fell from a standing height and subsequently was found to have a right intertrochanteric and proximal femur fracture.  She underwent ORIF subsequently.  She  also sustained a right medial clavicular fracture treated conservatively.  During this hospitalization records indicate catheter associated urinary tract infection.  Preemptively treated with ciprofloxacin.  Review of records shows significant titer of ESBL E. coli as expected relatively resistant although sensitive to piperacillin/tazobactam.  She subsequently was transferred to skilled nursing facility, as she had been after her TAVR, for subacute rehabilitation.  As noted while she showed some improvement initially overall she is remained fatigued and not at her baseline.  She is had more marked lower urinary tract symptoms over the past 3-4 days but in retrospect these may have been present for a longer period of time.    Past Medical History    I have reviewed this patient's medical history and updated it with pertinent information if needed.   Past Medical History:   Diagnosis Date     Arthritis      Backache, unspecified 1/1/2011     Breast cancer, stage 1 3/19/2010     Chronic pain syndrome 1/23/2015     Claustrophobia 1/1/2011     Hip joint replacement by other means 1/1/2011     Osteoarthrosis, unspecified whether generalized or localized, lower leg 8/14/2006     Posttraumatic stress disorder 1/1/2011       Past Surgical History   I have reviewed this patient's surgical history and updated it with pertinent information if needed.  Past Surgical History:   Procedure Laterality Date     BREAST SURGERY      right  side mastectomy     C TOTAL KNEE ARTHROPLASTY Left 01/26/15     cataract extraction and lens implantation       COLONOSCOPY       CV CORONARY ANGIOGRAM N/A 3/6/2020    Procedure: CV CORONARY ANGIOGRAM;  Surgeon: Skyler Shepherd MD;  Location:  HEART CARDIAC CATH LAB     CV RIGHT HEART CATH N/A 3/6/2020    Procedure: CV RIGHT HEART CATH;  Surgeon: Skyler Shepherd MD;  Location:  HEART CARDIAC CATH LAB     ESOPHAGOSCOPY, GASTROSCOPY, DUODENOSCOPY (EGD), COMBINED N/A 11/1/2019    Procedure:  UPPER ENDOSCOPY, WITH BIOPSY;  Surgeon: Fab Carroll MD;  Location: [facility]     EYE SURGERY       GYN SURGERY      tubal pregnancy     ORTHOPEDIC SURGERY  2009    l. hip replacement LT     ORTHOPEDIC SURGERY  july 19th 2013    Right total knee     TUBAL/ECTOPIC PREGNANCY     TAVR 23 mm Landa S3 5/5/2020    Prior to Admission Medications   Prior to Admission Medications   Prescriptions Last Dose Informant Patient Reported? Taking?   Acetaminophen (TYLENOL PO)  Self Yes No   Sig: Take 500 mg by mouth every 4 hours as needed    Calcium Citrate-Vitamin D (CALCITRATE/VITAMIN D PO)  Self Yes No   Sig: Take 1 tablet by mouth 2 times daily    Multiple Vitamins-Minerals (PRESERVISION AREDS 2 PO)  Self Yes No   Sig: Take by mouth 2 times daily    ciprofloxacin (CIPRO) 500 MG tablet   Yes No   Sig: Take 500 mg by mouth 2 times daily    cyclobenzaprine (FLEXERIL) 5 MG tablet   Yes No   Sig: Take 5 mg by mouth 3 times daily as needed   enoxaparin ANTICOAGULANT (LOVENOX) 30 MG/0.3ML syringe   Yes No   Sig: Inject 1 mg/kg Subcutaneous daily   metoprolol tartrate (LOPRESSOR) 25 MG tablet   No No   Sig: TAKE 1/2 (ONE-HALF) TABLET BY MOUTH ONCE DAILY IN THE MORNING AND 1/2 (ONE-HALF) IN THE EVENING   omeprazole (PRILOSEC) 20 MG DR capsule   Yes No   Sig: Take 20 mg by mouth   oxyCODONE (ROXICODONE) 5 MG tablet   No No   Sig: TAKE 1-1.5 TABLETS BY MOUTH EVERY FOUR HOURS AS NEEDED FOR PAIN   senna-docusate (SENOKOT-S/PERICOLACE) 8.6-50 MG tablet   Yes No   Sig: Take 1 tablet by mouth 2 times daily as needed      Facility-Administered Medications: None     Allergies   Allergies   Allergen Reactions     Atenolol Shortness Of Breath and Other (See Comments)     Dizziness  Severe vertigo and dizziness/SOB     Lisinopril Cough     Augmentin Diarrhea     Pollen Extract      Other reaction(s): Runny Nose       Social History   I have reviewed this patient's social history and updated it with pertinent information if needed. Michael DEJESUS  Елена  reports that she quit smoking about 33 years ago. Her smoking use included cigarettes. She has a 9.00 pack-year smoking history. She has never used smokeless tobacco. She reports that she does not drink alcohol or use drugs.    Family History   I have reviewed this patient's family history and updated it with pertinent information if needed.   Family History   Problem Relation Age of Onset     Diabetes Father 75        cause of death     Other - See Comments Father         PVD     Heart Disease Sister      Coronary Artery Disease Sister      Other - See Comments Mother 83        old age; cause of death     Other - See Comments Sister         14 year twin [employer]; cause of death     Chronic Obstructive Pulmonary Disease Daughter      Osteoporosis Daughter      Thyroid Disease Daughter      Cancer Sister      Other Cancer Sister      Other Cancer Sister         uterine     Hypertension No family hx of      Hyperlipidemia No family hx of      Cerebrovascular Disease No family hx of      Breast Cancer No family hx of      Prostate Cancer No family hx of      Colon Cancer No family hx of      Asthma No family hx of      Anesthesia Reaction No family hx of      Genetic Disorder No family hx of        Review of Systems   The 10 point Review of Systems is negative other than noted in the HPI.    Physical Exam   Temp: 98.4  F (36.9  C) Temp src: Oral BP: 99/60 Pulse: 100 Heart Rate: 95 Resp: 16 SpO2: 99 % O2 Device: Nasal cannula Oxygen Delivery: 2 LPM  Vital Signs with Ranges  Temp:  [98.4  F (36.9  C)-100.8  F (38.2  C)] 98.4  F (36.9  C)  Pulse:  [] 100  Heart Rate:  [] 95  Resp:  [16-18] 16  BP: ()/(36-84) 99/60  SpO2:  [90 %-100 %] 99 %  100 lbs 15.53 oz      Awake, alert, somewhat frail-appearing asthenic woman lying in bed on medical wards.  Speech is clear.  Oriented x3.  Pleasant and interactive.  Oriented x3.  Not distractible.    HEENT: Pupils equal, conjugate. No icterus  or nystagmus. Oral mucosa minimally dry. No facial asymmetry.   Neck: Supple, jugular veins flat. Trachea midline   Chest: No chest wall movement asymmetry. Aeration preserved to bases. Accessory muscles not in use. Expiratory time not increased.  No egophony.  No dullness to percussion.  No tidal wheezes.  No wheeze on forced expiratory maneuver.  Few scattered rhonchi.  Fine to medium early to mid inspiratory crackles left greater than right but generally diffuse.   Cardiac: PMI not displaced. S1, S2 unremarkable. No S3, S4. P2 not accentuated.  1/6 midsystolic murmur at left sternal border with minimal radiation to carotids. Carotid upstroke preserved. Carotid amplitude preserved.   Abdomen: Soft. No palpation or percussion tenderness. No distention. Normoactive bowel sounds. Liver and spleen not increased in size. No bruits, masses, or pulsations.   Extremities: No lower extremity edema.  Warm distally.  Scattered nonpalpable eccymoses, no clubbing.   Neurologic: Mental state above. Motor 5/5 and bilaterally equal. Tone preserved. No fasiculations or tremors. Sensation intact to light touch. DTR 2/4 and bilaterally equal.   Data   Data reviewed today:  I personally reviewed Chest radiograph reviewed with findings as above.  Increased interstitial markings.  Basilar scattered airspace infiltrates.  No air bronchograms.  EKG shows atrial fibrillation.  Recent Labs   Lab 08/02/20  1148 08/02/20  1050 07/27/20   WBC  --  19.2*  --    HGB  --  11.7  --    MCV  --  91  --    PLT  --  520*  --    INR 4.19*  --  4.2*   NA  --  131*  --    POTASSIUM  --  5.3  --    CHLORIDE  --  97  --    CO2  --  28  --    BUN  --  20  --    CR  --  0.67  --    ANIONGAP  --  6  --    LOIS  --  8.4*  --    GLC  --  164*  --    ALBUMIN  --  2.4*  --    PROTTOTAL  --  7.4  --    BILITOTAL  --  0.7  --    ALKPHOS  --  187*  --    ALT  --  20  --    AST  --  43  --    TROPI  --  <0.015  --        Lactic Acid   Date Value Ref Range Status    08/02/2020 2.0 0.7 - 2.0 mmol/L Final   05/15/2020 1.1 0.7 - 2.0 mmol/L Final   05/07/2020 0.7 0.7 - 2.0 mmol/L Final       Recent Results (from the past 24 hour(s))   XR Chest Port 1 View    Narrative    PROCEDURE:  XR CHEST PORT 1 VW    HISTORY: Shortness of breath. .    COMPARISON:  5/15/2020, 7/1/2020.    FINDINGS:    The cardiomediastinal contours are stable.  Diffuse ill-defined and interstitial opacities are superimposed upon  chronic fibrosis. No significant effusion is seen. No pneumothorax is  present.  The bones are osteoporotic. There is a fracture of the distal right  clavicle, identified on 7/1/2020.      Impression    IMPRESSION:  Pulmonary fibrosis with superimposed patchy ill-defined  opacities suggests multifocal pneumonia. Edema or worsening fibrosis  are also in the differential. Recommend follow-up.      JOELLE SMITH MD

## 2020-08-02 NOTE — ED PROVIDER NOTES
History   No chief complaint on file.    HPI  Michael Christiansen is a 84 year old female who presented to the emergency department with complaints of generalized weakness for the last 1 week.  Patient feels tired all the time and sleepy especially after physical therapy.  She was started on Keflex for UTI 2 days ago but her symptoms are not improved.  Noted to have a fever upon arrival to the ED with a temperature 100.8  F.  Original complaint prior to arrival to the ED was generalized abdominal pain and shortness of breath but abdominal pain had resolved by the time patient was being evaluated at the ED without any interventions.  She has been having generalized ill health for several weeks now.  She is a resident of a nursing home and had right hip arthroplasty recently.  She also has a history of chronic kidney injury, coronary artery disease, moderate pulmonary hypertension, interstitial lung disease, malignant neoplasm of the right breast, permanent atrial fibrillation (currently anticoagulated) amongst others.    Allergies:  Allergies   Allergen Reactions     Atenolol Shortness Of Breath and Other (See Comments)     Dizziness  Severe vertigo and dizziness/SOB     Lisinopril Cough     Augmentin Diarrhea     Pollen Extract      Other reaction(s): Runny Nose       Problem List:    Patient Active Problem List    Diagnosis Date Noted     Pyelonephritis 08/02/2020     Priority: Medium     Nursing Home Visit 07/09/2020     Priority: Medium     Urinary tract infection associated with indwelling urethral catheter (H) 07/07/2020     Priority: Medium     Closed right hip fracture (H) 07/02/2020     Priority: Medium     Fall from standing, initial encounter 07/02/2020     Priority: Medium     Fracture of acromial end of right clavicle 07/02/2020     Priority: Medium     BARRERA (acute kidney injury) (H) 05/15/2020     Priority: Medium     Syncope and collapse 05/15/2020     Priority: Medium     Status post transcatheter  aortic valve replacement 05/12/2020     Priority: Medium     Coronary artery disease involving native coronary artery of native heart without angina pectoris 05/04/2020     Priority: Medium     Left-sided carotid artery disease (H) 05/04/2020     Priority: Medium     Dyslipidemia 04/29/2020     Priority: Medium     Moderate pulmonary arterial systolic hypertension (H) 04/29/2020     Priority: Medium     Status post coronary angiogram 03/06/2020     Priority: Medium     Other ill-defined heart diseases 02/17/2020     Priority: Medium     Added automatically from request for surgery [id number]       Severe aortic stenosis 01/03/2020     Priority: Medium     Left upper quadrant pain 10/22/2019     Priority: Medium     Added automatically from request for surgery [id number]       ILD (interstitial lung disease) (H) 10/21/2019     Priority: Medium     Chronic, continuous use of opioids 09/23/2019     Priority: Medium     Hx of rheumatic fever 06/19/2019     Priority: Medium     Protein-calorie malnutrition (H) 06/06/2019     Priority: Medium     Valvular heart disease 06/06/2019     Priority: Medium     Unexplained weight loss 06/06/2019     Priority: Medium     LISA (generalized anxiety disorder) 11/26/2018     Priority: Medium     Anticoagulated on Coumadin 06/13/2018     Priority: Medium     Pulmonary hypertension-moderate 12/20/2017     Priority: Medium     Moderate tricuspid insufficiency 12/20/2017     Priority: Medium     Malignant neoplasm of right breast in female, estrogen receptor positive, unspecified site of breast (H) 08/02/2017     Priority: Medium     Osteoporosis 01/26/2017     Priority: Medium     Long-term (current) use of anticoagulants [Z79.01] 07/27/2016     Priority: Medium     History of total knee replacement 01/26/2015     Priority: Medium     Chronic pain syndrome 01/23/2015     Priority: Medium     Acute blood loss as cause of postoperative anemia 07/19/2013     Priority: Medium     Permanent atrial  fibrillation (H) 05/01/2013     Priority: Medium     Overview:   Chronic        Essential hypertension 03/25/2013     Priority: Medium     Osteoarthritis 03/25/2013     Priority: Medium     Health care directive on file 07/02/2012     Priority: Medium     Duplicate ACP problems. Resolving this one. Kerry Pichardo RN, System Director ACP-[facility]oring Choices          Lumbar pain with radiation down right leg 07/05/2011     Priority: Medium     Overview:   IMO Update 10/11       Lumbar spinal stenosis 07/05/2011     Priority: Medium     Radiculitis 06/16/2011     Priority: Medium     Overview:   IMO Update 10/11       Osteoarthritis of knee 09/16/2010     Priority: Medium     Overview:   IMO Update 10/11          Past Medical History:    Past Medical History:   Diagnosis Date     Arthritis      Backache, unspecified 1/1/2011     Breast cancer, stage 1 3/19/2010     Chronic pain syndrome 1/23/2015     Claustrophobia 1/1/2011     Hip joint replacement by other means 1/1/2011     Osteoarthrosis, unspecified whether generalized or localized, lower leg 8/14/2006     Posttraumatic stress disorder 1/1/2011       Past Surgical History:    Past Surgical History:   Procedure Laterality Date     BREAST SURGERY      right  side mastectomy     C TOTAL KNEE ARTHROPLASTY Left 01/26/15     cataract extraction and lens implantation       COLONOSCOPY       CV CORONARY ANGIOGRAM N/A 3/6/2020    Procedure: CV CORONARY ANGIOGRAM;  Surgeon: Skyler Shepherd MD;  Location:  [facility]     CV RIGHT HEART CATH N/A 3/6/2020    Procedure: CV RIGHT HEART CATH;  Surgeon: Skyler Shepherd MD;  Location: [facility] CARDIAC CATH LAB     ESOPHAGOSCOPY, GASTROSCOPY, DUODENOSCOPY (EGD), COMBINED N/A 11/1/2019    Procedure: UPPER ENDOSCOPY, WITH BIOPSY;  Surgeon: Fab Carroll MD;  Location: [facility] OR     EYE SURGERY       GYN SURGERY      tubal pregnancy     ORTHOPEDIC SURGERY  2009    l. hip replacement LT     ORTHOPEDIC SURGERY  july 19th  2013    Right total knee     TUBAL/ECTOPIC PREGNANCY         Family History:    Family History   Problem Relation Age of Onset     Diabetes Father 75        cause of death     Other - See Comments Father         PVD     Heart Disease Sister      Coronary Artery Disease Sister      Other - See Comments Mother 83        old age; cause of death     Other - See Comments Sister         14 year twin [employer]; cause of death     Chronic Obstructive Pulmonary Disease Daughter      Osteoporosis Daughter      Thyroid Disease Daughter      Cancer Sister      Other Cancer Sister      Other Cancer Sister         uterine     Hypertension No family hx of      Hyperlipidemia No family hx of      Cerebrovascular Disease No family hx of      Breast Cancer No family hx of      Prostate Cancer No family hx of      Colon Cancer No family hx of      Asthma No family hx of      Anesthesia Reaction No family hx of      Genetic Disorder No family hx of        Social History:  Marital Status:   [5]  Social History     Tobacco Use     Smoking status: Former Smoker     Packs/day: 0.30     Years: 30.00     Pack years: 9.00     Types: Cigarettes     Last attempt to quit: 1987     Years since quittin.0     Smokeless tobacco: Never Used     Tobacco comment: year quit    Substance Use Topics     Alcohol use: No     Drug use: No        Medications:    No current outpatient medications on file.        Review of Systems   Constitutional: Positive for fever.   HENT: Negative for congestion.    Eyes: Negative for redness.   Respiratory: Positive for shortness of breath.    Cardiovascular: Negative for chest pain.   Gastrointestinal: Positive for abdominal pain.   Genitourinary: Negative for difficulty urinating.   Musculoskeletal: Negative for arthralgias and neck stiffness.   Skin: Negative for color change.   Neurological: Negative for headaches.   Psychiatric/Behavioral: Negative for confusion.   All other systems  "reviewed and are negative.      Physical Exam   BP: 101/74  Pulse: (!) 142  Heart Rate: (!) 146  Temp: 100.8  F (38.2  C)  Resp: 18  Height: 162.6 cm (5' 4\")  Weight: 47.5 kg (104 lb 11.2 oz)  SpO2: (!) 90 %      Physical Exam  Vitals signs and nursing note reviewed.   Constitutional:       General: She is not in acute distress.     Appearance: Normal appearance. She is not diaphoretic.   HENT:      Head: Normocephalic and atraumatic.      Mouth/Throat:      Pharynx: No oropharyngeal exudate.   Eyes:      General: No scleral icterus.     Pupils: Pupils are equal, round, and reactive to light.   Cardiovascular:      Heart sounds: Normal heart sounds.   Pulmonary:      Effort: No respiratory distress.      Breath sounds: Normal breath sounds.   Abdominal:      General: Bowel sounds are normal.      Palpations: Abdomen is soft.      Tenderness: There is no abdominal tenderness.   Musculoskeletal:         General: No tenderness.   Skin:     General: Skin is warm.   Neurological:      Mental Status: She is alert.         ED Course   Evaluated upon arrival in the ED and laboratory tests ordered.  Started on IV fluid hydration-sepsis protocol.  The heart rate improved from 130 bpm to 96 bpm after a liter of normal saline.  Blood pressure also normalized from 80/40 mmHg to 105/60 mmHg.  IV Zosyn and vancomycin started after blood cultures were drawn.    Procedures         EKG Interpretation:      Interpreted by Brian Dhaliwal MD  Time reviewed: 10:35 AM  Symptoms at time of EKG: Shortness of breath  Rhythm: Atrial fibrillation  Rate: 130 bpm.  Axis: normal  Ectopy: none  Conduction: normal  ST Segments/ T Waves: No ST-T wave changes  Q Waves: none  Comparison to prior: No old EKG available    Clinical Impression: Atrial fibrillation with RVR at 130 bpm      Results for orders placed or performed during the hospital encounter of 08/02/20 (from the past 24 hour(s))   Blood gas venous and oxyhgb   Result Value Ref Range    " Ph Venous 7.42 7.32 - 7.43 pH    PCO2 Venous 44 40 - 50 mm Hg    PO2 Venous 58 (H) 25 - 47 mm Hg    Bicarbonate Venous 29 (H) 21 - 28 mmol/L    FIO2 UNKNOWN     Oxyhemoglobin Venous 87 %    Base Excess Venous 3.6 mmol/L   TSH with free T4 reflex   Result Value Ref Range    TSH 0.81 0.40 - 4.00 mU/L   Troponin I   Result Value Ref Range    Troponin I ES <0.015 0.000 - 0.045 ug/L   Magnesium   Result Value Ref Range    Magnesium 2.0 1.6 - 2.3 mg/dL   Comprehensive metabolic panel   Result Value Ref Range    Sodium 131 (L) 133 - 144 mmol/L    Potassium 5.3 3.4 - 5.3 mmol/L    Chloride 97 94 - 109 mmol/L    Carbon Dioxide 28 20 - 32 mmol/L    Anion Gap 6 3 - 14 mmol/L    Glucose 164 (H) 70 - 99 mg/dL    Urea Nitrogen 20 7 - 30 mg/dL    Creatinine 0.67 0.52 - 1.04 mg/dL    GFR Estimate 80 >60 mL/min/[1.73_m2]    GFR Estimate If Black >90 >60 mL/min/[1.73_m2]    Calcium 8.4 (L) 8.5 - 10.1 mg/dL    Bilirubin Total 0.7 0.2 - 1.3 mg/dL    Albumin 2.4 (L) 3.4 - 5.0 g/dL    Protein Total 7.4 6.8 - 8.8 g/dL    Alkaline Phosphatase 187 (H) 40 - 150 U/L    ALT 20 0 - 50 U/L    AST 43 0 - 45 U/L   CBC with platelets differential   Result Value Ref Range    WBC 19.2 (H) 4.0 - 11.0 10e9/L    RBC Count 3.78 (L) 3.8 - 5.2 10e12/L    Hemoglobin 11.7 11.7 - 15.7 g/dL    Hematocrit 34.5 (L) 35.0 - 47.0 %    MCV 91 78 - 100 fl    MCH 31.0 26.5 - 33.0 pg    MCHC 33.9 31.5 - 36.5 g/dL    RDW 16.7 (H) 10.0 - 15.0 %    Platelet Count 520 (H) 150 - 450 10e9/L    Diff Method Automated Method     % Neutrophils 90.1 %    % Lymphocytes 3.6 %    % Monocytes 4.8 %    % Eosinophils 0.1 %    % Basophils 0.4 %    % Immature Granulocytes 1.0 %    Nucleated RBCs 0 0 /100    Absolute Neutrophil 17.3 (H) 1.6 - 8.3 10e9/L    Absolute Lymphocytes 0.7 (L) 0.8 - 5.3 10e9/L    Absolute Monocytes 0.9 0.0 - 1.3 10e9/L    Absolute Eosinophils 0.0 0.0 - 0.7 10e9/L    Absolute Basophils 0.1 0.0 - 0.2 10e9/L    Abs Immature Granulocytes 0.2 0 - 0.4 10e9/L    Absolute  Nucleated RBC 0.0    Lactic acid whole blood   Result Value Ref Range    Lactic Acid 2.0 0.7 - 2.0 mmol/L   Nt probnp inpatient   Result Value Ref Range    N-Terminal Pro BNP Inpatient 9,697 (H) 0 - 1,800 pg/mL   Symptomatic COVID-19 Virus (Coronavirus) by PCR    Specimen: Nasopharyngeal   Result Value Ref Range    COVID-19 Virus PCR to [facility] - Source Nasopharyngeal     COVID-19 Virus PCR to [facility] - Result       Test received-See reflex to [facility] test SARS CoV2 (COVID-19) Virus RT-PCR   SARS-CoV-2 COVID-19 Virus (Coronavirus) RT-PCR Nasopharyngeal    Specimen: Nasopharyngeal   Result Value Ref Range    SARS-CoV-2 Virus Specimen Source Nasopharyngeal     SARS-CoV-2 PCR Result NEGATIVE     SARS-CoV-2 PCR Comment       Testing was performed using the Xpert Xpress SARS-CoV-2 Assay on the Cepheid Gene-Xpert   Instrument Systems. Additional information about this Emergency Use Authorization (EUA)   assay can be found via the Lab Guide.     XR Chest Port 1 View    Narrative    PROCEDURE:  XR CHEST PORT 1 VW    HISTORY: Shortness of breath. .    COMPARISON:  5/15/2020, 7/1/2020.    FINDINGS:    The cardiomediastinal contours are stable.  Diffuse ill-defined and interstitial opacities are superimposed upon  chronic fibrosis. No significant effusion is seen. No pneumothorax is  present.  The bones are osteoporotic. There is a fracture of the distal right  clavicle, identified on 7/1/2020.      Impression    IMPRESSION:  Pulmonary fibrosis with superimposed patchy ill-defined  opacities suggests multifocal pneumonia. Edema or worsening fibrosis  are also in the differential. Recommend follow-up.      JOELLE SMITH MD   UA with Microscopic   Result Value Ref Range    Color Urine Yellow     Appearance Urine Cloudy     Glucose Urine Negative NEG^Negative mg/dL    Bilirubin Urine Negative NEG^Negative    Ketones Urine Negative NEG^Negative mg/dL    Specific Gravity Urine 1.016 1.003 - 1.035    Blood Urine Moderate  (A) NEG^Negative    pH Urine 6.5 4.7 - 8.0 pH    Protein Albumin Urine 30 (A) NEG^Negative mg/dL    Urobilinogen mg/dL Normal 0.0 - 2.0 mg/dL    Nitrite Urine Negative NEG^Negative    Leukocyte Esterase Urine Large (A) NEG^Negative    Source Catheterized Urine     WBC Urine >182 (H) 0 - 5 /HPF    RBC Urine 68 (H) 0 - 2 /HPF    WBC Clumps Present (A) NEG^Negative /HPF    Bacteria Urine Many (A) NEG^Negative /HPF   INR   Result Value Ref Range    INR 4.19 (H) 0.86 - 1.14   Reticulocyte Count   Result Value Ref Range    % Retic 2.7 (H) 0.5 - 2.0 %    Absolute Retic 102.6 (H) 25 - 95 10e9/L   Procalcitonin   Result Value Ref Range    Procalcitonin 0.72 ng/ml       Medications   sodium chloride (PF) 0.9% PF flush 3 mL (has no administration in time range)   sodium chloride (PF) 0.9% PF flush 3 mL (3 mLs Intracatheter Not Given 8/2/20 1446)   piperacillin-tazobactam (ZOSYN) infusion 3.375 g (has no administration in time range)   acetaminophen (TYLENOL) tablet 325-650 mg (has no administration in time range)   metoprolol tartrate (LOPRESSOR) half-tab 12.5 mg (has no administration in time range)   omeprazole (priLOSEC) CR capsule 20 mg (has no administration in time range)   oxyCODONE (ROXICODONE) tablet 5 mg (has no administration in time range)   senna-docusate (SENOKOT-S/PERICOLACE) 8.6-50 MG per tablet 1 tablet (has no administration in time range)   lidocaine 1 % 0.1-1 mL (has no administration in time range)   lidocaine (LMX4) kit (has no administration in time range)   sodium chloride (PF) 0.9% PF flush 3 mL (has no administration in time range)   sodium chloride (PF) 0.9% PF flush 3 mL (3 mLs Intracatheter Not Given 8/2/20 1541)   naloxone (NARCAN) injection 0.1-0.4 mg (has no administration in time range)   melatonin tablet 1 mg (has no administration in time range)   Patient is already receiving anticoagulation with heparin, enoxaparin (LOVENOX), warfarin (COUMADIN)  or other anticoagulant medication (has no  administration in time range)   warfarin-No DOSE today (has no administration in time range)   Warfarin Therapy Reminder (Check START DATE - warfarin may be starting in the FUTURE) (has no administration in time range)   sodium chloride 0.9% infusion ( Intravenous New Bag 8/2/20 1444)   potassium chloride ER (KLOR-CON M) CR tablet 20-40 mEq (has no administration in time range)   potassium chloride (KLOR-CON) Packet 20-40 mEq (has no administration in time range)   potassium chloride 10 mEq in 100 mL sterile water intermittent infusion (premix) (has no administration in time range)   potassium chloride 10 mEq in 100 mL intermittent infusion with 10 mg lidocaine (has no administration in time range)   magnesium sulfate 4 g in 100 mL sterile water (premade) (has no administration in time range)   morphine (PF) injection 1 mg (has no administration in time range)   magnesium hydroxide (MILK OF MAGNESIA) suspension 30 mL (has no administration in time range)   ondansetron (ZOFRAN-ODT) ODT tab 4 mg (has no administration in time range)     Or   ondansetron (ZOFRAN) injection 4 mg (has no administration in time range)   vancomycin (VANCOCIN) 750 mg in sodium chloride 0.9 % 250 mL intermittent infusion (has no administration in time range)   0.9% sodium chloride BOLUS (0 mLs Intravenous Stopped 8/2/20 1225)   piperacillin-tazobactam (ZOSYN) infusion 3.375 g (0 g Intravenous Stopped 8/2/20 1155)   vancomycin (VANCOCIN) 1000 mg in dextrose 5% 200 mL PREMIX (1,000 mg Intravenous New Bag 8/2/20 1222)       Assessments & Plan (with Medical Decision Making)   Sepsis secondary to acute cystitis:  Multifocal pneumonia:  Patient presented to the ED via EMS with complaints of abdominal pain.  Her symptoms had resolved by the time she got to the ED.  She was however noted to be febrile at 100.8  F.  Urinalysis was consistent with acute cystitis and CBC showed significant leukocytosis which is not unusual in this patient was a  persistent leukocytosis for several months without a definite diagnosis.  Peripheral smear and reticulocyte count were ordered and this may need further evaluation to rule out myeloproliferative disorder.  Patient was started on IV Zosyn and vancomycin after blood cultures were drawn.  Chest x-ray showed suspicion of multifocal pneumonia.  COVID with swab taken and patient admitted under care of Dr. Reyna.  I have reviewed the nursing notes.    I have reviewed the findings, diagnosis, plan and need for follow up with the patient.    Current Discharge Medication List          Final diagnoses:   Multifocal pneumonia   Acute cystitis with hematuria   Sepsis without acute organ dysfunction, due to unspecified organism (H)       8/2/2020   [facility] EM[facility]     rBian Dhaliwal MD  08/02/20 9036

## 2020-08-02 NOTE — ED NOTES
Report called to acute care rn.  All belongings sent up.  Iv patent and infusing.  Vitals stable.

## 2020-08-02 NOTE — PLAN OF CARE
[facility] Inpatient Admission Note:    Patient admitted to 3212/3212-1 at approximately 1245 via cart accompanied by transport tech and daughter from emergency room . Report received from Lana in SBAR format at 1234 via telephone. Patient transferred to bed via self.. Patient is alert and oriented X 4, denies pain; rates at 0 on 0-10 scale.  Patient oriented to room, unit, hourly rounding, and plan of care. Explained admission packet and patient handbook with patient bill of rights brochure. Will continue to monitor and document as needed.     Inpatient Nursing criteria listed below was met:    Health care directives status obtained and documented: Yes    Care Everywhere authorization obtained No    MRSA swab completed for patient 65 years and older: n/a    Patient identifies a surrogate decision maker: Yes If yes, who:Mimi [id number]      If initial lactic acid >2.0, repeat lactic acid drawn within one hour of arrival to unit: NA. If no, state reason: n/a    Vaccination assessment and education completed: Yes   Vaccinations received prior to admission: Pneumovax yes  Influenza(seasonal)  YES   Vaccination(s) ordered: n/a    Clergy visit ordered if patient requests: No    Skin issues/needs documented: Yes    Isolation Patient: yes Education given, correct sign in place and documentation row added to PCS:  Yes    Fall Prevention Yes: Care plan updated, education given and documented, sticker and magnet in place: Yes    Care Plan initiated: Yes    Education Documented (including assessment): Yes    Patient has discharge needs : No If yes, please explain:

## 2020-08-02 NOTE — PHARMACY-VANCOMYCIN DOSING SERVICE
Pharmacy Vancomycin Initial Note  Date of Service 2020  Patient's  1935  84 year old, female    Indication: [facility] Pneumonia    Current estimated CrCl = Estimated Creatinine Clearance: 45.2 mL/min (based on SCr of 0.67 mg/dL).    Creatinine for last 3 days  2020: 10:50 AM Creatinine 0.67 mg/dL    Recent Vancomycin Level(s) for last 3 days  No results found for requested labs within last 72 hours.      Vancomycin IV Administrations (past 72 hours)                   vancomycin (VANCOCIN) 1000 mg in dextrose 5% 200 mL PREMIX (mg) 1,000 mg New Bag 20 1222                Nephrotoxins and other renal medications (From now, onward)    Start     Dose/Rate Route Frequency Ordered Stop    20 1200  vancomycin (VANCOCIN) 750 mg in sodium chloride 0.9 % 250 mL intermittent infusion      750 mg  over 90 Minutes Intravenous EVERY 24 HOURS 20 1534      20 1800  piperacillin-tazobactam (ZOSYN) infusion 3.375 g      3.375 g  over 30 Minutes Intravenous EVERY 6 HOURS 20 1349            Contrast Orders - past 72 hours (72h ago, onward)    None                Plan:  1.  Start vancomycin  750 mg IV q24h. Received ED dose of 1000mg 2020  2.  Goal Trough Level: 10-15 mg/L   3.  Pharmacy will check trough levels as appropriate in 1-3 Days.    4. Serum creatinine levels will be ordered daily for the first week of therapy and at least twice weekly for subsequent weeks.    5. [place] method utilized to dose vancomycin therapy: Method 1 and method 2.    [facility]

## 2020-08-02 NOTE — PHARMACY
Pharmacy received consult to dose vancomycin for ED for one time dosing.  Indication Sepsis.  Dosed at  20 mg/kg using a weight ot 47.5 kg. Which gave a dose of 1000 mg.  Please order another pharmacy to dose vanco consult if further vancomycin is needed. Thank you.  -AG, August 2, 2020

## 2020-08-03 NOTE — PROVIDER NOTIFICATION
Notified Dr. Reyna of SIRS alert popup window d/t WBCs and increased HR. Does not want lactic, already treating with abx and intermittent increased HR d/t a fib.

## 2020-08-03 NOTE — PLAN OF CARE
A/O. VSS afebrile. A fib 80s-110s per telemetry. Requiring 3L O2/ nasal cannula. Some dyspnea on exertion. NS at 50ml/hr and started primaxin abx this AM. Tolerates diet, encouraging food/fluids. Declines pain medications. Ambulates in rodriguez SBA and walker. Daughter here to visit today. Makes needs known.

## 2020-08-03 NOTE — PROVIDER NOTIFICATION
DATE:  8/3/2020   TIME OF RECEIPT FROM LAB:  0512  LAB TEST:  Blood culture  LAB VALUE:  !st bottle of 1st set Gram negative rods  RESULTS GIVEN WITH READ-BACK TO (PROVIDER):  Dr. Moy   TIME LAB VALUE REPORTED TO PROVIDER:   0514

## 2020-08-03 NOTE — PROGRESS NOTES
"CLINICAL NUTRITION SERVICES  -  ASSESSMENT NOTE      Michael Christiansen : Admission Nutrition Risk Screen - unintentional weight loss    84 yof admitted for pyelonephritis. Pt has a hx of osteoarthritis, previous breast cancer, and atrial fibrillation. Pt has been at [facility] for about the past month. Usually has a good appetite, but it has been poor for several days before coming to the hospital. Pt has gained weight since May 2020 - about 5lbs. Usual weight has been around 95lb for the past year.    Diet Order: Regular  Intake: 50-75% x 3 meals documented    Height: 5' 4\"  Weight: 103 lbs 2.8 oz  Body mass index is 17.71 kg/m .  Weight Status:  Underweight BMI <18.5  IBW: 54.7 kg   Weight History:   Wt Readings from Last 10 Encounters:   08/03/20 08/02/20 46.8 kg (103 lb 2.8 oz)  45.8 kg (100 lb 15.5 oz)   07/01/20 40.8 kg (90 lb)   05/15/20 42.6 kg (94 lb)   05/12/20 42.9 kg (94 lb 9.6 oz)   03/16/20 43.1 kg (95 lb 1.6 oz)   03/13/20 45.8 kg (101 lb)   03/05/20 42.3 kg (93 lb 3.2 oz)   01/24/20 42.8 kg (94 lb 6.4 oz)   01/03/20 43.5 kg (96 lb)   10/30/19 43.1 kg (95 lb)       Estimated Energy Needs: 2805-9251 kcals (30-35 Kcal/Kg)   Estimated Protein Needs: 55-70 grams protein (1.2-1.5 g pro/Kg)    Malnutrition Diagnosis: Patient does not meet two of the criteria necessary for diagnosing malnutrition at this time. Usual weight has been around 95lb for the past year.    NUTRITION DIAGNOSIS:  Underweight related to advanced age, low oral intake as evidenced by BMI 17.7    NUTRITION RECOMMENDATIONS  - Encourage intake. May need to add nutrition supplement if intake/appetite does not improve in the next few days.    MONITORING AND EVALUATION:  RD will monitor intake, weight, labs          "

## 2020-08-03 NOTE — PROVIDER NOTIFICATION
Notified Dr. Reyna of 98/50 BP HR 80s-110s. Changing metoprolol dose back to 12.5mg BID from 25mg BID.

## 2020-08-03 NOTE — PROGRESS NOTES
[facility]    Hospitalist Progress Note    Date of Service (when I saw the patient): 08/03/2020    Assessment & Plan   Michael Christiansen is a 84 year old  woman who was admitted on 8/2/2020. She presented from her nursing home noting most recently weakness and fatigue worse after physical therapy sessions for the past week.  Over this period of time she is also had urinary frequency with minimal dysuria.  Interval history is significant for TAVR 5/5/2020 placed after an admission at [facility] with syncope although she had been undergoing extensive evaluation for this over a longer period of time during which she had intermittently had orthostatic symptoms.  During that admission she required medication reduction because of persistent hypotension or orthostasis.  She presented again 5/15 with an episode of syncope evaluation showed new intermittent left bundle branch block.  Echocardiogram was unrevealing showing adequate AVR function.  7/3 she experienced a fall from standing height resulting in a right intertrochanteric and proximal femur fracture as well as proximal right clavicular fracture.  She underwent ORIF.  Records indicate she developed catheter related cystitis.  She was treated with ciprofloxacin.  Subsequently cultures showed a significant titer of ESBL E. coli sensitive to piperacillin/tazobactam.  Because of her symptoms over the past week approximately 2 days ago she was preemptively begun with a course of oral cephalexin.  In emergency department she initially was markedly tachycardic (chronic atrial fibrillation).  She improved with intravenous crystalloid (approximately 20 mL/kg) and symptomatically felt somewhat improved.  Other evaluation included chest radiograph suggestive of diffuse patchy infiltrates although somewhat difficult to interpret in the context of her established interstitial lung disease.  She is admitted for further evaluation and management.     1.     Acute/subacute pyelonephritis  Bacteremia with gram-negative rods as well as urine culture now approximately 48 hours showing ESBL E. coli.  Al Hedrick disc sensitivity to piperacillin/tazobactam as well as carbopenems.  Former may not be reliable depending on the characteristics of the ESBL resistance.  For the time being well changed to carbopenem using imipenem.  Depending on ID and sensitivity and blood if she needs prolonged IV antibiotics transition to early depend on what allow daily dosing and outpatient treatment.  Certainly with bacteremia would regard this as pyelonephritis.  She did present with fever of 38.2 Celsius. She is not had a milo fever since but has shown temperature elevation in the last 12 hours.  As below she has had a somewhat persistent leukocytosis since early July.  Difficult to separate from chronic lumbar pain but likely left CVA tenderness which is now improved this morning.  This may simply be a matter of incomplete or inadequate treatment of her urinary tract infection initially cystitis and now best regarded as pyelonephritis.      She is quite clear as is her daughter that initially after her most recent hospitalization she did feel improved briefly although it does appear that her urinary frequency may be of longer duration than simply several days.   As noted cultures obtained at [facility] early in July did show a significant titer of ESBL E. coli relatively, as expected, resistant but sensitive to piperacillin/tazobactam. No sensitivity to carbopenem was evaluated.  We will continue treatment with this which would also address any possible lower respiratory tract infection.    2.  Volume depletion  Resolved. Continue low dose IV fluid; discontinue if still good oral intake in another few hours.  Presented with her known chronic atrial fibrillation and rapid ventricular response.  She responded rapidly to a modest volume of isotonic crystalloid.  She does not meet clear  criteria for sepsis and in any case would be inclined to avoid even minimally excessive fluid.  During admission at [place] for evaluation and treatment of aortic stenosis and syncope while she had issues with excessive lowering of blood pressure with agents such as losartan and diltiazem she also had episodes of volume overload requiring transient diuresis and therefore even minimally excessive fluid is likely to be deleterious.  At the present time she appears nearly euvolemic.  Continue a low dose of isotonic crystalloid with saline as she will require multiple doses of antibiotics making low chloride solution such as lactated Ringer's difficult.  3.  Chronic atrial fibrillation with transient rapid ventricular response  Transient episode of elevated ventricular response last night responsive to low dose IV metoprolol. Likely no dose orally yesterday. Continue her relatively low dose of oral metoprolol with PRN IV for sustained increased ventricular response. Concern in the past regarding tachyarrhythmias associated with episodes of syncope although that is not been borne out with outpatient cardiac monitoring.  Telemetry monitoring at present.  Continue her chronic regimen including low-dose of metoprolol as she has been intolerant of any increase.  Treatment of her volume status as above.  She is chronic anticoagulated.  4.  Chronic anticoagulation  Relatively elevated INR still approximately 4.  Likely related to poor oral intake in the past several days.  Withhold warfarin at present.  Goal INR 2-3.  5.  Possible lower respiratory tract infection  At a minimum doubt any significant staphylococcal infection. Discontinue vancomycin. Chronic increase in interstitial markings which are present.  In fact difficult to be certain that there is in fact any acute increase in airspace infiltrates.  Relatively tenuous woman at high risk of decompensation.  She has had relatively recent endoluminal aortic valve  replacement and therefore is at higher risk of endovascular infection.  All of these factors suggest that at least initial preemptive antibiotics are not unreasonable.    6.  Nutritional status  Good oral intake until several days ago.  Hopeful that with treatment of infection her appetite will improve.  Somewhat asthenic.  Is difficult to assess weights given her multiple hospitalization and acute illnesses recently.  Dietitian consult may be helpful.  Will defer addition of any supplements until her oral intake is better established.  7.  Hyponatremia  This is been present for an extended period of time with sodiums usually in a range of approximately 130.  Likely multiple fluid factorial.  May represent myocardial dysfunction.  For the time being avoid excessive free water and monitor her response.  8.  Leukocytosis  As above, likely related to subacute urinary infection.This is been noted to be present at least since early July.  Peripheral smear requested in emergency department and reasonable to evaluate that although it is unlikely to show a specific diagnosis.  She also shows a mild thrombocytosis which also may reflect an subacute inflammatory process.    Active Problems:    Pyelonephritis       DVT Prophylaxis: Warfarin  Code Status: Full Code    Disposition: Expected discharge in 2-3 days depending on her course.  Tarik Rodriguez    Interval History   Awake, alert. Bright affect. No dyspnea. Good appetite. Transient episode of high ventricular response last night. Feels well.    -Data reviewed today: I reviewed all new labs and imaging results over the last 24 hours. I personally reviewed     Peripheral IV 08/02/20 Left Lower forearm (Active)   Site Assessment WDL 08/03/20 1024   Line Status Infusing 08/03/20 1024   Phlebitis Scale 0-->no symptoms 08/03/20 1024   Infiltration Scale 0 08/03/20 1024   Number of days: 1       Pressure Injury 08/02/20 Left Coccyx small pea size open area (Active)   Wound  Base Pink;Erythema, blanchable 08/03/20 0900   Anabel-wound Assessment Blanchable erythema 08/03/20 0900   Estimated Circumference ( if not measured dime sized 08/03/20 0900   Drainage Amount None 08/03/20 0900   Dressing Foam 08/03/20 0900   Dressing Status Clean, dry, intact 08/03/20 0900   Number of days: 1     Line/device assessment(s) completed for medical necessity August 3    Physical Exam   Temp: 98.7  F (37.1  C) Temp src: Tympanic BP: 98/50 Pulse: 101 Heart Rate: 101 Resp: 20 SpO2: 95 % O2 Device: Nasal cannula Oxygen Delivery: 3 LPM  Vitals:    08/02/20 1156 08/02/20 1258 08/03/20 0500   Weight: 47.5 kg (104 lb 11.2 oz) 45.8 kg (100 lb 15.5 oz) 46.8 kg (103 lb 2.8 oz)     Vital Signs with Ranges  Temp:  [97.9  F (36.6  C)-100.2  F (37.9  C)] 98.7  F (37.1  C)  Pulse:  [] 101  Heart Rate:  [] 101  Resp:  [16-20] 20  BP: ()/(36-84) 98/50  SpO2:  [78 %-100 %] 95 %  I/O last 3 completed shifts:  In: 2542 [P.O.:440; I.V.:2102]  Out: 450 [Urine:450]    Awake, alert, concentration preserved   HEENT: Pupils equal, conjugate. No icterus or nystagmus. Oral mucosa moist. No facial asymmetry.   Neck: Supple, jugular veins not elevated. Trachea midline   Chest: No chest wall movement asymmetry. Aeration to bases. Accessory muscles not in use. Expiratory time not increased. No tidal wheezes. No rhonchi. Early to mid inspiratory fine to medium crackles bases bilaterally.   Cardiac: PMI not displaced. S1, S2 unremarkable. No S3, S4. P2 not accentuated. No murmurs. Carotid upstroke preserved. Carotid amplitude preserved.   Abdomen: Soft. No palpation or percussion tenderness. No distention. Normoactive bowel sounds. Liver and spleen not increased in size. No bruits, masses, or pulsations. Mininmal-decreased- L CVAT.  Extremities: No lower extremity edema. Warm distally. No eccymoses, clubbing.   Neurologic: Mental state above. Motor 5/5 and bilaterally equal. Tone preserved. No fasiculations or tremors.      Medications     - MEDICATION INSTRUCTIONS -       sodium chloride 50 mL/hr at 08/03/20 0853     Warfarin Therapy Reminder         imipenem-cilastatin (PRIMAXIN) IV  500 mg Intravenous Q6H     metoprolol tartrate  12.5 mg Oral BID     omeprazole  20 mg Oral QAM AC     sodium chloride (PF)  3 mL Intracatheter Q8H     sodium chloride (PF)  3 mL Intracatheter Q8H     warfarin-No DOSE today  1 each Does not apply no dose today (warfarin)           Data   Recent Labs   Lab 08/03/20  0506 08/02/20  1148 08/02/20  1050   WBC 14.9*  --  19.2*   HGB 9.5*  --  11.7   MCV 94  --  91     --  520*   INR 4.28* 4.19*  --      --  131*   POTASSIUM 4.3  --  5.3   CHLORIDE 103  --  97   CO2 27  --  28   BUN 18  --  20   CR 0.62  --  0.67   ANIONGAP 5  --  6   LOIS 7.9*  --  8.4*   *  --  164*   ALBUMIN  --   --  2.4*   PROTTOTAL  --   --  7.4   BILITOTAL  --   --  0.7   ALKPHOS  --   --  187*   ALT  --   --  20   AST  --   --  43   TROPI  --   --  <0.015       Lactic Acid   Date Value Ref Range Status   08/02/2020 2.0 0.7 - 2.0 mmol/L Final   05/15/2020 1.1 0.7 - 2.0 mmol/L Final   05/07/2020 0.7 0.7 - 2.0 mmol/L Final       Recent Results (from the past 24 hour(s))   XR Chest Port 1 View    Narrative    PROCEDURE:  XR CHEST PORT 1 VW    HISTORY: Shortness of breath. .    COMPARISON:  5/15/2020, 7/1/2020.    FINDINGS:    The cardiomediastinal contours are stable.  Diffuse ill-defined and interstitial opacities are superimposed upon  chronic fibrosis. No significant effusion is seen. No pneumothorax is  present.  The bones are osteoporotic. There is a fracture of the distal right  clavicle, identified on 7/1/2020.      Impression    IMPRESSION:  Pulmonary fibrosis with superimposed patchy ill-defined  opacities suggests multifocal pneumonia. Edema or worsening fibrosis  are also in the differential. Recommend follow-up.      JOELLE SMITH MD

## 2020-08-03 NOTE — PROGRESS NOTES
Sepsis Evaluation Progress Note    I was called to see Michael Christiansen due to abnormal vital signs triggering the Sepsis SIRS screening alert. She is known to have an infection.     Physical Exam   Vital Signs:  Temp: 99.5  F (37.5  C) Temp src: Tympanic BP: 145/77 Pulse: 94 Heart Rate: 99 Resp: 18 SpO2: 94 % O2 Device: Nasal cannula Oxygen Delivery: 3 LPM    Lab:  Lactic Acid   Date Value Ref Range Status   08/02/2020 2.0 0.7 - 2.0 mmol/L Final       The patient is at baseline mental status.     The rest of their physical exam is significant for distinct heart tones. S1-S2 irregularly irregular and otherwise unremarkable. Pulmonary exam without interval change. Aeration to bases. Abd soft. Extremities warm distally.     Assessment & Plan   NO EVIDENCE OF SEPSIS at this time.  Vital sign, physical exam, and lab findings are likely due to chronic atrial fibrillation, leukocytosis related to pyelonephritis.    Disposition: The patient will remain on the current unit. We will continue to monitor this patient closely..  Tarik Rodriguez MD    Sepsis Criteria   Sepsis: 2+ SIRS criteria due to infection  Severe Sepsis: Sepsis AND 1+ new sign of acute organ dysfunction (Note: lactate >2 is organ dysfunction)  Septic Shock: Sepsis AND hypotension despite volume resuscitation with 30 ml/kg crystalloid

## 2020-08-03 NOTE — PROGRESS NOTES
Care Transitions focused note:      Spoke with Katlin at [facility] in regards to possibility of once daily IV antibiotics for Michael.  She will discuss with team and advice writer of confirmation, would depend on how administered.  Anticipate via PICC. Updated Dr. Reyna.       Checked in with Michael.  She has been at [facility] for about a month for short term rehab.  Anticipate she will be able to return but this will be dependent on antibiotic needs.  Reviewed IMM letter.

## 2020-08-03 NOTE — PLAN OF CARE
Prior to Admission Medication Reconciliation:     Medications added:   [] None  [x] As listed below:    Sertraline    Coumadin    Multivitamin    Melatonin    Glucosamine    apap 325    Keflex    simvastatin    Medications deleted:   [] None  [x] As listed below:    lovenox    Changes made to existing medications:   [] None  [x] As listed below:    Updated medication frequency and strengths per physician order sheet.     Last times/dates taken verified with patient:  [x] Yes- completed myself: reviewed with Andrew at [facility] over the phone  [] Nurse completed no changes made  [] Unable to review with patient:  [] Nurse completed/changes made:       Allergy modifications:    [x]Did not review  []Patient verified NKA  []Patient verified current existing allergies: no changes made  []New allergies: listed below      Medication reconciliation sources:   []Patient  []Patient family member/emergency contact: **  [] [facility] Report Review  []Epic Chart Review  []Care Everywhere review  []Pharmacy med list: **  []Pharmacy phone call  []Outside meds dispense report  [x]Nursing home or Assisted Living MAR: [facility]    [x]prilosec 20 mg once daily at 1600    [x]Calcium 600 Vit D 200 1 tab BID at 0800 and 2000    [x]Metoprolol tartrate 25 mg 1/2 tab BID at 0800 and 2000    [x]preservision areds 2 1 cap BID at 0800 and 2000    [x]apap 500 mg 2 tabs QID at 0800, 1200, 1600 and 2000    [x]Sertraline 50 mg once daily at 0800    [x]Glucosamine 500 mg once daily at 0800    [x]Simvastatin 20 mg once daily at 0800    [x]Coumadin 5 mg one tab MF 2.5 mg ROW    [x]Multivitamin once daily at 0800    [x]Keflex 500 mg 1 cap BID x 5 days start date 7/31/20    [x]Oxycodone 5 mg 1 tab q4h prn  For pain rated 1-4/10. 2 tabs pain rated 5-10/10    [x]Senna s- 1 tab BID prn    [x]Flexeril 5 mg 1 tab TID prn    [x]Melatonin 5 mg 1 tab at bedtime prn    [x]Apap 325 mg 2 tabs q4h prn (do not exceed 4000 mg/day)      []Other: **    Pharmacy  desired at discharge: [facility]    Is patient on coumadin?  []No  [x]Yes:   Anticoagulation Summary  As of 2020                INR goal:   2.0-3.0   TTR:   41.2 % (1 y)   INR used for dosin.2! (2020)   Warfarin maintenance plan:   5 mg (5 mg x 1) every Mon, Fri; 2.5 mg (5 mg x 0.5) all other days   Full warfarin instructions:   : Hold; : 2.5 mg; Otherwise 5 mg every Mon, Fri; 2.5 mg all other days   Weekly warfarin total:   22.5 mg   Plan last modified:   Kasia Hercules RN (2020)   Next INR check:   8/3/2020   Priority:   High   Target end date:   Indefinite                  Requests for consultation by provider or pharmacist:   [] Patient understands why all of their meds were prescribed and how to take them. No questions.   [x] Fill dates coincide with compliancy for all maintenance meds.   [] Fill dates do not coincide with compliancy with maintenance meds. See notes in PTA medlist about how patient is taking.   [] Patient has questions about the following:        Comments: medications managed by nursing staff at [facility]. No concerns.     Paola Butler on 8/3/2020 at 11:06 AM       Discrepancies: [x] No []Not Applicable []Yes: listed below    Time spent on medication reconciliation:   []5-20 mins  []20-40 mins  [x]> 40 mins    Issues completing PTA medication reconciliation:  [] On hold for a long time  [] Waited for a call back  [] Fax didn't come through  [] Fax took a long time  [x] Other:MAR didn't come through, had to call a 2 nd time. Then snf sent 19 page eMAR with only PRN administrations. Tried to go over on the phone, snf was in a different patient's chart. Had to get another med list, they sent over the physician order sheet with no last times taken. I updated her medlist and then had to call back again for the 5th time and go over each med one by one for last times taken.     Notifying appropriate party of changes/additions/discrepancies:  []No pertinent changes  made, notification not necessary.   [x] Notified attending provider via text page  [] Notified attending provider in person  [] Notified pharmacy  [] Notified nurse  [] Attending provider not available, left detailed notes  [] Changes/additions made don't need provider notification because provider has not seen patient or input any orders  [] Changes/additions made don't need provider notification because changes made are to medications not ordered  Medications Prior to Admission   Medication Sig Dispense Refill Last Dose     acetaminophen (TYLENOL) 325 MG tablet Take 325-650 mg by mouth every 4 hours as needed for mild pain (Do not exceed 4000 mg per day from all sources)   7/30/2020 at 0051     acetaminophen (TYLENOL) 500 MG tablet Take 500 mg by mouth 4 times daily    8/2/2020 at 0800     Calcium-Vitamin D 600-200 MG-UNIT TABS Take 1 tablet by mouth 2 times daily   8/2/2020 at 0800     cephALEXin (KEFLEX) 500 MG capsule Take 500 mg by mouth 2 times daily X 5 days.   8/2/2020 at 0800     Glucosamine Sulfate 500 MG TABS Take 500 mg by mouth daily   8/2/2020 at 0800     melatonin 5 MG tablet Take 5 mg by mouth nightly as needed for sleep   Past Week at Unknown time     metoprolol tartrate (LOPRESSOR) 25 MG tablet TAKE 1/2 (ONE-HALF) TABLET BY MOUTH ONCE DAILY IN THE MORNING AND 1/2 (ONE-HALF) IN THE EVENING 30 tablet 0 8/2/2020 at 0800     Multiple Vitamins-Minerals (PRESERVISION AREDS 2 PO) Take 1 tablet by mouth 2 times daily    8/2/2020 at 0800     multivitamin, therapeutic (THERA-VIT) TABS tablet Take 1 tablet by mouth daily   8/2/2020 at 0800     omeprazole (PRILOSEC) 20 MG DR capsule Take 20 mg by mouth daily    8/1/2020 at 1600     oxyCODONE HCl (OXAYDO) 5 MG TABA Take 5-10 mg by mouth every 4 hours as needed (1 tablet for pain rated 1-4/10. 2 tablets for pain rated 5-10/10)   8/2/2020 at 0930     senna-docusate (SENOKOT-S/PERICOLACE) 8.6-50 MG tablet Take 1 tablet by mouth 2 times daily as needed   Past  Month at Unknown time     sertraline (ZOLOFT) 50 MG tablet Take 50 mg by mouth daily   8/2/2020 at 0800     simvastatin (ZOCOR) 20 MG tablet Take 20 mg by mouth daily    8/2/2020 at 0800     warfarin ANTICOAGULANT (COUMADIN) 5 MG tablet Take 5 mg by mouth twice a week on Monday and Friday. Take 2.5 mg (1/2 tab) the rest of the week or as directed by coumadin clinic.   8/1/2020 at 2000     cyclobenzaprine (FLEXERIL) 5 MG tablet Take 5 mg by mouth 3 times daily as needed   7/31/2020 at 0930

## 2020-08-03 NOTE — PHARMACY-ANTICOAGULATION SERVICE
Pharmacy Consult-Coumadin Assessment Day #2    Michael Christiansen is a 84 year old female admitted on 8/2/2020 with <principal problem not specified>    Primary Indication(s) for Anticoagulation: Afib    Goal INR: 2-3    FYI, patient is followed by the Anticoagulation/Protime Clinic at: [facility]    Patient Active Problem List   Diagnosis     Essential hypertension     Osteoarthritis     Permanent atrial fibrillation (H)     Health care directive on file     Chronic pain syndrome     History of total knee replacement     Radiculitis     Osteoarthritis of knee     Long-term (current) use of anticoagulants [Z79.01]     Osteoporosis     Malignant neoplasm of right breast in female, estrogen receptor positive, unspecified site of breast (H)     Pulmonary hypertension-moderate     Moderate tricuspid insufficiency     Anticoagulated on Coumadin     LISA (generalized anxiety disorder)     Acute blood loss as cause of postoperative anemia     Lumbar pain with radiation down right leg     Lumbar spinal stenosis     Protein-calorie malnutrition (H)     Valvular heart disease     Unexplained weight loss     Hx of rheumatic fever     Chronic, continuous use of opioids     ILD (interstitial lung disease) (H)     Left upper quadrant pain     Severe aortic stenosis     Other ill-defined heart diseases     Status post coronary angiogram     Coronary artery disease involving native coronary artery of native heart without angina pectoris     Dyslipidemia     Left-sided carotid artery disease (H)     Moderate pulmonary arterial systolic hypertension (H)     Status post transcatheter aortic valve replacement     BARRERA (acute kidney injury) (H)     Closed right hip fracture (H)     Fall from standing, initial encounter     Fracture of acromial end of right clavicle     Syncope and collapse     Urinary tract infection associated with indwelling urethral catheter (H)     Nursing Home Visit     Pyelonephritis       Patient  previously anticoagulated on Coumadin at a dose of 22.5 mg/week; dosed as: (5 mg (5 mg x 1 tab) every Mon, Fri; 2.5 mg (5 mg x 0.5 tab)) all other days    Factors that may increase patient's bleeding risk and/or sensitivity to warfarin (Coumadin) include: age, infection, poor oral intake over past several days    Factors that may decrease patient's sensitivity to warfarin (Coumadin) include: none      Anticoagulation Dose History     Recent Dosing and Labs Latest Ref Rng & Units 7/8/2020 7/15/2020 7/15/2020 7/22/2020 7/27/2020 8/2/2020 8/3/2020    INR 0.86 - 1.14 2.0(A) 3.3(A) 3.08(H) 4.6(A) 4.2(A) 4.19(H) 4.28(H)    INR 0.86 - 1.14 - - - - - - -          Assessment/Plan: INR still supratherapeutic at 4.28 today. Will hold dose again today.     Thank You for the Consult. Will continue to follow.    Philomena Wilkerson [facility] ....................  8/3/2020   10:24 AM

## 2020-08-03 NOTE — PLAN OF CARE
A&Ox4. Prn oxycodone given for pain. Up with assist x 1, tx belt and walker to commode. Voiding QS, of clear pale non-odorous urine. Max temp 100.2. VSS, on 3L nc. Pt is on tele, Afib -120's. HR up to 140/150's around 2300, Dr. Moy notified one time dose of metoprolol given with improvement in HR. Ns@75, IV zosyn given. Pt has small dime sized pressure ulcer to coccyx, foam dressing applied. LS have fine crackles. Tolerating a regular diet. Call light is within reach, pt calling appropriately.    Face to face report given with opportunity to observe patient.    Report given to Jennifer Watters RN   8/3/2020  7:19 AM

## 2020-08-03 NOTE — PHARMACY
[facility]    Pharmacy      Antimicrobial Stewardship Note     Current antimicrobial therapy:  Anti-infectives (From now, onward)    Start     Dose/Rate Route Frequency Ordered Stop    08/03/20 0830  imipenem-cilastatin (PRIMAXIN) 500 mg in sodium chloride 0.9 % 100 mL intermittent infusion      500 mg  over 30 Minutes Intravenous EVERY 6 HOURS 08/03/20 0754          Indication: bacteremia, pyelonephritis    Days of Therapy: 1 (was previously on 2 days Zosyn + 1 day Vanco)     Pertinent labs:  Creatinine   Creatinine   Date Value Ref Range Status   08/03/2020 0.62 0.52 - 1.04 mg/dL Final   08/02/2020 0.67 0.52 - 1.04 mg/dL Final   07/15/2020 0.68 0.52 - 1.04 mg/dL Final     WBC   WBC   Date Value Ref Range Status   08/03/2020 14.9 (H) 4.0 - 11.0 10e9/L Final   08/02/2020 19.2 (H) 4.0 - 11.0 10e9/L Final   07/15/2020 20.5 (H) 4.0 - 11.0 10e9/L Final     Procalcitonin   Procalcitonin   Date Value Ref Range Status   08/02/2020 0.72 ng/ml Final     Comment:     0.50-1.99 ng/ml  Moderate risk of systemic infection.  Recommendation:   Recommend antibiotics. Evaluate culture results and clinical features to   target antibacterial therapy. Obtain blood cultures and other relevant   cultures if not done.  If empiric antibiotics were started, recheck PCT in: 2 days to guide   antibiotic de-escalation.  Discontinue or de-escalate antibiotics when PCT   concentration is <80% of peak or abs PCT <0.5. If empiric antibiotics were NOT   started, recheck PCT in 6-24 hours to re-evaluate need for antibiotics.       CRP   CRP Inflammation   Date Value Ref Range Status   10/10/2019 <2.9 0.0 - 8.0 mg/L Final   11/16/2018 7.9 0.0 - 8.0 mg/L Final   11/14/2018 20.2 (H) 0.0 - 8.0 mg/L Final     Culture Results:   Collected  Updated  Procedure  Result Status     08/02/2020 1129  08/03/2020 0612  Blood culture [[id number]]    Blood     Preliminary result  Component  Value    Specimen Description  Blood    Culture Micro  No growth  after 18 hours P            08/02/2020 1042  08/03/2020 0512  Blood culture [[id number]]    (Abnormal)    Blood    Left Arm     Preliminary result  Component  Value    Specimen Description  Blood Left Arm    Culture Micro  1 of 2 bottles   Gram negative rods   Identification and susceptibility to follow.   Abnormal   P    Culture Micro  Critical Value:   Gram stain result of bottle 1 called to and read back by Briana Zamora at 0510 on 8/3/20 by    Radha Arita.   P            07/31/2020 0405  08/03/2020 0745  Urine Culture Aerobic Bacterial [[id number]]     (Abnormal)    Unspecified Urine     Final result  Component  Value    Specimen Description  Unspecified Urine    Culture Micro  >100,000 colonies/mL   Escherichia coli ESBL   Abnormal      Culture Micro  Significant value called to and read back by   Dr Reyna at 0745 on 08/03/20 by Marquis Addison        Escherichia coli esbl        MARTIN      Amoxicillin/Clav  16 ug/mL  Intermediate      AMPICILLIN  >=32 ug/mL  Resistant      AMPICILLIN/SULBACTAM  >=32 ug/mL  Resistant      CEFAZOLIN  >=64 ug/mL  Resistant1      CEFEPIME  2 ug/mL  Resistant      CEFTAZIDIME  16 ug/mL  Resistant      CEFTRIAXONE  >=64 ug/mL  Resistant      CIPROFLOXACIN  >=4 ug/mL  Resistant      Ext Spect B Lac Prod  Positive   Resistant*      GENTAMICIN  >=16 ug/mL  Resistant      IMIPENEM  <=0.25 ug/mL  Sensitive      LEVOFLOXACIN  >=8 ug/mL  Resistant      NITROFURANTOIN  128 ug/mL  Resistant      Piperacillin/Tazo  8 ug/mL  Sensitive      TOBRAMYCIN  >=16 ug/mL  Resistant      Trimethoprim/Sulfa  >=16/304 ug/mL  Resistant            *  Suppressed Antibiotic        Recommendations/Interventions:  1. None at this time. E coli ESBL also sensitive to Zosyn, but it's not recommended to treat severe infections with Zosyn as it has led to worse outcomes than treating with carbapenems. Will continue to monitor.     Philomena Wilkerson, Former[facility]  August 3, 2020

## 2020-08-04 PROBLEM — E86.9 VOLUME DEPLETION: Status: ACTIVE | Noted: 2020-01-01

## 2020-08-04 PROBLEM — A49.9 ESBL (EXTENDED SPECTRUM BETA-LACTAMASE) PRODUCING BACTERIA INFECTION: Status: ACTIVE | Noted: 2020-01-01

## 2020-08-04 PROBLEM — Z16.12 ESBL (EXTENDED SPECTRUM BETA-LACTAMASE) PRODUCING BACTERIA INFECTION: Status: ACTIVE | Noted: 2020-01-01

## 2020-08-04 PROBLEM — E87.1 HYPONATREMIA: Status: ACTIVE | Noted: 2020-01-01

## 2020-08-04 NOTE — PHARMACY
[facility]    Pharmacy      Antimicrobial Stewardship Note     Current antimicrobial therapy:  Anti-infectives (From now, onward)    Start     Dose/Rate Route Frequency Ordered Stop    08/03/20 0830  imipenem-cilastatin (PRIMAXIN) 500 mg in sodium chloride 0.9 % 100 mL intermittent infusion      500 mg  over 30 Minutes Intravenous EVERY 6 HOURS 08/03/20 0754          Indication: bacteermia, pyelonephritis    Days of Therapy: 2     Pertinent labs:  Creatinine   Creatinine   Date Value Ref Range Status   08/03/2020 0.62 0.52 - 1.04 mg/dL Final   08/02/2020 0.67 0.52 - 1.04 mg/dL Final   07/15/2020 0.68 0.52 - 1.04 mg/dL Final     WBC   WBC   Date Value Ref Range Status   08/03/2020 14.9 (H) 4.0 - 11.0 10e9/L Final   08/02/2020 19.2 (H) 4.0 - 11.0 10e9/L Final   07/15/2020 20.5 (H) 4.0 - 11.0 10e9/L Final     Procalcitonin   Procalcitonin   Date Value Ref Range Status   08/02/2020 0.72 ng/ml Final     Comment:     0.50-1.99 ng/ml  Moderate risk of systemic infection.  Recommendation:   Recommend antibiotics. Evaluate culture results and clinical features to   target antibacterial therapy. Obtain blood cultures and other relevant   cultures if not done.  If empiric antibiotics were started, recheck PCT in: 2 days to guide   antibiotic de-escalation.  Discontinue or de-escalate antibiotics when PCT   concentration is <80% of peak or abs PCT <0.5. If empiric antibiotics were NOT   started, recheck PCT in 6-24 hours to re-evaluate need for antibiotics.       CRP   CRP Inflammation   Date Value Ref Range Status   10/10/2019 <2.9 0.0 - 8.0 mg/L Final   11/16/2018 7.9 0.0 - 8.0 mg/L Final   11/14/2018 20.2 (H) 0.0 - 8.0 mg/L Final       Culture Results:   Collected  Updated  Procedure  Result Status     08/02/2020 1129  08/04/2020 0856  Blood culture [[id number]]    Blood     Preliminary result  Component  Value    Specimen Description  Blood    Culture Micro  No growth after 2 days P            08/02/2020 1042   08/03/2020 0512  Blood culture [[id number]]    (Abnormal)    Blood    Left Arm     Preliminary result  Component  Value    Specimen Description  Blood Left Arm    Culture Micro  1 of 2 bottles   Gram negative rods   Identification and susceptibility to follow.   Abnormal   P    Culture Micro  Critical Value:   Gram stain result of bottle 1 called to and read back by Briana Zamora at 0510 on 8/3/20 by    Radha Arita.   P            07/31/2020 0405  08/03/2020 0745  Urine Culture Aerobic Bacterial [[id number]]     (Abnormal)    Unspecified Urine     Final result  Component  Value    Specimen Description  Unspecified Urine    Culture Micro  >100,000 colonies/mL   Escherichia coli ESBL   Abnormal      Culture Micro  Significant value called to and read back by   Dr Reyna at 0745 on 08/03/20 by Marquis Addison              Escherichia coli esbl        MARTIN      Amoxicillin/Clav  16 ug/mL  Intermediate      AMPICILLIN  >=32 ug/mL  Resistant      AMPICILLIN/SULBACTAM  >=32 ug/mL  Resistant      CEFAZOLIN  >=64 ug/mL  Resistant1      CEFEPIME  2 ug/mL  Resistant      CEFTAZIDIME  16 ug/mL  Resistant      CEFTRIAXONE  >=64 ug/mL  Resistant      CIPROFLOXACIN  >=4 ug/mL  Resistant      Ext Spect B Lac Prod  Positive   Resistant*      GENTAMICIN  >=16 ug/mL  Resistant      IMIPENEM  <=0.25 ug/mL  Sensitive      LEVOFLOXACIN  >=8 ug/mL  Resistant      NITROFURANTOIN  128 ug/mL  Resistant      Piperacillin/Tazo  8 ug/mL  Sensitive      TOBRAMYCIN  >=16 ug/mL  Resistant      Trimethoprim/Sulfa  >=16/304 ug/mL  Resistant            *  Suppressed Antibiotic           Recommendations/Interventions:  1. None at this time    Philomena Wilkerson, formerly [facility]  August 4, 2020

## 2020-08-04 NOTE — PROGRESS NOTES
[facility]    Hospitalist Progress Note    Date of Service (when I saw the patient): 08/04/2020    Assessment & Plan   Michael Christiansen is a 84 year old  woman who was admitted on 8/2/2020. She presented from her nursing home noting most recently weakness and fatigue worse after physical therapy sessions for the past week.  Over this period of time she is also had urinary frequency with minimal dysuria.  Interval history is significant for TAVR 5/5/2020 placed after an admission at [facility] with syncope although she had been undergoing extensive evaluation for this over a longer period of time during which she had intermittently had orthostatic symptoms.  During that admission she required medication reduction because of persistent hypotension or orthostasis.  She presented again 5/15 with an episode of syncope evaluation showed new intermittent left bundle branch block.  Echocardiogram was unrevealing showing adequate AVR function.  7/3 she experienced a fall from standing height resulting in a right intertrochanteric and proximal femur fracture as well as proximal right clavicular fracture.  She underwent ORIF.  Records indicate she developed catheter related cystitis.  She was treated with ciprofloxacin.  Subsequently cultures showed a significant titer of ESBL E. coli sensitive to piperacillin/tazobactam.  Because of her symptoms over the past week approximately 2 days ago she was preemptively begun with a course of oral cephalexin.  In emergency department she initially was markedly tachycardic (chronic atrial fibrillation).  She improved with intravenous crystalloid (approximately 20 mL/kg) and symptomatically felt somewhat improved.  Other evaluation included chest radiograph suggestive of diffuse patchy infiltrates although somewhat difficult to interpret in the context of her established interstitial lung disease.  She is admitted for further evaluation and management.     1.     Acute/subacute pyelonephritis  ESBL E. coli  Bacteremia with gram-negative rods 1/4 as well as urine culture now approximately 48 hours showing ESBL E. coli.  Al Hderick disc sensitivity to piperacillin/tazobactam as well as carbopenems.  Former may not be reliable sensitivity in vivo depending on the characteristics of the ESBL resistance, of which there are number of variance.  For the time being well changed to carbopenem using imipenem.  Depending on ID and sensitivity and blood if she needs prolonged IV antibiotics transition to early depend on what allow daily dosing and outpatient treatment.  Certainly with bacteremia would regard this as pyelonephritis.  No true fevers in the last 24 hours.  Maximal temperature 37.9.  She did present with fever of 38.2 Celsius. As below she has had a somewhat persistent leukocytosis since early July.  Difficult to separate from chronic lumbar pain but likely left CVA tenderness which essentially resolved within 24 hours.  This may simply be a matter of incomplete or inadequate treatment of her urinary tract infection initially cystitis and now best regarded as pyelonephritis.    Plan placement for PICC 8/5    She is quite clear as is her daughter that initially after her most recent hospitalization she did feel improved briefly although it does appear that her urinary frequency may be of longer duration than simply several days.   As noted cultures obtained at [place] early in July did show a significant titer of ESBL E. coli with expected resistance pattern. No sensitivity to carbopenem was evaluated.  We will continue treatment with this which would also address any possible lower respiratory tract infection.    2.  Volume depletion  Resolved. Continue low dose IV fluid; discontinue if still good oral intake in another few hours.  Presented with her known chronic atrial fibrillation and rapid ventricular response.  She responded rapidly to a modest volume of isotonic  crystalloid.  She does not meet clear criteria for sepsis and in any case would be inclined to avoid even minimally excessive fluid.  During admission at [place] for evaluation and treatment of aortic stenosis and syncope while she had issues with excessive lowering of blood pressure with agents such as losartan and diltiazem she also had episodes of volume overload requiring transient diuresis and therefore even minimally excessive fluid is likely to be deleterious.  At the present time she appears nearly euvolemic.  Continue a low dose of isotonic crystalloid with saline as she will require multiple doses of antibiotics making low chloride solution such as lactated Ringer's difficult.  3.  Chronic atrial fibrillation with transient rapid ventricular response  Rare episodes of elevated ventricular response.  Telemetry monitoring has not shown any increase in frequency or ventricular response high enough to prompt change in treatment.  Discontinue telemetry.  Continue her relatively low dose of oral metoprolol with PRN IV for sustained increased ventricular response.  She has had more difficulty in the past with complications from even low doses of medications with antihypertensive effect.  Concern in the past regarding tachyarrhythmias associated with episodes of syncope although that is not been borne out with outpatient cardiac monitoring.  Continue her chronic regimen including low-dose of metoprolol as she has been intolerant of any increase.  Treatment of her volume status as above.  She is chronic anticoagulated.  4.  Chronic anticoagulation  INR decreased to 3.  Given plans for PIC tomorrow continue to withhold warfarin and give dose of oral phytonadione this afternoon with follow-up INR later tonight.  Expected increase in her INR consistent with increase related to poor oral intake.  Likely related to poor oral intake in the past several days.  Withhold warfarin at present.  Goal INR 2-3.  5.  Possible  lower respiratory tract infection  At a minimum doubt any significant staphylococcal infection. Discontinue vancomycin. Chronic increase in interstitial markings which are present.  Established interstitial lung disease, likely UIP. In fact difficult to be certain that there is in fact any acute increase in airspace infiltrates.  Relatively tenuous woman at high risk of decompensation.  She has had relatively recent endoluminal aortic valve replacement and therefore is at higher risk of endovascular infection.  All of these factors suggest that at least initial preemptive antibiotics are not unreasonable.    6.  Nutritional status  Appreciate dietitian input.  Continued oral intake.  Nursing staff indicates she is eating approximately half of her meals which is not unreasonable for a woman of her body habitus.  Would consider supplement if this remains poor.  Oral intake until several days ago.  Hopeful that with treatment of infection her appetite will improve.  Somewhat asthenic.  Is difficult to assess weights given her multiple hospitalization and acute illnesses recently.    She is bothered by having somewhat increased frequency of loose stools.  No milo diarrhea.  Add probiotic although benefit of this is modest.  His has worsened her appetite somewhat.  7.  Hyponatremia  Resolved.  Primarily hypovolemic hyponatremia.  This is been present for an extended period of time with sodiums usually in a range of approximately 130.    Continue avoiding excessive free water and monitor her response.  8.  Leukocytosis  Relatively significant decrease although still borderline leukocytosis.  Almost certainly  related to subacute urinary infection.This is been noted to be present at least since early July.  Peripheral smear requested in emergency department and reasonable to evaluate that although it is unlikely to show a specific diagnosis.  Report still pending. She also showed a mild thrombocytosis now decreasing,  which also may reflect an subacute inflammatory process.    Active Problems:    Pyelonephritis       DVT Prophylaxis: Warfarin  Code Status: Full Code    Disposition: Expected discharge in 2-3 days depending on her course.  Not unlikely she will require prolonged intravenous antibiotic treatment.  Will request evaluation for PICC.    Tarik GALLOWAY Michael    Interval History   Awake, alert. Bright affect. No dyspnea.  Improved appetite.  Generally feels well.    -Data reviewed today: I reviewed all new labs and imaging results over the last 24 hours. I personally reviewed     Peripheral IV 08/02/20 Left Lower forearm (Active)   Site Assessment WDL 08/04/20 0400   Line Status Infusing 08/04/20 0400   Phlebitis Scale 0-->no symptoms 08/04/20 0400   Infiltration Scale 0 08/04/20 0400   Number of days: 2       Pressure Injury 08/02/20 Left Coccyx small pea size open area (Active)   Wound Base [place];Erythema, non-blanchable 08/04/20 0400   Anabel-wound Assessment Blanchable erythema 08/04/20 0400   Estimated Circumference ( if not measured dime sized 08/04/20 0400   Drainage Amount None 08/04/20 0400   Dressing Foam 08/04/20 0400   Dressing Status Clean, dry, intact 08/04/20 0400   Number of days: 2     Line/device assessment(s) completed for medical necessity August 4    Physical Exam   Temp: 98.7  F (37.1  C) Temp src: Tympanic BP: 138/87 Pulse: (!) 142 Heart Rate: 116 Resp: 18 SpO2: 92 % O2 Device: Nasal cannula Oxygen Delivery: 3 LPM  Vitals:    08/02/20 1258 08/03/20 0500 08/04/20 0600   Weight: 45.8 kg (100 lb 15.5 oz) 46.8 kg (103 lb 2.8 oz) 48.1 kg (106 lb 0.7 oz)     Vital Signs with Ranges  Temp:  [97.8  F (36.6  C)-100.3  F (37.9  C)] 98.7  F (37.1  C)  Pulse:  [] 142  Heart Rate:  [] 116  Resp:  [18-20] 18  BP: ()/(50-91) 138/87  SpO2:  [92 %-96 %] 92 %  I/O last 3 completed shifts:  In: 2265 [P.O.:840; I.V.:1425]  Out: 1550 [Urine:1550]    Awake, alert, concentration preserved   HEENT: Pupils  equal, conjugate. No icterus or nystagmus. Oral mucosa moist. No facial asymmetry.   Neck: Supple, jugular veins not elevated. Trachea midline   Chest: No chest wall movement asymmetry. Aeration to bases. Accessory muscles not in use. Expiratory time not increased. No tidal wheezes. No rhonchi. Early to mid inspiratory fine to medium crackles bases bilaterally.   Cardiac: PMI not displaced. S1, S2 unremarkable. No S3, S4. P2 not accentuated. No murmurs. Carotid upstroke preserved. Carotid amplitude preserved.   Abdomen: Soft. No palpation or percussion tenderness. No distention. Normoactive bowel sounds. Liver and spleen not increased in size. No bruits, masses, or pulsations.  No CVAT.  Extremities: No lower extremity edema. Warm distally. No eccymoses, clubbing.   Neurologic: Mental state above. Motor 5/5 and bilaterally equal. Tone preserved. No fasiculations or tremors.     Medications     - MEDICATION INSTRUCTIONS -       sodium chloride 50 mL/hr at 08/03/20 0853     Warfarin Therapy Reminder         imipenem-cilastatin (PRIMAXIN) IV  500 mg Intravenous Q6H     metoprolol tartrate  12.5 mg Oral BID     omeprazole  20 mg Oral QAM AC     sertraline  50 mg Oral Daily     simvastatin  20 mg Oral Daily     sodium chloride (PF)  3 mL Intracatheter Q8H     sodium chloride (PF)  3 mL Intracatheter Q8H           Data   Recent Labs   Lab 08/04/20  0530 08/03/20  0506 08/02/20  1148 08/02/20  1050   WBC  --  14.9*  --  19.2*   HGB  --  9.5*  --  11.7   MCV  --  94  --  91   PLT  --  426  --  520*   INR 2.94* 4.28* 4.19*  --    NA  --  135  --  131*   POTASSIUM  --  4.3  --  5.3   CHLORIDE  --  103  --  97   CO2  --  27  --  28   BUN  --  18  --  20   CR  --  0.62  --  0.67   ANIONGAP  --  5  --  6   LOIS  --  7.9*  --  8.4*   GLC  --  118*  --  164*   ALBUMIN  --   --   --  2.4*   PROTTOTAL  --   --   --  7.4   BILITOTAL  --   --   --  0.7   ALKPHOS  --   --   --  187*   ALT  --   --   --  20   AST  --   --   --  43    TROPI  --   --   --  <0.015       Lactic Acid   Date Value Ref Range Status   08/02/2020 2.0 0.7 - 2.0 mmol/L Final   05/15/2020 1.1 0.7 - 2.0 mmol/L Final   05/07/2020 0.7 0.7 - 2.0 mmol/L Final       No results found for this or any previous visit (from the past 24 hour(s)).

## 2020-08-04 NOTE — PROGRESS NOTES
Spoke with Katlin at [facility].  She advised that they would be able to manage a once daily IV antibiotic via PICC. She also explained that as soon as dose, etc known they will need to know so they can order what they will need.

## 2020-08-04 NOTE — PROGRESS NOTES
Spoke with Philomena in pharmacy regarding holding pts coumadin on 8/4/20 evening for PICC planned for 8-5-20 at 0830.  Goal for INR is 2.0, coumadin will be held this evening (8-4-20) and also reversal agent to be given. INR will be rechecked at 0600 on 8-5-20.

## 2020-08-04 NOTE — PLAN OF CARE
Alert and oriented. VSS afebrile. Telemetry discontinued but intermittently tachycardic especially with exertion. Attempted to wean O2 but this afternoon is again requiring 3LPM/ nasal cannula. NS at 50ml/hr. Pt is eating okay, at least 50% of each meal. Ambulates with assist x1 and walker. Adequate voids. A couple soft BMs. Vit K given PO in anticipation of PICC line placement tomorrow. Pt has been tired, resting a lot. Makes needs known.    Face to face report given with opportunity to observe patient.    Report given to Vaibhav Catherine RN   8/4/2020  7:11 PM

## 2020-08-04 NOTE — PLAN OF CARE
Face to face report given with opportunity to observe patient.    Report given to Cathy Catherine RN   8/3/2020  7:08 PM

## 2020-08-04 NOTE — PLAN OF CARE
VSS on 3lpm NC. Remains on telemetry reading afib 90-110s. Heart rate increases with activity and oxygen demands increase slightly but pt recovers well on the 3lpm. Lung sounds clear. Bowel sounds active. Complains of some abdominal pain and leg pain. Medicated with oxycodone and tylenol with relief. Assist of one with walker to bathroom. IV infusing NS at 50ml/hr. IV abx continue. Using call light appropriately but alarms remain in place for pt safety.     Face to face report given with opportunity to observe patient.    Report given to DAVI Browning RN   8/4/2020  6:59 AM

## 2020-08-04 NOTE — PHARMACY-ANTICOAGULATION SERVICE
Pharmacy Consult-Coumadin Assessment Day #3    Michael Christiansen is a 84 year old female admitted on 8/2/2020 with acute/subacute pyelonephritis    Primary Indication(s) for Anticoagulation: Afib    Goal INR: 2-3    FYI, patient is followed by the Anticoagulation/Protime Clinic at: [facility]    Patient Active Problem List   Diagnosis     Essential hypertension     Osteoarthritis     Permanent atrial fibrillation (H)     Health care directive on file     Chronic pain syndrome     History of total knee replacement     Radiculitis     Osteoarthritis of knee     Long-term (current) use of anticoagulants [Z79.01]     Osteoporosis     Malignant neoplasm of right breast in female, estrogen receptor positive, unspecified site of breast (H)     Pulmonary hypertension-moderate     Moderate tricuspid insufficiency     Anticoagulated on Coumadin     LISA (generalized anxiety disorder)     Acute blood loss as cause of postoperative anemia     Lumbar pain with radiation down right leg     Lumbar spinal stenosis     Protein-calorie malnutrition (H)     Valvular heart disease     Unexplained weight loss     Hx of rheumatic fever     Chronic, continuous use of opioids     ILD (interstitial lung disease) (H)     Left upper quadrant pain     Severe aortic stenosis     Other ill-defined heart diseases     Status post coronary angiogram     Coronary artery disease involving native coronary artery of native heart without angina pectoris     Dyslipidemia     Left-sided carotid artery disease (H)     Moderate pulmonary arterial systolic hypertension (H)     Status post transcatheter aortic valve replacement     BARRERA (acute kidney injury) (H)     Closed right hip fracture (H)     Fall from standing, initial encounter     Fracture of acromial end of right clavicle     Syncope and collapse     Urinary tract infection associated with indwelling urethral catheter (H)     Nursing Home Visit     Pyelonephritis       Patient  previously anticoagulated on Coumadin at a dose of  22.5 mg/week; dosed as: (5 mg (5 mg x 1 tab) every Mon, Fri; 2.5 mg (5 mg x 0.5 tab)) all other days    Factors that may increase patient's bleeding risk and/or sensitivity to warfarin (Coumadin) include: age, infection, interaction with cephalexin (taking at home--not continued upon admission), poor oral intake over past several days prior to admission, Zosyn x 2 days (8/2-8/3)     Factors that may decrease patient's sensitivity to warfarin (Coumadin) include: none    Dietary Considerations: has been eating ~half of meals, which seems good for patient given her size (per nurse)    Anticoagulation Dose History     Recent Dosing and Labs Latest Ref Rng & Units 7/15/2020 7/15/2020 7/22/2020 7/27/2020 8/2/2020 8/3/2020 8/4/2020    INR 0.86 - 1.14 3.3(A) 3.08(H) 4.6(A) 4.2(A) 4.19(H) 4.28(H) 2.94(H)    INR 0.86 - 1.14 - - - - HELD HELD -        Assessment/Plan: INR decreased down to 2.94 today after holding the past 2 doses. Has been on Primaxin for Bacteremia/pyelopnephritis since yesterday (does not interact with Coumadin). Patient scheduled to get PICC line placed tomorrow morning and INR has to be <2 for that to happen. Giving Vit K 5 mg PO today and holding Coumadin.     Thank You for the Consult. Will continue to follow.    Philomena Wilkerson [facility] ....................  8/4/2020   8:50 AM

## 2020-08-05 PROBLEM — R78.81 BACTEREMIA: Status: ACTIVE | Noted: 2020-01-01

## 2020-08-05 NOTE — PROVIDER NOTIFICATION
DATE:  8/5/2020   TIME OF RECEIPT FROM LAB:  0657  LAB TEST:  Blood culture  LAB VALUE:  1 bottle; growing e-coli and ESBL.  RESULTS GIVEN WITH READ-BACK TO (PROVIDER): Text page sent at 0658 to Luna MCCORD CNP.   TIME LAB VALUE REPORTED TO PROVIDER:   Spoke to provider on the phone at [phone].

## 2020-08-05 NOTE — PHARMACY-ANTICOAGULATION SERVICE
Pharmacy Consult-Coumadin Assessment Day #4    Michael Christiansen is a 84 year old female admitted on 8/2/2020 with Pyelonephritis    Primary Indication(s) for Anticoagulation: A. fib    Goal INR:2-3    FYI, patient is followed by the Anticoagulation/Protime Clinic at: [facility]    Patient Active Problem List   Diagnosis     Essential hypertension     Osteoarthritis     Permanent atrial fibrillation (H)     Health care directive on file     Chronic pain syndrome     History of total knee replacement     Radiculitis     Osteoarthritis of knee     Long-term (current) use of anticoagulants [Z79.01]     Osteoporosis     Malignant neoplasm of right breast in female, estrogen receptor positive, unspecified site of breast (H)     Pulmonary hypertension-moderate     Moderate tricuspid insufficiency     Anticoagulated on Coumadin     LISA (generalized anxiety disorder)     Acute blood loss as cause of postoperative anemia     Lumbar pain with radiation down right leg     Lumbar spinal stenosis     Protein-calorie malnutrition (H)     Valvular heart disease     Unexplained weight loss     Hx of rheumatic fever     Chronic, continuous use of opioids     ILD (interstitial lung disease) (H)     Left upper quadrant pain     Severe aortic stenosis     Other ill-defined heart diseases     Status post coronary angiogram     Coronary artery disease involving native coronary artery of native heart without angina pectoris     Dyslipidemia     Left-sided carotid artery disease (H)     Moderate pulmonary arterial systolic hypertension (H)     Status post transcatheter aortic valve replacement     BARRERA (acute kidney injury) (H)     Closed right hip fracture (H)     Fall from standing, initial encounter     Fracture of acromial end of right clavicle     Syncope and collapse     Urinary tract infection associated with indwelling urethral catheter (H)     Nursing Home Visit     Pyelonephritis     Hyponatremia     Volume  depletion     ESBL (extended spectrum beta-lactamase) producing bacteria infection     Bacteremia       Patient previously anticoagulated on Coumadin at a dose of 22.5 mg/week; dosed as: (5 mg (5 mg x 1 tab) every Mon, Fri; 2.5 mg (5 mg x 0.5 tab)) all other days    Factors that may increase patient's bleeding risk and/or sensitivity to warfarin (Coumadin) include: age, infection, interaction with cephalexin (taking at home--not continued upon admission), poor oral intake over past several days prior to admission, Zosyn x 2 days (8/2-8/3)     Factors that may decrease patient's sensitivity to warfarin (Coumadin) include: 10 mg oral Vitamin K given on 8/4/20    Dietary Considerations: normal intake for someone her size    Anticoagulation Dose History     Recent Dosing and Labs Latest Ref Rng & Units 7/22/2020 7/27/2020 8/2/2020 8/3/2020 8/4/2020 8/4/2020 8/5/2020    INR 0.86 - 1.14 4.6(A) 4.2(A) 4.19(H) 4.28(H) 2.94(H) 2.13(H) 1.60(H)    INR 0.86 - 1.14 - - - -HELD -HELD - -          Assessment/Plan: Restart warfarin as soon as possible per hospitalist discussion at patient care rounding meeting. Patient is likely to be resistant to warfarin for a few days due to Vitamin K doses and may need higher than normal doses. Will start with warfarin 5mg today, which is on the higher end of her normal dosing range.    Thank You for the Consult. Will continue to follow.    Mojgan Hoffmann [facility] ....................  8/5/2020   10:28 AM

## 2020-08-05 NOTE — PHARMACY
[facility]    Pharmacy      Antimicrobial Stewardship Note     Current antimicrobial therapy:  Anti-infectives (From now, onward)    Start     Dose/Rate Route Frequency Ordered Stop    08/03/20 0830  imipenem-cilastatin (PRIMAXIN) 500 mg in sodium chloride 0.9 % 100 mL intermittent infusion      500 mg  over 30 Minutes Intravenous EVERY 6 HOURS 08/03/20 0754            Indication: bacteremia, pyelonephritis    Days of Therapy: 3 (was previously on Zosyn + Vanco)     Pertinent labs:  Creatinine   Creatinine   Date Value Ref Range Status   08/05/2020 0.44 (L) 0.52 - 1.04 mg/dL Final   08/03/2020 0.62 0.52 - 1.04 mg/dL Final   08/02/2020 0.67 0.52 - 1.04 mg/dL Final     WBC   WBC   Date Value Ref Range Status   08/05/2020 26.8 (H) 4.0 - 11.0 10e9/L Final   08/03/2020 14.9 (H) 4.0 - 11.0 10e9/L Final   08/02/2020 19.2 (H) 4.0 - 11.0 10e9/L Final     Procalcitonin   Procalcitonin   Date Value Ref Range Status   08/05/2020 0.26 ng/ml Final     Comment:     0.25-0.49 ng/ml  Possible early systemic infection or localized infection.     Recommendation: Encourage antibiotics only in the correct clinical context.   Consider obtaining blood cultures or other relevant cultures. Recheck PCT in   6-12 hours to ensure baseline low level. If repeat PCT is rising, consider   early systemic infection and consider starting antibiotics.     08/02/2020 0.72 ng/ml Final     Comment:     0.50-1.99 ng/ml  Moderate risk of systemic infection.  Recommendation:   Recommend antibiotics. Evaluate culture results and clinical features to   target antibacterial therapy. Obtain blood cultures and other relevant   cultures if not done.  If empiric antibiotics were started, recheck PCT in: 2 days to guide   antibiotic de-escalation.  Discontinue or de-escalate antibiotics when PCT   concentration is <80% of peak or abs PCT <0.5. If empiric antibiotics were NOT   started, recheck PCT in 6-24 hours to re-evaluate need for antibiotics.        CRP   CRP Inflammation   Date Value Ref [facility] Status   10/10/2019 <2.9 0.0 - 8.0 mg/L Final   11/16/2018 7.9 0.0 - 8.0 mg/L Final   11/14/2018 20.2 (H) 0.0 - 8.0 mg/L Final       Culture Results: Blood and Urine cultures with susceptibility       Recommendations/Interventions:  1. Continue imipenem.   Per [facility] Guide:  E.coli may be susceptible to Piperacillin-tazobactam in vitro but fail clinically. Not recommended.    2. If patient is doing IV antibiotics at nursing home, ertapenem may be an option as it is Q24 hr dosing. Bacteria should be susceptible if imipenem is, but you could talk with lab about running that susceptibility as well.    Mojgan Hoffmann, PharmD on 8/5/2020       Mojgan Hoffmann, ROMAN  August 5, 2020

## 2020-08-05 NOTE — DISCHARGE INSTRUCTIONS
PICC Line Care  PICC stands for peripherally inserted central catheter. This is a short-term (temporary) tube that is used instead of a regular IV (intravenous) line.   Reasons for using a PICC line  A PICC line may be used because:    It reduces the discomfort of putting in a new IV every time one is needed.    Medicine or nutrition needs to be given over a period of weeks or even months.    A PICC can stay in place longer than an IV, so it reduces needle sticks.    It reduces damage to small veins, where an IV is normally inserted.    A PICC may have more than 1 channel, so different fluids or medicines can be given at the same time.    A PICC line allows for home therapy.  Your PICC will need some care to keep it clean and working. This care includes:    Changing the bandage (dressing)    Flushing the catheter with fluids    Changing the cap on the end of the catheter  A nurse or other healthcare provider will teach you how to do each of these things.    Home care  The following are general care guidelines that will help you care for your PICC line at home:    You can use your arm. But avoid any activity that causes mild pain.    Do not pick at it or pull on the tubing.    Don t lift anything heavier than 10 pounds with the arm on the side of the PICC line.    When you bathe or shower, tape plastic wrap over the site to keep it dry.    Do not put the PICC site under water. No swimming or hot tubs. If the dressing gets wet, change it right away.    Always wash your hands with soap and warm water before and after touching any part of your PICC.    Do not allow the tubing to hang freely. Make sure to keep the tubing covered and secured to your arm to prevent the PICC line from being pulled out by accident.  The following tips will help you with dressing changes:    Change the dressing over the site as directed. This is usually once a week. Change it sooner if the dressing gets wet or soiled. Check the dressing  daily.    You or a family member may be able to do the dressing change at home. Or you may be instructed to return to the office or clinic for dressing changes.    Sterile technique must be used for PICC dressing change. If your dressing is changed at home, be sure you or your family member knows sterile dressing technique. Call your health care provider for instructions if you need them.  Follow-up care  Follow up as advised by your healthcare provider or our staff.  When to seek medical advice  Call your healthcare provider right away if any of these occur:    Fever of 100.4 F (38 C) or higher    Drainage from the PICC site    Swelling or bulging around the PICC site    Bleeding from the PICC site    Skin pulls away from the PICC site    Redness, warmth, or pus at the PICC site    Tubing breaks, splits, or leaks    More exposed tubing (tubing seems longer) or the tubing is pulled out completely    Medicine or fluids do not drain from the bag into your PICC  Date Last Reviewed: 7/1/2016 2000-2019 [employer]. [address] [place] [address]. All rights reserved. This information is not intended as a substitute for professional medical care. Always follow your healthcare professional's instructions.        Discharge Instructions for Pyelonephritis  You have been told you have a kidney infection. This is called pyelonephritis. The infection can be serious. It can damage your kidneys and cause bacteria to enter your bloodstream. You were treated in the hospital. Once you return home, here s what you can do at home to aid in your recovery and prevent future infections.  Home care    Take all the medicine you were prescribed, even if you feel better. Not finishing the medicine can make the infection come back. It may also make a future infection harder to treat.    Unless told not to by your healthcare provider, drink 8 to 12 glasses of fluid every day. Clear fluids, such as water, are best.  This may help flush the infection from your system.  Preventing future infection    Keep your genital area clean. Use mild soap. Rinse with water.    If you are a woman, always wipe the genital area from front to back.    Urinate frequently. Avoid holding urine in the bladder for a long time.    Always urinate after sexual intercourse.  Follow-up care  Follow up with your healthcare provider, or as advised. And see your healthcare provider for regular lab tests as directed.     When to call your healthcare provider  Call your healthcare provider right away if you have any of the following:    Decreased urine output or trouble urinating    Severe pain in the lower back or flank    Fever of 100.4 F (38 C) or higher, or as directed by your healthcare provider    Shaking chills    Vomiting    Blood in your urine    Dark-colored or foul-smelling urine    Nausea or other problems that prevent you from taking your prescribed medicine   Date Last Reviewed: 2/1/2017 2000-2019 [employer]. [address] [place] [address], [place] [address]. All rights reserved. This information is not intended as a substitute for professional medical care. Always follow your healthcare professional's instructions.

## 2020-08-05 NOTE — PLAN OF CARE
[id number]  Around 0730 patient got up from her bed to the chair, and had severe OLIVER, tachypnea 22-24,  with 's-140's, from bed to chair.  It took awhile for patient to recuperate.  Chest x-ray obtained. Pt was given IV lasix 20 mg, with large output.  Pt had PICC line placement.  Received IV Primaxin.  Pt didn't have much of an appetite for breakfast.     11:00 am-1500 Lactic acid drawn, UA collected and sent, sputum collected and sent. Coumadin 5 mg given. PO Tylenol 650 mg given for headache, with good relief.  Pt did not eat much for lunch, no appetite today.  PO potassium dose 40 meq given. Pt responded well to the IV lasix, and respiratory status doing better, but still with mild OLIVER, but can ambulate from bed to bathroom.  Still requiring 2 LPM NC, SPO2 91-92%.      Face to face report given with opportunity to observe patient.    Report given to DAVI Hdz.    Jimena Pond RN   8/5/2020  3:49 PM

## 2020-08-05 NOTE — PLAN OF CARE
Face to face report given with opportunity to observe patient.    Report given to Jimena PLAZA RN.    Melinda Martinez RN   8/5/2020  7:19 AM

## 2020-08-05 NOTE — PROGRESS NOTES
Procedure: PICC line, left    There were  no complications and patient has no symptoms..      Tolerated procedure well.    Radiologist:Dr. Michel    Time Out: Prior to the start of the procedure and with procedural staff participation, I verbally confirmed the patient s identity using two indicators, relevant allergies, that the procedure was appropriate and matched the consent or emergent situation, and that the correct equipment/implants were available. Immediately prior to starting the procedure I conducted the Time Out with the procedural staff and re-confirmed the patient s name, procedure, and site/side. (The Joint Commission universal protocol was followed.)  Yes    Position:  supine    Pain:  0    Sedation:None. Local Anesthestic used  No sedation    Estimated Blood Loss: None     Condition: Stable    Comments: See dictated procedure note for full details     Areli Palumbo RN

## 2020-08-05 NOTE — PLAN OF CARE
"Report obtained from Vaibhav BRITO RN at approximately 2100.      /76 (BP Location: Left arm)   Pulse 106   Temp 99.6  F (37.6  C) (Tympanic)   Resp 18   Ht 1.626 m (5' 4\")   Wt 48.1 kg (106 lb 0.7 oz)   SpO2 (!) 91%   BMI 18.20 kg/m   Pt on 2.5LPM of O2. No c/o pain initially. Pt began c/o belly pain and requested more pain medication. PRN tylenol and oxycodone administered at 0316.     Lungs: fine crackles in bases. Pt stated she has occasional congested cough due to allergies. GI: Active bowel sounds. Regular diet tolerated without difficulty. : Pt voiding without difficulty. Foam sacral dressing intact. Trace edema noted to RLE. Pt up SBA with walker. Contact isolation maintained for ESBL. PO Vitamin K administered at 2258 for INR of 2.13.     ABX: Primaxin. PIV infusing NS @ 50mL/hr. Call light within reach. Will continue to monitor.  "

## 2020-08-05 NOTE — TELEPHONE ENCOUNTER
Received fax from [facility] in regards to Pinochavezmelissa DEJESUS Елена   :  [phone]     The following information was received via fax:    Received 19 pages of time and dates that oxycodone and flexeril were taken. I printed it and placed it on your desk. They did not ask for anything and there is no documentation that anyone requested them.       Yuli Gonzáles MA

## 2020-08-06 NOTE — PHARMACY-ANTICOAGULATION SERVICE
Pharmacy Consult-Coumadin Assessment Day #5    Michael Christiansen is a 84 year old female admitted on 8/2/2020 with Pyelonephritis    Primary Indication(s) for Anticoagulation: Afib    Goal INR: 2-3    FYI, patient is followed by the Anticoagulation/Protime Clinic at: [facility]    Patient Active Problem List   Diagnosis     Essential hypertension     Osteoarthritis     Permanent atrial fibrillation (H)     Health care directive on file     Chronic pain syndrome     History of total knee replacement     Radiculitis     Osteoarthritis of knee     Long-term (current) use of anticoagulants [Z79.01]     Osteoporosis     Malignant neoplasm of right breast in female, estrogen receptor positive, unspecified site of breast (H)     Pulmonary hypertension-moderate     Moderate tricuspid insufficiency     Anticoagulated on Coumadin     LISA (generalized anxiety disorder)     Acute blood loss as cause of postoperative anemia     Lumbar pain with radiation down right leg     Lumbar spinal stenosis     Protein-calorie malnutrition (H)     Valvular heart disease     Unexplained weight loss     Hx of rheumatic fever     Chronic, continuous use of opioids     ILD (interstitial lung disease) (H)     Left upper quadrant pain     Severe aortic stenosis     Other ill-defined heart diseases     Status post coronary angiogram     Coronary artery disease involving native coronary artery of native heart without angina pectoris     Dyslipidemia     Left-sided carotid artery disease (H)     Moderate pulmonary arterial systolic hypertension (H)     Status post transcatheter aortic valve replacement     BARRERA (acute kidney injury) (H)     Closed right hip fracture (H)     Fall from standing, initial encounter     Fracture of acromial end of right clavicle     Syncope and collapse     Urinary tract infection associated with indwelling urethral catheter (H)     Nursing Home Visit     Pyelonephritis     Hyponatremia      Volume depletion     ESBL (extended spectrum beta-lactamase) producing bacteria infection     Bacteremia       Patient previously anticoagulated on Coumadin at a dose of 22.5 mg/week; dosed as:  (5 mg (5 mg x 1 tab) every Mon, Fri; 2.5 mg (5 mg x 0.5 tab)) all other days    Factors that may increase patient's bleeding risk and/or sensitivity to warfarin (Coumadin) include: age, infection, interaction with cephalexin (taking at home--not continued upon admission), poor oral intake over past several days prior to admission, Zosyn x 2 days (8/2-8/3)     Factors that may decrease patient's sensitivity to warfarin (Coumadin) include: 10 mg oral Vit K given on 8/4/2020    Dietary Considerations: normal intake for someone her size    Anticoagulation Dose History     Recent Dosing and Labs Latest Ref Rng & Units 7/27/2020 8/2/2020 8/3/2020 8/4/2020 8/4/2020 8/5/2020 8/6/2020    Warfarin 2.5 mg - - - - - 10mg Vit K PO 5 mg -    INR 0.86 - 1.14 4.2(A) 4.19(H) 4.28(H) 2.94(H) 2.13(H) 1.60(H) 1.47(H)    INR 0.86 - 1.14 - - HELD HELD -HELD - -        Assessment/Plan: INR subtherapeutic. Will give 5 mg again today, which is higher than normal home dose. Still on Lovenox 1mg/kg bridge.     Thank You for the Consult. Will continue to follow.    Philomena Wilkerson [facility] ....................  8/6/2020   11:37 AM

## 2020-08-06 NOTE — PLAN OF CARE
"Pt with an increase in her WBC;started on Levaquin IV today. Received Roxicodone for pain per request with relief. Uses walker and ambulates to BR, monitoring I&O. AM cares done. PICC line to left upper arm flushes without difficulty. Daughter her and updated, requested for  to contact her regarding possible long term placement; \"sticky note\" for . Pt alert and communicated needs and uses call light appropriately.   "

## 2020-08-06 NOTE — PLAN OF CARE
Pt A&O x4, VSS on 2.5L of O2 via NC. C/o 6/10 pain in back and abdomen, prn tylenol and roxicodone given x1. HR irregular, lungs have crackles throughout. BS active, denies any N&V. Up with SBA to bathroom. Dbl lumen PICC in left arm is SL, both ports flush and draw. IV primaxin in use. Pt slept throughout night. No other significant events, will continue to monitor.

## 2020-08-06 NOTE — PLAN OF CARE
Face to face report given with opportunity to observe patient.    Report given to DAVI Limon   8/6/2020  7:02 AM

## 2020-08-06 NOTE — PHARMACY
[facility]    Pharmacy      Antimicrobial Stewardship Note     Current antimicrobial therapy:  Anti-infectives (From now, onward)    Start     Dose/Rate Route Frequency Ordered Stop    08/06/20 0715  levofloxacin (LEVAQUIN) infusion 750 mg      750 mg  100 mL/hr over 90 Minutes Intravenous EVERY 24 HOURS 08/06/20 0712      08/03/20 0830  imipenem-cilastatin (PRIMAXIN) 500 mg in sodium chloride 0.9 % 100 mL intermittent infusion      500 mg  over 30 Minutes Intravenous EVERY 6 HOURS 08/03/20 0754          Indication: HCAP (Levaquin), Bacteremia/pyelonephritis (Primaxin)    Days of Therapy: 1 (Levaquin), 4 (Primaxin)     Pertinent labs:  Creatinine   Creatinine   Date Value Ref Range Status   08/06/2020 0.50 (L) 0.52 - 1.04 mg/dL Final   08/05/2020 0.44 (L) 0.52 - 1.04 mg/dL Final   08/03/2020 0.62 0.52 - 1.04 mg/dL Final     WBC   WBC   Date Value Ref Range Status   08/06/2020 27.2 (H) 4.0 - 11.0 10e9/L Final   08/05/2020 26.8 (H) 4.0 - 11.0 10e9/L Final   08/03/2020 14.9 (H) 4.0 - 11.0 10e9/L Final     Procalcitonin   Procalcitonin   Date Value Ref Range Status   08/06/2020 0.28 ng/ml Final     Comment:     0.25-0.49 ng/ml  Possible early systemic infection or localized infection.     Recommendation: Encourage antibiotics only in the correct clinical context.   Consider obtaining blood cultures or other relevant cultures. Recheck PCT in   6-12 hours to ensure baseline low level. If repeat PCT is rising, consider   early systemic infection and consider starting antibiotics.     08/05/2020 0.26 ng/ml Final     Comment:     0.25-0.49 ng/ml  Possible early systemic infection or localized infection.     Recommendation: Encourage antibiotics only in the correct clinical context.   Consider obtaining blood cultures or other relevant cultures. Recheck PCT in   6-12 hours to ensure baseline low level. If repeat PCT is rising, consider   early systemic infection and consider starting antibiotics.     08/02/2020  0.72 ng/ml Final     Comment:     0.50-1.99 ng/ml  Moderate risk of systemic infection.  Recommendation:   Recommend antibiotics. Evaluate culture results and clinical features to   target antibacterial therapy. Obtain blood cultures and other relevant   cultures if not done.  If empiric antibiotics were started, recheck PCT in: 2 days to guide   antibiotic de-escalation.  Discontinue or de-escalate antibiotics when PCT   concentration is <80% of peak or abs PCT <0.5. If empiric antibiotics were NOT   started, recheck PCT in 6-24 hours to re-evaluate need for antibiotics.       CRP   CRP Inflammation   Date Value Ref Range Status   10/10/2019 <2.9 0.0 - 8.0 mg/L Final   11/16/2018 7.9 0.0 - 8.0 mg/L Final   11/14/2018 20.2 (H) 0.0 - 8.0 mg/L Final       Culture Results:   Collected  Updated  Procedure  Result Status     08/05/2020 1144  08/06/2020 0830  Sputum Culture Aerobic Bacterial [[id number]]    Sputum     Preliminary result  Component  Value    Specimen Description  Sputum    Culture Micro  Culture in progress P            08/05/2020 1144  08/05/2020 1227  Gram stain [[id number]]    (Abnormal)    Sputum     Final result  Component  Value    Specimen Description  Sputum    Gram Stain Smear  >25 PMNs/low power field    Gram Stain Smear  Rare   Gram positive cocci   Abnormal      Gram Stain Smear  Rare   Yeast   Abnormal              08/05/2020 1144  08/06/2020 0901  Urine Culture Aerobic Bacterial [[id number]]    Midstream Urine from Urine clean catch     Preliminary result  Component  Value    Specimen Description  Midstream Urine    Culture Micro  Culture in progress P            08/02/2020 1129  08/06/2020 0613  Blood culture [[id number]]    Blood     Preliminary result  Component  Value    Specimen Description  Blood    Culture Micro  No growth after 4 days P            08/02/2020 1042  08/05/2020 1349  Blood culture [[id number]]     (Abnormal)    Blood    Left Arm     Preliminary result  Component  Value    Specimen  Description  Blood Left Arm    Culture Micro  1 of 2 Bottles   Escherichia coli ESBL   Abnormal   P    Culture Micro  Critical Value:   Gram stain result of bottle 1 called to and read back by Briana Zamora at 0510 on 8/3/20 by    Radha Arita.   P    Culture Micro  Critical Value:   Susceptibility results called to and read back by   Melinda Martinez at 0657 on 08/05/20 by Marquis Addison   P            07/31/2020 0405  08/03/2020 0745  Urine Culture Aerobic Bacterial [[id number]]     (Abnormal)    Unspecified Urine     Final result  Component  Value    Specimen Description  Unspecified Urine    Culture Micro  >100,000 colonies/mL   Escherichia coli ESBL   Abnormal      Culture Micro  Significant value called to and read back by   Dr Reyna at 0745 on 08/03/20 by Marquis Addison         Escherichia coli esbl        MARTIN (Preliminary)      Amoxicillin/Clav  16 ug/mL  Intermediate*      AMPICILLIN  >=32 ug/mL  Resistant      AMPICILLIN/SULBACTAM  >=32 ug/mL  Resistant      CEFAZOLIN  >=64 ug/mL  Resistant*      CEFEPIME  2 ug/mL  Resistant      CEFTAZIDIME  16 ug/mL  Resistant      CEFTRIAXONE  >=64 ug/mL  Resistant      CIPROFLOXACIN  >=4 ug/mL  Resistant      ERTAPENEM  <=0.5 ug/mL  Sensitive      Ext Spect B Lac Prod  Positive   Resistant*      GENTAMICIN  >=16 ug/mL  Resistant      IMIPENEM  <=0.25 ug/mL  Sensitive      LEVOFLOXACIN  >=8 ug/mL  Resistant      Piperacillin/Tazo  <=4 ug/mL  Sensitive      TOBRAMYCIN  >=16 ug/mL  Resistant      Trimethoprim/Sulfa  >=16/304 ug/mL  Resistant       Recommendations/Interventions:  1. Levaquin added for possible HCAP, double pseudomonas coverage. WBC increased. Sputum culture pending. No recommendations at this time    Philomena Wilkerson, [facility]  August 6, 2020

## 2020-08-06 NOTE — PLAN OF CARE
"Reason for hospital stay:  Pyelonephritis/pneumonia    Living situation PTA: long term care   Most recent vitals: /67 (BP Location: Left arm)   Pulse 108   Temp 97.7  F (36.5  C) (Tympanic)   Resp 22   Ht 1.626 m (5' 4\")   Wt 49.4 kg (109 lb)   SpO2 92%   BMI 18.71 kg/m      Pain Management:  denies pain   LOC:  oriented x4  Cardiac:  s1s2 HR irregular apical   Respiratory:  fine crackles throughout - good cough with production light colored/clear mucous   GI:  abd flat soft nontender - BS active x4  :  voids spontaneously   Skin Issues:  bruising to right ankles - foam to coccyx CDI    Face to face report given with opportunity to observe patient.    Report given to Vaibhav Horvath   8/5/2020  11:13 PM      "

## 2020-08-06 NOTE — PROGRESS NOTES
[facility]    Hospitalist Progress Note    Date of Service (when I saw the patient): 08/06/2020    Assessment & Plan       Pyelonephritis: CAUTI present on admission. Due to ESBL,  noted in urine 7/7 at [facility] where she was for right hip fracture. She was sent on Cipro which isd resistant. 7/10 her antibiotics were changed to Augmentin but that is also resistant. She has been on Imipenem since arrival as we had previous culture and sensitivity. Her blood culture also positive for ESBL. She will need prolonged antibiotics so PICC was placed today. Will switch to Ertapenem for daily dosing on discharge.  -8/5: Sudden worsening of leukocytosis, WBC 26.8 from 14.9. Will re-culture urine for another bacteria resistant to imipenem though that would be unlikely. No abdominal symptoms, she does have increased sputum production will get culture of that. CXR shows interstitial edema making it difficult to see any possible pneumonia. She is still requiring 2 liters of oxygen but may be due to edema.     Possible HCAP: Present on admission. She has bilateral opacities on arrival CXR with some dyspnea, minimal hypoxia that initially did improve with Imipenem. Repeat CXR shows worsening opacities throughout both lungs,some of this is likely edema as she did respond well to lasix with large output. However, her leukocytosis continues to worsen despite continued imipenem. She is coughing up some sputum though not particularly thick or tenacious-did collect sputum sample 8/5. Still requiring 2 liters of oxygen but overall her dyspnea improved dramatically after lasix dose yesterday.  Will add Levaquin for double coverage of pseudomonas.      Volume depletion: Resolved.   -8/5:Actually now volume overloaded with >5 pound weight gain since admission and increased opacities, dyspnea and hypoxia. Trial dose of Lasix this morning yielded >1500ml out in only 4 hours. Will give 40meq potassium. Consider repeat dosing later  this afternoon depending on her subjective symptoms and hypoxia.   -8/6: Urine output down overnight, she diuresed over 2 liters and is down 2 kilos. Will hold further diuresis for now.       Bacteremia: ESBL due to pyelonephritis. Complicating issue is both recent hip ORIF with hardware in place 7/2/20 and TAVR that was completed 5/5/20. To prevent seeding and cardiac complications will need longer term antibiotics as above.       Hypoxia: Has continued to require 2 liters of oxygen despite interventions. PCXR shows increased pulmonary edema and bilateral opacities. Responded dramatically to single dose IV lasix. Will wean oxygen as able.       Essential hypertension: Stable, running 140-160's systolic.       Permanent atrial fibrillation (H): Rates well controlled with understandable increased with activity due to dyspnea.       Hyponatremia: Now resolved. Likely due to volume depletion on arrival.       Long-term (current) use of anticoagulants [Z79.01]: For chronic A-fib as well as recent TAVR. Per current recommendations she should be on full anticoagulation for TAVR for 3 months. She was bridged with Lovenox with her hip surgery. She received Vit K for PICC line placement, will restart coumadin per pharmacy dosing protocol and bridge with lovenox until INR >1.6. Suspect tomorrow INR will be lower than it is today.       Pulmonary hypertension-moderate: Likely contributing factor to her ongoing hypoxia.     DVT Prophylaxis: Enoxaparin (Lovenox) SQ  Code Status: Full Code    Disposition: Expected discharge in 1-3 days once infction markers consistently improving.    Luna Tejeda, CNP    Interval History   Feels overall improved,not dyspneic at rest and feels overall dyspnea has improved with activity as well. She denies chest pain or abdominal pain. No nausea.     -Data reviewed today: I reviewed all new labs and imaging results over the last 24 hours.    Physical Exam   Temp: 97.8  F (36.6  C) Temp src:  Tympanic BP: 174/79 Pulse: 104 Heart Rate: (!) 122 Resp: 20 SpO2: 92 % O2 Device: Nasal cannula Oxygen Delivery: 3 LPM  Vitals:    08/04/20 0600 08/05/20 0556 08/06/20 0802   Weight: 48.1 kg (106 lb 0.7 oz) 49.4 kg (109 lb) 48.4 kg (106 lb 11.2 oz)     Vital Signs with Ranges  Temp:  [97.7  F (36.5  C)-98.7  F (37.1  C)] 97.8  F (36.6  C)  Pulse:  [104-108] 104  Heart Rate:  [113-122] 122  Resp:  [18-22] 20  BP: (134-174)/(67-79) 174/79  SpO2:  [92 %-93 %] 92 %  I/O last 3 completed shifts:  In: 408 [P.O.:100; I.V.:308]  Out: 2015 [Urine:2015]    PICC Double Lumen 08/05/20 Left Basilic (Active)   Site Assessment WDL 08/06/20 0800   External Cath Length (cm) 7 cm 08/02/20 0000   Dressing Intervention Chlorhexidine patch;Transparent;New dressing 08/06/20 0800   Lumen A - Color PURPLE 08/06/20 0800   Lumen A - Status blood return noted;saline locked;cap changed 08/06/20 0800   Lumen B - Color RED 08/06/20 0800   Lumen B - Status blood return noted;saline locked;cap changed 08/06/20 0800   Extravasation? No 08/06/20 0800   Number of days: 1       Pressure Injury 08/02/20 Left Coccyx small pea size open area (Active)   Wound Base Other (Comment) 08/06/20 0802   Anabel-wound Assessment Blanchable erythema 08/04/20 2049   Estimated Circumference ( if not measured dime sized 08/04/20 2049   Drainage Amount None 08/04/20 2049   Dressing Foam 08/05/20 1601   Dressing Status Clean, dry, intact 08/06/20 0802   Number of days: 4     Line/device assessment(s) completed for medical necessity    Constitutional: Awake,alert, no acute distress.   Respiratory: Fine crackles bilateral bases but dramatic improvement in air movement overall and crackles have improved as well. No dyspnaa or accessory muscle use at rest today.    Cardiovascular: HR irregular, +murmur, trace peripheral edema.   GI: Soft, nontender, bowel sounds positive.   Skin/Integumen: No rashes, open areas or unusual bruising       Medications     - MEDICATION  INSTRUCTIONS -       Warfarin Therapy Reminder         enoxaparin ANTICOAGULANT  1 mg/kg Subcutaneous Q12H     imipenem-cilastatin (PRIMAXIN) IV  500 mg Intravenous Q6H     lactobacillus rhamnosus (GG)  1 capsule Oral BID     levofloxacin  750 mg Intravenous Q24H     metoprolol tartrate  12.5 mg Oral BID     omeprazole  20 mg Oral QAM AC     sertraline  50 mg Oral Daily     simvastatin  20 mg Oral Daily     sodium chloride (PF)  10 mL Intravenous Q7 Days     sodium chloride (PF)  3 mL Intracatheter Q8H       Data   Recent Labs   Lab 08/06/20  0541 08/05/20  0544 08/04/20  2150  08/03/20  0506  08/02/20  1050   WBC 27.2* 26.8*  --   --  14.9*  --  19.2*   HGB 10.1* 10.3*  --   --  9.5*  --  11.7   MCV 92 93  --   --  94  --  91   * 455*  --   --  426  --  520*   INR 1.47* 1.60* 2.13*   < > 4.28*   < >  --     135  --   --  135  --  131*   POTASSIUM 4.0 3.9  --   --  4.3  --  5.3   CHLORIDE 101 103  --   --  103  --  97   CO2 31 28  --   --  27  --  28   BUN 15 14  --   --  18  --  20   CR 0.50* 0.44*  --   --  0.62  --  0.67   ANIONGAP 3 4  --   --  5  --  6   LOIS 8.5 8.4*  --   --  7.9*  --  8.4*   * 122*  --   --  118*  --  164*   ALBUMIN  --   --   --   --   --   --  2.4*   PROTTOTAL  --   --   --   --   --   --  7.4   BILITOTAL  --   --   --   --   --   --  0.7   ALKPHOS  --   --   --   --   --   --  187*   ALT  --   --   --   --   --   --  20   AST  --   --   --   --   --   --  43   TROPI  --   --   --   --   --   --  <0.015    < > = values in this interval not displayed.       No results found for this or any previous visit (from the past 24 hour(s)).

## 2020-08-07 NOTE — PLAN OF CARE
Face to face report given with opportunity to observe patient.    Report given to Vaibhav PRESTON RN   8/6/2020  7:03 PM

## 2020-08-07 NOTE — PLAN OF CARE
Pt A&O x4, VSS on 2.5L of O2 via NC. Pt did have an episode where she desatted to 68% while ambulating to the bathroom, placed pt on 5L via NC and pts sats slowly chuckie back to the 90s. C/o pain in lower back and abdomen, prn tylenol and oxycodone given x1. HR irregular and lungs have crackles throughout. BS active. Pt up with SBA and walker. Dbl lumen picc in left arm flushes and draws, currently saline locked. IV primaxin and levaquin in use. Encouraging pt to increase PO intake. Urine output continued to be low this shift. Pt slept throughout night. No other significant events,  will continue to monitor.

## 2020-08-07 NOTE — PLAN OF CARE
Face to face report given with opportunity to observe patient.    Report given to Denise Jeffries RN   8/7/2020  6:59 PM

## 2020-08-07 NOTE — PROGRESS NOTES
Spoke with daughter, Hui.  She inquired about options for Michael after short term rehab.  Discussed returning home, returning to her home vs structured setting ([facility] or adult foster).  Will provide [facility] resource guide, also encouraged reaching out to the [facility] in regards to resources as she asked about income guidelines.  Michael to return to [facility] for therapy and IV antibiotics.

## 2020-08-07 NOTE — PROGRESS NOTES
Received message back from [facility] advising that they have sent it out to their infusion center in [place].  She anticipates to know more Monday/Tuesday.  Updated Luna Tejeda NP.

## 2020-08-07 NOTE — PROGRESS NOTES
Left message for Katlin at [facility], inquiring how quickly they would anticipate being able to obtain supplies for IV antibiotic therapy.

## 2020-08-07 NOTE — PROGRESS NOTES
Medication order sent to [facility].  Spoke with [facility] in regards to this.  Awaiting return call as to how quickly they will be able to obtain the medication.

## 2020-08-07 NOTE — SIGNIFICANT EVENT
Sepsis Evaluation Progress Note    I was called to see Michael Christiansen due to abnormal vital signs triggering the Sepsis SIRS screening alert. She is known to have an infection.     Physical Exam   Vital Signs:  Temp: 97.7  F (36.5  C) Temp src: Tympanic BP: 168/68 Pulse: 107 Heart Rate: (!) 125 Resp: 20 SpO2: 93 % O2 Device: Nasal cannula Oxygen Delivery: 2.5 LPM    Lab:  Lactic Acid   Date Value Ref Range Status   08/05/2020 1.0 0.7 - 2.0 mmol/L Final     Lactate for Sepsis Protocol   Date Value Ref Range Status   08/06/2020 1.5 0.7 - 2.0 mmol/L Final       The patient is at baseline mental status.     The rest of their physical exam is significant for no changes    Assessment & Plan   NO EVIDENCE OF SEPSIS at this time.  Vital sign, physical exam, and lab findings are likely due to increased heart rate was with ambulation.    Disposition: The patient will remain on the current unit. We will continue to monitor this patient closely..  Luna Tejeda NP    Sepsis Criteria   Sepsis: 2+ SIRS criteria due to infection  Severe Sepsis: Sepsis AND 1+ new sign of acute organ dysfunction (Note: lactate >2 is organ dysfunction)  Septic Shock: Sepsis AND hypotension despite volume resuscitation with 30 ml/kg crystalloid

## 2020-08-07 NOTE — PLAN OF CARE
Patient up to the bathroom, has the urge to have a bowel movement but not able to go. Voided in toilet. Will order prune juice with lunch. Covid swab done. PICC line flushed easily now after IV antibiotic infusion.

## 2020-08-07 NOTE — PLAN OF CARE
Patient alert and oriented x4 . Skin color very pale. Feet are cold and toes are very red azeb, cyanotic in color. States she always has cold feet.  PICC line patent, but not able to get blood back for lab draw. Has fine crackles throughout lungs. Coughing up some clear phlegm at times. Edema noted in lower extremities. Has some edema on lower thigh too on right leg. Ambulates to the bathroom with walker and gait steady. Moves ok. Feels the urge to have a bowel movement but not able to. Only passes a small amount of stool at times, nothing significant. Refused to get anything for it like a supp, only wants prune juice. Rested in bed and sat up in chair a few times today. Has been to the bathroom several times today. Daughter here now . Offers no further complaints.

## 2020-08-07 NOTE — PHARMACY-ANTICOAGULATION SERVICE
Pharmacy Consult-Coumadin Assessment Day #6    Michael Christiansen is a 85 year old female admitted on 8/2/2020 with Pyelonephritis    Primary Indication(s) for Anticoagulation: Afib    Goal INR: 2-3    FYI, patient is followed by the Anticoagulation[facility]time Clinic at: [facility]    Patient Active Problem List   Diagnosis     Essential hypertension     Osteoarthritis     Permanent atrial fibrillation (H)     Health care directive on file     Chronic pain syndrome     History of total knee replacement     Radiculitis     Osteoarthritis of knee     Long-term (current) use of anticoagulants [Z79.01]     Osteoporosis     Malignant neoplasm of right breast in female, estrogen receptor positive, unspecified site of breast (H)     Pulmonary hypertension-moderate     Moderate tricuspid insufficiency     Anticoagulated on Coumadin     LISA (generalized anxiety disorder)     Acute blood loss as cause of postoperative anemia     Lumbar pain with radiation down right leg     Lumbar spinal stenosis     Protein-calorie malnutrition (H)     Valvular heart disease     Unexplained weight loss     Hx of rheumatic fever     Chronic, continuous use of opioids     ILD (interstitial lung disease) (H)     Left upper quadrant pain     Severe aortic stenosis     Other ill-defined heart diseases     Status post coronary angiogram     Coronary artery disease involving native coronary artery of native heart without angina pectoris     Dyslipidemia     Left-sided carotid artery disease (H)     Moderate pulmonary arterial systolic hypertension (H)     Status post transcatheter aortic valve replacement     BARRERA (acute kidney injury) (H)     Closed right hip fracture (H)     Fall from standing, initial encounter     Fracture of acromial end of right clavicle     Syncope and collapse     Urinary tract infection associated with indwelling urethral catheter (H)     Nursing Home Visit     Pyelonephritis     Hyponatremia     Volume depletion     ESBL  (extended spectrum beta-lactamase) producing bacteria infection     Bacteremia       Patient previously anticoagulated on Coumadin at 5mg MF and 2.5mg ROW    Factors that may increase patient's bleeding risk and/or sensitivity to warfarin (Coumadin) include: Levaquin    Factors that may decrease patient's sensitivity to warfarin (Coumadin) include:  A total of 10mg po Vitamin K given 08/04/20      Anticoagulation Dose History     Recent Dosing and Labs Latest Ref Rng & Units 8/2/2020 8/3/2020 8/4/2020 8/4/2020 8/5/2020 8/6/2020 8/7/2020    Warfarin 2.5 mg - - - - - 5 mg 5 mg -    INR 0.86 - 1.14 4.19(H) 4.28(H) 2.94(H) 2.13(H) 1.60(H) 1.47(H) 1.61(H)    INR 0.86 - 1.14 - - - - - - -          Assessment/Plan: At this point, INR is expectedly subtherapeutic. Will give warfarin 5mg again this evening. INR with AM labs.  Thank You for the Consult. Will continue to follow.    Rosario Sun, [facility] ....................  8/7/2020   12:23 PM

## 2020-08-07 NOTE — PLAN OF CARE
Face to face report given with opportunity to observe patient.    Report given to DAVI Brooks   8/7/2020  6:56 AM

## 2020-08-07 NOTE — PROGRESS NOTES
[facility]    Hospitalist Progress Note    Date of Service (when I saw the patient): 08/07/2020    Assessment & Plan       Pyelonephritis: CAUTI present on admission. Due to ESBL,  noted in urine 7/7 at [facility] where she was for right hip fracture. She was sent on Cipro which isd resistant. 7/10 her antibiotics were changed to Augmentin but that is also resistant. She has been on Imipenem since arrival as we had previous culture and sensitivity. Her blood culture also positive for ESBL. She will need prolonged antibiotics so PICC was placed today. Will switch to Ertapenem for daily dosing on discharge.  -8/5: Sudden worsening of leukocytosis, WBC 26.8 from 14.9. Will re-culture urine for another bacteria resistant to imipenem though that would be unlikely. No abdominal symptoms, she does have increased sputum production will get culture of that. CXR shows interstitial edema making it difficult to see any possible pneumonia. She is still requiring 2 liters of oxygen but may be due to edema.  -8/7: Will switch to ertepenem tomorrow morning in preparation for discharge and once daily dosing for total of 10 days.      Possible HCAP: Present on admission. She has bilateral opacities on arrival CXR with some dyspnea, minimal hypoxia that initially did improve with Imipenem. Repeat CXR shows worsening opacities throughout both lungs,some of this is likely edema as she did respond well to lasix with large output. However, her leukocytosis continues to worsen despite continued imipenem. She is coughing up some sputum though not particularly thick or tenacious-did collect sputum sample 8/5. Still requiring 2 liters of oxygen but overall her dyspnea improved dramatically after lasix dose yesterday.  Will add Levaquin for double coverage of pseudomonas.  -8/7: Reports that she feels her breathing has improved since yesterday. Still having sputum production but clear. Continue levaquin.      Volume depletion:  Resolved.   -8/5:Actually now volume overloaded with >5 pound weight gain since admission and increased opacities, dyspnea and hypoxia. Trial dose of Lasix this morning yielded >1500ml out in only 4 hours. Will give 40meq potassium. Consider repeat dosing later this afternoon depending on her subjective symptoms and hypoxia.   -8/6: Urine output down overnight, she diuresed over 2 liters and is down 2 kilos. Will hold further diuresis for now.       Bacteremia: ESBL due to pyelonephritis. Complicating issue is both recent hip ORIF with hardware in place 7/2/20 and TAVR that was completed 5/5/20. To prevent seeding and cardiac complications will need longer term antibiotics as above.   -8/7: Discussed with ID, will complete treatment after 10 days of antibiotics, no need for prolonged antibiotic unless she develops symptoms in the hip or recurrent bacteremia as indications for further investigation.       Hypoxia: Has continued to require 2 liters of oxygen despite interventions. PCXR shows increased pulmonary edema and bilateral opacities. Responded dramatically to single dose IV lasix. Will wean oxygen as able.   -8/7: Improving a little today, weaning oxygen as tolerating.      Essential hypertension: Stable, running 140-160's systolic.       Permanent atrial fibrillation (H): Rates well controlled with understandable increased with activity due to dyspnea.       Hyponatremia: Now resolved. Likely due to volume depletion on arrival.       Long-term (current) use of anticoagulants [Z79.01]: For chronic A-fib as well as recent TAVR. Per current recommendations she should be on full anticoagulation for TAVR for 3 months. She was bridged with Lovenox with her hip surgery. She received Vit K for PICC line placement, will restart coumadin per pharmacy dosing protocol and bridge with lovenox until INR >1.6. Suspect tomorrow INR will be lower than it is today.   -8/7: INR 1.6, stop lovenox, continue coumadin       Pulmonary hypertension-moderate: Likely contributing factor to her ongoing hypoxia.     DVT Prophylaxis: Enoxaparin (Lovenox) SQ  Code Status: Full Code    Disposition: Expected discharge in 1-3 days once infction markers consistently improving.    Luna Tejeda, CNP    Interval History   Feels overall improved,not dyspneic at rest and feels overall dyspnea has improved with activity as well. She denies chest pain or abdominal pain. No nausea.     -Data reviewed today: I reviewed all new labs and imaging results over the last 24 hours.    Physical Exam   Temp: 98  F (36.7  C) Temp src: Tympanic BP: 145/82 Pulse: (!) 121 Heart Rate: (!) 125 Resp: 20 SpO2: (!) 87 % O2 Device: Nasal cannula Oxygen Delivery: 2.5 LPM  Vitals:    08/05/20 0556 08/06/20 0802 08/07/20 0608   Weight: 49.4 kg (109 lb) 48.4 kg (106 lb 11.2 oz) 48.2 kg (106 lb 4.2 oz)     Vital Signs with Ranges  Temp:  [97.4  F (36.3  C)-98.9  F (37.2  C)] 98  F (36.7  C)  Pulse:  [121-122] 121  Heart Rate:  [125] 125  Resp:  [18-22] 20  BP: (139-145)/(72-93) 145/82  SpO2:  [70 %-100 %] 87 %  I/O last 3 completed shifts:  In: 360 [P.O.:360]  Out: 900 [Urine:900]    PICC Double Lumen 08/05/20 Left Basilic (Active)   Site Assessment WDL 08/07/20 1156   External Cath Length (cm) 7 cm 08/02/20 0000   Dressing Intervention Chlorhexidine patch 08/07/20 1156   Lumen A - Color PURPLE 08/07/20 1156   Lumen A - Status saline locked 08/07/20 1156   Lumen B - Color RED 08/07/20 1156   Lumen B - Status saline locked 08/07/20 1156   Extravasation? No 08/07/20 1156   Number of days: 2       Pressure Injury 08/02/20 Left Coccyx small pea size open area (Active)   Wound Base Erythema, non-blanchable 08/07/20 1200   Anabel-wound Assessment Blanchable erythema 08/07/20 1200   Estimated Circumference ( if not measured dime sized 08/06/20 2020   Drainage Amount None 08/04/20 2049   Dressing Foam 08/07/20 1200   Dressing Status Clean, dry, intact 08/07/20 1200   Number of days:  5     Line/device assessment(s) completed for medical necessity    Constitutional: Awake,alert, no acute distress.   Respiratory: Fine crackles bilateral bases but dramatic improvement in air movement overall and crackles have improved as well. No dyspnaa or accessory muscle use at rest today.    Cardiovascular: HR irregular, +murmur, trace peripheral edema.   GI: Soft, nontender, bowel sounds positive.   Skin/Integumen: No rashes, open areas or unusual bruising       Medications     - MEDICATION INSTRUCTIONS -       Warfarin Therapy Reminder         [START ON 8/8/2020] ertapenem (INVanz) IV  1 g Intravenous Q24H     imipenem-cilastatin (PRIMAXIN) IV  500 mg Intravenous Q6H     lactobacillus rhamnosus (GG)  1 capsule Oral BID     levofloxacin  750 mg Intravenous Q24H     metoprolol tartrate  12.5 mg Oral BID     omeprazole  20 mg Oral QAM AC     sertraline  50 mg Oral Daily     simvastatin  20 mg Oral Daily     sodium chloride (PF)  10 mL Intravenous Q7 Days     sodium chloride (PF)  3 mL Intracatheter Q8H     warfarin ANTICOAGULANT  5 mg Oral ONCE at 18:00       Data   Recent Labs   Lab 08/07/20  0542 08/06/20  0541 08/05/20  0544  08/02/20  1050   WBC 26.9* 27.2* 26.8*   < > 19.2*   HGB 10.0* 10.1* 10.3*   < > 11.7   MCV 92 92 93   < > 91    482* 455*   < > 520*   INR 1.61* 1.47* 1.60*   < >  --     135 135   < > 131*   POTASSIUM 3.7 4.0 3.9   < > 5.3   CHLORIDE 99 101 103   < > 97   CO2 32 31 28   < > 28   BUN 17 15 14   < > 20   CR 0.42* 0.50* 0.44*   < > 0.67   ANIONGAP 3 3 4   < > 6   LOIS 8.6 8.5 8.4*   < > 8.4*   * 114* 122*   < > 164*   ALBUMIN  --   --   --   --  2.4*   PROTTOTAL  --   --   --   --  7.4   BILITOTAL  --   --   --   --  0.7   ALKPHOS  --   --   --   --  187*   ALT  --   --   --   --  20   AST  --   --   --   --  43   TROPI  --   --   --   --  <0.015    < > = values in this interval not displayed.       No results found for this or any previous visit (from the past 24  hour(s)).

## 2020-08-08 PROBLEM — J84.9 ILD (INTERSTITIAL LUNG DISEASE) (H): Status: ACTIVE | Noted: 2019-01-01

## 2020-08-08 NOTE — PHARMACY-ANTICOAGULATION SERVICE
Pharmacy Consult-Coumadin Assessment Day #7    Michael Christiansen is a 85 year old female admitted on 8/2/2020 with Pyelonephritis    Primary Indication(s) for Anticoagulation: A-fib, TIA, TAVR (transcatheter aortic valve replacement)    Goal INR: 2-3    FYI, patient is followed by the [facility] at: INR drawn at [facility] ER and faxed to [facility]       Patient Active Problem List   Diagnosis     Essential hypertension     Osteoarthritis     Permanent atrial fibrillation (H)     Health care directive on file     Chronic pain syndrome     History of total knee replacement     Radiculitis     Osteoarthritis of knee     Long-term (current) use of anticoagulants [Z79.01]     Osteoporosis     Malignant neoplasm of right breast in female, estrogen receptor positive, unspecified site of breast (H)     Pulmonary hypertension-moderate     Moderate tricuspid insufficiency     Anticoagulated on Coumadin     LISA (generalized anxiety disorder)     Acute blood loss as cause of postoperative anemia     Lumbar pain with radiation down right leg     Lumbar spinal stenosis     Protein-calorie malnutrition (H)     Valvular heart disease     Unexplained weight loss     Hx of rheumatic fever     Chronic, continuous use of opioids     ILD (interstitial lung disease) (H)     Left upper quadrant pain     Severe aortic stenosis     Other ill-defined heart diseases     Status post coronary angiogram     Coronary artery disease involving native coronary artery of native heart without angina pectoris     Dyslipidemia     Left-sided carotid artery disease (H)     Moderate pulmonary arterial systolic hypertension (H)     Status post transcatheter aortic valve replacement     BARRERA (acute kidney injury) (H)     Closed right hip fracture (H)     Fall from standing, initial encounter     Fracture of acromial end of right clavicle     Syncope and collapse     Urinary tract infection associated with  indwelling urethral catheter (H)     Nursing Home Visit     Pyelonephritis     Hyponatremia     Volume depletion     ESBL (extended spectrum beta-lactamase) producing bacteria infection     Bacteremia       Patient previously anticoagulated on Coumadin at a dose of 22.5 mg/week; dosed as: 2.5 mg daily, except 5 mg Monday & Friday    Factors that may increase patient's bleeding risk and/or sensitivity to warfarin (Coumadin) include: Advanced Age, Antibiotics, IV Lasix given 8/5 & 8/3    Factors that may decrease patient's sensitivity to warfarin (Coumadin) include: Home multivitamins held (may have Vitamin K in them)    Dietary Considerations: Regular Diet    Anticoagulation Dose History     Recent Dosing and Labs 8/3/2020 8/4/2020 8/4/2020 8/5/2020 8/6/2020 8/7/2020 8/8/2020    Warfarin 2.5 mg - - - 5 mg 5 mg 5 mg -    INR 4.28(H) 2.94(H) 2.13(H) 1.60(H) 1.47(H) 1.61(H) 2.62(H)    INR - Vitamin K 10 mg PO - - - - -          Assessment/Plan: INR therapeutic today at 2.62. Will likely see another significant increase in tomorrow's INR. Will give warfarin 1 mg today. Check INR with AM labs.     Thank You for the Consult. Will continue to follow.    Callum Small [facility] ....................  8/8/2020   11:00 AM

## 2020-08-08 NOTE — PLAN OF CARE
Face to face report given with opportunity to observe patient.    Report given to Yolanda Martinez RN   8/8/2020  7:02 AM

## 2020-08-08 NOTE — PROGRESS NOTES
[facility]    Hospitalist Progress Note    Date of Service (when I saw the patient): 08/08/2020    Assessment & Plan       Pyelonephritis: CAUTI present on admission. Due to ESBL,  noted in urine 7/7 at [facility] where she was for right hip fracture. She was sent on Cipro which isd resistant. 7/10 her antibiotics were changed to Augmentin but that is also resistant. She has been on Imipenem since arrival as we had previous culture and sensitivity. Her blood culture also positive for ESBL. She will need prolonged antibiotics so PICC was placed today. Will switch to Ertapenem for daily dosing on discharge.  -8/5: Sudden worsening of leukocytosis, WBC 26.8 from 14.9. Will re-culture urine for another bacteria resistant to imipenem though that would be unlikely. No abdominal symptoms, she does have increased sputum production will get culture of that. CXR shows interstitial edema making it difficult to see any possible pneumonia. She is still requiring 2 liters of oxygen but may be due to edema.  -8/7: Will switch to ertepenem tomorrow morning in preparation for discharge and once daily dosing for total of 10 days.   -8/8: Ertepenem to start today.      Possible HCAP: Present on admission. She has bilateral opacities on arrival CXR with some dyspnea, minimal hypoxia that initially did improve with Imipenem. Repeat CXR shows worsening opacities throughout both lungs,some of this is likely edema as she did respond well to lasix with large output. However, her leukocytosis continues to worsen despite continued imipenem. She is coughing up some sputum though not particularly thick or tenacious-did collect sputum sample 8/5. Still requiring 2 liters of oxygen but overall her dyspnea improved dramatically after lasix dose yesterday.  Will add Levaquin for double coverage of pseudomonas.  -8/7: Reports that she feels her breathing has improved since yesterday. Still having sputum production but clear. Continue  levaquin.  -8/8: More dyspneic and visibly tachypneic as well as tachycardic again this morning. Inaccurate I/O by nursing. Will give trial dose of Lasix, suspect fluid overload rather than worsening of infection. Her cough and sputum production are nearly gone, though her WBC remains high but stable. All other infection markers remain normal including procal, lactic acid, no fevers.  If she has large response to lasix will repeat dose this afternoon.       Volume depletion: Resolved.   -8/5:Actually now volume overloaded with >5 pound weight gain since admission and increased opacities, dyspnea and hypoxia. Trial dose of Lasix this morning yielded >1500ml out in only 4 hours. Will give 40meq potassium. Consider repeat dosing later this afternoon depending on her subjective symptoms and hypoxia.   -8/6: Urine output down overnight, she diuresed over 2 liters and is down 2 kilos. Will hold further diuresis for now.     Fluid overload: More dyspneic and visibly tachypneic as well as tachycardic again this morning. Inaccurate I/O by nursing. Will give trial dose of Lasix, suspect fluid overload rather than worsening of infection. If she has large response will repeat dose this afternoon.       Bacteremia: ESBL due to pyelonephritis. Complicating issue is both recent hip ORIF with hardware in place 7/2/20 and TAVR that was completed 5/5/20. To prevent seeding and cardiac complications will need longer term antibiotics as above.   -8/7: Discussed with ID, will complete treatment after 10 days of antibiotics, no need for prolonged antibiotic unless she develops symptoms in the hip or recurrent bacteremia as indications for further investigation.       Hypoxia: Has continued to require 2 liters of oxygen despite interventions. PCXR shows increased pulmonary edema and bilateral opacities. Responded dramatically to single dose IV lasix. Will wean oxygen as able.   -8/7: Improving a little today, weaning oxygen as  tolerating.  -8/8: Oxygen is stable but more dyspneic/tachypneic, see above      Essential hypertension: Stable, running 140-160's systolic. Will trial increased beta blocker today.       Permanent atrial fibrillation (H): Rates well controlled with understandable increased with activity due to dyspnea.   -8/8: rates increased to 110-120's over the last 24 hours, will give lasix, increase beta blocker a bit.       Hyponatremia: Now resolved. Likely due to volume depletion on arrival.       Long-term (current) use of anticoagulants [Z79.01]: For chronic A-fib as well as recent TAVR. Per current recommendations she should be on full anticoagulation for TAVR for 3 months. She was bridged with Lovenox with her hip surgery. She received Vit K for PICC line placement, will restart coumadin per pharmacy dosing protocol and bridge with lovenox until INR >1.6. Suspect tomorrow INR will be lower than it is today.   -8/7: INR 1.6, stop lovenox, continue coumadin      Pulmonary hypertension-moderate: Likely contributing factor to her ongoing hypoxia.     DVT Prophylaxis: Enoxaparin (Lovenox) SQ  Code Status: Full Code    Disposition: Expected discharge in 1-3 days once infction markers consistently improving.    Luna Tejeda, CNP    Interval History   Worse dyspnea today, didn't sleep well due to same. She denies chest pain or abdominal pain. No nausea.     -Data reviewed today: I reviewed all new labs and imaging results over the last 24 hours.    Physical Exam   Temp: 97.9  F (36.6  C) Temp src: Oral BP: 146/73 Pulse: 110 Heart Rate: 112 Resp: 22 SpO2: 95 % O2 Device: Nasal cannula Oxygen Delivery: 4 LPM  Vitals:    08/05/20 0556 08/06/20 0802 08/07/20 0608   Weight: 49.4 kg (109 lb) 48.4 kg (106 lb 11.2 oz) 48.2 kg (106 lb 4.2 oz)     Vital Signs with Ranges  Temp:  [97.7  F (36.5  C)-98.1  F (36.7  C)] 97.9  F (36.6  C)  Pulse:  [107-118] 110  Heart Rate:  [112-135] 112  Resp:  [20-26] 22  BP: (146-168)/(68-90)  146/73  SpO2:  [87 %-95 %] 95 %  I/O last 3 completed shifts:  In: 260 [P.O.:260]  Out: 150 [Urine:150]    PICC Double Lumen 08/05/20 Left Basilic (Active)   Site Assessment WDL 08/08/20 1000   External Cath Length (cm) 10 cm 08/07/20 2104   Dressing Intervention Chlorhexidine patch;Transparent 08/08/20 1000   Dressing Change Due 08/14/20 08/07/20 2104   Lumen A - Color PURPLE 08/08/20 1000   Lumen A - Status saline locked 08/08/20 1000   Lumen B - Color RED 08/08/20 1000   Lumen B - Status infusing 08/08/20 1000   Extravasation? No 08/07/20 1745   Line Necessity Yes, meets criteria 08/08/20 1000   Number of days: 3       Pressure Injury 08/02/20 Left Coccyx small pea size open area (Active)   Wound Base Erythema, blanchable 08/08/20 0933   Anabel-wound Assessment Blanchable erythema 08/08/20 0933   Estimated Circumference ( if not measured dime sized 08/08/20 0933   Drainage Amount None 08/08/20 0933   Dressing Foam 08/08/20 0933   Dressing Status Clean, dry, intact 08/08/20 0933   Number of days: 6     Line/device assessment(s) completed for medical necessity    Constitutional: Awake,alert, no acute distress.   Respiratory: Course crackles bilateral bases with decreased air movement today. She is no longer having frequent productive cough. Accessory muscle use and tachypnea noted again today.     Cardiovascular: HR irregular, +murmur, 1+ peripheral edema.   GI: Soft, nontender, bowel sounds positive.   Skin/Integumen: No rashes, open areas or unusual bruising       Medications     - MEDICATION INSTRUCTIONS -       Warfarin Therapy Reminder         ertapenem (INVanz) IV  1 g Intravenous Q24H     furosemide  20 mg Intravenous Once     lactobacillus rhamnosus (GG)  1 capsule Oral BID     levofloxacin  750 mg Intravenous Q24H     metoprolol tartrate  25 mg Oral BID     omeprazole  20 mg Oral QAM AC     potassium chloride ER  40 mEq Oral Once     sertraline  50 mg Oral Daily     simvastatin  20 mg Oral Daily     sodium  chloride (PF)  10 mL Intravenous Q7 Days     sodium chloride (PF)  3 mL Intracatheter Q8H     warfarin ANTICOAGULANT  1 mg Oral ONCE at 18:00       Data   Recent Labs   Lab 08/08/20  0534 08/07/20  0542 08/06/20  0541  08/02/20  1050   WBC 27.8* 26.9* 27.2*   < > 19.2*   HGB 10.1* 10.0* 10.1*   < > 11.7   MCV 94 92 92   < > 91    450 482*   < > 520*   INR 2.62* 1.61* 1.47*   < >  --    * 134 135   < > 131*   POTASSIUM 3.9 3.7 4.0   < > 5.3   CHLORIDE 96 99 101   < > 97   CO2 31 32 31   < > 28   BUN 16 17 15   < > 20   CR 0.42* 0.42* 0.50*   < > 0.67   ANIONGAP 5 3 3   < > 6   LOIS 8.7 8.6 8.5   < > 8.4*   * 117* 114*   < > 164*   ALBUMIN  --   --   --   --  2.4*   PROTTOTAL  --   --   --   --  7.4   BILITOTAL  --   --   --   --  0.7   ALKPHOS  --   --   --   --  187*   ALT  --   --   --   --  20   AST  --   --   --   --  43   TROPI  --   --   --   --  <0.015    < > = values in this interval not displayed.       No results found for this or any previous visit (from the past 24 hour(s)).

## 2020-08-08 NOTE — PLAN OF CARE
Patient's O2 requirements increasing overnight. Started on 2.5 L and is now on 4 L with sats 88-91%. At one point after patient ambulated to bathroom her sats remains in the 70's and O2 had to be increased to 6 L and took approx 5 mins for patient's sats to recover to the upper 80's. Patient stated she felt more SOB tonight. Lungs coarse with crackles. HR also elevated tonight. Did sustain 110's-120's majority of night, occasionally down into the 100's A-fib- patient received 2 PRN doses of Metoprolol. Patient complaining of intermittent epigastric left sided pain and was unsure whether or not it was chest pain; EKG obtained with no change from previous one. Medicated with tylenol, flexeril, oxy, and morphine for pain control. PICC remains patent but slugging with flushing- Primaxin administered q6h overnight. Up with walker to bathroom- is voiding but missing hat.

## 2020-08-08 NOTE — PLAN OF CARE
"/73 (BP Location: Left arm)   Pulse 110   Temp 97.9  F (36.6  C) (Tympanic)   Resp 22   Ht 1.626 m (5' 4\")   Wt 48.2 kg (106 lb 4.2 oz)   SpO2 94%   BMI 18.24 kg/m      Pt alert and oriented to self and time. C/O gen pain 7/10. Hyun 5mg given 1x. Fine crackles t/o lungs. Satting 92% on 4 LPM, unable to wean O2 today. SLow rebound with activity.  HR irregular, Afib 90-110s. Increased to 140s with ambulation with quick rebound on HR.  Increased urine output post lasix admin. Ax1 to bedside commode. Family at bedside for portion of day. No falls or injuries this shift. Will continue to monitor.     Face to face report given with opportunity to observe patient.    Report given to DAVI Walker RN   8/8/2020  7:01 PM      "

## 2020-08-08 NOTE — PHARMACY-MEDICATION REGIMEN REVIEW
[facility]    Pharmacy      Antimicrobial Stewardship Note     Current antimicrobial therapy:  Anti-infectives (From now, onward)    Start     Dose/Rate Route Frequency Ordered Stop    08/08/20 0900  ertapenem (INVanz) 1 g in sodium chloride 0.9 % 50 mL intermittent infusion      1 g  100 mL/hr over 30 Minutes Intravenous EVERY 24 HOURS 08/07/20 1506      08/07/20 0000  ertapenem 1 g      1 g Intravenous EVERY 24 HOURS 08/07/20 1245 08/13/20 2359    08/06/20 0715  levofloxacin (LEVAQUIN) infusion 750 mg      750 mg  100 mL/hr over 90 Minutes Intravenous EVERY 24 HOURS 08/06/20 0712            Indication: Ertapenem: Bacteremia + Pyelonephritis; Levaquin: HCAP    Days of Therapy:   Day 1 Ertapenem (day 6/10 Carbapenem)  Day 3 Levaquin     Pertinent labs:  Creatinine   Creatinine   Date Value Ref Range Status   08/08/2020 0.42 (L) 0.52 - 1.04 mg/dL Final   08/07/2020 0.42 (L) 0.52 - 1.04 mg/dL Final   08/06/2020 0.50 (L) 0.52 - 1.04 mg/dL Final     WBC   WBC   Date Value Ref Range Status   08/08/2020 27.8 (H) 4.0 - 11.0 10e9/L Final   08/07/2020 26.9 (H) 4.0 - 11.0 10e9/L Final   08/06/2020 27.2 (H) 4.0 - 11.0 10e9/L Final     Procalcitonin   Procalcitonin   Date Value Ref Range Status   08/08/2020 0.11 ng/ml Final     Comment:     0.05-0.24 ng/ml Low risk of systemic bacterial infection. Local bacterial   infection possible.  Recommendation: Assess other clinical features of   infection. Discourage antibiotics unless strong clinical suspicion for serious   infection.     08/06/2020 0.28 ng/ml Final     Comment:     0.25-0.49 ng/ml  Possible early systemic infection or localized infection.     Recommendation: Encourage antibiotics only in the correct clinical context.   Consider obtaining blood cultures or other relevant cultures. Recheck PCT in   6-12 hours to ensure baseline low level. If repeat PCT is rising, consider   early systemic infection and consider starting antibiotics.     08/05/2020 0.26 ng/ml  Final     Comment:     0.25-0.49 ng/ml  Possible early systemic infection or localized infection.     Recommendation: Encourage antibiotics only in the correct clinical context.   Consider obtaining blood cultures or other relevant cultures. Recheck PCT in   6-12 hours to ensure baseline low level. If repeat PCT is rising, consider   early systemic infection and consider starting antibiotics.       CRP   CRP Inflammation   Date Value Ref Range Status   08/08/2020 181.0 (H) 0.0 - 8.0 mg/L Final   10/10/2019 <2.9 0.0 - 8.0 mg/L Final   11/16/2018 7.9 0.0 - 8.0 mg/L Final       Culture Results:   (8/8/20) Blood  (8/5/20) Sputum  (8/5/20) Urine = negative  (8/2/20) Blood = negative  (8/2/20) Blood = ESBL E. Coli  (8/2/20) COVID = negative    (7/31/20) Urine = ESBL E. coli  (7/7/20) Urine = ESBL E. Coli    Recent Antibiotics:     Recommendations/Interventions:  1. Ertapenem: duration of 10 days per provider notes [last dose should be 8/12/20 of carbapenem]  2. Transition to oral Levaquin when able    Callum Small, [facility]  August 8, 2020

## 2020-08-09 NOTE — PLAN OF CARE
Was able to turn down O2 to 3 L while patient slept. With any activity she de-sats into the 70's and requires her O2 to be doubled to get her sats into the 80's. After 20 minutes sats are still mid 80's on 5-6 L. Patient is resting comfortably and states her SOB is better tonight compared to last night. Will continue to closely monitor and titrate O2 as needed.

## 2020-08-09 NOTE — PLAN OF CARE
Face to face report given with opportunity to observe patient.    Report given to Yolanda Martinez RN   8/9/2020  7:07 AM

## 2020-08-09 NOTE — PLAN OF CARE
Patient discharged at 10:56 AM via cart accompanied by other:[facility] and staff. Prescriptions - None ordered for discharge. All belongings sent with patient.     Discharge instructions reviewed with patient and daughter. Listed belongings gathered and returned to patient.     Patient discharged to [facility].   Report called to DAVI Mabry receiving nurse and stat doc called at time of departure. All questions answered    Core Measures and Vaccines  Core Measures applicable during stay: No. If yes, state diagnosis: NA  Pneumonia and Influenza given prior to discharge, if indicated: N/A    Surgical Patient   Surgical Procedures during stay: NA  Did patient receive discharge instruction on wound care and recognition of infection symptoms? N/A    Left at 1053 via [facility].        Update called to Renita Mcghee. All questions answered.

## 2020-08-09 NOTE — PLAN OF CARE
Spoke with provider to update him that patients activity tolerance is worsening and that O2 requirements are increasing. Tonight patient has been able to sleep on 3 L O2 but as soon as she wakes up and does any activity her O2 has to be increased to a highest of 10 L HFNC. With activity sats will decrease to 65%- attempted increasing O2 to 6 L on normal canula with minimal improvement. The night previous O2 was able to be turned up to 6 L with improvement in sats.  Patient states her breathing does not feel worse compared to previous shifts. Provider ok with chest x-ray being portable at this time d/t patient's status.

## 2020-08-09 NOTE — PLAN OF CARE
"Patient lethargic overnight but awakens easily to voice. See previous notes regarding oxygenation. Lungs continue with fine crackles. Afib improved compared to previous night shift,  at rest 110-120 with activity. Oxy and Tylenol given earlier in the shift for pain control. Patient's PO intake minimal. Encouraged fluids t/o the night but patient would only take 1 small sip at a time. Earlier in the night patient did have a 6 beat run of v-tach and was having frequent PVCs this has since resolved- provider was made aware. Did talk with daughter on the phone for 25 minutes last night. Daughter stated she is worried her mom \"is giving up\". Patient's daughter had various other concerns about patient's status and was encouraged to come and visit today. Daughter stated she would be coming in the early afternoon.   "

## 2020-08-09 NOTE — PHARMACY-MEDICATION REGIMEN REVIEW
[facility]    Pharmacy      Antimicrobial Stewardship Note     Current antimicrobial therapy:  Anti-infectives (From now, onward)    Start     Dose/Rate Route Frequency Ordered Stop    08/08/20 0900  ertapenem (INVanz) 1 g in sodium chloride 0.9 % 50 mL intermittent infusion      1 g  100 mL/hr over 30 Minutes Intravenous EVERY 24 HOURS 08/07/20 1506      08/07/20 0000  ertapenem 1 g      1 g Intravenous EVERY 24 HOURS 08/07/20 1245 08/13/20 2359    08/06/20 0715  levofloxacin (LEVAQUIN) infusion 750 mg      750 mg  100 mL/hr over 90 Minutes Intravenous EVERY 24 HOURS 08/06/20 0712            Indication: Ertapenem: Bacteremia + Pyelonephritis; Levaquin: HCAP     Days of Therapy:   Day 2 Ertapenem (day 7/10 Carbapenem)  Day 4 Levaquin     Pertinent labs:  Creatinine   Creatinine   Date Value Ref Range Status   08/09/2020 0.47 (L) 0.52 - 1.04 mg/dL Final   08/08/2020 0.42 (L) 0.52 - 1.04 mg/dL Final   08/07/2020 0.42 (L) 0.52 - 1.04 mg/dL Final     WBC   WBC   Date Value Ref Range Status   08/09/2020 27.9 (H) 4.0 - 11.0 10e9/L Final   08/08/2020 27.8 (H) 4.0 - 11.0 10e9/L Final   08/07/2020 26.9 (H) 4.0 - 11.0 10e9/L Final     Procalcitonin   Procalcitonin   Date Value Ref Range Status   08/08/2020 0.11 ng/ml Final     Comment:     0.05-0.24 ng/ml Low risk of systemic bacterial infection. Local bacterial   infection possible.  Recommendation: Assess other clinical features of   infection. Discourage antibiotics unless strong clinical suspicion for serious   infection.     08/06/2020 0.28 ng/ml Final     Comment:     0.25-0.49 ng/ml  Possible early systemic infection or localized infection.     Recommendation: Encourage antibiotics only in the correct clinical context.   Consider obtaining blood cultures or other relevant cultures. Recheck PCT in   6-12 hours to ensure baseline low level. If repeat PCT is rising, consider   early systemic infection and consider starting antibiotics.     08/05/2020 0.26  ng/ml Final     Comment:     0.25-0.49 ng/ml  Possible early systemic infection or localized infection.     Recommendation: Encourage antibiotics only in the correct clinical context.   Consider obtaining blood cultures or other relevant cultures. Recheck PCT in   6-12 hours to ensure baseline low level. If repeat PCT is rising, consider   early systemic infection and consider starting antibiotics.       CRP   CRP Inflammation   Date Value Ref Range Status   08/09/2020 145.0 (H) 0.0 - 8.0 mg/L Final   08/08/2020 181.0 (H) 0.0 - 8.0 mg/L Final   10/10/2019 <2.9 0.0 - 8.0 mg/L Final       Culture Results:   (8/8/20) Blood  (8/5/20) Sputum = light growth Candida albicans  (8/5/20) Urine = negative  (8/2/20) Blood = negative  (8/2/20) Blood = ESBL E. Coli  (8/2/20) COVID = negative       (7/31/20) Urine = ESBL E. coli  (7/7/20) Urine = ESBL E. Coli    Recent Antibiotics:       Recommendations/Interventions:  1. Ertapenem: duration of 10 days per provider notes [last dose should be 8/12/20 of carbapenem]  2. Transition to oral Levaquin when able  3. Trend Procalcitonin    Callum Small, [facility]  August 9, 2020

## 2020-08-09 NOTE — DISCHARGE SUMMARY
[facility]    Discharge/Transfer Summary  Hospitalist    Date of Admission:  8/2/2020  Date of Discharge:  8/9/2020  Discharging Provider: Luna Tejeda CNP  Date of Service (when I saw the patient): 08/09/20    Discharge Diagnoses     Principal Problem:    Pyelonephritis  Active Problems:    Volume depletion    Bacteremia    Essential hypertension    Permanent atrial fibrillation (H)    Chronic pain syndrome    Long-term (current) use of anticoagulants [Z79.01]    Pulmonary hypertension-moderate    Valvular heart disease    ILD (interstitial lung disease) (H)    Hyponatremia      History of Present Illness   From admission: Michael Christiansen is a 84 year old woman who presents with weakness and fatigue for the past week.  In retrospect while after recent hospitalization she transferred to nursing home now nearly a month ago for subacute rehabilitation she showed some improvement she is not been feeling entirely well over this period of time.  She is also developed urinary frequency with minimal dysuria particularly over the past 2-4 days.  Urine culture obtained 7/31 has shown significant titer of gram-negative rods.  Preemptive cephalexin was begun approximately that time.  Fever 38.2 on arrival to emergency department otherwise she is not aware of fevers.  No cough.  No chest discomfort.  No headache.  No orthostasis.  No unilateral weakness or paresthesias.  Some anorexia over the past several days with excellent appetite before that time.  No nausea or vomiting.  No diarrhea.  Interval history is somewhat complicated.  Pertinent history goes back to early May when she was admitted at [facility] with syncope.  She underwent TAVR 5/5 with 23 mm Landa S3 valve after this episode of syncope.  In fact she had been undergoing extensive evaluation for possible aortic valve replacement first with Dr. Kirby and subsequently at [facility] his family wished to use an institution closer  physically.  Valve replacement was essentially unremarkable although she had several episodes subsequently of hypotension requiring discontinuation of losartan and diltiazem as well as a decrease in the dose of metoprolol.  She did have at least one episode of volume overload requiring short periods of active diuresis.  She was readmitted 5/15 after another episode of syncope.  Evaluation also showed transient left bundle branch block.  Echocardiogram revealed adequate AVR function.  She was evaluated in part with outpatient cardiac monitoring.  This is well as inpatient cardiac monitoring showed no significant arrhythmias with the exception of her known chronic atrial fibrillation.  Subsequently 7/3 she fell from a standing height and subsequently was found to have a right intertrochanteric and proximal femur fracture.  She underwent ORIF subsequently.  She also sustained a right medial clavicular fracture treated conservatively.  During this hospitalization records indicate catheter associated urinary tract infection.  Preemptively treated with ciprofloxacin.  Review of records shows significant titer of ESBL E. coli as expected relatively resistant although sensitive to piperacillin/tazobactam.  She subsequently was transferred to skilled nursing facility, as she had been after her TAVR, for subacute rehabilitation.  As noted while she showed some improvement initially overall she is remained fatigued and not at her baseline.  She is had more marked lower urinary tract symptoms over the past 3-4 days but in retrospect these may have been present for a longer period of time.    Hospital Course      Hypoxia: Has continued to require 2 liters of oxygen despite interventions. PCXR shows increased pulmonary edema and bilateral opacities. Responded dramatically to single dose IV lasix. Will wean oxygen as able.   -8/7: Improving a little today, weaning oxygen as tolerating.  -8/8: Oxygen is stable 2-4L but more  dyspneic/tachypneic, see above   -8/9: Repeat CXR shows slight worsening of opacities, certianly no improvement after diuresis/expanded abx coverage. Increasingly dyspneic with exertion and for longer periods of time requiring increasing oxygen. At rest she is stable at 3-5 liters. With worsening respiratory status despite intervnetions and persistent leukocytosis will transfer back to [facility] for pulmonology/cardiology availability. Discussed course with patient and recommendations for transfer and she is agreeable as is her daughter Hui. I spoke with Dr. Mcgill at [facility] who accepted her in transfer.     Pyelonephritis: CAUTI present on admission. Due to ESBL,  noted in urine 7/7 at [facility] where she was for right hip fracture. She was sent on Cipro which isd resistant. 7/10 her antibiotics were changed to Augmentin but that is also resistant. She has been on Imipenem since arrival as we had previous culture and sensitivity. Her blood culture also positive for ESBL. She will need prolonged antibiotics so PICC was placed today. Will switch to Ertapenem for daily dosing on discharge.  -8/5: Sudden worsening of leukocytosis, WBC 26.8 from 14.9. Will re-culture urine for another bacteria resistant to imipenem though that would be unlikely. No abdominal symptoms, she does have increased sputum production will get culture of that. CXR shows interstitial edema making it difficult to see any possible pneumonia. She is still requiring 2 liters of oxygen but may be due to edema.  -8/7: Will switch to ertepenem tomorrow morning in preparation for discharge and once daily dosing for total of 10 days.   -8/8: Ertepenem to start today.       Possible HCAP: Present on admission. She has bilateral opacities on arrival CXR with some dyspnea, minimal hypoxia that initially did improve with Imipenem. Repeat CXR shows worsening opacities throughout both lungs,some of this is likely edema as she did respond well to lasix with  large output. However, her leukocytosis continues to worsen despite continued imipenem. She is coughing up some sputum though not particularly thick or tenacious-did collect sputum sample 8/5. Still requiring 2 liters of oxygen but overall her dyspnea improved dramatically after lasix dose yesterday.  Will add Levaquin for double coverage of pseudomonas.  -8/7: Reports that she feels her breathing has improved since yesterday. Still having sputum production but clear. Continue levaquin.  -8/8: More dyspneic and visibly tachypneic as well as tachycardic again this morning. Inaccurate I/O by nursing. Will give trial dose of Lasix, suspect fluid overload rather than worsening of infection. Her cough and sputum production are nearly gone, though her WBC remains high but stable. All other infection markers remain normal including procal, lactic acid, no fevers.  If she has large response to lasix will repeat dose this afternoon.        Volume depletion: Resolved.   -8/5:Actually now volume overloaded with >5 pound weight gain since admission and increased opacities, dyspnea and hypoxia. Trial dose of Lasix this morning yielded >1500ml out in only 4 hours. Will give 40meq potassium. Consider repeat dosing later this afternoon depending on her subjective symptoms and hypoxia.   -8/6: Urine output down overnight, she diuresed over 2 liters and is down 2 kilos. Will hold further diuresis for now.      Fluid overload:8/8 More dyspneic and visibly tachypneic as well as tachycardic again this morning. Inaccurate I/O by nursing. Will give trial dose of Lasix, suspect fluid overload rather than worsening of infection. If she has large response will repeat dose this afternoon.   -8/9: Had nearly 3 liters out after first dose of Lasix, repeat 20mg lasix yesterday afternoon yielded only 500 over 8 hours. No significant improvement in dyspnea, hypoxia.        Bacteremia: ESBL due to pyelonephritis. Complicating issue is both recent  hip ORIF with hardware in place 7/2/20 and TAVR that was completed 5/5/20. To prevent seeding and cardiac complications will need longer term antibiotics as above.   -8/7: Discussed with ID, will complete treatment after 10 days of antibiotics, no need for prolonged antibiotic unless she develops symptoms in the hip or recurrent bacteremia as indications for further investigation.         Essential hypertension: Stable, running 140-160's systolic. Metoprolol increased to 25mg BID 8/8       Permanent atrial fibrillation (H): Rates well controlled with understandable increased with activity due to dyspnea.   -8/8: rates increased to 110-120's over the last 24 hours, will give lasix, increase beta blocker a bit.        Hyponatremia: Now resolved. Likely due to volume depletion on arrival.        Long-term (current) use of anticoagulants [Z79.01]: For chronic A-fib as well as recent TAVR. Per current recommendations she should be on full anticoagulation for TAVR for 3 months. She was bridged with Lovenox with her hip surgery. She received Vit K for PICC line placement, will restart coumadin per pharmacy dosing protocol and bridge with lovenox until INR >1.6. Suspect tomorrow INR will be lower than it is today.   -8/7: INR 1.6, stop lovenox, continue coumadin       Pulmonary hypertension-moderate: Likely contributing factor to her ongoing hypoxia.         Luna Tejeda CNP        Pending Results     Unresulted Labs Ordered in the Past 30 Days of this Admission     Date and Time Order Name Status Description    8/8/2020 0735 Blood culture Preliminary     8/8/2020 0735 Blood culture Preliminary     8/5/2020 1126 Sputum Culture Aerobic Bacterial Preliminary           Code Status   Full Code       Primary Care Physician   Chandrika Kumar          Discharge Disposition   Transferred [place] [facility] [place]  Condition at discharge: Stable    Consultations This Hospital Stay   [facility]  [facility]ACY TO DOSE  [facility]  PHARMACY TO DOSE WARFARIN    Time Spent on this Encounter   ILuna NP, personally saw the patient today and spent greater than 30 minutes discharging this patient.      Current Facility-Administered Medications   Medication     acetaminophen (TYLENOL) tablet 325-650 mg     albuterol (PROVENTIL) neb solution 2.5 mg     cyclobenzaprine (FLEXERIL) tablet 5 mg     ertapenem (INVanz) 1 g in sodium chloride 0.9 % 50 mL intermittent infusion     lactobacillus rhamnosus (GG) (CULTURELL) capsule 1 capsule     levofloxacin (LEVAQUIN) infusion 750 mg     lidocaine (LMX4) kit     lidocaine 1 % 0.1-1 mL     magnesium hydroxide (MILK OF MAGNESIA) suspension 30 mL     magnesium sulfate 4 g in 100 mL sterile water (premade)     melatonin tablet 1 mg     metoprolol (LOPRESSOR) injection 2.5 mg     metoprolol tartrate (LOPRESSOR) tablet 25 mg     morphine (PF) injection 1 mg     naloxone (NARCAN) injection 0.1-0.4 mg     omeprazole (priLOSEC) CR capsule 20 mg     ondansetron (ZOFRAN-ODT) ODT tab 4 mg    Or     ondansetron (ZOFRAN) injection 4 mg     oxyCODONE (ROXICODONE) tablet 5 mg     Patient is already receiving anticoagulation with heparin, enoxaparin (LOVENOX), warfarin (COUMADIN)  or other anticoagulant medication     potassium chloride (KLOR-CON) Packet 20-40 mEq     potassium chloride 10 mEq in 100 mL intermittent infusion with 10 mg lidocaine     potassium chloride 10 mEq in 100 mL sterile water intermittent infusion (premix)     potassium chloride ER (KLOR-CON M) CR tablet 20-40 mEq     senna-docusate (SENOKOT-S/PERICOLACE) 8.6-50 MG per tablet 1 tablet     sertraline (ZOLOFT) tablet 50 mg     simvastatin (ZOCOR) tablet 20 mg     sodium chloride (PF) 0.9% PF flush 10 mL     sodium chloride (PF) 0.9% PF flush 10-20 mL     sodium chloride (PF) 0.9% PF flush 3 mL     Warfarin Therapy Reminder (Check START DATE - warfarin may be starting in the FUTURE)     Medications Prior to Admission   Medication Sig  Dispense Refill Last Dose     acetaminophen (TYLENOL) 325 MG tablet Take 325-650 mg by mouth every 4 hours as needed for mild pain (Do not exceed 4000 mg per day from all sources)   7/30/2020 at 0051     acetaminophen (TYLENOL) 500 MG tablet Take 500 mg by mouth 4 times daily    8/2/2020 at 0800     Calcium-Vitamin D 600-200 MG-UNIT TABS Take 1 tablet by mouth 2 times daily   8/2/2020 at 0800     cephALEXin (KEFLEX) 500 MG capsule Take 500 mg by mouth 2 times daily X 5 days.   8/2/2020 at 0800     Glucosamine Sulfate 500 MG TABS Take 500 mg by mouth daily   8/2/2020 at 0800     melatonin 5 MG tablet Take 5 mg by mouth nightly as needed for sleep   Past Week at unknown     metoprolol tartrate (LOPRESSOR) 25 MG tablet TAKE 1/2 (ONE-HALF) TABLET BY MOUTH ONCE DAILY IN THE MORNING AND 1/2 (ONE-HALF) IN THE EVENING 30 tablet 0 8/2/2020 at 0800     Multiple Vitamins-Minerals (PRESERVISION AREDS 2 PO) Take 1 tablet by mouth 2 times daily    8/2/2020 at 0800     multivitamin, therapeutic (THERA-VIT) TABS tablet Take 1 tablet by mouth daily   8/2/2020 at 0800     omeprazole (PRILOSEC) 20 MG DR capsule Take 20 mg by mouth daily    8/1/2020 at 1600     oxyCODONE HCl (OXAYDO) 5 MG TABA Take 5-10 mg by mouth every 4 hours as needed (1 tablet for pain rated 1-4/10. 2 tablets for pain rated 5-10/10)   8/2/2020 at 0930     senna-docusate (SENOKOT-S/PERICOLACE) 8.6-50 MG tablet Take 1 tablet by mouth 2 times daily as needed   Past Month at unknown     sertraline (ZOLOFT) 50 MG tablet Take 50 mg by mouth daily   8/2/2020 at 0800     simvastatin (ZOCOR) 20 MG tablet Take 20 mg by mouth daily    8/2/2020 at 0800     warfarin ANTICOAGULANT (COUMADIN) 5 MG tablet Take 5 mg by mouth twice a week on Monday and Friday. Take 2.5 mg (1/2 tab) the rest of the week or as directed by coumadin clinic.   8/1/2020 at 2000     cyclobenzaprine (FLEXERIL) 5 MG tablet Take 5 mg by mouth 3 times daily as needed   7/31/2020 at 0930           Allergies    Allergies   Allergen Reactions     Atenolol Shortness Of Breath and Other (See Comments)     Dizziness  Severe vertigo and dizziness/SOB     Lisinopril Cough     Augmentin Diarrhea     Pollen Extract      Other reaction(s): Runny Nose     Data   Most Recent 3 CBC's:  Recent Labs   Lab Test 08/09/20  0559 08/08/20  0534 08/07/20  0542   WBC 27.9* 27.8* 26.9*   HGB 9.9* 10.1* 10.0*   MCV 92 94 92    430 450      Most Recent 3 BMP's:  Recent Labs   Lab Test 08/09/20  0559 08/08/20  0534 08/07/20  0542    132* 134   POTASSIUM 3.2* 3.9 3.7   CHLORIDE 95 96 99   CO2 39* 31 32   BUN 20 16 17   CR 0.47* 0.42* 0.42*   ANIONGAP 1* 5 3   LOIS 8.2* 8.7 8.6   * 115* 117*     Most Recent 2 LFT's:  Recent Labs   Lab Test 08/02/20  1050 07/15/20  2253   AST 43 46*   ALT 20 29   ALKPHOS 187* 135   BILITOTAL 0.7 0.7     Most Recent INR's and Anticoagulation Dosing History:  Anticoagulation Dose History     Recent Dosing and Labs Latest Ref Rng & Units 8/4/2020 8/4/2020 8/5/2020 8/6/2020 8/7/2020 8/8/2020 8/9/2020    Warfarin 1 mg - - - - - - 1 mg -    Warfarin 2.5 mg - - - 5 mg 5 mg 5 mg - -    INR 0.86 - 1.14 2.94(H) 2.13(H) 1.60(H) 1.47(H) 1.61(H) 2.62(H) 3.29(H)    INR 0.86 - 1.14 - - - - - - -        Most Recent 3 Troponin's:  Recent Labs   Lab Test 08/02/20  1050 07/15/20  2253 07/01/20  1922   TROPI <0.015 <0.015 <0.015     Most Recent Cholesterol Panel:  Recent Labs   Lab Test 12/31/18  1007   CHOL 142   LDL 60   HDL 69   TRIG 63     Most Recent 6 Bacteria Isolates From Any Culture (See EPIC Reports for Culture Details):  Recent Labs   Lab Test 08/08/20  0754 08/05/20  1144 08/02/20  1129 08/02/20  1042 07/31/20  0405 05/07/20  2145   CULT No growth after 23 hours  No growth after 23 hours Light growth  Yeast  Identification to follow.  *  Normal sputum kyaw absent.  No growth No growth after 6 days 1 of 2 Bottles  Escherichia coli ESBL  *  Critical Value:  Gram stain result of bottle 1 called to and  read back by Briana Zamora at 0510 on 8/3/20 by   Radha Arita.    Critical Value:  Susceptibility results called to and read back by  Melinda Martinez at 0657 on 08/05/20 by Marquis Addison   >100,000 colonies/mL  Escherichia coli ESBL  *  Significant value called to and read back by  Dr Reyna at 0745 on 08/03/20 by Marquis Addison   No growth after 6 days  No growth after 6 days     Most Recent TSH, T4 and A1c Labs:  Recent Labs   Lab Test 08/08/20  0534   TSH 0.82     Results for orders placed or performed during the hospital encounter of 08/02/20   XR Chest Port 1 View    Narrative    PROCEDURE:  XR CHEST PORT 1 VW    HISTORY: Shortness of breath. .    COMPARISON:  5/15/2020, 7/1/2020.    FINDINGS:    The cardiomediastinal contours are stable.  Diffuse ill-defined and interstitial opacities are superimposed upon  chronic fibrosis. No significant effusion is seen. No pneumothorax is  present.  The bones are osteoporotic. There is a fracture of the distal right  clavicle, identified on 7/1/2020.      Impression    IMPRESSION:  Pulmonary fibrosis with superimposed patchy ill-defined  opacities suggests multifocal pneumonia. Edema or worsening fibrosis  are also in the differential. Recommend follow-up.      JOELLE SMITH MD   IR PICC Placement > 5 Yrs of Age    Narrative    IR PICC PLACEMENT > 5 YRS OF AGE, 8/5/2020 9:39 AM    History: Female, age 84 years; prolonged antibiotics    Comparison: None.    Technique: After informed consent was obtained, a timeout was  performed, satisfactorily identifying the patient and the site of the  procedure.    The patient's Left upper extremity was then prepped and draped in the  usual sterile fashion. A tourniquet was placed. 4 mL of one percent  lidocaine was instilled in the skin and deeper tissues.    Under ultrasound guidance, access into the basilic  was obtained.  Through the needle, a small guidewire was placed. Over the guidewire,  a peel-away sheath was  positioned. The double-lumen PICC line was then  positioned.    Under fluoroscopic guidance, the tip of the catheter was then  positioned in the low superior vena cava. 7 mL of Isovue-300 was then  instilled, demonstrating patency of the lumen, in satisfactory  position of the catheter.    Aspirating blood from each lumen flushed and each lumen was performed  without difficulty.    FINDINGS: Fluoroscopic evaluation demonstrates patency the catheter,  in satisfactory position.      Impression    IMPRESSION:   Technically successful ultrasound and fluoroscopically guided  placement of a PICC line into the left upper extremity. No evidence of  acute complication. Total fluoroscopy time: .2 minutes.    BALTAZAR LEAL MD   XR Chest Port 1 View    Narrative    PROCEDURE:  XR CHEST PORT 1 VW    HISTORY:  persistent hypoxia.     COMPARISON:  None.    FINDINGS:   Cardiac valve stent is seen. There are widespread interstitial and  alveolar opacities which have worsened from August 2, 2020 No pleural  effusion or pneumothorax.      Impression    IMPRESSION:  Worsening bilateral pulmonary parenchymal opacities from  August 2, 2020      NARDA WHITE MD   XR Chest Port 1 View    Narrative    PROCEDURE:  XR CHEST PORT 1 VW    HISTORY:  Activity intolerance worsening- O2 requirements increasing.     COMPARISON:  August 5, 2020    FINDINGS:   A bowel stent is seen within the heart. Heart is normal in size.  Widespread interstitial and alveolar opacities are seen in both lungs  without change there is a central catheter with its tip in the  superior vena cava      Impression    IMPRESSION:  No change from August 5, 2020      NARDA WHITE MD

## 2020-08-09 NOTE — PLAN OF CARE
Report called and given to Sanjana TOMLINSON at [facility] at this time. All questions answered.     [phone]

## 2020-08-09 NOTE — PHARMACY-ANTICOAGULATION SERVICE
Pharmacy Consult-Coumadin Assessment Day #8    Michael Christiansen is a 85 year old female admitted on 8/2/2020 with Pyelonephritis    Primary Indication(s) for Anticoagulation: A-fib, TIA, TAVR (transcatheter aortic valve replacement)     Goal INR: 2-3     FYI, patient is followed by the Anticoagulation[facility]time Clinic at: INR drawn at [facility] and faxed to [facility]    Patient Active Problem List   Diagnosis     Essential hypertension     Osteoarthritis     Permanent atrial fibrillation (H)     Health care directive on file     Chronic pain syndrome     History of total knee replacement     Radiculitis     Osteoarthritis of knee     Long-term (current) use of anticoagulants [Z79.01]     Osteoporosis     Malignant neoplasm of right breast in female, estrogen receptor positive, unspecified site of breast (H)     Pulmonary hypertension-moderate     Moderate tricuspid insufficiency     Anticoagulated on Coumadin     LISA (generalized anxiety disorder)     Acute blood loss as cause of postoperative anemia     Lumbar pain with radiation down right leg     Lumbar spinal stenosis     Protein-calorie malnutrition (H)     Valvular heart disease     Unexplained weight loss     Hx of rheumatic fever     Chronic, continuous use of opioids     ILD (interstitial lung disease) (H)     Left upper quadrant pain     Severe aortic stenosis     Other ill-defined heart diseases     Status post coronary angiogram     Coronary artery disease involving native coronary artery of native heart without angina pectoris     Dyslipidemia     Left-sided carotid artery disease (H)     Moderate pulmonary arterial systolic hypertension (H)     Status post transcatheter aortic valve replacement     BARRERA (acute kidney injury) (H)     Closed right hip fracture (H)     Fall from standing, initial encounter     Fracture of acromial end of right clavicle     Syncope and collapse     Urinary tract infection associated with  indwelling urethral catheter (H)     Nursing Home Visit     Pyelonephritis     Hyponatremia     Volume depletion     ESBL (extended spectrum beta-lactamase) producing bacteria infection     Bacteremia       Patient previously anticoagulated on Coumadin at a dose of 22.5 mg/week; dosed as: 2.5 mg daily, except 5 mg Monday & Friday     Factors that may increase patient's bleeding risk and/or sensitivity to warfarin (Coumadin) include: Advanced Age, Antibiotics, IV Lasix given 8/8, 8/5, 8/3     Factors that may decrease patient's sensitivity to warfarin (Coumadin) include: Home multivitamins held (may have Vitamin K in them)     Dietary Considerations: Regular Diet    Anticoagulation Dose History     Recent Dosing and Labs 8/3/20 8/4/2020 8/4/2020 8/5/2020 8/6/2020 8/7/2020 8/8/2020 8/9/2020    Warfarin 1 mg - - - - - - 1 mg -    Warfarin 2.5 mg - - - 5 mg 5 mg 5 mg - -    INR 4.25 (H) 2.94(H) 2.13(H) 1.60(H) 1.47(H) 1.61(H) 2.62(H) 3.29(H)    INR  Vitamin K 10 mg PO - - - - - -          Assessment/Plan: INR supratherapeutic today at 3.29. Patient transferring to [facility]. Will hold warfarin and check INR with AM labs.     Thank You for the Consult. Will continue to follow.    Callum Small [facility] ....................  8/9/2020   10:50 AM

## 2020-08-13 NOTE — PROGRESS NOTES
"Michael Christiansen is a 84 year old female who is being evaluated via a billable video visit.      The patient has been notified of following:     \"This video visit will be conducted via a call between you and your physician/provider. We have found that certain health care needs can be provided without the need for an in-person physical exam.  This service lets us provide the care you need with a video conversation.  If a prescription is necessary we can send it directly to your pharmacy.  If lab work is needed we can place an order for that and you can then stop by our lab to have the test done at a later time.    Video visits are billed at different rates depending on your insurance coverage.  Please reach out to your insurance provider with any questions.    If during the course of the call the physician/provider feels a video visit is not appropriate, you will not be charged for this service.\"    Patient has given verbal consent for Video visit? Yes  How would you like to obtain your AVS? Mail a copy  If you are dropped from the video visit, the video invite should be resent to: Text to cell phone: [phone]  Will anyone else be joining your video visit? No  {If patient encounters technical issues they should call [phone] :[phone]}      Video-Visit Details    Type of service:  Video Visit    Video Start Time: 1415  Video End Time: 1430    Originating Location (pt. Location): Long term Care    Distant Location (provider location):  [facility]     Platform used for Video Visit: Hover 3D      HISTORY OF PRESENT ILLNESS:  Michael is a 84 year old female (1935)  resident of [facility] who is being seen today for initial Nursing Home Visit.     She has a history of atrial fibrillation, hypertension, osteoporosis, valvular heart disease, coronary heart disease  pulmonary hypertension, interstitial lung disease, left carotid stenosis, and well as chronic pain requiring the " use of opioids.      Michael was admitted from  after being hospitalized with multiple fractures resulting from a fall.  This included an intertrochanteric fracture of the right femur as well as closed displaced fracture of right clavicle.  Her postoperative course was complicated by acute blood loss anemia.  She was eventually discharged to [facility] for short term rehabilitation.     The patient notes continued pain which is tolerable.  She is attending Physical Therapy      Discussed with nursing staff who note the following concerns:  None.    The patient is seen in her room.  Family is not present.     Medical records are reviewed.    Current medications, allergies, and interdisciplinary care plan are reviewed.      Patient Active Problem List    Diagnosis Date Noted     Bacteremia 08/05/2020     Priority: Medium     Hyponatremia 08/04/2020     Priority: Medium     Volume depletion 08/04/2020     Priority: Medium     Pyelonephritis 08/02/2020     Priority: Medium     ESBL (extended spectrum beta-lactamase) producing bacteria infection 07/10/2020     Priority: Medium     Date and Source of first known ESBL: 7/7/2020  - URINE (E.coli)    Contact Precautions are indicated for hospitalized patients. Contact Infection Prevention  for your facility regarding criteria for removal from isolation.       Urinary tract infection associated with indwelling urethral catheter (H) 07/07/2020     Priority: Medium     Closed right hip fracture (H) 07/02/2020     Priority: Medium     Fall from standing, initial encounter 07/02/2020     Priority: Medium     Fracture of acromial end of right clavicle 07/02/2020     Priority: Medium     BARRERA (acute kidney injury) (H) 05/15/2020     Priority: Medium     Syncope and collapse 05/15/2020     Priority: Medium     Status post transcatheter aortic valve replacement 05/12/2020     Priority: Medium     Coronary artery disease involving native coronary artery  of native heart without angina pectoris 05/04/2020     Priority: Medium     Left-sided carotid artery disease (H) 05/04/2020     Priority: Medium     Dyslipidemia 04/29/2020     Priority: Medium     Moderate pulmonary arterial systolic hypertension (H) 04/29/2020     Priority: Medium     Status post coronary angiogram 03/06/2020     Priority: Medium     Other ill-defined heart diseases 02/17/2020     Priority: Medium     Added automatically from request for surgery [id number]       Severe aortic stenosis 01/03/2020     Priority: Medium     Left upper quadrant pain 10/22/2019     Priority: Medium     Added automatically from request for surgery [id number]       ILD (interstitial lung disease) (H) 10/21/2019     Priority: Medium     Chronic, continuous use of opioids 09/23/2019     Priority: Medium     Hx of rheumatic fever 06/19/2019     Priority: Medium     Protein-calorie malnutrition (H) 06/06/2019     Priority: Medium     Valvular heart disease 06/06/2019     Priority: Medium     Unexplained weight loss 06/06/2019     Priority: Medium     LISA (generalized anxiety disorder) 11/26/2018     Priority: Medium     Anticoagulated on Coumadin 06/13/2018     Priority: Medium     Pulmonary hypertension-moderate 12/20/2017     Priority: Medium     Moderate tricuspid insufficiency 12/20/2017     Priority: Medium     Malignant neoplasm of right breast in female, estrogen receptor positive, unspecified site of breast (H) 08/02/2017     Priority: Medium     Osteoporosis 01/26/2017     Priority: Medium     Long-term (current) use of anticoagulants [Z79.01] 07/27/2016     Priority: Medium     History of total knee replacement 01/26/2015     Priority: Medium     Chronic pain syndrome 01/23/2015     Priority: Medium     Acute blood loss as cause of postoperative anemia 07/19/2013     Priority: Medium     Permanent atrial fibrillation (H) 05/01/2013     Priority: Medium     Overview:   Chronic        Essential hypertension 03/25/2013      Priority: Medium     Osteoarthritis 2013     Priority: Medium     Health care directive on file 2012     Priority: Medium     Duplicate ACP problems. Resolving this one. Kerry Pichardo RN, System Director ACP-Honoring Choices          Lumbar pain with radiation down right leg 2011     Priority: Medium     Overview:   IMO Update 10/11       Lumbar spinal stenosis 2011     Priority: Medium     Radiculitis 2011     Priority: Medium     Overview:   IMO Update 10/11       Osteoarthritis of knee 2010     Priority: Medium     Overview:   IMO Update 10/11       Nursing Home Visit 2020     Priority: Low          Social History     Socioeconomic History     Marital status:      Spouse name: Not on file     Number of children: Not on file     Years of education: Not on file     Highest education level: Not on file   Occupational History     Not on file   Social Needs     Financial resource strain: Not on file     Food insecurity     Worry: Not on file     Inability: Not on file     Transportation needs     Medical: Not on file     Non-medical: Not on file   Tobacco Use     Smoking status: Former Smoker     Packs/day: 0.30     Years: 30.00     Pack years: 9.00     Types: Cigarettes     Last attempt to quit: 1987     Years since quittin.0     Smokeless tobacco: Never Used     Tobacco comment: year quit    Substance and Sexual Activity     Alcohol use: No     Drug use: No     Sexual activity: Not Currently     Partners: Male   Lifestyle     Physical activity     Days per week: Not on file     Minutes per session: Not on file     Stress: Not on file   Relationships     Social connections     Talks on phone: Not on file     Gets together: Not on file     Attends [employer] service: Not on file     Active member of club or organization: Not on file     Attends meetings of clubs or organizations: Not on file     Relationship status: Not on file     Intimate partner violence      Fear of current or ex partner: Not on file     Emotionally abused: Not on file     Physically abused: Not on file     Forced sexual activity: Not on file   Other Topics Concern     Parent/sibling w/ CABG, MI or angioplasty before 65F 55M? No      Service Not Asked     Blood Transfusions Yes     Caffeine Concern Not Asked     Occupational Exposure Not Asked     Hobby Hazards Not Asked     Sleep Concern Not Asked     Stress Concern Not Asked     Weight Concern Not Asked     Special Diet Not Asked     Back Care Not Asked     Exercise Not Asked     Bike Helmet Not Asked     Seat Belt Not Asked     Self-Exams Not Asked   Social History Narrative     Not on file        Current Outpatient Medications   Medication Sig     acetaminophen (TYLENOL) 325 MG tablet Take 325-650 mg by mouth every 4 hours as needed for mild pain (Do not exceed 4000 mg per day from all sources)     acetaminophen (TYLENOL) 500 MG tablet Take 500 mg by mouth 4 times daily      Calcium-Vitamin D 600-200 MG-UNIT TABS Take 1 tablet by mouth 2 times daily     cephALEXin (KEFLEX) 500 MG capsule Take 500 mg by mouth 2 times daily X 5 days.     cyclobenzaprine (FLEXERIL) 5 MG tablet Take 5 mg by mouth 3 times daily as needed     ertapenem 1 g Inject 1 g into the vein every 24 hours for 6 days     Glucosamine Sulfate 500 MG TABS Take 500 mg by mouth daily     melatonin 5 MG tablet Take 5 mg by mouth nightly as needed for sleep     metoprolol tartrate (LOPRESSOR) 25 MG tablet TAKE 1/2 (ONE-HALF) TABLET BY MOUTH ONCE DAILY IN THE MORNING AND 1/2 (ONE-HALF) IN THE EVENING     Multiple Vitamins-Minerals (PRESERVISION AREDS 2 PO) Take 1 tablet by mouth 2 times daily      multivitamin, therapeutic (THERA-VIT) TABS tablet Take 1 tablet by mouth daily     omeprazole (PRILOSEC) 20 MG DR capsule Take 20 mg by mouth daily      oxyCODONE HCl (OXAYDO) 5 MG TABA Take 5-10 mg by mouth every 4 hours as needed (1 tablet for pain rated 1-4/10. 2 tablets for pain  rated 5-10/10)     senna-docusate (SENOKOT-S/PERICOLACE) 8.6-50 MG tablet Take 1 tablet by mouth 2 times daily as needed     sertraline (ZOLOFT) 50 MG tablet Take 50 mg by mouth daily     simvastatin (ZOCOR) 20 MG tablet Take 20 mg by mouth daily      warfarin ANTICOAGULANT (COUMADIN) 5 MG tablet Take 5 mg by mouth twice a week on Monday and Friday. Take 2.5 mg (1/2 tab) the rest of the week or as directed by coumadin clinic.     No current facility-administered medications for this visit.        Allergies   Allergen Reactions     Atenolol Shortness Of Breath and Other (See Comments)     Dizziness  Severe vertigo and dizziness/SOB     Lisinopril Cough     Augmentin Diarrhea     Pollen Extract      Other reaction(s): Runny Nose       I have reviewed the care plan and do agree with the plan.      ROS:  No chest pain, shortness of breath, fever, chills, headache, nausea, vomiting, dysuria, or changes in bowel habits.  Appetite is normal.  Diffuse pain noted.          OBJECTIVE:  There were no vitals taken for this visit.    GENERAL: Healthy, alert and no distress  EYES: Eyes grossly normal to inspection.  No discharge or erythema, or obvious scleral/conjunctival abnormalities.  RESP: No audible wheeze, cough, or visible cyanosis.  No visible retractions or increased work of breathing.    SKIN: Visible skin clear. No significant rash, abnormal pigmentation or lesions.  NEURO: Cranial nerves grossly intact.  Mentation and speech appropriate for age.  PSYCH: Mentation appears normal, affect normal/bright, judgement and insight intact, normal speech and appearance well-groomed.              Lab/Diagnostic data:    Reviewed    ASSESSMENT/ORDERS:  Nursing Home Visit  Discussed with staff. Care plan reviewed and orders updated.  Chronic issues are stable. Routine 30 day Nursing Home follow up.     Closed fracture of right hip with routine healing, subsequent encounter  Pain is controlled.  She will continue Physical Therapy  and Occupational Therapy     Closed displaced fracture of acromial end of right clavicle with routine healing, subsequent encounter  As above    Permanent atrial fibrillation (H)  Rate controlled     Long-term (current) use of anticoagulants [Z79.01]  Follow inr    Pulmonary hypertension-moderate  Stable    Acute blood loss as cause of postoperative anemia  Monitor hemoglobin    Valvular heart disease  Status post TAVR (TRANSCATHETER AORTIC VALVE REPLACEMENT).  Stable        Total time spent with patient visit was 35 min including patient visit, review of pertinent clinical information, and treatment plan.      Geo Henderson MD FAAFP DC

## 2020-08-15 NOTE — PROGRESS NOTES
Patient is unavailable for rounds.  Discussed with staff. Current medications, allergies, and Care Plan reviewed.  Orders updated and signed.

## 2020-08-28 NOTE — PROGRESS NOTES
ANTICOAGULATION FOLLOW-UP CLINIC VISIT    Patient Name:  Michael Christiansen  Date:  8/28/2020  Contact Type:  Telephone/ spoke to patient daughter, Margareth, patient still in hospital in [place]    SUBJECTIVE:  Patient Findings     Comments:   Call placed to patient daughter and spoke to her. She states her mom is still hospitalized in [place]. Unsure when she will get out. Daughter states her mom may die and states that LTC nurses did  Not listen to daughter when she told them something was wrong and then daughter states patient stayed too long in [place] prior to being transferred. She states her mom will be on oxygen for the rest of her life. She will let me know when patient is discharged and where she will be going.        Clinical Outcomes     Negatives:   Major bleeding event, Thromboembolic event, Anticoagulation-related hospital admission, Anticoagulation-related ED visit, Anticoagulation-related fatality    Comments:   Call placed to patient daughter and spoke to her. She states her mom is still hospitalized in [place]. Unsure when she will get out. Daughter states her mom may die and states that LTC nurses did  Not listen to daughter when she told them something was wrong and then daughter states patient stayed too long in [place] prior to being transferred. She states her mom will be on oxygen for the rest of her life. She will let me know when patient is discharged and where she will be going.           OBJECTIVE    No results for input(s): INR, [id number], [id number], 2AGN, F2 in the last 168 hours.    ASSESSMENT / PLAN  No question data found.  Anticoagulation Summary  As of 8/28/2020    INR goal:   2.0-3.0   TTR:   41.6 % (11.3 mo)   INR used for dosing:   No new INR was available at the time of this encounter.   Warfarin maintenance plan:   5 mg (5 mg x 1) every Mon, Fri; 2.5 mg (5 mg x 0.5) all other days   Full warfarin instructions:   5 mg every Mon, Fri; 2.5 mg all other days   Weekly warfarin total:    22.5 mg   No change documented:   Kasia Hercules, RN   Plan last modified:   Kasia Hercules RN (7/27/2020)   Next INR check:   9/11/2020   Priority:   High   Target end date:   Indefinite    Indications    Long-term (current) use of anticoagulants [Z79.01] [Z79.01]  Permanent atrial fibrillation (H) [I48.21]             Anticoagulation Episode Summary     INR check location:       Preferred lab:       Send INR reminders to:   HASEEB KLEIN    Comments:         Anticoagulation Care Providers     Provider Role Specialty Phone number    Chandrika Kumar PA Referring [facility] [phone]            See the Encounter Report to view Anticoagulation Flowsheet and Dosing Calendar (Go to Encounters tab in chart review, and find the Anticoagulation Therapy Visit)        Kasia Hercules RN

## 2020-09-02 NOTE — PROGRESS NOTES
Subjective     Michael Christiansen is a 85 year old female who presents to clinic today for the following health issues:    [facility]         Hospital Follow-up Visit:    Hospital/Nursing Home/IP Rehab Facility: [facility]  Date of Admission: 8/2/20  Date of Discharge: 8/9/20  Reason(s) for Admission: Pyelonephritis/pneumonitis /hip fractures.       Was your hospitalization related to COVID-19? No   Problems taking medications regularly:  None  Medication changes since discharge: added prednisone, fluconazole, oxycodone and warfarin   Problems adhering to non-medication therapy:  None    Summary of hospitalization:  [facility] discharge summary reviewed  Diagnostic Tests/Treatments reviewed.  Follow up needed: none  Other Healthcare Providers Involved in Patient s Care:         None  Update since discharge: fluctuating course. Post Discharge Medication Reconciliation: discharge medications reconciled, continue medications without change.  Plan of care communicated with patient              Review of Systems   Constitutional, HEENT, cardiovascular, pulmonary, gi and gu systems are negative, except as otherwise noted.      Objective    /70   Pulse 99   Temp 97.5  F (36.4  C) (Tympanic)   Wt 37.6 kg (83 lb)   SpO2 99%   BMI 14.25 kg/m    Body mass index is 14.25 kg/m .  Physical Exam   GENERAL: pale and tired looking. Very weak. In a wheel chair.   NECK: no adenopathy, no asymmetry, masses, or scars and thyroid normal to palpation  RESP: lungs clear to auscultation - no rales, rhonchi or wheezes  CV: regular rate and rhythm, normal S1 S2, no S3 or S4, no murmur, click or rub, no peripheral edema and peripheral pulses strong  ABDOMEN: soft, nontender, no hepatosplenomegaly, no masses and bowel sounds normal  MS: no gross musculoskeletal defects noted, no edema  SKIN: no suspicious lesions or rashes  PSYCH: affect flat, fatigued and forgetful. But when reminded is able to recall details. Daughter  did give her oxycodone.   LYMPH: no cervical, supraclavicular, axillary, or inguinal adenopathy    Results for orders placed or performed in visit on 09/03/20 (from the past 24 hour(s))   Reflex INR   Result Value Ref Range    INR 3.33 (H) 0.86 - 1.14           Assessment & Plan     Hospital discharge follow-up  Had been in  hospital for over a month. Had pneumonitis and fractured hip . Is in A fib and INR is too high. She will be starting back on her neb at home. She is good oxygnation.  - Basic metabolic panel  - XR CHEST 1 VW (Clinic Performed); Future    Chronic atrial fibrillation (H)  Her INR is at 3.33 start tomorrow on 2.5 mg until we get a future INR  - Reflex INR    Aspiration pneumonitis (H)  Repeat cxr. Was clearing but still fluffy heart boarders.    - XR CHEST 1 VW (Clinic Performed); Future       See Patient Instructions    No follow-ups on file.    Chandrika Kumar PA-C  [facility]

## 2020-09-03 PROBLEM — J96.01 ACUTE RESPIRATORY FAILURE WITH HYPOXIA (H): Status: ACTIVE | Noted: 2020-01-01

## 2020-09-03 PROBLEM — D72.829 LEUKOCYTOSIS: Status: ACTIVE | Noted: 2020-01-01

## 2020-09-03 PROBLEM — N30.00 ACUTE CYSTITIS WITHOUT HEMATURIA: Status: ACTIVE | Noted: 2020-01-01

## 2020-09-03 NOTE — PROGRESS NOTES
ANTICOAGULATION FOLLOW-UP CLINIC VISIT    Patient Name:  Michael Christiansen  Date:  9/3/2020  Contact Type:  Telephone/ spoke to Margareth moses re: warfarin dosing. I also left message on homecare intake nurse voicemail to call warfarin clinic    SUBJECTIVE:  Patient Findings     Comments:   INR done by lab. Call placed to patient daughterMargareth and spoke to her re: INR result, warfarin dosing/INR recheck date.Margareth verbalized understanding and has no questions.  She states her mom is taking prednisone 5mg for 2 more day and fluconozole 100mg daily for 8 more days. Daughter states homecare will be seeing her mom  I called [facility] and found out they are the homecare agency who will be following patient. I left a message for , Meagan, to have nurse let me know how many times/week that she will see patient so INR recheck date can be set up for patient.         Clinical Outcomes     Comments:   INR done by lab. Call placed to patient Margareth moses and spoke to her re: INR result, warfarin dosing/INR recheck date.Margareth verbalized understanding and has no questions.  She states her mom is taking prednisone 5mg for 2 more day and fluconozole 100mg daily for 8 more days. Daughter states homecare will be seeing her mom  I called [facility] and found out they are the homecare agency who will be following patient. I left a message for , Meagan, to have nurse let me know how many times/week that she will see patient so INR recheck date can be set up for patient.            OBJECTIVE    Recent labs: (last 7 days)     09/03/20  1505   INR 3.33*       ASSESSMENT / PLAN  INR assessment SUPRA just discharged from hospital in [place], [facility] dosing   Recheck INR In: 4 DAYS    INR Location Clinic      Anticoagulation Summary  As of 9/3/2020    INR goal:   2.0-3.0   TTR:   42.3 % (11.1 mo)   INR used for dosing:   3.33! (9/3/2020)   Warfarin maintenance plan:   5 mg (5 mg  x 1) every Mon, Fri; 2.5 mg (5 mg x 0.5) all other days   Full warfarin instructions:   9/3: Hold; 9/4: 2.5 mg; 9/6: Hold; 9/7: 2.5 mg; Otherwise 5 mg every Mon, Fri; 2.5 mg all other days   Weekly warfarin total:   22.5 mg   Plan last modified:   Kasia Hercules RN (9/3/2020)   Next INR check:   9/9/2020   Priority:   High   Target end date:   Indefinite    Indications    Long-term (current) use of anticoagulants [Z79.01] [Z79.01]  Permanent atrial fibrillation (H) [I48.21]             Anticoagulation Episode Summary     INR check location:       Preferred lab:       Send INR reminders to:   HASEEB KLEIN    Comments:         Anticoagulation Care Providers     Provider Role Specialty Phone number    Chandrika Kumar PA Referring [facility] [phone]            See the Encounter Report to view Anticoagulation Flowsheet and Dosing Calendar (Go to Encounters tab in chart review, and find the Anticoagulation Therapy Visit)        Kasia Hercules RN

## 2020-09-03 NOTE — NURSING NOTE
"Chief Complaint   Patient presents with     Hospital F/U       Initial /70   Pulse 99   Temp 97.5  F (36.4  C) (Tympanic)   Wt 37.6 kg (83 lb)   SpO2 99%   BMI 14.25 kg/m   Estimated body mass index is 14.25 kg/m  as calculated from the following:    Height as of 8/2/20: 1.626 m (5' 4\").    Weight as of this encounter: 37.6 kg (83 lb).  Medication Reconciliation: complete  Ting De La Rosa LPN  "

## 2020-09-04 NOTE — PROGRESS NOTES
ANTICOAGULATION FOLLOW-UP CLINIC VISIT    Patient Name:  Michael Christiansen  Date:  9/4/2020  Contact Type:  Telephone/ spoke to patient daughter, Margareth and also message left on homecare nurse, Fina voicemail to call warfarin clinic    SUBJECTIVE:  Patient Findings     Positives:   Change in diet/appetite (not eating per pt. daughter)    Comments:   Call placed to patient daughter and spoke to her re: change in warfarin dosing.  We discussed new changes in warfarin dosing and patient daughter verbalized understanding and has no questions. Patient has 3 different size warfarin tablets, 5mg, 2.5mg and 1mg tabs. Per patient daughter, patient not eating much at all. I did call [place] [facility] and left a message on Fina's voicemail to please return call to warfarin clinic so INR date can be set up and dosing done until that date.         Clinical Outcomes     Negatives:   Major bleeding event, Thromboembolic event, Anticoagulation-related hospital admission, Anticoagulation-related ED visit, Anticoagulation-related fatality    Comments:   Call placed to patient daughter and spoke to her re: change in warfarin dosing.  We discussed new changes in warfarin dosing and patient daughter verbalized understanding and has no questions. Patient has 3 different size warfarin tablets, 5mg, 2.5mg and 1mg tabs. Per patient daughter, patient not eating much at all. I did call [place] [facility] and left a message on Fina's voicemail to please return call to warfarin clinic so INR date can be set up and dosing done until that date.            OBJECTIVE    Recent labs: (last 7 days)     09/03/20  1505   INR 3.33*       ASSESSMENT / PLAN  No question data found.  Anticoagulation Summary  As of 9/4/2020    INR goal:   2.0-3.0   TTR:   42.4 % (11.1 mo)   INR used for dosing:   No new INR was available at the time of this encounter.   Warfarin maintenance plan:   2.5 mg (2.5 mg x 1) every day   Full warfarin  instructions:   9/4: 1 mg; 9/5: 1.25 mg; 9/6: 1 mg; 9/7: 1 mg; Otherwise 2.5 mg every day   Weekly warfarin total:   17.5 mg   Plan last modified:   Kasia Hercules RN (9/4/2020)   Next INR check:   9/8/2020   Priority:   High   Target end date:   Indefinite    Indications    Long-term (current) use of anticoagulants [Z79.01] [Z79.01]  Permanent atrial fibrillation (H) [I48.21]             Anticoagulation Episode Summary     INR check location:       Preferred lab:       Send INR reminders to:   HASEEB KLEIN    Comments:   She has 5mg, 1mg and 2.5mg size pills      Anticoagulation Care Providers     Provider Role Specialty Phone number    Chandrika Kumar, PA Referring [facility] [phone]            See the Encounter Report to view Anticoagulation Flowsheet and Dosing Calendar (Go to Encounters tab in chart review, and find the Anticoagulation Therapy Visit)        Kasia Hercules RN

## 2020-09-08 NOTE — PROGRESS NOTES
ANTICOAGULATION FOLLOW-UP CLINIC VISIT    Patient Name:  Michael Christiansen  Date:  2020  Contact Type:  Telephone/ spoke to homecare nurseDea and patient daughterHui    SUBJECTIVE:  Patient Findings     Positives:   Change in medications (has 4 more days of diflucan)    Comments:   INR done by Dea, homecare nurse and result called to warfarin clinic. Per nurse report, patient has no bleeding/bruising but INR is elevated. She has 4 more doses left of fluconazole and then will be done. She is eating fairly good according to report. We discussed warfarin dosing and INR recheck date and nurse verbalized understanding and has no questions. Daughter was told to bring  Patient to ER if any uncontrolled bleeding and she verbalized understanding.        Clinical Outcomes     Negatives:   Major bleeding event, Thromboembolic event, Anticoagulation-related hospital admission, Anticoagulation-related ED visit, Anticoagulation-related fatality    Comments:   INR done by Dea homecare nurse and result called to warfarin clinic. Per nurse report, patient has no bleeding/bruising but INR is elevated. She has 4 more doses left of fluconazole and then will be done. She is eating fairly good according to report. We discussed warfarin dosing and INR recheck date and nurse verbalized understanding and has no questions. Daughter was told to bring  Patient to ER if any uncontrolled bleeding and she verbalized understanding.           OBJECTIVE    Recent labs: (last 7 days)     20   INR 6.2*       ASSESSMENT / PLAN  INR assessment SUPRA fluconazole x 4 more days   Recheck INR In: 3 DAYS    INR Location [facility]R      Anticoagulation Summary  As of 2020    INR goal:   2.0-3.0   TTR:   42.4 % (11.1 mo)   INR used for dosin.2! (2020)   Warfarin maintenance plan:   2.5 mg (2.5 mg x 1) every day   Full warfarin instructions:   : Hold; : Hold; 9/10: Hold; Otherwise 2.5 mg every day   Weekly warfarin  total:   17.5 mg   Plan last modified:   Kasia Hercules RN (9/4/2020)   Next INR check:   9/11/2020   Priority:   High   Target end date:   Indefinite    Indications    Long-term (current) use of anticoagulants [Z79.01] [Z79.01]  Permanent atrial fibrillation (H) [I48.21]             Anticoagulation Episode Summary     INR check location:       Preferred lab:       Send INR reminders to:   HASEEB KLEIN    Comments:   She has 5mg, 1mg and 2.5mg size pills      Anticoagulation Care Providers     Provider Role Specialty Phone number    Chandrika Kumar, PA Referring [facility] [phone]            See the Encounter Report to view Anticoagulation Flowsheet and Dosing Calendar (Go to Encounters tab in chart review, and find the Anticoagulation Therapy Visit)        Kasia Hercules RN

## 2020-09-08 NOTE — Clinical Note
Michael Christiansen INR today is 6.2 even though her warfarin dosing was very low. I am guessing it is lack of activity and also that she is on fluconazole for 4 more days. I am holding warfarin for 3 days and then INR will be done on 9/11/2020. She has  no bleeding/bruising, no changes in diet. Her daughter was told to bring her to ER if there is ANY uncontrolled bleeding and she states she will do that  Kasia Hercules RN  Anticoagulation clinic

## 2020-09-11 NOTE — PROGRESS NOTES
ANTICOAGULATION FOLLOW-UP CLINIC VISIT    Patient Name:  Michael Christiansen  Date:  2020  Contact Type:  Telephone/ spoke to homecare nurseImani    SUBJECTIVE:  Patient Findings     Comments:   INR done by  homecare nurse and result called to warfarin clinic. Per nurse report, Diflucan will be done tomorrow. Patient has a pressure sore on her buttocks which has worsened over past week. No new bleeding/bruising. No changes in meds, other than diflucan. She is starting to eat better per nurse report. No changes in activity. We discussed warfarin dosing/INR recheck date. Nurse verbalized understanding and has no questions. She will call warfarin clinic if any changes/issues/illness        Clinical Outcomes     Comments:   INR done by  homecare nurse and result called to warfarin clinic. Per nurse report, Diflucan will be done tomorrow. Patient has a pressure sore on her buttocks which has worsened over past week. No new bleeding/bruising. No changes in meds, other than diflucan. She is starting to eat better per nurse report. No changes in activity. We discussed warfarin dosing/INR recheck date. Nurse verbalized understanding and has no questions. She will call warfarin clinic if any changes/issues/illness           OBJECTIVE    Recent labs: (last 7 days)     20   INR 4.5*       ASSESSMENT / PLAN  INR assessment SUPRA diflucan, minimal activity, starting to eat better   Recheck INR In: 3 DAYS    INR Location [facility] INR      Anticoagulation Summary  As of 2020    INR goal:   2.0-3.0   TTR:   42.4 % (11.1 mo)   INR used for dosin.5! (2020)   Warfarin maintenance plan:   2.5 mg (2.5 mg x 1) every day   Full warfarin instructions:   : Hold; : Hold; : 1 mg; Otherwise 2.5 mg every day   Weekly warfarin total:   17.5 mg   Plan last modified:   Kasia Hercules RN (2020)   Next INR check:   2020   Priority:   High   Target end date:   Indefinite    Indications    Long-term  (current) use of anticoagulants [Z79.01] [Z79.01]  Permanent atrial fibrillation (H) [I48.21]             Anticoagulation Episode Summary     INR check location:       Preferred lab:       Send INR reminders to:   HASEEB KLEIN    Comments:   She has 5mg, 1mg and 2.5mg size pills      Anticoagulation Care Providers     Provider Role Specialty Phone number    Chandrika Kumar PA Referring [facility] [phone]            See the Encounter Report to view Anticoagulation Flowsheet and Dosing Calendar (Go to Encounters tab in chart review, and find the Anticoagulation Therapy Visit)        Kasia Hercules RN

## 2020-09-14 NOTE — PROGRESS NOTES
ANTICOAGULATION FOLLOW-UP CLINIC VISIT    Patient Name:  Michael Christiansen  Date:  2020  Contact Type:  Telephone/ spoke to homecare nurseDarlene    SUBJECTIVE:  Patient Findings     Positives:   Change in activity (minimal activity), Change in diet/appetite (no vit K intake)    Comments:   INR done by homecare nurse and result called to warfarin clinic. Per nurse report, patient only eating cream of wheat. No intake of vit K. We discussed drinking some boost or ensure as that has vit K in it. We also discussed some green tea added to diet. Nurse states patient has no bleeding/bruising. No new changes in meds/activity. She verbalized understanding of warfarin dosing/INR recheck date and has no questions.         Clinical Outcomes     Negatives:   Major bleeding event, Thromboembolic event, Anticoagulation-related hospital admission, Anticoagulation-related ED visit, Anticoagulation-related fatality    Comments:   INR done by homecare nurse and result called to warfarin clinic. Per nurse report, patient only eating cream of wheat. No intake of vit K. We discussed drinking some boost or ensure as that has vit K in it. We also discussed some green tea added to diet. Nurse states patient has no bleeding/bruising. No new changes in meds/activity. She verbalized understanding of warfarin dosing/INR recheck date and has no questions.            OBJECTIVE    Recent labs: (last 7 days)     20   INR 4.2*       ASSESSMENT / PLAN  INR assessment SUPRA no vit K intake, only eating cream of wheat  minimal activity   Recheck INR In: 2 DAYS    INR Location [facility]      Anticoagulation Summary  As of 2020    INR goal:   2.0-3.0   TTR:   42.4 % (11.1 mo)   INR used for dosin.2! (2020)   Warfarin maintenance plan:   2.5 mg (2.5 mg x 1) every day   Full warfarin instructions:   : Hold; 9/15: Hold; : Hold; Otherwise 2.5 mg every day   Weekly warfarin total:   17.5 mg   Plan last modified:    Kasia Hercules RN (9/4/2020)   Next INR check:   9/16/2020   Priority:   High   Target end date:   Indefinite    Indications    Long-term (current) use of anticoagulants [Z79.01] [Z79.01]  Permanent atrial fibrillation (H) [I48.21]             Anticoagulation Episode Summary     INR check location:       Preferred lab:       Send INR reminders to:   HASEEB KLEIN    Comments:   She has 5mg, 1mg and 2.5mg size pills      Anticoagulation Care Providers     Provider Role Specialty Phone number    Chandrika Kumar PA Referring [facility] [phone]            See the Encounter Report to view Anticoagulation Flowsheet and Dosing Calendar (Go to Encounters tab in chart review, and find the Anticoagulation Therapy Visit)        Kasia Hercules, RN

## 2020-09-15 NOTE — TELEPHONE ENCOUNTER
Push fluids. Reassess in 24-48 hrs. Odor and dark urine would not be indication for UA.  If recent abx, would not recheck unless other sx, mental status change, fever, urinary sx, etc.     Dianne Santos MD

## 2020-09-15 NOTE — TELEPHONE ENCOUNTER
Darlene from [facility] called stating the patient has increased odor and dark urine.  States she had a UTI a couple weeks ago and she would like her checked again .  States patient has been septic in the past from UTI.  She would like another UA ordered if appropriate.    [phone]  Darlene

## 2020-09-16 NOTE — PROGRESS NOTES
ANTICOAGULATION FOLLOW-UP CLINIC VISIT    Patient Name:  Michael Christiansen  Date:  9/16/2020  Contact Type:  Telephone/ spoke to homecare nurseBrittanie and DAMON Kumar    SUBJECTIVE:  Patient Findings     Positives:   Change in medications (vit K 1mg PO), Change in diet/appetite (not eating)    Comments:   INR done by homecare  NurseBrittanie, and result called to warfarin clinic. We discussed INR result, warfarin dosing/INR recheck date. We discussed patient diet and all she is eating is cream of wheat. She is drinking minimal amounts of protein supplement.  We discussed INR is elevated since Monday even though she has no warfarin.  Nurse verbalized understanding of warfarin dosing/INR recheck date and has no questions. I did also speak to Chandrika Kumar and we discussed vit K. Per DAMON Kumar, patient to get vit K. 1mg PO x 1.   I called patient daughter and let her know that there is a prescription for vit K at [place]'s pharmacy today.  Daughter verbalized understanding and will  prescription after she gets off work today. I also called homecare nurse back and let her know patient will be getting vit K.  Nurse verbalized understanding and has no questions.        Clinical Outcomes     Negatives:   Major bleeding event, Thromboembolic event, Anticoagulation-related hospital admission, Anticoagulation-related ED visit, Anticoagulation-related fatality    Comments:   INR done by homecare  NurseBrittanie, and result called to warfarin clinic. We discussed INR result, warfarin dosing/INR recheck date. We discussed patient diet and all she is eating is cream of wheat. She is drinking minimal amounts of protein supplement.  We discussed INR is elevated since Monday even though she has no warfarin.  Nurse verbalized understanding of warfarin dosing/INR recheck date and has no questions. I did also speak to Chandrika Kumar and we discussed vit K. Per DAMON Kumar, patient to get vit K. 1mg PO x 1.   I called patient daughter  and let her know that there is a prescription for vit K at [place]'s pharmacy today.  Daughter verbalized understanding and will  prescription after she gets off work today. I also called homecare nurse back and let her know patient will be getting vit K.  Nurse verbalized understanding and has no questions.           OBJECTIVE    Recent labs: (last 7 days)     20   INR 4.7*       ASSESSMENT / PLAN  INR assessment SUPRA not eating    Recheck INR In: 5 DAYS    INR Location Clinic      Anticoagulation Summary  As of 2020    INR goal:   2.0-3.0   TTR:   42.4 % (11.1 mo)   INR used for dosin.7! (2020)   Warfarin maintenance plan:   2.5 mg (2.5 mg x 1) every day   Full warfarin instructions:   : Hold; : Hold; : Hold; : Hold; : Hold; Otherwise 2.5 mg every day   Weekly warfarin total:   17.5 mg   Plan last modified:   Kasia Hercules RN (2020)   Next INR check:   2020   Priority:   High   Target end date:   Indefinite    Indications    Long-term (current) use of anticoagulants [Z79.01] [Z79.01]  Permanent atrial fibrillation (H) [I48.21]             Anticoagulation Episode Summary     INR check location:       Preferred lab:       Send INR reminders to:   HASEEB KLEIN    Comments:   She has 5mg, 1mg and 2.5mg size pills      Anticoagulation Care Providers     Provider Role Specialty Phone number    Chandrika Kumar PA Referring [facility] [phone]            See the Encounter Report to view Anticoagulation Flowsheet and Dosing Calendar (Go to Encounters tab in chart review, and find the Anticoagulation Therapy Visit)        Kasia Hercules, RN

## 2020-09-17 NOTE — PROGRESS NOTES
"Michael Christiansen is a 85 year old female who is being evaluated via a billable telephone visit.      The patient has been notified of following:     \"This telephone visit will be conducted via a call between you and your physician/provider. We have found that certain health care needs can be provided without the need for a physical exam.  This service lets us provide the care you need with a short phone conversation.  If a prescription is necessary we can send it directly to your pharmacy.  If lab work is needed we can place an order for that and you can then stop by our lab to have the test done at a later time.    Telephone visits are billed at different rates depending on your insurance coverage. During this emergency period, for some insurers they may be billed the same as an in-person visit.  Please reach out to your insurance provider with any questions.    If during the course of the call the physician/provider feels a telephone visit is not appropriate, you will not be charged for this service.\"    Patient has given verbal consent for Telephone visit?  Yes    What phone number would you like to be contacted at? [phone]    How would you like to obtain your AVS? Mail a copy    Subjective     Michael Christiansen is a 85 year old female who presents via phone visit today for the following health issues:    HPI    Concern - Pain Mangement  Onset: ongoing   Description: 9/2/20 discharged from hospital to daughter house  Intensity: severe, 10/10  Progression of Symptoms:  worsening  Accompanying Signs & Symptoms: needs increase in pain management   Previous history of similar problem: in hospice care  Precipitating factors:        Worsened by: needs increase in pain mangement  Alleviating factors:        Improved by: none  Therapies tried and outcome: None           Review of Systems   Constitutional, HEENT, cardiovascular, pulmonary, gi and gu systems are negative, except as otherwise noted.       Objective    "       Vitals:  No vitals were obtained today due to virtual visit.    alert, fatigued and speech is slurred spoke mostly with her daughter Hui.   PSYCH: Alert and oriented times 3; coherent speech, normal   rate and volume, able to articulate logical thoughts, able   to abstract reason, no tangential thoughts, no hallucinations   or delusions  Her affect is tearful and daughter stated she smiled.   RESP: No cough, no audible wheezing, able to talk in full sentences  Remainder of exam unable to be completed due to telephone visits    No results found for this or any previous visit (from the past 24 hour(s)).        Assessment/Plan:    Assessment & Plan     Pressure injury of buttock, stage 3, unspecified laterality (H)  Ricardo ulcer.  Spoke with patient and her daughter as well as her wound care and home care workers.  Culture taken, not able to get labs.     End of life care  After today's visit and discussion on her health state she is going to be admitted to hospice. Pain controlled will be followed with hospice protocols. Discussion with her Daughter Hui if she wants to further be off work. If working is causing her too much anxiety will write for her Hui to out of work .   Hui will let me know. Long discussion with Hui and Spoke with Delaney. Will do a home visit next week for follow up.          See Patient Instructions    No follow-ups on file.    MELISA Gillespie  [facility] [place]    Phone call duration:  25  minutes

## 2020-09-17 NOTE — Clinical Note
Note- admitting to hospice 9/18.  Please check my primary and secondary diagnoses.    Thanks, Sundeep

## 2020-09-18 PROBLEM — Z51.5 HOSPICE CARE PATIENT: Status: ACTIVE | Noted: 2020-01-01

## 2020-09-18 NOTE — PROGRESS NOTES
PHYSICIAN CERTIFICATION OF TERMINAL ILLNESS FOR HOSPICE BENEFIT    September 18, 2020    Michael Christiansen    1935      Effective Date of Certification    Start Date:  9/18/2020      End Date:  12/16/2020    Terminal Diagnosis:    Interstitial Lung Disease      Secondary Diagnoses:     Pulmonary hypertension       Prognosis Related Comorbidities:    VHD (valvular heart disease), status post TAVR (TRANSCATHETER AORTIC VALVE REPLACEMENT)    Atrial fibrillation, chronic    Hypertension           Narrative Statement:  Client suffers from interstitial lung disease complicated by pulmonary hypertension.  Within the last several months she has suffed a syncopal episode with a fall resulting in left hip fracture and more recently hospitalized with hospital acquired pneumonia with respiratory failure and hypoxia.  She was discharged home . She is on maximal tolerated medical therapy.  Client continues to decline. Client resides at home and requires assistance with ADL's.  Goals of symptom control will be met.  Because of the progressive nature of the illness, anticipated disease trajectory, as well as associated comorbidities, client qualifies for hospice care.             I certify that this patient is terminally ill and has a life expectancy of 6 months or less if the terminal illness runs its anticipated course.        Attestation:  I confirm that this narrative was composed by myself and is based on review of the medical record and/or examination of the patient.            Geo Henderson MD

## 2020-09-18 NOTE — PROGRESS NOTES
Hospice Consultation, Physician Face to Face and Certification of Terminal Illness    HISTORY OF PRESENT ILLNESS:  Michael is a 85 year old female, (1935),   is being seen today for a Medicare Hospice Face to Face consultation visit.  She is currently on skilled home care services.   Patient is seen with family present.   Client suffers from pulmonary arterial hypertension.    Secondary diagnoses include: chronic arterial hypertension, interstitial lung disease, chronic atrial fibrillation, coronary artery disease, essential hypertension, tricuspid insufficiency and valvular heart disease.   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      Current symptoms include uncontrolled pain, shortness of breath with exertion, general weakness, weight loss, poor appetite.    The patient feels that these are managed not well.    Additional history:  Patient's health started declining in May, when she had a heart valve replaced on 5/6/2020.  On 5/8 she experienced a TIA, the same day as her discharge from the hospital.  She had repeat TIA's on 5/12,13, & 14.  She collapsed and was transferred back to [place] on 5/14.  She was discharged to [facility] for one week, then back to home, where she fell and fractured her right hip.  She had an ORIF done and was discharged to [facility] again.  She developed a fever during her stay there and was transferred back to [place] with respiratory failure, pneumonia and sepsis.  She was started on oxygen and neb treatments.  She returned home two weeks ago (9/2/2020), and at that time the skin on her sacral area was pink.  A week ago the nurses noted that there were a few pinpoint open areas, and in a week this area has progressed to a large eschar with foul smelling drainage. I am asked to evaluate this  today.  She has lost 20# since July of 2020.   Current medications, allergies, and interdisciplinary plan of care is reviewed.    ROS:  Review Of Systems  Skin: ulcer on sacrum  Eyes: glasses, macular degeneration  Ears/Nose/Throat: difficulty swallowing, uses thick it for liquids  Respiratory: Shortness of breath- with exertion and uses oxygen continuous at 2 liters  Cardiovascular: feet become cool and purple when dependent and irregular heart beat, pulmonary hypertension  Gastrointestinal: abdominal pain and constipation  Genitourinary: incontinence and hx breast cancer  Musculoskeletal: back pain, arthritis, joint pain and muscular weakness  Neurologic: hx of TIA   Psychiatric: anxiety and depression  Hematologic/Lymphatic/Immunologic: negative  Endocrine: negative    Karnovsky scale: 30%  Palliative performance scale: 30    PHYSICAL EXAMINATION:  O2 sat 94% while on 2 liters of oxygen continuous, AP 80  GENERAL:  Chronically ill appearing. Thin elderly female, confined to bed.  HEENT:  Eyes grossly normal to inspection.  Hearing intact.  NECK:  No lymphadenopathy or thyromegaly.   PULM:  Lungs clear to auscultation. Breath sounds reduced throughout  CV:  RRR, with no murmur.  No clicks or rubs appreciated.  ABDOMEN:  Soft, no masses or hepatosplenomegaly.  SKIN:  Age related changes. Ulcer present on sacrum, more to the right side.  Ulcer is a stage 4, close to the bone.  Wound bed is covered with rubbery thick tan/brown eschar.  Drainage is foul smelling, tan to grey in color.  Skin surrounding is pink.  Area partially debrided, (see below) to allow drainage to escape.  Area measures 10.0 x 4.5 cm, the eschar itself measures 7.0 x 3.5 cm and is 0.9 cm deep.  I cannot probe bone, but it is likely this ulcer goes to or very near the bone.  Area cleansed with saline and gauze and dressed with silver hydrofiber and a bordered foam dressing.  EXTREM:  No edema.  Pulses - not able to palpate pedal pulses, feet are  "cool.  PSYCH:  Alert and oriented. Affect is sad after hospice discussion, with a few tears.     Sharp Debridement -   Verified name,  and site as part of time out done prior to procedure. Patient was informed of the risks and benefits and consented to the procedure.  Two identifiers were used and a time out was completed.    Conservative debridement was completed with sterile scissors and pick ups  to remove a central portion of loose eschar to allow drainage (< 20 sq cm)   Patient response: good  Pain: none  Bleeding:none    Lab/Diagnostic data:    Reviewed    ASSESSMENT/PLAN:  1) Long conversation held with family and patient about poor prognosis. Delaney's daughter and two grandchildren were present. They do elect hospice at this time.  Delaney agrees to treatment at home, but still wants to have some hope and try to heal her ulcer if they can.  2) Family have requested to start oral magnesium and zinc, for healing.    3) Delaney will continue to drink her protein beverages  4) Provide a APM mattress under hospice  5) Pain control- we will start with oral morphine, family will document how much they are giving to keep her comfortable and we will look at extended release morphine after the weekend, dose dependent on how much she needed to be comfortable over the weekend  6) I spoke with her PCP Chandrika Kumar and this plan was formulated  7) Ulcer cultured today    Based on interview, examination, and review of pertinent clinical information, I certify patient qualifies for continued hospice care.    Attestation: I confirm that this narrative was composed by myself and is based on my review of the patient's medical record and results of the face to face encounter above.    Total time spent with patient visit was 60\".    Kierra Antunez CNP, [facility]  Hospice and Palliative Care          "

## 2020-09-21 ENCOUNTER — TELEPHONE (OUTPATIENT)
Dept: FAMILY MEDICINE | Facility: OTHER | Age: 85
End: 2020-09-21

## 2020-09-21 DIAGNOSIS — L89.314 PRESSURE INJURY OF RIGHT BUTTOCK, STAGE 4 (H): ICD-10-CM

## 2020-09-21 DIAGNOSIS — A49.01 STAPH AUREUS INFECTION: Primary | ICD-10-CM

## 2020-09-21 RX ORDER — SULFAMETHOXAZOLE AND TRIMETHOPRIM 200; 40 MG/5ML; MG/5ML
SUSPENSION ORAL 2 TIMES DAILY
Status: CANCELLED | OUTPATIENT
Start: 2020-09-21

## 2020-09-21 NOTE — TELEPHONE ENCOUNTER
Alka from  Homecare calling and PCP to do a home visit today.     Patient passed away last night at 9:30p.m.    Please note.      Georgia Cutler RN

## 2020-09-24 LAB
BACTERIA SPEC CULT: ABNORMAL
SPECIMEN SOURCE: ABNORMAL

## 2020-09-25 ENCOUNTER — DOCUMENTATION ONLY (OUTPATIENT)
Dept: FAMILY MEDICINE | Facility: OTHER | Age: 85
End: 2020-09-25

## 2020-09-25 NOTE — PROGRESS NOTES
I called yesterday to speak to Michael's (Delaney's) daughter Hui.  Hui is grieving and this happened so fast she is having a hard time processing everything.  I expressed my condolences and spent some time allowing her to grieve and express her feelings.  I asked her to let me know if there was anything I could do to help her.  Our bereavement  will follow up with her.  Kierra Antunez  CNP, CWOCN  Hospice and Palliative Care

## 2021-04-29 NOTE — NURSING NOTE
"Chief Complaint   Patient presents with     Musculoskeletal Problem       Initial /70  Pulse 79  Temp 97.2  F (36.2  C)  Ht 5' 5\" (1.651 m)  Wt 117 lb (53.1 kg)  SpO2 95%  BMI 19.47 kg/m2 Estimated body mass index is 19.47 kg/(m^2) as calculated from the following:    Height as of this encounter: 5' 5\" (1.651 m).    Weight as of this encounter: 117 lb (53.1 kg).  Medication Reconciliation: complete    NETTIE Florentino    " Attempted to reach pt via phone. Left message to call back regarding results.

## 2022-10-19 NOTE — ED NOTES
Emergency Department Assessment  Reason for Referral: Help at home  PCP: Chandrika Kumar  Specialists or MH providers: Sandi Stein, oncology  Pharmacy: [facility]  Medication routine/concerns sets up own meds, no issues  Cognitive Behavioral Status: awake, alert and oriented    Affect and Mood Status: appears anxious    Problems sleeping: sleeps on couch to keep an eye on her spouse    Mental Health History: anxiety  Recent Stressors: denies    Case Manger/: no    Support System: son who lives with them and other children  Transportation: drives    Current Living Situation:    Home Setting: Two-story   Living Situation:Spouse and Children   Function Status/Assistive Device: wheeled walker and single end cane    Employment/Financial/Insurance Concerns:retired     No Insurance issues identified     Patient's insurance coverage: [unfilled]     [facility]  [facility]  Health Care Directive: na  Previous Services at Home or in the Community:[facility] pt's [facility]  Falls at home?: yes before easter, cracked ribs    ADL's:independent  Equipment at home?: walker, cane  O2: no  Smoker: no  ETOH: no  THC/Drugs: no    Any Violence in the home?: no  Do you feel safe at home?: yes    Smoke alarm/Carbon monoxide detectors in home: na :     Plan: home.  I will set up follow up with patients primary.  She has had significant wt loss 40 pounds in the past few months.  She said she is having a lot of issues with anxiety and this causes her stomach to hurt and then she isn't able to eat.  She drinks ensure shakes.  She stated she tried to get in to see her pcp but there were no openings.  I have called the clinic and they will call her as Chandrika is booked up.  I will send her a staff message as well regarding concerns.  Pt states she is very worried something is going to happen to her  and they have been together 56 years.  She said he gets  Medication: clonazepam   [facility] but  is not homebound so no other services at this time.  Pt's spouse did come in the room at the end of my interview.  He ambulates independently with a walker.  His speech is difficult to understand.  He has Parkinson's disease.  Pt doesn't wish for a complimentary visit or other referrals at this time.  Pt likely to go home after work up.  Will follow as needed.    Tessa Castro, MARCO   Care Transitions

## 2023-01-23 NOTE — PROGRESS NOTES
- monitor BMP; replete PRN    Pt ate , voided and walked. Family at the bedside. AVSS.   /81   Pulse 73   Temp 98.1  F (36.7  C) (Oral)   Resp 16   SpO2 95%

## 2023-04-19 NOTE — ED AVS SNAPSHOT
[facility]  [address] [place] [address]  Phone:  [phone]                                    Michael Christiansen   MRN: [id number]    Department:  [facility]   Date of Visit:  5/28/2019           After Visit Summary Signature Page    I have received my discharge instructions, and my questions have been answered. I have discussed any challenges I see with this plan with the nurse or doctor.    ..........................................................................................................................................  Patient/Patient Representative Signature      ..........................................................................................................................................  Patient Representative Print Name and Relationship to Patient    ..................................................               ................................................  Date                                   Time    ..........................................................................................................................................  Reviewed by Signature/Title    ...................................................              ..............................................  Date                                               Time          22EPIC Rev 08/18        22

## 2023-08-07 NOTE — TELEPHONE ENCOUNTER
Not on protocol. Please advise. Thank you!       Initiate Treatment: Terbenafine 250mg tablet once daily for six weeks\\nKetoconazole 2% cream bid for six weeks. Jundred Lowers Detail Level: Zone Render In Strict Bullet Format?: No Plan: Do bloodwork after the six weeks and if everything is okay can do another round for six weeks

## 2024-04-25 NOTE — MR AVS SNAPSHOT
After Visit Summary   8/9/2017    Michael Christiansen    MRN: [id number]           Patient Information     Date Of Birth          1935        Visit Information        Provider Department      8/9/2017 10:45 AM Chandrika Kumar PA [facility]        Today's Diagnoses     Chronic left-sided low back pain with left-sided sciatica    -  1    Osteoporosis, unspecified osteoporosis type, unspecified pathological fracture presence        Age related osteoporosis, unspecified pathological fracture presence          Care Instructions      Thank you for choosing [facility].   I have office hours 8:00 am to 4:30 pm on Monday's, Wednesday's, Thursday's and Friday's. My nurse and I are out of the office every Tuesday.    Following your visit, when your labs and diagnostic testing have returned, I will review then and you will be contacted by my nurse.  If you are on My Chart, you can also view results there.    For refills, notify your pharmacy regarding what you need and the pharmacy will generate a refill request. Do not call my nurse as she is unable to process refill request. Please plan ahead and allow 3-5 days for refill requests.    You will generally receive a reminder call the day prior to your appointment.  If you cannot attend your appointment, please cancel your appointment with as much notice as possible.  If there is a pattern of failure to present for your appointments, I cannot provide consistent, meaningful, ongoing care for you. It is very important to me that you come in for your care, so we can best assist you with your health care needs.    IMPORTANT:  Please note that it is my standard of practice to NOT participate in prescribing ongoing requested Narcotic Analgesic therapy, and/or participate in the prescribing of other controlled substances.  My nurse and I am happy to assist you with the process of referral for alternative pain management as needed, and other  "treatment modalities including but not limited to:  Physical Therapy, Physical Medicine and Rehab, Counseling, Chiropractic Care, Orthopedic Care, and non-narcotic medication management.     In the event that you need to be seen for emergent concerns and I am out of office,  please see one of my colleagues for acute concerns.  You may also present to UC or ER.  I appreciate the opportunity to serve you and look forward to supporting your healthcare needs in the future. Please contact me with any questions or concerns that you may have.    Sincerely,      Chandrika Kumar RN, PA-C               Follow-ups after your visit        Additional Services     PHYSICAL THERAPY REFERRAL       *This therapy referral will be filtered to a centralized scheduling office at [facility] and the patient will receive a call to schedule an appointment at a [facility] location most convenient for them. *     [facility] provides Physical Therapy evaluation and treatment and many specialty services across the [facility] system.  If requesting a specialty program, please choose from the list below.    If you have not heard from the scheduling office within 2 business days, please call [phone] for all locations, with the exception of [place], please call [phone].  Treatment: Evaluation & Treatment  Special Instructions/Modalities: sciatica copy of xray can be sent to [facility]  Special Programs: low back pain      Please be aware that coverage of these services is subject to the terms and limitations of your health insurance plan.  Call member services at your health plan with any benefit or coverage questions.      **Note to Provider:  If you are referring outside of [facility] for the therapy appointment, please list the name of the location in the \"special instructions\" above, print the referral and give to the patient to schedule the appointment.                  Your next 10 appointments " already scheduled     Sep 06, 2017  9:45 AM CDT   Anticoagulation Visit with HC ANTI COAGULATION   [facility] ([facility] )    [address]  [place] [address]   [phone]            Dec 21, 2017 10:15 AM CST   (Arrive by 10:00 AM)   Office Visit with MELISA Murray   [facility] ([facility] )    [address]  [facility] [address]   [phone]            Dec 27, 2017 12:15 PM CST   LAB with HC LAB   [facility] ([facility] )    [address]  [facility] [address]   [phone]            Dec 27, 2017  1:00 PM CST   [facility]   [facility] ([facility] )    [address] [id number] [phone]            Jan 03, 2018  1:00 PM CST   (Arrive by 12:45 PM)   Return Visit with Noemy Mahajan NP   [facility] ([facility] )    [address]  [facility] [address]   [phone]              Who to contact     If you have questions or need follow up information about today's clinic visit or your schedule please contact [facility]] directly at [phone].  Normal or non-critical lab and imaging results will be communicated to you by MyChart, letter or phone within 4 business days after the clinic has received the results. If you do not hear from us within 7 days, please contact the clinic through MyChart or phone. If you have a critical or abnormal lab result, we will notify you by phone as soon as possible.  Submit refill requests through [employer] or call your pharmacy and they will forward the refill request to us. Please allow 3 business days for your refill to be completed.          Additional Information About Your Visit        Care EveryWhere ID     This is your Care EveryWhere ID. This could be used by other organizations to access your [facility] medical records  [id number]        Your Vitals  Were     Pulse Temperature Pulse Oximetry Breastfeeding? BMI (Body Mass Index)       62 98  F (36.7  C) (Tympanic) 96% No 23.03 kg/m2        Blood Pressure from Last 3 Encounters:   08/09/17 116/62   08/02/17 120/84   07/06/17 130/76    Weight from Last 3 Encounters:   08/09/17 130 lb (59 kg)   08/02/17 131 lb 14.4 oz (59.8 kg)   07/06/17 130 lb (59 kg)              We Performed the Following     PHYSICAL THERAPY REFERRAL          Today's Medication Changes          These changes are accurate as of: 8/9/17 11:37 AM.  If you have any questions, ask your nurse or doctor.               Start taking these medicines.        Dose/Directions    methylPREDNISolone 4 MG tablet   Commonly known as:  MEDROL DOSEPAK   Used for:  Chronic left-sided low back pain with left-sided sciatica   Started by:  Chandrika Kumar PA        Follow package instructions   Quantity:  21 tablet   Refills:  0            Where to get your medicines      These medications were sent to [id number] [place] - [address]   [address] [address] [facility] [address], [facility] [address]     Phone:  [phone]     alendronate 70 MG tablet    methylPREDNISolone 4 MG tablet                Primary Care Provider Office Phone # Fax #    MELISA Murray [phone]       [facility] [address]  [facility] [address]        Equal Access to Services     [facility] AH: Hadii aad ku hadasho Soomaali, waaxda luqadaha, qaybta kaalmada adeegyada, topher finley. So [facility] [phone].    ATENCIÓN: Si habla español, tiene a shay disposición servicios gratuitos de asistencia lingüística. Gera al [phone].    We comply with applicable federal civil rights laws and [place] laws. We do not discriminate on the basis of race, color, national origin, age, disability sex, sexual orientation or gender identity.            Thank you!     Thank you for choosing [facility]  for your care. Our goal is  always to provide you with excellent care. Hearing back from our patients is one way we can continue to improve our services. Please take a few minutes to complete the written survey that you may receive in the mail after your visit with us. Thank you!             Your Updated Medication List - Protect others around you: Learn how to safely use, store and throw away your medicines at www.disposemymeds.org.          This list is accurate as of: 8/9/17 11:37 AM.  Always use your most recent med list.                   Brand Name Dispense Instructions for use Diagnosis    alendronate 70 MG tablet    FOSAMAX    4 tablet    Take 1 tablet (70 mg) by mouth every 7 days Take 60 minutes before am meal with 8 oz. water. Remain upright for 30 minutes.    Age related osteoporosis, unspecified pathological fracture presence       CALCITRATE/VITAMIN D PO      Take  by mouth 2 times daily.        diltiazem 60 MG tablet    CARDIZEM    93 tablet    TAKE ONE TABLET BY MOUTH THREE TIMES DAILY    Benign essential hypertension       losartan 50 MG tablet    COZAAR    30 tablet    TAKE ONE TABLET BY MOUTH ONCE DAILY    HTN (hypertension)       methylPREDNISolone 4 MG tablet    MEDROL DOSEPAK    21 tablet    Follow package instructions    Chronic left-sided low back pain with left-sided sciatica       MULTIVITAMINS PO      Take  by mouth daily.        OSTEO BI-FLEX ADV DOUBLE ST PO      Take  by mouth daily.        simvastatin 40 MG tablet    ZOCOR    45 tablet    TAKE ONE-HALF TABLET BY MOUTH ONCE DAILY IN THE EVENING    Hyperlipidemia LDL goal <100       traMADol 50 MG tablet    ULTRAM    180 tablet    TAKE ONE TO TWO TABLETS BY MOUTH EVERY 6 HOURS AS NEEDED FOR PAIN    Chronic pain syndrome       TYLENOL PO      Take 500 mg by mouth        warfarin 5 MG tablet    COUMADIN    90 tablet    TAKE ONE TABLET (5 MG) BY MOUTH MONDAY, WEDNESDAY, FRIDAY, AND ONE-HALF TABLET (2.5 MG) THE REST OF THE WEEK.    Atrial fibrillation (H), Long term  current use of anticoagulant therapy          No

## 2024-07-19 NOTE — PROGRESS NOTES
Fax received, [facility] will be resulted in 5-10 business days.   All forms sent to wayne Soto RN     Medication sent to [facility] per patient request.  This RN called [facility] and left voicemail to cancel script sent 7/18/2024.  [employer] message sent to patient to notify.

## (undated) DEVICE — MOUTHPIECE W/GUARD FOR ENDOSCOPY

## (undated) DEVICE — 0.035IN X 260CM, EMERALD DIAGNOSTIC GUIDEWIRE, FIXED-CORE PTFE COATED, STANDARD, 3MM EXCHANGE J-TIP (EA/1)

## (undated) DEVICE — FORCEP-COLON BIOPSY STD W/NEEDLE 160CM

## (undated) DEVICE — LUBRICANT JELLY 2OZ. TUBE

## (undated) DEVICE — IRRIGATION-H2O 1000ML

## (undated) DEVICE — Device

## (undated) DEVICE — SHTH INTRO 0.021IN ID 6FR DIA

## (undated) DEVICE — CANISTER-SUCTION 2000CC

## (undated) DEVICE — SYRINGE-30CC SLIP TIP

## (undated) DEVICE — KIT HAND CONTROL ACIST 014644 AR-P54

## (undated) DEVICE — GLIDEWIRE TERUMO .035X180CM 1.5,, J-TIP GR3525

## (undated) DEVICE — MANIFOLD KIT ANGIO AUTOMATED 014613

## (undated) DEVICE — CATH ANGIO INFINITI AR MOD 4FRX100CM 538448

## (undated) DEVICE — INTRO SHEATH MICRO PLATINUM TIP 4FRX40CM 7274

## (undated) DEVICE — CONNECTOR-ERBEFLO 2 PORT

## (undated) DEVICE — INTRO SHEATH 7FRX10CM PINNACLE RSS702

## (undated) DEVICE — TUBING PRESSURE 30"

## (undated) DEVICE — FASTENER CATH BALLOON CLAMPX2 STATLOCK 0684-00-493

## (undated) DEVICE — SLEEVE TR BAND RADIAL COMPRESSION DEVICE 24CM TRB24-REG

## (undated) DEVICE — CATH ANGIO INFINITI 3DRC 4FRX100CM 538476

## (undated) DEVICE — TUBING-SUCTION 20FT

## (undated) DEVICE — PACK HEART LEFT CUSTOM

## (undated) RX ORDER — LIDOCAINE HYDROCHLORIDE 20 MG/ML
INJECTION, SOLUTION EPIDURAL; INFILTRATION; INTRACAUDAL; PERINEURAL
Status: DISPENSED
Start: 2019-01-01

## (undated) RX ORDER — FENTANYL CITRATE 50 UG/ML
INJECTION, SOLUTION INTRAMUSCULAR; INTRAVENOUS
Status: DISPENSED
Start: 2020-01-01

## (undated) RX ORDER — SODIUM CHLORIDE 9 MG/ML
INJECTION, SOLUTION INTRAVENOUS
Status: DISPENSED
Start: 2020-01-01

## (undated) RX ORDER — HEPARIN SODIUM 1000 [USP'U]/ML
INJECTION, SOLUTION INTRAVENOUS; SUBCUTANEOUS
Status: DISPENSED
Start: 2020-01-01

## (undated) RX ORDER — ASPIRIN 325 MG
TABLET ORAL
Status: DISPENSED
Start: 2020-01-01

## (undated) RX ORDER — NITROGLYCERIN 5 MG/ML
VIAL (ML) INTRAVENOUS
Status: DISPENSED
Start: 2020-01-01

## (undated) RX ORDER — PROPOFOL 10 MG/ML
INJECTION, EMULSION INTRAVENOUS
Status: DISPENSED
Start: 2019-01-01

## (undated) RX ORDER — VERAPAMIL HYDROCHLORIDE 2.5 MG/ML
INJECTION, SOLUTION INTRAVENOUS
Status: DISPENSED
Start: 2020-01-01